# Patient Record
Sex: FEMALE | Race: ASIAN | NOT HISPANIC OR LATINO | Employment: UNEMPLOYED | URBAN - METROPOLITAN AREA
[De-identification: names, ages, dates, MRNs, and addresses within clinical notes are randomized per-mention and may not be internally consistent; named-entity substitution may affect disease eponyms.]

---

## 2017-04-28 PROBLEM — K21.9 GASTROESOPHAGEAL REFLUX DISEASE WITHOUT ESOPHAGITIS: Status: ACTIVE | Noted: 2017-04-28

## 2017-06-15 ENCOUNTER — HOSPITAL ENCOUNTER (EMERGENCY)
Facility: HOSPITAL | Age: 32
Discharge: HOME | End: 2017-06-15
Payer: COMMERCIAL

## 2017-06-15 VITALS
OXYGEN SATURATION: 99 % | DIASTOLIC BLOOD PRESSURE: 53 MMHG | HEART RATE: 84 BPM | TEMPERATURE: 98 F | WEIGHT: 103 LBS | RESPIRATION RATE: 20 BRPM | SYSTOLIC BLOOD PRESSURE: 109 MMHG | BODY MASS INDEX: 22.22 KG/M2 | HEIGHT: 57 IN

## 2017-06-15 DIAGNOSIS — R10.9 RIGHT FLANK PAIN: ICD-10-CM

## 2017-06-15 DIAGNOSIS — R11.0 NAUSEA: ICD-10-CM

## 2017-06-15 DIAGNOSIS — R10.31 RIGHT LOWER QUADRANT ABDOMINAL PAIN: Primary | ICD-10-CM

## 2017-06-15 LAB
ALBUMIN SERPL BCP-MCNC: 3.8 G/DL
ALP SERPL-CCNC: 43 U/L
ALT SERPL W/O P-5'-P-CCNC: 16 U/L
ANION GAP SERPL CALC-SCNC: 8 MMOL/L
AST SERPL-CCNC: 25 U/L
B-HCG UR QL: NEGATIVE
BASOPHILS # BLD AUTO: 0.02 K/UL
BASOPHILS NFR BLD: 0.2 %
BILIRUB SERPL-MCNC: 0.3 MG/DL
BILIRUB UR QL STRIP: NEGATIVE
BUN SERPL-MCNC: 9 MG/DL
CALCIUM SERPL-MCNC: 9.1 MG/DL
CHLORIDE SERPL-SCNC: 106 MMOL/L
CLARITY UR: CLEAR
CO2 SERPL-SCNC: 22 MMOL/L
COLOR UR: COLORLESS
CREAT SERPL-MCNC: 0.7 MG/DL
CTP QC/QA: YES
DIFFERENTIAL METHOD: ABNORMAL
EOSINOPHIL # BLD AUTO: 0.1 K/UL
EOSINOPHIL NFR BLD: 1.1 %
ERYTHROCYTE [DISTWIDTH] IN BLOOD BY AUTOMATED COUNT: 16.8 %
EST. GFR  (AFRICAN AMERICAN): >60 ML/MIN/1.73 M^2
EST. GFR  (NON AFRICAN AMERICAN): >60 ML/MIN/1.73 M^2
GLUCOSE SERPL-MCNC: 83 MG/DL
GLUCOSE UR QL STRIP: NEGATIVE
HCT VFR BLD AUTO: 35 %
HGB BLD-MCNC: 11.1 G/DL
HGB UR QL STRIP: NEGATIVE
KETONES UR QL STRIP: NEGATIVE
LEUKOCYTE ESTERASE UR QL STRIP: ABNORMAL
LIPASE SERPL-CCNC: 20 U/L
LYMPHOCYTES # BLD AUTO: 1 K/UL
LYMPHOCYTES NFR BLD: 9.5 %
MCH RBC QN AUTO: 24.4 PG
MCHC RBC AUTO-ENTMCNC: 31.7 %
MCV RBC AUTO: 77 FL
MICROSCOPIC COMMENT: NORMAL
MONOCYTES # BLD AUTO: 0.8 K/UL
MONOCYTES NFR BLD: 7.7 %
NEUTROPHILS # BLD AUTO: 8.3 K/UL
NEUTROPHILS NFR BLD: 81.5 %
NITRITE UR QL STRIP: NEGATIVE
PH UR STRIP: 7 [PH] (ref 5–8)
PLATELET # BLD AUTO: 258 K/UL
PMV BLD AUTO: 9.8 FL
POTASSIUM SERPL-SCNC: 3.9 MMOL/L
PROT SERPL-MCNC: 7.6 G/DL
PROT UR QL STRIP: NEGATIVE
RBC # BLD AUTO: 4.55 M/UL
RBC #/AREA URNS HPF: 0 /HPF (ref 0–4)
SODIUM SERPL-SCNC: 136 MMOL/L
SP GR UR STRIP: 1 (ref 1–1.03)
SQUAMOUS #/AREA URNS HPF: NORMAL /HPF
URN SPEC COLLECT METH UR: ABNORMAL
UROBILINOGEN UR STRIP-ACNC: NEGATIVE EU/DL
WBC # BLD AUTO: 10.12 K/UL
WBC #/AREA URNS HPF: 1 /HPF (ref 0–5)

## 2017-06-15 PROCEDURE — 81025 URINE PREGNANCY TEST: CPT | Performed by: EMERGENCY MEDICINE

## 2017-06-15 PROCEDURE — 99284 EMERGENCY DEPT VISIT MOD MDM: CPT | Mod: 25

## 2017-06-15 PROCEDURE — 81000 URINALYSIS NONAUTO W/SCOPE: CPT

## 2017-06-15 PROCEDURE — 96375 TX/PRO/DX INJ NEW DRUG ADDON: CPT

## 2017-06-15 PROCEDURE — 63600175 PHARM REV CODE 636 W HCPCS: Performed by: NURSE PRACTITIONER

## 2017-06-15 PROCEDURE — 96361 HYDRATE IV INFUSION ADD-ON: CPT

## 2017-06-15 PROCEDURE — 25000003 PHARM REV CODE 250: Performed by: NURSE PRACTITIONER

## 2017-06-15 PROCEDURE — 25500020 PHARM REV CODE 255

## 2017-06-15 PROCEDURE — 85025 COMPLETE CBC W/AUTO DIFF WBC: CPT

## 2017-06-15 PROCEDURE — 80053 COMPREHEN METABOLIC PANEL: CPT

## 2017-06-15 PROCEDURE — 96372 THER/PROPH/DIAG INJ SC/IM: CPT

## 2017-06-15 PROCEDURE — 83690 ASSAY OF LIPASE: CPT

## 2017-06-15 PROCEDURE — 96365 THER/PROPH/DIAG IV INF INIT: CPT

## 2017-06-15 RX ORDER — DICYCLOMINE HYDROCHLORIDE 10 MG/ML
20 INJECTION INTRAMUSCULAR
Status: COMPLETED | OUTPATIENT
Start: 2017-06-15 | End: 2017-06-15

## 2017-06-15 RX ORDER — TRAMADOL HYDROCHLORIDE 50 MG/1
50 TABLET ORAL EVERY 6 HOURS PRN
Qty: 12 TABLET | Refills: 0 | Status: SHIPPED | OUTPATIENT
Start: 2017-06-15 | End: 2017-06-25

## 2017-06-15 RX ORDER — DICYCLOMINE HYDROCHLORIDE 20 MG/1
20 TABLET ORAL 2 TIMES DAILY
Qty: 20 TABLET | Refills: 0 | Status: SHIPPED | OUTPATIENT
Start: 2017-06-15 | End: 2017-07-15

## 2017-06-15 RX ORDER — ONDANSETRON 8 MG/1
8 TABLET, ORALLY DISINTEGRATING ORAL EVERY 6 HOURS PRN
Qty: 20 TABLET | Refills: 0 | Status: SHIPPED | OUTPATIENT
Start: 2017-06-15 | End: 2018-05-03

## 2017-06-15 RX ORDER — KETOROLAC TROMETHAMINE 30 MG/ML
30 INJECTION, SOLUTION INTRAMUSCULAR; INTRAVENOUS
Status: COMPLETED | OUTPATIENT
Start: 2017-06-15 | End: 2017-06-15

## 2017-06-15 RX ORDER — ONDANSETRON 2 MG/ML
8 INJECTION INTRAMUSCULAR; INTRAVENOUS
Status: COMPLETED | OUTPATIENT
Start: 2017-06-15 | End: 2017-06-15

## 2017-06-15 RX ORDER — MORPHINE SULFATE 10 MG/ML
4 INJECTION INTRAMUSCULAR; INTRAVENOUS; SUBCUTANEOUS
Status: COMPLETED | OUTPATIENT
Start: 2017-06-15 | End: 2017-06-15

## 2017-06-15 RX ADMIN — IOHEXOL 75 ML: 350 INJECTION, SOLUTION INTRAVENOUS at 01:06

## 2017-06-15 RX ADMIN — DICYCLOMINE HYDROCHLORIDE 20 MG: 10 INJECTION INTRAMUSCULAR at 12:06

## 2017-06-15 RX ADMIN — KETOROLAC TROMETHAMINE 30 MG: 30 INJECTION, SOLUTION INTRAMUSCULAR at 02:06

## 2017-06-15 RX ADMIN — PROMETHAZINE HYDROCHLORIDE 12.5 MG: 25 INJECTION INTRAMUSCULAR; INTRAVENOUS at 02:06

## 2017-06-15 RX ADMIN — ONDANSETRON 8 MG: 2 INJECTION INTRAMUSCULAR; INTRAVENOUS at 12:06

## 2017-06-15 RX ADMIN — SODIUM CHLORIDE 1000 ML: 0.9 INJECTION, SOLUTION INTRAVENOUS at 11:06

## 2017-06-15 RX ADMIN — MORPHINE SULFATE 4 MG: 10 INJECTION INTRAVENOUS at 02:06

## 2017-06-15 NOTE — ED PROVIDER NOTES
"Encounter Date: 6/15/2017    SCRIBE #1 NOTE: I, Mat Morel, am scribing for, and in the presence of,  Aguilar Gaona NP. I have scribed the following portions of the note - Other sections scribed: HPI and ROS.       History     Chief Complaint   Patient presents with    Abdominal Pain     Intermittent cramping w/ constant burning that is generalized since yesterday. +Nausea. Patient reports "I feel like its squeezing."      Review of patient's allergies indicates:  No Known Allergies  CC: Abdominal Pain     HPI: This 31 y.o F with anemia, GERD, an ovarian cyst and hypoglycemia presents to the ED c/o acute onset of constant and severe (10/10) generalized abdominal pain described as "cramping and burning" with associated nausea and emesis which began this AM. Pt also reports sharp back pain. Pt notes her abdominal pain is more severe on the R side. Pt reports intermittent abdominal pain since the birth of her child which worsened after a colonoscopy that was performed approximately 2 months ago. Pt has been seen by a gastroenterologist who performed an endoscopy, colonoscopy and CT. Pt was dx with "excessive acid." Pt denies fever, chills and diaphoresis. Pt attempted tx with Mylanta which did not provide relief.       The history is provided by the patient. No  was used.     Past Medical History:   Diagnosis Date    Anemia     GERD (gastroesophageal reflux disease)     Hypoglycemia     Hypoglycemia      No past surgical history on file.  Family History   Problem Relation Age of Onset    Hypertension Mother     Hyperlipidemia Mother      Social History   Substance Use Topics    Smoking status: Never Smoker    Smokeless tobacco: Not on file    Alcohol use No     Review of Systems   Constitutional: Negative for fever.   HENT: Positive for sore throat. Negative for congestion.    Respiratory: Negative for shortness of breath.    Cardiovascular: Negative for chest pain.   Gastrointestinal: " "Positive for abdominal pain (generalized "cramping and burning" ) and vomiting. Negative for nausea.   Genitourinary: Negative for dysuria, pelvic pain and urgency.   Musculoskeletal: Positive for back pain ("sharp").   Skin: Negative for rash.   Neurological: Negative for weakness.   Hematological: Does not bruise/bleed easily.       Physical Exam     Initial Vitals [06/15/17 1007]   BP Pulse Resp Temp SpO2   135/64 99 (!) 22 98 °F (36.7 °C) 99 %     Physical Exam    Nursing note and vitals reviewed.  Constitutional: She appears well-developed and well-nourished. She is not diaphoretic. No distress.   HENT:   Head: Normocephalic and atraumatic.   Right Ear: External ear normal.   Left Ear: External ear normal.   Nose: Nose normal.   Eyes: Conjunctivae and EOM are normal. Pupils are equal, round, and reactive to light. Right eye exhibits no discharge. Left eye exhibits no discharge. No scleral icterus.   Neck: Normal range of motion. Neck supple. No thyromegaly present. No tracheal deviation present. No JVD present.   Cardiovascular: Normal rate, regular rhythm, normal heart sounds and intact distal pulses. Exam reveals no gallop and no friction rub.    No murmur heard.  Pulmonary/Chest: Breath sounds normal. No stridor. No respiratory distress. She has no wheezes. She has no rhonchi. She has no rales.   Abdominal: Soft. Normal appearance and bowel sounds are normal. She exhibits no distension and no mass. There is no hepatosplenomegaly. There is tenderness in the right upper quadrant and right lower quadrant. There is no rigidity, no rebound, no guarding, no CVA tenderness, no tenderness at McBurney's point and negative Casillas's sign. No hernia.   Musculoskeletal: Normal range of motion. She exhibits no edema or tenderness.   Lymphadenopathy:     She has no cervical adenopathy.   Neurological: She is alert and oriented to person, place, and time. She has normal strength and normal reflexes. She displays normal " reflexes. No cranial nerve deficit or sensory deficit.   Skin: Skin is warm and dry. No rash and no abscess noted. No erythema. No pallor.   Psychiatric: She has a normal mood and affect. Her behavior is normal. Judgment and thought content normal.         ED Course   Procedures  Labs Reviewed   CBC W/ AUTO DIFFERENTIAL - Abnormal; Notable for the following:        Result Value    Hemoglobin 11.1 (*)     Hematocrit 35.0 (*)     MCV 77 (*)     MCH 24.4 (*)     MCHC 31.7 (*)     RDW 16.8 (*)     Gran # 8.3 (*)     Gran% 81.5 (*)     Lymph% 9.5 (*)     All other components within normal limits    Narrative:     For upper or mid abdominal pain.   COMPREHENSIVE METABOLIC PANEL - Abnormal; Notable for the following:     CO2 22 (*)     Alkaline Phosphatase 43 (*)     All other components within normal limits    Narrative:     For upper or mid abdominal pain.   URINALYSIS - Abnormal; Notable for the following:     Color, UA Colorless (*)     Leukocytes, UA Trace (*)     All other components within normal limits   LIPASE    Narrative:     For upper or mid abdominal pain.   URINALYSIS MICROSCOPIC   POCT URINE PREGNANCY             Medical Decision Making:   History:   Old Medical Records: I decided to obtain old medical records.  Clinical Tests:   Lab Tests: Ordered and Reviewed  Radiological Study: Ordered and Reviewed  ED Management:  This is a 31-year-old female with a history of abdominal pain and right ovarian cyst presenting emergency department complaining of right upper and lower quadrant abdominal pain that began this morning. She states also complaining of right flank and thoracic back pain. She has a history of chronic abdominal problems but states that this is worse than usual. She has had a negative EGD, colonoscopy, and CT for the same problems. She states that her abdominal problems began following the birth of her child several months ago. She also reports associated nausea and vomiting yesterday but has not  vomited today. On exam patient appears uncomfortable. Patient is afebrile. There is tenderness to palpation of the right upper and lower abdominal quadrants. No tenderness to palpation of the left side of the abdomen. Abdomen is soft ×4 quadrants. No peritoneal signs. CBC shows mild hypochromic microcytic anemia. No leukocytosis. CMP and urinalysis are unremarkable. Lipase is within normal limits. CT of the abdomen shows no evidence of acute intra-abdominal pathology or other abnormality. Based on these findings I suspect abdominal pain. Pain possibly due to previously diagnosed right ovarian cyst. I consider but doubt cholecystitis, pancreatitis, appendicitis, nephrolithiasis, pyelonephritis, bowel obstruction, Crohn's disease, diverticulitis, AAA, dissecting aorta.    Administered morphine, Zofran, Bentyl for analgesia. Instructed patient to follow-up with gastroenterology in the next several days for further evaluation and management. At this time I felt the patient is stable and safe for discharge. Pt discharged home with instructions for follow up and supportive care. ED return precautions given. Pt verbalized understanding of all information and instructions given. This case was discussed with Hermes Salgado M.D. who agreed with the assessment and plan.    Other:   I have discussed this case with another health care provider.            Scribe Attestation:   Scribe #1: I performed the above scribed service and the documentation accurately describes the services I performed. I attest to the accuracy of the note.    Attending Attestation:     Physician Attestation Statement for NP/PA:   I have conducted a face to face encounter with this patient in addition to the NP/PA, due to    Other NP/PA Attestation Additions:    History of Present Illness: Patient has had negative colonoscopy EGD and CT at an outside Center,  states she's gained from 92 pounds to 110 pounds over the last 6 months.   Physical Exam:  Abdomen bowel sounds normoactive but general tenderness throughout no guarding unable to appreciate masses or hepatosplenomegaly        Physician Attestation for Scribe:  Physician Attestation Statement for Scribe #1: I, Aguilar Gaona NP, reviewed documentation, as scribed by Mat Morel in my presence, and it is both accurate and complete.                 ED Course     Clinical Impression:   The primary encounter diagnosis was Right lower quadrant abdominal pain. Diagnoses of Nausea and Right flank pain were also pertinent to this visit.    Disposition:   Disposition: Discharged  Condition: Stable       Aguilar Gaona NP  06/15/17 1025

## 2017-06-15 NOTE — DISCHARGE INSTRUCTIONS
Follow-up with your gastroenterologist and gynecologist as discussed.    Take medications for pain and nausea as needed and only as prescribed.    Return the emergency department for any new or worsening symptoms.

## 2017-10-31 PROBLEM — H91.90 HEARING LOSS: Status: ACTIVE | Noted: 2017-10-31

## 2017-10-31 PROBLEM — H92.03 EAR PAIN, BILATERAL: Status: ACTIVE | Noted: 2017-10-31

## 2018-08-13 ENCOUNTER — TELEPHONE (OUTPATIENT)
Dept: SLEEP MEDICINE | Facility: CLINIC | Age: 33
End: 2018-08-13

## 2018-08-13 NOTE — TELEPHONE ENCOUNTER
Left message on voicemail to call the clinic to reschedule appointment per Martha Spann due to appointment schedule error

## 2019-03-26 ENCOUNTER — HOSPITAL ENCOUNTER (EMERGENCY)
Facility: HOSPITAL | Age: 34
Discharge: HOME | End: 2019-03-26
Attending: EMERGENCY MEDICINE
Payer: COMMERCIAL

## 2019-03-26 VITALS
RESPIRATION RATE: 17 BRPM | HEART RATE: 84 BPM | OXYGEN SATURATION: 100 % | DIASTOLIC BLOOD PRESSURE: 86 MMHG | HEIGHT: 59 IN | BODY MASS INDEX: 21.17 KG/M2 | TEMPERATURE: 98 F | SYSTOLIC BLOOD PRESSURE: 112 MMHG | WEIGHT: 105 LBS

## 2019-03-26 DIAGNOSIS — N12 PYELONEPHRITIS: Primary | ICD-10-CM

## 2019-03-26 DIAGNOSIS — K38.9 APPENDICOLITH: ICD-10-CM

## 2019-03-26 LAB
ALBUMIN SERPL BCP-MCNC: 4 G/DL (ref 3.5–5.2)
ALP SERPL-CCNC: 62 U/L (ref 55–135)
ALT SERPL W/O P-5'-P-CCNC: 16 U/L (ref 10–44)
ANION GAP SERPL CALC-SCNC: 9 MMOL/L (ref 8–16)
ANISOCYTOSIS BLD QL SMEAR: SLIGHT
AST SERPL-CCNC: 17 U/L (ref 10–40)
B-HCG UR QL: NEGATIVE
BACTERIA #/AREA URNS HPF: ABNORMAL /HPF
BASOPHILS # BLD AUTO: 0.06 K/UL (ref 0–0.2)
BASOPHILS NFR BLD: 0.6 % (ref 0–1.9)
BILIRUB SERPL-MCNC: 0.2 MG/DL (ref 0.1–1)
BILIRUB UR QL STRIP: NEGATIVE
BUN SERPL-MCNC: 11 MG/DL (ref 6–20)
CALCIUM SERPL-MCNC: 9.5 MG/DL (ref 8.7–10.5)
CHLORIDE SERPL-SCNC: 108 MMOL/L (ref 95–110)
CLARITY UR: ABNORMAL
CO2 SERPL-SCNC: 22 MMOL/L (ref 23–29)
COLOR UR: YELLOW
CREAT SERPL-MCNC: 0.7 MG/DL (ref 0.5–1.4)
CTP QC/QA: YES
DIFFERENTIAL METHOD: ABNORMAL
EOSINOPHIL # BLD AUTO: 0.4 K/UL (ref 0–0.5)
EOSINOPHIL NFR BLD: 3.7 % (ref 0–8)
ERYTHROCYTE [DISTWIDTH] IN BLOOD BY AUTOMATED COUNT: 18 % (ref 11.5–14.5)
EST. GFR  (AFRICAN AMERICAN): >60 ML/MIN/1.73 M^2
EST. GFR  (NON AFRICAN AMERICAN): >60 ML/MIN/1.73 M^2
GLUCOSE SERPL-MCNC: 97 MG/DL (ref 70–110)
GLUCOSE UR QL STRIP: NEGATIVE
HCT VFR BLD AUTO: 34.3 % (ref 37–48.5)
HGB BLD-MCNC: 10.3 G/DL (ref 12–16)
HGB UR QL STRIP: ABNORMAL
HYALINE CASTS #/AREA URNS LPF: 0 /LPF
HYPOCHROMIA BLD QL SMEAR: ABNORMAL
KETONES UR QL STRIP: NEGATIVE
LEUKOCYTE ESTERASE UR QL STRIP: ABNORMAL
LIPASE SERPL-CCNC: 22 U/L (ref 4–60)
LYMPHOCYTES # BLD AUTO: 2.1 K/UL (ref 1–4.8)
LYMPHOCYTES NFR BLD: 19.4 % (ref 18–48)
MCH RBC QN AUTO: 21.4 PG (ref 27–31)
MCHC RBC AUTO-ENTMCNC: 30 G/DL (ref 32–36)
MCV RBC AUTO: 71 FL (ref 82–98)
MICROSCOPIC COMMENT: ABNORMAL
MONOCYTES # BLD AUTO: 0.8 K/UL (ref 0.3–1)
MONOCYTES NFR BLD: 7.4 % (ref 4–15)
NEUTROPHILS # BLD AUTO: 7.4 K/UL (ref 1.8–7.7)
NEUTROPHILS NFR BLD: 69 % (ref 38–73)
NITRITE UR QL STRIP: NEGATIVE
PH UR STRIP: 5 [PH] (ref 5–8)
PLATELET # BLD AUTO: 307 K/UL (ref 150–350)
PLATELET BLD QL SMEAR: ABNORMAL
PMV BLD AUTO: 10.1 FL (ref 9.2–12.9)
POLYCHROMASIA BLD QL SMEAR: ABNORMAL
POTASSIUM SERPL-SCNC: 4 MMOL/L (ref 3.5–5.1)
PROT SERPL-MCNC: 7.4 G/DL (ref 6–8.4)
PROT UR QL STRIP: ABNORMAL
RBC # BLD AUTO: 4.81 M/UL (ref 4–5.4)
RBC #/AREA URNS HPF: 10 /HPF (ref 0–4)
SODIUM SERPL-SCNC: 139 MMOL/L (ref 136–145)
SP GR UR STRIP: 1.01 (ref 1–1.03)
URN SPEC COLLECT METH UR: ABNORMAL
UROBILINOGEN UR STRIP-ACNC: NEGATIVE EU/DL
WBC # BLD AUTO: 10.71 K/UL (ref 3.9–12.7)
WBC #/AREA URNS HPF: >100 /HPF (ref 0–5)
WBC CLUMPS URNS QL MICRO: ABNORMAL

## 2019-03-26 PROCEDURE — 96375 TX/PRO/DX INJ NEW DRUG ADDON: CPT

## 2019-03-26 PROCEDURE — 83690 ASSAY OF LIPASE: CPT

## 2019-03-26 PROCEDURE — 96365 THER/PROPH/DIAG IV INF INIT: CPT

## 2019-03-26 PROCEDURE — 96361 HYDRATE IV INFUSION ADD-ON: CPT

## 2019-03-26 PROCEDURE — 96376 TX/PRO/DX INJ SAME DRUG ADON: CPT

## 2019-03-26 PROCEDURE — 87086 URINE CULTURE/COLONY COUNT: CPT

## 2019-03-26 PROCEDURE — 85025 COMPLETE CBC W/AUTO DIFF WBC: CPT

## 2019-03-26 PROCEDURE — 87088 URINE BACTERIA CULTURE: CPT

## 2019-03-26 PROCEDURE — 99284 EMERGENCY DEPT VISIT MOD MDM: CPT | Mod: 25

## 2019-03-26 PROCEDURE — 80053 COMPREHEN METABOLIC PANEL: CPT

## 2019-03-26 PROCEDURE — 81025 URINE PREGNANCY TEST: CPT | Performed by: EMERGENCY MEDICINE

## 2019-03-26 PROCEDURE — 81000 URINALYSIS NONAUTO W/SCOPE: CPT

## 2019-03-26 PROCEDURE — 87186 SC STD MICRODIL/AGAR DIL: CPT

## 2019-03-26 PROCEDURE — 87077 CULTURE AEROBIC IDENTIFY: CPT

## 2019-03-26 PROCEDURE — 63600175 PHARM REV CODE 636 W HCPCS: Performed by: EMERGENCY MEDICINE

## 2019-03-26 PROCEDURE — 25000003 PHARM REV CODE 250: Performed by: EMERGENCY MEDICINE

## 2019-03-26 RX ORDER — CEPHALEXIN 500 MG/1
500 CAPSULE ORAL EVERY 8 HOURS
Qty: 42 CAPSULE | Refills: 0 | Status: SHIPPED | OUTPATIENT
Start: 2019-03-26 | End: 2019-04-09

## 2019-03-26 RX ORDER — MORPHINE SULFATE 10 MG/ML
2 INJECTION INTRAMUSCULAR; INTRAVENOUS; SUBCUTANEOUS
Status: COMPLETED | OUTPATIENT
Start: 2019-03-26 | End: 2019-03-26

## 2019-03-26 RX ORDER — SODIUM CHLORIDE 9 MG/ML
1000 INJECTION, SOLUTION INTRAVENOUS
Status: COMPLETED | OUTPATIENT
Start: 2019-03-26 | End: 2019-03-26

## 2019-03-26 RX ORDER — ACETAMINOPHEN AND CODEINE PHOSPHATE 300; 30 MG/1; MG/1
1 TABLET ORAL EVERY 6 HOURS PRN
Qty: 12 TABLET | Refills: 0 | Status: SHIPPED | OUTPATIENT
Start: 2019-03-26 | End: 2019-04-05

## 2019-03-26 RX ORDER — KETOROLAC TROMETHAMINE 30 MG/ML
15 INJECTION, SOLUTION INTRAMUSCULAR; INTRAVENOUS
Status: COMPLETED | OUTPATIENT
Start: 2019-03-26 | End: 2019-03-26

## 2019-03-26 RX ORDER — IBUPROFEN 600 MG/1
600 TABLET ORAL EVERY 6 HOURS PRN
Qty: 20 TABLET | Refills: 0 | Status: SHIPPED | OUTPATIENT
Start: 2019-03-26 | End: 2019-05-19

## 2019-03-26 RX ORDER — ONDANSETRON 2 MG/ML
4 INJECTION INTRAMUSCULAR; INTRAVENOUS
Status: COMPLETED | OUTPATIENT
Start: 2019-03-26 | End: 2019-03-26

## 2019-03-26 RX ORDER — DOCUSATE SODIUM 100 MG/1
100 CAPSULE, LIQUID FILLED ORAL 2 TIMES DAILY
Qty: 60 CAPSULE | Refills: 0 | Status: SHIPPED | OUTPATIENT
Start: 2019-03-26 | End: 2019-05-19

## 2019-03-26 RX ORDER — ONDANSETRON 4 MG/1
4 TABLET, FILM COATED ORAL EVERY 6 HOURS PRN
Qty: 12 TABLET | Refills: 0 | Status: SHIPPED | OUTPATIENT
Start: 2019-03-26 | End: 2019-05-19

## 2019-03-26 RX ADMIN — KETOROLAC TROMETHAMINE 15 MG: 30 INJECTION, SOLUTION INTRAMUSCULAR; INTRAVENOUS at 07:03

## 2019-03-26 RX ADMIN — KETOROLAC TROMETHAMINE 15 MG: 30 INJECTION INTRAMUSCULAR; INTRAVENOUS at 12:03

## 2019-03-26 RX ADMIN — SODIUM CHLORIDE 1000 ML: 0.9 INJECTION, SOLUTION INTRAVENOUS at 09:03

## 2019-03-26 RX ADMIN — SODIUM CHLORIDE 1000 ML: 0.9 INJECTION, SOLUTION INTRAVENOUS at 07:03

## 2019-03-26 RX ADMIN — CEFTRIAXONE 1 G: 1 INJECTION, SOLUTION INTRAVENOUS at 09:03

## 2019-03-26 RX ADMIN — MORPHINE SULFATE 2 MG: 10 INJECTION INTRAVENOUS at 09:03

## 2019-03-26 RX ADMIN — ONDANSETRON 4 MG: 2 INJECTION INTRAMUSCULAR; INTRAVENOUS at 07:03

## 2019-03-26 NOTE — ED NOTES
Patient reports pain has subsided with pain mediation earlier, but has currently returned to a 5/10. MD sellers

## 2019-03-26 NOTE — ED PROVIDER NOTES
"Encounter Date: 3/26/2019    SCRIBE #1 NOTE: I, Bonifacio Workman, am scribing for, and in the presence of,  Celso Finney MD. I have scribed the following portions of the note - Other sections scribed: HPI, ROS, PE.       History     Chief Complaint   Patient presents with    Abdominal Pain     pt reports intermittent RLQ abdominal pain & nausea starting Thursday 3/21 but became severe this morning; pt grabbing at side and crying in pain     CC: Abdominal Pain    HPI: This is a 33 y.o. F who has GERD, Anemia who presents to the ED for emergent evaluation of acute recurrent abdominal pain. Pt reports an episode of generalized abdominal pain 5 days ago that spontaneously resolved and reoccurred today. However, current abdominal pain is localized in the right lower quadrant. Abdominal pain is intermittent and cramping. She states that the abdominal pain radiates to the back, in which she describes the back pain as a sharp pain. Pt has associated nausea, dysuria, urinary frequency, and hematuria. She reports an episode of diarrhea that resolved, but currently reports constipation. Her LMP was a "couple of weeks ago." Pt denies vomiting, SOB, ill contact, or Hx of abdominal surgery.    The history is provided by the patient. No  was used.     Review of patient's allergies indicates:  No Known Allergies  Past Medical History:   Diagnosis Date    Anemia     GERD (gastroesophageal reflux disease)     Hypoglycemia     Hypoglycemia     Mental disorder      History reviewed. No pertinent surgical history.  Family History   Problem Relation Age of Onset    Hypertension Mother     Hyperlipidemia Mother     Cancer Neg Hx      Social History     Tobacco Use    Smoking status: Never Smoker    Smokeless tobacco: Never Used   Substance Use Topics    Alcohol use: No    Drug use: No     Review of Systems   Constitutional: Negative for chills and fever.   HENT: Negative for ear pain and sore throat.  "   Eyes: Negative for pain.   Respiratory: Negative for cough and shortness of breath.    Cardiovascular: Negative for chest pain.   Gastrointestinal: Positive for abdominal pain, constipation and nausea. Negative for diarrhea and vomiting.   Genitourinary: Positive for dysuria, frequency and hematuria.   Musculoskeletal: Positive for back pain.   Skin: Negative for rash.   Neurological: Negative for headaches.       Physical Exam     Initial Vitals [03/26/19 0618]   BP Pulse Resp Temp SpO2   119/78 94 19 98.1 °F (36.7 °C) 100 %      MAP       --         Physical Exam    Nursing note and vitals reviewed.  Constitutional: She is not diaphoretic. She appears distressed.   HENT:   Head: Normocephalic and atraumatic.   Mouth/Throat: Oropharynx is clear and moist.   Eyes: EOM are normal. Pupils are equal, round, and reactive to light. No scleral icterus.   Neck: Normal range of motion. Neck supple. No JVD present.   Cardiovascular: Normal rate and regular rhythm.   Pulmonary/Chest: Breath sounds normal. No stridor. No respiratory distress.   Abdominal: Soft. Bowel sounds are normal. She exhibits no distension. There is tenderness in the right lower quadrant. There is CVA tenderness (on the right side).   Musculoskeletal: Normal range of motion. She exhibits no edema or tenderness.   Neurological: She is alert and oriented to person, place, and time. She has normal strength. No cranial nerve deficit or sensory deficit.   Skin: Skin is warm and dry.   Psychiatric: She has a normal mood and affect.         ED Course   Procedures  Labs Reviewed   CBC W/ AUTO DIFFERENTIAL - Abnormal; Notable for the following components:       Result Value    Hemoglobin 10.3 (*)     Hematocrit 34.3 (*)     MCV 71 (*)     MCH 21.4 (*)     MCHC 30.0 (*)     RDW 18.0 (*)     All other components within normal limits   COMPREHENSIVE METABOLIC PANEL - Abnormal; Notable for the following components:    CO2 22 (*)     All other components within  "normal limits   URINALYSIS, REFLEX TO URINE CULTURE - Abnormal; Notable for the following components:    Appearance, UA Hazy (*)     Protein, UA 1+ (*)     Occult Blood UA 2+ (*)     Leukocytes, UA 3+ (*)     All other components within normal limits    Narrative:     Preferred Collection Type->Urine, Clean Catch   URINALYSIS MICROSCOPIC - Abnormal; Notable for the following components:    RBC, UA 10 (*)     WBC, UA >100 (*)     WBC Clumps, UA Moderate (*)     Bacteria, UA Few (*)     All other components within normal limits    Narrative:     Preferred Collection Type->Urine, Clean Catch   CULTURE, URINE    Narrative:     Preferred Collection Type->Urine, Clean Catch   LIPASE   POCT URINE PREGNANCY          Imaging Results          CT Renal Stone Study ABD Pelvis WO (Edited Result - FINAL)  Result time 03/26/19 09:50:13    Addendum 1 of 1 by Izzy Hartman MD (03/26/19 09:50:13)      CORRECTION: There is an error in the impression of the report (Impression 3).  This sentence should read "appendicolith WITHOUT appendiceal enlargement or surrounding inflammatory change to suggest acute appendicitis."      Electronically signed by: Izzy Hartman MD  Date:    03/26/2019  Time:    09:50               Final result by Izzy Hartman MD (03/26/19 09:11:53)                 Impression:      1. No evidence of nephrolithiasis or obstructive uropathy.  2.   Margins of the pancreas and upper abdominal bowel loops appear somewhat indistinct which may be due to motion artifact and or lack of intravenous/enteric contrast. However mild inflammatory process such as pancreatitis, gastritis or enteritis cannot be excluded. Correlate with lipase and clinical history.    3.  Appendicoliths with appendiceal enlargement or surrounding inflammatory change to suggest acute appendicitis.      Electronically signed by: Izzy Hartman MD  Date:    03/26/2019  Time:    09:11             Narrative:    EXAMINATION:  CT RENAL " STONE STUDY ABD PELVIS WO    CLINICAL HISTORY:  Flank pain, stone disease suspected;Hematuria;    TECHNIQUE:  Low dose axial images, sagittal and coronal reformations were obtained from the lung bases to the pubic symphysis.  Contrast was not administered.    COMPARISON:  Prior dated 05/24/2017    FINDINGS:  A few subcentimeter foci of pleural thickening versus atelectasis are noted at the dependent aspect of the left lung base.    The liver is normal in size and contour.  Evaluation for focal lesions is limited by lack of intravenous contrast.  The gallbladder is nondistended.  The  spleen, and adrenal glands have a normal noncontrast appearance.  Kidneys demonstrate normal cortical thickness with no evidence of hydronephrosis or nephrolithiasis.    The margins of the pancreas and upper abdominal bowel loops appear somewhat indistinct which may be due to motion artifact and or lack of intravenous/enteric contrast.  However mild inflammatory process such as pancreatitis or enteritis cannot be excluded.  Correlate with lipase.    Stomach and small bowel are nondistended.  There is no evidence of colonic obstruction.  A small appendicoliths is present but there is no evidence of appendiceal enlargement or periappendiceal inflammatory change to suggest acute appendicitis.    Bladder and reproductive organs have a normal appearance.    There is no evidence of free air or free fluid in the abdomen or pelvis.    No osseous abnormalities are seen.                                 Medical Decision Making:   History:   Old Medical Records: I decided to obtain old medical records.  Old Records Summarized: records from clinic visits.       <> Summary of Records: Past OB visit for evaluation ovarian cyst  Differential Diagnosis:   Appendicitis   renal stone  UTI  Pyelonephritis  Ovarian cyst  Clinical Tests:   Lab Tests: Ordered and Reviewed  Radiological Study: Ordered and Reviewed  ED Management:  Patient painful distress at  time history and physical.  She has right lower quadrant and right CVA tenderness. Urine pregnancy test is negative. Given analgesia and antiemetic.  Labs without leukocytosis.  Renal function and hepatobiliary profile are within normal limits. UA indicative of UTI.  Given focal right lower quadrant tenderness patient sent for CT scans well appendicitis.  CT scan the abdomen pelvis note at and equal with but no secondary findings consistent with appendicitis.  Case discussed with Radiology as well as a general surgery who agree that findings are not likely indicative of appendicitis.  Patient given analgesia antibiotics in the emergency department.  On reassessment she reports improvement symptoms. She is hemodynamically stable and fit for discharge to complete course of antibiotics for pyelonephritis and follow up with primary physician as well as General surgery. counseled on supportive care, appropriate medication usage, concerning symptoms for which to return to ER and the importance of follow up. Understanding and agreement with treatment plan was expressed.   This chart was completed using dictation software, as a result there may be some typographical errors.                       Clinical Impression:       ICD-10-CM ICD-9-CM   1. Pyelonephritis N12 590.80   2. Appendicolith K38.9 543.9         Disposition:   Disposition: Discharged  Condition: Stable                        Celso Finney MD  03/29/19 3486

## 2019-03-26 NOTE — DISCHARGE INSTRUCTIONS
Your lab tests are within acceptable limits however you appear to have a urinary tract infection that is likely affecting your kidneys.  You have normal kidney function. You should complete the entire course of antibiotics prescribed for your kidney infection.  Make an appointment to see your primary physician as soon as possible to monitor your symptoms. CT scan did not show evidence of appendicitis but did show an appendicolith.  You should schedule appointment to be evaluated by general surgeon as an outpatient.  Return to the emergency department for fever, worsening pain not controlled by medications, uncontrollable vomiting, or any new, worsening or concerning symptoms.

## 2019-03-26 NOTE — ED TRIAGE NOTES
Pt reports to ED with complaints of RLQ abdominal pain that began Thursday and worsened this morning. Pt reports nausea with no vomiting.

## 2019-03-28 LAB — BACTERIA UR CULT: NORMAL

## 2019-05-19 ENCOUNTER — HOSPITAL ENCOUNTER (EMERGENCY)
Facility: HOSPITAL | Age: 34
Discharge: HOME | End: 2019-05-20
Attending: EMERGENCY MEDICINE
Payer: COMMERCIAL

## 2019-05-19 DIAGNOSIS — R21 RASH: ICD-10-CM

## 2019-05-19 DIAGNOSIS — L23.9 ALLERGIC DERMATITIS: Primary | ICD-10-CM

## 2019-05-19 LAB
B-HCG UR QL: NEGATIVE
BILIRUB UR QL STRIP: NEGATIVE
CLARITY UR: CLEAR
COLOR UR: YELLOW
CTP QC/QA: YES
GLUCOSE UR QL STRIP: NEGATIVE
HGB UR QL STRIP: NEGATIVE
KETONES UR QL STRIP: NEGATIVE
LEUKOCYTE ESTERASE UR QL STRIP: NEGATIVE
NITRITE UR QL STRIP: NEGATIVE
PH UR STRIP: 5 [PH] (ref 5–8)
PROT UR QL STRIP: NEGATIVE
SP GR UR STRIP: 1.03 (ref 1–1.03)
URN SPEC COLLECT METH UR: NORMAL
UROBILINOGEN UR STRIP-ACNC: NEGATIVE EU/DL

## 2019-05-19 PROCEDURE — 96375 TX/PRO/DX INJ NEW DRUG ADDON: CPT

## 2019-05-19 PROCEDURE — 99284 EMERGENCY DEPT VISIT MOD MDM: CPT | Mod: 25

## 2019-05-19 PROCEDURE — S0028 INJECTION, FAMOTIDINE, 20 MG: HCPCS | Performed by: EMERGENCY MEDICINE

## 2019-05-19 PROCEDURE — 96374 THER/PROPH/DIAG INJ IV PUSH: CPT

## 2019-05-19 PROCEDURE — 25000003 PHARM REV CODE 250: Performed by: EMERGENCY MEDICINE

## 2019-05-19 PROCEDURE — 81003 URINALYSIS AUTO W/O SCOPE: CPT

## 2019-05-19 PROCEDURE — 63600175 PHARM REV CODE 636 W HCPCS: Performed by: EMERGENCY MEDICINE

## 2019-05-19 PROCEDURE — 81025 URINE PREGNANCY TEST: CPT | Performed by: PHYSICIAN ASSISTANT

## 2019-05-19 RX ORDER — FAMOTIDINE 10 MG/ML
20 INJECTION INTRAVENOUS
Status: COMPLETED | OUTPATIENT
Start: 2019-05-19 | End: 2019-05-19

## 2019-05-19 RX ORDER — METHYLPREDNISOLONE SOD SUCC 125 MG
125 VIAL (EA) INJECTION
Status: COMPLETED | OUTPATIENT
Start: 2019-05-19 | End: 2019-05-19

## 2019-05-19 RX ORDER — DIPHENHYDRAMINE HYDROCHLORIDE 50 MG/ML
25 INJECTION INTRAMUSCULAR; INTRAVENOUS
Status: COMPLETED | OUTPATIENT
Start: 2019-05-19 | End: 2019-05-19

## 2019-05-19 RX ADMIN — METHYLPREDNISOLONE SODIUM SUCCINATE 125 MG: 125 INJECTION, POWDER, FOR SOLUTION INTRAMUSCULAR; INTRAVENOUS at 10:05

## 2019-05-19 RX ADMIN — DIPHENHYDRAMINE HYDROCHLORIDE 25 MG: 50 INJECTION INTRAMUSCULAR; INTRAVENOUS at 10:05

## 2019-05-19 RX ADMIN — FAMOTIDINE 20 MG: 10 INJECTION INTRAVENOUS at 10:05

## 2019-05-20 VITALS
HEIGHT: 57 IN | BODY MASS INDEX: 23.73 KG/M2 | HEART RATE: 87 BPM | DIASTOLIC BLOOD PRESSURE: 55 MMHG | OXYGEN SATURATION: 99 % | SYSTOLIC BLOOD PRESSURE: 100 MMHG | WEIGHT: 110 LBS | RESPIRATION RATE: 16 BRPM | TEMPERATURE: 99 F

## 2019-05-20 PROCEDURE — 25000003 PHARM REV CODE 250: Performed by: EMERGENCY MEDICINE

## 2019-05-20 RX ORDER — HYDROXYZINE PAMOATE 25 MG/1
25 CAPSULE ORAL EVERY 6 HOURS PRN
Qty: 20 CAPSULE | Refills: 0 | Status: ON HOLD | OUTPATIENT
Start: 2019-05-20 | End: 2019-05-21 | Stop reason: SDUPTHER

## 2019-05-20 RX ORDER — PREDNISONE 50 MG/1
50 TABLET ORAL DAILY
Qty: 5 TABLET | Refills: 0 | Status: ON HOLD | OUTPATIENT
Start: 2019-05-20 | End: 2019-05-21 | Stop reason: SDUPTHER

## 2019-05-20 RX ORDER — FAMOTIDINE 20 MG/1
20 TABLET, FILM COATED ORAL 2 TIMES DAILY
Qty: 20 TABLET | Refills: 0 | Status: ON HOLD | OUTPATIENT
Start: 2019-05-20 | End: 2019-05-21 | Stop reason: SDUPTHER

## 2019-05-20 RX ORDER — HYDROXYZINE PAMOATE 25 MG/1
25 CAPSULE ORAL
Status: COMPLETED | OUTPATIENT
Start: 2019-05-20 | End: 2019-05-20

## 2019-05-20 RX ADMIN — HYDROXYZINE PAMOATE 25 MG: 25 CAPSULE ORAL at 12:05

## 2019-05-20 NOTE — DISCHARGE INSTRUCTIONS
You should stop taking her Bactrim.  Please follow up with her primary care physician in 2 days to recheck her symptoms.  He may return to the ER if needed for any new or worsening symptoms.

## 2019-05-20 NOTE — ED NOTES
Redness & hives noted to have decreased on pt but pt still reporting itching especially to hands and back;

## 2019-05-20 NOTE — ED PROVIDER NOTES
"Encounter Date: 2019  SORT:   34 y/o female presenting for evaluation of allergic reaction presenting for 4 day history of hives worse within last 2 days. reports "weird feeling in throat" today. "SOB today a lot" intermittently and nausea. Reports chronic abdominal pain, vomiting denies worsening since onset of rash. On Bactrim for UTI but reports she has taken this before without any issues. Denies new soaps, lotions, foods, detergents. Initial orders placed. ANNIKA Panchal PA-C   SCRIBE #1 NOTE: IIsak, am scribing for, and in the presence of,  Krystal Chance MD. I have scribed the following portions of the note - Other sections scribed: HPI, ROS.   SCRIBE #2 NOTE: IMj, onur scribing for, and in the presence of,  Krystal ESPINOSA) . I have scribed the following portions of the note - Other sections scribed: HPI, ROS, PE, ED Management.     History     Chief Complaint   Patient presents with    Rash     x 4 days from neck down Pt taking bactrim from UTI      Patient is a 33 year old female with PMHx of GERD who presents to the ED with complaints of a rash that began 4 days ago but became severe today. Symptoms have gradually worsened since. Reports SOB that is intermittent (describes occasionally feeling the need to take a deep breath) and an itchy rash that has spread. She has nausea and has vomited but states this is not new and relates this to her GERD. On Bactrim for UTI (Began her course on ) but reports she has taken Bactrim before without any issues. She denies new soaps, lotions, foods, detergents. Recently traveled for her grandmothers , and sat on a blanket that was a cat owners 2 days ago. Thinks this may have something to do with her current symptoms. Allergic to certain smells.           Review of patient's allergies indicates:  No Known Allergies  Past Medical History:   Diagnosis Date    Anemia     GERD (gastroesophageal reflux disease)     Hypoglycemia  "    Hypoglycemia     Mental disorder      History reviewed. No pertinent surgical history.  Family History   Problem Relation Age of Onset    Hypertension Mother     Hyperlipidemia Mother     Cancer Neg Hx      Social History     Tobacco Use    Smoking status: Never Smoker    Smokeless tobacco: Never Used   Substance Use Topics    Alcohol use: No    Drug use: No     Review of Systems   Constitutional: Negative for chills, fatigue and fever.   HENT: Positive for trouble swallowing. Negative for sore throat.    Eyes: Negative for visual disturbance.   Respiratory: Positive for shortness of breath.    Cardiovascular: Negative for chest pain.   Gastrointestinal: Positive for nausea. Negative for abdominal pain.   Genitourinary: Negative for difficulty urinating.   Musculoskeletal: Negative for back pain.   Skin: Positive for rash (Hives).        Positive for itching   Allergic/Immunologic:        Allergic reaction     Neurological: Negative for dizziness, syncope, weakness and headaches.   Psychiatric/Behavioral: Negative for confusion.       Physical Exam     Initial Vitals [05/19/19 2126]   BP Pulse Resp Temp SpO2   (!) 120/55 (!) 118 18 99.1 °F (37.3 °C) 100 %      MAP       --         Physical Exam    Nursing note and vitals reviewed.  Constitutional: She appears well-developed and well-nourished. She is not diaphoretic. No distress.   Pleasant and smiling throughout exam.    HENT:   Mouth/Throat: Uvula is midline, oropharynx is clear and moist and mucous membranes are normal. No posterior oropharyngeal edema.   No oropharyngeal swelling, no oropharyngeal lesions   Eyes: Conjunctivae are normal. Pupils are equal, round, and reactive to light.   Neck: Normal range of motion. Neck supple.   Cardiovascular: Regular rhythm and normal heart sounds. Tachycardia present.    Pulmonary/Chest: Breath sounds normal. No respiratory distress. She has no wheezes.   Abdominal: Soft. Bowel sounds are normal.    Musculoskeletal: She exhibits no edema.   Neurological: She is alert and oriented to person, place, and time. No cranial nerve deficit.   Skin: Skin is warm and dry. Rash (Pink maculopapular rash that is diffuse (legs, arms, trunk) spares the patients face. Flat and pruritic.   ) noted.   Psychiatric: She has a normal mood and affect.           ED Course   Procedures  Labs Reviewed   URINALYSIS, REFLEX TO URINE CULTURE    Narrative:     Preferred Collection Type->Urine, Clean Catch   POCT URINE PREGNANCY          Imaging Results    None          Medical Decision Making:   History:   Old Medical Records: I decided to obtain old medical records.  Initial Assessment:   33-year-old female presenting with diffuse pruritic rash getting worse over the past several days.  Recently started on Bactrim for UTI, also recent exposure to cat dander.  Exam notable for a diffuse, maculopapular rash that is pruritic and flat, there is no wheezing or or pharyngeal swelling, patient appears well and is smiling and pleasant during the exam.  I suspect drug related skin eruption versus allergic dermatitis.  I do not suspect Rey Taj syndrome versus anaphylaxis versus cellulitis.  Will treat symptomatically with steroid, Pepcid, Benadryl and reassess.  ED Management:  22:15: Will order Pepcid and Benadryl as well as IV steroids.     00:23: I discussed with the patient the diagnosis, treatment plan, indications for return to the emergency department, and for expected follow-up. The patient verbalized an understanding. The patient is asked if there are any questions or concerns. We discuss the case, until all issues are addressed to the patient's satisfaction. Patient understands and is agreeable to the plan.               Scribe Attestation:   Scribe #1: I performed the above scribed service and the documentation accurately describes the services I performed. I attest to the accuracy of the note.  Scribe #2: I performed the above  "scribed service and the documentation accurately describes the services I performed. I attest to the accuracy of the note.            ED Course as of May 20 0135   Mon May 20, 2019   0024 Patient resting comfortably in bed, rash is still present but it appears to have improved, it is not as prominent as noted before.  Patient still complaining of itching.  Will give trial of Vistaril.  Her urinalysis does not show any infection.  I have advised her to discontinue the Bactrim.  I plan to discharge the patient on a course of prednisone, Benadryl and Pepcid with follow-up with her primary care.    []   0120 Patient reports itching is"bearable" after Vistaril.  Discussed return precautions.     []      ED Course User Index  [LH] Krystal Chance MD     Clinical Impression:       ICD-10-CM ICD-9-CM   1. Allergic dermatitis L23.9 692.9   2. Rash R21 782.1         Disposition:   Disposition: Discharged                        Krystal Chance MD  05/20/19 0135    "

## 2019-05-20 NOTE — ED TRIAGE NOTES
Pt reports to ED via personal transportation with  with c/o red, itchy, raised/welted rash to bilateral arms, legs, neck, stomach, & back started on Thursday 5/16/19 but gradually getting worse everyday; pt reports that she was started on Bactrim, Diflucan, & Pyridium on Monday 5/13/19 for a UTI; pt denies using any new lotions or body washes; Pt took Benadryl at 7am this morning and Zyrtec yesterday with no relief; significant rash noted to pt's skin; pt reports that she is also still having dysuria; pt denies any other complaints at this time

## 2019-05-20 NOTE — ED NOTES
Pt reports that she is feeling much better and the medication has helped stop the itching; no distress noted; red, hives also noted to have decreased even more;

## 2019-05-21 ENCOUNTER — NURSE TRIAGE (OUTPATIENT)
Dept: ADMINISTRATIVE | Facility: CLINIC | Age: 34
End: 2019-05-21

## 2019-05-21 ENCOUNTER — HOSPITAL ENCOUNTER (OUTPATIENT)
Facility: HOSPITAL | Age: 34
Discharge: HOME | End: 2019-05-21
Attending: EMERGENCY MEDICINE | Admitting: EMERGENCY MEDICINE
Payer: COMMERCIAL

## 2019-05-21 VITALS
OXYGEN SATURATION: 97 % | RESPIRATION RATE: 18 BRPM | WEIGHT: 106.69 LBS | DIASTOLIC BLOOD PRESSURE: 57 MMHG | TEMPERATURE: 98 F | HEART RATE: 82 BPM | BODY MASS INDEX: 23.02 KG/M2 | HEIGHT: 57 IN | SYSTOLIC BLOOD PRESSURE: 103 MMHG

## 2019-05-21 DIAGNOSIS — E04.1 THYROID NODULE: ICD-10-CM

## 2019-05-21 DIAGNOSIS — R07.9 CHEST PAIN: ICD-10-CM

## 2019-05-21 DIAGNOSIS — L50.9 URTICARIA: Primary | ICD-10-CM

## 2019-05-21 DIAGNOSIS — F32.A DEPRESSION, UNSPECIFIED DEPRESSION TYPE: ICD-10-CM

## 2019-05-21 DIAGNOSIS — R21 MACULOPAPULAR RASH, GENERALIZED: ICD-10-CM

## 2019-05-21 LAB
ALBUMIN SERPL BCP-MCNC: 4 G/DL (ref 3.5–5.2)
ALP SERPL-CCNC: 63 U/L (ref 55–135)
ALT SERPL W/O P-5'-P-CCNC: 20 U/L (ref 10–44)
ANION GAP SERPL CALC-SCNC: 16 MMOL/L (ref 8–16)
ANION GAP SERPL CALC-SCNC: 9 MMOL/L (ref 8–16)
AST SERPL-CCNC: 16 U/L (ref 10–40)
BASOPHILS # BLD AUTO: 0.03 K/UL (ref 0–0.2)
BASOPHILS NFR BLD: 0.1 % (ref 0–1.9)
BILIRUB SERPL-MCNC: 0.3 MG/DL (ref 0.1–1)
BNP SERPL-MCNC: 17 PG/ML (ref 0–99)
BUN SERPL-MCNC: 16 MG/DL (ref 6–20)
BUN SERPL-MCNC: 17 MG/DL (ref 6–30)
C3 SERPL-MCNC: 108 MG/DL (ref 50–180)
C4 SERPL-MCNC: 25 MG/DL (ref 11–44)
CALCIUM SERPL-MCNC: 9.6 MG/DL (ref 8.7–10.5)
CHLORIDE SERPL-SCNC: 106 MMOL/L (ref 95–110)
CHLORIDE SERPL-SCNC: 106 MMOL/L (ref 95–110)
CO2 SERPL-SCNC: 23 MMOL/L (ref 23–29)
CREAT SERPL-MCNC: 0.6 MG/DL (ref 0.5–1.4)
CREAT SERPL-MCNC: 0.7 MG/DL (ref 0.5–1.4)
DIFFERENTIAL METHOD: ABNORMAL
EOSINOPHIL # BLD AUTO: 0 K/UL (ref 0–0.5)
EOSINOPHIL NFR BLD: 0 % (ref 0–8)
ERYTHROCYTE [DISTWIDTH] IN BLOOD BY AUTOMATED COUNT: 17.8 % (ref 11.5–14.5)
EST. GFR  (AFRICAN AMERICAN): >60 ML/MIN/1.73 M^2
EST. GFR  (NON AFRICAN AMERICAN): >60 ML/MIN/1.73 M^2
GLUCOSE SERPL-MCNC: 124 MG/DL (ref 70–110)
GLUCOSE SERPL-MCNC: 140 MG/DL (ref 70–110)
HCT VFR BLD AUTO: 32.7 % (ref 37–48.5)
HCT VFR BLD CALC: 33 %PCV (ref 36–54)
HGB BLD-MCNC: 10.2 G/DL (ref 12–16)
HIV1+2 IGG SERPL QL IA.RAPID: NEGATIVE
LIPASE SERPL-CCNC: 20 U/L (ref 4–60)
LYMPHOCYTES # BLD AUTO: 2 K/UL (ref 1–4.8)
LYMPHOCYTES NFR BLD: 9.7 % (ref 18–48)
MCH RBC QN AUTO: 22.2 PG (ref 27–31)
MCHC RBC AUTO-ENTMCNC: 31.2 G/DL (ref 32–36)
MCV RBC AUTO: 71 FL (ref 82–98)
MONOCYTES # BLD AUTO: 1 K/UL (ref 0.3–1)
MONOCYTES NFR BLD: 4.9 % (ref 4–15)
NEUTROPHILS # BLD AUTO: 18 K/UL (ref 1.8–7.7)
NEUTROPHILS NFR BLD: 85.5 % (ref 38–73)
PLATELET # BLD AUTO: 358 K/UL (ref 150–350)
PMV BLD AUTO: 10 FL (ref 9.2–12.9)
POC IONIZED CALCIUM: 1.23 MMOL/L (ref 1.06–1.42)
POC TCO2 (MEASURED): 23 MMOL/L (ref 23–29)
POTASSIUM BLD-SCNC: 3.8 MMOL/L (ref 3.5–5.1)
POTASSIUM SERPL-SCNC: 3.9 MMOL/L (ref 3.5–5.1)
PROCALCITONIN SERPL IA-MCNC: 0.03 NG/ML
PROT SERPL-MCNC: 7.5 G/DL (ref 6–8.4)
RBC # BLD AUTO: 4.6 M/UL (ref 4–5.4)
SAMPLE: ABNORMAL
SODIUM BLD-SCNC: 140 MMOL/L (ref 136–145)
SODIUM SERPL-SCNC: 138 MMOL/L (ref 136–145)
TROPONIN I SERPL DL<=0.01 NG/ML-MCNC: <0.006 NG/ML (ref 0–0.03)
WBC # BLD AUTO: 21.11 K/UL (ref 3.9–12.7)

## 2019-05-21 PROCEDURE — 63600175 PHARM REV CODE 636 W HCPCS: Performed by: EMERGENCY MEDICINE

## 2019-05-21 PROCEDURE — 25000003 PHARM REV CODE 250: Performed by: EMERGENCY MEDICINE

## 2019-05-21 PROCEDURE — 84295 ASSAY OF SERUM SODIUM: CPT

## 2019-05-21 PROCEDURE — 86592 SYPHILIS TEST NON-TREP QUAL: CPT

## 2019-05-21 PROCEDURE — S0028 INJECTION, FAMOTIDINE, 20 MG: HCPCS | Performed by: EMERGENCY MEDICINE

## 2019-05-21 PROCEDURE — 80053 COMPREHEN METABOLIC PANEL: CPT

## 2019-05-21 PROCEDURE — 85014 HEMATOCRIT: CPT

## 2019-05-21 PROCEDURE — 84484 ASSAY OF TROPONIN QUANT: CPT

## 2019-05-21 PROCEDURE — 25500020 PHARM REV CODE 255: Performed by: EMERGENCY MEDICINE

## 2019-05-21 PROCEDURE — 82803 BLOOD GASES ANY COMBINATION: CPT

## 2019-05-21 PROCEDURE — 82330 ASSAY OF CALCIUM: CPT

## 2019-05-21 PROCEDURE — 93010 EKG 12-LEAD: ICD-10-PCS | Mod: ,,, | Performed by: INTERNAL MEDICINE

## 2019-05-21 PROCEDURE — 86765 RUBEOLA ANTIBODY: CPT

## 2019-05-21 PROCEDURE — 25000003 PHARM REV CODE 250: Performed by: NURSE PRACTITIONER

## 2019-05-21 PROCEDURE — 83880 ASSAY OF NATRIURETIC PEPTIDE: CPT

## 2019-05-21 PROCEDURE — 86160 COMPLEMENT ANTIGEN: CPT

## 2019-05-21 PROCEDURE — 82962 GLUCOSE BLOOD TEST: CPT

## 2019-05-21 PROCEDURE — 96375 TX/PRO/DX INJ NEW DRUG ADDON: CPT

## 2019-05-21 PROCEDURE — 82800 BLOOD PH: CPT

## 2019-05-21 PROCEDURE — 96374 THER/PROPH/DIAG INJ IV PUSH: CPT

## 2019-05-21 PROCEDURE — 86703 HIV-1/HIV-2 1 RESULT ANTBDY: CPT

## 2019-05-21 PROCEDURE — 96376 TX/PRO/DX INJ SAME DRUG ADON: CPT

## 2019-05-21 PROCEDURE — 82565 ASSAY OF CREATININE: CPT

## 2019-05-21 PROCEDURE — G0378 HOSPITAL OBSERVATION PER HR: HCPCS

## 2019-05-21 PROCEDURE — 25000003 PHARM REV CODE 250: Performed by: INTERNAL MEDICINE

## 2019-05-21 PROCEDURE — 84132 ASSAY OF SERUM POTASSIUM: CPT

## 2019-05-21 PROCEDURE — 93010 ELECTROCARDIOGRAM REPORT: CPT | Mod: ,,, | Performed by: INTERNAL MEDICINE

## 2019-05-21 PROCEDURE — 93005 ELECTROCARDIOGRAM TRACING: CPT

## 2019-05-21 PROCEDURE — 85025 COMPLETE CBC W/AUTO DIFF WBC: CPT

## 2019-05-21 PROCEDURE — 99285 EMERGENCY DEPT VISIT HI MDM: CPT | Mod: 25

## 2019-05-21 PROCEDURE — 84145 PROCALCITONIN (PCT): CPT

## 2019-05-21 PROCEDURE — 99900035 HC TECH TIME PER 15 MIN (STAT)

## 2019-05-21 PROCEDURE — 36415 COLL VENOUS BLD VENIPUNCTURE: CPT

## 2019-05-21 PROCEDURE — 83690 ASSAY OF LIPASE: CPT

## 2019-05-21 PROCEDURE — 86160 COMPLEMENT ANTIGEN: CPT | Mod: 59

## 2019-05-21 PROCEDURE — 86765 RUBEOLA ANTIBODY: CPT | Mod: 91

## 2019-05-21 RX ORDER — PREDNISONE 50 MG/1
50 TABLET ORAL DAILY
Qty: 5 TABLET | Refills: 0 | Status: SHIPPED | OUTPATIENT
Start: 2019-05-21 | End: 2019-05-26

## 2019-05-21 RX ORDER — DIPHENHYDRAMINE HYDROCHLORIDE 50 MG/ML
25 INJECTION INTRAMUSCULAR; INTRAVENOUS
Status: COMPLETED | OUTPATIENT
Start: 2019-05-21 | End: 2019-05-21

## 2019-05-21 RX ORDER — TRIAMCINOLONE ACETONIDE 1 MG/G
CREAM TOPICAL 4 TIMES DAILY
Status: DISCONTINUED | OUTPATIENT
Start: 2019-05-21 | End: 2019-05-21 | Stop reason: HOSPADM

## 2019-05-21 RX ORDER — TRIAMCINOLONE ACETONIDE 1 MG/G
CREAM TOPICAL 4 TIMES DAILY
Qty: 80 G | Refills: 1 | OUTPATIENT
Start: 2019-05-21 | End: 2019-08-16

## 2019-05-21 RX ORDER — FAMOTIDINE 20 MG/1
20 TABLET, FILM COATED ORAL 2 TIMES DAILY
Qty: 20 TABLET | Refills: 0 | Status: SHIPPED | OUTPATIENT
Start: 2019-05-21 | End: 2019-08-21

## 2019-05-21 RX ORDER — HYDROXYZINE HYDROCHLORIDE 25 MG/1
25 TABLET, FILM COATED ORAL 3 TIMES DAILY
Status: DISCONTINUED | OUTPATIENT
Start: 2019-05-21 | End: 2019-05-21 | Stop reason: HOSPADM

## 2019-05-21 RX ORDER — HYDROXYZINE HYDROCHLORIDE 25 MG/1
25 TABLET, FILM COATED ORAL 3 TIMES DAILY PRN
Status: DISCONTINUED | OUTPATIENT
Start: 2019-05-21 | End: 2019-05-21

## 2019-05-21 RX ORDER — TRIAMCINOLONE ACETONIDE 1 MG/G
CREAM TOPICAL 4 TIMES DAILY
Qty: 80 G | Refills: 1 | Status: SHIPPED | OUTPATIENT
Start: 2019-05-21 | End: 2019-05-21

## 2019-05-21 RX ORDER — ACETAMINOPHEN 325 MG/1
650 TABLET ORAL EVERY 8 HOURS PRN
Status: DISCONTINUED | OUTPATIENT
Start: 2019-05-21 | End: 2019-05-21 | Stop reason: HOSPADM

## 2019-05-21 RX ORDER — FAMOTIDINE 10 MG/ML
20 INJECTION INTRAVENOUS 2 TIMES DAILY
Status: DISCONTINUED | OUTPATIENT
Start: 2019-05-21 | End: 2019-05-21 | Stop reason: HOSPADM

## 2019-05-21 RX ORDER — DIPHENHYDRAMINE HYDROCHLORIDE 50 MG/ML
25 INJECTION INTRAMUSCULAR; INTRAVENOUS EVERY 4 HOURS PRN
Status: DISCONTINUED | OUTPATIENT
Start: 2019-05-21 | End: 2019-05-21

## 2019-05-21 RX ORDER — HYDROXYZINE PAMOATE 25 MG/1
25 CAPSULE ORAL EVERY 6 HOURS
Qty: 30 CAPSULE | Refills: 2 | Status: SHIPPED | OUTPATIENT
Start: 2019-05-21 | End: 2019-05-26

## 2019-05-21 RX ORDER — METHYLPREDNISOLONE SOD SUCC 125 MG
125 VIAL (EA) INJECTION
Status: COMPLETED | OUTPATIENT
Start: 2019-05-21 | End: 2019-05-21

## 2019-05-21 RX ORDER — HYDROXYZINE PAMOATE 25 MG/1
25 CAPSULE ORAL EVERY 6 HOURS
Qty: 20 CAPSULE | Refills: 0 | Status: SHIPPED | OUTPATIENT
Start: 2019-05-21 | End: 2019-05-21 | Stop reason: SDUPTHER

## 2019-05-21 RX ORDER — METHYLPREDNISOLONE SOD SUCC 125 MG
125 VIAL (EA) INJECTION EVERY 4 HOURS
Status: DISCONTINUED | OUTPATIENT
Start: 2019-05-21 | End: 2019-05-21 | Stop reason: HOSPADM

## 2019-05-21 RX ORDER — TRIAMCINOLONE ACETONIDE 1 MG/G
CREAM TOPICAL 4 TIMES DAILY
Qty: 1 TUBE | Refills: 1 | Status: SHIPPED | OUTPATIENT
Start: 2019-05-21 | End: 2019-05-21

## 2019-05-21 RX ORDER — SODIUM CHLORIDE 0.9 % (FLUSH) 0.9 %
10 SYRINGE (ML) INJECTION
Status: DISCONTINUED | OUTPATIENT
Start: 2019-05-21 | End: 2019-05-21 | Stop reason: HOSPADM

## 2019-05-21 RX ORDER — FAMOTIDINE 10 MG/ML
20 INJECTION INTRAVENOUS
Status: COMPLETED | OUTPATIENT
Start: 2019-05-21 | End: 2019-05-21

## 2019-05-21 RX ORDER — ONDANSETRON 2 MG/ML
4 INJECTION INTRAMUSCULAR; INTRAVENOUS EVERY 8 HOURS PRN
Status: DISCONTINUED | OUTPATIENT
Start: 2019-05-21 | End: 2019-05-21 | Stop reason: HOSPADM

## 2019-05-21 RX ADMIN — METHYLPREDNISOLONE SODIUM SUCCINATE 125 MG: 125 INJECTION, POWDER, FOR SOLUTION INTRAMUSCULAR; INTRAVENOUS at 05:05

## 2019-05-21 RX ADMIN — TRIAMCINOLONE ACETONIDE: 1 CREAM TOPICAL at 03:05

## 2019-05-21 RX ADMIN — FAMOTIDINE 20 MG: 10 INJECTION INTRAVENOUS at 02:05

## 2019-05-21 RX ADMIN — METHYLPREDNISOLONE SODIUM SUCCINATE 125 MG: 125 INJECTION, POWDER, FOR SOLUTION INTRAMUSCULAR; INTRAVENOUS at 03:05

## 2019-05-21 RX ADMIN — METHYLPREDNISOLONE SODIUM SUCCINATE 125 MG: 125 INJECTION, POWDER, FOR SOLUTION INTRAMUSCULAR; INTRAVENOUS at 02:05

## 2019-05-21 RX ADMIN — METHYLPREDNISOLONE SODIUM SUCCINATE 125 MG: 125 INJECTION, POWDER, FOR SOLUTION INTRAMUSCULAR; INTRAVENOUS at 09:05

## 2019-05-21 RX ADMIN — TRIAMCINOLONE ACETONIDE: 1 CREAM TOPICAL at 11:05

## 2019-05-21 RX ADMIN — DIPHENHYDRAMINE HYDROCHLORIDE 25 MG: 50 INJECTION INTRAMUSCULAR; INTRAVENOUS at 02:05

## 2019-05-21 RX ADMIN — HYDROXYZINE HYDROCHLORIDE 25 MG: 25 TABLET, FILM COATED ORAL at 05:05

## 2019-05-21 RX ADMIN — HYDROXYZINE HYDROCHLORIDE 25 MG: 25 TABLET, FILM COATED ORAL at 10:05

## 2019-05-21 RX ADMIN — IOHEXOL 70 ML: 350 INJECTION, SOLUTION INTRAVENOUS at 03:05

## 2019-05-21 RX ADMIN — HYDROXYZINE HYDROCHLORIDE 25 MG: 25 TABLET, FILM COATED ORAL at 03:05

## 2019-05-21 NOTE — SUBJECTIVE & OBJECTIVE
Past Medical History:   Diagnosis Date    Anemia     GERD (gastroesophageal reflux disease)     Hypoglycemia     Hypoglycemia     Mental disorder        History reviewed. No pertinent surgical history.    Review of patient's allergies indicates:  No Known Allergies    No current facility-administered medications on file prior to encounter.      Current Outpatient Medications on File Prior to Encounter   Medication Sig    famotidine (PEPCID) 20 MG tablet Take 1 tablet (20 mg total) by mouth 2 (two) times daily.    hydrOXYzine pamoate (VISTARIL) 25 MG Cap Take 1 capsule (25 mg total) by mouth every 6 (six) hours as needed.    omeprazole (PRILOSEC) 20 MG capsule TAKE ONE CAPSULE BY MOUTH TWICE DAILY    predniSONE (DELTASONE) 50 MG Tab Take 1 tablet (50 mg total) by mouth once daily. for 5 days    phenazopyridine (PYRIDIUM) 100 MG tablet Take 1 tablet (100 mg total) by mouth 3 (three) times daily as needed for Pain.    ranitidine (ZANTAC) 150 MG tablet Take 1 tablet (150 mg total) by mouth nightly.    [DISCONTINUED] fluconazole (DIFLUCAN) 150 MG Tab Take 1 tablet (150 mg total) by mouth Every 3 (three) days.    [DISCONTINUED] sulfamethoxazole-trimethoprim 800-160mg (BACTRIM DS) 800-160 mg Tab Take 1 tablet by mouth 2 (two) times daily.     Family History     Problem Relation (Age of Onset)    Hyperlipidemia Mother    Hypertension Mother        Tobacco Use    Smoking status: Never Smoker    Smokeless tobacco: Never Used   Substance and Sexual Activity    Alcohol use: No     Comment: occasional    Drug use: No    Sexual activity: Not on file     Review of Systems   Constitutional: Negative for appetite change, chills, diaphoresis and fever.   HENT: Negative for congestion, hearing loss, sore throat, tinnitus and trouble swallowing.    Eyes: Negative for photophobia, discharge, itching and visual disturbance.   Respiratory: Negative for apnea, cough, wheezing and stridor.    Cardiovascular: Negative for  chest pain, palpitations and leg swelling.   Gastrointestinal: Negative for abdominal distention, abdominal pain, blood in stool, constipation, diarrhea and nausea.   Endocrine: Negative for polydipsia, polyphagia and polyuria.   Genitourinary: Negative for difficulty urinating, dysuria, flank pain and frequency.   Musculoskeletal: Negative for arthralgias, joint swelling and neck stiffness.   Skin: Negative for color change, rash and wound.   Neurological: Negative for dizziness, tremors, seizures, light-headedness, numbness and headaches.   Hematological: Negative for adenopathy.   Psychiatric/Behavioral: Negative for hallucinations and self-injury.     Objective:     Vital Signs (Most Recent):  Temp: 98.4 °F (36.9 °C) (05/21/19 1116)  Pulse: 82 (05/21/19 1116)  Resp: 18 (05/21/19 1116)  BP: (!) 103/57 (05/21/19 1116)  SpO2: 97 % (05/21/19 1116) Vital Signs (24h Range):  Temp:  [97.5 °F (36.4 °C)-98.5 °F (36.9 °C)] 98.4 °F (36.9 °C)  Pulse:  [] 82  Resp:  [15-23] 18  SpO2:  [97 %-100 %] 97 %  BP: ()/(50-64) 103/57     Weight: 48.4 kg (106 lb 11.2 oz)  Body mass index is 23.09 kg/m².    Physical Exam   Constitutional: She appears well-developed and well-nourished. She is cooperative.   HENT:   Head: Normocephalic and atraumatic.   Eyes: Conjunctivae and lids are normal.   Neck: Full passive range of motion without pain. Neck supple. No JVD present. No edema present. No thyroid mass present.   Cardiovascular: S1 normal, S2 normal and intact distal pulses.   No murmur heard.  Abdominal: Soft. Bowel sounds are normal. She exhibits no distension and no abdominal bruit. There is no splenomegaly or hepatomegaly. There is no tenderness. There is no CVA tenderness.   Lymphadenopathy:     She has no cervical adenopathy.     She has no axillary adenopathy.   Neurological: She is alert. She has normal reflexes. She displays no tremor. She displays no seizure activity.   Skin: Skin is warm, dry and intact.    Psychiatric: She has a normal mood and affect. Her speech is normal. Thought content normal. Cognition and memory are normal.           Significant Labs:   CBC:   Recent Labs   Lab 05/21/19 0245 05/21/19 0245   WBC 21.11*  --    HGB 10.2*  --    HCT 32.7* 33*   *  --      CMP:   Recent Labs   Lab 05/21/19  0616      K 3.9      CO2 23   *   BUN 16   CREATININE 0.7   CALCIUM 9.6   PROT 7.5   ALBUMIN 4.0   BILITOT 0.3   ALKPHOS 63   AST 16   ALT 20   ANIONGAP 9   EGFRNONAA >60       Lipase:   Recent Labs   Lab 05/21/19  0245   LIPASE 20       Troponin:   Recent Labs   Lab 05/21/19 0245   TROPONINI <0.006     Significant Imaging:   Imaging Results          CTA Chest Non-Coronary (PE Study) (Final result)  Result time 05/21/19 03:40:37    Final result by Elieser Brasher MD (05/21/19 03:40:37)                 Impression:      1.  No convincing evidence of pulmonary thromboembolism or other acute intrathoracic abnormality.    2.  1.3 cm hypoattenuating left thyroid lobe nodule.  Consider follow-up nonemergent thyroid ultrasound.      Electronically signed by: Elieser Brasher MD  Date:    05/21/2019  Time:    03:40             Narrative:    EXAMINATION:  CTA CHEST NON CORONARY    CLINICAL HISTORY:  Chest pain, acute, PE suspected, low pretest prob;    TECHNIQUE:  Low dose axial images, sagittal and coronal reformations were obtained from the thoracic inlet to the lung bases following the IV administration of 70 mL of Omnipaque 350.  Contrast timing was optimized to evaluate the pulmonary arteries.  MIP images were performed.    COMPARISON:  None    FINDINGS:  There is a 1.3 cm hypoattenuating left thyroid lobe nodule.  The remaining visualized soft tissue structures at the base of the neck are unremarkable.    The thoracic aorta maintains normal caliber, contour, and course without significant atherosclerotic calcification within its course.  There is no evidence of aneurysmal dilation or  dissection. The heart is not enlarged and there is no evidence of pericardial effusion.The esophagus maintains a normal course and caliber.There is no axillary, mediastinal, or hilar lymph node enlargement.    The trachea is midline and proximal airways are patent. The lungs are symmetrically expanded. There is no confluent airspace consolidation or pulmonary mass.  There is no pneumothorax or pleural effusion.    There is no convincing evidence of pulmonary thromboembolism or pulmonary infarct.    Limited images of the upper abdomen obtained during the course of this dedicated thoracic CT demonstrate nothing unusual.    The osseous structures are unremarkable.

## 2019-05-21 NOTE — H&P
[facility] Medicine  History & Physical    Patient Name: Dina Hutton  MRN: [id number]  Admission Date: 5/21/2019  Attending Physician: Jodi Solano MD   Primary Care Provider: Pradeep Kennedy MD         Patient information was obtained from patient and ER records.     Subjective:     Principal Problem:Urticaria    Chief Complaint:   Chief Complaint   Patient presents with    Urticaria     Pt reports she was seen on 5/19/19 for hives and d/c to home. Pt tonight c/o hives returned. Pt states she has had 4 doses of benadryl and steroids and pepcid today with no relief.         HPI: Dina Hutton is a 33 y.o. female with PMHx including anemia, gerd and mental disorder?? Presents for evaluation of acute onset of pruitis on BL arms, legs, and back. Patient reports that 5 days ago on Thursday she visited her family in [place] who has cats. Her rash began shortly after arriving. She reports that she was seen in the ED and was found to have UTI and ordered bactrim + diflucan 2 days later. Her rash persisted and has progressively worsened since that time. Additionally, she also states that when she was discharged from the ED she had contact with the clothes that she brought home from [place] and suspect this contact was contributory.     Past Medical History:   Diagnosis Date    Anemia     GERD (gastroesophageal reflux disease)     Hypoglycemia     Hypoglycemia     Mental disorder        History reviewed. No pertinent surgical history.    Review of patient's allergies indicates:  No Known Allergies    No current facility-administered medications on file prior to encounter.      Current Outpatient Medications on File Prior to Encounter   Medication Sig    famotidine (PEPCID) 20 MG tablet Take 1 tablet (20 mg total) by mouth 2 (two) times daily.    hydrOXYzine pamoate (VISTARIL) 25 MG Cap Take 1 capsule (25 mg total) by mouth every 6 (six) hours as needed.    omeprazole (PRILOSEC) 20 MG  capsule TAKE ONE CAPSULE BY MOUTH TWICE DAILY    predniSONE (DELTASONE) 50 MG Tab Take 1 tablet (50 mg total) by mouth once daily. for 5 days    phenazopyridine (PYRIDIUM) 100 MG tablet Take 1 tablet (100 mg total) by mouth 3 (three) times daily as needed for Pain.    ranitidine (ZANTAC) 150 MG tablet Take 1 tablet (150 mg total) by mouth nightly.    [DISCONTINUED] fluconazole (DIFLUCAN) 150 MG Tab Take 1 tablet (150 mg total) by mouth Every 3 (three) days.    [DISCONTINUED] sulfamethoxazole-trimethoprim 800-160mg (BACTRIM DS) 800-160 mg Tab Take 1 tablet by mouth 2 (two) times daily.     Family History     Problem Relation (Age of Onset)    Hyperlipidemia Mother    Hypertension Mother        Tobacco Use    Smoking status: Never Smoker    Smokeless tobacco: Never Used   Substance and Sexual Activity    Alcohol use: No     Comment: occasional    Drug use: No    Sexual activity: Not on file     Review of Systems   Constitutional: Negative for appetite change, chills, diaphoresis and fever.   HENT: Negative for congestion, hearing loss, sore throat, tinnitus and trouble swallowing.    Eyes: Negative for photophobia, discharge, itching and visual disturbance.   Respiratory: Negative for apnea, cough, wheezing and stridor.    Cardiovascular: Negative for chest pain, palpitations and leg swelling.   Gastrointestinal: Negative for abdominal distention, abdominal pain, blood in stool, constipation, diarrhea and nausea.   Endocrine: Negative for polydipsia, polyphagia and polyuria.   Genitourinary: Negative for difficulty urinating, dysuria, flank pain and frequency.   Musculoskeletal: Negative for arthralgias, joint swelling and neck stiffness.   Skin: Negative for color change, rash and wound.   Neurological: Negative for dizziness, tremors, seizures, light-headedness, numbness and headaches.   Hematological: Negative for adenopathy.   Psychiatric/Behavioral: Negative for hallucinations and self-injury.      Objective:     Vital Signs (Most Recent):  Temp: 98.4 °F (36.9 °C) (05/21/19 1116)  Pulse: 82 (05/21/19 1116)  Resp: 18 (05/21/19 1116)  BP: (!) 103/57 (05/21/19 1116)  SpO2: 97 % (05/21/19 1116) Vital Signs (24h Range):  Temp:  [97.5 °F (36.4 °C)-98.5 °F (36.9 °C)] 98.4 °F (36.9 °C)  Pulse:  [] 82  Resp:  [15-23] 18  SpO2:  [97 %-100 %] 97 %  BP: ()/(50-64) 103/57     Weight: 48.4 kg (106 lb 11.2 oz)  Body mass index is 23.09 kg/m².    Physical Exam   Constitutional: She appears well-developed and well-nourished. She is cooperative.   HENT:   Head: Normocephalic and atraumatic.   Eyes: Conjunctivae and lids are normal.   Neck: Full passive range of motion without pain. Neck supple. No JVD present. No edema present. No thyroid mass present.   Cardiovascular: S1 normal, S2 normal and intact distal pulses.   No murmur heard.  Abdominal: Soft. Bowel sounds are normal. She exhibits no distension and no abdominal bruit. There is no splenomegaly or hepatomegaly. There is no tenderness. There is no CVA tenderness.   Lymphadenopathy:     She has no cervical adenopathy.     She has no axillary adenopathy.   Neurological: She is alert. She has normal reflexes. She displays no tremor. She displays no seizure activity.   Skin: Skin is warm, dry and intact.   Psychiatric: She has a normal mood and affect. Her speech is normal. Thought content normal. Cognition and memory are normal.           Significant Labs:   CBC:   Recent Labs   Lab 05/21/19  0245 05/21/19  0245   WBC 21.11*  --    HGB 10.2*  --    HCT 32.7* 33*   *  --      CMP:   Recent Labs   Lab 05/21/19  0616      K 3.9      CO2 23   *   BUN 16   CREATININE 0.7   CALCIUM 9.6   PROT 7.5   ALBUMIN 4.0   BILITOT 0.3   ALKPHOS 63   AST 16   ALT 20   ANIONGAP 9   EGFRNONAA >60       Lipase:   Recent Labs   Lab 05/21/19  0245   LIPASE 20       Troponin:   Recent Labs   Lab 05/21/19 0245   TROPONINI <0.006     Significant Imaging:    Imaging Results          CTA Chest Non-Coronary (PE Study) (Final result)  Result time 05/21/19 03:40:37    Final result by Elieser Brasher MD (05/21/19 03:40:37)                 Impression:      1.  No convincing evidence of pulmonary thromboembolism or other acute intrathoracic abnormality.    2.  1.3 cm hypoattenuating left thyroid lobe nodule.  Consider follow-up nonemergent thyroid ultrasound.      Electronically signed by: Elieser Brasher MD  Date:    05/21/2019  Time:    03:40             Narrative:    EXAMINATION:  CTA CHEST NON CORONARY    CLINICAL HISTORY:  Chest pain, acute, PE suspected, low pretest prob;    TECHNIQUE:  Low dose axial images, sagittal and coronal reformations were obtained from the thoracic inlet to the lung bases following the IV administration of 70 mL of Omnipaque 350.  Contrast timing was optimized to evaluate the pulmonary arteries.  MIP images were performed.    COMPARISON:  None    FINDINGS:  There is a 1.3 cm hypoattenuating left thyroid lobe nodule.  The remaining visualized soft tissue structures at the base of the neck are unremarkable.    The thoracic aorta maintains normal caliber, contour, and course without significant atherosclerotic calcification within its course.  There is no evidence of aneurysmal dilation or dissection. The heart is not enlarged and there is no evidence of pericardial effusion.The esophagus maintains a normal course and caliber.There is no axillary, mediastinal, or hilar lymph node enlargement.    The trachea is midline and proximal airways are patent. The lungs are symmetrically expanded. There is no confluent airspace consolidation or pulmonary mass.  There is no pneumothorax or pleural effusion.    There is no convincing evidence of pulmonary thromboembolism or pulmonary infarct.    Limited images of the upper abdomen obtained during the course of this dedicated thoracic CT demonstrate nothing unusual.    The osseous structures are  unremarkable.                                  Assessment/Plan:     * Urticaria  Unclear source; she reports not having an allergy to cats in the past but she suspects this. She reports her allergy worsened after she left the ED where she was RX'ed bactrim and diflucan. I believe the increased urticaria is likely worsened by the abx. Her repeat urinalysis is normal. Will discontinue her abx. Her pro calcitonin level is normal and she does not report dysuria  -discontinue bactrim  -follow up with immunology outpatient  -c1, c3, c4, compliments  -continue IV steroids - convert to oral steroids discharge  -triamcinolone cream + atarax Q6H 25 mg oral      VTE Risk Mitigation (From admission, onward)        Ordered     IP VTE LOW RISK PATIENT  Once      05/21/19 5997             ELVI Kurtz, FNP-C  Hospitalist - Department of Hospital Medicine  [address], [place] [address]  Office [phone]; Pager [phone]

## 2019-05-21 NOTE — PLAN OF CARE
05/21/19 1552   Final Note   Assessment Type Final Discharge Note   Anticipated Discharge Disposition Home   Hospital Follow Up  Appt(s) scheduled? No  (pt to schedule follow up appointment with PCP)   Discharge plans and expectations educations in teach back method with documentation complete? Yes   Right Care Referral Info   Post Acute Recommendation No Care   pts nurse Amanda advised pt can d/c from CM standpoint.

## 2019-05-21 NOTE — NURSING
Patient arrived to the unit via stretcher accompanied by transport personnel and spouse. Patient walked to the bed with no deficits noted. Vital signs obtained and found in flow sheets with complete patient assessment. Skin noted reddened on both arms, upper legs, neck, chest, and upper back and patient with a 20g RAC PIV noted saline locked. Complaints of pain noted where hives are but patient in NAD. Plan to treat Urticaria with scheduled steroids and prn antihistamine. Patient updated on plan of care and verbalized understanding. Call light in reach and patient instructed to inform the nurse if anything is needed. Patient stable and will continue to be monitored.

## 2019-05-21 NOTE — ASSESSMENT & PLAN NOTE
Unclear source; she reports not having an allergy to cats in the past but she suspects this. She reports her allergy worsened after she left the ED where she was RX'ed bactrim and diflucan. I believe the increased urticaria is likely worsened by the abx. Her repeat urinalysis is normal. Will discontinue her abx. Her pro calcitonin level is normal and she does not report dysuria  -discontinue bactrim  -follow up with immunology outpatient  -c1, c3, c4, compliments  -continue IV steroids - convert to oral steroids discharge  -triamcinolone cream + atarax Q6H 25 mg oral

## 2019-05-21 NOTE — PROGRESS NOTES
HOW TO MANAGE YOUR CARE  AT HOME:  TN taught Symptoms and Problems for URTICARIA home care with pt and with teach back:  1. INCREASES HIVES, 2.SWELLING OF LIPS. TN placed education sheet in [employer]..       TN taught patient about things SHE is responsible for when discharged TO HELP WITH HER RECOVERY:  How to Manage HER Care At Home:  1. Getting HER prescriptions filled.  2. Taking HER medications as directed. DO NOT MISS ANY DOSES!  3. Going to HER follow-up doctor appointments.   .  Roseline Rodriguez RN, BSN, STN CCM

## 2019-05-21 NOTE — NURSING
Patient still with complaints of itching and hives which are noted across body. Dr. Frausto notified of this and new orders were placed. Will administer and monitor for effectiveness. Patient stable and will continue to be monitored.

## 2019-05-21 NOTE — TELEPHONE ENCOUNTER
Pt declined triage because she is at ED already.    Reason for Disposition   Patient already left for the hospital/clinic.    Protocols used: NO CONTACT OR DUPLICATE CONTACT CALL-A-AH

## 2019-05-21 NOTE — NURSING
Discharge instructions given to patient and family at bedside. Patient and family verbalized understanding and states willingness to comply. Saline lock removed.

## 2019-05-21 NOTE — DISCHARGE SUMMARY
[facility] Hospital Medicine  Discharge Summary      Patient Name: Dina Hutton  MRN: [id number]  Admission Date: 5/21/2019  Hospital Length of Stay: 0 days  Discharge Date and Time:  05/21/2019 5:40 PM  Attending Physician: Jodi Solano MD   Discharging Provider: DIONY Claudio  Primary Care Provider: Pradeep Kennedy MD      HPI:   Dina Hutton is a 33 y.o. female with PMHx including anemia, gerd and mental disorder?? Presents for evaluation of acute onset of pruitis on BL arms, legs, and back. Patient reports that 5 days ago on Thursday she visited her family in [place] who has cats. Her rash began shortly after arriving. She reports that she was seen in the ED and was found to have UTI and ordered bactrim + diflucan 2 days later. Her rash persisted and has progressively worsened since that time. Additionally, she also states that when she was discharged from the ED she had contact with the clothes that she brought home from [place] and suspect this contact was contributory.     * No surgery found *      Hospital Course:   Patient initially responded well to treatment with High dose steroids, H2 blocker, and antihistamine. When she was ready for discharge and IV removed and patient sat on hospital comfort underpad and tape she began to break out again. I suspect that the patient has a severe latex allergy. She is ordered hydroxyzine, steroid burst, h2 blocker and triamcinolone cream for discharge. Her vitals, c3&c4 compliment normal. She has been instructed to follow up ASAp with derm at discharge.     Consults:     No new Assessment & Plan notes have been filed under this hospital service since the last note was generated.  Service: Hospital Medicine    Final Active Diagnoses:    Diagnosis Date Noted POA    PRINCIPAL PROBLEM:  Urticaria [L50.9] 05/21/2019 Yes      Problems Resolved During this Admission:       Discharged Condition: stable    Disposition: Home or Self Care    Follow  Up:  Follow-up Information     Pradeep Kennedy MD In 3 days.    Specialty:  Family Medicine  Why:  Call the office to schedule appointment, For outpatient follow-up/post hospitalizaton  Contact information:  [address] [address] [phone]                 Patient Instructions:      Ambulatory Referral to Immunology   Referral Priority: Routine Referral Type: Consultation   Referral Reason: Specialty Services Required   Requested Specialty: Immunology   Number of Visits Requested: 1     Diet Cardiac     Activity as tolerated       Significant Diagnostic Studies: Labs:   CMP   Recent Labs   Lab 05/21/19  0616      K 3.9      CO2 23   *   BUN 16   CREATININE 0.7   CALCIUM 9.6   PROT 7.5   ALBUMIN 4.0   BILITOT 0.3   ALKPHOS 63   AST 16   ALT 20   ANIONGAP 9   ESTGFRAFRICA >60   EGFRNONAA >60   , CBC   Recent Labs   Lab 05/21/19  0245 05/21/19  0245   WBC 21.11*  --    HGB 10.2*  --    HCT 32.7* 33*   *  --    , INR No results found for: INR, PROTIME, Lipid Panel No results found for: CHOL, HDL, LDLCALC, TRIG, CHOLHDL, Troponin   Recent Labs   Lab 05/21/19  0245   TROPONINI <0.006    and A1C:   Medications:  Reconciled Home Medications:      Medication List      START taking these medications    hydrOXYzine pamoate 25 MG Cap  Commonly known as:  VISTARIL  Take 1 capsule (25 mg total) by mouth every 6 (six) hours. for 5 days     triamcinolone acetonide 0.1% 0.1 % cream  Commonly known as:  KENALOG  Apply topically 4 (four) times daily. Upper/lower extremities and back for pruritis        CONTINUE taking these medications    famotidine 20 MG tablet  Commonly known as:  PEPCID  Take 1 tablet (20 mg total) by mouth 2 (two) times daily. for 5 days     predniSONE 50 MG Tab  Commonly known as:  DELTASONE  Take 1 tablet (50 mg total) by mouth once daily. for 5 days        STOP taking these medications    omeprazole 20 MG capsule  Commonly known as:  PRILOSEC     phenazopyridine 100 MG  tablet  Commonly known as:  PYRIDIUM     ranitidine 150 MG tablet  Commonly known as:  ZANTAC            Indwelling Lines/Drains at time of discharge:   Lines/Drains/Airways          None          Time spent on the discharge of patient: 35 minutes  Patient was seen and examined on the date of discharge and determined to be suitable for discharge.         Complete medications as ordered  Follow all discharge instructions.  Please schedule follow up appointments as necessary      When to Call Your Doctor  Call your doctor immediately if you have any of the following:  · Severe headache  · Severe dizziness, or fainting  · Nausea or vomiting  · Fast heartbeat (higher than 100 beats per minute)  · Fever or chills  · Swollen ankles  · Weakness  · Chest Pain attacks that last longer, occur more often, or are more severe than in the past

## 2019-05-21 NOTE — HPI
Dina Hutton is a 33 y.o. female with PMHx including anemia, gerd and mental disorder?? Presents for evaluation of acute onset of pruitis on BL arms, legs, and back. Patient reports that 5 days ago on Thursday she visited her family in [place] who has cats. Her rash began shortly after arriving. She reports that she was seen in the ED and was found to have UTI and ordered bactrim + diflucan 2 days later. Her rash persisted and has progressively worsened since that time. Additionally, she also states that when she was discharged from the ED she had contact with the clothes that she brought home from [place] and suspect this contact was contributory.

## 2019-05-21 NOTE — HOSPITAL COURSE
Patient initially responded well to treatment with High dose steroids, H2 blocker, and antihistamine. When she was ready for discharge and IV removed and patient sat on hospital comfort underpad and tape she began to break out again. I suspect that the patient has a severe latex allergy. She is ordered hydroxyzine, steroid burst, h2 blocker and triamcinolone cream for discharge. Her vitals, c3&c4 compliment normal. She has been instructed to follow up ASAp with derm at discharge.

## 2019-05-21 NOTE — PLAN OF CARE
05/21/19 1056   Discharge Assessment   Assessment Type Discharge Planning Assessment   Confirmed/corrected address and phone number on facesheet? Yes   Assessment information obtained from? Medical Record   Prior to hospitilization cognitive status: Alert/Oriented   Prior to hospitalization functional status: Independent   Current cognitive status: Alert/Oriented   Current Functional Status: Independent   Facility Arrived From: home   Lives With spouse   Able to Return to Prior Arrangements yes   Is patient able to care for self after discharge? Yes   Who are your caregiver(s) and their phone number(s)? michael- [phone]   Patient's perception of discharge disposition home or selfcare   Readmission Within the Last 30 Days no previous admission in last 30 days   Patient currently being followed by outpatient case management? No   Patient currently receives any other outside agency services? No   Equipment Currently Used at Home none   Do you have any problems affording any of your prescribed medications? No   Is the patient taking medications as prescribed? yes   Does the patient have transportation home? Yes   Transportation Anticipated car, drives self;family or friend will provide   Does the patient receive services at the [facility]? No   Discharge Plan A Home with family   Discharge Plan B Home with family  (with follow up)   DME Needed Upon Discharge  none   Patient/Family in Agreement with Plan yes     [facility] - [place] - [address]  [address]  [place] [address]  Phone: [phone] Fax: [phone]

## 2019-05-21 NOTE — DISCHARGE INSTRUCTIONS
Local Allergic Reaction, Other  You are having an allergic reaction. Almost anything can cause one. Different people are allergic to different things. It is usually something that you ate or swallowed, came into contact with by getting or putting it on your skin or clothes, or something you breathed in the air. This can be very annoying and sometimes scary.  Symptoms of an allergic reaction can include:  · Rash, hives, redness, welts, blisters  · Itching, burning, stinging, pain  · Dry, flaky, cracking, scaly skin  · Swelling of the face, lips or other parts of the body  Sometimes the cause of an allergic reaction may be obvious. To help identify your allergen, remember:  · When it started  · What you were doing at the time or just before that  · Any activities you were involved in  · Any new products or contacts  Here are some common causes, but remember almost anything can cause a reaction, and you may not even be aware that you came into contact with one of these things.  · Dust, mold, pollen  · Plants such as poison ivy and poison oak are common ones, but there are many others  · Animals  · Foods such as shrimp, shellfish, peanuts, milk products, gluten, eggs; also colorings, flavorings, additives  · Insect bites or stings such as bees, mosquitos, flees, ticks  · Medicines such as penicillin, sulfa drugs, amoxicillin, aspirin, ibuprofen; any medicine can cause a reaction  · Jewelry such as nickel, gold  (new, or something youve worn for a while including zippers, and  buttons)  · Latex such as in gloves, clothes, toys, balloons, or some tapes (some people allergic to latex may also have problems with foods like bananas, avocados, kiwi, papaya, or chestnuts)  · Lotions, perfumes, cosmetics, soaps, shampoos, skincare products, nail products  · Chemicals or dyes in clothing, linen, , hair dyes, soaps, iodine  Home care    The goal of our treatment is to help relieve the symptoms, and get you feeling  better. The rash will usually fade over several days, but can sometimes last a couple of weeks. Over the next couple of days, there may be times when it is gets a little worse, and then better again. Here are some things to do:  · If you know what you are allergic to, avoid it because future reactions could be worse than this one.  · Avoid tight clothing and anything that heats up your skin (hot showers/baths, direct sunlight) since heat will make itching worse.  · An ice pack will relieve local areas of intense itching and redness. Dont put the ice directly on the skin, because it can damage the skin. You can also ice put it in a plastic bag. Wrap it in something like a towel, fany shirt, or cloth.  · Oral Benadryl (diphenhydramine) is an antihistamine available at drug and grocery stores. Unless a prescription antihistamine was given, Benadryl may be used to reduce itching if large areas of the skin are involved. It may make you sleepy, so be careful using it in the daytime or when going to school, working, or driving. [NOTE: Do not use Benadryl if you have glaucoma or if you are a man with trouble urinating due to an enlarged prostate.] There are antihistamines that causes less drowsiness and is a good alternatives for daytime use. Ask your pharmacist for suggestions.  · Do not use Benadryl cream on your skin, because in some people it can cause a further reaction, and make you allergic to Benadryl.  · Try not to scratch. This can tear the skin and cause an infection.  · Using heat-steam to clean your home, using high-efficiency particulate (HEPA) vacuums and filters, avoiding food and pet triggers, exterminating cockroaches, and frequent house cleaning are a few of the strategies used to decrease allergic reactions.  Follow-up care  Follow up with your healthcare provider, or as advised if your symptoms do not continue to improve or get worse.  Call 911  Call 911 if any of these occur:  · Trouble breathing or  swallowing, wheezing  · New or worsening swelling in the mouth, throat, or tongue  · Hoarse voice or trouble speaking  · Confused   · Very drowsy or trouble awakening  · Fainting or loss of consciousness  · Rapid heart rate  · Low blood pressure  · Feeling of doom  · Nausea, vomiting, abdominal pain, diarrhea  · Vomiting blood, or large amounts of blood in stool  · Seizure  When to seek medical advice  Call your healthcare provider right away if any of the following occur:  · Spreading areas of itching, redness or swelling  · New or worse swelling in the face, eyelids, or  lips  · Dizziness, weakness  · Signs of infection:  ¨ Spreading redness  ¨ Increased pain or swelling  ¨ Fever of 100.4ºF (38ºC) or higher, or as directed by your healthcare provider  ¨ Colored fluid draining from the inflamed areas  Date Last Reviewed: 7/30/2015  © 6735-7470 [employer]. [address] [place] [address]. All rights reserved. This information is not intended as a substitute for professional medical care. Always follow your healthcare professional's instructions.       Your CT showed a 1.3 cm left thyroid lobe nodule.  Follow-up with primary care for nonemergent thyroid ultrasound.

## 2019-05-21 NOTE — ED PROVIDER NOTES
Encounter Date: 2019       History     Chief Complaint   Patient presents with    Urticaria     Pt reports she was seen on 19 for hives and d/c to home. Pt tonight c/o hives returned. Pt states she has had 4 doses of benadryl and steroids and pepcid today with no relief.      33-year-old female with history of GERD presents for diffuse, itchy rash.  Patient was seen by me last night, the following is HPI from her previous visit:    Patient is a 33 year old female with PMHx of GERD who presents to the ED with complaints of a rash that began 4 days ago but became severe today. Symptoms have gradually worsened since. Reports SOB that is intermittent (describes occasionally feeling the need to take a deep breath) and an itchy rash that has spread. She has nausea and has vomited but states this is not new and relates this to her GERD. On Bactrim for UTI (Began her course on ) but reports she has taken Bactrim before without any issues. She denies new soaps, lotions, foods, detergents. Recently traveled for her grandmothers , and sat on a blanket that was a cat owners 2 days ago. Thinks this may have something to do with her current symptoms. Allergic to certain smells.       Tonight she returns stating symptoms are worsening. She has taken prednisone, hydroxyzine, and pepcid as prescribed without relief. One hour ago she reports developing a sharp pain in the lower substernal area that radiates to her back, there is pleuritic in nature and associated with some shortness of breath. She denies feeling like this previously.  She has no personal history of PE or DVT, she is not on oral contraceptives, she denies tobacco use.  She does have GERD but states this is a different sensation.  She has no wheezing, no oropharyngeal swelling, no nausea or vomiting. She continues to have dysuria. UA last night negative for infection.           Review of patient's allergies indicates:  No Known Allergies  Past  Medical History:   Diagnosis Date    Anemia     GERD (gastroesophageal reflux disease)     Hypoglycemia     Hypoglycemia     Mental disorder      History reviewed. No pertinent surgical history.  Family History   Problem Relation Age of Onset    Hypertension Mother     Hyperlipidemia Mother     Cancer Neg Hx      Social History     Tobacco Use    Smoking status: Never Smoker    Smokeless tobacco: Never Used   Substance Use Topics    Alcohol use: No    Drug use: No     Review of Systems   Constitutional: Negative for chills and fever.   HENT: Negative for facial swelling, trouble swallowing and voice change.    Eyes: Negative for visual disturbance.   Respiratory: Positive for shortness of breath. Negative for cough and wheezing.    Cardiovascular: Positive for chest pain.   Gastrointestinal: Negative for abdominal pain, nausea and vomiting.   Genitourinary: Positive for dysuria. Negative for vaginal discharge.   Skin: Positive for color change and rash.   Allergic/Immunologic: Negative for immunocompromised state.   Neurological: Negative for headaches.       Physical Exam     Initial Vitals [05/21/19 0106]   BP Pulse Resp Temp SpO2   134/64 87 18 97.5 °F (36.4 °C) 100 %      MAP       --         Physical Exam    Constitutional: She appears well-developed and well-nourished. She is not diaphoretic. No distress.   HENT:   Mouth/Throat: Oropharynx is clear and moist.   No oropharyngeal swelling   Eyes: Pupils are equal, round, and reactive to light.   Neck: Neck supple.   Cardiovascular: Normal rate and regular rhythm.   Pulmonary/Chest: Breath sounds normal. She has no wheezes.   Abdominal: Soft. There is no tenderness.   Musculoskeletal: She exhibits no edema.   Neurological: She is alert and oriented to person, place, and time.   Skin: Skin is warm and dry.   Diffuse maculopapular rash noted on legs, trunk, arms.  No oral lesions.   Psychiatric: She has a normal mood and affect.         ED Course    Procedures  Labs Reviewed   CBC W/ AUTO DIFFERENTIAL - Abnormal; Notable for the following components:       Result Value    WBC 21.11 (*)     Hemoglobin 10.2 (*)     Hematocrit 32.7 (*)     Mean Corpuscular Volume 71 (*)     Mean Corpuscular Hemoglobin 22.2 (*)     Mean Corpuscular Hemoglobin Conc 31.2 (*)     RDW 17.8 (*)     Platelets 358 (*)     Gran # (ANC) 18.0 (*)     Gran% 85.5 (*)     Lymph% 9.7 (*)     All other components within normal limits   POCT GLUCOSE - Abnormal; Notable for the following components:    POC Glucose 124 (*)     POC Hematocrit 33 (*)     All other components within normal limits   TROPONIN I   B-TYPE NATRIURETIC PEPTIDE   LIPASE   ISTAT CHEM8     EKG Readings: (Independently Interpreted)   Normal sinus rhythm, rate 92 beats per minute, no ST elevation or T-wave inversion.  Normal HI interval,  milliseconds.       Imaging Results          CTA Chest Non-Coronary (PE Study) (Final result)  Result time 05/21/19 03:40:37    Final result by Elieser Brasher MD (05/21/19 03:40:37)                 Impression:      1.  No convincing evidence of pulmonary thromboembolism or other acute intrathoracic abnormality.    2.  1.3 cm hypoattenuating left thyroid lobe nodule.  Consider follow-up nonemergent thyroid ultrasound.      Electronically signed by: Elieser Brasher MD  Date:    05/21/2019  Time:    03:40             Narrative:    EXAMINATION:  CTA CHEST NON CORONARY    CLINICAL HISTORY:  Chest pain, acute, PE suspected, low pretest prob;    TECHNIQUE:  Low dose axial images, sagittal and coronal reformations were obtained from the thoracic inlet to the lung bases following the IV administration of 70 mL of Omnipaque 350.  Contrast timing was optimized to evaluate the pulmonary arteries.  MIP images were performed.    COMPARISON:  None    FINDINGS:  There is a 1.3 cm hypoattenuating left thyroid lobe nodule.  The remaining visualized soft tissue structures at the base of the neck are  unremarkable.    The thoracic aorta maintains normal caliber, contour, and course without significant atherosclerotic calcification within its course.  There is no evidence of aneurysmal dilation or dissection. The heart is not enlarged and there is no evidence of pericardial effusion.The esophagus maintains a normal course and caliber.There is no axillary, mediastinal, or hilar lymph node enlargement.    The trachea is midline and proximal airways are patent. The lungs are symmetrically expanded. There is no confluent airspace consolidation or pulmonary mass.  There is no pneumothorax or pleural effusion.    There is no convincing evidence of pulmonary thromboembolism or pulmonary infarct.    Limited images of the upper abdomen obtained during the course of this dedicated thoracic CT demonstrate nothing unusual.    The osseous structures are unremarkable.                                 Medical Decision Making:   Initial Assessment:   33-year-old female presenting with urticaria, diffuse maculopapular rash, worsening despite outpatient treatment.  She reports new onset of sharp pain just below the xiphoid that radiates to her back that is pleuritic in nature and associated with shortness of breath. Her exam is notable for diffuse maculopapular rash, there is no oropharyngeal swelling, no abdominal pain, no wheezing on exam.  She appears to be having some allergic reaction versus drug-related eruption.  She has no risk factors for PE or ACS, her EKG is normal without evidence of ischemia.  CT a ordered to rule out PE as patient noted to be tachycardic in the low 100s and reporting pleuritic chest pain with shortness of breath. This study was negative for acute PE but did show a left thyroid nodule, I disclosed this to the patient and recommend she has an ultrasound performed as an outpatient for further evaluation.  Her labs show a leukocytosis, the patient is afebrile, urinalysis last night was negative for signs  of infection, she has no cough or port of fever or chills. I suspect the leukocytosis is demargination due to recent steroid use.  I will admit this patient to observation unit to continue IV steroids, IV Benadryl and Pepcid.                      Clinical Impression:       ICD-10-CM ICD-9-CM   1. Urticaria L50.9 708.9   2. Chest pain R07.9 786.50   3. Maculopapular rash, generalized R21 782.1   4. Thyroid nodule E04.1 241.0                                Krystal Chance MD  05/21/19 0354       Krystal Chance MD  05/21/19 0354

## 2019-05-22 ENCOUNTER — LAB VISIT (OUTPATIENT)
Dept: LAB | Facility: HOSPITAL | Age: 34
End: 2019-05-22
Payer: COMMERCIAL

## 2019-05-22 ENCOUNTER — OFFICE VISIT (OUTPATIENT)
Dept: ALLERGY | Facility: CLINIC | Age: 34
End: 2019-05-22
Payer: COMMERCIAL

## 2019-05-22 VITALS
WEIGHT: 112 LBS | HEART RATE: 108 BPM | DIASTOLIC BLOOD PRESSURE: 60 MMHG | BODY MASS INDEX: 24.16 KG/M2 | SYSTOLIC BLOOD PRESSURE: 110 MMHG | OXYGEN SATURATION: 98 % | HEIGHT: 57 IN

## 2019-05-22 DIAGNOSIS — L50.9 URTICARIA: Primary | ICD-10-CM

## 2019-05-22 DIAGNOSIS — L50.9 URTICARIA: ICD-10-CM

## 2019-05-22 DIAGNOSIS — E04.1 THYROID NODULE: ICD-10-CM

## 2019-05-22 LAB
RPR SER QL: NORMAL
THYROGLOB AB SERPL IA-ACNC: 20 IU/ML (ref 0–3.9)
THYROPEROXIDASE IGG SERPL-ACNC: <6 IU/ML

## 2019-05-22 PROCEDURE — 86800 THYROGLOBULIN ANTIBODY: CPT

## 2019-05-22 PROCEDURE — 3008F BODY MASS INDEX DOCD: CPT | Mod: CPTII,S$GLB,, | Performed by: PEDIATRICS

## 2019-05-22 PROCEDURE — 99204 PR OFFICE/OUTPT VISIT, NEW, LEVL IV, 45-59 MIN: ICD-10-PCS | Mod: S$GLB,,, | Performed by: PEDIATRICS

## 2019-05-22 PROCEDURE — 3008F PR BODY MASS INDEX (BMI) DOCUMENTED: ICD-10-PCS | Mod: CPTII,S$GLB,, | Performed by: PEDIATRICS

## 2019-05-22 PROCEDURE — 84439 ASSAY OF FREE THYROXINE: CPT

## 2019-05-22 PROCEDURE — 99999 PR PBB SHADOW E&M-EST. PATIENT-LVL IV: CPT | Mod: PBBFAC,,, | Performed by: PEDIATRICS

## 2019-05-22 PROCEDURE — 99999 PR PBB SHADOW E&M-EST. PATIENT-LVL IV: ICD-10-PCS | Mod: PBBFAC,,, | Performed by: PEDIATRICS

## 2019-05-22 PROCEDURE — 84443 ASSAY THYROID STIM HORMONE: CPT

## 2019-05-22 PROCEDURE — 86376 MICROSOMAL ANTIBODY EACH: CPT

## 2019-05-22 PROCEDURE — 36415 COLL VENOUS BLD VENIPUNCTURE: CPT

## 2019-05-22 PROCEDURE — 99204 OFFICE O/P NEW MOD 45 MIN: CPT | Mod: S$GLB,,, | Performed by: PEDIATRICS

## 2019-05-22 RX ORDER — PREDNISONE 50 MG/1
50 TABLET ORAL DAILY
Qty: 5 TABLET | Refills: 0 | Status: SHIPPED | OUTPATIENT
Start: 2019-05-22 | End: 2019-05-27

## 2019-05-22 NOTE — PROGRESS NOTES
ALLERGY & IMMUNOLOGY CLINIC - INITIAL CONSULTATION      HISTORY OF PRESENT ILLNESS     Patient ID: Dina Hutton is a 33 y.o. female    CC: rash    HPI: Ms. Hutton is a 34 yo female here for evaluation of hives. Started with small red bumps on her upper extremities 5 days ago, then 3 days ago, spread to involve her head, trunk, back, lower extremities. Hives were transient, pruritic and painful; when they faded, they did leave a residual purple hue to the skin. She tried treating with benadryl, which was ineffective. No association with ibuprofen or other NSAIDs or etoh use. Hives are exacerbated by cold compresses and pressure.     No preceding viral illness or sick contacts, but she was taking bactrim for a UTI since 5/13, which she had tolerated in the past. She was also taking diflucan from 5/13-5/16, but had stopped this prior to onset of the hives. The bactrim was stopped on 5/19 when she presented to the ED for the first time. She was treated with IV steroids, H1 and H2 blockers and discharged on prednisone and topical triamcinolone. She continued with hives and they became more prominent/raised and diffiuse so she went back to the ED early in the morning on 5/21. She was again treated with IV steroids and antihistamines and was observed for several hours until she was discharged home on the afternoon of 5/21. She continues prednisone 50 mg daily, hydroxyzine and/or benadryl 25 mg every 6 hours, and pepcid twice a day. Since going home, her hives have improved, but she still has a few urticarial lesions.     Of note, she complained of shortness of breath with one of her ED visits, prompting a CTA chest which revealed a 1.3 cm hypoattenuating left thyroid nodule.     REVIEW OF SYSTEMS     CONST: no F/C/NS, +weight gain, +fatigue  NEURO: no H/A, no weakness, no paresthesias, +dizziness  EYES: no discharge, no pruritus, no erythema  EARS: no hearing loss, no sensation of fullness  NOSE: no congestion, no  "rhinorrhea, no discharge, no itching, no sneezing  PULM: no SOB, no wheezing, no cough, no snoring  CV: chest pain due to acid reflux, no palpitations, no leg swelling  GI: +acid reflux, abdominal pain, +esophageal hernia, +constipation   URO: +dysuria, no hematuria or nocturia  MSK:multiple joint pains, no muscle pain  DERM: see HPI  ENDO: no cold intolerance, +heat intolerance      MEDICAL HISTORY     MedHx: acid reflux, hypoglycemia   SurgHx: denies  SocHx: ; lives in [place]; stays at home,  works in IS at [employer]. 11 yo daughter. No tobacco use. Occasional etoh use   FamHx: arthritis in grandma, no autoimmune conditions in family   Allergies: see below  Medications: MAR reviewed  Vaccines: UTD    H/o Asthma: denies  H/o Eczema: denies  H/o Rhinitis: denies  Oral Allergy:  denies  Food Allergy: denies  Venom Allergy: denies  Latex Allergy: denies  Other Allergies: denies  Env/Occ: denies any evironmental or occupational exposures     PHYSICAL EXAM     VS: /60   Pulse 108   Ht 4' 9" (1.448 m)   Wt 50.8 kg (111 lb 15.9 oz)   LMP 05/07/2019   SpO2 98%   BMI 24.24 kg/m²   GENERAL: alert and oriented, NAD, well-appearing, cooperative  EYES: PERRL, EOMI, no conjunctival injection, no discharge, no infraorbital shiners  EARS: external auditory canals normal B/L, TM normal B/L  NOSE: normal turbinates, no stringing mucous, no polyps  ORAL: MMM, no ulcers, no thrush, no cobblestoning  NECK: supple, trachea midline, no LAD, palpable nodule in left thyroid, non-tender  LUNGS: CTAB, no w/r/c, no increased WOB  HEART: RRR, normal S1/S2, no m/g/r  ABDOMEN: non-specific tenderness to palpation throughout, soft, non-distended  EXTREMITIES: +2 distal pulses, no c/c/e  DERM: few bruises to AC fossa from prior IV sites, no urticarial lesions noted, hypopigmented macules on lower extremities at sites of previous hives  NEURO: normal gait, no facial asymmetry     LABORATORY STUDIES     Component      Latest " Ref Rng & Units 5/22/2019 5/21/2019 5/19/2019   WBC      3.90 - 12.70 K/uL  21.11 (H)    RBC      4.00 - 5.40 M/uL  4.60    Hemoglobin      12.0 - 16.0 g/dL  10.2 (L)    Hematocrit      37.0 - 48.5 %  32.7 (L)    MCV      82 - 98 fL  71 (L)    MCH      27.0 - 31.0 pg  22.2 (L)    MCHC      32.0 - 36.0 g/dL  31.2 (L)    RDW      11.5 - 14.5 %  17.8 (H)    Platelets      150 - 350 K/uL  358 (H)    MPV      9.2 - 12.9 fL  10.0    Gran # (ANC)      1.8 - 7.7 K/uL  18.0 (H)    Lymph #      1.0 - 4.8 K/uL  2.0    Mono #      0.3 - 1.0 K/uL  1.0    Eos #      0.0 - 0.5 K/uL  0.0    Baso #      0.00 - 0.20 K/uL  0.03    Gran%      38.0 - 73.0 %  85.5 (H)    Lymph%      18.0 - 48.0 %  9.7 (L)    Mono%      4.0 - 15.0 %  4.9    Eosinophil%      0.0 - 8.0 %  0.0    Basophil%      0.0 - 1.9 %  0.1    Differential Method        Automated    Sodium      136 - 145 mmol/L  138    Potassium      3.5 - 5.1 mmol/L  3.9    Chloride      95 - 110 mmol/L  106    CO2      23 - 29 mmol/L  23    Glucose      70 - 110 mg/dL  140 (H)    BUN, Bld      6 - 20 mg/dL  16    Creatinine      0.5 - 1.4 mg/dL  0.7    Calcium      8.7 - 10.5 mg/dL  9.6    PROTEIN TOTAL      6.0 - 8.4 g/dL  7.5    Albumin      3.5 - 5.2 g/dL  4.0    BILIRUBIN TOTAL      0.1 - 1.0 mg/dL  0.3    Alkaline Phosphatase      55 - 135 U/L  63    AST      10 - 40 U/L  16    ALT      10 - 44 U/L  20    Anion Gap      8 - 16 mmol/L  9    eGFR if African American      >60 mL/min/1.73 m:2  >60    eGFR if non African American      >60 mL/min/1.73 m:2  >60    Specimen UA         Urine, Clean Catch   Color, UA      Yellow, Straw, Olya   Yellow   Appearance, UA      Clear   Clear   pH, UA      5.0 - 8.0   5.0   Specific Gravity, UA      1.005 - 1.030   1.030   Protein, UA      Negative   Negative   Glucose, UA      Negative   Negative   Ketones, UA      Negative   Negative   Bilirubin (UA)      Negative   Negative   Occult Blood UA      Negative   Negative   NITRITE UA      Negative    Negative   UROBILINOGEN UA      <2.0 EU/dL   Negative   Leukocytes, UA      Negative   Negative   Troponin I      0.000 - 0.026 ng/mL  <0.006    BNP      0 - 99 pg/mL  17    Lipase      4 - 60 U/L  20    Complement (C-3)      50 - 180 mg/dL  108    Complement (C-4)      11 - 44 mg/dL  25    RPR      Non-reactive  Non-reactive    HIV Rapid Testing      Negative  Negative    Procalcitonin      <0.25 ng/mL  0.03    TSH      0.400 - 4.000 uIU/mL 0.281 (L)     Thyroglobulin Ab Screen      0.0 - 3.9 IU/mL 20.0 (H)     Thyroperoxidase Antibodies      <6.0 IU/mL <6.0     Free T4      0.71 - 1.51 ng/dL 0.85          IMAGING & OTHER DIAGNOSTICS     CTA Chest 5/21/19  Impression       1.  No convincing evidence of pulmonary thromboembolism or other acute intrathoracic abnormality.    2.  1.3 cm hypoattenuating left thyroid lobe nodule.  Consider follow-up nonemergent thyroid ultrasound.          CHART REVIEW     Reviewed ED notes, labs, imaging      ASSESSMENT & PLAN     Dina Hutton is a 33 y.o. female with     Acute urticaria-we discussed that urticaria lasting several days is not consistent with an IgE mediated process such as food, drug or environmental allergy. It is more likely that an infection, such as her UTI or an unrecognized viral illness triggered the onset of the hives. We discussed the difference between acute and chronic urticaria (chronic lasting>6 weeks), and the approach to management.  -Start high dose 2nd generation H1 blockers such as cetirizine 20 mg BID  -Can finish 5 day course prednisone course prescribed by ED (only has 1-2 more days)  -Can continue hydroxyzine 25-50 mg q6h prn breakthrough hives as well as pepcid BID  -If hives not improved with the above, will prescribe 5 more days of prednisone to be used only if high dose antihistamines ineffective     Thyroid nodule-work up in ED revealed a left thyroid nodule. ROS suggestive of possible thyroid disease, and we discussed that urticaria can be  associated with thyroid disease, although not commonly  -TSH, TPO, and thyroglobulin Ab sent today  -Will refer to endocrine for further work up given low TSH and elevated thyroglobulin Ab  -Recommend follow up with PCP if cannot be seen by endocrine soon for possible thyroid US    Follow up: June 26th at 8 AM     Discussed with Dr. Lilian Perez MD  Allergy/Immunology Fellow

## 2019-05-23 ENCOUNTER — PATIENT MESSAGE (OUTPATIENT)
Dept: ALLERGY | Facility: CLINIC | Age: 34
End: 2019-05-23

## 2019-05-23 LAB
HIV 1+2 AB+HIV1 P24 AG SERPL QL IA: NEGATIVE
MEV IGM SER QL: NEGATIVE
RUBEOLA IGG ANTIBODY: 1.83 ISR (ref 0–0.9)
RUBEOLA INTERPRETATION: POSITIVE
T4 FREE SERPL-MCNC: 0.85 NG/DL (ref 0.71–1.51)
TSH SERPL DL<=0.005 MIU/L-ACNC: 0.28 UIU/ML (ref 0.4–4)

## 2019-05-23 NOTE — UM SECONDARY REVIEW
[facility]    Level of Care Issue  Created By ER  DR. ROGERS (ED PHYSICIAN) FEELS THAT PT NEEDS IV TX DUE TO FAILED OUTPT TX-NO CRITERIA FOR ADMISSION  SEE ATTACHED REVIEW    THANKS  CHRISTOPHER WELLS     Subset: General Medical  Review Details Comments: 33-year-old female with history of GERD presents for diffuse, itchy rash. ?Patient was seen by me last night, the following is HPI from her previous visit:     Patient is a 33 year old female with PMHx of GERD who presents to the ED with complaints of a rash that began 4 days ago but became severe today. Symptoms have gradually worsened since. Reports SOB that is intermittent (describes occasionally feeling the need to take a deep breath) and an itchy rash that has spread. She has nausea and has vomited but states this is not new and relates this to her GERD. On Bactrim for UTI (Began her course on ) but reports she has taken Bactrim before without any issues. She denies new soaps, lotions, foods, detergents. Recently traveled for her grandmothers , and sat on a blanket that was a cat owners 2 days ago. Thinks this may have something to do with her current symptoms. Allergic to certain smells.         Tonight she returns stating symptoms are worsening. She has taken prednisone, hydroxyzine, and pepcid as prescribed without relief. One hour ago she reports developing a sharp pain in the lower substernal area that radiates to her back, there is pleuritic in nature and associated with some shortness of breath. She denies feeling like this previously. ?She has no personal history of PE or DVT, she is not on oral contraceptives, she denies tobacco use. ?She does have GERD but states this is a different sensation. ?She has no wheezing, no oropharyngeal swelling, no nausea or vomiting. She continues to have dysuria. UA last night negative for infection.   V/S- 99.1, 118, 120/55, 18, 100%  ED TX- BENADRYL IV, PEPCID IV, SOLUMEDROL IV  LABS- WBC 21.11  CTA-1.  No  convincing evidence of pulmonary thromboembolism or other acute intrathoracic abnormality.    2.  1.3 cm hypoattenuating left thyroid lobe nodule.  Consider follow-up nonemergent thyroid ultrasound.  EKG Readings: (Independently Interpreted)   Normal sinus rhythm, rate 92 beats per minute, no ST elevation or T-wave inversion. ?Normal CA interval,  milliseconds.   PLACE IN OBS M/S WITH URTICARIA, MACUPAPULAR RASH, THYROID NODULE, CHEST PAIN  M/S ORDERS- BENADRYL IV PRN, PEPCID IV, SOLUMEDROL IV  ED [id number]-old female presenting with urticaria, diffuse maculopapular rash, worsening despite outpatient treatment. ?She reports new onset of sharp pain just below the xiphoid that radiates to her back that is pleuritic in nature and associated with shortness of breath. Her exam is notable for diffuse maculopapular rash, there is no oropharyngeal swelling, no abdominal pain, no wheezing on exam. ?She appears to be having some allergic reaction versus drug-related eruption. ?She has no risk factors for PE or ACS, her EKG is normal without evidence of ischemia. ?CT a ordered to rule out PE as patient noted to be tachycardic in the low 100s and reporting pleuritic chest pain with shortness of breath. This study was negative for acute PE but did show a left thyroid nodule, I disclosed this to the patient and recommend she has an ultrasound performed as an outpatient for further evaluation. ?Her labs show a leukocytosis, the patient is afebrile, urinalysis last night was negative for signs of infection, she has no cough or port of fever or chills. I suspect the leukocytosis is demargination due to recent steroid use. ?I will admit this patient to observation unit to continue IV steroids, IV Benadryl and Pepcid.        Approved Observation   Patient should already be discharged. How many hours was she here?  AA    Discharged 5/21/19 151/2 hrs

## 2019-05-24 ENCOUNTER — TELEPHONE (OUTPATIENT)
Dept: ALLERGY | Facility: CLINIC | Age: 34
End: 2019-05-24

## 2019-05-24 NOTE — TELEPHONE ENCOUNTER
Attempted to contact Ms. Hutton to discuss her thyroid testing results. No answer, LVM stating that I sent a portal message explaining her test results and referral to endocrinology.

## 2019-05-27 LAB — C1INH SERPL-MCNC: 28 MG/DL (ref 21–39)

## 2019-05-31 DIAGNOSIS — S27.809A RUPTURE OF DIAPHRAGM: Primary | ICD-10-CM

## 2019-06-05 ENCOUNTER — HOSPITAL ENCOUNTER (OUTPATIENT)
Dept: RADIOLOGY | Facility: HOSPITAL | Age: 34
Discharge: HOME | End: 2019-06-05
Attending: SURGERY
Payer: COMMERCIAL

## 2019-06-05 DIAGNOSIS — S27.809A RUPTURE OF DIAPHRAGM: ICD-10-CM

## 2019-06-05 PROCEDURE — 74220 X-RAY XM ESOPHAGUS 1CNTRST: CPT | Mod: 26,,, | Performed by: RADIOLOGY

## 2019-06-05 PROCEDURE — 74220 FL ESOPHAGRAM COMPLETE: ICD-10-PCS | Mod: 26,,, | Performed by: RADIOLOGY

## 2019-06-05 PROCEDURE — 74220 X-RAY XM ESOPHAGUS 1CNTRST: CPT | Mod: TC

## 2019-07-29 ENCOUNTER — TELEPHONE (OUTPATIENT)
Dept: ENDOCRINOLOGY | Facility: CLINIC | Age: 34
End: 2019-07-29

## 2019-07-29 DIAGNOSIS — E03.9 HYPOTHYROIDISM, UNSPECIFIED TYPE: Primary | ICD-10-CM

## 2019-08-16 ENCOUNTER — HOSPITAL ENCOUNTER (EMERGENCY)
Facility: HOSPITAL | Age: 34
Discharge: HOME | End: 2019-08-16
Attending: EMERGENCY MEDICINE
Payer: COMMERCIAL

## 2019-08-16 VITALS
TEMPERATURE: 98 F | BODY MASS INDEX: 23.09 KG/M2 | RESPIRATION RATE: 20 BRPM | DIASTOLIC BLOOD PRESSURE: 55 MMHG | HEART RATE: 77 BPM | OXYGEN SATURATION: 100 % | SYSTOLIC BLOOD PRESSURE: 99 MMHG | HEIGHT: 58 IN | WEIGHT: 110 LBS

## 2019-08-16 DIAGNOSIS — R10.11 RIGHT UPPER QUADRANT PAIN: ICD-10-CM

## 2019-08-16 DIAGNOSIS — R06.02 SOB (SHORTNESS OF BREATH): ICD-10-CM

## 2019-08-16 DIAGNOSIS — Z3A.01 LESS THAN 8 WEEKS GESTATION OF PREGNANCY: Primary | ICD-10-CM

## 2019-08-16 DIAGNOSIS — R10.9 ABDOMINAL PAIN: ICD-10-CM

## 2019-08-16 LAB
ALBUMIN SERPL BCP-MCNC: 4.2 G/DL (ref 3.5–5.2)
ALP SERPL-CCNC: 66 U/L (ref 55–135)
ALT SERPL W/O P-5'-P-CCNC: 16 U/L (ref 10–44)
ANION GAP SERPL CALC-SCNC: 9 MMOL/L (ref 8–16)
ANISOCYTOSIS BLD QL SMEAR: SLIGHT
AST SERPL-CCNC: 17 U/L (ref 10–40)
B-HCG UR QL: POSITIVE
BASOPHILS # BLD AUTO: 0.04 K/UL (ref 0–0.2)
BASOPHILS NFR BLD: 0.3 % (ref 0–1.9)
BILIRUB SERPL-MCNC: 0.2 MG/DL (ref 0.1–1)
BILIRUB UR QL STRIP: NEGATIVE
BUN SERPL-MCNC: 10 MG/DL (ref 6–20)
CALCIUM SERPL-MCNC: 9.3 MG/DL (ref 8.7–10.5)
CHLORIDE SERPL-SCNC: 106 MMOL/L (ref 95–110)
CLARITY UR: CLEAR
CO2 SERPL-SCNC: 23 MMOL/L (ref 23–29)
COLOR UR: YELLOW
CREAT SERPL-MCNC: 0.7 MG/DL (ref 0.5–1.4)
CTP QC/QA: YES
DIFFERENTIAL METHOD: ABNORMAL
EOSINOPHIL # BLD AUTO: 0.1 K/UL (ref 0–0.5)
EOSINOPHIL NFR BLD: 1.2 % (ref 0–8)
ERYTHROCYTE [DISTWIDTH] IN BLOOD BY AUTOMATED COUNT: 21.7 % (ref 11.5–14.5)
EST. GFR  (AFRICAN AMERICAN): >60 ML/MIN/1.73 M^2
EST. GFR  (NON AFRICAN AMERICAN): >60 ML/MIN/1.73 M^2
GLUCOSE SERPL-MCNC: 86 MG/DL (ref 70–110)
GLUCOSE UR QL STRIP: NEGATIVE
HCT VFR BLD AUTO: 37.1 % (ref 37–48.5)
HGB BLD-MCNC: 11.4 G/DL (ref 12–16)
HGB UR QL STRIP: NEGATIVE
HYPOCHROMIA BLD QL SMEAR: ABNORMAL
KETONES UR QL STRIP: NEGATIVE
LEUKOCYTE ESTERASE UR QL STRIP: ABNORMAL
LIPASE SERPL-CCNC: 17 U/L (ref 4–60)
LYMPHOCYTES # BLD AUTO: 2.1 K/UL (ref 1–4.8)
LYMPHOCYTES NFR BLD: 17.3 % (ref 18–48)
MCH RBC QN AUTO: 23 PG (ref 27–31)
MCHC RBC AUTO-ENTMCNC: 30.7 G/DL (ref 32–36)
MCV RBC AUTO: 75 FL (ref 82–98)
MICROSCOPIC COMMENT: NORMAL
MONOCYTES # BLD AUTO: 0.8 K/UL (ref 0.3–1)
MONOCYTES NFR BLD: 6.9 % (ref 4–15)
NEUTROPHILS # BLD AUTO: 8.8 K/UL (ref 1.8–7.7)
NEUTROPHILS NFR BLD: 74.3 % (ref 38–73)
NITRITE UR QL STRIP: NEGATIVE
PH UR STRIP: 7 [PH] (ref 5–8)
PLATELET # BLD AUTO: 312 K/UL (ref 150–350)
PMV BLD AUTO: 10.4 FL (ref 9.2–12.9)
POCT GLUCOSE: 79 MG/DL (ref 70–110)
POTASSIUM SERPL-SCNC: 4 MMOL/L (ref 3.5–5.1)
PROT SERPL-MCNC: 7.6 G/DL (ref 6–8.4)
PROT UR QL STRIP: NEGATIVE
RBC # BLD AUTO: 4.96 M/UL (ref 4–5.4)
RBC #/AREA URNS HPF: 2 /HPF (ref 0–4)
SODIUM SERPL-SCNC: 138 MMOL/L (ref 136–145)
SP GR UR STRIP: 1.03 (ref 1–1.03)
URN SPEC COLLECT METH UR: ABNORMAL
UROBILINOGEN UR STRIP-ACNC: NEGATIVE EU/DL
WBC # BLD AUTO: 11.92 K/UL (ref 3.9–12.7)
WBC #/AREA URNS HPF: 3 /HPF (ref 0–5)

## 2019-08-16 PROCEDURE — 80053 COMPREHEN METABOLIC PANEL: CPT

## 2019-08-16 PROCEDURE — 99285 EMERGENCY DEPT VISIT HI MDM: CPT | Mod: 25

## 2019-08-16 PROCEDURE — 25000003 PHARM REV CODE 250: Performed by: EMERGENCY MEDICINE

## 2019-08-16 PROCEDURE — 63600175 PHARM REV CODE 636 W HCPCS: Performed by: EMERGENCY MEDICINE

## 2019-08-16 PROCEDURE — 85025 COMPLETE CBC W/AUTO DIFF WBC: CPT

## 2019-08-16 PROCEDURE — 96361 HYDRATE IV INFUSION ADD-ON: CPT

## 2019-08-16 PROCEDURE — 96374 THER/PROPH/DIAG INJ IV PUSH: CPT

## 2019-08-16 PROCEDURE — 93010 ELECTROCARDIOGRAM REPORT: CPT | Mod: ,,, | Performed by: INTERNAL MEDICINE

## 2019-08-16 PROCEDURE — 93005 ELECTROCARDIOGRAM TRACING: CPT

## 2019-08-16 PROCEDURE — 82962 GLUCOSE BLOOD TEST: CPT | Mod: 59

## 2019-08-16 PROCEDURE — 81000 URINALYSIS NONAUTO W/SCOPE: CPT

## 2019-08-16 PROCEDURE — 93010 EKG 12-LEAD: ICD-10-PCS | Mod: ,,, | Performed by: INTERNAL MEDICINE

## 2019-08-16 PROCEDURE — 83690 ASSAY OF LIPASE: CPT

## 2019-08-16 PROCEDURE — 81025 URINE PREGNANCY TEST: CPT | Performed by: EMERGENCY MEDICINE

## 2019-08-16 RX ORDER — MORPHINE SULFATE 10 MG/ML
2 INJECTION INTRAMUSCULAR; INTRAVENOUS; SUBCUTANEOUS
Status: DISCONTINUED | OUTPATIENT
Start: 2019-08-16 | End: 2019-08-16

## 2019-08-16 RX ORDER — MAG HYDROX/ALUMINUM HYD/SIMETH 200-200-20
30 SUSPENSION, ORAL (FINAL DOSE FORM) ORAL
COMMUNITY
End: 2019-08-21

## 2019-08-16 RX ORDER — ACETAMINOPHEN 325 MG/1
650 TABLET ORAL
Status: COMPLETED | OUTPATIENT
Start: 2019-08-16 | End: 2019-08-16

## 2019-08-16 RX ORDER — ONDANSETRON 2 MG/ML
4 INJECTION INTRAMUSCULAR; INTRAVENOUS
Status: COMPLETED | OUTPATIENT
Start: 2019-08-16 | End: 2019-08-16

## 2019-08-16 RX ORDER — SUCRALFATE 1 G/1
1 TABLET ORAL 4 TIMES DAILY
Qty: 10 TABLET | Refills: 0 | Status: SHIPPED | OUTPATIENT
Start: 2019-08-16 | End: 2019-08-21

## 2019-08-16 RX ORDER — ONDANSETRON 2 MG/ML
8 INJECTION INTRAMUSCULAR; INTRAVENOUS
Status: DISCONTINUED | OUTPATIENT
Start: 2019-08-16 | End: 2019-08-16

## 2019-08-16 RX ADMIN — ACETAMINOPHEN 650 MG: 325 TABLET, FILM COATED ORAL at 05:08

## 2019-08-16 RX ADMIN — ONDANSETRON 4 MG: 2 INJECTION INTRAMUSCULAR; INTRAVENOUS at 03:08

## 2019-08-16 RX ADMIN — SODIUM CHLORIDE 1000 ML: 0.9 INJECTION, SOLUTION INTRAVENOUS at 03:08

## 2019-08-16 NOTE — ED PROVIDER NOTES
Encounter Date: 8/16/2019    SCRIBE #1 NOTE: I, Jose Luis Wei, am scribing for, and in the presence of,  Noelle Reid MD. I have scribed the following portions of the note - Other sections scribed: HPI,ROS,PE, EKG.       History     Chief Complaint   Patient presents with    Shortness of Breath     since yesterday. Also reports chest pain and back pain. Denies recent travel or lifting anything heavy    Oral Swelling     x couple of days. Reports that yesterday it felt likle her throat was closing. Has an appointment with endocrinologist at the end of the month.      CC:Abdominal Pain    HPI:  This is a 34 y.o. female with a PMHx of GERD, Hypoglycemia, and Mental Disorder who presents to the Emergency Department with a cc of constant upper abdominal pain radiating to her chest and back that started yesterday. Pt reports associated shortness of breath, fatigue, light headedness, nausea, vomiting, dizziness, and diarrhea. She denies hematemesis. Patient reports prior history of similar symptoms 5 months ago. She was diagnosed with severe acid reflux but denies being diagnosed with ulcers. She used to take Omeprazole but recently stopped because she is trying to get pregnant. Patient reports concern for pregnancy. Last menstrual cycle was 6 weeks ago. Patient denies alcohol consumption. No known drug allergies.     The history is provided by the patient. No  was used.     Review of patient's allergies indicates:  No Known Allergies  Past Medical History:   Diagnosis Date    Anemia     GERD (gastroesophageal reflux disease)     Hypoglycemia     Hypoglycemia     Mental disorder      History reviewed. No pertinent surgical history.  Family History   Problem Relation Age of Onset    Hypertension Mother     Hyperlipidemia Mother     Cancer Neg Hx      Social History     Tobacco Use    Smoking status: Never Smoker    Smokeless tobacco: Never Used   Substance Use Topics    Alcohol use: No      Comment: occasional    Drug use: No     Review of Systems   Constitutional: Positive for fatigue. Negative for fever.   Respiratory: Positive for shortness of breath.    Cardiovascular: Positive for chest pain.   Gastrointestinal: Positive for abdominal pain, diarrhea, nausea and vomiting.        Negative for hematemesis.   Genitourinary: Negative for dysuria.   Musculoskeletal: Positive for back pain.   Skin: Negative for rash.   Neurological: Positive for dizziness and light-headedness. Negative for weakness.   Hematological: Does not bruise/bleed easily.       Physical Exam     Initial Vitals [08/16/19 1349]   BP Pulse Resp Temp SpO2   (!) 106/55 92 (!) 28 97.8 °F (36.6 °C) 99 %      MAP       --         Physical Exam    Constitutional: She appears well-nourished. She does not appear ill.   HENT:   Head: Normocephalic.   Eyes: Conjunctivae are normal. No scleral icterus.   Neck: Normal range of motion. Neck supple.   Cardiovascular: Normal heart sounds and intact distal pulses.   No murmur heard.  Pulmonary/Chest: Breath sounds normal. No respiratory distress. She has no wheezes. She has no rhonchi. She has no rales.   Abdominal: Soft. Bowel sounds are normal. She exhibits no distension. There is tenderness in the right upper quadrant and epigastric area.   Musculoskeletal: Normal range of motion. She exhibits no edema.   Neurological: She is alert.   Skin: Skin is warm and dry.   Psychiatric: She has a normal mood and affect. Her behavior is normal. Judgment and thought content normal.         ED Course   Procedures  Labs Reviewed   CBC W/ AUTO DIFFERENTIAL - Abnormal; Notable for the following components:       Result Value    Hemoglobin 11.4 (*)     Mean Corpuscular Volume 75 (*)     Mean Corpuscular Hemoglobin 23.0 (*)     Mean Corpuscular Hemoglobin Conc 30.7 (*)     RDW 21.7 (*)     Gran # (ANC) 8.8 (*)     Gran% 74.3 (*)     Lymph% 17.3 (*)     All other components within normal limits   URINALYSIS,  REFLEX TO URINE CULTURE - Abnormal; Notable for the following components:    Leukocytes, UA Trace (*)     All other components within normal limits    Narrative:     Preferred Collection Type->Urine, Clean Catch   POCT URINE PREGNANCY - Abnormal; Notable for the following components:    POC Preg Test, Ur Positive (*)     All other components within normal limits   COMPREHENSIVE METABOLIC PANEL   LIPASE   URINALYSIS MICROSCOPIC    Narrative:     Preferred Collection Type->Urine, Clean Catch   POCT GLUCOSE     EKG Readings: (Independently Interpreted)   Normal sinus rhythm at a rate of 94. Normal QT. No ST segment abnormalities.     ECG Results          EKG 12-lead (In process)  Result time 08/16/19 17:10:22    In process by Interface, Lab In [facility] (08/16/19 17:10:22)                 Narrative:    Test Reason : R06.02,    Vent. Rate : 094 BPM     Atrial Rate : 094 BPM     P-R Int : 128 ms          QRS Dur : 066 ms      QT Int : 338 ms       P-R-T Axes : 070 070 045 degrees     QTc Int : 422 ms    Normal sinus rhythm  Normal ECG  When compared with ECG of 21-MAY-2019 03:33,  No significant change was found    Referred By:             Confirmed By:                   In process by Interface, Lab In [facility] (08/16/19 17:08:24)                 Narrative:    Test Reason : R06.02,    Vent. Rate : 094 BPM     Atrial Rate : 094 BPM     P-R Int : 128 ms          QRS Dur : 066 ms      QT Int : 338 ms       P-R-T Axes : 070 070 045 degrees     QTc Int : 422 ms    Normal sinus rhythm  Normal ECG  When compared with ECG of 21-MAY-2019 03:33,  No significant change was found    Referred By:             Confirmed By:                             Imaging Results          US Abdomen Limited (In process)    Procedure changed from US Abdomen Complete                US OB Less Than 14 Wks First Gestation (In process)                  Medical Decision Making:   Independently Interpreted Test(s):   I have ordered and independently  interpreted EKG Reading(s) - see prior notes  Clinical Tests:   Lab Tests: Ordered and Reviewed  Radiological Study: Ordered and Reviewed  Medical Tests: Reviewed and Ordered    34-year-old female with a history of GERD noncompliant with medication presents with mid epigastric burning pain that radiates to her back.  She states that she has been trying to get pregnant and stops her omeprazole because of this.  She has also been under lot of stress with the recent death of her mother-in-law and diet change secondary to culture expectations.  Differential includes pregnancy, GERD, ulcer, pancreatitis, cholecystitis, hepatitis, gastroenteritis.  Pregnancy test is positive here today.  Labs overall reassuring.  Patient was handed off to Dr. cazares pending ultrasound.  As I was walking out ultrasound is resulted and both within normal limits. Ob ultrasound confirmed a an intrauterine gestational sac with no fetal pole.  Patient was notified of the results and understands she is to follow up to make sure that this is a normal pregnancy.  I will give her Carafate as it is safe in pregnancy.  I spoke with lactation consultant to confirm this. She should follow up with her OB for further care.  I also spoke to her about strict return precautions.  She should come back with any worsening of her condition and/or any other concerns.  MENDOZA Reid MD  6:31 PM                Scribe Attestation:   Scribe #1: I performed the above scribed service and the documentation accurately describes the services I performed. I attest to the accuracy of the note.               Clinical Impression:       ICD-10-CM ICD-9-CM   1. Right upper quadrant pain R10.11 789.01   2. SOB (shortness of breath) R06.02 786.05   3. Abdominal pain R10.9 789.00               I, Noelle Reid, personally performed the services described in this documentation. All medical record entries made by the scribe were at my direction and in my presence. I have reviewed the  chart and agree that the record reflects my personal performance and is accurate and complete.                 Noelle Reid MD  08/16/19 1935

## 2019-08-16 NOTE — ED TRIAGE NOTES
Pt arrived to EMS via personal transportation with CC of SOB and chest, throat, epigastirc, and back pain 8/10 starting yesterday 2300. Pt describes pain as burning pain that spreads downward into abdomen. Pt took Mylanta and Pepcid with some relief for pain with SOB continuing. Patient reports nausea, vomited 2-3x, last time 2 hours ago. Pt reports diarrhea yesterday PM. Pt reports feeling dizzy, light-headed, fatigued, with temporal and occipitally located headache. Pt is AAO x 4 and able to follow commands. Pt reports she may be pregnant.

## 2019-08-27 ENCOUNTER — OFFICE VISIT (OUTPATIENT)
Dept: ENDOCRINOLOGY | Facility: CLINIC | Age: 34
End: 2019-08-27
Payer: COMMERCIAL

## 2019-08-27 ENCOUNTER — PATIENT MESSAGE (OUTPATIENT)
Dept: ENDOCRINOLOGY | Facility: CLINIC | Age: 34
End: 2019-08-27

## 2019-08-27 VITALS
DIASTOLIC BLOOD PRESSURE: 60 MMHG | RESPIRATION RATE: 18 BRPM | SYSTOLIC BLOOD PRESSURE: 90 MMHG | BODY MASS INDEX: 23.57 KG/M2 | HEART RATE: 88 BPM | HEIGHT: 58 IN | WEIGHT: 112.31 LBS

## 2019-08-27 DIAGNOSIS — Z34.90 PREGNANCY, UNSPECIFIED GESTATIONAL AGE: ICD-10-CM

## 2019-08-27 DIAGNOSIS — R79.89 LOW TSH LEVEL: ICD-10-CM

## 2019-08-27 DIAGNOSIS — E04.1 THYROID NODULE: Primary | ICD-10-CM

## 2019-08-27 PROCEDURE — 99204 PR OFFICE/OUTPT VISIT, NEW, LEVL IV, 45-59 MIN: ICD-10-PCS | Mod: S$GLB,,, | Performed by: INTERNAL MEDICINE

## 2019-08-27 PROCEDURE — 3008F BODY MASS INDEX DOCD: CPT | Mod: CPTII,S$GLB,, | Performed by: INTERNAL MEDICINE

## 2019-08-27 PROCEDURE — 3008F PR BODY MASS INDEX (BMI) DOCUMENTED: ICD-10-PCS | Mod: CPTII,S$GLB,, | Performed by: INTERNAL MEDICINE

## 2019-08-27 PROCEDURE — 99204 OFFICE O/P NEW MOD 45 MIN: CPT | Mod: S$GLB,,, | Performed by: INTERNAL MEDICINE

## 2019-08-27 PROCEDURE — 99999 PR PBB SHADOW E&M-EST. PATIENT-LVL IV: CPT | Mod: PBBFAC,,, | Performed by: INTERNAL MEDICINE

## 2019-08-27 PROCEDURE — 99999 PR PBB SHADOW E&M-EST. PATIENT-LVL IV: ICD-10-PCS | Mod: PBBFAC,,, | Performed by: INTERNAL MEDICINE

## 2019-08-27 NOTE — PROGRESS NOTES
"Subjective:      Chief Complaint: Thyroid nodules; current pregnancy 8 WGA    HPI: Dina Hutton is a 34 y.o. female who is here for an initial evaluation for thyroid nodule with suppressed TSH and current pregnancy.    With regards to the thyroid nodule:  Left-sided thyroid nodule was initially discovered on CTA of the chest done in May, 2019 when she was evaluated in the emergency room for chest pain.   "There is a 1.3 cm hypoattenuating left thyroid lobe nodule.  The remaining visualized soft tissue structures at the base of the neck are unremarkable."    She had a follow-up ultrasound done on June 5th, which showed a solid, hypoechoic left thyroid nodule with "punctate peripheral calcifications" and circumscribed borders (see report below). The images aren't available for review, but her  is going to get me a copy of the disc.    She reports a history of severe GERD, and recently had to come off her prilosec due to pregnancy. She's had a lot of nausea/vomiting and reflux symptoms since the beginning of pregnancy, which includes pain in her throat. She was worried the thyroid nodule could be causing some of the symptoms. The pain is located on both the left and right side of her throat and is worsened by swallowing. She hasn't noticed any increased swelling in the thyroid area or lymph nodes.     Ref. Range 5/22/2019 15:55   TSH 0.400 - 4.000 uIU/mL 0.281 (L)   Free T4  0.71 - 1.51 ng/dL 0.85   Thyroperoxidase Ab <6.0 IU/mL <6.0   Thyroglobulin Ab Screen 0.0 - 3.9 IU/mL 20.0 (H)     Compressiveymptoms:  Anterior neck pressure?: no  Dysphagia?: yes  Voice changes?: no    Risk Factors:  Radiation to head or neck for any treatment of cancer or exposure to radiation?: no  Personal history of colon or breast cancer?: no  Tobacco use?: no  Family history of thyroid cancer or MEN syndrome?: no cancers in the family    Symptoms of hyper or hypothyroidism:  Weight changes?: no  Diarrhea or Constipation?: no  Heat or " cold intolerance?: yes, sometimes feels hot and cold - attributed to pregnancy  Hair, nail or skin changes?: no  Chest pain, palpations or shortness of breath?: yes, chest pain (sounds like GERD)  Mental fog?: no    Previous biopsy results:  None    Last ultrasound: 6/2019      Reviewed past medical, family, social history and updated as appropriate.    Review of Systems   Constitutional: Negative for unexpected weight change.   HENT:        Anterior neck pain   Eyes: Negative for visual disturbance.   Respiratory: Negative for shortness of breath.    Cardiovascular: Positive for chest pain.   Gastrointestinal: Positive for abdominal pain, nausea and vomiting.   Genitourinary: Negative for urgency.   Musculoskeletal: Negative for arthralgias.   Skin: Negative for wound.   Neurological: Negative for headaches.   Hematological: Does not bruise/bleed easily.   Psychiatric/Behavioral: Negative for sleep disturbance.     Objective:     Vitals:    08/27/19 1448   BP: 90/60   Pulse: 88   Resp: 18     BP Readings from Last 5 Encounters:   08/27/19 90/60   08/21/19 101/64   08/16/19 (!) 99/55   06/05/19 99/60   05/22/19 110/60         Physical Exam   Constitutional: She is oriented to person, place, and time. She appears well-developed.   HENT:   Right Ear: External ear normal.   Left Ear: External ear normal.   Nose: Nose normal.   Hearing grossly normal  Dentition grossly normal   Eyes: Conjunctivae are normal. Right eye exhibits no discharge. Left eye exhibits no discharge.   Neck: No tracheal deviation present. No thyromegaly (Could not palpate the nodule found on ultrasound. Tenderness noted to palpation over right and left thyroid) present.   Cardiovascular: Normal rate.   No murmur heard.  Pulmonary/Chest: Effort normal and breath sounds normal.   Musculoskeletal: She exhibits no edema.   No digital clubbing or extremity cyanosis   Lymphadenopathy:     She has no cervical adenopathy (no supraclavicular or cervical  nodes palpable).   Neurological: She is alert and oriented to person, place, and time. Coordination normal.   Skin: No rash noted.   No subcutaneous nodules noted.   Psychiatric: She has a normal mood and affect. Her behavior is normal.   Alert and oriented to person, place, and situation.   Nursing note and vitals reviewed.      Wt Readings from Last 10 Encounters:   08/27/19 1448 51 kg (112 lb 5.2 oz)   08/21/19 1259 50.8 kg (112 lb)   08/16/19 1349 49.9 kg (110 lb)   06/05/19 1441 50.2 kg (110 lb 9.6 oz)   05/22/19 1442 50.8 kg (111 lb 15.9 oz)   05/21/19 0511 48.4 kg (106 lb 11.2 oz)   05/21/19 0106 49.9 kg (110 lb)   05/19/19 2126 49.9 kg (110 lb)   05/13/19 1516 49.9 kg (110 lb)   03/26/19 0618 47.6 kg (105 lb)   01/23/19 0831 49.5 kg (109 lb 3.2 oz)       Lab Results   Component Value Date     08/16/2019    K 4.0 08/16/2019     08/16/2019    CO2 23 08/16/2019    GLU 86 08/16/2019    BUN 10 08/16/2019    CREATININE 0.7 08/16/2019    CALCIUM 9.3 08/16/2019    PROT 7.6 08/16/2019    ALBUMIN 4.2 08/16/2019    BILITOT 0.2 08/16/2019    ALKPHOS 66 08/16/2019    AST 17 08/16/2019    ALT 16 08/16/2019    ANIONGAP 9 08/16/2019    ESTGFRAFRICA >60 08/16/2019    EGFRNONAA >60 08/16/2019    TSH 0.940 08/27/2019          Assessment/Plan:     Thyroid nodule  Per the outside report, the nodule meets criteria for FNA. I asked her to obtain a copy of the images on CD, and her  says he will drop it off to me.    Low suspicion that her throat pain is related to the thyroid nodule, although thyroiditis is a consideration. Given previously suppressed TSH, will check TRAb. Cannot obtain NM uptake/scan 2/2 pregnancy.    Discussed indications for a FNA  Discussed possible FNA results (benign, FLUS,  follicular or hurthle lesion, suspicious for cancer, cancer and non diagnostic)     Reviewed that a non diagnostic or FLUS would require a repeat FNA or molecular marker testing.    Will proceed with FNA    Discussed  indications for repeat FNA as well as surgical indications (abnormal FNA, compressive symptoms or interval change)     If FNA is negative then plan follow up in 1 year with TSH and thyroid u/s prior    Low TSH level  Repeat TSH with FT4 and TRAb.    No symptoms suggestive of hyperthyroidism.    Pregnancy  Further management of thyroid nodule pending result of biopsy. If positive for thyroid cancer, we will discuss surgery during second trimester vs. waiting until after pregnancy.    Follow-up TBD pending lab and biopsy result.

## 2019-08-28 ENCOUNTER — PATIENT MESSAGE (OUTPATIENT)
Dept: ENDOCRINOLOGY | Facility: CLINIC | Age: 34
End: 2019-08-28

## 2019-08-28 NOTE — ASSESSMENT & PLAN NOTE
Per the outside report, the nodule meets criteria for FNA. I asked her to obtain a copy of the images on CD, and her  says he will drop it off to me.    Low suspicion that her throat pain is related to the thyroid nodule, although thyroiditis is a consideration. Given previously suppressed TSH, will check TRAb. Cannot obtain NM uptake/scan 2/2 pregnancy.    Discussed indications for a FNA  Discussed possible FNA results (benign, FLUS,  follicular or hurthle lesion, suspicious for cancer, cancer and non diagnostic)     Reviewed that a non diagnostic or FLUS would require a repeat FNA or molecular marker testing.    Will proceed with FNA    Discussed indications for repeat FNA as well as surgical indications (abnormal FNA, compressive symptoms or interval change)     If FNA is negative then plan follow up in 1 year with TSH and thyroid u/s prior

## 2019-08-28 NOTE — ASSESSMENT & PLAN NOTE
Further management of thyroid nodule pending result of biopsy. If positive for thyroid cancer, we will discuss surgery during second trimester vs. waiting until after pregnancy.

## 2019-11-08 ENCOUNTER — TELEPHONE (OUTPATIENT)
Dept: ENDOCRINOLOGY | Facility: CLINIC | Age: 34
End: 2019-11-08

## 2019-11-11 ENCOUNTER — TELEPHONE (OUTPATIENT)
Dept: ENDOCRINOLOGY | Facility: CLINIC | Age: 34
End: 2019-11-11

## 2019-11-11 NOTE — TELEPHONE ENCOUNTER
Called LVM regard scheduled FNA 1/2/2020 @ 2:30pm. If any problems with schedule please feel free to call and reschedule.

## 2019-11-14 ENCOUNTER — OFFICE VISIT (OUTPATIENT)
Dept: URGENT CARE | Facility: CLINIC | Age: 34
End: 2019-11-14
Payer: COMMERCIAL

## 2019-11-14 VITALS
SYSTOLIC BLOOD PRESSURE: 97 MMHG | DIASTOLIC BLOOD PRESSURE: 64 MMHG | WEIGHT: 118 LBS | OXYGEN SATURATION: 97 % | TEMPERATURE: 98 F | BODY MASS INDEX: 24.66 KG/M2 | HEART RATE: 93 BPM

## 2019-11-14 DIAGNOSIS — R11.2 NAUSEA AND VOMITING, INTRACTABILITY OF VOMITING NOT SPECIFIED, UNSPECIFIED VOMITING TYPE: Primary | ICD-10-CM

## 2019-11-14 DIAGNOSIS — R68.83 CHILLS: ICD-10-CM

## 2019-11-14 LAB
CTP QC/QA: YES
FLUAV AG NPH QL: NEGATIVE
FLUBV AG NPH QL: NEGATIVE

## 2019-11-14 PROCEDURE — 87804 POCT INFLUENZA A/B: ICD-10-PCS | Mod: 59,QW,S$GLB, | Performed by: NURSE PRACTITIONER

## 2019-11-14 PROCEDURE — 3008F BODY MASS INDEX DOCD: CPT | Mod: CPTII,S$GLB,, | Performed by: NURSE PRACTITIONER

## 2019-11-14 PROCEDURE — 99214 PR OFFICE/OUTPT VISIT, EST, LEVL IV, 30-39 MIN: ICD-10-PCS | Mod: S$GLB,,, | Performed by: NURSE PRACTITIONER

## 2019-11-14 PROCEDURE — 99214 OFFICE O/P EST MOD 30 MIN: CPT | Mod: S$GLB,,, | Performed by: NURSE PRACTITIONER

## 2019-11-14 PROCEDURE — 87804 INFLUENZA ASSAY W/OPTIC: CPT | Mod: QW,S$GLB,, | Performed by: NURSE PRACTITIONER

## 2019-11-14 PROCEDURE — 3008F PR BODY MASS INDEX (BMI) DOCUMENTED: ICD-10-PCS | Mod: CPTII,S$GLB,, | Performed by: NURSE PRACTITIONER

## 2019-11-14 NOTE — PROGRESS NOTES
Subjective:       Patient ID: Dina Hutton is a 34 y.o. female.    Vitals:  weight is 53.5 kg (118 lb). Her temperature is 98.2 °F (36.8 °C). Her blood pressure is 97/64 and her pulse is 93. Her oxygen saturation is 97%.     Chief Complaint: Emesis    Pt states that she is having vomiting , nausea , dizziness and feeling off balance (in morning) today . Pt states that she is having ear discomfort as well. Pt states that she is pregnant .  Patient reports she did not take her promethazine or Zofran today because she was unsure if she can take Zofran.  Patient reports she has not been drinking enough fluids, Patient was scheduled for thyroid biopsy with endocrinologist but cancelled the appointment  Patient does have history of GERD and takes Prilosec for it    Emesis    This is a new problem. The current episode started yesterday. The problem has been unchanged. The emesis has an appearance of stomach contents. Associated symptoms include chills, dizziness (in morning) and headaches. Pertinent negatives include no arthralgias, chest pain, coughing, diarrhea, fever or myalgias.       Constitution: Positive for chills and fatigue. Negative for fever.   HENT: Negative for congestion and sore throat.    Neck: Negative for painful lymph nodes.   Cardiovascular: Negative for chest pain and leg swelling.   Eyes: Negative for double vision and blurred vision.   Respiratory: Negative for cough and shortness of breath.    Gastrointestinal: Positive for nausea and vomiting. Negative for diarrhea.   Genitourinary: Negative for dysuria, frequency, urgency and history of kidney stones.   Musculoskeletal: Negative for joint pain, joint swelling, muscle cramps and muscle ache.   Skin: Negative for color change, pale, rash and bruising.   Allergic/Immunologic: Negative for seasonal allergies.   Neurological: Positive for dizziness (in morning), light-headedness (in morning) and headaches. Negative for history of vertigo and passing  out.   Hematologic/Lymphatic: Negative for swollen lymph nodes.   Psychiatric/Behavioral: Negative for nervous/anxious, sleep disturbance and depression. The patient is not nervous/anxious.        Objective:      Physical Exam   Constitutional: She is oriented to person, place, and time. She appears well-developed and well-nourished. She is cooperative.  Non-toxic appearance. She does not have a sickly appearance. She does not appear ill. No distress.   HENT:   Head: Normocephalic and atraumatic.   Right Ear: Hearing, tympanic membrane, external ear and ear canal normal.   Left Ear: Hearing, tympanic membrane, external ear and ear canal normal.   Nose: Nose normal. No mucosal edema, rhinorrhea or nasal deformity. No epistaxis. Right sinus exhibits no maxillary sinus tenderness and no frontal sinus tenderness. Left sinus exhibits no maxillary sinus tenderness and no frontal sinus tenderness.   Mouth/Throat: Uvula is midline, oropharynx is clear and moist and mucous membranes are normal. No trismus in the jaw. Normal dentition. No uvula swelling. No oropharyngeal exudate, posterior oropharyngeal edema or posterior oropharyngeal erythema.   Eyes: Conjunctivae and lids are normal. No scleral icterus.   Neck: Trachea normal, full passive range of motion without pain and phonation normal. Neck supple. No neck rigidity. No edema and no erythema present.   Cardiovascular: Normal rate, regular rhythm, normal heart sounds, intact distal pulses and normal pulses.   Pulmonary/Chest: Effort normal and breath sounds normal. No respiratory distress. She has no decreased breath sounds. She has no rhonchi.   Abdominal: Soft. Normal appearance and bowel sounds are normal. She exhibits no distension, no abdominal bruit, no pulsatile midline mass and no mass. There is no tenderness.   Musculoskeletal: Normal range of motion. She exhibits no edema or deformity.   Neurological: She is alert and oriented to person, place, and time. She  has normal strength. She exhibits normal muscle tone. Coordination normal.   Skin: Skin is warm, dry, intact, not diaphoretic and not pale.   Psychiatric: She has a normal mood and affect. Her speech is normal and behavior is normal. Judgment and thought content normal. Cognition and memory are normal.   Nursing note and vitals reviewed.        Results for orders placed or performed in visit on 11/14/19   POCT Influenza A/B   Result Value Ref Range    Rapid Influenza A Ag Negative Negative    Rapid Influenza B Ag Negative Negative     Acceptable Yes     Detailed education provided regarding promethazine and Zofran prescription as well as symptoms related to pregnancy.  Instructed patient to follow up with endocrinologist/Ob-GYn.  Patient verbalized understanding in agreement with current plan of care.    Assessment:       1. Nausea and vomiting, intractability of vomiting not specified, unspecified vomiting type    2. Chills        Plan:       Strict precautions given to patient to monitor for worsening signs and symptoms. Please arrange follow up with your primary medical clinic as soon as possible. You must understand that you've received an urgent care treatment only and you may be released before all of your medical problems are known or treated, you, the patient will arrange for follow up an instructed. If your symptoms worsens of fail to improve you should go to the Emergency Room.  Patient voiced understanding and in agreement with current treatment plan.    Nausea and vomiting, intractability of vomiting not specified, unspecified vomiting type    Chills  -     POCT Influenza A/B      Patient Instructions   Please return here or go to the Emergency Department for any concerns or worsening of condition.  If you were prescribed antibiotics, please take them to completion.  If you were prescribed a narcotic medication, do not drive or operate heavy equipment or machinery while taking these  "medications.  Please follow up with your primary care doctor or specialist as needed.  If you  smoke, please stop smoking.  Vomiting (Adult)  Vomiting is a common symptom that may be due to different causes. These include gastroenteritis ("stomach flu"), food poisoning and gastritis. There are other more serious causes of vomiting which may be hard to diagnose early in the illness. Therefore, it is important to watch for the warning signs listed below.  The main danger from repeated vomiting is dehydration. This is due to excess loss of water and minerals from the body. When this occurs, body fluids must be replaced.  Home care  · If symptoms are severe, rest at home for the next 24 hours.  · Because your symptoms may be from an infection, wash your hands frequently and well, and use alcohol-based  to avoid spreading the infection to others.  · Wash your hands for at least 20 seconds. Hum the happy birthday song twice for the correct length of time.  · Wash your hands after using the toilet, before and after preparing food, before eating food, after changing a diaper, cleaning a wound, caring for a sick person, and blowing your nose, coughing, or sneezing. You should also wash your hands after caring for someone who is sick, touching pet food, or treats, and touching an animal, or animal waste.  · You may use acetaminophen or NSAID medicines like ibuprofen or naproxen to control fever, unless another medicine was prescribed. If you have chronic liver or kidney disease or ever had a stomach ulcer or GI bleeding, talk with your doctor before using these medicines. Aspirin should never be used in anyone under 18 years of age who is ill with a fever. It may cause severe liver damage. Don't use NSAID medicines if you are already taking one for another condition (like arthritis) or are on aspirin (such as for heart disease, or after a stroke)  · Avoid tobacco and alcohol use, which may worsen your symptoms.  · If " medicines for vomiting were prescribed, take as directed.  · Once vomiting stops, then follow these guidelines:  During the first 12 to 24 hours follow the diet below:  · Fruit juices. Apple, grape juice, clear fruit drinks, and electrolyte replacement drinks.  · Beverages. Soft drinks without caffeine; mineral water (plain or flavored), decaffeinated tea and coffee.  · Soups. Clear broth, consommé and bouillon  · Desserts. Plain gelatin, popsicles and fruit juice bars. As you feel better, you may add 6-8 ounces of yogurt per day.  During the next 24 hours you may add the following to the above:  · Hot cereal, plain toast, bread, rolls, crackers  · Plain noodles, rice, mashed potatoes, chicken noodle or rice soup  · Unsweetened canned fruit (avoid pineapple), bananas  · Limit caffeine and chocolate. No spices or seasonings except salt.  During the next 24 hours:  Gradually resume a normal diet, as you feel better and your symptoms lessen.  Follow-up care  Follow up with your healthcare provider, or as advised.  When to seek medical advice  Call your healthcare provider right away if any of these occur:  · Constant right-sided lower abdominal pain or increasing general abdominal pain  · Continued vomiting (unable to keep liquids down) for 24 hours  · Frequent diarrhea (more than 5 times a day); blood (red or black color) or mucus in diarrhea  · Reduced urine output or extreme thirst  · Weakness, dizziness or fainting  · Unusually drowsy or confused  · Fever of 100.4°F (38°C) oral or higher, or as directed  · Yellow color of the eyes or skin  Date Last Reviewed: 11/16/2015  © 4888-6455 [employer]. [address] [place] [address], [place] [address]. All rights reserved. This information is not intended as a substitute for professional medical care. Always follow your healthcare professional's instructions.

## 2019-11-14 NOTE — PATIENT INSTRUCTIONS
"Please return here or go to the Emergency Department for any concerns or worsening of condition.  If you were prescribed antibiotics, please take them to completion.  If you were prescribed a narcotic medication, do not drive or operate heavy equipment or machinery while taking these medications.  Please follow up with your primary care doctor or specialist as needed.  If you  smoke, please stop smoking.  Vomiting (Adult)  Vomiting is a common symptom that may be due to different causes. These include gastroenteritis ("stomach flu"), food poisoning and gastritis. There are other more serious causes of vomiting which may be hard to diagnose early in the illness. Therefore, it is important to watch for the warning signs listed below.  The main danger from repeated vomiting is dehydration. This is due to excess loss of water and minerals from the body. When this occurs, body fluids must be replaced.  Home care  · If symptoms are severe, rest at home for the next 24 hours.  · Because your symptoms may be from an infection, wash your hands frequently and well, and use alcohol-based  to avoid spreading the infection to others.  · Wash your hands for at least 20 seconds. Hum the happy birthday song twice for the correct length of time.  · Wash your hands after using the toilet, before and after preparing food, before eating food, after changing a diaper, cleaning a wound, caring for a sick person, and blowing your nose, coughing, or sneezing. You should also wash your hands after caring for someone who is sick, touching pet food, or treats, and touching an animal, or animal waste.  · You may use acetaminophen or NSAID medicines like ibuprofen or naproxen to control fever, unless another medicine was prescribed. If you have chronic liver or kidney disease or ever had a stomach ulcer or GI bleeding, talk with your doctor before using these medicines. Aspirin should never be used in anyone under 18 years of age who " is ill with a fever. It may cause severe liver damage. Don't use NSAID medicines if you are already taking one for another condition (like arthritis) or are on aspirin (such as for heart disease, or after a stroke)  · Avoid tobacco and alcohol use, which may worsen your symptoms.  · If medicines for vomiting were prescribed, take as directed.  · Once vomiting stops, then follow these guidelines:  During the first 12 to 24 hours follow the diet below:  · Fruit juices. Apple, grape juice, clear fruit drinks, and electrolyte replacement drinks.  · Beverages. Soft drinks without caffeine; mineral water (plain or flavored), decaffeinated tea and coffee.  · Soups. Clear broth, consommé and bouillon  · Desserts. Plain gelatin, popsicles and fruit juice bars. As you feel better, you may add 6-8 ounces of yogurt per day.  During the next 24 hours you may add the following to the above:  · Hot cereal, plain toast, bread, rolls, crackers  · Plain noodles, rice, mashed potatoes, chicken noodle or rice soup  · Unsweetened canned fruit (avoid pineapple), bananas  · Limit caffeine and chocolate. No spices or seasonings except salt.  During the next 24 hours:  Gradually resume a normal diet, as you feel better and your symptoms lessen.  Follow-up care  Follow up with your healthcare provider, or as advised.  When to seek medical advice  Call your healthcare provider right away if any of these occur:  · Constant right-sided lower abdominal pain or increasing general abdominal pain  · Continued vomiting (unable to keep liquids down) for 24 hours  · Frequent diarrhea (more than 5 times a day); blood (red or black color) or mucus in diarrhea  · Reduced urine output or extreme thirst  · Weakness, dizziness or fainting  · Unusually drowsy or confused  · Fever of 100.4°F (38°C) oral or higher, or as directed  · Yellow color of the eyes or skin  Date Last Reviewed: 11/16/2015  © 2894-9454 [employer]. [address] [place] [address],  [place] [address]. All rights reserved. This information is not intended as a substitute for professional medical care. Always follow your healthcare professional's instructions.

## 2020-01-02 ENCOUNTER — HOSPITAL ENCOUNTER (OUTPATIENT)
Dept: ENDOCRINOLOGY | Facility: CLINIC | Age: 35
Discharge: HOME | End: 2020-01-02
Attending: INTERNAL MEDICINE
Payer: COMMERCIAL

## 2020-01-02 DIAGNOSIS — E04.1 THYROID NODULE: ICD-10-CM

## 2020-01-02 PROCEDURE — 88173 CYTOPATH EVAL FNA REPORT: CPT | Mod: 26,,, | Performed by: PATHOLOGY

## 2020-01-02 PROCEDURE — 88173 PR  INTERPRETATION OF FNA SMEAR: ICD-10-PCS | Mod: 26,,, | Performed by: PATHOLOGY

## 2020-01-02 PROCEDURE — 10005 FNA BX W/US GDN 1ST LES: CPT | Mod: S$GLB,,, | Performed by: INTERNAL MEDICINE

## 2020-01-02 PROCEDURE — 88173 CYTOPATH EVAL FNA REPORT: CPT | Performed by: PATHOLOGY

## 2020-01-02 PROCEDURE — 10005 US FINE NEEDLE ASPIRATION BIOPSY, FIRST LESION: ICD-10-PCS | Mod: S$GLB,,, | Performed by: INTERNAL MEDICINE

## 2020-01-08 LAB
CD4 COMMENT: ABNORMAL
FINAL PATHOLOGIC DIAGNOSIS: ABNORMAL

## 2020-01-09 ENCOUNTER — PATIENT MESSAGE (OUTPATIENT)
Dept: ENDOCRINOLOGY | Facility: CLINIC | Age: 35
End: 2020-01-09

## 2020-01-09 ENCOUNTER — TELEPHONE (OUTPATIENT)
Dept: ENDOCRINOLOGY | Facility: CLINIC | Age: 35
End: 2020-01-09

## 2020-01-09 DIAGNOSIS — C73 PAPILLARY THYROID CARCINOMA: Primary | ICD-10-CM

## 2020-01-09 NOTE — TELEPHONE ENCOUNTER
I called to speak with Ms. Hutton regarding the FNA result of malignant PTC ([place] 6). Her  answered and said she hasn't been feeling well and was sleeping. I discussed the results and the next steps. She is entering the third trimester of her pregnancy, so according to [facility] guidelines, we would generally wait until after delivery to perform a thyroidectomy. We will start with a thyroid ultrasound for lymph node mapping. I asked them to get a copy of the previous ultrasound images to bring to the appointment.

## 2020-01-10 ENCOUNTER — PATIENT MESSAGE (OUTPATIENT)
Dept: ENDOCRINOLOGY | Facility: CLINIC | Age: 35
End: 2020-01-10

## 2020-01-14 ENCOUNTER — TELEPHONE (OUTPATIENT)
Dept: MATERNAL FETAL MEDICINE | Facility: CLINIC | Age: 35
End: 2020-01-14

## 2020-01-14 DIAGNOSIS — Z36.89 ENCOUNTER FOR FETAL ANATOMIC SURVEY: Primary | ICD-10-CM

## 2020-01-24 ENCOUNTER — INITIAL CONSULT (OUTPATIENT)
Dept: MATERNAL FETAL MEDICINE | Facility: CLINIC | Age: 35
End: 2020-01-24
Attending: OBSTETRICS & GYNECOLOGY
Payer: COMMERCIAL

## 2020-01-24 ENCOUNTER — PROCEDURE VISIT (OUTPATIENT)
Dept: MATERNAL FETAL MEDICINE | Facility: CLINIC | Age: 35
End: 2020-01-24
Payer: COMMERCIAL

## 2020-01-24 ENCOUNTER — LAB VISIT (OUTPATIENT)
Dept: LAB | Facility: OTHER | Age: 35
End: 2020-01-24
Attending: OBSTETRICS & GYNECOLOGY
Payer: COMMERCIAL

## 2020-01-24 VITALS
WEIGHT: 127.19 LBS | DIASTOLIC BLOOD PRESSURE: 70 MMHG | SYSTOLIC BLOOD PRESSURE: 112 MMHG | BODY MASS INDEX: 26.59 KG/M2

## 2020-01-24 DIAGNOSIS — R79.89 LOW TSH LEVEL: ICD-10-CM

## 2020-01-24 DIAGNOSIS — C73 THYROID CANCER: Primary | ICD-10-CM

## 2020-01-24 DIAGNOSIS — Z36.89 ENCOUNTER FOR FETAL ANATOMIC SURVEY: ICD-10-CM

## 2020-01-24 DIAGNOSIS — Z3A.28 28 WEEKS GESTATION OF PREGNANCY: ICD-10-CM

## 2020-01-24 DIAGNOSIS — C73 THYROID CANCER: ICD-10-CM

## 2020-01-24 DIAGNOSIS — C73 PRIMARY THYROID CANCER: ICD-10-CM

## 2020-01-24 DIAGNOSIS — Z36.89 ENCOUNTER FOR ULTRASOUND TO ASSESS FETAL GROWTH: ICD-10-CM

## 2020-01-24 DIAGNOSIS — K21.9 GASTROESOPHAGEAL REFLUX DISEASE WITHOUT ESOPHAGITIS: ICD-10-CM

## 2020-01-24 LAB
T4 FREE SERPL-MCNC: 0.72 NG/DL (ref 0.71–1.51)
TSH SERPL DL<=0.005 MIU/L-ACNC: 0.95 UIU/ML (ref 0.4–4)

## 2020-01-24 PROCEDURE — 76805 PR US, OB 14+WKS, TRANSABD, SINGLE GESTATION: ICD-10-PCS | Mod: S$GLB,,, | Performed by: OBSTETRICS & GYNECOLOGY

## 2020-01-24 PROCEDURE — 99999 PR PBB SHADOW E&M-EST. PATIENT-LVL I: CPT | Mod: PBBFAC,,, | Performed by: OBSTETRICS & GYNECOLOGY

## 2020-01-24 PROCEDURE — 99205 PR OFFICE/OUTPT VISIT, NEW, LEVL V, 60-74 MIN: ICD-10-PCS | Mod: 25,S$GLB,, | Performed by: OBSTETRICS & GYNECOLOGY

## 2020-01-24 PROCEDURE — 99205 OFFICE O/P NEW HI 60 MIN: CPT | Mod: 25,S$GLB,, | Performed by: OBSTETRICS & GYNECOLOGY

## 2020-01-24 PROCEDURE — 99999 PR PBB SHADOW E&M-EST. PATIENT-LVL I: ICD-10-PCS | Mod: PBBFAC,,, | Performed by: OBSTETRICS & GYNECOLOGY

## 2020-01-24 PROCEDURE — 84443 ASSAY THYROID STIM HORMONE: CPT

## 2020-01-24 PROCEDURE — 76805 OB US >/= 14 WKS SNGL FETUS: CPT | Mod: S$GLB,,, | Performed by: OBSTETRICS & GYNECOLOGY

## 2020-01-24 PROCEDURE — 36415 COLL VENOUS BLD VENIPUNCTURE: CPT

## 2020-01-24 PROCEDURE — 84439 ASSAY OF FREE THYROXINE: CPT

## 2020-01-24 NOTE — LETTER
January 27, 2020      Татьяна Graham MD  [address] [facility] [address]  [address]  [place] [address]           [facility] [address]  [address]  [facility] [address]  Phone: [phone]          Patient: Dina Hutton   MR Number: [id number]   YOB: 1985   Date of Visit: 1/24/2020       Dear Dr. Татьяна Graham:    Thank you for referring Dina Hutton to me for evaluation. Attached you will find relevant portions of my assessment and plan of care.    If you have questions, please do not hesitate to call me. I look forward to following Dina Hutton along with you.    Sincerely,    Natividad Guerrero MD    Enclosure  CC:  No Recipients    If you would like to receive this communication electronically, please contact [email or web address] or [phone] to request more information on [employer] access.    For providers and/or their staff who would like to refer a patient to [facility], please contact us through our one-stop-shop provider referral line, [facility], at [phone].    If you feel you have received this communication in error or would no longer like to receive these types of communications, please e-mail [email or web address]

## 2020-01-24 NOTE — PROGRESS NOTES
Obstetrical ultrasound completed today. See report in imaging section of [facility].

## 2020-01-27 NOTE — PROGRESS NOTES
Obstetrical ultrasound completed today. See report in imaging section of [facility].

## 2020-02-06 ENCOUNTER — HOSPITAL ENCOUNTER (OUTPATIENT)
Dept: ENDOCRINOLOGY | Facility: CLINIC | Age: 35
Discharge: HOME | End: 2020-02-06
Attending: INTERNAL MEDICINE
Payer: COMMERCIAL

## 2020-02-06 DIAGNOSIS — C73 PAPILLARY THYROID CARCINOMA: ICD-10-CM

## 2020-02-06 PROCEDURE — 76536 US EXAM OF HEAD AND NECK: CPT | Mod: S$GLB,,, | Performed by: INTERNAL MEDICINE

## 2020-02-06 PROCEDURE — 76536 US SOFT TISSUE HEAD NECK THYROID: ICD-10-PCS | Mod: S$GLB,,, | Performed by: INTERNAL MEDICINE

## 2020-02-16 ENCOUNTER — HOSPITAL ENCOUNTER (OUTPATIENT)
Facility: HOSPITAL | Age: 35
Discharge: HOME | End: 2020-02-16
Attending: OBSTETRICS & GYNECOLOGY | Admitting: OBSTETRICS & GYNECOLOGY
Payer: COMMERCIAL

## 2020-02-16 ENCOUNTER — HOSPITAL ENCOUNTER (OUTPATIENT)
Facility: HOSPITAL | Age: 35
Discharge: HOME | End: 2020-02-17
Attending: OBSTETRICS & GYNECOLOGY | Admitting: OBSTETRICS & GYNECOLOGY
Payer: COMMERCIAL

## 2020-02-16 VITALS
SYSTOLIC BLOOD PRESSURE: 105 MMHG | HEIGHT: 58 IN | OXYGEN SATURATION: 96 % | DIASTOLIC BLOOD PRESSURE: 59 MMHG | WEIGHT: 128 LBS | TEMPERATURE: 98 F | HEART RATE: 100 BPM | RESPIRATION RATE: 16 BRPM | BODY MASS INDEX: 26.87 KG/M2

## 2020-02-16 DIAGNOSIS — R10.9 ABDOMINAL PAIN: ICD-10-CM

## 2020-02-16 DIAGNOSIS — O36.8190 DECREASED FETAL MOVEMENT: ICD-10-CM

## 2020-02-16 DIAGNOSIS — K21.9 GASTROESOPHAGEAL REFLUX DISEASE WITHOUT ESOPHAGITIS: Primary | ICD-10-CM

## 2020-02-16 LAB — FIBRONECTIN FETAL SPEC QL: NEGATIVE

## 2020-02-16 PROCEDURE — 96360 HYDRATION IV INFUSION INIT: CPT

## 2020-02-16 PROCEDURE — 99211 OFF/OP EST MAY X REQ PHY/QHP: CPT | Mod: 27,25

## 2020-02-16 PROCEDURE — 59025 FETAL NON-STRESS TEST: CPT

## 2020-02-16 PROCEDURE — 96361 HYDRATE IV INFUSION ADD-ON: CPT

## 2020-02-16 PROCEDURE — 99211 OFF/OP EST MAY X REQ PHY/QHP: CPT

## 2020-02-16 PROCEDURE — 63600175 PHARM REV CODE 636 W HCPCS: Performed by: OBSTETRICS & GYNECOLOGY

## 2020-02-16 PROCEDURE — 82731 ASSAY OF FETAL FIBRONECTIN: CPT

## 2020-02-16 RX ORDER — ONDANSETRON 8 MG/1
8 TABLET, ORALLY DISINTEGRATING ORAL EVERY 8 HOURS PRN
Status: DISCONTINUED | OUTPATIENT
Start: 2020-02-16 | End: 2020-02-16 | Stop reason: HOSPADM

## 2020-02-16 RX ORDER — SODIUM CHLORIDE, SODIUM LACTATE, POTASSIUM CHLORIDE, CALCIUM CHLORIDE 600; 310; 30; 20 MG/100ML; MG/100ML; MG/100ML; MG/100ML
INJECTION, SOLUTION INTRAVENOUS CONTINUOUS
Status: DISCONTINUED | OUTPATIENT
Start: 2020-02-16 | End: 2020-02-17 | Stop reason: HOSPADM

## 2020-02-16 RX ORDER — ACETAMINOPHEN 500 MG
500 TABLET ORAL EVERY 6 HOURS PRN
Status: DISCONTINUED | OUTPATIENT
Start: 2020-02-16 | End: 2020-02-16 | Stop reason: HOSPADM

## 2020-02-16 RX ADMIN — SODIUM CHLORIDE, SODIUM LACTATE, POTASSIUM CHLORIDE, AND CALCIUM CHLORIDE: .6; .31; .03; .02 INJECTION, SOLUTION INTRAVENOUS at 09:02

## 2020-02-16 NOTE — DISCHARGE INSTRUCTIONS
Home Undelivered Discharge Instructions    After Discharge Orders:    Future Appointments   Date Time Provider Department Center   2/20/2020  2:30 PM [facility]   2/28/2020  8:00 AM Macey Cid MD [facility]   2/28/2020  8:00 AM ROOM 2, [facility]         Current Discharge Medication List      CONTINUE these medications which have NOT CHANGED    Details   aluminum & magnesium hydroxide-simethicone (MYLANTA MAX STRENGTH) 400-400-40 mg/5 mL suspension Take by mouth every 6 (six) hours as needed for Indigestion.      !! omeprazole (PRILOSEC) 20 MG capsule Take 1 capsule (20 mg total) by mouth once daily.  Qty: 30 capsule, Refills: 11      !! omeprazole (PRILOSEC) 20 MG capsule TAKE 1 CAPSULE BY MOUTH TWICE DAILY  Qty: 60 capsule, Refills: 11    Comments: Please consider 90 day supplies to promote better adherence  Associated Diagnoses: Gastroesophageal reflux disease without esophagitis      ondansetron (ZOFRAN ODT) 4 MG TbDL Take 1 tablet (4 mg total) by mouth every 6 (six) hours as needed.  Qty: 30 tablet, Refills: 5      PNV,calcium 72-iron,carb-folic (PRENATAL PLUS) 29 mg iron- 1 mg Tab Take 1 tablet by mouth once daily.  Qty: 30 tablet, Refills: 10      promethazine (PHENERGAN) 12.5 MG Tab Take 1 tablet (12.5 mg total) by mouth every 6 (six) hours as needed.  Qty: 30 tablet, Refills: 5       !! - Potential duplicate medications found. Please discuss with provider.                  · Diet:  normal diet as tolerated    · Rest: normal activity as tolerated    Other instructions: Do kick counts once a day on your baby. Choose the time of day your baby is most active. Get in a comfortable lying or sitting position and time how long it takes to feel 10 kicks, twists, turns, swishes, or rolls. Call your physician or midwife if there have not been 10 kicks in 1 hours    Call physician or midwife, return to Labor and Delivery, call 911, or go to the  nearest Emergency Room if: increased leakage or fluid, contractions 2 to 5 minutes apart for 1 hour, decreased fetal movement, persistent low back pain or cramping, bleeding from vaginal area, difficulty urinating, pain with urination, difficulty breathing, new calf pain, persistent headache or vision change

## 2020-02-16 NOTE — NURSING
Pt given discharge instructions, including kick counts and labor precautions, verbalizes understanding. Ambulated off unit with diamond, accompanied by .

## 2020-02-16 NOTE — NURSING
"Pt to unit with c/o decreased fetal movement and occasional ctx since yesterday. Denies bleeding or lof. Clean catch urine specimen obtained. Pt states she had water and tea to drink today, "about 2 cups." PO hydration provided, educated pt on need to drink more water. EFM placed. POC reviewed with pt and , verbalize understanding.  "

## 2020-02-17 VITALS
WEIGHT: 128 LBS | HEIGHT: 58 IN | DIASTOLIC BLOOD PRESSURE: 60 MMHG | OXYGEN SATURATION: 97 % | RESPIRATION RATE: 20 BRPM | BODY MASS INDEX: 26.87 KG/M2 | SYSTOLIC BLOOD PRESSURE: 108 MMHG | HEART RATE: 100 BPM

## 2020-02-17 LAB
BILIRUB UR QL STRIP: NEGATIVE
CLARITY UR: CLEAR
COLOR UR: YELLOW
GLUCOSE UR QL STRIP: NEGATIVE
HGB UR QL STRIP: NEGATIVE
KETONES UR QL STRIP: ABNORMAL
LEUKOCYTE ESTERASE UR QL STRIP: NEGATIVE
NITRITE UR QL STRIP: NEGATIVE
PH UR STRIP: 8 [PH] (ref 5–8)
PROT UR QL STRIP: NEGATIVE
SP GR UR STRIP: 1.01 (ref 1–1.03)
URN SPEC COLLECT METH UR: ABNORMAL
UROBILINOGEN UR STRIP-ACNC: NEGATIVE EU/DL

## 2020-02-17 PROCEDURE — 63600175 PHARM REV CODE 636 W HCPCS: Performed by: OBSTETRICS & GYNECOLOGY

## 2020-02-17 PROCEDURE — 96374 THER/PROPH/DIAG INJ IV PUSH: CPT

## 2020-02-17 PROCEDURE — 96376 TX/PRO/DX INJ SAME DRUG ADON: CPT

## 2020-02-17 PROCEDURE — 25000003 PHARM REV CODE 250: Performed by: OBSTETRICS & GYNECOLOGY

## 2020-02-17 PROCEDURE — 96361 HYDRATE IV INFUSION ADD-ON: CPT

## 2020-02-17 PROCEDURE — 96375 TX/PRO/DX INJ NEW DRUG ADDON: CPT

## 2020-02-17 PROCEDURE — 81003 URINALYSIS AUTO W/O SCOPE: CPT

## 2020-02-17 PROCEDURE — 96372 THER/PROPH/DIAG INJ SC/IM: CPT

## 2020-02-17 RX ORDER — TERBUTALINE SULFATE 1 MG/ML
0.25 INJECTION SUBCUTANEOUS ONCE
Status: COMPLETED | OUTPATIENT
Start: 2020-02-17 | End: 2020-02-17

## 2020-02-17 RX ORDER — BETAMETHASONE SODIUM PHOSPHATE AND BETAMETHASONE ACETATE 3; 3 MG/ML; MG/ML
12 INJECTION, SUSPENSION INTRA-ARTICULAR; INTRALESIONAL; INTRAMUSCULAR; SOFT TISSUE EVERY 12 HOURS
Status: DISCONTINUED | OUTPATIENT
Start: 2020-02-17 | End: 2020-02-17

## 2020-02-17 RX ORDER — NIFEDIPINE 10 MG/1
10 CAPSULE ORAL EVERY 6 HOURS PRN
Status: DISCONTINUED | OUTPATIENT
Start: 2020-02-17 | End: 2020-02-17 | Stop reason: HOSPADM

## 2020-02-17 RX ORDER — BETAMETHASONE SODIUM PHOSPHATE AND BETAMETHASONE ACETATE 3; 3 MG/ML; MG/ML
12 INJECTION, SUSPENSION INTRA-ARTICULAR; INTRALESIONAL; INTRAMUSCULAR; SOFT TISSUE
Status: DISCONTINUED | OUTPATIENT
Start: 2020-02-17 | End: 2020-02-17 | Stop reason: HOSPADM

## 2020-02-17 RX ORDER — ONDANSETRON 2 MG/ML
4 INJECTION INTRAMUSCULAR; INTRAVENOUS EVERY 4 HOURS PRN
Status: DISCONTINUED | OUTPATIENT
Start: 2020-02-17 | End: 2020-02-17 | Stop reason: HOSPADM

## 2020-02-17 RX ORDER — PANTOPRAZOLE SODIUM 40 MG/1
40 TABLET, DELAYED RELEASE ORAL DAILY
Status: DISCONTINUED | OUTPATIENT
Start: 2020-02-17 | End: 2020-02-17 | Stop reason: HOSPADM

## 2020-02-17 RX ORDER — BUTORPHANOL TARTRATE 2 MG/ML
1 INJECTION INTRAMUSCULAR; INTRAVENOUS ONCE
Status: DISCONTINUED | OUTPATIENT
Start: 2020-02-17 | End: 2020-02-17 | Stop reason: HOSPADM

## 2020-02-17 RX ADMIN — NIFEDIPINE 10 MG: 10 CAPSULE ORAL at 03:02

## 2020-02-17 RX ADMIN — ONDANSETRON HYDROCHLORIDE 4 MG: 2 SOLUTION INTRAMUSCULAR; INTRAVENOUS at 04:02

## 2020-02-17 RX ADMIN — NIFEDIPINE 10 MG: 10 CAPSULE ORAL at 08:02

## 2020-02-17 RX ADMIN — ONDANSETRON HYDROCHLORIDE 4 MG: 2 SOLUTION INTRAMUSCULAR; INTRAVENOUS at 09:02

## 2020-02-17 RX ADMIN — SODIUM CHLORIDE, SODIUM LACTATE, POTASSIUM CHLORIDE, AND CALCIUM CHLORIDE: .6; .31; .03; .02 INJECTION, SOLUTION INTRAVENOUS at 02:02

## 2020-02-17 RX ADMIN — PANTOPRAZOLE SODIUM 40 MG: 40 TABLET, DELAYED RELEASE ORAL at 04:02

## 2020-02-17 RX ADMIN — BETAMETHASONE SODIUM PHOSPHATE AND BETAMETHASONE ACETATE 12 MG: 3; 3 INJECTION, SUSPENSION INTRA-ARTICULAR; INTRALESIONAL; INTRAMUSCULAR at 07:02

## 2020-02-17 RX ADMIN — TERBUTALINE SULFATE 0.25 MG: 1 INJECTION, SOLUTION SUBCUTANEOUS at 10:02

## 2020-02-17 NOTE — H&P
`History and Physical  Obstetrics      SUBJECTIVE:     Dina Hutton is a 34 y.o.  female at 31w6d  admitted for observation.  Her current obstetrical history is significant for uncomplicated pregnancy except dx'd with thyroid cancer this pregnancy  No surgery yet  She reports has had some ctx off and on for several weeks and last night became more painful and regular   Also has pain with FM.      PTA Medications   Medication Sig    aluminum & magnesium hydroxide-simethicone (MYLANTA MAX STRENGTH) 400-400-40 mg/5 mL suspension Take by mouth every 6 (six) hours as needed for Indigestion.    omeprazole (PRILOSEC) 20 MG capsule Take 1 capsule (20 mg total) by mouth once daily.    omeprazole (PRILOSEC) 20 MG capsule TAKE 1 CAPSULE BY MOUTH TWICE DAILY    ondansetron (ZOFRAN ODT) 4 MG TbDL Take 1 tablet (4 mg total) by mouth every 6 (six) hours as needed. (Patient not taking: Reported on 2019)    PNV,calcium 72-iron,carb-folic (PRENATAL PLUS) 29 mg iron- 1 mg Tab Take 1 tablet by mouth once daily.    promethazine (PHENERGAN) 12.5 MG Tab Take 1 tablet (12.5 mg total) by mouth every 6 (six) hours as needed.       Review of patient's allergies indicates:  No Known Allergies     Past Medical History:   Diagnosis Date    Anemia     GERD (gastroesophageal reflux disease)     Hypoglycemia     Hypoglycemia     Mental disorder     depression    Thyroid cancer     Thyroid disease      History reviewed. No pertinent surgical history.  Family History   Problem Relation Age of Onset    Hypertension Mother     Hyperlipidemia Mother     Cancer Neg Hx      Social History     Tobacco Use    Smoking status: Never Smoker    Smokeless tobacco: Never Used   Substance Use Topics    Alcohol use: No     Comment: occasional    Drug use: No        OBJECTIVE:     Vital Signs (Most Recent)  Temp: (P) 97.8 °F (36.6 °C) (20)  Pulse: 75 (20)  Resp: 20 (20)  BP: (!) 90/51 (20  0513)  SpO2: 97 % (20 0534)    Physical Exam:  General:  Normal, alert and oriented                       Abdomen: Soft gravid no FT   Uterine Size:  c/w dates   Presentations:  vertex   FHT: reviewed   Pelvis: NEFG   Cervix:     Dilation: 1 cm per RN    Effacement: 50%    Station:  -3               Laboratory:  No results for input(s): ABORH, HEPBSAG, RUBELLAIGGSC, GBS, AFP, XHTMVHA5CX in the last 168 hours.   ASSESSMENT/PLAN:     31w6d gestation.  Not in labor.   Conditions:  ctx  FFN neg but pt continues to have regular painful ctx  Will try po procardia, check urine and continue hydration.  1st dose BMZ given

## 2020-02-17 NOTE — NURSING
"Received to unit w/ complaints of decreased fetal movement and lower abdominal pressure. She was here earlier for the same complaints, feeling light headed on and off through out the day.   She also  "bumped " the right side of her abdomen on the edge of the counter but forgot to tell the nurse when she was last here. No tenderness, bruising noted to site.  Pt felt fetal movement upon admit. Pt states feels "light" movements, not the "strong" movements the baby usually does, but states baby has these "light" movements at least 10 in an hour.  Reassured pt those movements count and fetal heart tones reactive. She has been eating and drinking regularly throughout the day.  Abdomen soft in between mild contractions w/o tenderness to palpation. Denied vaginal bleeding, leaking, intercourse.  FFN collected. sve 1.5/40/-2. Accompanied by s.o.  Will continue to monitor.      2135: Dr. Camacho informed of pt status. Will continue to monitor. If ctx continue then iv hydration.  Awaiting FFN result    2150: LR bolus started    2245:  Pt returned from voiding. States feeling better, sve unchanged. fht reactive.  Ctx q 3 -40.     2310: pt continues to c/o intermittent discomfort but "not as bad as before"   Dr. Camacho updated on pt status, ctx, sve unchanged, reactive fht, orders to continue to observe overnight    2/17 @ 0200:  Pt c/o worsening of pain. "8"/10. sve unchanged. Abdomen remains soft in between mild to moderate ctx.  Requesting pain medication. Dr. Camacho informed of pt's request, status, orders for stadol given    0635: Dr. Camarena phoned unit. Informed of pt status, ctx, sve, iv hydration. Orders to start steroids.     "

## 2020-02-17 NOTE — DISCHARGE INSTRUCTIONS
Home Undelivered Discharge Instructions    After Discharge Orders:    Future Appointments   Date Time Provider Department Center   2/20/2020  2:30 PM Malik Christianson MD [facility]   2/28/2020  8:00 AM Macey Cid MD [facility]   2/28/2020  8:00 AM ROOM 2, [facility]       Call physician or midwife's office for instructions.    Current Discharge Medication List      CONTINUE these medications which have NOT CHANGED    Details   aluminum & magnesium hydroxide-simethicone (MYLANTA MAX STRENGTH) 400-400-40 mg/5 mL suspension Take by mouth every 6 (six) hours as needed for Indigestion.      !! omeprazole (PRILOSEC) 20 MG capsule Take 1 capsule (20 mg total) by mouth once daily.  Qty: 30 capsule, Refills: 11      !! omeprazole (PRILOSEC) 20 MG capsule TAKE 1 CAPSULE BY MOUTH TWICE DAILY  Qty: 60 capsule, Refills: 11    Comments: Please consider 90 day supplies to promote better adherence  Associated Diagnoses: Gastroesophageal reflux disease without esophagitis      ondansetron (ZOFRAN ODT) 4 MG TbDL Take 1 tablet (4 mg total) by mouth every 6 (six) hours as needed.  Qty: 30 tablet, Refills: 5      PNV,calcium 72-iron,carb-folic (PRENATAL PLUS) 29 mg iron- 1 mg Tab Take 1 tablet by mouth once daily.  Qty: 30 tablet, Refills: 10      promethazine (PHENERGAN) 12.5 MG Tab Take 1 tablet (12.5 mg total) by mouth every 6 (six) hours as needed.  Qty: 30 tablet, Refills: 5       !! - Potential duplicate medications found. Please discuss with provider.                  · Diet:  normal diet as tolerated    · Rest: normal activity as tolerated    Other instructions: Do kick counts once a day on your baby. Choose the time of day your baby is most active. Get in a comfortable lying or sitting position and time how long it takes to feel 10 kicks, twists, turns, swishes, or rolls. Call your physician or midwife if there have not been 10 kicks in 1 hours    Call physician or midwife,  return to Labor and Delivery, call 911, or go to the nearest Emergency Room if: increased leakage or fluid, contractions more than  10 per  1 hour, decreased fetal movement, persistent low back pain or cramping, bleeding from vaginal area, difficulty urinating, pain with urination, difficulty breathing, new calf pain, persistent headache or vision change

## 2020-02-18 ENCOUNTER — HOSPITAL ENCOUNTER (OUTPATIENT)
Facility: HOSPITAL | Age: 35
Discharge: HOME | End: 2020-02-18
Attending: OBSTETRICS & GYNECOLOGY | Admitting: OBSTETRICS & GYNECOLOGY
Payer: COMMERCIAL

## 2020-02-18 PROCEDURE — 63600175 PHARM REV CODE 636 W HCPCS: Performed by: OBSTETRICS & GYNECOLOGY

## 2020-02-18 PROCEDURE — 96372 THER/PROPH/DIAG INJ SC/IM: CPT

## 2020-02-18 RX ORDER — BETAMETHASONE SODIUM PHOSPHATE AND BETAMETHASONE ACETATE 3; 3 MG/ML; MG/ML
12 INJECTION, SUSPENSION INTRA-ARTICULAR; INTRALESIONAL; INTRAMUSCULAR; SOFT TISSUE ONCE
Status: COMPLETED | OUTPATIENT
Start: 2020-02-18 | End: 2020-02-18

## 2020-02-18 RX ADMIN — BETAMETHASONE SODIUM PHOSPHATE AND BETAMETHASONE ACETATE 12 MG: 3; 3 INJECTION, SUSPENSION INTRA-ARTICULAR; INTRALESIONAL; INTRAMUSCULAR at 08:02

## 2020-02-18 NOTE — NURSING
Late entry:  Was informed by Avril Stark RN that stadol on 2/17 showed not given/due.  Medication was scanned at the time using rover phone but did not register. Was unaware of this at the time, since pain score was documented via rover phone when stadol was scanned prior to administering to patient.  Unable to document later due to >24hrs . Contacted Vy in pharmacy to see about resolving issue. States no flag on medication as of now.  Will chart in narrative that it was given and due to technical error was not documented.

## 2020-02-18 NOTE — NURSING
0818: Patient arrived to 222 for second celestone injection. Pt denies pain, leaking of fluid, or vaginal bleeding. Reports +FM. Plan discussed. Verbalized understanding. Denies questions/concerns. Pt sitting in chair next to significant other. Call light within reach.    0833: Pt denies complaints or concerns. Ambulated off unit with significant other without difficulty.

## 2020-02-20 ENCOUNTER — INITIAL CONSULT (OUTPATIENT)
Dept: SURGERY | Facility: CLINIC | Age: 35
End: 2020-02-20
Payer: COMMERCIAL

## 2020-02-20 VITALS
HEIGHT: 58 IN | WEIGHT: 128.06 LBS | SYSTOLIC BLOOD PRESSURE: 109 MMHG | DIASTOLIC BLOOD PRESSURE: 60 MMHG | HEART RATE: 95 BPM | BODY MASS INDEX: 26.88 KG/M2

## 2020-02-20 DIAGNOSIS — E04.1 THYROID NODULE: Primary | ICD-10-CM

## 2020-02-20 DIAGNOSIS — C73 PRIMARY THYROID CANCER: ICD-10-CM

## 2020-02-20 PROCEDURE — 99999 PR PBB SHADOW E&M-EST. PATIENT-LVL III: CPT | Mod: PBBFAC,,, | Performed by: SURGERY

## 2020-02-20 PROCEDURE — 99999 PR PBB SHADOW E&M-EST. PATIENT-LVL III: ICD-10-PCS | Mod: PBBFAC,,, | Performed by: SURGERY

## 2020-02-20 PROCEDURE — 99244 PR OFFICE CONSULTATION,LEVEL IV: ICD-10-PCS | Mod: S$GLB,,, | Performed by: SURGERY

## 2020-02-20 PROCEDURE — 99244 OFF/OP CNSLTJ NEW/EST MOD 40: CPT | Mod: S$GLB,,, | Performed by: SURGERY

## 2020-02-20 NOTE — LETTER
[facility] - Endo Surgery 2nd [place]  [address]  [facility] [address]  Phone: [phone] February 22, 2020      Татьяна Graham MD  [address] [place] [address]  [address]  [place] [address]    Patient: Dina Hutton   MR Number: [id number]   YOB: 1985   Date of Visit: 2/20/2020     Dear Dr. Graham:    Thank you for referring Dina Hutton to me for evaluation. Below you will find relevant portions of my assessment and plan of care.    Ms. Hutton presents with her  and mother in consultation for newly diagnosed left inferior thyroid nodule positive for papillary carcinoma of the thyroid on fine-needle aspiration biopsy. She presented with a 2.8 cm left inferior thyroid nodule which was found incidentally on an MRI for some questionable chest discomfort. The patient is clinically and biochemically euthyroid with a TSH level of 0.947. There is no family history of thyroid malignancy or problems.  She has no history of radiation exposure.     She is currently 32 weeks pregnant and is due in mid April but states she may deliver sooner. We counseled her at length regarding recommendations for total thyroidectomy with central neck level 6 lymph node dissection. We discussed the recommendations for a total thyroidectomy in order to assist with appropriate postoperative radioactive iodine ablation which is typically done 6 weeks postoperatively. We discussed the technique and rationale for radioactive iodine ablation with papillary thyroid cancer at length. I told her that she may not be able to breast feed for several weeks following radioactive iodine ablation. We also discussed that we typically recommend to not get pregnant again 9-12 months after radioactive iodine ablation. We discussed that her radioactive iodine ablation may require a 1 or 2 day overnight admission and short-term isolation.     We discussed the low risk of recurrent laryngeal nerve injury and hypocalcemia  hypoparathyroidism at typically 1%.     Patient states she will likely deliver early and would like to have her surgery approximately 2 weeks after delivery.  She will keep our office posted as to the status of her pregnancy and delivery and would like to schedule the surgery approximately 2 weeks following delivery which will be a total thyroidectomy with central neck level 6 lymph node dissection for a Damon category 6 positive for papillary carcinoma fine-needle aspiration biopsy of a 2.8 cm left inferior thyroid nodule.    Time spent with the patient and her family today was greater than 45 min with greater than 50% of that time in counseling    If you have questions, please do not hesitate to call me. I look forward to following Dina Hutton along with you.    Sincerely,    Malik Christianson MD  Medical Director, [facility]  Staff Attending Surgeon - Department of Surgery  [facility]  Associate Professor of Surgery  [employer]  [employer] [employer]    RLC/hcr    CC  Pradeep Kennedy MD

## 2020-02-20 NOTE — PROGRESS NOTES
GENERAL SURGERY  Clinic Note        SUBJECTIVE:     HISTORY OF PRESENT ILLNESS:  Dina Hutton is a 34 y.o. female who has been referred to surgery clinic for malignant papillary thyroid carcinoma.     She has currently been pregnant 32 weeks, . This current pregnancy, she has early contractions, morning sickness, shortness of breath, and abdominal pain.     Fine-needle aspiration was performed on 20.     Found incidentally on random MRI check for chest pain around May 21 last year. Found nodule on left side.     MEDICATIONS:  Home Medications:  Current Outpatient Medications on File Prior to Visit   Medication Sig Dispense Refill    aluminum & magnesium hydroxide-simethicone (MYLANTA MAX STRENGTH) 400-400-40 mg/5 mL suspension Take by mouth every 6 (six) hours as needed for Indigestion.      omeprazole (PRILOSEC) 20 MG capsule Take 1 capsule (20 mg total) by mouth once daily. 30 capsule 11    omeprazole (PRILOSEC) 20 MG capsule TAKE 1 CAPSULE BY MOUTH TWICE DAILY 60 capsule 11    ondansetron (ZOFRAN ODT) 4 MG TbDL Take 1 tablet (4 mg total) by mouth every 6 (six) hours as needed. (Patient not taking: Reported on 2019) 30 tablet 5    PNV,calcium 72-iron,carb-folic (PRENATAL PLUS) 29 mg iron- 1 mg Tab Take 1 tablet by mouth once daily. 30 tablet 10    promethazine (PHENERGAN) 12.5 MG Tab Take 1 tablet (12.5 mg total) by mouth every 6 (six) hours as needed. 30 tablet 5     No current facility-administered medications on file prior to visit.        ALLERGIES:    Review of patient's allergies indicates:  No Known Allergies    PAST MEDICAL HISTORY:    Past Medical History:   Diagnosis Date    Anemia     GERD (gastroesophageal reflux disease)     Hypoglycemia     Hypoglycemia     Mental disorder     depression    Thyroid cancer     Thyroid disease      Also anxiety. Not currently taking medications for anxiety or depression.     SURGICAL HISTORY:  No past surgical history on file.    FAMILY  HISTORY:  Family History   Problem Relation Age of Onset    Hypertension Mother     Hyperlipidemia Mother     Cancer Neg Hx      Family history -   No known history of cancer or thyroid problems.    SOCIAL HISTORY:  Social History     Tobacco Use    Smoking status: Never Smoker    Smokeless tobacco: Never Used   Substance Use Topics    Alcohol use: No     Comment: occasional    Drug use: No      REVIEW OF SYSTEMS:  Review of Systems  Some occasional cough. Occasional pain in neck 2-3 days out of a week, lasts 10 minutes max.      No fevers, no chills, no headaches, no recent weight loss.     Positive for some night sweats, fatigue, syncope, gets hot easily. (She states she is unsure whether these symptoms may be due to pregnancy or not).      OBJECTIVE:     Most Recent Vitals:  Vitals:    02/20/20 1422   BP: 109/60   Pulse: 95       PHYSICAL EXAM:  Physical Exam  GENERAL APPEARANCE: The patient is alert, oriented, not in acute distress.  NECK: No lymphadenopathy. Some pain when palpated on left side. There is no mass on the neck, no tracheal deviation.  HEART: Regular rate and rhythm.  LUNGS: Revealed decreased breath sounds at the bases. No crackles or wheezes are heard.  ABDOMEN: Soft, nontender, nondistended with normal bowel sounds.  EXTREMITIES: No cyanosis, clubbing or edema.  SKIN: Warm and dry without any rash. Some redness below neck, patient states that she feels hot.    LABORATORY VALUES:  Lab Results   Component Value Date    WBC 10.3 08/21/2019    HGB 11.8 08/21/2019    HCT 37.3 08/21/2019     08/21/2019     Lab Results   Component Value Date     08/16/2019    K 4.0 08/16/2019     08/16/2019    CO2 23 08/16/2019    BUN 10 08/16/2019    CREATININE 0.7 08/16/2019    GLU 86 08/16/2019    CALCIUM 9.3 08/16/2019    MG 2.0 10/04/2013    PHOS 2.2 (L) 10/04/2013    AST 17 08/16/2019    ALT 16 08/16/2019    ALKPHOS 66 08/16/2019    BILITOT 0.2 08/16/2019    PROT 7.6 08/16/2019     ALBUMIN 4.2 08/16/2019    AMYLASE 74 09/09/2016    LIPASE 17 08/16/2019     No results found for: INR, PTT  Lab Results   Component Value Date    TSH 0.947 01/24/2020    FREET4 0.72 01/24/2020     Lab Results 1/24/20:   Ref Range & Units 3wk ago 5mo ago 9mo ago   Free T4 0.71 - 1.51 ng/dL 0.72  0.86  0.85       Ref Range & Units 3wk ago 5mo ago 9mo ago   TSH 0.400 - 4.000 uIU/mL 0.947  0.940  0.281Low           DIAGNOSTIC STUDIES:    Pathology Report:  Thyroid, left inferior, fine-needle aspiration:  - [facility] System thyroid cytology category: Malignant, papillary thyroid carcinoma.    ASSESSMENT:     Dina Hutton is a 34 y.o. female referred for malignant papillary thyroid carcinoma. We discussed the risks and benefits of surgical intervention and she is willing to undergo a total thyroidectomy.    PLAN:  · Schedule total thyroidectomy 2-3 weeks after giving birth (due date April 14)   I have personally taken the history and examined this patient, and I agree with the history, physical exam, assessment, and plan as written and outlined and stated above per the medical student.  The patient presents with her  and mother for consultation for newly diagnosed left inferior thyroid nodule positive for papillary carcinoma of the thyroid on fine-needle aspiration biopsy.  The patient presented with a 2.8 cm left inferior thyroid nodule which was found incidentally on an MRI for some questionable chest discomfort.  The patient is clinically and biochemically euthyroid with a TSH level of 0.947.  There is no family history of thyroid malignancy or problems.  She has no history of radiation exposure.    She is currently 32 weeks pregnant and is due in mid April but states she may deliver sooner.  We counseled her at length regarding recommendations for total thyroidectomy with central neck level 6 lymph node dissection.  We discussed the recommendations for a total thyroidectomy in order to assist with appropriate  postoperative radioactive iodine ablation which is typically done 6 weeks postoperatively.  We discussed the technique and rationale for radioactive iodine ablation with papillary thyroid cancer at length.  I told her that she may not be able to breast feed for several weeks following radioactive iodine ablation.  We also discussed that we typically recommend to not get pregnant again 9-12 months after radioactive iodine ablation.  We discussed that her radioactive iodine ablation may require a 1 or 2 day overnight admission and short-term isolation.    We discussed the low risk of recurrent laryngeal nerve injury and hypocalcemia hypoparathyroidism at typically 1%.    Patient states she will likely deliver early and would like to have her surgery approximately 2 weeks after delivery.  She will keep our office posted as to the status of her pregnancy and delivery and would like to schedule the surgery approximately 2 weeks following delivery which will be a total thyroidectomy with central neck level 6 lymph node dissection for a Reserve category 6 positive for papillary carcinoma fine-needle aspiration biopsy of a 2.8 cm left inferior thyroid nodule.  Time spent with the patient and her family today was greater than 45 min with greater than 50% of that time in counseling

## 2020-02-20 NOTE — LETTER
February 22, 2020      Wale Nur MD  [address]  [facility] [address]           [facility] [place]  [address]  [facility] [address]  Phone: [phone]          Patient: Dina Hutton   MR Number: [id number]   YOB: 1985   Date of Visit: 2/20/2020       Dear Dr. Wale Nur:    Thank you for referring Dina Hutton to me for evaluation. Attached you will find relevant portions of my assessment and plan of care.    If you have questions, please do not hesitate to call me. I look forward to following Dina Hutton along with you.    Sincerely,    Malik Christianson MD    Enclosure  CC:  No Recipients    If you would like to receive this communication electronically, please contact [email or web address] or [phone] to request more information on [employer] Link access.    For providers and/or their staff who would like to refer a patient to [facility], please contact us through our one-stop-shop provider referral line, [facility], at [phone].    If you feel you have received this communication in error or would no longer like to receive these types of communications, please e-mail [email or web address]

## 2020-02-22 PROBLEM — C73 PRIMARY THYROID CANCER: Status: ACTIVE | Noted: 2020-02-22

## 2020-02-28 ENCOUNTER — LAB VISIT (OUTPATIENT)
Dept: LAB | Facility: OTHER | Age: 35
End: 2020-02-28
Attending: OBSTETRICS & GYNECOLOGY
Payer: COMMERCIAL

## 2020-02-28 ENCOUNTER — PROCEDURE VISIT (OUTPATIENT)
Dept: MATERNAL FETAL MEDICINE | Facility: CLINIC | Age: 35
End: 2020-02-28
Payer: COMMERCIAL

## 2020-02-28 ENCOUNTER — INITIAL CONSULT (OUTPATIENT)
Dept: MATERNAL FETAL MEDICINE | Facility: CLINIC | Age: 35
End: 2020-02-28
Attending: OBSTETRICS & GYNECOLOGY
Payer: COMMERCIAL

## 2020-02-28 VITALS
DIASTOLIC BLOOD PRESSURE: 70 MMHG | WEIGHT: 131.63 LBS | BODY MASS INDEX: 27.51 KG/M2 | SYSTOLIC BLOOD PRESSURE: 104 MMHG

## 2020-02-28 DIAGNOSIS — C73 THYROID CANCER: Primary | ICD-10-CM

## 2020-02-28 DIAGNOSIS — C73 THYROID CANCER: ICD-10-CM

## 2020-02-28 DIAGNOSIS — Z3A.33 33 WEEKS GESTATION OF PREGNANCY: ICD-10-CM

## 2020-02-28 DIAGNOSIS — O99.891 CURRENT MATERNAL CONDITION AFFECTING PREGNANCY: ICD-10-CM

## 2020-02-28 DIAGNOSIS — Z36.3 ANTENATAL SCREENING FOR MALFORMATION USING ULTRASONICS: ICD-10-CM

## 2020-02-28 DIAGNOSIS — Z36.89 ENCOUNTER FOR ULTRASOUND TO ASSESS FETAL GROWTH: ICD-10-CM

## 2020-02-28 DIAGNOSIS — Z36.4 ANTENATAL SCREENING FOR FETAL GROWTH RETARDATION USING ULTRASONICS: ICD-10-CM

## 2020-02-28 DIAGNOSIS — Z36.89 ENCOUNTER FOR ULTRASOUND TO CHECK FETAL GROWTH: ICD-10-CM

## 2020-02-28 LAB
T4 FREE SERPL-MCNC: 0.82 NG/DL (ref 0.71–1.51)
TSH SERPL DL<=0.005 MIU/L-ACNC: 1.49 UIU/ML (ref 0.4–4)

## 2020-02-28 PROCEDURE — 36415 COLL VENOUS BLD VENIPUNCTURE: CPT

## 2020-02-28 PROCEDURE — 99213 OFFICE O/P EST LOW 20 MIN: CPT | Mod: 25,S$GLB,, | Performed by: OBSTETRICS & GYNECOLOGY

## 2020-02-28 PROCEDURE — 84443 ASSAY THYROID STIM HORMONE: CPT

## 2020-02-28 PROCEDURE — 99999 PR PBB SHADOW E&M-EST. PATIENT-LVL III: CPT | Mod: PBBFAC,,, | Performed by: OBSTETRICS & GYNECOLOGY

## 2020-02-28 PROCEDURE — 3008F PR BODY MASS INDEX (BMI) DOCUMENTED: ICD-10-PCS | Mod: CPTII,S$GLB,, | Performed by: OBSTETRICS & GYNECOLOGY

## 2020-02-28 PROCEDURE — 99213 PR OFFICE/OUTPT VISIT, EST, LEVL III, 20-29 MIN: ICD-10-PCS | Mod: 25,S$GLB,, | Performed by: OBSTETRICS & GYNECOLOGY

## 2020-02-28 PROCEDURE — 76816 PR  US,PREGNANT UTERUS,F/U,TRANSABD APP: ICD-10-PCS | Mod: 26,S$GLB,, | Performed by: OBSTETRICS & GYNECOLOGY

## 2020-02-28 PROCEDURE — 84439 ASSAY OF FREE THYROXINE: CPT

## 2020-02-28 PROCEDURE — 99999 PR PBB SHADOW E&M-EST. PATIENT-LVL III: ICD-10-PCS | Mod: PBBFAC,,, | Performed by: OBSTETRICS & GYNECOLOGY

## 2020-02-28 PROCEDURE — 76816 OB US FOLLOW-UP PER FETUS: CPT | Mod: 26,S$GLB,, | Performed by: OBSTETRICS & GYNECOLOGY

## 2020-02-28 PROCEDURE — 3008F BODY MASS INDEX DOCD: CPT | Mod: CPTII,S$GLB,, | Performed by: OBSTETRICS & GYNECOLOGY

## 2020-04-01 ENCOUNTER — HOSPITAL ENCOUNTER (INPATIENT)
Facility: HOSPITAL | Age: 35
LOS: 2 days | Discharge: HOME | End: 2020-04-03
Attending: OBSTETRICS & GYNECOLOGY | Admitting: OBSTETRICS & GYNECOLOGY
Payer: COMMERCIAL

## 2020-04-01 DIAGNOSIS — O47.9 UTERINE CONTRACTIONS DURING PREGNANCY: ICD-10-CM

## 2020-04-01 DIAGNOSIS — Z87.59 HISTORY OF FOURTH DEGREE PERINEAL LACERATION: ICD-10-CM

## 2020-04-01 PROBLEM — R10.9 ABDOMINAL PAIN: Status: RESOLVED | Noted: 2020-02-16 | Resolved: 2020-04-01

## 2020-04-01 PROBLEM — O36.8190 DECREASED FETAL MOVEMENT: Status: RESOLVED | Noted: 2020-02-16 | Resolved: 2020-04-01

## 2020-04-01 PROBLEM — C73 THYROID CANCER: Status: ACTIVE | Noted: 2020-01-10

## 2020-04-01 LAB
ABO + RH BLD: NORMAL
BASOPHILS # BLD AUTO: 0.02 K/UL (ref 0–0.2)
BASOPHILS NFR BLD: 0.1 % (ref 0–1.9)
BLD GP AB SCN CELLS X3 SERPL QL: NORMAL
DIFFERENTIAL METHOD: ABNORMAL
EOSINOPHIL # BLD AUTO: 0.1 K/UL (ref 0–0.5)
EOSINOPHIL NFR BLD: 0.8 % (ref 0–8)
ERYTHROCYTE [DISTWIDTH] IN BLOOD BY AUTOMATED COUNT: 13.2 % (ref 11.5–14.5)
HCT VFR BLD AUTO: 45.4 % (ref 37–48.5)
HGB BLD-MCNC: 14.6 G/DL (ref 12–16)
IMM GRANULOCYTES # BLD AUTO: 0.07 K/UL (ref 0–0.04)
IMM GRANULOCYTES NFR BLD AUTO: 0.5 % (ref 0–0.5)
LYMPHOCYTES # BLD AUTO: 2.1 K/UL (ref 1–4.8)
LYMPHOCYTES NFR BLD: 14.9 % (ref 18–48)
MCH RBC QN AUTO: 29.1 PG (ref 27–31)
MCHC RBC AUTO-ENTMCNC: 32.2 G/DL (ref 32–36)
MCV RBC AUTO: 91 FL (ref 82–98)
MONOCYTES # BLD AUTO: 0.9 K/UL (ref 0.3–1)
MONOCYTES NFR BLD: 6.4 % (ref 4–15)
NEUTROPHILS # BLD AUTO: 11 K/UL (ref 1.8–7.7)
NEUTROPHILS NFR BLD: 77.3 % (ref 38–73)
NRBC BLD-RTO: 0 /100 WBC
PLATELET # BLD AUTO: 159 K/UL (ref 150–350)
PMV BLD AUTO: 10.4 FL (ref 9.2–12.9)
RBC # BLD AUTO: 5.01 M/UL (ref 4–5.4)
WBC # BLD AUTO: 14.2 K/UL (ref 3.9–12.7)

## 2020-04-01 PROCEDURE — 63600175 PHARM REV CODE 636 W HCPCS: Performed by: OBSTETRICS & GYNECOLOGY

## 2020-04-01 PROCEDURE — 85025 COMPLETE CBC W/AUTO DIFF WBC: CPT

## 2020-04-01 PROCEDURE — 25000003 PHARM REV CODE 250

## 2020-04-01 PROCEDURE — 86592 SYPHILIS TEST NON-TREP QUAL: CPT

## 2020-04-01 PROCEDURE — 72100002 HC LABOR CARE, 1ST 8 HOURS

## 2020-04-01 PROCEDURE — 11000001 HC ACUTE MED/SURG PRIVATE ROOM

## 2020-04-01 PROCEDURE — 88307 TISSUE EXAM BY PATHOLOGIST: CPT | Performed by: PATHOLOGY

## 2020-04-01 PROCEDURE — 88307 TISSUE EXAM BY PATHOLOGIST: CPT | Mod: 26,,, | Performed by: PATHOLOGY

## 2020-04-01 PROCEDURE — 25000003 PHARM REV CODE 250: Performed by: OBSTETRICS & GYNECOLOGY

## 2020-04-01 PROCEDURE — 86850 RBC ANTIBODY SCREEN: CPT

## 2020-04-01 PROCEDURE — 88307 PR  SURG PATH,LEVEL V: ICD-10-PCS | Mod: 26,,, | Performed by: PATHOLOGY

## 2020-04-01 PROCEDURE — 99285 EMERGENCY DEPT VISIT HI MDM: CPT

## 2020-04-01 PROCEDURE — 99211 OFF/OP EST MAY X REQ PHY/QHP: CPT | Mod: 25,27

## 2020-04-01 PROCEDURE — 36415 COLL VENOUS BLD VENIPUNCTURE: CPT

## 2020-04-01 PROCEDURE — 72200004 HC VAGINAL DELIVERY LEVEL I

## 2020-04-01 RX ORDER — DIPHENHYDRAMINE HYDROCHLORIDE 50 MG/ML
25 INJECTION INTRAMUSCULAR; INTRAVENOUS EVERY 4 HOURS PRN
Status: DISCONTINUED | OUTPATIENT
Start: 2020-04-01 | End: 2020-04-03 | Stop reason: HOSPADM

## 2020-04-01 RX ORDER — OXYCODONE AND ACETAMINOPHEN 5; 325 MG/1; MG/1
1 TABLET ORAL ONCE
Status: COMPLETED | OUTPATIENT
Start: 2020-04-01 | End: 2020-04-01

## 2020-04-01 RX ORDER — OXYTOCIN/RINGER'S LACTATE 30/500 ML
95 PLASTIC BAG, INJECTION (ML) INTRAVENOUS CONTINUOUS
Status: ACTIVE | OUTPATIENT
Start: 2020-04-01 | End: 2020-04-02

## 2020-04-01 RX ORDER — HYDROCODONE BITARTRATE AND ACETAMINOPHEN 5; 325 MG/1; MG/1
1 TABLET ORAL EVERY 4 HOURS PRN
Status: DISCONTINUED | OUTPATIENT
Start: 2020-04-01 | End: 2020-04-03 | Stop reason: HOSPADM

## 2020-04-01 RX ORDER — DIPHENHYDRAMINE HCL 25 MG
25 CAPSULE ORAL EVERY 4 HOURS PRN
Status: DISCONTINUED | OUTPATIENT
Start: 2020-04-01 | End: 2020-04-03 | Stop reason: HOSPADM

## 2020-04-01 RX ORDER — PRENATAL WITH FERROUS FUM AND FOLIC ACID 3080; 920; 120; 400; 22; 1.84; 3; 20; 10; 1; 12; 200; 27; 25; 2 [IU]/1; [IU]/1; MG/1; [IU]/1; MG/1; MG/1; MG/1; MG/1; MG/1; MG/1; UG/1; MG/1; MG/1; MG/1; MG/1
1 TABLET ORAL DAILY
Status: DISCONTINUED | OUTPATIENT
Start: 2020-04-02 | End: 2020-04-01 | Stop reason: SDUPTHER

## 2020-04-01 RX ORDER — SODIUM CHLORIDE, SODIUM LACTATE, POTASSIUM CHLORIDE, CALCIUM CHLORIDE 600; 310; 30; 20 MG/100ML; MG/100ML; MG/100ML; MG/100ML
INJECTION, SOLUTION INTRAVENOUS CONTINUOUS
Status: DISCONTINUED | OUTPATIENT
Start: 2020-04-01 | End: 2020-04-01

## 2020-04-01 RX ORDER — HYDROCODONE BITARTRATE AND ACETAMINOPHEN 10; 325 MG/1; MG/1
1 TABLET ORAL EVERY 4 HOURS PRN
Status: DISCONTINUED | OUTPATIENT
Start: 2020-04-01 | End: 2020-04-03 | Stop reason: HOSPADM

## 2020-04-01 RX ORDER — OXYTOCIN/RINGER'S LACTATE 30/500 ML
334 PLASTIC BAG, INJECTION (ML) INTRAVENOUS CONTINUOUS
Status: DISCONTINUED | OUTPATIENT
Start: 2020-04-01 | End: 2020-04-01

## 2020-04-01 RX ORDER — IBUPROFEN 600 MG/1
600 TABLET ORAL ONCE
Status: COMPLETED | OUTPATIENT
Start: 2020-04-01 | End: 2020-04-01

## 2020-04-01 RX ORDER — ACETAMINOPHEN 325 MG/1
650 TABLET ORAL EVERY 6 HOURS PRN
Status: DISCONTINUED | OUTPATIENT
Start: 2020-04-01 | End: 2020-04-03 | Stop reason: HOSPADM

## 2020-04-01 RX ORDER — LIDOCAINE HYDROCHLORIDE 10 MG/ML
INJECTION INFILTRATION; PERINEURAL
Status: COMPLETED
Start: 2020-04-01 | End: 2020-04-01

## 2020-04-01 RX ORDER — IBUPROFEN 600 MG/1
600 TABLET ORAL EVERY 6 HOURS
Status: DISCONTINUED | OUTPATIENT
Start: 2020-04-02 | End: 2020-04-03 | Stop reason: HOSPADM

## 2020-04-01 RX ORDER — PRENATAL WITH FERROUS FUM AND FOLIC ACID 3080; 920; 120; 400; 22; 1.84; 3; 20; 10; 1; 12; 200; 27; 25; 2 [IU]/1; [IU]/1; MG/1; [IU]/1; MG/1; MG/1; MG/1; MG/1; MG/1; MG/1; UG/1; MG/1; MG/1; MG/1; MG/1
1 TABLET ORAL DAILY
Status: DISCONTINUED | OUTPATIENT
Start: 2020-04-02 | End: 2020-04-03 | Stop reason: HOSPADM

## 2020-04-01 RX ORDER — HYDROCORTISONE 25 MG/G
CREAM TOPICAL 3 TIMES DAILY PRN
Status: DISCONTINUED | OUTPATIENT
Start: 2020-04-01 | End: 2020-04-03 | Stop reason: HOSPADM

## 2020-04-01 RX ORDER — DOCUSATE SODIUM 100 MG/1
100 CAPSULE, LIQUID FILLED ORAL 2 TIMES DAILY
Status: DISCONTINUED | OUTPATIENT
Start: 2020-04-01 | End: 2020-04-03 | Stop reason: HOSPADM

## 2020-04-01 RX ORDER — ONDANSETRON 8 MG/1
8 TABLET, ORALLY DISINTEGRATING ORAL EVERY 8 HOURS PRN
Status: DISCONTINUED | OUTPATIENT
Start: 2020-04-01 | End: 2020-04-03 | Stop reason: HOSPADM

## 2020-04-01 RX ORDER — BUTORPHANOL TARTRATE 2 MG/ML
1 INJECTION INTRAMUSCULAR; INTRAVENOUS ONCE
Status: COMPLETED | OUTPATIENT
Start: 2020-04-01 | End: 2020-04-01

## 2020-04-01 RX ADMIN — LIDOCAINE HYDROCHLORIDE: 10 INJECTION, SOLUTION INFILTRATION; PERINEURAL at 10:04

## 2020-04-01 RX ADMIN — OXYCODONE HYDROCHLORIDE AND ACETAMINOPHEN 1 TABLET: 5; 325 TABLET ORAL at 10:04

## 2020-04-01 RX ADMIN — IBUPROFEN 600 MG: 600 TABLET, FILM COATED ORAL at 10:04

## 2020-04-01 RX ADMIN — Medication 334 MILLI-UNITS/MIN: at 10:04

## 2020-04-01 RX ADMIN — SODIUM CHLORIDE, SODIUM LACTATE, POTASSIUM CHLORIDE, AND CALCIUM CHLORIDE: .6; .31; .03; .02 INJECTION, SOLUTION INTRAVENOUS at 05:04

## 2020-04-01 RX ADMIN — BUTORPHANOL TARTRATE 1 MG: 2 INJECTION, SOLUTION INTRAMUSCULAR; INTRAVENOUS at 06:04

## 2020-04-01 NOTE — PROGRESS NOTES
34-year-old female, checking in through the emergency department with contractions.  She has no symptoms to suggest COVID-19 infection.  Patient is clear for transport directly to Labor and delivery.

## 2020-04-01 NOTE — NURSING
1605:LetiRN at bedside, SVE 4/85/-2, will continue to monitor. LetiRN calling , to admit and epidural when ready  1645:Labs sent to lab  1730:lab at bedside to recollect labs

## 2020-04-02 LAB
BASOPHILS # BLD AUTO: 0.02 K/UL (ref 0–0.2)
BASOPHILS NFR BLD: 0.1 % (ref 0–1.9)
DIFFERENTIAL METHOD: ABNORMAL
EOSINOPHIL # BLD AUTO: 0 K/UL (ref 0–0.5)
EOSINOPHIL NFR BLD: 0.1 % (ref 0–8)
ERYTHROCYTE [DISTWIDTH] IN BLOOD BY AUTOMATED COUNT: 13.2 % (ref 11.5–14.5)
HCT VFR BLD AUTO: 35.6 % (ref 37–48.5)
HGB BLD-MCNC: 11.7 G/DL (ref 12–16)
IMM GRANULOCYTES # BLD AUTO: 0.06 K/UL (ref 0–0.04)
IMM GRANULOCYTES NFR BLD AUTO: 0.3 % (ref 0–0.5)
LYMPHOCYTES # BLD AUTO: 1.4 K/UL (ref 1–4.8)
LYMPHOCYTES NFR BLD: 7.5 % (ref 18–48)
MCH RBC QN AUTO: 29.3 PG (ref 27–31)
MCHC RBC AUTO-ENTMCNC: 32.9 G/DL (ref 32–36)
MCV RBC AUTO: 89 FL (ref 82–98)
MONOCYTES # BLD AUTO: 1.2 K/UL (ref 0.3–1)
MONOCYTES NFR BLD: 6.6 % (ref 4–15)
NEUTROPHILS # BLD AUTO: 15.4 K/UL (ref 1.8–7.7)
NEUTROPHILS NFR BLD: 85.4 % (ref 38–73)
NRBC BLD-RTO: 0 /100 WBC
PLATELET # BLD AUTO: 141 K/UL (ref 150–350)
PMV BLD AUTO: 10.5 FL (ref 9.2–12.9)
RBC # BLD AUTO: 4 M/UL (ref 4–5.4)
RPR SER QL: NORMAL
WBC # BLD AUTO: 18.09 K/UL (ref 3.9–12.7)

## 2020-04-02 PROCEDURE — 72200004 HC VAGINAL DELIVERY LEVEL I

## 2020-04-02 PROCEDURE — 25000003 PHARM REV CODE 250: Performed by: OBSTETRICS & GYNECOLOGY

## 2020-04-02 PROCEDURE — 85025 COMPLETE CBC W/AUTO DIFF WBC: CPT

## 2020-04-02 PROCEDURE — 72100002 HC LABOR CARE, 1ST 8 HOURS

## 2020-04-02 PROCEDURE — 11000001 HC ACUTE MED/SURG PRIVATE ROOM

## 2020-04-02 RX ADMIN — IBUPROFEN 600 MG: 600 TABLET, FILM COATED ORAL at 11:04

## 2020-04-02 RX ADMIN — PRENATAL VIT W/ FE FUMARATE-FA TAB 27-0.8 MG 1 TABLET: 27-0.8 TAB at 09:04

## 2020-04-02 RX ADMIN — IBUPROFEN 600 MG: 600 TABLET, FILM COATED ORAL at 05:04

## 2020-04-02 RX ADMIN — DOCUSATE SODIUM 100 MG: 100 CAPSULE, LIQUID FILLED ORAL at 08:04

## 2020-04-02 RX ADMIN — DOCUSATE SODIUM 100 MG: 100 CAPSULE, LIQUID FILLED ORAL at 09:04

## 2020-04-02 RX ADMIN — IBUPROFEN 600 MG: 600 TABLET, FILM COATED ORAL at 06:04

## 2020-04-02 NOTE — PROGRESS NOTES
PPD# 1.  Doing well, no complaints. Denies h/a, vision changes, upper abd pain, chest pain, sob.    Patient Active Problem List   Diagnosis    Depression    Gastroesophageal reflux disease without esophagitis    Hearing loss    Postpartum depression    History of fourth degree perineal laceration    Thyroid nodule    Low TSH level    Pregnancy    Thyroid cancer    Uterine contractions during pregnancy       Temp:  [97.7 °F (36.5 °C)-98.5 °F (36.9 °C)] 98.1 °F (36.7 °C)  Pulse:  [] 82  Resp:  [16-20] 17  SpO2:  [95 %-100 %] 96 %  BP: ()/(51-80) 98/64  Alert and oriented  Lungs: Normal respiratory effort.  Cv: RRR  Abd soft fundus firm and nontender  Ext: No significant asymmetric edema, no calf tenderness.    Recent Labs   Lab 20  1800 20  0555   WBC  --  18.09*   HGB  --  11.7*   HCT  --  35.6*   PLT  --  141*   GROUPTRH B POS  --        A&P  34 y.o.  Post partum, doing well  Routine postpartum care  Thyroid cancer

## 2020-04-02 NOTE — PLAN OF CARE
Plan of care reviewed with pt.  All questions and concerns addressed.  Mother and infant bonding well.  Breastfeeding without complication. Pt up ambulating and voiding without complication. Maintaining adequate pain control with PO pain meds.  Exam WNL.       Instructed on the AAP recommendation of exclusive breastfeeding for the first 6 months of life and continued breastfeeding with the introduction of supplemental foods beyond the first year of life.  Instructed on the recommendation to delay all bottle and pacifier use until after 4 weeks of age and breastfeeding is well established.  Discussed the benefits of exclusive breastfeeding for both mother and baby.  Discussed the risks of supplementation/pacifier use on the exclusivity of breastfeeding in the first 6 months.  Pt states understanding and verbalized appropriate recall.

## 2020-04-02 NOTE — PLAN OF CARE
VSS.  Good pain control.  Ambulating and voiding without difficulty.  Tolerating regular diet.  Breastfeeding on demand.  Pt verbalized understanding of plan of care.

## 2020-04-02 NOTE — LACTATION NOTE
This note was copied from a baby's chart.     04/02/20 0928   Breastfeeding Session   Infant Positioning clutch/football   Effective Latch During Feeding yes   Suck/Swallow Coordination present   Signs of Milk Transfer audible swallow;infant jaw motion present   Breastfeeding Left Side (min) 5 Min  (continues to nurse)   LATCH Score   Latch 2-->grasps breast, tongue down, lips flanged, rhythmic sucking   Audible Swallowing 2-->spontaneous and intermittent (24 hrs old)   Type of Nipple 2-->everted (after stimulation)   Comfort (Breast/Nipple) 2-->soft/nontender   Hold (Positioning) 1-->minimal assist, teach one side, mother does other, staff holds   Score 9   min. assisted with infant latch to left breast in football hold. Able to get infant in deep latch with good sucks and audible swallows noted to mother. P/o states pain of 4/10 initially at latch but reports 2/10 intermittent after infant at breast and sucking for a couple minutes. Instructed mother on appropriate latch to facilitate effective breastfeeding.  Discussed appropriate positioning and wide mouth latch.  Discussed ways to determine an effective latch including:  areola included in latch, rhythmic/nutritive sucking and audible swallowing.  Also discussed soreness/tenderness associated with latch and prevention and treatment.  Pt states understanding and verbalized appropriate recall. Significant other at bedside. Encouraged to call for lactation assistance PRN. Understanding voiced.

## 2020-04-02 NOTE — LACTATION NOTE
"Patient with questions about formula supplementation. States concern she does not have "enough" milk and infant still hungry and always showing cues. Infant presently calm and not showing cues. Discussed normal  behavior and frequentcy of nursing to build milk supply.     Reviewed the risks of supplementation.  Discussed the adequacy of colostrum.  Instructed on normal  feeding and sleeping patterns.  Encouraged mother to feed the infant on cue, a minimum of 8 times in 24 hours prior to supplementation to promote appropriate breast stimulation for adequate milk supply.  Discussed preferred alternative feeding methods, such as supplementing the infant via breast with SNS, syringe feeding, cup feeding, spoon feeding and finger feeding.  Discussed risks and encouraged to avoid artificial bottles and nipples.      Patient does not want to supplement at this time. Informed will support her choice and decision and to notify nurse if she changes her mind or if she needs assistance breastfeeding infant. Understanding voiced.   " Rx Doxycycline done

## 2020-04-02 NOTE — LACTATION NOTE
04/02/20 0835   Maternal Assessment   Breast Density Bilateral:;soft   Areola Bilateral:;elastic   Nipples Bilateral:;everted;graspable   Left Nipple Symptoms redness   Right Nipple Symptoms redness   patient states breastfeeding well. BF last child for 3 months. Goal is to breastfeed infant for 1-2 months d/t thyroid cx. Reports sometimes feels biting when infant nursing, but able to correct latch for deeper latch. Assessed nipples and noted to be red bilaterally. Patient denies any pain. BF basis reviewed using breastfeeding guide. Instructed to call lactation at next feeding for latch assessment. Reviewed feeding cues and encouraged to watch for cues and bring to breast when displayed - at least 8 or more times in 24 hours. Understanding voiced. Significant other at bedside.

## 2020-04-02 NOTE — H&P
History and Physical - Obstetrics    Subjective:     Ms Hutton is a 35 yo  @ 38w1d gestational age with an Estimated Date of Delivery: 20, who presents with the complaint of: regular painful contractions. Fetal Movement: normal.    Her current obstetrical history is significant for:  Patient Active Problem List   Diagnosis    Depression    Gastroesophageal reflux disease without esophagitis    Hearing loss    Postpartum depression    history of 4th degree lac with 5#15oz baby - consider primary C/S    Thyroid nodule    Low TSH level    Pregnancy    Primary thyroid cancer    Uterine contractions during pregnancy       Review of Systems  Nine system ROS negative except for HPI    PTA Medications   Medication Sig    aluminum & magnesium hydroxide-simethicone (MYLANTA MAX STRENGTH) 400-400-40 mg/5 mL suspension Take by mouth every 6 (six) hours as needed for Indigestion.    PNV,calcium 72-iron,carb-folic (PRENATAL PLUS) 29 mg iron- 1 mg Tab Take 1 tablet by mouth once daily.    promethazine (PHENERGAN) 12.5 MG Tab Take 1 tablet (12.5 mg total) by mouth every 6 (six) hours as needed.       Review of patient's allergies indicates:  No Known Allergies     Past Medical History:   Diagnosis Date    Anemia     GERD (gastroesophageal reflux disease)     Hypoglycemia     Hypoglycemia     Mental disorder     depression    Thyroid cancer     Thyroid disease        History reviewed. No pertinent surgical history.    OB History        2    Para   1    Term   1            AB        Living   1       SAB        TAB        Ectopic        Multiple        Live Births   1               SHx: negative    FHx: negative    Objective:   Vital Signs (Most Recent)  Temp: 97.7 °F (36.5 °C) (20)  Pulse: 84 (20)  Resp: 16 (20)  BP: (!) 151/70 (20)  SpO2: 99 % (20)  Fetal Heart Tones: category1  TOCO: CTX q 3 mins    General: no acute distress, alert  and oriented to person, place and time  Abdomen: gravid, +palpable contractions  Incision(s): none  Extremities: calves non-tender to palpation, no calf edema, erythema or palpable cords  Cervix: C/C/0, AROM, thick meconium    Laboratory: see results console, wnl    Assessment:     35 yo  @ 38w1d gestational age in labor.    Plan:   Patient previously consented in the office.  Consents reviewed today.  All questions answered.   anticipated  GBS negative  Will send placenta 2/2 h/o thyroid cancer (no treatment yet)  H/o depression - will monitor closely during the postpartum period

## 2020-04-02 NOTE — L&D DELIVERY NOTE
Vaginal Delivery Note    Physician: Ameena Camacho MD    Pre-procedure diagnosis:   1. IUP @ 38w1d  2. Labor  3. Thyroid cancer  4. Depression  5. H/o 4th degree laceration    Post-procedure diagnosis: Same    Anesthesia: Local    Estimated blood loss: 400 cc    Lacerations: Large 2nd degree repaired with interrupted 0 Vicryl interrupteds, and in the normal running fashion with 2-0 Vicryl Rapide, followed by a running subcutaneous 3-0 Chromic to help the skin come together better    Findings: Viable male infant in the vertex position with Apgars 8/9, and weighing 6#2oz    Delivery Note: Patient called out that she felt vaginal pressure.  She was checked and found to be completely dilated at 2+ station.  With instruction, she pushed and the infant's head delivered atraumatically followed by anterior shoulder and rest of the infant's body without any difficulty.  The infant was placed on the mother's abdomen and its cord was clamped x2 and cut.  The infant's mouth and nose were bulb suctioned and it was dried and stimulated with warm towels.  Cord blood was sampled.  The placenta was then delivered spontaneously, inspected and found to be intact.   Laceration was repaired as described above. The patient tolerated the procedure well.      Complications: None    Additional medications: Pitocin IV

## 2020-04-03 VITALS
TEMPERATURE: 98 F | HEART RATE: 85 BPM | OXYGEN SATURATION: 95 % | HEIGHT: 57 IN | WEIGHT: 135 LBS | BODY MASS INDEX: 29.12 KG/M2 | RESPIRATION RATE: 17 BRPM | DIASTOLIC BLOOD PRESSURE: 51 MMHG | SYSTOLIC BLOOD PRESSURE: 108 MMHG

## 2020-04-03 PROCEDURE — 25000003 PHARM REV CODE 250: Performed by: OBSTETRICS & GYNECOLOGY

## 2020-04-03 RX ORDER — HYDROCODONE BITARTRATE AND ACETAMINOPHEN 5; 325 MG/1; MG/1
1 TABLET ORAL EVERY 6 HOURS PRN
Qty: 28 TABLET | Refills: 0 | Status: ON HOLD | OUTPATIENT
Start: 2020-04-03 | End: 2020-04-23

## 2020-04-03 RX ORDER — IBUPROFEN 600 MG/1
600 TABLET ORAL EVERY 6 HOURS
Qty: 40 TABLET | Refills: 1 | Status: SHIPPED | OUTPATIENT
Start: 2020-04-03 | End: 2020-09-24 | Stop reason: CLARIF

## 2020-04-03 RX ADMIN — IBUPROFEN 600 MG: 600 TABLET, FILM COATED ORAL at 11:04

## 2020-04-03 RX ADMIN — DOCUSATE SODIUM 100 MG: 100 CAPSULE, LIQUID FILLED ORAL at 08:04

## 2020-04-03 RX ADMIN — IBUPROFEN 600 MG: 600 TABLET, FILM COATED ORAL at 06:04

## 2020-04-03 RX ADMIN — PRENATAL VIT W/ FE FUMARATE-FA TAB 27-0.8 MG 1 TABLET: 27-0.8 TAB at 08:04

## 2020-04-03 RX ADMIN — HYDROCODONE BITARTRATE AND ACETAMINOPHEN 1 TABLET: 5; 325 TABLET ORAL at 07:04

## 2020-04-03 NOTE — PLAN OF CARE
Plan of care reviewed with pt.  All questions and concerns addressed.  Mother and infant bonding well.  Breastfeeding without complication. Pt up ambulating and voiding without complication. Maintaining adequate pain control with PO pain meds.  Exam WNL.       Instructed on the AAP recommendation of exclusive breastfeeding for the first 6 months of life and continued breastfeeding with the introduction of supplemental foods beyond the first year of life.  Instructed on the recommendation to delay all bottle and pacifier use until after 4 weeks of age and breastfeeding is well established.  Discussed the benefits of exclusive breastfeeding for both mother and baby.  Discussed the risks of supplementation/pacifier use on the exclusivity of breastfeeding in the first 6 months.  Pt states understanding and verbalized appropriate recall.     Infant fall prevention reviewed with mother. Mother verbalized understanding with no questions. Reviewed safety prevention facts and interventions which include the following:    *While you and your baby are in the hospital, please remember these important safety tips to keep your baby safe.     -If you are feeling weak, faint, or unsteady on your feet, DO NOT life your baby. Press the nurse call button and ask for help.  -Keep your bed in the LOWEST position (closest to the floor) at all times.  -Do NOT sleep with your baby in your bed, sofa, or chair as this may place your baby at risk of serious injury.  -When you want to sleep, first place the baby in the bassinet.  -If we find you asleep with the baby in your arms, we will move your baby to the bassinet.    *Keep your baby safe.    -Breastfeeding every 2-3 hours  -Bottle feeding every 3-4 hours  -Do NOT feed the baby in the crib with the bottle propped up. Feed the baby in your arms.  -NO heavy blankets in the crib.  -NO stuffed animals in the infants crib.  -NO balloons attached to the infant's crib.  -Keep the infant WRAPPED  with their HAT ON at all times.

## 2020-04-03 NOTE — LACTATION NOTE
04/03/20 0830   Maternal Assessment   Breast Density Bilateral:;soft   Areola Bilateral:;elastic   Nipples Bilateral:;everted   Maternal Infant Feeding   Maternal Emotional State independent;relaxed   Infant Positioning clutch/football   Signs of Milk Transfer audible swallow;infant jaw motion present   Pain with Feeding no   Latch Assistance no   Lactation Referrals   Lactation Referrals WIC (women, infants and children) program;pediatric care provider;other (see comments)  (insurance for pump)   mother with baby breastfeeding on right side in football hold -strong sucking with swallows noted -mother denies discomfort with feeding -states breasts may be just starting to fill -reinforced normal at this time and should feel much rm in next 24 hours -review breastfeeding discharge information and referred to breastfeeding guide for community resources -aware to monitor wet and dirty diapers over next few days -expresses understanding of all information and all questions answered -plans to get personal pump through insurance company or WIC-provided information -states understanding of all information

## 2020-04-03 NOTE — DISCHARGE INSTRUCTIONS
After a Vaginal Birth    General Discharge Instructions    May follow a regular diet, unless otherwise discussed with physician.    Take showers, not baths unless otherwise discussed with physician.    Activity as tolerated.    No lifting or heavy exercise for 6 weeks, no driving for 2 weeks, no sexual intercourse, douching or tampons for 6 weeks    May return to work/school as discussed with physician  Take medications as directed    Discuss birth control with physician    Breast care support bra worn at all times    Lactation consultant referral number ( [phone] or [phone])    Call Your Healthcare Provider Right Away If You Have:  A temperature of 100.4°F or higher.  If your blood pressure is over 155/105.  You have difficulty catching your breath or trouble breathing.  Heavy vaginal bleeding, clots, or vaginal discharge with foul odor. (heavier than menses)  Persistent nausea or vomiting.  You gain more than 3 pounds in 3 days.  Severe headaches not relieved by Tylenol (acetaminophen) or Motrin (ibuprofen)  Blurry or double vision, see spots or flashing lights.  Dizziness or fainting.  New onset swelling or worsening of existing swelling.  Burning or pain when you urinate.  No bowel movement for 5 days.  Redness, warmth, swelling, or pain in the lower leg.  Redness, discharge, or pain worse than you had in the hospital.  Burning, pain, red streaks, or lumpy areas in your breasts.  Cracks, blisters, or blood on your nipples.  Feelings of extreme sadness or anxiety, or a feeling that you dont want to be with your baby.  If you have any new or unusual symptoms or have questions or concerns    Some of these symptoms can occur up to 4 to 6 weeks after delivery. This can be a sign of pre-eclampsia, which is a serious disease that can cause stroke, seizures, organ damage, or death. Do not wait to call your doctor or seek medical attention.            If You Had Stitches  You may have received stitches in the  skin near your vagina. The stitches might have closed an episiotomy (an incision that enlarges the opening of the vagina). Or you may have needed stitches to repair torn skin. Either way, your stitches should dissolve within weeks. Until then, you can help reduce discomfort, aid healing, and reduce your risk of infection by keeping the stitches clean. These tips can help:    Gently wipe from front to back after you urinate or have a bowel movement.  After wiping, spray warm water on the area. Or you can have a sitz bath. This means sitting in a tub with a few inches of water in it. Then pat the area dry or use a hairdryer on a cool setting.  You can take a shower instead of a bath.  Change sanitary pads at least every 2-4 hours.  Place cold or heat packs on the area as directed by your doctors or nurses. Keep a thin towel between the pack and your skin.  Sit on firm seats so the stitches pull less.     Follow-Up  Schedule a  follow-up exam with your healthcare provider for about 6 weeks after delivery. During this exam, your uterus and vaginal area will be checked. Contact your healthcare provider if you think you or your baby are having any problems.         Breastfeeding Discharge Instructions      AAP recommendation of exclusive breastfeeding for the first 6 months of life and continued breastfeeding with the introduction of supplemental foods beyond the first year of life and recommends to delay all bottle and pacifier use until after 4 weeks of age and breastfeeding is well established.  Discussed the benefits of exclusive breastfeeding for both mother and baby.  Discussed the risks of supplementation/pacifier use on the exclusivity of breastfeeding in the first 6 months. Feed the baby at the earliest sign of hunger or comfort  o Hands to mouth, sucking motions  o Rooting or searching for something to suck on  o Dont wait for crying - it is a not a late sign of hunger; it is a sign of  distress     The feedings may be 8-12 times per 24hrs and will not follow a schedule   Alternate the breast you start the feeding with, or start with the breast that feels the fullest   Switch breasts when the baby takes himself off the breast or falls asleep   Keep offering breasts until the baby looks full, no longer gives hunger signs, and stays asleep when placed on his back in the crib   If the baby is sleepy and wont wake for a feeding, put the baby skin-to-skin dressed in a diaper against the mothers bare chest   Sleep near your baby   The baby should be positioned and latched on to the breast correctly  o Chest-to-chest, chin in the breast  o Babys lips are flipped outward  o Babys mouth is stretched open wide like a shout  o Babys sucking should feel like tugging to the mother  - The baby should be drinking at the breast:  o You should hear swallowing or gulping throughout the feeding  o You should see milk on the babys lips when he comes off the breast  o Your breasts should be softer when the baby is finished feeding  o The baby should look relaxed at the end of feedings  o After the 4th day and your milk is in:  o The babys poop should turn bright yellow and be loose, watery, and seedy  o The baby should have at least 3-4 poops the size of the palm of your hand per day  o The baby should have at least 6-8 wet diapers per day  o The urine should be light yellow in color  You should drink when you are thirsty and eat a healthy diet when you are    hungry.     Take naps to get the rest you need.   Take medications and/or drink alcohol only with permission of your obstetrician    or the babys pediatrician.  You can also call the [facility],   ([phone]), Monday-Friday, 8am-5pm Central time, to get the most   up-to-date evidence-based information on the use of medications during   pregnancy and breastfeeding.      The baby should be examined by a pediatrician at 3-5 days of age;  unless ordered sooner by the pediatrician.  Once your milk comes in, the baby should be back to birth weight no later than 10-14 days of age.    Primary Engorgement    If the milk is flowing, use wet or dry heat applied to the breasts for approximately 10min prior to each feeding as a comfort measure to facilitate the milk ejection reflex    Follow heat treatment with breast massage to soften hard/lumpy areas of the breast    Use unrestricted, frequent, effective feedings.      Wake baby to feed if necessary    Avoid pacifier and bottle feedings    Hand express or pump breasts to the point of comfort prn    Use cold treatments in the form of ice packs/gel packs/ frozen vegetables wrapped in a soft thin cloth and applied to the breasts for approximately 20min after each feeding until engorgement is resolved    Wear comfortable, supportive bra    Take pain medicine prn    Use anti-inflammatory medications if prescribed by physician    Other:    [facility] Pumping Instructions :    Preparation and Hygiene:    1. Shower daily.  2. Wear a clean bra each day and wash daily in warm soapy water.  3. Change wet or moist breast pads frequently.  Moist pads can promote growth of germs.  4. Actively wash your hands, paying close attention to the area around and under your fingernails, thoroughly with soap and water for 15 seconds before pumping or handling your milk.  Re-wash your hands if you touch anything (scratching your nose, answering the phone, etc) while pumping or handling your milk.   5. Before pumping your breasts, assemble the pump collection kit and have ready the sterile container and labels.  Place these items on a clean surface next to the breastpump.  6. Each time after you have finished pumping, take apart all of the parts of the breastpump collection kit and place them in a separate cleaning container (do not place them in the sink).  Be sure to remove the yellow valve from the breastshield and separate the  white membrane from the yellow valve.  Rinse all of these parts with cool water.  Then use a new sponge and/or bottle brush and dishwashing detergent to clean the parts.  Rinse off the soapy water with cool water and air dry on a clean towel covered with a clean cloth.  All parts may also be washed after each use in the top rack of a .  7. Once each day, sterilize all of the parts of the breastpump collection kit.  This can be done by boiling the kit parts for 10 minutes or by using a Quick Clean Micro-Steam Bag made by [employer]  8. If condensation appears in the tubing, continue to run the pump with the tubing attached for 1-2 minutes or until the tubing is dry.   9. Notify your babys nurse or doctor if you become ill or need to take any medication, even over-the-counter medicines.        Collection and Storage of Expressed Breastmilk:         1. Pump your breasts at least 8-10 times every 24 hours.  Double pump (both breasts at  the same time) for at least 15-20 minutes using the most suction that is comfortable.    2. Write the date and time of pumping and the name of any medications you are takingon the babys pre-printed hospital identification label.   3.    Do not touch the inside of the storage containers or lids.      4.        Tightly screw the lid onto the container and place immediately into the                                refrigerator for daily use.  Bottle may remain at room temperature if the next                    feeding is within 4 hours.  5.    Expressed breastmilk should be refrigerated or frozen within 4 hours of                pumping.  6.        Do not store expressed breastmilk on the door of your refrigerator or freeze             where the temperature is warmer.   7.        Refrigerated milk may be stored in for up to 7 days.  At this point it can be              moved to the freezer for 6 -12 months.  8.        Thaw frozen breast milk in overnight in the refrigerator.   Once milk is thawed it              must be used within 24 hours.  9.        Refrigerated breast milk needs to be warmed to room temperature.  Warm by leaving unrefrigerated until it reached room temperature, or place sealed bottle into a cup of warm (not boiling) water or use a bottle warmer.               Never warm breast milk in a microwave or boiling water.    For any questions or concerns call:  Lactation Department at [phone]

## 2020-04-03 NOTE — DISCHARGE SUMMARY
Delivery Discharge Summary  Obstetrics        Principle Dx:  Pregnancy     Conditions: h/o depression    Hospital Course: Patient admitted, underwent  without complications.  Postpartum close was uneventful    Rh Immune Globulin Given: no  Rubella Vaccine Given: no  Tdap Vaccine Given: no    Discharge Date: 4/3/2020    Disposition:  Discharge home    Follow-up 6 weeks    Patient Instructions:   Current Discharge Medication List      START taking these medications    Details   HYDROcodone-acetaminophen (NORCO) 5-325 mg per tablet Take 1 tablet by mouth every 6 (six) hours as needed for Pain.  Qty: 28 tablet, Refills: 0    Comments: Quantity prescribed more than 7 day supply? No      ibuprofen (ADVIL,MOTRIN) 600 MG tablet Take 1 tablet (600 mg total) by mouth every 6 (six) hours.  Qty: 40 tablet, Refills: 1         CONTINUE these medications which have NOT CHANGED    Details   PNV,calcium 72-iron,carb-folic (PRENATAL PLUS) 29 mg iron- 1 mg Tab Take 1 tablet by mouth once daily.  Qty: 30 tablet, Refills: 10         STOP taking these medications       aluminum & magnesium hydroxide-simethicone (MYLANTA MAX STRENGTH) 400-400-40 mg/5 mL suspension Comments:   Reason for Stopping:         promethazine (PHENERGAN) 12.5 MG Tab Comments:   Reason for Stopping:               No discharge procedures on file.    Normal diet    Normal activity    Pelvic rest x 6 weeks.     Deny Funes MD

## 2020-04-03 NOTE — LACTATION NOTE
This note was copied from a baby's chart.  Mother requests formula for supplementation.  Reviewed exclusive breastfeeding vs formula feeding.  Mother chooses to breastfeed and supplement.  Instruction and demonstration re syringe feeding given.

## 2020-04-03 NOTE — PROGRESS NOTES
Pt discharged to home with baby in arms, via wheelchair, per dr's orders.  Pt and  verbalized understanding of all discharge instructions including follow up appointment, prescription to get filled, and medication to  at pharmacy.  No complaints.  No signs of distress.

## 2020-04-05 ENCOUNTER — TELEPHONE (OUTPATIENT)
Dept: OBSTETRICS AND GYNECOLOGY | Facility: HOSPITAL | Age: 35
End: 2020-04-05

## 2020-04-05 NOTE — TELEPHONE ENCOUNTER
Lactation Telephone Follow-up:  Voicemail box not set up; unable to leave a message with return phone number on voicemail.

## 2020-04-06 ENCOUNTER — TELEPHONE (OUTPATIENT)
Dept: OBSTETRICS AND GYNECOLOGY | Facility: HOSPITAL | Age: 35
End: 2020-04-06

## 2020-04-06 LAB
FINAL PATHOLOGIC DIAGNOSIS: NORMAL
GROSS: NORMAL

## 2020-04-16 ENCOUNTER — OFFICE VISIT (OUTPATIENT)
Dept: URGENT CARE | Facility: CLINIC | Age: 35
End: 2020-04-16
Payer: COMMERCIAL

## 2020-04-16 ENCOUNTER — LAB VISIT (OUTPATIENT)
Dept: URGENT CARE | Facility: CLINIC | Age: 35
End: 2020-04-16
Payer: COMMERCIAL

## 2020-04-16 VITALS
OXYGEN SATURATION: 98 % | TEMPERATURE: 100 F | DIASTOLIC BLOOD PRESSURE: 61 MMHG | HEART RATE: 120 BPM | BODY MASS INDEX: 29.12 KG/M2 | HEIGHT: 57 IN | WEIGHT: 135 LBS | SYSTOLIC BLOOD PRESSURE: 107 MMHG

## 2020-04-16 DIAGNOSIS — Z20.822 SUSPECTED COVID-19 VIRUS INFECTION: ICD-10-CM

## 2020-04-16 DIAGNOSIS — G44.209 TENSION HEADACHE: Primary | ICD-10-CM

## 2020-04-16 PROCEDURE — 99213 OFFICE O/P EST LOW 20 MIN: CPT | Mod: S$GLB,,, | Performed by: FAMILY MEDICINE

## 2020-04-16 PROCEDURE — U0002 COVID-19 LAB TEST NON-CDC: HCPCS

## 2020-04-16 PROCEDURE — 99213 PR OFFICE/OUTPT VISIT, EST, LEVL III, 20-29 MIN: ICD-10-PCS | Mod: S$GLB,,, | Performed by: FAMILY MEDICINE

## 2020-04-16 NOTE — PATIENT INSTRUCTIONS
Please proceed to:    [facility] [address] [facility], [address]  Phone: [phone]    When you arrive go to the back lot where you can remain in your car.    They will be expecting you and you can also expect a phone call from them prior to your arrival.              Instructions for Patients with Confirmed or Suspected COVID-19    If you are awaiting your test result, you will either be called or it will be released to the patient portal.  If you have any questions about your test, please visit [email or web address] or call our COVID-19 information line at [phone].       Stay home and stay away from family members and friends. The CDC says, you can leave home after these three things have happened: 1) You have had no fever for at least 72 hours (that is three full days of no fever without the use of medicine that reduces fevers) 2) AND other symptoms have improved (for example, when your cough or shortness of breath have improved) 3) AND at least 7 days have passed since your symptoms first appeared.   Separate yourself from other people and animals in your home.   Call ahead before visiting your doctor.   Wear a facemask.   Cover your coughs and sneezes.   Wash your hands often with soap and water; hand  can be used, too.   Avoid sharing personal household items.   Wipe down surfaces used daily.   Monitor your symptoms. Seek prompt medical attention if your illness is worsening (e.g., difficulty breathing).    Before seeking care, call your healthcare provider.   If you have a medical emergency and need to call 911, notify the dispatch personnel that you have, or are being evaluated for COVID-19. If possible, put on a facemask before emergency medical services arrive.        Recommended precautions for household members, intimate partners, and caregivers in a home setting of a patient with symptomatic laboratory-confirmed COVID-19 or a patient under  investigation.  Household members, intimate partners, and caregivers in the home setting awaiting tests results have close contact with a person with symptomatic, laboratory-confirmed COVID-19 or a person under investigation. Close contacts should monitor their health; they should call their provider right away if they develop symptoms suggestive of COVID-19 (e.g., fever, cough, shortness of breath).    Close contacts should also follow these recommendations:   Make sure that you understand and can help the patient follow their provider's instructions for medication(s) and care. You should help the patient with basic needs in the home and provide support for getting groceries, prescriptions, and other personal needs.   Monitor the patient's symptoms. If the patient is getting sicker, call his or her healthcare provider and tell them that the patient has laboratory-confirmed COVID-19. If the patient has a medical emergency and you need to call 911, notify the dispatch personnel that the patient has, or is being evaluated for COVID-19.   Household members should stay in another room or be  from the patient. Household members should use a separate bedroom and bathroom, if available.   Prohibit visitors.   Household members should care for any pets in the home.   Make sure that shared spaces in the home have good air flow, such as by an air conditioner or an opened window, weather permitting.   Perform hand hygiene frequently. Wash your hands often with soap and water for at least 20 seconds or use an alcohol-based hand  (that contains > 60% alcohol) covering all surfaces of your hands and rubbing them together until they feel dry. Soap and water should be used preferentially.   Avoid touching your eyes, nose, and mouth.   The patient should wear a facemask. If the patient is not able to wear a facemask (for example, because it causes trouble breathing), caregivers should wear a mask when they  are in the same room as the patient.   Wear a disposable facemask and gloves when you touch or have contact with the patient's blood, stool, or body fluids, such as saliva, sputum, nasal mucus, vomit, urine.  o Throw out disposable facemasks and gloves after using them. Do not reuse.  o When removing personal protective equipment, first remove and dispose of gloves. Then, immediately clean your hands with soap and water or alcohol-based hand . Next, remove and dispose of facemask, and immediately clean your hands again with soap and water or alcohol-based hand .   You should not share dishes, drinking glasses, cups, eating utensils, towels, bedding, or other items with the patient. After the patient uses these items, you should wash them thoroughly (see below Wash laundry thoroughly).   Clean all high-touch surfaces, such as counters, tabletops, doorknobs, bathroom fixtures, toilets, phones, keyboards, tablets, and bedside tables, every day. Also, clean any surfaces that may have blood, stool, or body fluids on them.   Use a household cleaning spray or wipe, according to the label instructions. Labels contain instructions for safe and effective use of the cleaning product including precautions you should take when applying the product, such as wearing gloves and making sure you have good ventilation during use of the product.   Wash laundry thoroughly.  o Immediately remove and wash clothes or bedding that have blood, stool, or body fluids on them.  o Wear disposable gloves while handling soiled items and keep soiled items away from your body. Clean your hands (with soap and water or an alcohol-based hand ) immediately after removing your gloves.  o Read and follow directions on labels of laundry or clothing items and detergent. In general, using a normal laundry detergent according to washing machine instructions and dry thoroughly using the warmest temperatures recommended on  the clothing label.   Place all used disposable gloves, facemasks, and other contaminated items in a lined container before disposing of them with other household waste. Clean your hands (with soap and water or an alcohol-based hand ) immediately after handling these items. Soap and water should be used preferentially if hands are visibly dirty.   Discuss any additional questions with your state or local health department or healthcare provider. Check available hours when contacting your local health department.    For more information see CDC link below.      https://www.cdc.gov/coronavirus/2019-ncov/hcp/guidance-prevent-spread.html#precautions        Sources:  CDC, [facility] and [facility]

## 2020-04-16 NOTE — PROGRESS NOTES
"Subjective:       Patient ID: Dian Hutton is a 34 y.o. female.    Vitals:  height is 4' 9" (1.448 m) and weight is 61.2 kg (135 lb). Her temperature is 100.1 °F (37.8 °C). Her blood pressure is 107/61 and her pulse is 120 (abnormal). Her oxygen saturation is 98%.     Chief Complaint: Generalized Body Aches    34-year-old woman who gave birth to weeks prior and is recently diagnosed with thyroid cancer presents to urgent care complaining of a multitude of symptoms.  Biggest symptom is a headache.  Headache is a generalized HA. Denies prodromal symptoms.    Patient also reports anorexia, lethargy, myalgia, nausea , and a subjective sense of alternating fever and chills.    Denies any known COVID contacts. Denies anosmia. Denies diarrhea.      Constitution: Positive for fever. Negative for chills and fatigue.   HENT: Negative for congestion and sore throat.    Neck: Positive for neck pain. Negative for painful lymph nodes.   Cardiovascular: Negative for chest pain and leg swelling.   Eyes: Negative for double vision and blurred vision.   Respiratory: Positive for cough. Negative for shortness of breath.    Gastrointestinal: Negative for nausea, vomiting and diarrhea.   Genitourinary: Negative for dysuria, frequency, urgency and history of kidney stones.   Musculoskeletal: Positive for joint pain. Negative for joint swelling, muscle cramps and muscle ache.   Skin: Negative for color change, pale, rash, erythema and bruising.   Allergic/Immunologic: Negative for seasonal allergies.   Neurological: Positive for headaches. Negative for dizziness, history of vertigo, light-headedness and passing out.   Hematologic/Lymphatic: Negative for swollen lymph nodes.   Psychiatric/Behavioral: Negative for nervous/anxious, sleep disturbance and depression. The patient is not nervous/anxious.        Objective:      Physical Exam   Constitutional: She is oriented to person, place, and time. She appears well-developed and " well-nourished. She is active. No distress.   HENT:   Head: Normocephalic and atraumatic.       Right Ear: Hearing and external ear normal.   Left Ear: Hearing and external ear normal.   Nose: Nose normal. No rhinorrhea. No epistaxis.   Occipital pain, temporalis muscle tenderness. Lateral pterygoid tenderness. masseter tenderness.   Eyes: Pupils are equal, round, and reactive to light. Conjunctivae, EOM and lids are normal. Right eye exhibits no discharge. Left eye exhibits no discharge. Right conjunctiva is not injected. Right conjunctiva has no hemorrhage. Left conjunctiva is not injected. Left conjunctiva has no hemorrhage. No scleral icterus. Right eye exhibits normal extraocular motion. Left eye exhibits normal extraocular motion.   Neck: Normal range of motion. Neck supple.   Cardiovascular: Normal rate.   Pulmonary/Chest: Effort normal.   Musculoskeletal: Normal range of motion. She exhibits no edema, tenderness or deformity.   Neurological: She is alert and oriented to person, place, and time. She has normal strength. No cranial nerve deficit.   Skin: Skin is warm, dry, intact, not diaphoretic, not pale and no rash. erythema  Psychiatric: Her speech is normal and behavior is normal. Judgment and thought content normal. Her mood appears anxious. Cognition and memory are normal.   anxious         Assessment:       1. Tension headache    2. Suspected Covid-19 Virus Infection        Plan:         Tension headache    Suspected Covid-19 Virus Infection  -     COVID-19 Routine Screening; Future; Expected date: 04/16/2020

## 2020-04-17 ENCOUNTER — TELEPHONE (OUTPATIENT)
Dept: URGENT CARE | Facility: CLINIC | Age: 35
End: 2020-04-17

## 2020-04-17 LAB — SARS-COV-2 RNA RESP QL NAA+PROBE: NOT DETECTED

## 2020-04-20 ENCOUNTER — HOSPITAL ENCOUNTER (INPATIENT)
Facility: HOSPITAL | Age: 35
LOS: 3 days | Discharge: HOME | DRG: 769 | End: 2020-04-25
Attending: OBSTETRICS & GYNECOLOGY | Admitting: OBSTETRICS & GYNECOLOGY
Payer: COMMERCIAL

## 2020-04-20 DIAGNOSIS — N61.0 MASTITIS: Primary | ICD-10-CM

## 2020-04-20 LAB
ALBUMIN SERPL BCP-MCNC: 2.7 G/DL (ref 3.5–5.2)
ALP SERPL-CCNC: 133 U/L (ref 55–135)
ALT SERPL W/O P-5'-P-CCNC: 13 U/L (ref 10–44)
ANION GAP SERPL CALC-SCNC: 12 MMOL/L (ref 8–16)
AST SERPL-CCNC: 15 U/L (ref 10–40)
BASOPHILS # BLD AUTO: 0.05 K/UL (ref 0–0.2)
BASOPHILS NFR BLD: 0.3 % (ref 0–1.9)
BILIRUB SERPL-MCNC: 0.2 MG/DL (ref 0.1–1)
BUN SERPL-MCNC: 10 MG/DL (ref 6–20)
CALCIUM SERPL-MCNC: 9.1 MG/DL (ref 8.7–10.5)
CHLORIDE SERPL-SCNC: 106 MMOL/L (ref 95–110)
CO2 SERPL-SCNC: 24 MMOL/L (ref 23–29)
CREAT SERPL-MCNC: 0.4 MG/DL (ref 0.5–1.4)
DIFFERENTIAL METHOD: ABNORMAL
EOSINOPHIL # BLD AUTO: 0.6 K/UL (ref 0–0.5)
EOSINOPHIL NFR BLD: 3.7 % (ref 0–8)
ERYTHROCYTE [DISTWIDTH] IN BLOOD BY AUTOMATED COUNT: 13.2 % (ref 11.5–14.5)
EST. GFR  (AFRICAN AMERICAN): >60 ML/MIN/1.73 M^2
EST. GFR  (NON AFRICAN AMERICAN): >60 ML/MIN/1.73 M^2
GLUCOSE SERPL-MCNC: 75 MG/DL (ref 70–110)
HCT VFR BLD AUTO: 36.5 % (ref 37–48.5)
HGB BLD-MCNC: 11.8 G/DL (ref 12–16)
IMM GRANULOCYTES # BLD AUTO: 0.15 K/UL (ref 0–0.04)
IMM GRANULOCYTES NFR BLD AUTO: 1 % (ref 0–0.5)
LYMPHOCYTES # BLD AUTO: 1.3 K/UL (ref 1–4.8)
LYMPHOCYTES NFR BLD: 8.4 % (ref 18–48)
MCH RBC QN AUTO: 28.9 PG (ref 27–31)
MCHC RBC AUTO-ENTMCNC: 32.3 G/DL (ref 32–36)
MCV RBC AUTO: 90 FL (ref 82–98)
MONOCYTES # BLD AUTO: 0.8 K/UL (ref 0.3–1)
MONOCYTES NFR BLD: 5.4 % (ref 4–15)
NEUTROPHILS # BLD AUTO: 12.4 K/UL (ref 1.8–7.7)
NEUTROPHILS NFR BLD: 81.2 % (ref 38–73)
NRBC BLD-RTO: 0 /100 WBC
PLATELET # BLD AUTO: 360 K/UL (ref 150–350)
PMV BLD AUTO: 9.4 FL (ref 9.2–12.9)
POTASSIUM SERPL-SCNC: 3.7 MMOL/L (ref 3.5–5.1)
PROT SERPL-MCNC: 6.4 G/DL (ref 6–8.4)
RBC # BLD AUTO: 4.08 M/UL (ref 4–5.4)
SODIUM SERPL-SCNC: 142 MMOL/L (ref 136–145)
WBC # BLD AUTO: 15.3 K/UL (ref 3.9–12.7)

## 2020-04-20 PROCEDURE — 25000003 PHARM REV CODE 250: Performed by: OBSTETRICS & GYNECOLOGY

## 2020-04-20 PROCEDURE — 63600175 PHARM REV CODE 636 W HCPCS: Performed by: OBSTETRICS & GYNECOLOGY

## 2020-04-20 PROCEDURE — 85025 COMPLETE CBC W/AUTO DIFF WBC: CPT

## 2020-04-20 PROCEDURE — 80053 COMPREHEN METABOLIC PANEL: CPT

## 2020-04-20 PROCEDURE — U0002 COVID-19 LAB TEST NON-CDC: HCPCS

## 2020-04-20 PROCEDURE — 36415 COLL VENOUS BLD VENIPUNCTURE: CPT

## 2020-04-20 PROCEDURE — G0378 HOSPITAL OBSERVATION PER HR: HCPCS

## 2020-04-20 PROCEDURE — G0379 DIRECT REFER HOSPITAL OBSERV: HCPCS

## 2020-04-20 RX ORDER — DIPHENHYDRAMINE HYDROCHLORIDE 50 MG/ML
25 INJECTION INTRAMUSCULAR; INTRAVENOUS EVERY 6 HOURS PRN
Status: DISCONTINUED | OUTPATIENT
Start: 2020-04-20 | End: 2020-04-25 | Stop reason: HOSPADM

## 2020-04-20 RX ORDER — HYDROCODONE BITARTRATE AND ACETAMINOPHEN 5; 325 MG/1; MG/1
1 TABLET ORAL EVERY 4 HOURS PRN
Status: DISCONTINUED | OUTPATIENT
Start: 2020-04-20 | End: 2020-04-21

## 2020-04-20 RX ORDER — VANCOMYCIN HCL IN 5 % DEXTROSE 1G/250ML
20 PLASTIC BAG, INJECTION (ML) INTRAVENOUS ONCE
Status: DISCONTINUED | OUTPATIENT
Start: 2020-04-20 | End: 2020-04-20

## 2020-04-20 RX ORDER — SODIUM CHLORIDE 0.9 % (FLUSH) 0.9 %
10 SYRINGE (ML) INJECTION
Status: DISCONTINUED | OUTPATIENT
Start: 2020-04-20 | End: 2020-04-25 | Stop reason: HOSPADM

## 2020-04-20 RX ORDER — HYDROCODONE BITARTRATE AND ACETAMINOPHEN 10; 325 MG/1; MG/1
1 TABLET ORAL EVERY 4 HOURS PRN
Status: DISCONTINUED | OUTPATIENT
Start: 2020-04-20 | End: 2020-04-21

## 2020-04-20 RX ORDER — VANCOMYCIN HCL IN 5 % DEXTROSE 1G/250ML
1000 PLASTIC BAG, INJECTION (ML) INTRAVENOUS
Status: DISCONTINUED | OUTPATIENT
Start: 2020-04-21 | End: 2020-04-20

## 2020-04-20 RX ORDER — ACETAMINOPHEN 325 MG/1
650 TABLET ORAL EVERY 4 HOURS PRN
Status: DISCONTINUED | OUTPATIENT
Start: 2020-04-20 | End: 2020-04-25 | Stop reason: HOSPADM

## 2020-04-20 RX ORDER — ONDANSETRON 8 MG/1
8 TABLET, ORALLY DISINTEGRATING ORAL EVERY 8 HOURS PRN
Status: DISCONTINUED | OUTPATIENT
Start: 2020-04-20 | End: 2020-04-25 | Stop reason: HOSPADM

## 2020-04-20 RX ORDER — SODIUM CHLORIDE, SODIUM LACTATE, POTASSIUM CHLORIDE, CALCIUM CHLORIDE 600; 310; 30; 20 MG/100ML; MG/100ML; MG/100ML; MG/100ML
INJECTION, SOLUTION INTRAVENOUS CONTINUOUS
Status: DISCONTINUED | OUTPATIENT
Start: 2020-04-20 | End: 2020-04-25 | Stop reason: HOSPADM

## 2020-04-20 RX ADMIN — HYDROCODONE BITARTRATE AND ACETAMINOPHEN 1 TABLET: 10; 325 TABLET ORAL at 08:04

## 2020-04-20 RX ADMIN — DIPHENHYDRAMINE HYDROCHLORIDE 25 MG: 50 INJECTION, SOLUTION INTRAMUSCULAR; INTRAVENOUS at 08:04

## 2020-04-20 RX ADMIN — ACETAMINOPHEN 650 MG: 325 TABLET ORAL at 07:04

## 2020-04-20 RX ADMIN — VANCOMYCIN HYDROCHLORIDE 1000 MG: 1 INJECTION, POWDER, LYOPHILIZED, FOR SOLUTION INTRAVENOUS at 07:04

## 2020-04-20 RX ADMIN — SODIUM CHLORIDE, SODIUM LACTATE, POTASSIUM CHLORIDE, AND CALCIUM CHLORIDE: .6; .31; .03; .02 INJECTION, SOLUTION INTRAVENOUS at 06:04

## 2020-04-20 NOTE — PLAN OF CARE
Received from admitting in w/c. Respirations even and non-labored breath sounds clear per auscultation. Heart rate regular. Abd. Soft and non-distended with active bowel sound.

## 2020-04-20 NOTE — LACTATION NOTE
04/20/20 1650   Maternal Assessment   Breast Density Right:;full;Left:;other (see comments)  (mastitis vs abscess)   Areola Right:;elastic;Left:;firm   Nipples Right:;everted;Left:;flat   Breast Signs/Symptoms of Infection warmth, increased;redness;blister(s);other (see comments)  (bruising)   Maternal Infant Feeding   Maternal Emotional State anxious;assist needed   Equipment Type   Breast Pump Type double electric, hospital grade   Breast Pump Flange Type hard   Breast Pump Flange Size 24 mm   Breast Pumping   Breast Pumping Interventions frequent pumping encouraged   Breast Pumping right breast pumped until soft     Left breast noted to have significant redness, warmth and firm/tightness, blistering noted to left outer area at 3 o'clock position, and palpable lymph nodes x 2 in the axilla.  Breast tender to palpation, Initiate pumping on right breast to relieve engorgement.  Cannot tolerate pumping on left side due to pain of mastitis/abscess.  Warm compress applied to left breast for comfort measure.  Small drops of EBM noted leaking from left breast.  [employer] pump, tubing, collections containers and labels brought to bedside.  Discussed proper pump setting of maintenance phase.  Instructed on proper usage of pump and to adjust suction according to maximum comfort level.  Verified appropriate flange fit.  Educated on the frequency and duration of pumping in order to promote and maintain a full milk supply.  Instructed on cleaning of breast pump parts.  Written instructions also given.  Pt verbalized understanding and appropriate recall for proper milk handling, collection, labeling, storage and transportation.  FOB in garage to  EBM, labeled and transported to [facility].

## 2020-04-20 NOTE — H&P
History and Physical - OBGYN    Subjective:     Ms Hutton is a 34 y.o.  PPD20 s/p  with failed outpatient mastitis treatment.    Review of Systems  Nine system ROS negative except for HPI      (Not in a hospital admission)    Review of patient's allergies indicates:  No Known Allergies     Past Medical History:   Diagnosis Date    Anemia     GERD (gastroesophageal reflux disease)     Hypoglycemia     Hypoglycemia     Mental disorder     depression    Thyroid cancer     Thyroid disease        No past surgical history on file.    OB History        2    Para   2    Term   2            AB        Living   2       SAB        TAB        Ectopic        Multiple   0    Live Births   2                 SHx: negative    FHx: negative    Objective:   Vital Signs (Most Recent)     General: no acute distress, alert and oriented to person, place and time  Abdomen: NTTP  Breasts: left breast with large area of erythema, tender, fluctuant  Incision(s): none  Extremities: calves non-tender to palpation, no calf edema, erythema or palpable cords  External genitals: normal female anatomy, with no lesions  Urethra: normal in appearance, no discharge, non-tender to palpation  Bladder: no trigone or suprapubic tenderness  Vagina: normal-appearing mucosa; no lesions, blood or abnormal appearing discharge in vaginal vault  Cervix: full cervix visualized; normal in appearance with no lesions; no abnormal blood or discharge coming from the os  Uterus: palpated to be normal in size, non-tender to palpation  Adnexa: non-tender to palpation, no masses or fullness  Rectal: deferred    Laboratory: pending    Assessment:     33 yo  with mastitis that failed outpatient treatment    Plan:     Will admit to OW for IV ABX, breast US, and General surgery consult

## 2020-04-20 NOTE — PLAN OF CARE
Initiate pumping on right breast to relieve engorgement.  Cannot tolerate pumping on left side due to pain.

## 2020-04-21 LAB
GRAM STN SPEC: NORMAL
GRAM STN SPEC: NORMAL
SARS-COV-2 RNA RESP QL NAA+PROBE: NOT DETECTED

## 2020-04-21 PROCEDURE — 87186 SC STD MICRODIL/AGAR DIL: CPT

## 2020-04-21 PROCEDURE — 25000003 PHARM REV CODE 250: Performed by: OBSTETRICS & GYNECOLOGY

## 2020-04-21 PROCEDURE — G0378 HOSPITAL OBSERVATION PER HR: HCPCS

## 2020-04-21 PROCEDURE — 63600175 PHARM REV CODE 636 W HCPCS: Performed by: OBSTETRICS & GYNECOLOGY

## 2020-04-21 PROCEDURE — 87205 SMEAR GRAM STAIN: CPT

## 2020-04-21 PROCEDURE — 63600175 PHARM REV CODE 636 W HCPCS: Performed by: RADIOLOGY

## 2020-04-21 PROCEDURE — 87070 CULTURE OTHR SPECIMN AEROBIC: CPT

## 2020-04-21 PROCEDURE — 87075 CULTR BACTERIA EXCEPT BLOOD: CPT

## 2020-04-21 PROCEDURE — 87077 CULTURE AEROBIC IDENTIFY: CPT

## 2020-04-21 RX ORDER — FENTANYL CITRATE 50 UG/ML
INJECTION, SOLUTION INTRAMUSCULAR; INTRAVENOUS CODE/TRAUMA/SEDATION MEDICATION
Status: COMPLETED | OUTPATIENT
Start: 2020-04-21 | End: 2020-04-21

## 2020-04-21 RX ORDER — CLINDAMYCIN PHOSPHATE 600 MG/50ML
600 INJECTION, SOLUTION INTRAVENOUS
Status: DISCONTINUED | OUTPATIENT
Start: 2020-04-21 | End: 2020-04-25 | Stop reason: HOSPADM

## 2020-04-21 RX ORDER — IBUPROFEN 600 MG/1
600 TABLET ORAL EVERY 6 HOURS
Status: DISCONTINUED | OUTPATIENT
Start: 2020-04-21 | End: 2020-04-25 | Stop reason: HOSPADM

## 2020-04-21 RX ORDER — OXYCODONE HCL 5 MG/5 ML
2.5 SOLUTION, ORAL ORAL EVERY 6 HOURS PRN
Status: DISCONTINUED | OUTPATIENT
Start: 2020-04-21 | End: 2020-04-23

## 2020-04-21 RX ORDER — MIDAZOLAM HYDROCHLORIDE 1 MG/ML
INJECTION INTRAMUSCULAR; INTRAVENOUS CODE/TRAUMA/SEDATION MEDICATION
Status: COMPLETED | OUTPATIENT
Start: 2020-04-21 | End: 2020-04-21

## 2020-04-21 RX ORDER — IBUPROFEN 600 MG/1
600 TABLET ORAL EVERY 6 HOURS
Status: DISCONTINUED | OUTPATIENT
Start: 2020-04-21 | End: 2020-04-21

## 2020-04-21 RX ADMIN — SODIUM CHLORIDE, SODIUM LACTATE, POTASSIUM CHLORIDE, AND CALCIUM CHLORIDE: .6; .31; .03; .02 INJECTION, SOLUTION INTRAVENOUS at 09:04

## 2020-04-21 RX ADMIN — MIDAZOLAM HYDROCHLORIDE 0.5 MG: 1 INJECTION, SOLUTION INTRAMUSCULAR; INTRAVENOUS at 02:04

## 2020-04-21 RX ADMIN — FENTANYL CITRATE 25 MCG: 50 INJECTION, SOLUTION INTRAMUSCULAR; INTRAVENOUS at 02:04

## 2020-04-21 RX ADMIN — HYDROCODONE BITARTRATE AND ACETAMINOPHEN 1 TABLET: 10; 325 TABLET ORAL at 12:04

## 2020-04-21 RX ADMIN — ACETAMINOPHEN 650 MG: 325 TABLET ORAL at 08:04

## 2020-04-21 RX ADMIN — PROMETHAZINE HYDROCHLORIDE 12.5 MG: 25 INJECTION INTRAMUSCULAR; INTRAVENOUS at 11:04

## 2020-04-21 RX ADMIN — SODIUM CHLORIDE, SODIUM LACTATE, POTASSIUM CHLORIDE, AND CALCIUM CHLORIDE: .6; .31; .03; .02 INJECTION, SOLUTION INTRAVENOUS at 12:04

## 2020-04-21 RX ADMIN — FENTANYL CITRATE 50 MCG: 50 INJECTION, SOLUTION INTRAMUSCULAR; INTRAVENOUS at 02:04

## 2020-04-21 RX ADMIN — IBUPROFEN 600 MG: 600 TABLET, FILM COATED ORAL at 03:04

## 2020-04-21 RX ADMIN — ACETAMINOPHEN 650 MG: 325 TABLET ORAL at 01:04

## 2020-04-21 RX ADMIN — HYDROCODONE BITARTRATE AND ACETAMINOPHEN 1 TABLET: 10; 325 TABLET ORAL at 04:04

## 2020-04-21 RX ADMIN — CLINDAMYCIN IN 5 PERCENT DEXTROSE 600 MG: 12 INJECTION, SOLUTION INTRAVENOUS at 11:04

## 2020-04-21 RX ADMIN — CLINDAMYCIN IN 5 PERCENT DEXTROSE 600 MG: 12 INJECTION, SOLUTION INTRAVENOUS at 08:04

## 2020-04-21 RX ADMIN — SODIUM CHLORIDE, SODIUM LACTATE, POTASSIUM CHLORIDE, AND CALCIUM CHLORIDE 125 ML/HR: .6; .31; .03; .02 INJECTION, SOLUTION INTRAVENOUS at 09:04

## 2020-04-21 RX ADMIN — IBUPROFEN 600 MG: 600 TABLET, FILM COATED ORAL at 11:04

## 2020-04-21 RX ADMIN — CLINDAMYCIN IN 5 PERCENT DEXTROSE 600 MG: 12 INJECTION, SOLUTION INTRAVENOUS at 03:04

## 2020-04-21 NOTE — CONSULTS
Inpatient Radiology Pre-procedure Note    History of Present Illness:  Dina Hutton is a 34 y.o. female who presents for right breast abscess aspiration and possible drainage.  Admission H&P reviewed.  Past Medical History:   Diagnosis Date    Anemia     GERD (gastroesophageal reflux disease)     Hypoglycemia     Hypoglycemia     Mental disorder     depression    Thyroid cancer     Thyroid disease      No past surgical history on file.    Review of Systems:   As documented in primary team H&P    Home Meds:   Prior to Admission medications    Medication Sig Start Date End Date Taking? Authorizing Provider   HYDROcodone-acetaminophen (NORCO) 5-325 mg per tablet Take 1 tablet by mouth every 6 (six) hours as needed for Pain. 4/3/20   Deny Funes MD   ibuprofen (ADVIL,MOTRIN) 600 MG tablet Take 1 tablet (600 mg total) by mouth every 6 (six) hours. 4/3/20   Deny Funes MD   PNV,calcium 72-iron,carb-folic (PRENATAL PLUS) 29 mg iron- 1 mg Tab Take 1 tablet by mouth once daily. 8/21/19   Татьяна Graham MD     Scheduled Meds:    clindamycin (CLEOCIN) IVPB  600 mg Intravenous Q8H     Continuous Infusions:    lactated ringers Stopped (04/21/20 0945)     PRN Meds:acetaminophen, diphenhydrAMINE, HYDROcodone-acetaminophen, HYDROcodone-acetaminophen, ondansetron, promethazine (PHENERGAN) IVPB, sodium chloride 0.9%  Anticoagulants/Antiplatelets: no anticoagulation    Allergies:   Review of patient's allergies indicates:   Allergen Reactions    Vancomycin analogues Hives     Sedation Hx: have not been any systemic reactions    Labs:  No results for input(s): INR in the last 168 hours.    Invalid input(s):  PT,  PTT    Recent Labs   Lab 04/20/20  1756   WBC 15.30*   HGB 11.8*   HCT 36.5*   MCV 90   *      Recent Labs   Lab 04/20/20  1756   GLU 75      K 3.7      CO2 24   BUN 10   CREATININE 0.4*   CALCIUM 9.1   ALT 13   AST 15   ALBUMIN 2.7*   BILITOT 0.2         Vitals:  Temp: 99.3 °F  (37.4 °C) (04/21/20 1149)  Pulse: 96 (04/21/20 1149)  Resp: 17 (04/21/20 1149)  BP: 118/70 (04/21/20 1149)  SpO2: 95 % (04/21/20 1149)     Physical Exam:  ASA: 2  Mallampati: 2    General: no acute distress  Mental Status: alert and oriented to person, place and time  HEENT: normocephalic, atraumatic  Chest: unlabored breathing, c/o pain at left upper/outer breast  Heart: regular heart rate  Abdomen: nondistended, mild abdominal pain today  Extremity: moves all extremities    Plan: proceed with left breast abscess aspiration/possible drain placement  Sedation Plan: moderate    Maynor Mcgarry MD  Staff Radiologist  Department of Radiology  Pager: [phone]

## 2020-04-21 NOTE — LACTATION NOTE
"   04/21/20 1728   Maternal Assessment   Breast Density Right:;soft;Left:;engorged   Areola Right:;elastic;Left:;firm   Nipples Right:;everted;Left:;flat   Right Nipple Symptoms scabbed   Breast Signs/Symptoms of Infection blister(s);flu-like symptoms;redness;warmth, increased   Maternal Infant Feeding   Maternal Emotional State independent   Equipment Type   Breast Pump Type double electric, hospital grade   Breast Pump Flange Type hard   Breast Pump Flange Size 24 mm   Breast Pumping   Breast Pumping Interventions frequent pumping encouraged   Breast Pumping double electric breast pump utilized;pre-pumping breast massage;pre-pumping tactile stimulation   mother able to pump this morning and get some relief of engorgement on left side -only palpating 1 lymph node this morning and redness and swelling have decreased per mother but remains significant -with blistering noted -especially at raised, red and bruised looking area at 3 o'clock position to outer left breast -offered ice packs for use as needed for comfort -pt states heat "has not been working"-has been able to tolerate gentle massage to help release milk -reinforced frequent pumping as tolerated and call for any assistance   "

## 2020-04-21 NOTE — PROGRESS NOTES
.gen surg consult    Consult from Dr. Camacho  Consult directed to Dr. Mason Rubalcava  Current left mastitis    34-year-old female who is about 2-3 weeks status post vaginal delivery.  She has been breast feeding at home.  She has noticed some left breast pain and swelling over the course of the last week which did not improve with oral antibiotics prescribed by her obstetrician she has been having fever at home up to 101.  She denies any change in the color or smell of the milk produced by her left breast.  Her child was having trouble latching onto her left breast since birth  Past medical history gastroesophageal reflux disease, hypoglycemia, thyroid cancer-scheduled for surgical intervention in the next month or 2  Past surgical history none  Allergies vancomycin  Social history patient does not smoke or consume alcoholic beverages  Vital signs  Blood pressure 132/63, respiratory rate 17, pulse 99, temperature 99.5°  Alert and oriented x4 in no acute distress  HEENT no cervical or supraclavicular masses, trachea midline  Lungs is equal breath sounds bilaterally  Cardiovascular regular rate and rhythm  Abdomen is soft  Left breast some edema induration and tenderness, no axillary masses palpable, some erythema and possible fluctuance lower outer quadrant of breast adjacent to areola about 3 cm in size, there is an additional 1 cm area of erythema and induration upper outer quadrant of breast  White blood cell count 15 yesterday    Assessment plan-left postpartum mastitis-new allergy to vanc- continue intravenous Cleocin, agree with ultrasound of left breast to evaluate for fluid collection/abscess; if present image guided aspiration by interventional radiology versus incision and drainage in the operating room under anesthesia if not amenable to percutaneous drainage/aspiration; discussed with patient at length; thank you for consult; will follow

## 2020-04-21 NOTE — PLAN OF CARE
Plan pumping at least 8 times in 24 hours while  from infant -massage and pump left breast as tolerated to relieve engorgement associated with mastitis

## 2020-04-21 NOTE — PROGRESS NOTES
Pt states that she is itching. 75% of Vancomycin infused. Vancomycin discontinued. Dr. Titus notified. New orders received.

## 2020-04-21 NOTE — PROGRESS NOTES
gen surg   L br u/s results noted-one main pocket 3.6 cm and few smaller surrounding pockets  Cont abx, will consult IR for perc asp  Ultimately if fails to resolve with these measures could end up needing I&D in OR

## 2020-04-21 NOTE — PROCEDURES
Radiology Post-Procedure Note    Pre Op Diagnosis: left breast abscess    Post Op Diagnosis: left breast abscess    Procedure: left breast abscess aspiration and attempted drain placement    Procedure performed by: Maynor Mcgarry MD    Written Informed Consent Obtained: Yes    Specimen Removed: YES 5 mL pus    Estimated Blood Loss: Minimal    Findings: US was used for localization of abnormal fluid collection. A needle was inserted into the fluid collection and purulent fluid was aspirated.  A wire was inserted into the collection and the tract was dilated.  Attempted to place an 8.0 [place] all-purpose drainage catheter but unsuccessful due to small size of collection.  Approximately  5 mL fluid was removed.     A specimen was sent to the lab for further analysis and culture.    The patient tolerated procedure well and there were no complications. Please see procedure report under Imaging for further details.    Maynor Mcgarry MD  Staff Radiologist  Department of Radiology  Pager: [phone]

## 2020-04-21 NOTE — SEDATION DOCUMENTATION
Report called to BEE Vogt. Abscess to left breast drained. Drain placement unsuccessful. Approximately 5 cc purulent, blood tinged drainage aspirated and sent to lab for testing. Site with gauze and tegaderm, CDI, no redness, swelling or hematoma noted at site. CHELLE HAYNES.

## 2020-04-21 NOTE — PROGRESS NOTES
Pt states that she is light headed and weak. Vital Signs stable. Pt states she feels this is due to her having not eaten since yesterday. Dr. Titus notified. Patient placed on regular diet until midnight. Pt NPO after midnight. Pt encouraged to call for assistance when ambulating to void. Understanding verbalized.

## 2020-04-21 NOTE — PROGRESS NOTES
Pt in radiology for u/s  Temp:  [97.7 °F (36.5 °C)-99.5 °F (37.5 °C)]   Pulse:  []   Resp:  [17-20]   BP: (102-132)/(53-63)   SpO2:  [95 %-98 %]   Labs reviewed  Continue abx - will f/u on u/s

## 2020-04-21 NOTE — PROGRESS NOTES
BEE Mello called me this evening. Pt had an allergic reaction to vanc. Most of vanc received. Started Benadryl. Will now start Clindamycing 600mg IV q8 hours.

## 2020-04-21 NOTE — PLAN OF CARE
Pt progressing well. NAD noted. Left breast noted to have significant redness, warmth, tightness, and blistering noted to left outer area and palpable lymph nodes x 2 in the axilla.  Breast tender to palpation. Patient tolerating Clindamycin. Pain well controlled with Vicodin. Ambulating and voiding. POC discussed with pt. Understanding verbalized.

## 2020-04-21 NOTE — PROGRESS NOTES
Pharmacokinetic Initial Assessment: IV Vancomycin    Assessment/Plan:    Initiate intravenous vancomycin with loading dose of 1000 mg once followed by a maintenance dose of vancomycin 1000 mg IV every 12 hours  Desired empiric serum trough concentration is 10 to 20 mcg/mL  Draw vancomycin trough level 30 min prior to fourth dose on 04/22/20 at approximately 06:30   Pharmacy will continue to follow and monitor vancomycin.      Please contact pharmacy at extension [phone] with any questions regarding this assessment.     Thank you for the consult,   Dina Cowart       Patient brief summary:  Dina Hutton is a 34 y.o. female initiated on antimicrobial therapy with IV Vancomycin for treatment of suspected mastitis    Drug Allergies:   Review of patient's allergies indicates:  No Known Allergies    Actual Body Weight:   51.7 kg    Renal Function:   Estimated Creatinine Clearance: 161.7 mL/min (A) (based on SCr of 0.4 mg/dL (L)).,     Dialysis Method (if applicable):  N/A    CBC (last 72 hours):  Recent Labs   Lab Result Units 04/20/20  1756   WBC K/uL 15.30*   Hemoglobin g/dL 11.8*   Hematocrit % 36.5*   Platelets K/uL 360*   Gran% % 81.2*   Lymph% % 8.4*   Mono% % 5.4   Eosinophil% % 3.7   Basophil% % 0.3   Differential Method  Automated       Metabolic Panel (last 72 hours):  Recent Labs   Lab Result Units 04/20/20  1756   Sodium mmol/L 142   Potassium mmol/L 3.7   Chloride mmol/L 106   CO2 mmol/L 24   Glucose mg/dL 75   BUN, Bld mg/dL 10   Creatinine mg/dL 0.4*   Albumin g/dL 2.7*   Total Bilirubin mg/dL 0.2   Alkaline Phosphatase U/L 133   AST U/L 15   ALT U/L 13       Drug levels (last 3 results):  No results for input(s): VANCOMYCINRA, VANCOMYCINPE, VANCOMYCINTR in the last 72 hours.    Microbiologic Results:  Microbiology Results (last 7 days)       ** No results found for the last 168 hours. **

## 2020-04-21 NOTE — NURSING
Pt. Given warm compress for her breast. Crackers, hot tea and water to take her ibuprofen. Assisted with position of comfort. .

## 2020-04-22 ENCOUNTER — ANESTHESIA EVENT (OUTPATIENT)
Dept: SURGERY | Facility: HOSPITAL | Age: 35
DRG: 769 | End: 2020-04-22
Payer: COMMERCIAL

## 2020-04-22 ENCOUNTER — PATIENT MESSAGE (OUTPATIENT)
Dept: ADMINISTRATIVE | Facility: OTHER | Age: 35
End: 2020-04-22

## 2020-04-22 LAB
BASOPHILS # BLD AUTO: 0.06 K/UL (ref 0–0.2)
BASOPHILS NFR BLD: 0.3 % (ref 0–1.9)
DIFFERENTIAL METHOD: ABNORMAL
EOSINOPHIL # BLD AUTO: 0.3 K/UL (ref 0–0.5)
EOSINOPHIL NFR BLD: 1.5 % (ref 0–8)
ERYTHROCYTE [DISTWIDTH] IN BLOOD BY AUTOMATED COUNT: 13.2 % (ref 11.5–14.5)
HCT VFR BLD AUTO: 32.4 % (ref 37–48.5)
HGB BLD-MCNC: 10.4 G/DL (ref 12–16)
IMM GRANULOCYTES # BLD AUTO: 0.19 K/UL (ref 0–0.04)
IMM GRANULOCYTES NFR BLD AUTO: 1 % (ref 0–0.5)
LYMPHOCYTES # BLD AUTO: 1.5 K/UL (ref 1–4.8)
LYMPHOCYTES NFR BLD: 8.2 % (ref 18–48)
MCH RBC QN AUTO: 29.1 PG (ref 27–31)
MCHC RBC AUTO-ENTMCNC: 32.1 G/DL (ref 32–36)
MCV RBC AUTO: 91 FL (ref 82–98)
MONOCYTES # BLD AUTO: 1.3 K/UL (ref 0.3–1)
MONOCYTES NFR BLD: 7.3 % (ref 4–15)
NEUTROPHILS # BLD AUTO: 14.9 K/UL (ref 1.8–7.7)
NEUTROPHILS NFR BLD: 81.7 % (ref 38–73)
NRBC BLD-RTO: 0 /100 WBC
PLATELET # BLD AUTO: 330 K/UL (ref 150–350)
PMV BLD AUTO: 9.6 FL (ref 9.2–12.9)
RBC # BLD AUTO: 3.57 M/UL (ref 4–5.4)
WBC # BLD AUTO: 18.19 K/UL (ref 3.9–12.7)

## 2020-04-22 PROCEDURE — 25000003 PHARM REV CODE 250: Performed by: OBSTETRICS & GYNECOLOGY

## 2020-04-22 PROCEDURE — 85025 COMPLETE CBC W/AUTO DIFF WBC: CPT

## 2020-04-22 PROCEDURE — 11000001 HC ACUTE MED/SURG PRIVATE ROOM

## 2020-04-22 PROCEDURE — 63600175 PHARM REV CODE 636 W HCPCS: Performed by: OBSTETRICS & GYNECOLOGY

## 2020-04-22 RX ORDER — DOCUSATE SODIUM 100 MG/1
200 CAPSULE, LIQUID FILLED ORAL DAILY PRN
Status: DISCONTINUED | OUTPATIENT
Start: 2020-04-22 | End: 2020-04-23

## 2020-04-22 RX ORDER — SERTRALINE HYDROCHLORIDE 25 MG/1
25 TABLET, FILM COATED ORAL DAILY
Status: DISCONTINUED | OUTPATIENT
Start: 2020-04-22 | End: 2020-04-25 | Stop reason: HOSPADM

## 2020-04-22 RX ADMIN — IBUPROFEN 600 MG: 600 TABLET, FILM COATED ORAL at 11:04

## 2020-04-22 RX ADMIN — ONDANSETRON 8 MG: 8 TABLET, ORALLY DISINTEGRATING ORAL at 07:04

## 2020-04-22 RX ADMIN — ACETAMINOPHEN 650 MG: 325 TABLET ORAL at 09:04

## 2020-04-22 RX ADMIN — SERTRALINE HYDROCHLORIDE 25 MG: 25 TABLET ORAL at 02:04

## 2020-04-22 RX ADMIN — CLINDAMYCIN IN 5 PERCENT DEXTROSE 600 MG: 12 INJECTION, SOLUTION INTRAVENOUS at 04:04

## 2020-04-22 RX ADMIN — IBUPROFEN 600 MG: 600 TABLET, FILM COATED ORAL at 06:04

## 2020-04-22 RX ADMIN — SODIUM CHLORIDE, SODIUM LACTATE, POTASSIUM CHLORIDE, AND CALCIUM CHLORIDE: .6; .31; .03; .02 INJECTION, SOLUTION INTRAVENOUS at 03:04

## 2020-04-22 RX ADMIN — SODIUM CHLORIDE, SODIUM LACTATE, POTASSIUM CHLORIDE, AND CALCIUM CHLORIDE: .6; .31; .03; .02 INJECTION, SOLUTION INTRAVENOUS at 11:04

## 2020-04-22 RX ADMIN — IBUPROFEN 600 MG: 600 TABLET, FILM COATED ORAL at 05:04

## 2020-04-22 RX ADMIN — DOCUSATE SODIUM 200 MG: 100 CAPSULE, LIQUID FILLED ORAL at 06:04

## 2020-04-22 RX ADMIN — SODIUM CHLORIDE, SODIUM LACTATE, POTASSIUM CHLORIDE, AND CALCIUM CHLORIDE: .6; .31; .03; .02 INJECTION, SOLUTION INTRAVENOUS at 06:04

## 2020-04-22 RX ADMIN — CLINDAMYCIN IN 5 PERCENT DEXTROSE 600 MG: 12 INJECTION, SOLUTION INTRAVENOUS at 08:04

## 2020-04-22 RX ADMIN — CLINDAMYCIN IN 5 PERCENT DEXTROSE 600 MG: 12 INJECTION, SOLUTION INTRAVENOUS at 11:04

## 2020-04-22 NOTE — ANESTHESIA PREPROCEDURE EVALUATION
04/22/2020  Dina Hutton is a 34 y.o., female.  Pre-operative evaluation for Procedure(s) (LRB):  INCISION AND DRAINAGE, BREAST (Left)      NPO >8h instructions given  METS >4    Drainage of left breast on 4/21 with IR. Possible I&D 4/22 if no improvement. Continued on IV clinda.      Vitals:    04/22/20 0639 04/22/20 0755 04/22/20 1134 04/22/20 1638   BP: 109/62 120/63 120/62 (!) 118/57   BP Location:  Right arm Right arm Right arm   Patient Position:  Sitting Lying Lying   Pulse: 88 101 81 76   Resp: 18 16 16 16   Temp: 36.2 °C (97.1 °F) 36.8 °C (98.2 °F) 35.9 °C (96.7 °F) 36.3 °C (97.4 °F)   TempSrc:  Oral Oral Oral   SpO2:  96% (!) 93% 96%   Weight:       Height:           Patient Active Problem List   Diagnosis    Depression    Gastroesophageal reflux disease without esophagitis    Hearing loss    Postpartum depression    History of fourth degree perineal laceration    Thyroid nodule    Low TSH level    Pregnancy    Thyroid cancer    Uterine contractions during pregnancy    Mastitis       Review of patient's allergies indicates:   Allergen Reactions    Vancomycin analogues Hives       No current facility-administered medications on file prior to encounter.      Current Outpatient Medications on File Prior to Encounter   Medication Sig Dispense Refill    HYDROcodone-acetaminophen (NORCO) 5-325 mg per tablet Take 1 tablet by mouth every 6 (six) hours as needed for Pain. 28 tablet 0    ibuprofen (ADVIL,MOTRIN) 600 MG tablet Take 1 tablet (600 mg total) by mouth every 6 (six) hours. 40 tablet 1    PNV,calcium 72-iron,carb-folic (PRENATAL PLUS) 29 mg iron- 1 mg Tab Take 1 tablet by mouth once daily. 30 tablet 10       History reviewed. No pertinent surgical history.    Social History     Socioeconomic History    Marital status:      Spouse name: Not on file    Number of children: Not  on file    Years of education: Not on file    Highest education level: Not on file   Occupational History    Not on file   Social Needs    Financial resource strain: Not on file    Food insecurity:     Worry: Not on file     Inability: Not on file    Transportation needs:     Medical: Not on file     Non-medical: Not on file   Tobacco Use    Smoking status: Never Smoker    Smokeless tobacco: Never Used   Substance and Sexual Activity    Alcohol use: No     Comment: occasional    Drug use: No    Sexual activity: Yes     Partners: Male   Lifestyle    Physical activity:     Days per week: Not on file     Minutes per session: Not on file    Stress: Not on file   Relationships    Social connections:     Talks on phone: Not on file     Gets together: Not on file     Attends Orthodoxy service: Not on file     Active member of club or organization: Not on file     Attends meetings of clubs or organizations: Not on file     Relationship status: Not on file   Other Topics Concern    Not on file   Social History Narrative    Not on file         CBC:   Recent Labs     20  1756 20  0435   WBC 15.30* 18.19*   RBC 4.08 3.57*   HGB 11.8* 10.4*   HCT 36.5* 32.4*   * 330   MCV 90 91   MCH 28.9 29.1   MCHC 32.3 32.1       CMP:   Recent Labs     20  1756      K 3.7      CO2 24   BUN 10   CREATININE 0.4*   GLU 75   CALCIUM 9.1   ALBUMIN 2.7*   PROT 6.4   ALKPHOS 133   ALT 13   AST 15   BILITOT 0.2       INR  No results for input(s): PT, INR, PROTIME, APTT in the last 72 hours.        Diagnostic Studies:      EKD Echo:  No results found for this or any previous visit.      Anesthesia Evaluation    I have reviewed the Patient Summary Reports.     I have reviewed the Medications.     Review of Systems  Anesthesia Hx:  No problems with previous Anesthesia  Neg history of prior surgery.  Denies Personal Hx of Anesthesia complications.   Social:  Non-Smoker     Hematology/Oncology:  Hematology Normal      Oncology Comments: Thyroid cancer    EENT/Dental:EENT/Dental Normal   Cardiovascular:  Cardiovascular Normal Exercise tolerance: good     Pulmonary:  Pulmonary Normal    Renal/:  Renal/ Normal     Hepatic/GI:   GERD    Neurological:  Neurology Normal    Endocrine:  Endocrine Normal    Dermatological:   Left breast mastitis/abscess postpartum.   Psych:   Psychiatric History          Physical Exam  General:  Well nourished    Airway/Jaw/Neck:  Airway Findings: Mouth Opening: Normal General Airway Assessment: Adult  Mallampati: III  TM Distance: 4 - 6 cm  Jaw/Neck Findings:  Neck ROM: Normal ROM     Eyes/Ears/Nose:  EYES/EARS/NOSE FINDINGS: Normal   Dental:  Dental Findings: In tact, Periodontal disease, Mild    Chest/Lungs:  Chest/Lungs Findings: Normal Respiratory Rate         Mental Status:  Mental Status Findings:  Cooperative, Alert and Oriented         Anesthesia Plan  Type of Anesthesia, risks & benefits discussed:  Anesthesia Type:  general  Patient's Preference:   Intra-op Monitoring Plan: standard ASA monitors  Intra-op Monitoring Plan Comments:   Post Op Pain Control Plan: multimodal analgesia  Post Op Pain Control Plan Comments:   Induction:   IV  Beta Blocker:  Patient is not currently on a Beta-Blocker (No further documentation required).       Informed Consent: Patient understands risks and agrees with Anesthesia plan.  Questions answered. Anesthesia consent signed with patient.  ASA Score: 2     Day of Surgery Review of History & Physical: I have interviewed and examined the patient. I have reviewed the patient's H&P dated:            Ready For Surgery From Anesthesia Perspective.

## 2020-04-22 NOTE — PLAN OF CARE
Pumping well 8 - 12 times in 24 hours with Symphony pump.  Appropriate labeling and storage of EBM noted.  Encouraged to call for assist prn.

## 2020-04-22 NOTE — LACTATION NOTE
"   04/22/20 0745   Pain/Comfort/Sleep   Pain Body Location - Side Left   Pain Body Location breast   Pain Rating (0-10): Activity 6   Frequency intermittent   Quality tingling;tightness   Pain Management Interventions   (breastpump and warm compresses)   Breasts WDL   Left Breast Symptoms tenderness localized;mass, reddened;full   Breast Pumping   Breast Pumping Interventions frequent pumping encouraged   Maternal Feeding Assessment   Maternal Emotional State relaxed;independent   Reproductive Interventions   Breastfeeding Assistance electric breast pump used;support offered   Breastfeeding Support encouragement provided;lactation counseling provided     Redness noted to left breast around aerola and in 3 - 5 o'clock position.  Drainage site closed, covered with 4x4 gauze, no drainage noted at this time.  Pumped well on right breast, voiced concerns over decreased supply, advised that this is normal with the stress of mastitis even on unaffected breast.  Pt pumping left breast with minimal suction and minimal discomfort, no EBM noted at this time.  Discussed comfort measures of pumping, warm or ice compresses.  Pump parts sanitized with Medela microsteam bag.  Encouraged to call for assist prn.  States "understand".    "

## 2020-04-22 NOTE — PROGRESS NOTES
Surgery Progress Note    Primary Problem: <principal problem not specified>    Other problems:   Active Hospital Problems    Diagnosis  POA    Mastitis [N61.0]  Yes      Resolved Hospital Problems   No resolved problems to display.       HD #  LOS: 0 days   POD #     Overnight events:  IR aspiration of abscess yesterday.  Removed bile 5 cc of pus.  Patient feels better overall.  Decreased left breast pain     Diet - Diet Adult Regular (IDDSI Level 7)     I/O last 3 completed shifts:  In: 250 [P.O.:200; IV Piggyback:50]  Out: 900 [Urine:900]    Intake/Output Summary (Last 24 hours) at 4/22/2020 0633  Last data filed at 4/22/2020 0200  Gross per 24 hour   Intake 585.42 ml   Output 400 ml   Net 185.42 ml       Temp:  [98.5 °F (36.9 °C)-99.5 °F (37.5 °C)] 98.5 °F (36.9 °C)  Pulse:  [] 87  Resp:  [16-22] 16  SpO2:  [95 %-96 %] 95 %  BP: ()/(55-79) 108/58    Gen: alert, well appearing, and in no distress,    Left breast with the erythema.  Questionable fluctuance at about 5:00 position from her an areolar.  Upper outer with minimal erythema    Recent Labs   Lab 04/20/20  1756 04/22/20  0435   WBC 15.30* 18.19*   HGB 11.8* 10.4*   HCT 36.5* 32.4*   * 330     --    K 3.7  --      --    BUN 10  --    GLU 75  --    PROT 6.4  --    ALBUMIN 2.7*  --    BILITOT 0.2  --    AST 15  --    ALKPHOS 133  --    ALT 13  --       G stain-many wbc's, Gram-positive cocci    Assessment:  Postpartum mastitis, abscess    Plan:  Continue antibiotics, follow-up cultures  May require I and D, if this does not improve    Jose Anderson MD

## 2020-04-22 NOTE — PLAN OF CARE
Pt. Pain managed with scheduled motrin and prn medication. Tolerating abx well.  Enc. Pt. To frequently pump breast bilat. During shift pt was able to pump 10ml on left breast.Left breast red, swollen, tender, warmth to touch. Warm compress applied to left breast between pumping. Cbc this morning. POC reviewed with pt. Pt verbally stated understanding. Vss.Will cont. To monitor.

## 2020-04-22 NOTE — PROGRESS NOTES
Pt reports pain is moderate, 6/10, controlling with Motrin and Tylenol.  Upset that she can't have visitors.  No new c/o.  Pumping every 2 h  Vitals:    04/21/20 2013 04/22/20 0033 04/22/20 0639 04/22/20 0755   BP: (!) 102/56 (!) 108/58 109/62 120/63   BP Location: Right arm Right arm  Right arm   Patient Position: Sitting Sitting  Sitting   Pulse: 92 87 88 101   Resp: 20 16 18 16   Temp: 98.5 °F (36.9 °C) 98.5 °F (36.9 °C) 97.1 °F (36.2 °C) 98.2 °F (36.8 °C)   TempSrc: Oral Oral  Oral   SpO2:    96%   Weight:       Height:         Gen A&O x 4 tearful  Left breast area of tenderness and fluctuance at 4-5 o'clock position, breast intensely erythematous  Right breast normal  CBC WBC 18  cx pending    Assessment Breast abscess, mastitis, s/p IR aspiration yesterday  Plan Discussed case with Dr. Pichardo.  Continue Clinda IV and NPO after MN.  If no improvement by tomorrow consider I&D  Continue pumping every 2-3 hours, appreciate lactation consult  F/u cultures

## 2020-04-23 ENCOUNTER — ANESTHESIA (OUTPATIENT)
Dept: SURGERY | Facility: HOSPITAL | Age: 35
DRG: 769 | End: 2020-04-23
Payer: COMMERCIAL

## 2020-04-23 LAB
BACTERIA SPEC AEROBE CULT: ABNORMAL
BASOPHILS # BLD AUTO: 0.07 K/UL (ref 0–0.2)
BASOPHILS NFR BLD: 0.5 % (ref 0–1.9)
DIFFERENTIAL METHOD: ABNORMAL
EOSINOPHIL # BLD AUTO: 0.4 K/UL (ref 0–0.5)
EOSINOPHIL NFR BLD: 2.7 % (ref 0–8)
ERYTHROCYTE [DISTWIDTH] IN BLOOD BY AUTOMATED COUNT: 13.2 % (ref 11.5–14.5)
HCT VFR BLD AUTO: 34.7 % (ref 37–48.5)
HGB BLD-MCNC: 11 G/DL (ref 12–16)
IMM GRANULOCYTES # BLD AUTO: 0.35 K/UL (ref 0–0.04)
IMM GRANULOCYTES NFR BLD AUTO: 2.4 % (ref 0–0.5)
LYMPHOCYTES # BLD AUTO: 1.5 K/UL (ref 1–4.8)
LYMPHOCYTES NFR BLD: 10.2 % (ref 18–48)
MCH RBC QN AUTO: 28.4 PG (ref 27–31)
MCHC RBC AUTO-ENTMCNC: 31.7 G/DL (ref 32–36)
MCV RBC AUTO: 89 FL (ref 82–98)
MONOCYTES # BLD AUTO: 0.9 K/UL (ref 0.3–1)
MONOCYTES NFR BLD: 6.2 % (ref 4–15)
NEUTROPHILS # BLD AUTO: 11.4 K/UL (ref 1.8–7.7)
NEUTROPHILS NFR BLD: 78 % (ref 38–73)
NRBC BLD-RTO: 0 /100 WBC
PLATELET # BLD AUTO: 348 K/UL (ref 150–350)
PMV BLD AUTO: 9.6 FL (ref 9.2–12.9)
RBC # BLD AUTO: 3.88 M/UL (ref 4–5.4)
WBC # BLD AUTO: 14.62 K/UL (ref 3.9–12.7)

## 2020-04-23 PROCEDURE — 25000003 PHARM REV CODE 250: Performed by: NURSE ANESTHETIST, CERTIFIED REGISTERED

## 2020-04-23 PROCEDURE — D9220A PRA ANESTHESIA: Mod: ANES,,, | Performed by: ANESTHESIOLOGY

## 2020-04-23 PROCEDURE — 25000003 PHARM REV CODE 250: Performed by: OBSTETRICS & GYNECOLOGY

## 2020-04-23 PROCEDURE — 85025 COMPLETE CBC W/AUTO DIFF WBC: CPT

## 2020-04-23 PROCEDURE — 25000242 PHARM REV CODE 250 ALT 637 W/ HCPCS: Performed by: OBSTETRICS & GYNECOLOGY

## 2020-04-23 PROCEDURE — 37000009 HC ANESTHESIA EA ADD 15 MINS: Performed by: SURGERY

## 2020-04-23 PROCEDURE — 37000008 HC ANESTHESIA 1ST 15 MINUTES: Performed by: SURGERY

## 2020-04-23 PROCEDURE — 71000033 HC RECOVERY, INTIAL HOUR: Performed by: SURGERY

## 2020-04-23 PROCEDURE — 25000003 PHARM REV CODE 250: Performed by: SURGERY

## 2020-04-23 PROCEDURE — D9220A PRA ANESTHESIA: Mod: CRNA,,, | Performed by: NURSE ANESTHETIST, CERTIFIED REGISTERED

## 2020-04-23 PROCEDURE — 63600175 PHARM REV CODE 636 W HCPCS: Performed by: NURSE ANESTHETIST, CERTIFIED REGISTERED

## 2020-04-23 PROCEDURE — 36000704 HC OR TIME LEV I 1ST 15 MIN: Performed by: SURGERY

## 2020-04-23 PROCEDURE — D9220A PRA ANESTHESIA: ICD-10-PCS | Mod: CRNA,,, | Performed by: NURSE ANESTHETIST, CERTIFIED REGISTERED

## 2020-04-23 PROCEDURE — 11000001 HC ACUTE MED/SURG PRIVATE ROOM

## 2020-04-23 PROCEDURE — 36000705 HC OR TIME LEV I EA ADD 15 MIN: Performed by: SURGERY

## 2020-04-23 PROCEDURE — D9220A PRA ANESTHESIA: ICD-10-PCS | Mod: ANES,,, | Performed by: ANESTHESIOLOGY

## 2020-04-23 PROCEDURE — 63600175 PHARM REV CODE 636 W HCPCS: Performed by: ANESTHESIOLOGY

## 2020-04-23 RX ORDER — ONDANSETRON 2 MG/ML
INJECTION INTRAMUSCULAR; INTRAVENOUS
Status: DISCONTINUED | OUTPATIENT
Start: 2020-04-23 | End: 2020-04-23

## 2020-04-23 RX ORDER — ACETAMINOPHEN 10 MG/ML
INJECTION, SOLUTION INTRAVENOUS
Status: DISCONTINUED | OUTPATIENT
Start: 2020-04-23 | End: 2020-04-23

## 2020-04-23 RX ORDER — OXYCODONE AND ACETAMINOPHEN 5; 325 MG/1; MG/1
1 TABLET ORAL EVERY 6 HOURS PRN
Status: DISCONTINUED | OUTPATIENT
Start: 2020-04-23 | End: 2020-04-24

## 2020-04-23 RX ORDER — ENOXAPARIN SODIUM 100 MG/ML
40 INJECTION SUBCUTANEOUS EVERY 24 HOURS
Status: DISCONTINUED | OUTPATIENT
Start: 2020-04-24 | End: 2020-04-25 | Stop reason: HOSPADM

## 2020-04-23 RX ORDER — DOCUSATE SODIUM 100 MG/1
200 CAPSULE, LIQUID FILLED ORAL 2 TIMES DAILY PRN
Status: DISCONTINUED | OUTPATIENT
Start: 2020-04-23 | End: 2020-04-25 | Stop reason: HOSPADM

## 2020-04-23 RX ORDER — SODIUM CHLORIDE 0.9 % (FLUSH) 0.9 %
10 SYRINGE (ML) INJECTION
Status: DISCONTINUED | OUTPATIENT
Start: 2020-04-23 | End: 2020-04-25 | Stop reason: HOSPADM

## 2020-04-23 RX ORDER — MIDAZOLAM HYDROCHLORIDE 1 MG/ML
INJECTION, SOLUTION INTRAMUSCULAR; INTRAVENOUS
Status: DISCONTINUED | OUTPATIENT
Start: 2020-04-23 | End: 2020-04-23

## 2020-04-23 RX ORDER — SODIUM CHLORIDE, SODIUM LACTATE, POTASSIUM CHLORIDE, CALCIUM CHLORIDE 600; 310; 30; 20 MG/100ML; MG/100ML; MG/100ML; MG/100ML
INJECTION, SOLUTION INTRAVENOUS CONTINUOUS PRN
Status: DISCONTINUED | OUTPATIENT
Start: 2020-04-23 | End: 2020-04-23

## 2020-04-23 RX ORDER — ACETAMINOPHEN 10 MG/ML
INJECTION, SOLUTION INTRAVENOUS
Status: COMPLETED
Start: 2020-04-23 | End: 2020-04-23

## 2020-04-23 RX ORDER — HYDROMORPHONE HYDROCHLORIDE 2 MG/ML
0.5 INJECTION, SOLUTION INTRAMUSCULAR; INTRAVENOUS; SUBCUTANEOUS
Status: DISCONTINUED | OUTPATIENT
Start: 2020-04-23 | End: 2020-04-25 | Stop reason: HOSPADM

## 2020-04-23 RX ORDER — FENTANYL CITRATE 50 UG/ML
25 INJECTION, SOLUTION INTRAMUSCULAR; INTRAVENOUS EVERY 5 MIN PRN
Status: COMPLETED | OUTPATIENT
Start: 2020-04-23 | End: 2020-04-23

## 2020-04-23 RX ORDER — OXYCODONE AND ACETAMINOPHEN 10; 325 MG/1; MG/1
1 TABLET ORAL EVERY 4 HOURS PRN
Status: DISCONTINUED | OUTPATIENT
Start: 2020-04-23 | End: 2020-04-25 | Stop reason: HOSPADM

## 2020-04-23 RX ORDER — LIDOCAINE HCL/PF 100 MG/5ML
SYRINGE (ML) INTRAVENOUS
Status: DISCONTINUED | OUTPATIENT
Start: 2020-04-23 | End: 2020-04-23

## 2020-04-23 RX ORDER — FENTANYL CITRATE 50 UG/ML
INJECTION, SOLUTION INTRAMUSCULAR; INTRAVENOUS
Status: DISCONTINUED | OUTPATIENT
Start: 2020-04-23 | End: 2020-04-23

## 2020-04-23 RX ORDER — FAMOTIDINE 20 MG/1
20 TABLET, FILM COATED ORAL 2 TIMES DAILY
Status: DISCONTINUED | OUTPATIENT
Start: 2020-04-23 | End: 2020-04-25 | Stop reason: HOSPADM

## 2020-04-23 RX ORDER — PROPOFOL 10 MG/ML
VIAL (ML) INTRAVENOUS
Status: DISCONTINUED | OUTPATIENT
Start: 2020-04-23 | End: 2020-04-23

## 2020-04-23 RX ADMIN — OXYCODONE HYDROCHLORIDE 2.5 MG: 5 SOLUTION ORAL at 09:04

## 2020-04-23 RX ADMIN — IBUPROFEN 600 MG: 600 TABLET, FILM COATED ORAL at 05:04

## 2020-04-23 RX ADMIN — ONDANSETRON 4 MG: 2 INJECTION, SOLUTION INTRAMUSCULAR; INTRAVENOUS at 08:04

## 2020-04-23 RX ADMIN — LIDOCAINE HYDROCHLORIDE 40 MG: 20 INJECTION, SOLUTION INTRAVENOUS at 08:04

## 2020-04-23 RX ADMIN — PROPOFOL 150 MG: 10 INJECTION, EMULSION INTRAVENOUS at 08:04

## 2020-04-23 RX ADMIN — IBUPROFEN 600 MG: 600 TABLET, FILM COATED ORAL at 12:04

## 2020-04-23 RX ADMIN — FENTANYL CITRATE 25 MCG: 50 INJECTION INTRAMUSCULAR; INTRAVENOUS at 09:04

## 2020-04-23 RX ADMIN — MIDAZOLAM HYDROCHLORIDE 2 MG: 1 INJECTION, SOLUTION INTRAMUSCULAR; INTRAVENOUS at 08:04

## 2020-04-23 RX ADMIN — CLINDAMYCIN IN 5 PERCENT DEXTROSE 600 MG: 12 INJECTION, SOLUTION INTRAVENOUS at 08:04

## 2020-04-23 RX ADMIN — OXYCODONE HYDROCHLORIDE AND ACETAMINOPHEN 1 TABLET: 5; 325 TABLET ORAL at 05:04

## 2020-04-23 RX ADMIN — PROPOFOL 50 MG: 10 INJECTION, EMULSION INTRAVENOUS at 08:04

## 2020-04-23 RX ADMIN — ACETAMINOPHEN 1000 MG: 10 INJECTION, SOLUTION INTRAVENOUS at 08:04

## 2020-04-23 RX ADMIN — CLINDAMYCIN IN 5 PERCENT DEXTROSE 600 MG: 12 INJECTION, SOLUTION INTRAVENOUS at 12:04

## 2020-04-23 RX ADMIN — OXYCODONE HYDROCHLORIDE AND ACETAMINOPHEN 1 TABLET: 5; 325 TABLET ORAL at 11:04

## 2020-04-23 RX ADMIN — CLINDAMYCIN IN 5 PERCENT DEXTROSE 600 MG: 12 INJECTION, SOLUTION INTRAVENOUS at 04:04

## 2020-04-23 RX ADMIN — FAMOTIDINE 20 MG: 20 TABLET ORAL at 08:04

## 2020-04-23 RX ADMIN — FENTANYL CITRATE 50 MCG: 50 INJECTION INTRAMUSCULAR; INTRAVENOUS at 08:04

## 2020-04-23 RX ADMIN — SODIUM CHLORIDE, SODIUM LACTATE, POTASSIUM CHLORIDE, AND CALCIUM CHLORIDE: .6; .31; .03; .02 INJECTION, SOLUTION INTRAVENOUS at 08:04

## 2020-04-23 NOTE — PROGRESS NOTES
Pt sitting up in chair smiling.  Pt states she is feeling better and her pain is now about a 4.  No complaints.  No signs of distress.

## 2020-04-23 NOTE — PROGRESS NOTES
gen surg  Still sig L breast pain and swelling-unable to pump due to pain  abcess cx staph  Wbc down to 14  L breast still w erythema/induration/fluctuance LO quad and UO quad  A/p L breast abcess- cont abx, failed perc asp, I&D in OR today, procedure and risks d/w pt and , questions answered, they wish to proceed

## 2020-04-23 NOTE — PROGRESS NOTES
Pt transported back to Room 244 via bed.  Pt complaining of pain.   at bedside.  Left breast covered with ABD dressing and drainage marked in 2 spots.

## 2020-04-23 NOTE — ANESTHESIA POSTPROCEDURE EVALUATION
Anesthesia Post Evaluation    Patient: Thu JESUS Hutton    Procedure(s) Performed: Procedure(s) (LRB):  INCISION AND DRAINAGE, BREAST (Left)    Final Anesthesia Type: general    Patient location during evaluation: PACU  Patient participation: Yes- Able to Participate  Level of consciousness: awake and alert and oriented  Post-procedure vital signs: reviewed and stable  Pain management: adequate  Airway patency: patent    PONV status at discharge: No PONV  Anesthetic complications: no      Cardiovascular status: blood pressure returned to baseline, hemodynamically stable and stable  Respiratory status: unassisted, spontaneous ventilation and room air  Hydration status: euvolemic  Follow-up not needed.          Vitals Value Taken Time   /59 4/23/2020  9:27 AM   Temp 36.5 °C (97.7 °F) 4/23/2020  9:27 AM   Pulse 92 4/23/2020  9:27 AM   Resp 17 4/23/2020  9:27 AM   SpO2 96 % 4/23/2020  9:27 AM         No case tracking events are documented in the log.      Pain/Flor Score: Pain Rating Prior to Med Admin: 5 (4/23/2020  9:26 AM)  Pain Rating Post Med Admin: 5 (4/23/2020  9:26 AM)

## 2020-04-23 NOTE — TRANSFER OF CARE
"Anesthesia Transfer of Care Note    Patient: madeline Hutton    Procedure(s) Performed: Procedure(s) (LRB):  INCISION AND DRAINAGE, BREAST (Left)    Patient location: Other: OR    Anesthesia Type: general    Transport from OR: Transported from OR on 6-10 L/min O2 by face mask with adequate spontaneous ventilation    Post pain: adequate analgesia    Post assessment: no apparent anesthetic complications and tolerated procedure well    Post vital signs: stable    Level of consciousness: sedated    Nausea/Vomiting: no nausea/vomiting    Complications: none    Transfer of care protocol was followed      Last vitals:   Visit Vitals  /60 (BP Location: Right arm, Patient Position: Lying)   Pulse 81   Temp 36.5 °C (97.7 °F) (Oral)   Resp 17   Ht 4' 9" (1.448 m)   Wt 51.7 kg (114 lb)   LMP 07/09/2019   SpO2 (!) 93%   Breastfeeding? Yes   BMI 24.67 kg/m²     "

## 2020-04-23 NOTE — PLAN OF CARE
VSS.  Left breast abscess drained with 2 incisions packed open with gauze.  Good pain control.  Ambulating and voiding without difficulty.  Tolerating regular diet.  Pt and  verbalized understanding of plan of care.

## 2020-04-23 NOTE — PROGRESS NOTES
Pt transported to surgery via bed per dr's orders.  Pt was very emotional and nervous.   staying in pt's room.

## 2020-04-23 NOTE — PLAN OF CARE
Pt. Pain managed with scheduled motrin and prn medications. NPO. LR infusing at 125. Left breast is red, tender, warmth to touch.warm compress to left breast. Pt has not been able to pump left breast, states it is too painful and nothing is coming out. Frequently breast pumping right breast. Tolerating abx. Cbc this morning. POC reviewed with pt. And spouse.Vss.

## 2020-04-23 NOTE — PROGRESS NOTES
Delivery Progress Note  Obstetrics        SUBJECTIVE:     Postpartum Day 22    Ms. Hutton states she feels improved. She denies emotional concerns. Her pain is moderately controlled with current medications. The patient is ambulating well. Ms. Hutton is tolerating a normal diet. Flatus has been passed. Urinary output is adequate.    OBJECTIVE:     Vital Signs (Most Recent):  Temp: 98.5 °F (36.9 °C) (04/23/20 1123)  Pulse: 77 (04/23/20 1123)  Resp: 16 (04/23/20 1123)  BP: 112/66 (04/23/20 1123)  SpO2: (!) 94 % (04/23/20 1123)    Vital Signs Range (Last 24H):  Temp:  [96.4 °F (35.8 °C)-98.5 °F (36.9 °C)]   Pulse:  [72-92]   Resp:  [16-20]   BP: (104-126)/(57-70)   SpO2:  [93 %-100 %]     I & O (Last 24H):    Intake/Output Summary (Last 24 hours) at 4/23/2020 1211  Last data filed at 4/23/2020 0840  Gross per 24 hour   Intake 500 ml   Output --   Net 500 ml     Physical Exam:  General:    no distress             Breasts:  Left breast tender with bandage intact; right breast nontender and no masses   Abdomen:  soft, non-tender; bowel sounds normal   Fundus:  firm       Lochia:   scant rubra   DVT Evaluation:  No evidence of DVT on either side seen on physical exam.     Hemoglobin/Hematocrit  Recent Labs   Lab 04/23/20  0414   HGB 11.0*   HCT 34.7*     ABO/Rh  Lab Results   Component Value Date    GROUPTRH B POS 04/01/2020     Rubella  No results for input(s): RUBELLAIGGSC in the last 168 hours.    Culture  Staph sensitive to clindamycin    ASSESSMENT/PLAN:     Status post incision and drainage of left breast abscess. Doing well postoperatively.     Continue current care.

## 2020-04-23 NOTE — LACTATION NOTE
04/23/20 1230   Maternal Assessment   Breast Density Left:;engorged;other (see comments);Right:;full  (abcess)   Areola Right:;elastic   Nipples Right:;everted   Left Nipple Symptoms painful   Maternal Infant Feeding   Maternal Emotional State assist needed;anxious;tense   Equipment Type   Breast Pump Type double electric, hospital grade   Breast Pump Flange Type hard   Breast Pump Flange Size 24 mm   Breast Pumping   Breast Pumping Interventions frequent pumping encouraged   Breast Pumping right breast pumped until soft;double electric breast pump utilized   mother pumping right side independently -has been too painful to pump left side -surgery done today to drain abscess on that side-encouraged pt to call for any assistance

## 2020-04-23 NOTE — OP NOTE
[facility]  Brief Operative Note    SUMMARY     Surgery Date: 4/23/2020     Surgeon(s) and Role:     * Mason Rubalcava III, MD - Primary    Assisting Surgeon: None    Pre-op Diagnosis:  Mastitis [N61.0] left breast abscess    Post-op Diagnosis:  Post-Op Diagnosis Codes:     * Mastitis [N61.0] left breast abscess    Procedure(s) (LRB):  INCISION AND DRAINAGE, BREAST (Left) x2    Anesthesia: General    Description of Procedure:  34-year-old female with a postpartum left breast abscess.  Has not improved with intravenous antibiotics and percutaneous aspiration    Description of the findings of the procedure:  Patient brought into the operating room.  Placed on the operating table in a supine position.  The left breast was prepped and draped in sterile fashion.  A linear incision was made in the lower outer quadrant of the left breast over the fluctuant region.  There was a multiloculated abscess containing thick purulent material.  The loculations were broken up and the abscess was completely drained.  No cultures were performed as they have already been done.  A 1/2 cm incision was made in the upper outer quadrant of the left breast over the indurated area.  About 5 cc of pus were identified and completely drained.  Both wounds were irrigated with normal saline.  Hemostasis was achieved with pressure.  Each wound was packed open with gauze.  A sterile dressing was applied.  Patient tolerated the procedure well.    Estimated Blood Loss: * No values recorded between 4/23/2020  8:26 AM and 4/23/2020  8:35 AM *         Specimens:   Specimen (12h ago, onward)    None

## 2020-04-23 NOTE — PROGRESS NOTES
Pt sitting up in bed having dinner with .  Pt states pain at 7 on pain scale 0-10.  Pt states she doesn't want the higher dose of percocet only the smaller.  Percocet 5mg and scheduled dose of Ibuprofen 600mg given per dr's orders.

## 2020-04-24 PROBLEM — O47.9 UTERINE CONTRACTIONS DURING PREGNANCY: Status: RESOLVED | Noted: 2020-04-01 | Resolved: 2020-04-24

## 2020-04-24 PROBLEM — Z34.90 PREGNANCY: Status: RESOLVED | Noted: 2019-08-27 | Resolved: 2020-04-24

## 2020-04-24 PROCEDURE — 25000003 PHARM REV CODE 250: Performed by: SURGERY

## 2020-04-24 PROCEDURE — 11000001 HC ACUTE MED/SURG PRIVATE ROOM

## 2020-04-24 PROCEDURE — 25000003 PHARM REV CODE 250: Performed by: OBSTETRICS & GYNECOLOGY

## 2020-04-24 PROCEDURE — 63600175 PHARM REV CODE 636 W HCPCS: Performed by: SURGERY

## 2020-04-24 RX ORDER — OXYCODONE AND ACETAMINOPHEN 5; 325 MG/1; MG/1
1 TABLET ORAL EVERY 4 HOURS PRN
Status: DISCONTINUED | OUTPATIENT
Start: 2020-04-24 | End: 2020-04-25 | Stop reason: HOSPADM

## 2020-04-24 RX ADMIN — HYDROMORPHONE HYDROCHLORIDE 0.5 MG: 2 INJECTION, SOLUTION INTRAMUSCULAR; INTRAVENOUS; SUBCUTANEOUS at 11:04

## 2020-04-24 RX ADMIN — IBUPROFEN 600 MG: 600 TABLET, FILM COATED ORAL at 12:04

## 2020-04-24 RX ADMIN — IBUPROFEN 600 MG: 600 TABLET, FILM COATED ORAL at 11:04

## 2020-04-24 RX ADMIN — IBUPROFEN 600 MG: 600 TABLET, FILM COATED ORAL at 05:04

## 2020-04-24 RX ADMIN — CLINDAMYCIN IN 5 PERCENT DEXTROSE 600 MG: 12 INJECTION, SOLUTION INTRAVENOUS at 04:04

## 2020-04-24 RX ADMIN — CLINDAMYCIN IN 5 PERCENT DEXTROSE 600 MG: 12 INJECTION, SOLUTION INTRAVENOUS at 11:04

## 2020-04-24 RX ADMIN — DOCUSATE SODIUM 200 MG: 100 CAPSULE, LIQUID FILLED ORAL at 06:04

## 2020-04-24 RX ADMIN — FAMOTIDINE 20 MG: 20 TABLET ORAL at 08:04

## 2020-04-24 RX ADMIN — FAMOTIDINE 20 MG: 20 TABLET ORAL at 09:04

## 2020-04-24 RX ADMIN — CLINDAMYCIN IN 5 PERCENT DEXTROSE 600 MG: 12 INJECTION, SOLUTION INTRAVENOUS at 08:04

## 2020-04-24 RX ADMIN — OXYCODONE HYDROCHLORIDE AND ACETAMINOPHEN 1 TABLET: 10; 325 TABLET ORAL at 05:04

## 2020-04-24 RX ADMIN — OXYCODONE HYDROCHLORIDE AND ACETAMINOPHEN 1 TABLET: 10; 325 TABLET ORAL at 04:04

## 2020-04-24 RX ADMIN — HYDROMORPHONE HYDROCHLORIDE 0.5 MG: 2 INJECTION, SOLUTION INTRAMUSCULAR; INTRAVENOUS; SUBCUTANEOUS at 09:04

## 2020-04-24 RX ADMIN — ONDANSETRON 8 MG: 8 TABLET, ORALLY DISINTEGRATING ORAL at 10:04

## 2020-04-24 RX ADMIN — IBUPROFEN 600 MG: 600 TABLET, FILM COATED ORAL at 04:04

## 2020-04-24 NOTE — PROGRESS NOTES
"gen surg pod 1  Much less L breast pain since sx yest, op findings d/w pt  Blood pressure (!) 115/58, pulse 71, temperature 97.2 °F (36.2 °C), resp. rate 20, height 4' 9" (1.448 m), weight 51.7 kg (114 lb), last menstrual period 07/09/2019, SpO2 96 %, currently breastfeeding.  L breast wounds packed open, no purulent dc, no fluctuance, still some erythmea of skin lower outer quad  cx staph  A/p s/p I&D lactating L breast abcess-cont iv abx, begin packing changing today, may be ready for dc in next day or two when erythema decreasing  "

## 2020-04-24 NOTE — NURSING
"Pt moved to room 215.  Pt ambulated and tolerated well.  Percocet and scheduled motrin given for 7/10 pain.  Pt refused Lovenox.  Pt says "I will walk around more instead of getting a shot".  Pts  remains at bedside.  "

## 2020-04-24 NOTE — NURSING
Pt c/o pain .  Pain meds not due for another two hours.  Dr Martinez informed, okay to change percocet to every 4 hours prn.

## 2020-04-24 NOTE — PLAN OF CARE
VSS.  Pt ambulating and voiding.  Tolerating diet.  Pain meds prn and prior to BID dressing changes.

## 2020-04-24 NOTE — PLAN OF CARE
Problem: Adult Inpatient Plan of Care  Goal: Plan of Care Review  Outcome: Met  Goal: Patient-Specific Goal (Individualization)  Outcome: Met  Goal: Absence of Hospital-Acquired Illness or Injury  Outcome: Met  Goal: Optimal Comfort and Wellbeing  Outcome: Met  Goal: Readiness for Transition of Care  Outcome: Met  Goal: Rounds/Family Conference  Outcome: Met     Problem: Breastfeeding  Goal: Effective Breastfeeding  Outcome: Met     Problem: Infection  Goal: Infection Symptom Resolution  Outcome: Met     Problem: Fall Injury Risk  Goal: Absence of Fall and Fall-Related Injury  Outcome: Met

## 2020-04-24 NOTE — PLAN OF CARE
VSS.  Ambulating and voiding. Pain controlled with percocet and motrin.  Plan of care reviewed with pt,all questions and concerns addressed.

## 2020-04-24 NOTE — PROGRESS NOTES
Crying this am  Reports pain with dressing change and movement    Temp:  [96.7 °F (35.9 °C)-98.6 °F (37 °C)] 98.6 °F (37 °C)  Pulse:  [68-82] 79  Resp:  [16-20] 16  SpO2:  [95 %-97 %] 97 %  BP: (106-133)/(53-69) 124/69  Breast- significant erythema and induration, incision ok, no active purulence noted some old blood and milk noted    Mastitis and abscess  Continue abx and dressing changes  cx show staph - sens to clinda  D/w pt pain management

## 2020-04-25 VITALS
RESPIRATION RATE: 16 BRPM | OXYGEN SATURATION: 98 % | TEMPERATURE: 98 F | HEART RATE: 84 BPM | HEIGHT: 57 IN | WEIGHT: 114 LBS | DIASTOLIC BLOOD PRESSURE: 67 MMHG | BODY MASS INDEX: 24.59 KG/M2 | SYSTOLIC BLOOD PRESSURE: 124 MMHG

## 2020-04-25 PROBLEM — N61.1 BREAST ABSCESS: Status: ACTIVE | Noted: 2020-04-25

## 2020-04-25 LAB — BACTERIA SPEC ANAEROBE CULT: NORMAL

## 2020-04-25 PROCEDURE — 25000003 PHARM REV CODE 250: Performed by: SURGERY

## 2020-04-25 PROCEDURE — 25000003 PHARM REV CODE 250: Performed by: OBSTETRICS & GYNECOLOGY

## 2020-04-25 PROCEDURE — 63600175 PHARM REV CODE 636 W HCPCS: Performed by: SURGERY

## 2020-04-25 RX ORDER — SERTRALINE HYDROCHLORIDE 25 MG/1
25 TABLET, FILM COATED ORAL DAILY
Qty: 30 TABLET | Refills: 1 | Status: SHIPPED | OUTPATIENT
Start: 2020-04-26 | End: 2020-09-24 | Stop reason: CLARIF

## 2020-04-25 RX ORDER — OXYCODONE AND ACETAMINOPHEN 5; 325 MG/1; MG/1
1 TABLET ORAL EVERY 4 HOURS PRN
Qty: 30 EACH | Refills: 0 | Status: SHIPPED | OUTPATIENT
Start: 2020-04-25 | End: 2020-09-24 | Stop reason: CLARIF

## 2020-04-25 RX ORDER — ERYTHROMYCIN 500 MG/1
500 TABLET, COATED ORAL 2 TIMES DAILY WITH MEALS
Qty: 20 TABLET | Refills: 0 | Status: SHIPPED | OUTPATIENT
Start: 2020-04-25 | End: 2020-05-05

## 2020-04-25 RX ORDER — DICLOXACILLIN SODIUM 500 MG/1
500 CAPSULE ORAL 4 TIMES DAILY
Qty: 40 CAPSULE | Refills: 0 | Status: SHIPPED | OUTPATIENT
Start: 2020-04-25 | End: 2020-04-25 | Stop reason: HOSPADM

## 2020-04-25 RX ADMIN — CLINDAMYCIN IN 5 PERCENT DEXTROSE 600 MG: 12 INJECTION, SOLUTION INTRAVENOUS at 04:04

## 2020-04-25 RX ADMIN — FAMOTIDINE 20 MG: 20 TABLET ORAL at 09:04

## 2020-04-25 RX ADMIN — IBUPROFEN 600 MG: 600 TABLET, FILM COATED ORAL at 05:04

## 2020-04-25 RX ADMIN — SERTRALINE HYDROCHLORIDE 25 MG: 25 TABLET ORAL at 09:04

## 2020-04-25 RX ADMIN — HYDROMORPHONE HYDROCHLORIDE 0.5 MG: 2 INJECTION, SOLUTION INTRAMUSCULAR; INTRAVENOUS; SUBCUTANEOUS at 09:04

## 2020-04-25 RX ADMIN — CLINDAMYCIN IN 5 PERCENT DEXTROSE 600 MG: 12 INJECTION, SOLUTION INTRAVENOUS at 11:04

## 2020-04-25 RX ADMIN — OXYCODONE HYDROCHLORIDE AND ACETAMINOPHEN 1 TABLET: 5; 325 TABLET ORAL at 04:04

## 2020-04-25 RX ADMIN — IBUPROFEN 600 MG: 600 TABLET, FILM COATED ORAL at 02:04

## 2020-04-25 RX ADMIN — IBUPROFEN 600 MG: 600 TABLET, FILM COATED ORAL at 12:04

## 2020-04-25 NOTE — PROGRESS NOTES
"gen surg pod 2  Less L breast pain  abcess cx staoh sens to xiomara and bactrim  Blood pressure (!) 108/58, pulse 81, temperature 96.4 °F (35.8 °C), temperature source Oral, resp. rate 20, height 4' 9" (1.448 m), weight 51.7 kg (114 lb), last menstrual period 07/09/2019, SpO2 98 %, currently breastfeeding.  L breast less erythema LO quad, no purulent dc or fluctuance  A/p s/p I&D L breast abcess x2- appears ok for dc on po abx such as bactrim for about 1 week, pt requests home health for wound care, can f/u my office next week-she has office contact info  "

## 2020-04-25 NOTE — LACTATION NOTE
"Breast pumping dsicahrge instructions given for right breast.  Pt no longer pumpign left breast, states that only pain is from incision, not longer engorged.  Instructed to follow all of MDs instructions on dressing changes and cleaning.  Denies any other c/o or concerns at this time.  Hotline # given.  Sattes "understand".  "

## 2020-04-25 NOTE — PLAN OF CARE
Pt progressing well. NAD noted. VSS. Pain well controlled. Wet to dry dressing change done at 2300. Small amount of purulent drainage noted to gauze. Gauze removed and wound packed with dressing placed over wound. Patient tolerating Clindamycin. Pt remains afebrile. Ambulating and voiding. POC discussed with pt. Understanding verbalized.

## 2020-04-25 NOTE — DISCHARGE INSTRUCTIONS
Call to make appointment to be seen on Monday 4/27/20. Call Dr. Camarena with any problems and call office to be seen Friday 5/4/20.  Take extra care to wash pump parts.

## 2020-04-25 NOTE — DISCHARGE SUMMARY
[facility]  Discharge Summary     Patient ID:  Dina Hutton  [id number]  34 y.o.  1985    Admit date: 4/20/2020    Discharge Date and Time:  04/25/2020 1:17 PM    Admitting Physician: Ameena Camacho MD     Discharge Provider: Mariana Camarena    Reason for Admission: Mastitis [N61.0]  Mastitis [N61.0]  Mastitis [N61.0]    Admission Condition: good    Procedures Performed: Procedure(s) (LRB):  INCISION AND DRAINAGE, BREAST (Left)    Hospital Course (synopsis of major diagnoses, care, treatment, and services provided during the course of the hospital stay): Admitted for mastitis after failing outpatient treatment  Started on iv abx and general surgery consulted  She underwent failed IR drainage and ultimately open I&d  cx showed MSSA     Consults: General Surgery    Significant Diagnostic Studies: radiology: u/s    Final Diagnoses:    Principal Problem: Breast abscess   Secondary Diagnoses:   Active Hospital Problems    Diagnosis  POA    *Breast abscess [N61.1]  Yes    Mastitis [N61.0]  Yes      Resolved Hospital Problems   No resolved problems to display.       Discharged Condition: good      Disposition: Home or Self Care    Follow Up/Patient Instructions:     Medications:  Reconciled Home Medications:      Medication List      START taking these medications    dicloxacillin 500 MG capsule  Commonly known as:  DYNAPEN  Take 1 capsule (500 mg total) by mouth 4 (four) times daily. for 10 days     oxyCODONE-acetaminophen 5-325 mg per tablet  Commonly known as:  PERCOCET  Take 1 tablet by mouth every 4 (four) hours as needed.     sertraline 25 MG tablet  Commonly known as:  ZOLOFT  Take 1 tablet (25 mg total) by mouth once daily.  Start taking on:  April 26, 2020        CONTINUE taking these medications    ibuprofen 600 MG tablet  Commonly known as:  ADVIL,MOTRIN  Take 1 tablet (600 mg total) by mouth every 6 (six) hours.     PNV,calcium 72-iron,carb-folic 29 mg iron- 1 mg Tab  Commonly known as:   PRENATAL PLUS  Take 1 tablet by mouth once daily.          No discharge procedures on file.    Activity: activity as tolerated  Diet: regular diet  Wound Care: keep wound clean and dry and pack bid    Follow-up with surgery in 2 days and us later next week.    Signed:  Mariana Camarena  4/25/2020  1:17 PM

## 2020-04-25 NOTE — PROGRESS NOTES
Feeling some better  Temp:  [96.4 °F (35.8 °C)-98.3 °F (36.8 °C)] 97.8 °F (36.6 °C)  Pulse:  [80-88] 84  Resp:  [16-20] 16  SpO2:  [98 %] 98 %  BP: (108-133)/(58-74) 124/67  Breast not unpacked but area around inspected - erythema somewhat improved  Plan d/c home  Discussed wound care and abx

## 2020-06-03 ENCOUNTER — OFFICE VISIT (OUTPATIENT)
Dept: URGENT CARE | Facility: CLINIC | Age: 35
End: 2020-06-03
Payer: COMMERCIAL

## 2020-06-03 VITALS
RESPIRATION RATE: 18 BRPM | TEMPERATURE: 98 F | HEART RATE: 87 BPM | OXYGEN SATURATION: 98 % | SYSTOLIC BLOOD PRESSURE: 109 MMHG | DIASTOLIC BLOOD PRESSURE: 70 MMHG

## 2020-06-03 DIAGNOSIS — R30.0 DYSURIA: Primary | ICD-10-CM

## 2020-06-03 LAB
B-HCG UR QL: NEGATIVE
BILIRUB UR QL STRIP: NEGATIVE
CTP QC/QA: YES
GLUCOSE UR QL STRIP: NEGATIVE
KETONES UR QL STRIP: NEGATIVE
LEUKOCYTE ESTERASE UR QL STRIP: POSITIVE
PH, POC UA: 5 (ref 5–8)
POC BLOOD, URINE: NEGATIVE
POC NITRATES, URINE: POSITIVE
PROT UR QL STRIP: NEGATIVE
SP GR UR STRIP: 1.02 (ref 1–1.03)
UROBILINOGEN UR STRIP-ACNC: ABNORMAL (ref 0.1–1.1)

## 2020-06-03 PROCEDURE — 81003 POCT URINALYSIS, DIPSTICK, AUTOMATED, W/O SCOPE: ICD-10-PCS | Mod: QW,S$GLB,, | Performed by: PHYSICIAN ASSISTANT

## 2020-06-03 PROCEDURE — 99213 OFFICE O/P EST LOW 20 MIN: CPT | Mod: 25,S$GLB,, | Performed by: PHYSICIAN ASSISTANT

## 2020-06-03 PROCEDURE — 99213 PR OFFICE/OUTPT VISIT, EST, LEVL III, 20-29 MIN: ICD-10-PCS | Mod: 25,S$GLB,, | Performed by: PHYSICIAN ASSISTANT

## 2020-06-03 PROCEDURE — 81025 POCT URINE PREGNANCY: ICD-10-PCS | Mod: S$GLB,,, | Performed by: PHYSICIAN ASSISTANT

## 2020-06-03 PROCEDURE — 81025 URINE PREGNANCY TEST: CPT | Mod: S$GLB,,, | Performed by: PHYSICIAN ASSISTANT

## 2020-06-03 PROCEDURE — 81003 URINALYSIS AUTO W/O SCOPE: CPT | Mod: QW,S$GLB,, | Performed by: PHYSICIAN ASSISTANT

## 2020-06-03 RX ORDER — CEPHALEXIN 500 MG/1
500 CAPSULE ORAL 4 TIMES DAILY
Qty: 28 CAPSULE | Refills: 0 | Status: SHIPPED | OUTPATIENT
Start: 2020-06-03 | End: 2020-06-10

## 2020-06-03 RX ORDER — PHENAZOPYRIDINE HYDROCHLORIDE 200 MG/1
200 TABLET, FILM COATED ORAL
Qty: 6 TABLET | Refills: 0 | Status: SHIPPED | OUTPATIENT
Start: 2020-06-03 | End: 2020-06-05

## 2020-06-03 RX ORDER — ONDANSETRON 4 MG/1
4 TABLET, ORALLY DISINTEGRATING ORAL EVERY 6 HOURS PRN
Qty: 20 TABLET | Refills: 0 | Status: SHIPPED | OUTPATIENT
Start: 2020-06-03 | End: 2020-09-24 | Stop reason: CLARIF

## 2020-06-03 NOTE — PROGRESS NOTES
Subjective:       Patient ID: Dina Hutton is a 34 y.o. female.    Vitals:  temperature is 97.6 °F (36.4 °C). Her blood pressure is 109/70 and her pulse is 87. Her respiration is 18 and oxygen saturation is 98%.     Chief Complaint: Urinary Tract Infection    Pain and dysuria for about two weeks  A lot of pain after urination    Urinary Tract Infection    This is a new problem. The current episode started 1 to 4 weeks ago. The problem has been gradually worsening. The quality of the pain is described as burning and shooting. The pain is at a severity of 8/10. She is sexually active. Pertinent negatives include no chills, frequency, hematuria, nausea, urgency, vomiting or rash. She has tried increased fluids (cysto and cranberry) for the symptoms. The treatment provided no relief.       Constitution: Negative for chills and fever.   Neck: Negative for painful lymph nodes.   Gastrointestinal: Negative for abdominal pain, nausea and vomiting.   Genitourinary: Positive for dysuria and pelvic pain. Negative for frequency, urgency, urine decreased, hematuria, history of kidney stones, painful menstruation, irregular menstruation, missed menses, heavy menstrual bleeding, ovarian cysts, genital trauma, vaginal pain, vaginal discharge, vaginal bleeding, vaginal odor, painful intercourse, genital sore and painful ejaculation.   Musculoskeletal: Negative for back pain.   Skin: Negative for rash and lesion.   Hematologic/Lymphatic: Negative for swollen lymph nodes.       Objective:      Physical Exam   Constitutional: She is oriented to person, place, and time. She appears well-developed and well-nourished. No distress.   HENT:   Head: Normocephalic and atraumatic.   Eyes: EOM are normal.   Neck: Neck supple.   Pulmonary/Chest: No respiratory distress.   Abdominal:   Abdomen overall soft, normal bowel sounds ×4.  Mild generalized ttp. No rebound or guarding.  No palpable mass or distention.  No flank or CVA tenderness.     Neurological: She is alert and oriented to person, place, and time.   Skin: Skin is warm and not diaphoretic.   Psychiatric: She has a normal mood and affect. Her behavior is normal.   Nursing note and vitals reviewed.        Assessment:       1. Dysuria        Plan:         No flank pain. No CVA ttp. Pt states she experiences frequent UTIs and this is consistent. Previous culture sensitive to Ancef. No constitutional symptoms. Pt states mild generalized abdominal ttp is chronic; no acute worsening. Will treat empirically. Will have her f/u as outpatient.     Dysuria  -     POCT Urinalysis, Dipstick, Automated, W/O Scope  -     Culture, Urine  -     POCT urine pregnancy    Other orders  -     cephALEXin (KEFLEX) 500 MG capsule; Take 1 capsule (500 mg total) by mouth 4 (four) times daily. for 7 days  Dispense: 28 capsule; Refill: 0  -     ondansetron (ZOFRAN-ODT) 4 MG TbDL; Take 1 tablet (4 mg total) by mouth every 6 (six) hours as needed (nausea).  Dispense: 20 tablet; Refill: 0  -     phenazopyridine (PYRIDIUM) 200 MG tablet; Take 1 tablet (200 mg total) by mouth 3 (three) times daily with meals. for 2 days  Dispense: 6 tablet; Refill: 0

## 2020-06-03 NOTE — PATIENT INSTRUCTIONS
"Keflex as prescribed. Zofran as needed for nausea. Pyridium 3 times daily for 2 days to help with discomfort.     Follow-up with your primary care provider for reevaluation. Return if you begin with flank pain, if you begin with fever, if uncontrolled nausea/vomiting, if any worsening, if any other problems occur.   Dysuria     Painful urination (dysuria) is often caused by a problem in the urinary tract.   Dysuria is pain felt during urination. It is often described as a burning. Learn more about this problem and how it can be treated.  What causes dysuria?  Possible causes include:  · Infection with a bacteria or virus such as a urinary tract infection (UTI or a sexually transmitted infection (STI)  · Sensitivity or allergy to chemicals such as those found in lotions and other products  · Prostate or bladder problems  · Radiation therapy to the pelvic area  How is dysuria diagnosed?  Your healthcare provider will examine you. He or she will ask about your symptoms and health. After talking with you and doing a physical exam, your healthcare provider may know what is causing your dysuria. He or she will usually request  a sample of your urine. Tests of your urine, or a "urinalysis," are done. A urinalysis may include:  · Looking at the urine sample (visual exam)  · Checking for substances (chemical exam)  · Looking at a small amount under a microscope (microscopic exam)  Some parts of the urinalysis may be done in the provider's office and some in a lab. And, the urine sample may be checked for bacteria and yeast (urine culture). Your healthcare provider will tell you more about these tests if they are needed.  How is dysuria treated?  Treatment depends on the cause. If you have a bacterial infection, you may need antibiotics. You may be given medicines to make it easier for you to urinate and help relieve pain. Your healthcare provider can tell you more about your treatment options. Untreated, symptoms may get " worse.  When to call your healthcare provider  Call the healthcare provider right away if you have any of the following:  · Fever of 100.4°F (38°C) or higher   · No improvement after three days of treatment  · Trouble urinating because of pain  · New or increased discharge from the vagina or penis  · Rash or joint pain  · Increased back or abdominal pain  · Enlarged painful lymph nodes (lumps) in the groin   Date Last Reviewed: 1/1/2017 © 2000-2017 [employer]. [address] [place] [address]. All rights reserved. This information is not intended as a substitute for professional medical care. Always follow your healthcare professional's instructions.

## 2020-06-10 LAB
BACTERIA UR CULT: ABNORMAL
BACTERIA UR CULT: ABNORMAL
OTHER ANTIBIOTIC SUSC ISLT: ABNORMAL

## 2020-06-11 ENCOUNTER — TELEPHONE (OUTPATIENT)
Dept: URGENT CARE | Facility: CLINIC | Age: 35
End: 2020-06-11

## 2020-06-11 NOTE — TELEPHONE ENCOUNTER
Left message for patient to call clinic in regards to positive urine culture results----- Message from Fern Hutton PA-C sent at 6/11/2020  8:43 AM CDT -----  Please call the patient regarding her positive UCx result appropriately treated with Keflex at this visit. Please see how the patient is feeling.

## 2020-06-11 NOTE — TELEPHONE ENCOUNTER
Patient called clinic back and was informed of her positive urine culture. Patient states she is feeling a little better.

## 2020-09-14 ENCOUNTER — INITIAL CONSULT (OUTPATIENT)
Dept: SURGERY | Facility: CLINIC | Age: 35
End: 2020-09-14
Attending: SURGERY
Payer: COMMERCIAL

## 2020-09-14 VITALS
DIASTOLIC BLOOD PRESSURE: 83 MMHG | HEART RATE: 89 BPM | WEIGHT: 115 LBS | HEIGHT: 58 IN | BODY MASS INDEX: 24.14 KG/M2 | SYSTOLIC BLOOD PRESSURE: 119 MMHG

## 2020-09-14 DIAGNOSIS — C73 THYROID CANCER: Primary | ICD-10-CM

## 2020-09-14 PROCEDURE — 99213 OFFICE O/P EST LOW 20 MIN: CPT | Mod: S$GLB,,, | Performed by: SURGERY

## 2020-09-14 PROCEDURE — 3008F PR BODY MASS INDEX (BMI) DOCUMENTED: ICD-10-PCS | Mod: CPTII,S$GLB,, | Performed by: SURGERY

## 2020-09-14 PROCEDURE — 99213 PR OFFICE/OUTPT VISIT, EST, LEVL III, 20-29 MIN: ICD-10-PCS | Mod: S$GLB,,, | Performed by: SURGERY

## 2020-09-14 PROCEDURE — 3008F BODY MASS INDEX DOCD: CPT | Mod: CPTII,S$GLB,, | Performed by: SURGERY

## 2020-09-14 RX ORDER — OMEPRAZOLE 20 MG/1
20 CAPSULE, DELAYED RELEASE ORAL DAILY
COMMUNITY
End: 2021-06-21 | Stop reason: SDUPTHER

## 2020-09-14 RX ORDER — BREAST PUMP
1 EACH MISCELLANEOUS
COMMUNITY
Start: 2020-04-07 | End: 2021-03-16

## 2020-09-14 RX ORDER — NORELGESTROMIN AND ETHINYL ESTRADIOL 35; 150 UG/MG; UG/MG
1 PATCH TRANSDERMAL
COMMUNITY
Start: 2020-05-15 | End: 2021-03-16

## 2020-09-14 RX ORDER — NORELGESTROMIN AND ETHINYL ESTRADIOL 150; 35 UG/D; UG/D
PATCH TRANSDERMAL
COMMUNITY
Start: 2020-08-20 | End: 2020-09-14 | Stop reason: SDUPTHER

## 2020-09-14 NOTE — H&P (VIEW-ONLY)
I have reviewed the Resident's consultation note. I agree with the findings. Any changes were made directly in the note below.    Kayla Lovelace MD  Endocrine Surgery & General Surgery      Consult Note  Endocrine Surgery    Visit Diagnosis: Papillary Thyroid Cancer    SUBJECTIVE:     Dina Hutton is a 35 y.o. female seen today in the Endocrine Surgery Clinic for evaluation of thyroid carcinoma. Her history dates back to 2020 when patient was undergoing testing and a thyroid mass was identified as an incidental finding. Subsequent evaluation included referral to an endocrinologist, neck ultrasound and fine needle aspiration. She was found to have papillary thyroid cancer and saw Dr. Christianson in February but was 32 weeks pregnant. Felt ok to defer surgical intervention until after she had the baby but then the COVID pandemic began and she was lost to follow up. She recently reached out to schedule surgery but as Dr. Christianson is leaving she was sent to our clinic for evaluation. Dina Hutton complains of difficulty with swallowing. The patient denies hot/cold intolerance, fatigue, unexplained weight changes, change in voice quality and palpable neck mass. She does not have a history of head and neck radiation therapy. She does not have a family history of thyroid disease. She does not have a family history of endocrinopathies. She is not taking OTC Vitamin D and thyroid hormone supplementation. She has not undergone radioactive iodine treatment.      Review of patient's allergies indicates:   Allergen Reactions    Vancomycin analogues Hives       Current Outpatient Medications   Medication Sig Dispense Refill    breast pump Flory 1 Device.      norelgestromin-ethinyl estradiol (ORTHO EVRA) 150-35 mcg/24 hr Place 1 patch onto the skin.      omeprazole (PRILOSEC) 20 MG capsule Take 20 mg by mouth once daily.      ibuprofen (ADVIL,MOTRIN) 600 MG tablet Take 1 tablet (600 mg total) by mouth every 6 (six) hours.  40 tablet 1    ondansetron (ZOFRAN-ODT) 4 MG TbDL Take 1 tablet (4 mg total) by mouth every 6 (six) hours as needed (nausea). 20 tablet 0    oxyCODONE-acetaminophen (PERCOCET) 5-325 mg per tablet Take 1 tablet by mouth every 4 (four) hours as needed. 30 each 0    PNV,calcium 72-iron,carb-folic (PRENATAL PLUS) 29 mg iron- 1 mg Tab Take 1 tablet by mouth once daily. 30 tablet 10    sertraline (ZOLOFT) 25 MG tablet Take 1 tablet (25 mg total) by mouth once daily. (Patient not taking: Reported on 6/3/2020) 30 tablet 1     No current facility-administered medications for this visit.        Past Medical History:   Diagnosis Date    Anemia     GERD (gastroesophageal reflux disease)     Hypoglycemia     Hypoglycemia     Mental disorder     depression    Thyroid cancer     Thyroid disease      Past Surgical History:   Procedure Laterality Date    INCISION AND DRAINAGE OF BREAST Left 4/23/2020    Procedure: INCISION AND DRAINAGE, BREAST;  Surgeon: Mason Rubalcava III, MD;  [facility];  Service: General;  Laterality: Left;     Social History     Tobacco Use    Smoking status: Never Smoker    Smokeless tobacco: Never Used   Substance Use Topics    Alcohol use: No     Comment: occasional    Drug use: No          Review of Systems:    Review of Systems   Constitutional: Negative for fever, chills and fatigue.   HEENT: Positive for dysphasia. Negative for voice change and vision changes.    Respiratory: Negative for cough, shortness of breath, wheezing and dyspnea.    Cardiovascular: Negative for palpitations, arrhythmia and heart attack.   Genitourinary: Negative for dysuria and nocturia.   Endocrine: Negative for facial swelling.    Musculoskeletal: Negative for arthralgias, weakness and muscle weakness.   Skin: Negative for thin skin, striae and pruritis.   Gastrointestinal: Negative for nausea, vomiting, abdominal pain, diarrhea and constipation.   Neurological/Neuropsych: Negative for headaches,  "depression and irritability.   Hematological: Negative for lymphadenopathy and bleeds easily.         OBJECTIVE:     Vital Signs:  /83   Pulse 89   Ht 4' 10" (1.473 m)   Wt 52.2 kg (115 lb)   BMI 24.04 kg/m²    Body mass index is 24.04 kg/m².      Physical Exam:    General:  no distress, see vitals for BMI    Eyes:  conjunctivae/corneas clear   Neck: trachea midline and symmetric, no adenopathy    Thyroid:  thyroid not enlarged   Lung: clear to auscultation bilaterally   Heart:  regular rate and rhythm   Abdomen: soft, non-tender; bowel sounds normal; no masses,  no organomegaly   Skin/Extremities: warm and well-perfused   Pulses: 2+ and symmetric   Neuro: normal without focal findings and mental status, speech normal, alert and oriented x3       Laboratory/Radiologic Studies    Studies:  Date:       Results:    Ultrasound:  2/6/2020 FINDINGS:  THYROID GLAND has a homogeneous echotexture.  Vascularity is normal.     RIGHT LOBE of the thyroid measures: 4.89 x 1.35 x 1.70 cm.     1. 0.61 x 0.33 x 0.60 cm right middle lobe nodule. The nodule is solid with a isoechoic echotexture. Vascularity is Grade 2 (peripheral). Borders are well-defined. Microcalcifications are not present.  Per the report this measured 0.3 x 0.4 cm in June.  ISTHMUS measures 0.36 cm in AP dimension.     LEFT LOBE measures: 4.65 x 1.25 x 1.58 cm.     1. 2.80 x 1.15 x 1.57 cm left inferior lobe nodule. The nodule is complex with a hypoechoic solid component and macro cystic degeneration scattered throughout the nodule.  The solid component has an irregular shape with microcalcifications.  Vascularity is Grade 3 (penetrating). Borders are lobulated, and capsular invasion is not identified.  Per the report this measured 1.3 x 2.2 cm in June.  LYMPH NODES: Real-time survey of the bilateral central and lateral neck did not reveal any abnormal appearing lymph nodes.  Several benign-appearing lymph nodes were identified, each with well-defined " fatty hilums and a flat appearance with a S/L axis ratio < 0.5.     Impression:     1.  Thyroid gland is normal in size with homogeneous echotexture.  Vascularity is normal.     2.  Single isoechoic nodule in the right thyroid, which does not meet criteria for biopsy.     3.  Dominant left inferior nodule with malignant cytology on FNA.     RECOMMENDATIONS:  1.  Surgery for either left lobectomy/isthmusectomy or total thyroidectomy based on clinical factors.     2.  It is reasonable to delay surgery until after the patient delivers the baby.   Pathology::  1/2/2020 Thyroid, left inferior, fine-needle aspiration:  - [facility] thyroid cytology category: Malignant, papillary thyroid carcinoma.  - See comment.         Component Value Date    TSH 1.485 02/28/2020    Free T4 0.82 02/28/2020    Sodium 142 04/20/2020    Potassium 3.7 04/20/2020    Chloride 106 04/20/2020    CO2 24 04/20/2020    Glucose 75 04/20/2020    BUN, Bld 10 04/20/2020    Creatinine 0.4 (L) 04/20/2020    Calcium 9.1 04/20/2020    Anion Gap 12 04/20/2020    eGFR if African American >60 04/20/2020    eGFR if non African American >60 04/20/2020       ASSESSMENT/PLAN:       Assessment    Ms. Hutton is a 35 y.o. female who presents with evidence of papillary thyroid cancer    Plan    During this visit, lab and imaging results were reviewed with the patient and her spouse. Thyroid anatomy and physiology were reviewed and she was given a copy of our patient education booklet, Understanding Thyroid Disease. The above testing and examination support the diagnosis of thyroid carcinoma.     We discussed the options of lobectomy vs upfront total thyroidetomy in addition to the implications of each in terms of possible repeat surgery and surveillance. Potential complications of this operation were reviewed with the patient. The patient has a nodule in the contralateral(right) lobe and would like to have this removed in order to avoid ongoing surveillance of  the remaining lobe.    The risks and benefits of my recommendations, as well as other treatment options were discussed with the patient today. In addition, I discussed the nature of the disease process and the risk of surgery including, temporary or permanent hypoparathyroidism resulting in low blood calcium levels that require extensive medication to avoid serious degenerative conditions such as cataracts, brittle bones, muscle weakness, and muscle irritability. Other risks include bleeding, infection, injury to the recurrent laryngeal nerves resulting in hoarseness or impairment of speech and the risks of general anesthetic including MI, CVA, sudden death, or even reaction to anesthetic medications. The patient understands the risks, benefits, and treatment alternatives. Any and all questions were answered to the patient's satisfaction. Written consent was obtained.     Thyroid surgery(total thyroidectomy with ipsilateral central neck dissection) is scheduled for the near future. Case discussed with endocrine and since planning a total thyroidectomy with defer repeat imaging. We will ensure that the patient is medically optimized prior to procedure.

## 2020-09-14 NOTE — PROGRESS NOTES
I have reviewed the Resident's consultation note. I agree with the findings. Any changes were made directly in the note below.    Kayla Lovelace MD  Endocrine Surgery & General Surgery      Consult Note  Endocrine Surgery    Visit Diagnosis: Papillary Thyroid Cancer    SUBJECTIVE:     Dina Hutton is a 35 y.o. female seen today in the Endocrine Surgery Clinic for evaluation of thyroid carcinoma. Her history dates back to 2020 when patient was undergoing testing and a thyroid mass was identified as an incidental finding. Subsequent evaluation included referral to an endocrinologist, neck ultrasound and fine needle aspiration. She was found to have papillary thyroid cancer and saw Dr. Christianson in February but was 32 weeks pregnant. Felt ok to defer surgical intervention until after she had the baby but then the COVID pandemic began and she was lost to follow up. She recently reached out to schedule surgery but as Dr. Christianson is leaving she was sent to our clinic for evaluation. Dina Hutton complains of difficulty with swallowing. The patient denies hot/cold intolerance, fatigue, unexplained weight changes, change in voice quality and palpable neck mass. She does not have a history of head and neck radiation therapy. She does not have a family history of thyroid disease. She does not have a family history of endocrinopathies. She is not taking OTC Vitamin D and thyroid hormone supplementation. She has not undergone radioactive iodine treatment.      Review of patient's allergies indicates:   Allergen Reactions    Vancomycin analogues Hives       Current Outpatient Medications   Medication Sig Dispense Refill    breast pump Flory 1 Device.      norelgestromin-ethinyl estradiol (ORTHO EVRA) 150-35 mcg/24 hr Place 1 patch onto the skin.      omeprazole (PRILOSEC) 20 MG capsule Take 20 mg by mouth once daily.      ibuprofen (ADVIL,MOTRIN) 600 MG tablet Take 1 tablet (600 mg total) by mouth every 6 (six) hours.  40 tablet 1    ondansetron (ZOFRAN-ODT) 4 MG TbDL Take 1 tablet (4 mg total) by mouth every 6 (six) hours as needed (nausea). 20 tablet 0    oxyCODONE-acetaminophen (PERCOCET) 5-325 mg per tablet Take 1 tablet by mouth every 4 (four) hours as needed. 30 each 0    PNV,calcium 72-iron,carb-folic (PRENATAL PLUS) 29 mg iron- 1 mg Tab Take 1 tablet by mouth once daily. 30 tablet 10    sertraline (ZOLOFT) 25 MG tablet Take 1 tablet (25 mg total) by mouth once daily. (Patient not taking: Reported on 6/3/2020) 30 tablet 1     No current facility-administered medications for this visit.        Past Medical History:   Diagnosis Date    Anemia     GERD (gastroesophageal reflux disease)     Hypoglycemia     Hypoglycemia     Mental disorder     depression    Thyroid cancer     Thyroid disease      Past Surgical History:   Procedure Laterality Date    INCISION AND DRAINAGE OF BREAST Left 4/23/2020    Procedure: INCISION AND DRAINAGE, BREAST;  Surgeon: Mason Rubalcava III, MD;  Location: [facility];  Service: General;  Laterality: Left;     Social History     Tobacco Use    Smoking status: Never Smoker    Smokeless tobacco: Never Used   Substance Use Topics    Alcohol use: No     Comment: occasional    Drug use: No          Review of Systems:    Review of Systems   Constitutional: Negative for fever, chills and fatigue.   HEENT: Positive for dysphasia. Negative for voice change and vision changes.    Respiratory: Negative for cough, shortness of breath, wheezing and dyspnea.    Cardiovascular: Negative for palpitations, arrhythmia and heart attack.   Genitourinary: Negative for dysuria and nocturia.   Endocrine: Negative for facial swelling.    Musculoskeletal: Negative for arthralgias, weakness and muscle weakness.   Skin: Negative for thin skin, striae and pruritis.   Gastrointestinal: Negative for nausea, vomiting, abdominal pain, diarrhea and constipation.   Neurological/Neuropsych: Negative for headaches,  "depression and irritability.   Hematological: Negative for lymphadenopathy and bleeds easily.         OBJECTIVE:     Vital Signs:  /83   Pulse 89   Ht 4' 10" (1.473 m)   Wt 52.2 kg (115 lb)   BMI 24.04 kg/m²    Body mass index is 24.04 kg/m².      Physical Exam:    General:  no distress, see vitals for BMI    Eyes:  conjunctivae/corneas clear   Neck: trachea midline and symmetric, no adenopathy    Thyroid:  thyroid not enlarged   Lung: clear to auscultation bilaterally   Heart:  regular rate and rhythm   Abdomen: soft, non-tender; bowel sounds normal; no masses,  no organomegaly   Skin/Extremities: warm and well-perfused   Pulses: 2+ and symmetric   Neuro: normal without focal findings and mental status, speech normal, alert and oriented x3       Laboratory/Radiologic Studies    Studies:  Date:       Results:    Ultrasound:  2/6/2020 FINDINGS:  THYROID GLAND has a homogeneous echotexture.  Vascularity is normal.     RIGHT LOBE of the thyroid measures: 4.89 x 1.35 x 1.70 cm.     1. 0.61 x 0.33 x 0.60 cm right middle lobe nodule. The nodule is solid with a isoechoic echotexture. Vascularity is Grade 2 (peripheral). Borders are well-defined. Microcalcifications are not present.  Per the report this measured 0.3 x 0.4 cm in June.  ISTHMUS measures 0.36 cm in AP dimension.     LEFT LOBE measures: 4.65 x 1.25 x 1.58 cm.     1. 2.80 x 1.15 x 1.57 cm left inferior lobe nodule. The nodule is complex with a hypoechoic solid component and macro cystic degeneration scattered throughout the nodule.  The solid component has an irregular shape with microcalcifications.  Vascularity is Grade 3 (penetrating). Borders are lobulated, and capsular invasion is not identified.  Per the report this measured 1.3 x 2.2 cm in June.  LYMPH NODES: Real-time survey of the bilateral central and lateral neck did not reveal any abnormal appearing lymph nodes.  Several benign-appearing lymph nodes were identified, each with well-defined " fatty hilums and a flat appearance with a S/L axis ratio < 0.5.     Impression:     1.  Thyroid gland is normal in size with homogeneous echotexture.  Vascularity is normal.     2.  Single isoechoic nodule in the right thyroid, which does not meet criteria for biopsy.     3.  Dominant left inferior nodule with malignant cytology on FNA.     RECOMMENDATIONS:  1.  Surgery for either left lobectomy/isthmusectomy or total thyroidectomy based on clinical factors.     2.  It is reasonable to delay surgery until after the patient delivers the baby.   Pathology::  1/2/2020 Thyroid, left inferior, fine-needle aspiration:  - [facility] thyroid cytology category: Malignant, papillary thyroid carcinoma.  - See comment.         Component Value Date    TSH 1.485 02/28/2020    Free T4 0.82 02/28/2020    Sodium 142 04/20/2020    Potassium 3.7 04/20/2020    Chloride 106 04/20/2020    CO2 24 04/20/2020    Glucose 75 04/20/2020    BUN, Bld 10 04/20/2020    Creatinine 0.4 (L) 04/20/2020    Calcium 9.1 04/20/2020    Anion Gap 12 04/20/2020    eGFR if African American >60 04/20/2020    eGFR if non African American >60 04/20/2020       ASSESSMENT/PLAN:       Assessment    Ms. Hutton is a 35 y.o. female who presents with evidence of papillary thyroid cancer    Plan    During this visit, lab and imaging results were reviewed with the patient and her spouse. Thyroid anatomy and physiology were reviewed and she was given a copy of our patient education booklet, Understanding Thyroid Disease. The above testing and examination support the diagnosis of thyroid carcinoma.     We discussed the options of lobectomy vs upfront total thyroidetomy in addition to the implications of each in terms of possible repeat surgery and surveillance. Potential complications of this operation were reviewed with the patient. The patient has a nodule in the contralateral(right) lobe and would like to have this removed in order to avoid ongoing surveillance of  the remaining lobe.    The risks and benefits of my recommendations, as well as other treatment options were discussed with the patient today. In addition, I discussed the nature of the disease process and the risk of surgery including, temporary or permanent hypoparathyroidism resulting in low blood calcium levels that require extensive medication to avoid serious degenerative conditions such as cataracts, brittle bones, muscle weakness, and muscle irritability. Other risks include bleeding, infection, injury to the recurrent laryngeal nerves resulting in hoarseness or impairment of speech and the risks of general anesthetic including MI, CVA, sudden death, or even reaction to anesthetic medications. The patient understands the risks, benefits, and treatment alternatives. Any and all questions were answered to the patient's satisfaction. Written consent was obtained.     Thyroid surgery(total thyroidectomy with ipsilateral central neck dissection) is scheduled for the near future. Case discussed with endocrine and since planning a total thyroidectomy with defer repeat imaging. We will ensure that the patient is medically optimized prior to procedure.

## 2020-09-14 NOTE — Clinical Note
Can we have her get labs drawn when she comes to see anesthesia? Surgery orders placed.  Only anesthesia appt needed.    Thanks,    aob

## 2020-09-15 RX ORDER — SODIUM CHLORIDE 9 MG/ML
INJECTION, SOLUTION INTRAVENOUS CONTINUOUS
Status: CANCELLED | OUTPATIENT
Start: 2020-09-15

## 2020-09-16 ENCOUNTER — PATIENT MESSAGE (OUTPATIENT)
Dept: SURGERY | Facility: OTHER | Age: 35
End: 2020-09-16

## 2020-09-16 ENCOUNTER — PATIENT MESSAGE (OUTPATIENT)
Dept: ENDOCRINOLOGY | Facility: CLINIC | Age: 35
End: 2020-09-16

## 2020-09-17 ENCOUNTER — PATIENT MESSAGE (OUTPATIENT)
Dept: SURGERY | Facility: OTHER | Age: 35
End: 2020-09-17

## 2020-09-21 ENCOUNTER — TELEPHONE (OUTPATIENT)
Dept: ENDOCRINOLOGY | Facility: CLINIC | Age: 35
End: 2020-09-21

## 2020-09-21 DIAGNOSIS — C73 PAPILLARY THYROID CARCINOMA: ICD-10-CM

## 2020-09-21 DIAGNOSIS — E04.1 THYROID NODULE: Primary | ICD-10-CM

## 2020-09-21 DIAGNOSIS — C73 MALIGNANT NEOPLASM OF THYROID GLAND: ICD-10-CM

## 2020-09-21 NOTE — TELEPHONE ENCOUNTER
Spoke with patient informed patient of scheduled visit lab and CT scheduled tomorrow. Patient approved

## 2020-09-21 NOTE — TELEPHONE ENCOUNTER
----- Message from Barbara Richard MA sent at 9/21/2020  1:02 PM CDT -----  Regarding: FW: Orders  Contact: PT    ----- Message -----  From: Kori Chambers  Sent: 9/21/2020  12:37 PM CDT  To: Liudmila Echevarria Staff  Subject: Orders                                           PT is needing orders submitted scheduled prior to her CT scan tomorrow  Creatinine or CMP lab order    Callback: [phone]

## 2020-09-22 ENCOUNTER — HOSPITAL ENCOUNTER (OUTPATIENT)
Dept: RADIOLOGY | Facility: HOSPITAL | Age: 35
Discharge: HOME | End: 2020-09-22
Attending: INTERNAL MEDICINE
Payer: COMMERCIAL

## 2020-09-22 DIAGNOSIS — C73 MALIGNANT NEOPLASM OF THYROID GLAND: ICD-10-CM

## 2020-09-22 PROCEDURE — 70491 CT SOFT TISSUE NECK W/DYE: CPT | Mod: TC

## 2020-09-22 PROCEDURE — 25500020 PHARM REV CODE 255: Performed by: INTERNAL MEDICINE

## 2020-09-22 PROCEDURE — 70491 CT SOFT TISSUE NECK W/DYE: CPT | Mod: 26,,, | Performed by: RADIOLOGY

## 2020-09-22 PROCEDURE — 70491 CT SOFT TISSUE NECK WITH CONTRAST: ICD-10-PCS | Mod: 26,,, | Performed by: RADIOLOGY

## 2020-09-22 RX ADMIN — IOHEXOL 75 ML: 350 INJECTION, SOLUTION INTRAVENOUS at 09:09

## 2020-09-24 ENCOUNTER — HOSPITAL ENCOUNTER (OUTPATIENT)
Dept: PREADMISSION TESTING | Facility: OTHER | Age: 35
Discharge: HOME | End: 2020-09-24
Attending: SURGERY
Payer: COMMERCIAL

## 2020-09-24 ENCOUNTER — ANESTHESIA EVENT (OUTPATIENT)
Dept: SURGERY | Facility: OTHER | Age: 35
End: 2020-09-24
Payer: COMMERCIAL

## 2020-09-24 VITALS
SYSTOLIC BLOOD PRESSURE: 113 MMHG | OXYGEN SATURATION: 98 % | WEIGHT: 115 LBS | HEART RATE: 84 BPM | TEMPERATURE: 97 F | HEIGHT: 58 IN | DIASTOLIC BLOOD PRESSURE: 56 MMHG | BODY MASS INDEX: 24.14 KG/M2

## 2020-09-24 DIAGNOSIS — C73 THYROID CANCER: ICD-10-CM

## 2020-09-24 LAB
ALBUMIN SERPL BCP-MCNC: 3.8 G/DL (ref 3.5–5.2)
ANION GAP SERPL CALC-SCNC: 9 MMOL/L (ref 8–16)
BASOPHILS # BLD AUTO: 0.05 K/UL (ref 0–0.2)
BASOPHILS NFR BLD: 0.6 % (ref 0–1.9)
BUN SERPL-MCNC: 11 MG/DL (ref 6–20)
CALCIUM SERPL-MCNC: 9.1 MG/DL (ref 8.7–10.5)
CHLORIDE SERPL-SCNC: 105 MMOL/L (ref 95–110)
CO2 SERPL-SCNC: 26 MMOL/L (ref 23–29)
CREAT SERPL-MCNC: 0.6 MG/DL (ref 0.5–1.4)
DIFFERENTIAL METHOD: NORMAL
EOSINOPHIL # BLD AUTO: 0.2 K/UL (ref 0–0.5)
EOSINOPHIL NFR BLD: 2.7 % (ref 0–8)
ERYTHROCYTE [DISTWIDTH] IN BLOOD BY AUTOMATED COUNT: 12.7 % (ref 11.5–14.5)
EST. GFR  (AFRICAN AMERICAN): >60 ML/MIN/1.73 M^2
EST. GFR  (NON AFRICAN AMERICAN): >60 ML/MIN/1.73 M^2
GLUCOSE SERPL-MCNC: 88 MG/DL (ref 70–110)
HCT VFR BLD AUTO: 42.5 % (ref 37–48.5)
HGB BLD-MCNC: 13.8 G/DL (ref 12–16)
IMM GRANULOCYTES # BLD AUTO: 0.02 K/UL (ref 0–0.04)
IMM GRANULOCYTES NFR BLD AUTO: 0.2 % (ref 0–0.5)
LYMPHOCYTES # BLD AUTO: 1.7 K/UL (ref 1–4.8)
LYMPHOCYTES NFR BLD: 19.9 % (ref 18–48)
MCH RBC QN AUTO: 28.5 PG (ref 27–31)
MCHC RBC AUTO-ENTMCNC: 32.5 G/DL (ref 32–36)
MCV RBC AUTO: 88 FL (ref 82–98)
MONOCYTES # BLD AUTO: 0.5 K/UL (ref 0.3–1)
MONOCYTES NFR BLD: 5.3 % (ref 4–15)
NEUTROPHILS # BLD AUTO: 6.2 K/UL (ref 1.8–7.7)
NEUTROPHILS NFR BLD: 71.3 % (ref 38–73)
NRBC BLD-RTO: 0 /100 WBC
PHOSPHATE SERPL-MCNC: 2.6 MG/DL (ref 2.7–4.5)
PLATELET # BLD AUTO: 217 K/UL (ref 150–350)
PMV BLD AUTO: 10.8 FL (ref 9.2–12.9)
POTASSIUM SERPL-SCNC: 4 MMOL/L (ref 3.5–5.1)
RBC # BLD AUTO: 4.85 M/UL (ref 4–5.4)
SODIUM SERPL-SCNC: 140 MMOL/L (ref 136–145)
WBC # BLD AUTO: 8.75 K/UL (ref 3.9–12.7)

## 2020-09-24 PROCEDURE — 82306 VITAMIN D 25 HYDROXY: CPT

## 2020-09-24 PROCEDURE — 36415 COLL VENOUS BLD VENIPUNCTURE: CPT

## 2020-09-24 PROCEDURE — 85025 COMPLETE CBC W/AUTO DIFF WBC: CPT

## 2020-09-24 PROCEDURE — 80069 RENAL FUNCTION PANEL: CPT

## 2020-09-24 RX ORDER — FAMOTIDINE 20 MG/1
20 TABLET, FILM COATED ORAL
Status: CANCELLED | OUTPATIENT
Start: 2020-09-24 | End: 2020-09-24

## 2020-09-24 RX ORDER — SCOLOPAMINE TRANSDERMAL SYSTEM 1 MG/1
1 PATCH, EXTENDED RELEASE TRANSDERMAL
Status: CANCELLED | OUTPATIENT
Start: 2020-09-24

## 2020-09-24 RX ORDER — SODIUM CHLORIDE, SODIUM LACTATE, POTASSIUM CHLORIDE, CALCIUM CHLORIDE 600; 310; 30; 20 MG/100ML; MG/100ML; MG/100ML; MG/100ML
INJECTION, SOLUTION INTRAVENOUS CONTINUOUS
Status: CANCELLED | OUTPATIENT
Start: 2020-09-24

## 2020-09-24 NOTE — DISCHARGE INSTRUCTIONS
Information to Prepare you for your Surgery    PRE-ADMIT TESTING -  [phone]    [address]  [place] [facility]          Your surgery has been scheduled at [facility]. We are pleased to have the opportunity to serve you. For Further Information please call [phone].    On the day of surgery please report to the Information Desk on the 1st floor.    · CONTACT YOUR PHYSICIAN'S OFFICE THE DAY PRIOR TO YOUR SURGERY TO OBTAIN YOUR ARRIVAL TIME.     · The evening before surgery do not eat anything after 9 p.m. ( this includes hard candy, chewing gum and mints).  You may only have GATORADE, POWERADE AND WATER  from 9 p.m. until you leave your home.   DO NOT DRINK ANY LIQUIDS ON THE WAY TO THE HOSPITAL.      SPECIAL MEDICATION INSTRUCTIONS: TAKE medications checked off by the Anesthesiologist on your Medication List.    Angiogram Patients: Take medications as instructed by your physician, including aspirin.     Surgery Patients:    If you take ASPIRIN - Your PHYSICIAN/SURGEON will need to inform you IF/OR when you need to stop taking aspirin prior to your surgery.     Do Not take any medications containing IBUPROFEN.  Do Not Wear any make-up or dark nail polish   (especially eye make-up) to surgery. If you come to surgery with makeup on you will be required to remove the makeup or nail polish.    Do not shave your surgical area at least 5 days prior to your surgery. The surgical prep will be performed at the hospital according to Infection Control regulations.    Leave all valuables at home.   Do Not wear any jewelry or watches, including any metal in body piercings. Jewelry must be removed prior to coming to the hospital.  There is a possibility that rings that are unable to be removed may be cut off if they are on the surgical extremity.    Contact Lens must be removed before surgery. Either do not wear the contact lens or bring a case and solution for  storage.  Please bring a container for eyeglasses or dentures as required.  Bring any paperwork your physician has provided, such as consent forms,  history and physicals, doctor's orders, etc.   Bring comfortable clothes that are loose fitting to wear upon discharge. Take into consideration the type of surgery being performed.  Maintain your diet as advised per your physician the day prior to surgery.      Adequate rest the night before surgery is advised.   Park in the Parking lot behind the hospital or in the [place] [facility] across the street from the parking lot. Parking is complimentary.  If you will be discharged the same day as your procedure, please arrange for a responsible adult to drive you home or to accompany you if traveling by taxi.   YOU WILL NOT BE PERMITTED TO DRIVE OR TO LEAVE THE HOSPITAL ALONE AFTER SURGERY.   If you are being discharged the same day, it is strongly recommended that you arrange for someone to remain with you for the first 24 hrs following your surgery.    The Surgeon will speak to your family/visitor after your surgery regarding the outcome of your surgery and post op care.  The Surgeon may speak to you after your surgery, but there is a possibility you may not remember the details.  Please check with your family members regarding the conversation with the Surgeon.    We strongly recommend whoever is bringing you home be present for discharge instructions.  This will ensure a thorough understanding for your post op home care.    ALL CHILDREN MUST ALWAYS BE ACCOMPANIED BY AN ADULT.    Visitors-Refer to current Visitor policy handouts.    Thank you for your cooperation.  The Staff of [facility].                Bathing Instructions with Hibiclens     Shower the evening before and morning of your procedure with Hibiclens:   Wash your face with water and your regular face wash/soap   Apply Hibiclens directly on your skin or on a wet washcloth and wash  gently. When showering: Move away from the shower stream when applying Hibiclens to avoid rinsing off too soon.   Rinse thoroughly with warm water   Do not dilute Hibiclens         Dry off as usual, do not use any deodorant, powder, body lotions, perfume, after shave or cologne.

## 2020-09-24 NOTE — ANESTHESIA PREPROCEDURE EVALUATION
09/24/2020  Dina Hutton is a 35 y.o., female.    Anesthesia Evaluation    I have reviewed the Patient Summary Reports.    I have reviewed the Nursing Notes. I have reviewed the NPO Status.   I have reviewed the Medications.     Review of Systems  Anesthesia Hx:  No problems with previous Anesthesia  Denies Family Hx of Anesthesia complications.  Personal Hx of Anesthesia complications, Post-Operative Nausea/Vomiting   Social:  Non-Smoker    Hematology/Oncology:  Hematology Normal      Current/Recent Cancer. Oncology Comments: Thyroid      EENT/Dental:EENT/Dental Normal   Cardiovascular:  Cardiovascular Normal Exercise tolerance: good     Pulmonary:  Pulmonary Normal    Renal/:  Renal/ Normal     Hepatic/GI:   GERD    Musculoskeletal:  Musculoskeletal Normal    Neurological:  Neurology Normal    Endocrine:  Endocrine Normal    Dermatological:  Skin Normal    Psych:  Psychiatric Normal           Physical Exam  General:  Well nourished    Airway/Jaw/Neck:  Airway Findings: Mouth Opening: Normal Tongue: Normal  General Airway Assessment: Adult  Mallampati: I  TM Distance: Normal, at least 6 cm  Jaw/Neck Findings:  Neck ROM: Normal ROM      Dental:  Dental Findings: In tact              Anesthesia Plan  Type of Anesthesia, risks & benefits discussed:  Anesthesia Type:  general  Patient's Preference:   Intra-op Monitoring Plan: standard ASA monitors  Intra-op Monitoring Plan Comments:   Post Op Pain Control Plan: per primary service following discharge from PACU and multimodal analgesia  Post Op Pain Control Plan Comments:   Induction:   IV  Beta Blocker:         Informed Consent: Patient understands risks and agrees with Anesthesia plan.  Questions answered. Anesthesia consent signed with patient.  ASA Score: 1     Day of Surgery Review of History & Physical:    H&P update referred to the surgeon.          Ready For Surgery From Anesthesia Perspective.

## 2020-09-25 LAB — 25(OH)D3+25(OH)D2 SERPL-MCNC: 18 NG/ML (ref 30–96)

## 2020-09-26 ENCOUNTER — LAB VISIT (OUTPATIENT)
Dept: URGENT CARE | Facility: CLINIC | Age: 35
End: 2020-09-26
Payer: COMMERCIAL

## 2020-09-26 DIAGNOSIS — C73 THYROID CANCER: ICD-10-CM

## 2020-09-26 PROCEDURE — U0003 INFECTIOUS AGENT DETECTION BY NUCLEIC ACID (DNA OR RNA); SEVERE ACUTE RESPIRATORY SYNDROME CORONAVIRUS 2 (SARS-COV-2) (CORONAVIRUS DISEASE [COVID-19]), AMPLIFIED PROBE TECHNIQUE, MAKING USE OF HIGH THROUGHPUT TECHNOLOGIES AS DESCRIBED BY CMS-2020-01-R: HCPCS

## 2020-09-27 LAB — SARS-COV-2 RNA RESP QL NAA+PROBE: NOT DETECTED

## 2020-09-28 ENCOUNTER — TELEPHONE (OUTPATIENT)
Dept: SURGERY | Facility: CLINIC | Age: 35
End: 2020-09-28

## 2020-09-28 NOTE — TELEPHONE ENCOUNTER
Left message on patient's voicemail for her to arrive at the [facility] tomorrow 9/29/2020 at 5:15am for surgery with Dr. Lovelace.  Direct phone number given for her to call back with any questions or concerns.

## 2020-09-29 ENCOUNTER — ANESTHESIA (OUTPATIENT)
Dept: SURGERY | Facility: OTHER | Age: 35
End: 2020-09-29
Payer: COMMERCIAL

## 2020-09-29 ENCOUNTER — HOSPITAL ENCOUNTER (OUTPATIENT)
Facility: OTHER | Age: 35
Discharge: HOME | End: 2020-09-30
Attending: SURGERY | Admitting: SURGERY
Payer: COMMERCIAL

## 2020-09-29 DIAGNOSIS — R07.9 CHEST PAIN: ICD-10-CM

## 2020-09-29 DIAGNOSIS — C73 THYROID CANCER: Primary | ICD-10-CM

## 2020-09-29 LAB
ALBUMIN SERPL BCP-MCNC: 3.5 G/DL (ref 3.5–5.2)
ANION GAP SERPL CALC-SCNC: 10 MMOL/L (ref 8–16)
B-HCG UR QL: NEGATIVE
BUN SERPL-MCNC: 10 MG/DL (ref 6–20)
CALCIUM SERPL-MCNC: 8.3 MG/DL (ref 8.7–10.5)
CHLORIDE SERPL-SCNC: 104 MMOL/L (ref 95–110)
CO2 SERPL-SCNC: 24 MMOL/L (ref 23–29)
CREAT SERPL-MCNC: 0.7 MG/DL (ref 0.5–1.4)
CTP QC/QA: YES
EST. GFR  (AFRICAN AMERICAN): >60 ML/MIN/1.73 M^2
EST. GFR  (NON AFRICAN AMERICAN): >60 ML/MIN/1.73 M^2
GLUCOSE SERPL-MCNC: 158 MG/DL (ref 70–110)
PHOSPHATE SERPL-MCNC: 3.6 MG/DL (ref 2.7–4.5)
POCT GLUCOSE: 140 MG/DL (ref 70–110)
POTASSIUM SERPL-SCNC: 4.5 MMOL/L (ref 3.5–5.1)
PTH-INTACT SERPL-MCNC: 25.2 PG/ML (ref 9–77)
SODIUM SERPL-SCNC: 138 MMOL/L (ref 136–145)

## 2020-09-29 PROCEDURE — 94799 UNLISTED PULMONARY SVC/PX: CPT

## 2020-09-29 PROCEDURE — 60512 PR AUTOTRANSPLANT, PARATHYROID: ICD-10-PCS | Mod: ,,, | Performed by: SURGERY

## 2020-09-29 PROCEDURE — 37000008 HC ANESTHESIA 1ST 15 MINUTES: Performed by: SURGERY

## 2020-09-29 PROCEDURE — 99900035 HC TECH TIME PER 15 MIN (STAT)

## 2020-09-29 PROCEDURE — 88305 TISSUE EXAM BY PATHOLOGIST: CPT | Mod: 26,,, | Performed by: PATHOLOGY

## 2020-09-29 PROCEDURE — 88307 TISSUE EXAM BY PATHOLOGIST: CPT | Mod: 26,,, | Performed by: PATHOLOGY

## 2020-09-29 PROCEDURE — 88305 TISSUE EXAM BY PATHOLOGIST: ICD-10-PCS | Mod: 26,,, | Performed by: PATHOLOGY

## 2020-09-29 PROCEDURE — 36000706: Performed by: SURGERY

## 2020-09-29 PROCEDURE — 63600175 PHARM REV CODE 636 W HCPCS: Performed by: SURGERY

## 2020-09-29 PROCEDURE — 93005 ELECTROCARDIOGRAM TRACING: CPT

## 2020-09-29 PROCEDURE — 83970 ASSAY OF PARATHORMONE: CPT

## 2020-09-29 PROCEDURE — 25000003 PHARM REV CODE 250: Performed by: SURGERY

## 2020-09-29 PROCEDURE — 25000003 PHARM REV CODE 250: Performed by: NURSE ANESTHETIST, CERTIFIED REGISTERED

## 2020-09-29 PROCEDURE — 27201423 OPTIME MED/SURG SUP & DEVICES STERILE SUPPLY: Performed by: SURGERY

## 2020-09-29 PROCEDURE — 71000039 HC RECOVERY, EACH ADD'L HOUR: Performed by: SURGERY

## 2020-09-29 PROCEDURE — 37000009 HC ANESTHESIA EA ADD 15 MINS: Performed by: SURGERY

## 2020-09-29 PROCEDURE — S0020 INJECTION, BUPIVICAINE HYDRO: HCPCS | Performed by: SURGERY

## 2020-09-29 PROCEDURE — 88307 TISSUE EXAM BY PATHOLOGIST: CPT | Performed by: PATHOLOGY

## 2020-09-29 PROCEDURE — 63600175 PHARM REV CODE 636 W HCPCS: Performed by: ANESTHESIOLOGY

## 2020-09-29 PROCEDURE — 71000033 HC RECOVERY, INTIAL HOUR: Performed by: SURGERY

## 2020-09-29 PROCEDURE — 25000003 PHARM REV CODE 250: Performed by: ANESTHESIOLOGY

## 2020-09-29 PROCEDURE — 88331 PATH CONSLTJ SURG 1 BLK 1SPC: CPT | Mod: 26,,, | Performed by: PATHOLOGY

## 2020-09-29 PROCEDURE — 93010 ELECTROCARDIOGRAM REPORT: CPT | Mod: ,,, | Performed by: INTERNAL MEDICINE

## 2020-09-29 PROCEDURE — 88331 PR  PATH CONSULT IN SURG,W FRZ SEC: ICD-10-PCS | Mod: 26,,, | Performed by: PATHOLOGY

## 2020-09-29 PROCEDURE — 93041 RHYTHM ECG TRACING: CPT

## 2020-09-29 PROCEDURE — 94761 N-INVAS EAR/PLS OXIMETRY MLT: CPT

## 2020-09-29 PROCEDURE — 60254 EXTENSIVE THYROID SURGERY: CPT | Mod: ,,, | Performed by: SURGERY

## 2020-09-29 PROCEDURE — 88305 TISSUE EXAM BY PATHOLOGIST: CPT | Mod: 59 | Performed by: PATHOLOGY

## 2020-09-29 PROCEDURE — 81025 URINE PREGNANCY TEST: CPT | Performed by: ANESTHESIOLOGY

## 2020-09-29 PROCEDURE — 93010 EKG 12-LEAD: ICD-10-PCS | Mod: ,,, | Performed by: INTERNAL MEDICINE

## 2020-09-29 PROCEDURE — 88307 PR  SURG PATH,LEVEL V: ICD-10-PCS | Mod: 26,,, | Performed by: PATHOLOGY

## 2020-09-29 PROCEDURE — 60254: ICD-10-PCS | Mod: ,,, | Performed by: SURGERY

## 2020-09-29 PROCEDURE — 63600175 PHARM REV CODE 636 W HCPCS: Performed by: NURSE ANESTHETIST, CERTIFIED REGISTERED

## 2020-09-29 PROCEDURE — 88331 PATH CONSLTJ SURG 1 BLK 1SPC: CPT | Performed by: PATHOLOGY

## 2020-09-29 PROCEDURE — 36415 COLL VENOUS BLD VENIPUNCTURE: CPT

## 2020-09-29 PROCEDURE — 36000707: Performed by: SURGERY

## 2020-09-29 PROCEDURE — 60512 AUTOTRANSPLANT PARATHYROID: CPT | Mod: ,,, | Performed by: SURGERY

## 2020-09-29 PROCEDURE — 25000003 PHARM REV CODE 250: Performed by: STUDENT IN AN ORGANIZED HEALTH CARE EDUCATION/TRAINING PROGRAM

## 2020-09-29 PROCEDURE — 80069 RENAL FUNCTION PANEL: CPT

## 2020-09-29 RX ORDER — LEVOTHYROXINE SODIUM 100 UG/1
100 TABLET ORAL
Status: DISCONTINUED | OUTPATIENT
Start: 2020-09-30 | End: 2020-09-30 | Stop reason: HOSPADM

## 2020-09-29 RX ORDER — OXYCODONE HYDROCHLORIDE 5 MG/1
5 TABLET ORAL EVERY 4 HOURS PRN
Status: DISCONTINUED | OUTPATIENT
Start: 2020-09-29 | End: 2020-09-30 | Stop reason: HOSPADM

## 2020-09-29 RX ORDER — DEXAMETHASONE SODIUM PHOSPHATE 4 MG/ML
INJECTION, SOLUTION INTRA-ARTICULAR; INTRALESIONAL; INTRAMUSCULAR; INTRAVENOUS; SOFT TISSUE
Status: DISCONTINUED | OUTPATIENT
Start: 2020-09-29 | End: 2020-09-29

## 2020-09-29 RX ORDER — PROPOFOL 10 MG/ML
VIAL (ML) INTRAVENOUS
Status: DISCONTINUED | OUTPATIENT
Start: 2020-09-29 | End: 2020-09-29

## 2020-09-29 RX ORDER — ONDANSETRON 2 MG/ML
4 INJECTION INTRAMUSCULAR; INTRAVENOUS DAILY PRN
Status: DISCONTINUED | OUTPATIENT
Start: 2020-09-29 | End: 2020-09-29 | Stop reason: HOSPADM

## 2020-09-29 RX ORDER — LIDOCAINE HCL/PF 100 MG/5ML
SYRINGE (ML) INTRAVENOUS
Status: DISCONTINUED | OUTPATIENT
Start: 2020-09-29 | End: 2020-09-29

## 2020-09-29 RX ORDER — CEFAZOLIN SODIUM 2 G/50ML
2 SOLUTION INTRAVENOUS
Status: COMPLETED | OUTPATIENT
Start: 2020-09-29 | End: 2020-09-29

## 2020-09-29 RX ORDER — MEPERIDINE HYDROCHLORIDE 25 MG/ML
12.5 INJECTION INTRAMUSCULAR; INTRAVENOUS; SUBCUTANEOUS ONCE AS NEEDED
Status: DISCONTINUED | OUTPATIENT
Start: 2020-09-29 | End: 2020-09-29 | Stop reason: HOSPADM

## 2020-09-29 RX ORDER — HYDROMORPHONE HYDROCHLORIDE 2 MG/ML
0.4 INJECTION, SOLUTION INTRAMUSCULAR; INTRAVENOUS; SUBCUTANEOUS EVERY 5 MIN PRN
Status: DISCONTINUED | OUTPATIENT
Start: 2020-09-29 | End: 2020-09-29 | Stop reason: HOSPADM

## 2020-09-29 RX ORDER — ONDANSETRON 8 MG/1
8 TABLET, ORALLY DISINTEGRATING ORAL EVERY 8 HOURS PRN
Status: DISCONTINUED | OUTPATIENT
Start: 2020-09-29 | End: 2020-09-30 | Stop reason: HOSPADM

## 2020-09-29 RX ORDER — LORAZEPAM 2 MG/ML
1 INJECTION INTRAMUSCULAR EVERY 6 HOURS PRN
Status: DISCONTINUED | OUTPATIENT
Start: 2020-09-29 | End: 2020-09-30 | Stop reason: HOSPADM

## 2020-09-29 RX ORDER — SUCCINYLCHOLINE CHLORIDE 20 MG/ML
INJECTION INTRAMUSCULAR; INTRAVENOUS
Status: DISCONTINUED | OUTPATIENT
Start: 2020-09-29 | End: 2020-09-29

## 2020-09-29 RX ORDER — ROCURONIUM BROMIDE 10 MG/ML
INJECTION, SOLUTION INTRAVENOUS
Status: DISCONTINUED | OUTPATIENT
Start: 2020-09-29 | End: 2020-09-29

## 2020-09-29 RX ORDER — FENTANYL CITRATE 50 UG/ML
INJECTION, SOLUTION INTRAMUSCULAR; INTRAVENOUS
Status: DISCONTINUED | OUTPATIENT
Start: 2020-09-29 | End: 2020-09-29

## 2020-09-29 RX ORDER — MIDAZOLAM HYDROCHLORIDE 1 MG/ML
INJECTION INTRAMUSCULAR; INTRAVENOUS
Status: DISCONTINUED | OUTPATIENT
Start: 2020-09-29 | End: 2020-09-29

## 2020-09-29 RX ORDER — HYDROXYZINE HYDROCHLORIDE 25 MG/1
25 TABLET, FILM COATED ORAL EVERY 4 HOURS PRN
Status: DISCONTINUED | OUTPATIENT
Start: 2020-09-29 | End: 2020-09-30 | Stop reason: HOSPADM

## 2020-09-29 RX ORDER — SCOLOPAMINE TRANSDERMAL SYSTEM 1 MG/1
1 PATCH, EXTENDED RELEASE TRANSDERMAL
Status: DISCONTINUED | OUTPATIENT
Start: 2020-09-29 | End: 2020-09-29 | Stop reason: HOSPADM

## 2020-09-29 RX ORDER — SODIUM CHLORIDE 9 MG/ML
INJECTION, SOLUTION INTRAVENOUS CONTINUOUS
Status: DISCONTINUED | OUTPATIENT
Start: 2020-09-29 | End: 2020-09-29

## 2020-09-29 RX ORDER — DEXAMETHASONE SODIUM PHOSPHATE 4 MG/ML
4 INJECTION, SOLUTION INTRA-ARTICULAR; INTRALESIONAL; INTRAMUSCULAR; INTRAVENOUS; SOFT TISSUE EVERY 6 HOURS
Status: DISCONTINUED | OUTPATIENT
Start: 2020-09-29 | End: 2020-09-30 | Stop reason: HOSPADM

## 2020-09-29 RX ORDER — SODIUM CHLORIDE, SODIUM LACTATE, POTASSIUM CHLORIDE, CALCIUM CHLORIDE 600; 310; 30; 20 MG/100ML; MG/100ML; MG/100ML; MG/100ML
INJECTION, SOLUTION INTRAVENOUS CONTINUOUS
Status: DISCONTINUED | OUTPATIENT
Start: 2020-09-29 | End: 2020-09-29

## 2020-09-29 RX ORDER — PROPOFOL 10 MG/ML
VIAL (ML) INTRAVENOUS CONTINUOUS PRN
Status: DISCONTINUED | OUTPATIENT
Start: 2020-09-29 | End: 2020-09-29

## 2020-09-29 RX ORDER — FAMOTIDINE 20 MG/1
20 TABLET, FILM COATED ORAL
Status: COMPLETED | OUTPATIENT
Start: 2020-09-29 | End: 2020-09-29

## 2020-09-29 RX ORDER — SODIUM CHLORIDE 0.9 % (FLUSH) 0.9 %
3 SYRINGE (ML) INJECTION
Status: DISCONTINUED | OUTPATIENT
Start: 2020-09-29 | End: 2020-09-29

## 2020-09-29 RX ORDER — BUPIVACAINE HYDROCHLORIDE 5 MG/ML
INJECTION, SOLUTION EPIDURAL; INTRACAUDAL
Status: DISCONTINUED | OUTPATIENT
Start: 2020-09-29 | End: 2020-09-29 | Stop reason: HOSPADM

## 2020-09-29 RX ORDER — LEVOTHYROXINE SODIUM 100 UG/1
100 TABLET ORAL
Qty: 30 TABLET | Refills: 3 | Status: SHIPPED | OUTPATIENT
Start: 2020-09-30 | End: 2020-11-11

## 2020-09-29 RX ORDER — ZOLPIDEM TARTRATE 5 MG/1
5 TABLET ORAL NIGHTLY PRN
Status: DISCONTINUED | OUTPATIENT
Start: 2020-09-29 | End: 2020-09-30 | Stop reason: HOSPADM

## 2020-09-29 RX ORDER — PROMETHAZINE HYDROCHLORIDE 12.5 MG/1
25 TABLET ORAL EVERY 6 HOURS PRN
Status: DISCONTINUED | OUTPATIENT
Start: 2020-09-29 | End: 2020-09-30 | Stop reason: HOSPADM

## 2020-09-29 RX ORDER — ACETAMINOPHEN 500 MG
1000 TABLET ORAL EVERY 8 HOURS
Status: DISCONTINUED | OUTPATIENT
Start: 2020-09-29 | End: 2020-09-30 | Stop reason: HOSPADM

## 2020-09-29 RX ORDER — OXYCODONE HYDROCHLORIDE 5 MG/1
5 TABLET ORAL
Status: DISCONTINUED | OUTPATIENT
Start: 2020-09-29 | End: 2020-09-29 | Stop reason: HOSPADM

## 2020-09-29 RX ORDER — ERGOCALCIFEROL 1.25 MG/1
50000 CAPSULE ORAL DAILY
Status: COMPLETED | OUTPATIENT
Start: 2020-09-29 | End: 2020-09-30

## 2020-09-29 RX ORDER — PHENYLEPHRINE HYDROCHLORIDE 10 MG/ML
INJECTION INTRAVENOUS
Status: DISCONTINUED | OUTPATIENT
Start: 2020-09-29 | End: 2020-09-29

## 2020-09-29 RX ORDER — OXYCODONE HYDROCHLORIDE 5 MG/1
10 TABLET ORAL EVERY 4 HOURS PRN
Status: DISCONTINUED | OUTPATIENT
Start: 2020-09-29 | End: 2020-09-30 | Stop reason: HOSPADM

## 2020-09-29 RX ORDER — MUPIROCIN 20 MG/G
1 OINTMENT TOPICAL 2 TIMES DAILY
Status: DISCONTINUED | OUTPATIENT
Start: 2020-09-29 | End: 2020-09-30 | Stop reason: HOSPADM

## 2020-09-29 RX ORDER — ONDANSETRON HYDROCHLORIDE 2 MG/ML
INJECTION, SOLUTION INTRAMUSCULAR; INTRAVENOUS
Status: DISCONTINUED | OUTPATIENT
Start: 2020-09-29 | End: 2020-09-29

## 2020-09-29 RX ORDER — OXYCODONE HYDROCHLORIDE 5 MG/1
5 TABLET ORAL EVERY 6 HOURS PRN
Qty: 6 TABLET | Refills: 0 | Status: SHIPPED | OUTPATIENT
Start: 2020-09-29 | End: 2021-03-16

## 2020-09-29 RX ADMIN — HYDROMORPHONE HYDROCHLORIDE 0.4 MG: 2 INJECTION, SOLUTION INTRAMUSCULAR; INTRAVENOUS; SUBCUTANEOUS at 01:09

## 2020-09-29 RX ADMIN — ONDANSETRON 8 MG: 8 TABLET, ORALLY DISINTEGRATING ORAL at 06:09

## 2020-09-29 RX ADMIN — LIDOCAINE HYDROCHLORIDE 80 MG: 20 INJECTION, SOLUTION INTRAVENOUS at 07:09

## 2020-09-29 RX ADMIN — DEXAMETHASONE SODIUM PHOSPHATE 4 MG: 4 INJECTION, SOLUTION INTRAMUSCULAR; INTRAVENOUS at 06:09

## 2020-09-29 RX ADMIN — PROPOFOL 60 MG: 10 INJECTION, EMULSION INTRAVENOUS at 07:09

## 2020-09-29 RX ADMIN — PHENYLEPHRINE HYDROCHLORIDE 200 MCG: 10 INJECTION INTRAVENOUS at 09:09

## 2020-09-29 RX ADMIN — DEXAMETHASONE SODIUM PHOSPHATE 8 MG: 4 INJECTION, SOLUTION INTRAMUSCULAR; INTRAVENOUS at 07:09

## 2020-09-29 RX ADMIN — ONDANSETRON 4 MG: 2 INJECTION, SOLUTION INTRAMUSCULAR; INTRAVENOUS at 09:09

## 2020-09-29 RX ADMIN — CEFAZOLIN SODIUM 2 G: 2 SOLUTION INTRAVENOUS at 07:09

## 2020-09-29 RX ADMIN — PHENYLEPHRINE HYDROCHLORIDE 200 MCG: 10 INJECTION INTRAVENOUS at 07:09

## 2020-09-29 RX ADMIN — HYDROMORPHONE HYDROCHLORIDE 0.4 MG: 2 INJECTION, SOLUTION INTRAMUSCULAR; INTRAVENOUS; SUBCUTANEOUS at 12:09

## 2020-09-29 RX ADMIN — PHENYLEPHRINE HYDROCHLORIDE 100 MCG: 10 INJECTION INTRAVENOUS at 07:09

## 2020-09-29 RX ADMIN — OXYCODONE 5 MG: 5 TABLET ORAL at 10:09

## 2020-09-29 RX ADMIN — ACETAMINOPHEN 1000 MG: 500 TABLET, FILM COATED ORAL at 02:09

## 2020-09-29 RX ADMIN — MIDAZOLAM HYDROCHLORIDE 2 MG: 1 INJECTION, SOLUTION INTRAMUSCULAR; INTRAVENOUS at 06:09

## 2020-09-29 RX ADMIN — PHENYLEPHRINE HYDROCHLORIDE 200 MCG: 10 INJECTION INTRAVENOUS at 08:09

## 2020-09-29 RX ADMIN — SODIUM CHLORIDE, SODIUM LACTATE, POTASSIUM CHLORIDE, AND CALCIUM CHLORIDE: 600; 310; 30; 20 INJECTION, SOLUTION INTRAVENOUS at 08:09

## 2020-09-29 RX ADMIN — SUCCINYLCHOLINE CHLORIDE 80 MG: 20 INJECTION, SOLUTION INTRAMUSCULAR; INTRAVENOUS at 07:09

## 2020-09-29 RX ADMIN — SCOPALAMINE 1 PATCH: 1 PATCH, EXTENDED RELEASE TRANSDERMAL at 06:09

## 2020-09-29 RX ADMIN — PROPOFOL 75 MCG/KG/MIN: 10 INJECTION, EMULSION INTRAVENOUS at 07:09

## 2020-09-29 RX ADMIN — OXYCODONE 5 MG: 5 TABLET ORAL at 03:09

## 2020-09-29 RX ADMIN — MUPIROCIN 1 G: 20 OINTMENT TOPICAL at 08:09

## 2020-09-29 RX ADMIN — HYDROXYZINE HYDROCHLORIDE 25 MG: 25 TABLET, FILM COATED ORAL at 03:09

## 2020-09-29 RX ADMIN — ROCURONIUM BROMIDE 5 MG: 10 INJECTION, SOLUTION INTRAVENOUS at 07:09

## 2020-09-29 RX ADMIN — PROPOFOL 40 MG: 10 INJECTION, EMULSION INTRAVENOUS at 08:09

## 2020-09-29 RX ADMIN — PROPOFOL 20 MG: 10 INJECTION, EMULSION INTRAVENOUS at 08:09

## 2020-09-29 RX ADMIN — SODIUM CHLORIDE: 0.9 INJECTION, SOLUTION INTRAVENOUS at 02:09

## 2020-09-29 RX ADMIN — PROPOFOL 150 MG: 10 INJECTION, EMULSION INTRAVENOUS at 07:09

## 2020-09-29 RX ADMIN — SODIUM CHLORIDE, SODIUM LACTATE, POTASSIUM CHLORIDE, AND CALCIUM CHLORIDE: 600; 310; 30; 20 INJECTION, SOLUTION INTRAVENOUS at 06:09

## 2020-09-29 RX ADMIN — FENTANYL CITRATE 100 MCG: 50 INJECTION, SOLUTION INTRAMUSCULAR; INTRAVENOUS at 07:09

## 2020-09-29 RX ADMIN — ERGOCALCIFEROL 50000 UNITS: 1.25 CAPSULE ORAL at 03:09

## 2020-09-29 RX ADMIN — HYDROMORPHONE HYDROCHLORIDE 0.4 MG: 2 INJECTION, SOLUTION INTRAMUSCULAR; INTRAVENOUS; SUBCUTANEOUS at 10:09

## 2020-09-29 RX ADMIN — CARBOXYMETHYLCELLULOSE SODIUM 2 DROP: 2.5 SOLUTION/ DROPS OPHTHALMIC at 07:09

## 2020-09-29 RX ADMIN — ACETAMINOPHEN 1000 MG: 500 TABLET, FILM COATED ORAL at 09:09

## 2020-09-29 RX ADMIN — HYDROMORPHONE HYDROCHLORIDE 0.4 MG: 2 INJECTION, SOLUTION INTRAMUSCULAR; INTRAVENOUS; SUBCUTANEOUS at 11:09

## 2020-09-29 RX ADMIN — FAMOTIDINE 20 MG: 20 TABLET ORAL at 06:09

## 2020-09-29 NOTE — BRIEF OP NOTE
[facility]  Brief Operative Note    SUMMARY     Surgery Date: 9/29/2020     Surgeon(s) and Role:     * Kayla Lovelace MD - Primary     * Michelle Lin MD - Resident - Assisting    Pre-op Diagnosis:  Thyroid cancer [C73]    Post-op Diagnosis:  Post-Op Diagnosis Codes:     * Thyroid cancer [C73]    Procedure:  1) Total thyroidectomy  2) Left central neck dissection  3) Re-implantation(auto-transplantation) of left inferior parathyroid gland    Anesthesia: General    Description of Procedure: Total thyroidectomy with ipsilateral central neck dissection     Description of the findings of the procedure: Total thyroidectomy stitch marks right superior pole; left side central neck dissection, no clinically suspicious nodes identified.    Estimated Blood Loss has not been documented. EBL = 10.         Specimens:   1) question parathyroid tissue-frozen section confirmed parathyroid tissue presents  2) total thyroid stitch marks the right superior pole-permanent  3) left central neck contents     [id number]     unresponsive to pain

## 2020-09-29 NOTE — INTERVAL H&P NOTE
The patient has been examined and the H&P has been reviewed:    I concur with the findings and no changes have occurred since H&P was written.    Surgery risks, benefits and alternative options discussed and understood by patient/family.          Active Hospital Problems    Diagnosis  POA    Thyroid cancer [C73]  Yes     Repeat thyroid studies every 6 weeks until she delivers        Resolved Hospital Problems   No resolved problems to display.     
137

## 2020-09-29 NOTE — TRANSFER OF CARE
"Anesthesia Transfer of Care Note    Patient: madeline Hutton    Procedure(s) Performed: Procedure(s) (LRB):  THYROIDECTOMY with central neck dissection (Bilateral)    Patient location: PACU    Anesthesia Type: general    Transport from OR: Transported from OR on 2-3 L/min O2 by NC with adequate spontaneous ventilation    Post pain: adequate analgesia    Post assessment: no apparent anesthetic complications    Post vital signs: stable    Level of consciousness: awake    Nausea/Vomiting: no nausea/vomiting    Complications: none    Transfer of care protocol was followed      Last vitals:   Visit Vitals  /74 (BP Location: Right arm, Patient Position: Sitting)   Pulse 81   Temp 36.1 °C (97 °F) (Oral)   Resp 16   Ht 4' 10" (1.473 m)   Wt 52.2 kg (115 lb)   LMP 09/03/2020   SpO2 98%   Breastfeeding No   BMI 24.04 kg/m²     "

## 2020-09-29 NOTE — NURSING
Pt arrived to floor via stretcher with BEE Rojo and transferred to bed. IVF started, SCDs applied, oriented to room, call light placed within reach, bed low and locked. No complaints of pain. Incisions noted to neck , CDI. Trach set and suction set up at bedside.  No acute distress noted at this time. Will continue to monitor.

## 2020-09-29 NOTE — ANESTHESIA POSTPROCEDURE EVALUATION
Anesthesia Post Evaluation    Patient: Thu JESUS Hutton    Procedure(s) Performed: Procedure(s) (LRB):  THYROIDECTOMY with central neck dissection (Bilateral)    Final Anesthesia Type: general    Patient location during evaluation: PACU  Patient participation: Yes- Able to Participate  Level of consciousness: awake and alert  Post-procedure vital signs: reviewed and stable  Pain management: adequate  Airway patency: patent    PONV status at discharge: No PONV  Anesthetic complications: no      Cardiovascular status: blood pressure returned to baseline  Respiratory status: unassisted and spontaneous ventilation  Hydration status: euvolemic  Follow-up not needed.          Vitals Value Taken Time   /60 09/29/20 1508   Temp 36.4 °C (97.5 °F) 09/29/20 1508   Pulse 70 09/29/20 1508   Resp 17 09/29/20 1555   SpO2 98 % 09/29/20 1508         Event Time   Out of Recovery 13:15:00         Pain/Flor Score: Pain Rating Prior to Med Admin: 7 (9/29/2020  3:55 PM)  Pain Rating Post Med Admin: 4 (9/29/2020 12:00 PM)  Flor Score: 10 (9/29/2020 12:15 PM)

## 2020-09-29 NOTE — ANESTHESIA PROCEDURE NOTES
Intubation  Performed by: Teresa Mccauley CRNA  Authorized by: Dave Mann MD     Intubation:     Induction:  Intravenous    Intubated:  Postinduction    Mask Ventilation:  Easy mask    Attempts:  1    Attempted By:  CRNA    Method of Intubation:  Video laryngoscopy    Blade:  Bartlett 3    Laryngeal View Grade: Grade I - full view of chords      Difficult Airway Encountered?: No      Complications:  None    Airway Device:  EMG ETT (NIMS)    Airway Device Size:  6.0    Style/Cuff Inflation:  Cuffed    Inflation Amount (mL):  4    Tube secured:  21    Secured at:  The lips    Placement Verified By:  Capnometry    Complicating Factors:  None    Findings Post-Intubation:  BS equal bilateral and atraumatic/condition of teeth unchanged

## 2020-09-30 ENCOUNTER — NURSE TRIAGE (OUTPATIENT)
Dept: ADMINISTRATIVE | Facility: CLINIC | Age: 35
End: 2020-09-30

## 2020-09-30 VITALS
RESPIRATION RATE: 16 BRPM | HEIGHT: 58 IN | DIASTOLIC BLOOD PRESSURE: 66 MMHG | TEMPERATURE: 98 F | HEART RATE: 75 BPM | OXYGEN SATURATION: 97 % | SYSTOLIC BLOOD PRESSURE: 104 MMHG | WEIGHT: 115 LBS | BODY MASS INDEX: 24.14 KG/M2

## 2020-09-30 PROCEDURE — 63600175 PHARM REV CODE 636 W HCPCS: Performed by: SURGERY

## 2020-09-30 PROCEDURE — 25000003 PHARM REV CODE 250: Performed by: STUDENT IN AN ORGANIZED HEALTH CARE EDUCATION/TRAINING PROGRAM

## 2020-09-30 PROCEDURE — 25000003 PHARM REV CODE 250: Performed by: SURGERY

## 2020-09-30 RX ADMIN — OXYCODONE 5 MG: 5 TABLET ORAL at 09:09

## 2020-09-30 RX ADMIN — MUPIROCIN 1 G: 20 OINTMENT TOPICAL at 09:09

## 2020-09-30 RX ADMIN — ERGOCALCIFEROL 50000 UNITS: 1.25 CAPSULE ORAL at 09:09

## 2020-09-30 RX ADMIN — LEVOTHYROXINE SODIUM 100 MCG: 100 TABLET ORAL at 05:09

## 2020-09-30 RX ADMIN — DEXAMETHASONE SODIUM PHOSPHATE 4 MG: 4 INJECTION, SOLUTION INTRAMUSCULAR; INTRAVENOUS at 05:09

## 2020-09-30 RX ADMIN — DEXAMETHASONE SODIUM PHOSPHATE 4 MG: 4 INJECTION, SOLUTION INTRAMUSCULAR; INTRAVENOUS at 12:09

## 2020-09-30 RX ADMIN — OXYCODONE 5 MG: 5 TABLET ORAL at 05:09

## 2020-09-30 NOTE — NURSING
Pt free of trauma, falls, and injury. Pt VSS and afebrile throughout shift. Pt free of skin breakdown. Pt incision is clean, dry, and intact. Pt pain has been moderately controlled by PO pain meds and tolerated well. Pt has been eating and voiding adequately throughout shift. Pt has call light in reach, bed alarm on, bed brakes on, side rails up x2, bed in low position, SCDs on, IS at bedside, and nonskid socks on. Trach set and suction set up at bedside. Pt lying in bed in no distress. Purposeful rounding performed this shift.

## 2020-09-30 NOTE — DISCHARGE INSTRUCTIONS
Recovering after Endocrine Surgery    Type of Procedure: Thyroidectomy    Postoperative clinic appointment date: One week after surgery    Activity:  You may resume normal daily activities upon discharge.  This includes walking and climbing stairs.  Avoid heavy lifting and vigorous exercise for approximately 2 weeks.    Showering:  You may resume normal showering and bathing approximately 2 days after surgery.  Your incision does not require special care and it may get wet.    Incision:  Your neck incision is closed with absorbable sutures under the skin.  You will not need to have any stitches removed.      Pain:  After surgery you may experience pain at the incision, generalized neck pain, or a sore throat.  You will be given a prescription for pain relief.  Most patients will still have discomfort 2-3 days after surgery.  Many times, over the counter pain medications, such as Extra Strength Tylenol, are effective.    Driving:  Use your judgment in resuming to drive.  Avoid driving if you are still experiencing neck pain and are using prescription pain medications.      Return to Work:  Recovery after surgery depends on the individual.  Most patients can expect to return to work approximately 1-2 weeks after surgery.    Diet:  You may resume your normal diet after surgery without any restrictions.    Constipation:  Anesthesia and pain medication can cause a decrease in bowel motility.  If you experience constipation postoperatively, you may take over the counter laxatives such as Milk of Magnesia.    Calcium Supplements:  Calcium tablets may be recommended after your surgery.  Most often this is to promote bone health and prevent low blood calcium during recovery.  Numbness and tingling in your fingertips or around your mouth may be experienced when calcium levels are low.  If tingling or numbness occurs, call you doctor.  You may chew up several extra tablets to relieve symptoms from low calcium.  Tums may be  substituted for calcium tablets.    Medications:  You may resume taking medication as instructed by your MD at discharge from the hospital.     Questions/Concerns:  If you have any questions or concerns please call your doctor's office: Kayla Lovelace MD,  [phone] or after hours call [phone] and ask for the General Surgery Resident on call.

## 2020-09-30 NOTE — PLAN OF CARE
No significant events overnight. Remains free from fall, injury, skin breakdown. VSS on RA throughout the night. Positions self-independently. Pain controled w/ PO meds. Pt refuses ice to neck. Incision ULISSES, CDI. All alarms active and auduble. Suction and trach kit at bedside. Pt ambulated to bathroom x2. TEDs/SCDs in place. Plan of care reviewed w/ pt and all questions answered. Bed locked and in lowest position. Call light w/I reach. No needs at this time. Purposeful hourly rounding. Will continue to monitor.

## 2020-09-30 NOTE — TELEPHONE ENCOUNTER
Reason for Disposition   Information only question and nurse able to answer    Protocols used: INFORMATION ONLY CALL - NO TRIAGE-A-OH

## 2020-09-30 NOTE — OP NOTE
[facility]  Endocrine Surgery  Operative Report    SUMMARY     Date of Procedure: 9/29/2020     Procedure:   1) Total thyroidectomy  2) Left central neck dissection  3) Re-implantation(auto-transplantation) of left inferior parathyroid gland    Indications: This patient presents with a nodule on the left side of the neck. Fine needle aspiration cytology revealed findings consistent with a papillary carcinoma. The patient now presents for a total thyroidectomy and left central neck dissection.     Surgeon(s) and Role:     * Kayla Lovelace MD - Primary     * Michelle Lin MD - Resident - Assisting    Pre-Operative Diagnosis: Thyroid cancer [C73]    Post-Operative Diagnosis: Thyroid cancer [C73]    Anesthesia: General    Intraoperative Findings:     1.  A nodule was found within the thyroid lobe, located in the lower pole.  This measured approximately 2 cm in diameter and mobile from surrounding structures, and there was no adenopathy noted upon exploration of the neck..   2. The bilateral recurrent laryngeal nerve was identified.  Function was verified using the NIMS system.  3. Parathyroid tissue was identified and preserved with a viable blood supply.   4. Left inferior parathyroid gland auto-transplanted into the left SCM      Description of the Findings of the Procedure:    The patient was seen in the Holding Room. The risks, benefits, complications, treatment options, and expected outcomes were discussed with the patient. The possibilities of reaction to medication, pulmonary aspiration, perforation of viscus, bleeding, recurrent infection, finding a normal thyroid, recurrently laryngeal nerve damage, the need for additional procedures, failure to diagnose a condition, and creating a complication requiring transfusion or operation were discussed with the patient. The patient concurred with the proposed plan, giving informed consent.  The site of surgery properly noted/marked. The patient was  taken to Operating Room, identified as Dina Hutton and the procedure verified as Thyroidectomy. A Time Out was held and the above information confirmed.    The patient was placed supine after induction of a general anesthetic. The neck was extended and prepped and draped in standard fashion. An 6 cm transverse cervical incision was created above the sternal notch within a natural skin fold. Dissection was carried down through the platysma layer. Once this was completed, sub-platysmal flaps were raised superiorly to the thyroid cartilage and inferiorly to the sternal notch. The strap muscles were identified and divided at the midline. Sharp and blunt dissection were used to mobilize the left thyroid lobe in a medial direction. A nodule was found within the thyroid lobe, located in the lower pole.  This measured approximately 2 cm in diameter and mobile from surrounding structures. Dissection continued posteriorly to expose the tracheoesophageal groove and left carotid artery. The left thyroid lobe was mobilized further and the superior and inferior pole vessels were divided with silk ties, clips and the bipolar cautery. The middle thyroid vein was similarly divided. The left recurrent laryngeal nerve was identified and preserved. Function was verified using the NIMS.The left inferior parathyroid gland was identified, and the left superior parathyroid was noted dorsal to the nerve and left in situ. Small vessels were likewise divided. The gland was rotated in a medial direction and taken off the trachea using the bipolar cautery. A very small 15mg remnant was left in situ at the ligament of Thompson to preserve the nerve function.  The isthmus was removed off the thyroid cartilage also using the bipolar cautery.     Attention was then give to the right lobe.  Sharp and blunt dissection were again used to mobilize the right thyroid lobe in a medial direction. Dissection continued posteriorly to expose the  tracheoesophageal groove and right carotid artery. Similar to the left, the right thyroid lobe was mobilized further and the superior and inferior pole vessels were divided with silk ties, clips and the bipolar cautery. The middle thyroid vein was similarly divided. The right recurrent laryngeal nerve was identified and preserved. Function was verified using the NIMS. Small vessels were likewise divided. The right superior parathyroid gland was identified and preserved in situ as was the right inferior gland. The gland was rotated in a medial direction and taken off the trachea using the bipolar cautery. The specimen was submitted to pathology for permanent evaluation. A suture was placed for orientation purposes (stitch marks the right superior pole).     Inspection of the central neck did not reveal any significant adenopathy or grossly palpable disease worrisome of metastatic spread. Based on these findings and the known history of papillary thyroid cancer decision was made to perform a prophylatic left central neck dissection. The plane anterior to the carotid was opened. The plane anterior to the left carotid artery was developed using electocautery. The tissues immediately underneath the strap muscles and anterior to the carotid artery were removed. The tissues in the paratracheal region around the RLN were also removed. This dissection was continued inferiorly towards level 7. The thyrothymic ligament was incised and partial transcervical thymectomy was performed. The dissection was continue across the midline to the anterior trachea and the tissue here was also removed. The specimen was named central neck contents and sent for permanent section and included the left inferior parathyroid gland.     The left inferiorparathyroid gland (confirmed on frozen section) was minced into pieces on the back table. A small pocket in theleftSCM  was created and the gland was placed into the pocket. The pocket was closed  using a 3-0 Prolene and marked with 2 clips.    The wound was irrigated and inspected carefully. Multiple Valsalva maneuvers were performed at 30 cm of water and additional hemostasis was achieved as necessary with focal application of bipolar cautery. This was augmented with Fibrillar which was placed in the thyroid fossa. The parathyroid tissue was found to be viable and the bilateral recurrent laryngeal nerves were left intact in their anatomic locations. Function was again verified using the NIMS prior to closure. Then, 0.5% Marcaine was placed into the strap muscles and subcutaneous tissues for post-op analgesia. The strap muscles were then closed with interrupted 3-0 Vicryl suture.  The platysma was closed with interrupted 3-0 Vicryl suture, and the skin incision was closed with a 5-0 Monocryl subcuticular knot-less closure. Sterile dressings were was applied across the incision.    Instrument, sponge, and needle counts were correct prior to closure and at the conclusion of the case.     Significant Surgical Tasks Conducted by the Assistant(s), if Applicable: none    Complications: No    Total IV Fluids: see anesthesia record    Estimated Blood Loss (EBL): 10mL           Drains: none    Implants: Left inferior parathyroid gland(confirmed on frozen) re-implanted into left inferior medial SCM.    Specimens:   1) question parathyroid tissue-frozen section confirmed parathyroid tissue presents  2) total thyroid stitch marks the right superior pole-permanent  3) left central neck contents            Condition: stable    Disposition: PACU - hemodynamically stable.    Attestation: I was present and scrubbed for the entire procedure.

## 2020-09-30 NOTE — TELEPHONE ENCOUNTER
Patient's  is calling, cannot see the last dose of thyroid medication given on the AVS. AVS reviewed, assisted  in locating dose given this am. No further questions or concerns at this time.

## 2020-09-30 NOTE — DISCHARGE SUMMARY
[facility]  General Surgery  Discharge Summary      Patient Name: Dina Hutton  MRN: [id number]  Admission Date: 9/29/2020  Hospital Length of Stay: 0 days  Discharge Date and Time:  09/30/2020 6:31 AM  Attending Physician: Kayla Lovelace MD   Discharging Provider: Michelle Lin MD  Primary Care Provider: Pradeep Kennedy MD     HPI: Dina Hutton is a 35 y.o. female seen today in the Endocrine Surgery Clinic for evaluation of thyroid carcinoma. Her history dates back to 2020 when patient was undergoing testing and a thyroid mass was identified as an incidental finding. Subsequent evaluation included referral to an endocrinologist, neck ultrasound and fine needle aspiration. She was found to have papillary thyroid cancer and saw Dr. Christianson in February but was 32 weeks pregnant. Felt ok to defer surgical intervention until after she had the baby but then the COVID pandemic began and she was lost to follow up. She recently reached out to schedule surgery but as Dr. Christianson is leaving she was sent to our clinic for evaluation. Dina Hutton complains of difficulty with swallowing. The patient denies hot/cold intolerance, fatigue, unexplained weight changes, change in voice quality and palpable neck mass. She does not have a history of head and neck radiation therapy. She does not have a family history of thyroid disease. She does not have a family history of endocrinopathies. She is not taking OTC Vitamin D and thyroid hormone supplementation. She has not undergone radioactive iodine treatment.    Procedure(s) (LRB):  THYROIDECTOMY with central neck dissection (Bilateral)     Hospital Course: Patient presented as above and underwent the stated procedure without apparent intra-op complication. Following the case she was transferred from PACU to the floor where labs and vitals remained stable and appropriate. Regular diet was well tolerated. Pain was controlled with oral meds. Patient was deemed stable  for discharge home with follow up.    Physical Exam   Constitutional: She is oriented to person, place, and time and well-developed, well-nourished, and in no distress.   HENT:   Head: Normocephalic and atraumatic.   Right Ear: External ear normal.   Left Ear: External ear normal.   Eyes: Pupils are equal, round, and reactive to light. Conjunctivae and EOM are normal.   Neck: Normal range of motion. Neck supple. No tracheal deviation present.   Transverse cervical incision c/d/i no hematoma, ecchymosis or bruising appreciated   Cardiovascular: Normal rate and regular rhythm.   Pulmonary/Chest: Effort normal. No respiratory distress.   Abdominal: Soft. She exhibits no distension. There is no abdominal tenderness.   Musculoskeletal: Normal range of motion.         General: No edema.   Neurological: She is alert and oriented to person, place, and time. GCS score is 15.   Skin: Skin is warm and dry. No erythema.         Consults: None    Significant Diagnostic Studies: Labs:   BMP:   Recent Labs   Lab 09/29/20  1557   *      K 4.5      CO2 24   BUN 10   CREATININE 0.7   CALCIUM 8.3*       Pending Diagnostic Studies:     Procedure Component Value Units Date/Time    Specimen to Pathology, Surgery Thyroid, Parathyroid, and Adrenals [[id number]] Collected: 09/29/20 0750    Order Status: Sent Lab Status: In process Updated: 09/29/20 1702    Specimen to Pathology, Surgery Thyroid, Parathyroid, and Adrenals [[id number]] Collected: 09/29/20 0941    Order Status: Sent Lab Status: In process Updated: 09/29/20 0942    Specimen to Pathology, Surgery Thyroid, Parathyroid, and Adrenals [[id number]] Collected: 09/29/20 0904    Order Status: Sent Lab Status: In process Updated: 09/29/20 0904        Final Active Diagnoses:    Diagnosis Date Noted POA    PRINCIPAL PROBLEM:  Thyroid cancer [C73] 01/10/2020 Yes      Problems Resolved During this Admission:      Discharged Condition: good    Disposition: Home or Self  Care    Follow Up:  Follow-up Information     Kayla Lovelace MD In 2 weeks.    Specialty: General Surgery  Contact information:  [facility] [address]  [phone]                 Patient Instructions:      Notify your health care provider if you experience any of the following:  increased confusion or weakness     Notify your health care provider if you experience any of the following:  persistent dizziness, light-headedness, or visual disturbances     Notify your health care provider if you experience any of the following:  worsening rash     Notify your health care provider if you experience any of the following:  severe persistent headache     Notify your health care provider if you experience any of the following:  difficulty breathing or increased cough     Notify your health care provider if you experience any of the following:  redness, tenderness, or signs of infection (pain, swelling, redness, odor or green/yellow discharge around incision site)     Notify your health care provider if you experience any of the following:  severe uncontrolled pain     Notify your health care provider if you experience any of the following:  persistent nausea and vomiting or diarrhea     Notify your health care provider if you experience any of the following:  temperature >100.4     Other restrictions (specify):   Order Comments: Recovering after Endocrine Surgery    Type of Procedure: Thyroidectomy    Postoperative clinic appointment date: 1-2 weeks after surgery    Activity:  You may resume normal daily activities upon discharge.  This includes walking and climbing stairs.  Avoid heavy lifting and vigorous exercise for approximately 2 weeks.    Showering:  You may resume normal showering and bathing.  Your incision does not require special care and it may get wet after 48 hours.    Incision:  Your neck incision is closed with absorbable sutures under the skin.  You will not need to have  any stitches removed.      Pain:  After surgery you may experience pain at the incision, generalized neck pain, or a sore throat.  You will be given a prescription for pain relief.  Most patients will still have discomfort 2-3 days after surgery.  Many times, over the counter pain medications, such as Extra Strength Tylenol, are effective.    Driving:  Use your judgment in resuming to drive.  Avoid driving if you are still experiencing neck pain and are using prescription pain medications.      Return to Work:  Recovery after surgery depends on the individual.  Most patients can expect to return to work approximately 1-2 weeks after surgery.    Diet:  You may resume your normal diet after surgery without any restrictions.    Constipation:  Anesthesia and pain medication can cause a decrease in bowel motility.  If you experience constipation postoperatively, you may take over the counter laxatives such as Milk of Magnesia.    Calcium Supplements:  Calcium tablets are sometimes recommended after your surgery. Most often this is to promote bone health and prevent low blood calcium during recovery.  Numbness and tingling in your fingertips or around your mouth may be experienced when calcium levels are low.  If tingling or numbness occurs, double up on the Calcium; if you experience cramping in your hands or legs, double up on the Calcium and call you doctor.  Extra Strength Tums may be substituted for calcium tablets.    Medications:  You may resume taking medication as instructed by your MD at discharge from the hospital.     Questions/Concerns:  If you have any questions or concerns please call your doctor's office or after hours call [phone] and ask for the General Surgery Resident on call.  Dr. Kayla Lovelace     No dressing needed     Medications:  Reconciled Home Medications:      Medication List      START taking these medications    levothyroxine 100 MCG tablet  Commonly known as: SYNTHROID  Take 1  tablet (100 mcg total) by mouth before breakfast.     oxyCODONE 5 MG immediate release tablet  Commonly known as: ROXICODONE  Take 1 tablet (5 mg total) by mouth every 6 (six) hours as needed.        CONTINUE taking these medications    breast pump Flory  1 Device.     norelgestromin-ethinyl estradiol 150-35 mcg/24 hr  Commonly known as: ORTHO EVRA  Place 1 patch onto the skin.     omeprazole 20 MG capsule  Commonly known as: PRILOSEC  Take 20 mg by mouth once daily.            Michelle Lin MD  General Surgery  [facility]

## 2020-10-01 ENCOUNTER — PATIENT MESSAGE (OUTPATIENT)
Dept: SURGERY | Facility: CLINIC | Age: 35
End: 2020-10-01

## 2020-10-02 LAB
FINAL PATHOLOGIC DIAGNOSIS: NORMAL
FROZEN SECTION DIAGNOSIS: NORMAL
FROZEN SECTION FOOTNOTE: NORMAL
GROSS: NORMAL
Lab: NORMAL
MICROSCOPIC EXAM: NORMAL

## 2020-10-03 ENCOUNTER — PATIENT MESSAGE (OUTPATIENT)
Dept: ENDOCRINOLOGY | Facility: CLINIC | Age: 35
End: 2020-10-03

## 2020-10-05 ENCOUNTER — OFFICE VISIT (OUTPATIENT)
Dept: SURGERY | Facility: CLINIC | Age: 35
End: 2020-10-05
Attending: SURGERY
Payer: COMMERCIAL

## 2020-10-05 ENCOUNTER — TELEPHONE (OUTPATIENT)
Dept: ENDOCRINOLOGY | Facility: CLINIC | Age: 35
End: 2020-10-05

## 2020-10-05 VITALS — WEIGHT: 115 LBS | BODY MASS INDEX: 24.14 KG/M2 | HEIGHT: 58 IN

## 2020-10-05 DIAGNOSIS — Z09 POSTOP CHECK: ICD-10-CM

## 2020-10-05 DIAGNOSIS — E89.0 POST-SURGICAL HYPOTHYROIDISM: ICD-10-CM

## 2020-10-05 DIAGNOSIS — C73 THYROID CANCER: Primary | ICD-10-CM

## 2020-10-05 DIAGNOSIS — C73 PAPILLARY THYROID CARCINOMA: Primary | ICD-10-CM

## 2020-10-05 PROCEDURE — 99024 PR POST-OP FOLLOW-UP VISIT: ICD-10-PCS | Mod: S$GLB,,, | Performed by: SURGERY

## 2020-10-05 PROCEDURE — 99024 POSTOP FOLLOW-UP VISIT: CPT | Mod: S$GLB,,, | Performed by: SURGERY

## 2020-10-05 NOTE — LETTER
Endocrine/General Surgery  [address] [place] [address]  Phone: [phone]  Fax: [phone] October 6, 2020         Wale Nur MD  [address]  [facility] [address]    Patient: Dina Hutton   YOB: 1985   Date of Visit: 10/5/2020     Dear Dr. Nur:    I wanted to let you know that I operated on Dina Hutton. She underwent total thyroidectomy for thyroid cancer on 9/29/2020. You will find a copy of the operative report and final pathology in the [employer] system for your review. She clinically is doing very well, and I suspect her recovery will be uneventful. She is on thyroid replacement (Levothyroxine 100 mcg daily), and is not on calcium supplementation. She plans on seeing you in 6-8 weeks with repeat laboratory studies.     If you have any questions or concerns, please contact me. Again, thank you for allowing me to participate in the care of this patient.     Regards,        Kayla Lovelace MD      Pradeep Kennedy MD  [address] [facility] [address]  Via In Basket

## 2020-10-05 NOTE — Clinical Note
Afternoon,    Hope all is well.  Ms. Hutton is having some mild nausea post surgery.  It is unclear to me if this is related to the medication.  I told her to monitor over the next couple of weeks as it may be related to the surgery.  She will let you know if it persist to determine if medication change is necessary.  Also, her path was favorable.    Thanks,    aob

## 2020-10-05 NOTE — PROGRESS NOTES
"Subjective:       Dina Hutton presents to the clinic 1 weeks following total thyroidectomy with left central neck dissection on 9/29/2020. Eating a regular diet without difficulty. Bowel movements are Normal.  The patient is not having any pain.. Patient admits mood lability and tearfulness.  She also notes some mild nausea. Patient denies trouble swallowing, trouble speaking and fingers, toes and perioral numbness or tingling. She is currently not on Calcium  or Rocaltrol. Patient is taking Levothyroxine (100 mcg).       Objective:      Ht 4' 10" (1.473 m)   Wt 52.2 kg (115 lb)   BMI 24.04 kg/m²     General:   alert, appears stated age and cooperative. Chovstek's sign absent.   Incision:   well approximated, healing well, no signs of drainage, no erythema, no dehiscence, no hematoma, no ecchymosis, no seroma and swelling(minimal)   Voice:  normal     Pathology:    1. PARATHYROID BIOPSY:  HYPERCELLULAR PARATHYROID TISSUE  2. TOTAL THYROIDECTOMY SPECIMEN:  PAPILLARY CARCINOMA--SEE DESCRIPTION  NO CARCINOMA IDENTIFIED IN MARGINS  SEVERAL ADENOMATOUS NODULES THE  3. LEFT CENTRAL NECK CONTENTS:  3 LYMPH NODE WITH NO METASTATIC CARCINOMA IDENTIFIED    Procedure: Total thyroidectomy  Focality: There is 1 principal lesion with several nearby is a smaller similar foci. One focus is further way  and therefore there does appear to be multi focality of the same type of carcinoma.  Tumor Laterality: Left lobe  Tumor Size: 1.5 cm the very dominant lesion  Histologic Type: Papillary carcinoma, classic  Margins: No margin involvement identified  Angioinvasion (Vascular Invasion): Not identified  Lymphatic Invasion: Not identified  Extrathyroid Extension: No extrathyroidal extension identified  Number of Lymph Nodes Examined: 3  Which examined: Left central neck  Number of Lymph Nodes Involved: 0  Staging: pT1a pN0 pMX    Labs:  Lab Results   Component Value Date    CALCIUM 8.3 (L) 09/29/2020    CALCIUM 9.1 09/24/2020    CALCIUM " 9.1 04/20/2020    TSH 1.485 02/28/2020    TSH 0.947 01/24/2020    TSH 0.940 08/27/2019    FREET4 0.82 02/28/2020    FREET4 0.72 01/24/2020    FREET4 0.86 08/27/2019    [id number] 18 (L) 09/24/2020    PTH 25.2 09/29/2020    PHOS 3.6 09/29/2020    PHOS 2.6 (L) 09/24/2020    PHOS 2.2 (L) 10/04/2013            Assessment:     Dina Hutton is doing well post-operatively after total thyroidectomy with left central neck dissection on 9/29/2020.         Plan:        1. Continue any current medications.  I recommended the patient follow up with her primary care physician regarding her tearfullness.  She may need to resume anti-depressant medication.  Will also consider thyroid medication change if nausea persist over the next few weeks.  2. Wound care and scar massage discussed.  3. Pt is to increase activities as tolerated.  4. Follow up with endocrinology for the possibility for further treatment.

## 2020-11-10 ENCOUNTER — LAB VISIT (OUTPATIENT)
Dept: LAB | Facility: HOSPITAL | Age: 35
End: 2020-11-10
Attending: INTERNAL MEDICINE
Payer: COMMERCIAL

## 2020-11-10 DIAGNOSIS — C73 PAPILLARY THYROID CARCINOMA: ICD-10-CM

## 2020-11-10 DIAGNOSIS — E89.0 POST-SURGICAL HYPOTHYROIDISM: ICD-10-CM

## 2020-11-10 LAB
T4 FREE SERPL-MCNC: 1.34 NG/DL (ref 0.71–1.51)
TSH SERPL DL<=0.005 MIU/L-ACNC: 0.03 UIU/ML (ref 0.4–4)

## 2020-11-10 PROCEDURE — 84443 ASSAY THYROID STIM HORMONE: CPT

## 2020-11-10 PROCEDURE — 84439 ASSAY OF FREE THYROXINE: CPT

## 2020-11-10 PROCEDURE — 36415 COLL VENOUS BLD VENIPUNCTURE: CPT

## 2020-11-10 PROCEDURE — 86800 THYROGLOBULIN ANTIBODY: CPT

## 2020-11-11 ENCOUNTER — OFFICE VISIT (OUTPATIENT)
Dept: ENDOCRINOLOGY | Facility: CLINIC | Age: 35
End: 2020-11-11
Payer: COMMERCIAL

## 2020-11-11 ENCOUNTER — PATIENT MESSAGE (OUTPATIENT)
Dept: ENDOCRINOLOGY | Facility: CLINIC | Age: 35
End: 2020-11-11

## 2020-11-11 VITALS
DIASTOLIC BLOOD PRESSURE: 60 MMHG | HEIGHT: 58 IN | SYSTOLIC BLOOD PRESSURE: 100 MMHG | BODY MASS INDEX: 24.23 KG/M2 | WEIGHT: 115.44 LBS | HEART RATE: 90 BPM | OXYGEN SATURATION: 99 % | RESPIRATION RATE: 18 BRPM

## 2020-11-11 DIAGNOSIS — C73 THYROID CANCER: ICD-10-CM

## 2020-11-11 DIAGNOSIS — C73 PAPILLARY THYROID CARCINOMA: Primary | ICD-10-CM

## 2020-11-11 DIAGNOSIS — F32.A DEPRESSION, UNSPECIFIED DEPRESSION TYPE: ICD-10-CM

## 2020-11-11 DIAGNOSIS — E89.0 POST-SURGICAL HYPOTHYROIDISM: ICD-10-CM

## 2020-11-11 DIAGNOSIS — E89.0 POSTSURGICAL HYPOTHYROIDISM: ICD-10-CM

## 2020-11-11 PROBLEM — N61.1 BREAST ABSCESS: Status: RESOLVED | Noted: 2020-04-25 | Resolved: 2020-11-11

## 2020-11-11 PROBLEM — N61.0 MASTITIS: Status: RESOLVED | Noted: 2020-04-20 | Resolved: 2020-11-11

## 2020-11-11 PROBLEM — R79.89 LOW TSH LEVEL: Status: RESOLVED | Noted: 2019-08-27 | Resolved: 2020-11-11

## 2020-11-11 PROBLEM — E04.1 THYROID NODULE: Status: RESOLVED | Noted: 2019-08-27 | Resolved: 2020-11-11

## 2020-11-11 PROBLEM — Z87.59 HISTORY OF FOURTH DEGREE PERINEAL LACERATION: Status: RESOLVED | Noted: 2019-08-21 | Resolved: 2020-11-11

## 2020-11-11 LAB
THRYOGLOBULIN INTERPRETATION: ABNORMAL
THYROGLOB AB SERPL-ACNC: 25 IU/ML
THYROGLOB SERPL-MCNC: 10 NG/ML

## 2020-11-11 PROCEDURE — 99214 PR OFFICE/OUTPT VISIT, EST, LEVL IV, 30-39 MIN: ICD-10-PCS | Mod: S$GLB,,, | Performed by: INTERNAL MEDICINE

## 2020-11-11 PROCEDURE — 3008F PR BODY MASS INDEX (BMI) DOCUMENTED: ICD-10-PCS | Mod: CPTII,S$GLB,, | Performed by: INTERNAL MEDICINE

## 2020-11-11 PROCEDURE — 99214 OFFICE O/P EST MOD 30 MIN: CPT | Mod: S$GLB,,, | Performed by: INTERNAL MEDICINE

## 2020-11-11 PROCEDURE — 99999 PR PBB SHADOW E&M-EST. PATIENT-LVL IV: CPT | Mod: PBBFAC,,, | Performed by: INTERNAL MEDICINE

## 2020-11-11 PROCEDURE — 3008F BODY MASS INDEX DOCD: CPT | Mod: CPTII,S$GLB,, | Performed by: INTERNAL MEDICINE

## 2020-11-11 PROCEDURE — 99999 PR PBB SHADOW E&M-EST. PATIENT-LVL IV: ICD-10-PCS | Mod: PBBFAC,,, | Performed by: INTERNAL MEDICINE

## 2020-11-11 RX ORDER — LEVOTHYROXINE SODIUM 88 UG/1
88 TABLET ORAL
Qty: 90 TABLET | Refills: 3 | Status: SHIPPED | OUTPATIENT
Start: 2020-11-11 | End: 2020-12-29

## 2020-11-11 NOTE — ASSESSMENT & PLAN NOTE
AJCC stage I disease. LUIS ENRIQUE low risk pathology.    See below regarding adjusting levothyroxine dose.  Serum thyroglobulin is pending.    Discussed indications and role of I-131 in treatment of DTC.  Given that she has a young child, I recommended holding off on I-131 therapy for now unless her thyroglobulin level comes back unexpectedly high.    Check neck ultrasound in six months.    TSH goals discussed with patient. Goal is low normal. Will follow periodically.    TG is currently undetectable and this will be our marker for recurrence   Periodic thyroid bed u/s    RTC in 12 months with TFTs, TG and neck US.

## 2020-11-11 NOTE — ASSESSMENT & PLAN NOTE
It is likely that the hyperthyroidism is exacerbating her underlying anxiety.  We will try to keep her TSH in the low end of normal range.  I recommended discussing with her primary care regarding getting back on anxiety medication.

## 2020-11-11 NOTE — ASSESSMENT & PLAN NOTE
TSH is over suppressed with symptoms of hyperthyroidism.  Will reduce dose of levothyroxine to 88 mcg daily, which is around her weight based predicted dose.    Goal TSH 0.5-2.0

## 2020-11-11 NOTE — PROGRESS NOTES
"Subjective:      Chief Complaint:  Follow-up for papillary thyroid cancer and postsurgical hypothyroidism    HPI: Dina Hutton is a 35 y.o. female who is here for follow-up evaluation for papillary thyroid cancer and postsurgical hypothyroidism    With regards to the papillary thyroid cancer and postsurgical hypothyroidism:  11/11/2020:  She is now six weeks post surgery doing relatively well.  She does report occasional outbursts of anger and anxiety along with feeling excessively overheated at times with paroxysmal sweating.  She also reports intermittent nausea.  She is having some mild tenderness above the incision.    Most recent thyroid function tests show a suppressed TSH with normal free T4.  Serum thyroglobulin is pending.    Her new baby boy is now seven months old.       Background history:  Left-sided thyroid nodule was initially discovered on CTA of the chest done in May, 2019 when she was evaluated in the emergency room for chest pain.   "There is a 1.3 cm hypoattenuating left thyroid lobe nodule.  The remaining visualized soft tissue structures at the base of the neck are unremarkable."    She had a follow-up ultrasound done on June 5th, which showed a solid, hypoechoic left thyroid nodule with "punctate peripheral calcifications" and circumscribed borders (see report below).     She underwent FNA on 1/2/2020, which came back Buxton VI (Malignant).  Because she was pregnant at the time in the 3rd trimester, the decision was made to hold off on surgery until after she delivered.  She underwent total thyroidectomy on 9/29/2020:          Six week postoperative labs:      The current dose of levothyroxine is 100 mcg daily.      Reviewed past medical, family, social history and updated as appropriate.    Review of Systems   Constitutional: Negative for unexpected weight change.   HENT:        Anterior neck pain   Eyes: Negative for visual disturbance.   Respiratory: Negative for shortness of breath.  "   Gastrointestinal: Positive for nausea.   Genitourinary: Negative for urgency.   Musculoskeletal: Negative for arthralgias.   Skin: Negative for wound.   Neurological: Negative for headaches.   Hematological: Does not bruise/bleed easily.   Psychiatric/Behavioral: Positive for agitation and dysphoric mood. Negative for sleep disturbance.     Objective:     Vitals:    11/11/20 0950   BP: 100/60   Pulse: 90   Resp: 18     BP Readings from Last 5 Encounters:   11/11/20 100/60   09/30/20 104/66   09/24/20 (!) 113/56   09/14/20 119/83   06/03/20 109/70         Physical Exam  Vitals signs and nursing note reviewed.   Constitutional:       Appearance: She is well-developed.   HENT:      Right Ear: External ear normal.      Left Ear: External ear normal.      Nose: Nose normal.   Eyes:      General:         Right eye: No discharge.         Left eye: No discharge.      Conjunctiva/sclera: Conjunctivae normal.   Neck:      Thyroid: No thyromegaly (Thyroid is surgically absent.  There is a small, smooth midline fluid collection in the thyroid bed, possibly a seroma.  It is soft and freely mobile.).      Trachea: No tracheal deviation.   Cardiovascular:      Rate and Rhythm: Normal rate.      Heart sounds: No murmur.   Pulmonary:      Effort: Pulmonary effort is normal.      Breath sounds: Normal breath sounds.   Musculoskeletal:      Comments: No digital clubbing or extremity cyanosis   Lymphadenopathy:      Cervical: No cervical adenopathy.   Skin:     Findings: No rash.      Comments: No subcutaneous nodules noted.   Neurological:      Mental Status: She is alert and oriented to person, place, and time.      Coordination: Coordination normal.   Psychiatric:         Behavior: Behavior normal.      Comments: Alert and oriented to person, place, and situation.         Wt Readings from Last 10 Encounters:   11/11/20 0950 52.3 kg (115 lb 6.6 oz)   10/05/20 1000 52.2 kg (115 lb)   09/29/20 1956 52.2 kg (115 lb)   09/24/20 1212  52.2 kg (115 lb)   09/24/20 1117 52.2 kg (115 lb)   09/14/20 0910 52.2 kg (115 lb)   04/20/20 1750 51.7 kg (114 lb)   04/16/20 1418 61.2 kg (135 lb)   04/01/20 1649 61.2 kg (135 lb)   02/28/20 0808 59.7 kg (131 lb 9.8 oz)   02/20/20 1422 58.1 kg (128 lb 1.4 oz)       Lab Results   Component Value Date     09/29/2020    K 4.5 09/29/2020     09/29/2020    CO2 24 09/29/2020     (H) 09/29/2020    BUN 10 09/29/2020    CREATININE 0.7 09/29/2020    CALCIUM 8.3 (L) 09/29/2020    PROT 6.4 04/20/2020    ALBUMIN 3.5 09/29/2020    BILITOT 0.2 04/20/2020    ALKPHOS 133 04/20/2020    AST 15 04/20/2020    ALT 13 04/20/2020    ANIONGAP 10 09/29/2020    ESTGFRAFRICA >60 09/29/2020    EGFRNONAA >60 09/29/2020    TSH 0.028 (L) 11/10/2020          Assessment/Plan:     Postsurgical hypothyroidism  TSH is over suppressed with symptoms of hyperthyroidism.  Will reduce dose of levothyroxine to 88 mcg daily, which is around her weight based predicted dose.    Goal TSH 0.5-2.0    Thyroid cancer  AJCC stage I disease. LUIS ENRIQUE low risk pathology.    See below regarding adjusting levothyroxine dose.  Serum thyroglobulin is pending.    Discussed indications and role of I-131 in treatment of DTC.  Given that she has a young child, I recommended holding off on I-131 therapy for now unless her thyroglobulin level comes back unexpectedly high.    Check neck ultrasound in six months.    TSH goals discussed with patient. Goal is low normal. Will follow periodically.    TG is currently undetectable and this will be our marker for recurrence   Periodic thyroid bed u/s    RTC in 12 months with TFTs, TG and neck US.             Depression  It is likely that the hyperthyroidism is exacerbating her underlying anxiety.  We will try to keep her TSH in the low end of normal range.  I recommended discussing with her primary care regarding getting back on anxiety medication.      Follow-up in six months with repeat thyroid ultrasound done  before.

## 2020-11-26 ENCOUNTER — OFFICE VISIT (OUTPATIENT)
Dept: URGENT CARE | Facility: CLINIC | Age: 35
End: 2020-11-26
Payer: COMMERCIAL

## 2020-11-26 VITALS
OXYGEN SATURATION: 98 % | TEMPERATURE: 98 F | DIASTOLIC BLOOD PRESSURE: 68 MMHG | HEART RATE: 83 BPM | SYSTOLIC BLOOD PRESSURE: 114 MMHG | RESPIRATION RATE: 16 BRPM

## 2020-11-26 DIAGNOSIS — Z11.9 ENCOUNTER FOR SCREENING EXAMINATION FOR INFECTIOUS DISEASE: ICD-10-CM

## 2020-11-26 DIAGNOSIS — J06.9 VIRAL URI: Primary | ICD-10-CM

## 2020-11-26 LAB
CTP QC/QA: YES
SARS-COV-2 RDRP RESP QL NAA+PROBE: NEGATIVE

## 2020-11-26 PROCEDURE — 99214 OFFICE O/P EST MOD 30 MIN: CPT | Mod: S$GLB,,, | Performed by: NURSE PRACTITIONER

## 2020-11-26 PROCEDURE — 99214 PR OFFICE/OUTPT VISIT, EST, LEVL IV, 30-39 MIN: ICD-10-PCS | Mod: S$GLB,,, | Performed by: NURSE PRACTITIONER

## 2020-11-26 PROCEDURE — U0002: ICD-10-PCS | Mod: QW,S$GLB,, | Performed by: NURSE PRACTITIONER

## 2020-11-26 PROCEDURE — U0002 COVID-19 LAB TEST NON-CDC: HCPCS | Mod: QW,S$GLB,, | Performed by: NURSE PRACTITIONER

## 2020-11-26 RX ORDER — PROMETHAZINE HYDROCHLORIDE AND DEXTROMETHORPHAN HYDROBROMIDE 6.25; 15 MG/5ML; MG/5ML
5 SYRUP ORAL
Qty: 118 ML | Refills: 0 | Status: SHIPPED | OUTPATIENT
Start: 2020-11-26 | End: 2021-06-21

## 2020-11-26 NOTE — PATIENT INSTRUCTIONS
If not allergic,take tylenol (acetominophen) for fever control, chills, or body aches every 4 hours. Do not exceed 4000 mg/ day.If not allergic, take Motrin (Ibuprofen) every 4 hours for fever, chills, pain or inflammation. Do not exceed 2400 mg/day. You can alternate taking tylenol and motrin.    You must understand that you've received an Urgent Care treatment only and that you may be released before all your medical problems are known or treated. You, the patient, will arrange for follow up care as instructed.     Follow up with your PCP or specialty clinic as instructed in the next 2-3 days if not improved or as needed. You can call [phone] to schedule an appointment with appropriate provider.     If you condition worsens, we recommend that you receive another evaluation at the emergency room immediately or contact your primary medical clinic's after hours call service to discuss your concerns.     Please return here or go to the Emergency Department for any concerns or worsening condition.     If you were prescribed a narcotic or controlled substance, do not drive or operate heavy equipment or machinery while taking these medications.       Viral Upper Respiratory Illness (Adult)  You have a viral upper respiratory illness (URI), which is another term for the common cold. This illness is contagious during the first few days. It is spread through the air by coughing and sneezing. It may also be spread by direct contact (touching the sick person and then touching your own eyes, nose, or mouth). Frequent handwashing will decrease risk of spread. Most viral illnesses go away within 7 to 10 days with rest and simple home remedies. Sometimes the illness may last for several weeks. Antibiotics will not kill a virus, and they are generally not prescribed for this condition.    Home care  · If symptoms are severe, rest at home for the first 2 to 3 days. When you resume activity, don't let yourself get too  tired.  · Avoid being exposed to cigarette smoke (yours or others).  · You may use acetaminophen or ibuprofen to control pain and fever, unless another medicine was prescribed. (Note: If you have chronic liver or kidney disease, have ever had a stomach ulcer or gastrointestinal bleeding, or are taking blood-thinning medicines, talk with your healthcare provider before using these medicines.) Aspirin should never be given to anyone under 18 years of age who is ill with a viral infection or fever. It may cause severe liver or brain damage.  · Your appetite may be poor, so a light diet is fine. Avoid dehydration by drinking 6 to 8 glasses of fluids per day (water, soft drinks, juices, tea, or soup). Extra fluids will help loosen secretions in the nose and lungs.  · Over-the-counter cold medicines will not shorten the length of time youre sick, but they may be helpful for the following symptoms: cough, sore throat, and nasal and sinus congestion. (Note: Do not use decongestants if you have high blood pressure.)  Follow-up care  Follow up with your healthcare provider, or as advised.  When to seek medical advice  Call your healthcare provider right away if any of these occur:  · Cough with lots of colored sputum (mucus)  · Severe headache; face, neck, or ear pain  · Difficulty swallowing due to throat pain  · Fever of 100.4°F (38°C)  Call 911, or get immediate medical care  Call emergency services right away if any of these occur:  · Chest pain, shortness of breath, wheezing, or difficulty breathing  · Coughing up blood  · Inability to swallow due to throat pain  Date Last Reviewed: 9/13/2015 © 2000-2017 [employer]. [address] [place] [address], [place] [address]. All rights reserved. This information is not intended as a substitute for professional medical care. Always follow your healthcare professional's instructions.

## 2020-12-21 ENCOUNTER — LAB VISIT (OUTPATIENT)
Dept: LAB | Facility: HOSPITAL | Age: 35
End: 2020-12-21
Attending: INTERNAL MEDICINE
Payer: COMMERCIAL

## 2020-12-21 DIAGNOSIS — C73 PAPILLARY THYROID CARCINOMA: ICD-10-CM

## 2020-12-21 DIAGNOSIS — E89.0 POST-SURGICAL HYPOTHYROIDISM: ICD-10-CM

## 2020-12-21 LAB
T4 FREE SERPL-MCNC: 1.27 NG/DL (ref 0.71–1.51)
TSH SERPL DL<=0.005 MIU/L-ACNC: 0.02 UIU/ML (ref 0.4–4)

## 2020-12-21 PROCEDURE — 84439 ASSAY OF FREE THYROXINE: CPT

## 2020-12-21 PROCEDURE — 84443 ASSAY THYROID STIM HORMONE: CPT

## 2020-12-21 PROCEDURE — 84432 ASSAY OF THYROGLOBULIN: CPT

## 2020-12-28 ENCOUNTER — CLINICAL SUPPORT (OUTPATIENT)
Dept: URGENT CARE | Facility: CLINIC | Age: 35
End: 2020-12-28
Payer: COMMERCIAL

## 2020-12-28 DIAGNOSIS — Z11.9 SCREENING EXAMINATION FOR UNSPECIFIED INFECTIOUS DISEASE: Primary | ICD-10-CM

## 2020-12-28 LAB
CLINICAL BIOCHEMIST REVIEW: ABNORMAL
CTP QC/QA: YES
SARS-COV-2 RDRP RESP QL NAA+PROBE: NEGATIVE
THYROGLOB SERPL-MCNC: 0.6 NG/ML

## 2020-12-28 PROCEDURE — U0002: ICD-10-PCS | Mod: QW,S$GLB,, | Performed by: FAMILY MEDICINE

## 2020-12-28 PROCEDURE — U0002 COVID-19 LAB TEST NON-CDC: HCPCS | Mod: QW,S$GLB,, | Performed by: FAMILY MEDICINE

## 2020-12-29 ENCOUNTER — PATIENT MESSAGE (OUTPATIENT)
Dept: ENDOCRINOLOGY | Facility: CLINIC | Age: 35
End: 2020-12-29

## 2020-12-29 DIAGNOSIS — C73 PAPILLARY THYROID CARCINOMA: Primary | ICD-10-CM

## 2020-12-29 DIAGNOSIS — C73 THYROID CANCER: ICD-10-CM

## 2020-12-29 DIAGNOSIS — E89.0 POST-SURGICAL HYPOTHYROIDISM: ICD-10-CM

## 2020-12-29 RX ORDER — LEVOTHYROXINE SODIUM 75 UG/1
75 TABLET ORAL
Qty: 90 TABLET | Refills: 4 | Status: SHIPPED | OUTPATIENT
Start: 2020-12-29 | End: 2021-01-07 | Stop reason: SDUPTHER

## 2021-01-06 ENCOUNTER — PATIENT MESSAGE (OUTPATIENT)
Dept: ENDOCRINOLOGY | Facility: CLINIC | Age: 36
End: 2021-01-06

## 2021-01-06 DIAGNOSIS — E89.0 POST-SURGICAL HYPOTHYROIDISM: ICD-10-CM

## 2021-01-06 DIAGNOSIS — C73 PAPILLARY THYROID CARCINOMA: ICD-10-CM

## 2021-01-07 RX ORDER — LEVOTHYROXINE SODIUM 75 UG/1
75 TABLET ORAL
Qty: 90 TABLET | Refills: 4 | Status: SHIPPED | OUTPATIENT
Start: 2021-01-07 | End: 2021-03-11 | Stop reason: SDUPTHER

## 2021-01-29 ENCOUNTER — OFFICE VISIT (OUTPATIENT)
Dept: OPTOMETRY | Facility: CLINIC | Age: 36
End: 2021-01-29
Payer: COMMERCIAL

## 2021-01-29 ENCOUNTER — PATIENT MESSAGE (OUTPATIENT)
Dept: ENDOCRINOLOGY | Facility: CLINIC | Age: 36
End: 2021-01-29

## 2021-01-29 DIAGNOSIS — H52.13 MYOPIA OF BOTH EYES WITH REGULAR ASTIGMATISM: Primary | ICD-10-CM

## 2021-01-29 DIAGNOSIS — H52.223 MYOPIA OF BOTH EYES WITH REGULAR ASTIGMATISM: Primary | ICD-10-CM

## 2021-01-29 PROCEDURE — 1126F AMNT PAIN NOTED NONE PRSNT: CPT | Mod: S$GLB,,, | Performed by: OPTOMETRIST

## 2021-01-29 PROCEDURE — 92004 PR EYE EXAM, NEW PATIENT,COMPREHESV: ICD-10-PCS | Mod: S$GLB,,, | Performed by: OPTOMETRIST

## 2021-01-29 PROCEDURE — 1126F PR PAIN SEVERITY QUANTIFIED, NO PAIN PRESENT: ICD-10-PCS | Mod: S$GLB,,, | Performed by: OPTOMETRIST

## 2021-01-29 PROCEDURE — 99999 PR PBB SHADOW E&M-EST. PATIENT-LVL II: ICD-10-PCS | Mod: PBBFAC,,, | Performed by: OPTOMETRIST

## 2021-01-29 PROCEDURE — 92004 COMPRE OPH EXAM NEW PT 1/>: CPT | Mod: S$GLB,,, | Performed by: OPTOMETRIST

## 2021-01-29 PROCEDURE — 92015 DETERMINE REFRACTIVE STATE: CPT | Mod: S$GLB,,, | Performed by: OPTOMETRIST

## 2021-01-29 PROCEDURE — 99999 PR PBB SHADOW E&M-EST. PATIENT-LVL II: CPT | Mod: PBBFAC,,, | Performed by: OPTOMETRIST

## 2021-01-29 PROCEDURE — 92015 PR REFRACTION: ICD-10-PCS | Mod: S$GLB,,, | Performed by: OPTOMETRIST

## 2021-02-25 ENCOUNTER — PATIENT MESSAGE (OUTPATIENT)
Dept: ENDOCRINOLOGY | Facility: CLINIC | Age: 36
End: 2021-02-25

## 2021-03-05 ENCOUNTER — LAB VISIT (OUTPATIENT)
Dept: LAB | Facility: HOSPITAL | Age: 36
End: 2021-03-05
Attending: INTERNAL MEDICINE
Payer: COMMERCIAL

## 2021-03-05 DIAGNOSIS — C73 PAPILLARY THYROID CARCINOMA: ICD-10-CM

## 2021-03-05 DIAGNOSIS — C73 THYROID CANCER: ICD-10-CM

## 2021-03-05 LAB
THYROGLOB AB SERPL IA-ACNC: 9.6 IU/ML (ref 0–3.9)
TSH SERPL DL<=0.005 MIU/L-ACNC: 1.48 UIU/ML (ref 0.4–4)

## 2021-03-05 PROCEDURE — 86800 THYROGLOBULIN ANTIBODY: CPT | Performed by: INTERNAL MEDICINE

## 2021-03-05 PROCEDURE — 84432 ASSAY OF THYROGLOBULIN: CPT | Performed by: INTERNAL MEDICINE

## 2021-03-05 PROCEDURE — 36415 COLL VENOUS BLD VENIPUNCTURE: CPT | Performed by: INTERNAL MEDICINE

## 2021-03-05 PROCEDURE — 84443 ASSAY THYROID STIM HORMONE: CPT | Performed by: INTERNAL MEDICINE

## 2021-03-10 ENCOUNTER — PATIENT MESSAGE (OUTPATIENT)
Dept: ENDOCRINOLOGY | Facility: CLINIC | Age: 36
End: 2021-03-10

## 2021-03-10 LAB
CLINICAL BIOCHEMIST REVIEW: ABNORMAL
THYROGLOB SERPL-MCNC: 0.5 NG/ML

## 2021-03-16 ENCOUNTER — OFFICE VISIT (OUTPATIENT)
Dept: OBSTETRICS AND GYNECOLOGY | Facility: CLINIC | Age: 36
End: 2021-03-16
Payer: COMMERCIAL

## 2021-03-16 ENCOUNTER — LAB VISIT (OUTPATIENT)
Dept: LAB | Facility: OTHER | Age: 36
End: 2021-03-16
Payer: COMMERCIAL

## 2021-03-16 ENCOUNTER — PATIENT MESSAGE (OUTPATIENT)
Dept: OBSTETRICS AND GYNECOLOGY | Facility: CLINIC | Age: 36
End: 2021-03-16

## 2021-03-16 VITALS
BODY MASS INDEX: 25.57 KG/M2 | SYSTOLIC BLOOD PRESSURE: 100 MMHG | DIASTOLIC BLOOD PRESSURE: 66 MMHG | WEIGHT: 121.81 LBS | HEIGHT: 58 IN

## 2021-03-16 DIAGNOSIS — A59.9 TRICHOMONAS INFECTION: ICD-10-CM

## 2021-03-16 DIAGNOSIS — Z12.39 ENCOUNTER FOR SCREENING FOR MALIGNANT NEOPLASM OF BREAST, UNSPECIFIED SCREENING MODALITY: ICD-10-CM

## 2021-03-16 DIAGNOSIS — N92.6 IRREGULAR MENSES: Primary | ICD-10-CM

## 2021-03-16 DIAGNOSIS — F32.A DEPRESSION, UNSPECIFIED DEPRESSION TYPE: ICD-10-CM

## 2021-03-16 DIAGNOSIS — N92.6 IRREGULAR MENSES: ICD-10-CM

## 2021-03-16 DIAGNOSIS — Z11.3 SCREENING EXAMINATION FOR STD (SEXUALLY TRANSMITTED DISEASE): ICD-10-CM

## 2021-03-16 DIAGNOSIS — Z30.09 ENCOUNTER FOR OTHER GENERAL COUNSELING AND ADVICE ON CONTRACEPTION: ICD-10-CM

## 2021-03-16 LAB
BACTERIA HYPHAE, POC: NEGATIVE
GARDNERELLA VAGINALIS: NEGATIVE
OTHER MICROSC. OBSERVATIONS: ABNORMAL
POC BACTERIAL VAGINOSIS: NEGATIVE
POC CLUE CELLS: NEGATIVE
TRICHOMONAS, POC: POSITIVE
YEAST WET PREP: NEGATIVE
YEAST, POC: NEGATIVE

## 2021-03-16 PROCEDURE — 87088 URINE BACTERIA CULTURE: CPT | Performed by: OBSTETRICS & GYNECOLOGY

## 2021-03-16 PROCEDURE — 87147 CULTURE TYPE IMMUNOLOGIC: CPT | Performed by: OBSTETRICS & GYNECOLOGY

## 2021-03-16 PROCEDURE — 3008F PR BODY MASS INDEX (BMI) DOCUMENTED: ICD-10-PCS | Mod: CPTII,S$GLB,, | Performed by: OBSTETRICS & GYNECOLOGY

## 2021-03-16 PROCEDURE — 1126F AMNT PAIN NOTED NONE PRSNT: CPT | Mod: S$GLB,,, | Performed by: OBSTETRICS & GYNECOLOGY

## 2021-03-16 PROCEDURE — 87220 POCT KOH: ICD-10-PCS | Mod: S$GLB,,, | Performed by: OBSTETRICS & GYNECOLOGY

## 2021-03-16 PROCEDURE — 87210 POCT WET PREP: ICD-10-PCS | Mod: QW,S$GLB,, | Performed by: OBSTETRICS & GYNECOLOGY

## 2021-03-16 PROCEDURE — 87220 TISSUE EXAM FOR FUNGI: CPT | Mod: S$GLB,,, | Performed by: OBSTETRICS & GYNECOLOGY

## 2021-03-16 PROCEDURE — 87210 SMEAR WET MOUNT SALINE/INK: CPT | Mod: QW,S$GLB,, | Performed by: OBSTETRICS & GYNECOLOGY

## 2021-03-16 PROCEDURE — 1126F PR PAIN SEVERITY QUANTIFIED, NO PAIN PRESENT: ICD-10-PCS | Mod: S$GLB,,, | Performed by: OBSTETRICS & GYNECOLOGY

## 2021-03-16 PROCEDURE — 99214 PR OFFICE/OUTPT VISIT, EST, LEVL IV, 30-39 MIN: ICD-10-PCS | Mod: 25,S$GLB,, | Performed by: OBSTETRICS & GYNECOLOGY

## 2021-03-16 PROCEDURE — 84146 ASSAY OF PROLACTIN: CPT | Performed by: OBSTETRICS & GYNECOLOGY

## 2021-03-16 PROCEDURE — 87086 URINE CULTURE/COLONY COUNT: CPT | Performed by: OBSTETRICS & GYNECOLOGY

## 2021-03-16 PROCEDURE — 36415 COLL VENOUS BLD VENIPUNCTURE: CPT | Performed by: OBSTETRICS & GYNECOLOGY

## 2021-03-16 PROCEDURE — 99214 OFFICE O/P EST MOD 30 MIN: CPT | Mod: 25,S$GLB,, | Performed by: OBSTETRICS & GYNECOLOGY

## 2021-03-16 PROCEDURE — 3008F BODY MASS INDEX DOCD: CPT | Mod: CPTII,S$GLB,, | Performed by: OBSTETRICS & GYNECOLOGY

## 2021-03-16 PROCEDURE — 99999 PR PBB SHADOW E&M-EST. PATIENT-LVL III: CPT | Mod: PBBFAC,,, | Performed by: OBSTETRICS & GYNECOLOGY

## 2021-03-16 PROCEDURE — 99999 PR PBB SHADOW E&M-EST. PATIENT-LVL III: ICD-10-PCS | Mod: PBBFAC,,, | Performed by: OBSTETRICS & GYNECOLOGY

## 2021-03-16 RX ORDER — NORELGESTROMIN AND ETHINYL ESTRADIOL 35; 150 UG/MG; UG/MG
1 PATCH TRANSDERMAL
Qty: 4 PATCH | Refills: 11 | Status: SHIPPED | OUTPATIENT
Start: 2021-03-16 | End: 2021-10-20 | Stop reason: SDUPTHER

## 2021-03-16 RX ORDER — ESCITALOPRAM OXALATE 10 MG/1
10 TABLET ORAL DAILY
Qty: 30 TABLET | Refills: 2 | Status: SHIPPED | OUTPATIENT
Start: 2021-03-16 | End: 2021-10-20 | Stop reason: SDUPTHER

## 2021-03-17 ENCOUNTER — HOSPITAL ENCOUNTER (EMERGENCY)
Facility: HOSPITAL | Age: 36
Discharge: HOME | End: 2021-03-17
Attending: EMERGENCY MEDICINE
Payer: COMMERCIAL

## 2021-03-17 VITALS
DIASTOLIC BLOOD PRESSURE: 57 MMHG | WEIGHT: 117 LBS | BODY MASS INDEX: 24.45 KG/M2 | TEMPERATURE: 98 F | RESPIRATION RATE: 16 BRPM | HEART RATE: 70 BPM | OXYGEN SATURATION: 97 % | SYSTOLIC BLOOD PRESSURE: 117 MMHG

## 2021-03-17 DIAGNOSIS — M25.559 HIP PAIN: ICD-10-CM

## 2021-03-17 DIAGNOSIS — M54.2 NECK PAIN: ICD-10-CM

## 2021-03-17 DIAGNOSIS — S06.0X1A CONCUSSION WITH LOSS OF CONSCIOUSNESS OF 30 MINUTES OR LESS, INITIAL ENCOUNTER: ICD-10-CM

## 2021-03-17 DIAGNOSIS — S09.90XA CLOSED HEAD INJURY, INITIAL ENCOUNTER: Primary | ICD-10-CM

## 2021-03-17 DIAGNOSIS — S20.229A CONTUSION OF BACK, UNSPECIFIED LATERALITY, INITIAL ENCOUNTER: ICD-10-CM

## 2021-03-17 DIAGNOSIS — M54.9 BACK PAIN: ICD-10-CM

## 2021-03-17 LAB
BACTERIA UR CULT: ABNORMAL
PROLACTIN SERPL IA-MCNC: 12.4 NG/ML (ref 5.2–26.5)

## 2021-03-17 PROCEDURE — 63600175 PHARM REV CODE 636 W HCPCS: Performed by: EMERGENCY MEDICINE

## 2021-03-17 PROCEDURE — 96374 THER/PROPH/DIAG INJ IV PUSH: CPT

## 2021-03-17 PROCEDURE — 96375 TX/PRO/DX INJ NEW DRUG ADDON: CPT

## 2021-03-17 PROCEDURE — 99285 EMERGENCY DEPT VISIT HI MDM: CPT | Mod: 25

## 2021-03-17 RX ORDER — KETOROLAC TROMETHAMINE 30 MG/ML
30 INJECTION, SOLUTION INTRAMUSCULAR; INTRAVENOUS
Status: COMPLETED | OUTPATIENT
Start: 2021-03-17 | End: 2021-03-17

## 2021-03-17 RX ORDER — CYCLOBENZAPRINE HCL 10 MG
10 TABLET ORAL 3 TIMES DAILY PRN
Qty: 15 TABLET | Refills: 1 | Status: SHIPPED | OUTPATIENT
Start: 2021-03-17 | End: 2021-03-22

## 2021-03-17 RX ORDER — FENTANYL CITRATE 50 UG/ML
100 INJECTION, SOLUTION INTRAMUSCULAR; INTRAVENOUS
Status: COMPLETED | OUTPATIENT
Start: 2021-03-17 | End: 2021-03-17

## 2021-03-17 RX ORDER — KETOROLAC TROMETHAMINE 10 MG/1
10 TABLET, FILM COATED ORAL EVERY 6 HOURS PRN
Qty: 20 TABLET | Refills: 1 | Status: SHIPPED | OUTPATIENT
Start: 2021-03-17 | End: 2021-05-31

## 2021-03-17 RX ORDER — FENTANYL CITRATE 50 UG/ML
100 INJECTION, SOLUTION INTRAMUSCULAR; INTRAVENOUS
Status: DISCONTINUED | OUTPATIENT
Start: 2021-03-17 | End: 2021-03-17

## 2021-03-17 RX ORDER — METRONIDAZOLE 500 MG/1
2000 TABLET ORAL ONCE
Qty: 4 TABLET | Refills: 0 | Status: SHIPPED | OUTPATIENT
Start: 2021-03-17 | End: 2021-03-17

## 2021-03-17 RX ORDER — OXYCODONE AND ACETAMINOPHEN 5; 325 MG/1; MG/1
1 TABLET ORAL EVERY 4 HOURS PRN
Qty: 12 TABLET | Refills: 0 | Status: SHIPPED | OUTPATIENT
Start: 2021-03-17 | End: 2021-05-31

## 2021-03-17 RX ADMIN — FENTANYL CITRATE 100 MCG: 50 INJECTION, SOLUTION INTRAMUSCULAR; INTRAVENOUS at 03:03

## 2021-03-17 RX ADMIN — KETOROLAC TROMETHAMINE 30 MG: 30 INJECTION, SOLUTION INTRAMUSCULAR; INTRAVENOUS at 05:03

## 2021-03-18 LAB
C TRACH RRNA SPEC QL NAA+PROBE: NEGATIVE
N GONORRHOEA RRNA SPEC QL NAA+PROBE: NEGATIVE

## 2021-03-19 ENCOUNTER — PATIENT MESSAGE (OUTPATIENT)
Dept: OBSTETRICS AND GYNECOLOGY | Facility: CLINIC | Age: 36
End: 2021-03-19

## 2021-03-19 RX ORDER — AMOXICILLIN 500 MG/1
500 CAPSULE ORAL EVERY 12 HOURS
Qty: 14 CAPSULE | Refills: 0 | Status: SHIPPED | OUTPATIENT
Start: 2021-03-19 | End: 2021-03-26

## 2021-03-22 ENCOUNTER — PATIENT MESSAGE (OUTPATIENT)
Dept: OBSTETRICS AND GYNECOLOGY | Facility: CLINIC | Age: 36
End: 2021-03-22

## 2021-03-29 ENCOUNTER — HOSPITAL ENCOUNTER (OUTPATIENT)
Dept: RADIOLOGY | Facility: OTHER | Age: 36
Discharge: HOME | End: 2021-03-29
Attending: OBSTETRICS & GYNECOLOGY
Payer: COMMERCIAL

## 2021-03-29 DIAGNOSIS — Z12.31 BREAST CANCER SCREENING BY MAMMOGRAM: ICD-10-CM

## 2021-03-29 DIAGNOSIS — Z12.39 ENCOUNTER FOR SCREENING FOR MALIGNANT NEOPLASM OF BREAST, UNSPECIFIED SCREENING MODALITY: ICD-10-CM

## 2021-03-29 PROCEDURE — 77063 MAMMO DIGITAL SCREENING BILAT WITH TOMO: ICD-10-PCS | Mod: 26,,, | Performed by: RADIOLOGY

## 2021-03-29 PROCEDURE — 77063 BREAST TOMOSYNTHESIS BI: CPT | Mod: 26,,, | Performed by: RADIOLOGY

## 2021-03-29 PROCEDURE — 77067 SCR MAMMO BI INCL CAD: CPT | Mod: TC

## 2021-03-29 PROCEDURE — 77067 MAMMO DIGITAL SCREENING BILAT WITH TOMO: ICD-10-PCS | Mod: 26,,, | Performed by: RADIOLOGY

## 2021-03-29 PROCEDURE — 77067 SCR MAMMO BI INCL CAD: CPT | Mod: 26,,, | Performed by: RADIOLOGY

## 2021-03-30 ENCOUNTER — PATIENT MESSAGE (OUTPATIENT)
Dept: OBSTETRICS AND GYNECOLOGY | Facility: CLINIC | Age: 36
End: 2021-03-30

## 2021-03-30 RX ORDER — PHENAZOPYRIDINE HYDROCHLORIDE 200 MG/1
200 TABLET, FILM COATED ORAL 3 TIMES DAILY PRN
Qty: 20 TABLET | Refills: 0 | Status: SHIPPED | OUTPATIENT
Start: 2021-03-30 | End: 2021-05-31

## 2021-03-31 ENCOUNTER — LAB VISIT (OUTPATIENT)
Dept: LAB | Facility: HOSPITAL | Age: 36
End: 2021-03-31
Attending: OBSTETRICS & GYNECOLOGY
Payer: COMMERCIAL

## 2021-03-31 ENCOUNTER — TELEPHONE (OUTPATIENT)
Dept: OBSTETRICS AND GYNECOLOGY | Facility: CLINIC | Age: 36
End: 2021-03-31

## 2021-03-31 DIAGNOSIS — R30.0 DYSURIA: ICD-10-CM

## 2021-03-31 DIAGNOSIS — R30.0 DYSURIA: Primary | ICD-10-CM

## 2021-03-31 PROCEDURE — 87086 URINE CULTURE/COLONY COUNT: CPT | Performed by: OBSTETRICS & GYNECOLOGY

## 2021-04-02 LAB — BACTERIA UR CULT: NORMAL

## 2021-04-12 ENCOUNTER — PATIENT MESSAGE (OUTPATIENT)
Dept: RESEARCH | Facility: HOSPITAL | Age: 36
End: 2021-04-12

## 2021-04-30 ENCOUNTER — PATIENT MESSAGE (OUTPATIENT)
Dept: ENDOCRINOLOGY | Facility: CLINIC | Age: 36
End: 2021-04-30

## 2021-04-30 DIAGNOSIS — C73 PAPILLARY THYROID CARCINOMA: Primary | ICD-10-CM

## 2021-04-30 DIAGNOSIS — E89.0 POST-SURGICAL HYPOTHYROIDISM: ICD-10-CM

## 2021-04-30 DIAGNOSIS — R11.0 NAUSEA: ICD-10-CM

## 2021-05-01 ENCOUNTER — LAB VISIT (OUTPATIENT)
Dept: LAB | Facility: HOSPITAL | Age: 36
End: 2021-05-01
Attending: INTERNAL MEDICINE
Payer: COMMERCIAL

## 2021-05-01 DIAGNOSIS — R11.0 NAUSEA: ICD-10-CM

## 2021-05-01 DIAGNOSIS — E89.0 POST-SURGICAL HYPOTHYROIDISM: ICD-10-CM

## 2021-05-01 LAB
ALBUMIN SERPL BCP-MCNC: 3.7 G/DL (ref 3.5–5.2)
ALP SERPL-CCNC: 60 U/L (ref 55–135)
ALT SERPL W/O P-5'-P-CCNC: 11 U/L (ref 10–44)
ANION GAP SERPL CALC-SCNC: 11 MMOL/L (ref 8–16)
AST SERPL-CCNC: 13 U/L (ref 10–40)
BASOPHILS # BLD AUTO: 0.06 K/UL (ref 0–0.2)
BASOPHILS NFR BLD: 0.7 % (ref 0–1.9)
BILIRUB SERPL-MCNC: 0.2 MG/DL (ref 0.1–1)
BUN SERPL-MCNC: 16 MG/DL (ref 6–20)
CALCIUM SERPL-MCNC: 9 MG/DL (ref 8.7–10.5)
CHLORIDE SERPL-SCNC: 105 MMOL/L (ref 95–110)
CO2 SERPL-SCNC: 25 MMOL/L (ref 23–29)
CORTIS SERPL-MCNC: 25.4 UG/DL (ref 4.3–22.4)
CREAT SERPL-MCNC: 0.7 MG/DL (ref 0.5–1.4)
DIFFERENTIAL METHOD: NORMAL
EOSINOPHIL # BLD AUTO: 0.4 K/UL (ref 0–0.5)
EOSINOPHIL NFR BLD: 4.1 % (ref 0–8)
ERYTHROCYTE [DISTWIDTH] IN BLOOD BY AUTOMATED COUNT: 12.6 % (ref 11.5–14.5)
EST. GFR  (AFRICAN AMERICAN): >60 ML/MIN/1.73 M^2
EST. GFR  (NON AFRICAN AMERICAN): >60 ML/MIN/1.73 M^2
GLUCOSE SERPL-MCNC: 104 MG/DL (ref 70–110)
HCT VFR BLD AUTO: 41 % (ref 37–48.5)
HGB BLD-MCNC: 13.4 G/DL (ref 12–16)
IMM GRANULOCYTES # BLD AUTO: 0.01 K/UL (ref 0–0.04)
IMM GRANULOCYTES NFR BLD AUTO: 0.1 % (ref 0–0.5)
LYMPHOCYTES # BLD AUTO: 2.2 K/UL (ref 1–4.8)
LYMPHOCYTES NFR BLD: 25.1 % (ref 18–48)
MCH RBC QN AUTO: 28.8 PG (ref 27–31)
MCHC RBC AUTO-ENTMCNC: 32.7 G/DL (ref 32–36)
MCV RBC AUTO: 88 FL (ref 82–98)
MONOCYTES # BLD AUTO: 0.6 K/UL (ref 0.3–1)
MONOCYTES NFR BLD: 6.4 % (ref 4–15)
NEUTROPHILS # BLD AUTO: 5.6 K/UL (ref 1.8–7.7)
NEUTROPHILS NFR BLD: 63.6 % (ref 38–73)
NRBC BLD-RTO: 0 /100 WBC
PLATELET # BLD AUTO: 241 K/UL (ref 150–450)
PMV BLD AUTO: 10.6 FL (ref 9.2–12.9)
POTASSIUM SERPL-SCNC: 4.3 MMOL/L (ref 3.5–5.1)
PROT SERPL-MCNC: 7.5 G/DL (ref 6–8.4)
RBC # BLD AUTO: 4.66 M/UL (ref 4–5.4)
SODIUM SERPL-SCNC: 141 MMOL/L (ref 136–145)
T4 FREE SERPL-MCNC: 0.83 NG/DL (ref 0.71–1.51)
TSH SERPL DL<=0.005 MIU/L-ACNC: 4.16 UIU/ML (ref 0.4–4)
WBC # BLD AUTO: 8.79 K/UL (ref 3.9–12.7)

## 2021-05-01 PROCEDURE — 82533 TOTAL CORTISOL: CPT | Performed by: INTERNAL MEDICINE

## 2021-05-01 PROCEDURE — 84443 ASSAY THYROID STIM HORMONE: CPT | Performed by: INTERNAL MEDICINE

## 2021-05-01 PROCEDURE — 36415 COLL VENOUS BLD VENIPUNCTURE: CPT | Performed by: INTERNAL MEDICINE

## 2021-05-01 PROCEDURE — 84439 ASSAY OF FREE THYROXINE: CPT | Performed by: INTERNAL MEDICINE

## 2021-05-01 PROCEDURE — 80053 COMPREHEN METABOLIC PANEL: CPT | Performed by: INTERNAL MEDICINE

## 2021-05-01 PROCEDURE — 85025 COMPLETE CBC W/AUTO DIFF WBC: CPT | Performed by: INTERNAL MEDICINE

## 2021-05-03 ENCOUNTER — PATIENT MESSAGE (OUTPATIENT)
Dept: ENDOCRINOLOGY | Facility: CLINIC | Age: 36
End: 2021-05-03

## 2021-05-03 ENCOUNTER — OFFICE VISIT (OUTPATIENT)
Dept: ENDOCRINOLOGY | Facility: CLINIC | Age: 36
End: 2021-05-03
Payer: COMMERCIAL

## 2021-05-03 VITALS
SYSTOLIC BLOOD PRESSURE: 108 MMHG | HEIGHT: 58 IN | HEART RATE: 93 BPM | BODY MASS INDEX: 25.57 KG/M2 | RESPIRATION RATE: 18 BRPM | WEIGHT: 121.81 LBS | OXYGEN SATURATION: 97 % | DIASTOLIC BLOOD PRESSURE: 70 MMHG

## 2021-05-03 DIAGNOSIS — F32.A DEPRESSION, UNSPECIFIED DEPRESSION TYPE: ICD-10-CM

## 2021-05-03 DIAGNOSIS — C73 THYROID CANCER: Primary | Chronic | ICD-10-CM

## 2021-05-03 DIAGNOSIS — E89.0 POSTSURGICAL HYPOTHYROIDISM: Chronic | ICD-10-CM

## 2021-05-03 DIAGNOSIS — K21.9 GASTROESOPHAGEAL REFLUX DISEASE, UNSPECIFIED WHETHER ESOPHAGITIS PRESENT: ICD-10-CM

## 2021-05-03 DIAGNOSIS — R49.0 HOARSENESS: ICD-10-CM

## 2021-05-03 DIAGNOSIS — E89.0 POST-SURGICAL HYPOTHYROIDISM: ICD-10-CM

## 2021-05-03 DIAGNOSIS — K21.9 GASTROESOPHAGEAL REFLUX DISEASE WITHOUT ESOPHAGITIS: ICD-10-CM

## 2021-05-03 PROCEDURE — 1125F PR PAIN SEVERITY QUANTIFIED, PAIN PRESENT: ICD-10-PCS | Mod: S$GLB,,, | Performed by: INTERNAL MEDICINE

## 2021-05-03 PROCEDURE — 99999 PR PBB SHADOW E&M-EST. PATIENT-LVL V: ICD-10-PCS | Mod: PBBFAC,,, | Performed by: INTERNAL MEDICINE

## 2021-05-03 PROCEDURE — 3008F PR BODY MASS INDEX (BMI) DOCUMENTED: ICD-10-PCS | Mod: CPTII,S$GLB,, | Performed by: INTERNAL MEDICINE

## 2021-05-03 PROCEDURE — 3008F BODY MASS INDEX DOCD: CPT | Mod: CPTII,S$GLB,, | Performed by: INTERNAL MEDICINE

## 2021-05-03 PROCEDURE — 1125F AMNT PAIN NOTED PAIN PRSNT: CPT | Mod: S$GLB,,, | Performed by: INTERNAL MEDICINE

## 2021-05-03 PROCEDURE — 99999 PR PBB SHADOW E&M-EST. PATIENT-LVL V: CPT | Mod: PBBFAC,,, | Performed by: INTERNAL MEDICINE

## 2021-05-03 PROCEDURE — 99214 OFFICE O/P EST MOD 30 MIN: CPT | Mod: S$GLB,,, | Performed by: INTERNAL MEDICINE

## 2021-05-03 PROCEDURE — 99214 PR OFFICE/OUTPT VISIT, EST, LEVL IV, 30-39 MIN: ICD-10-PCS | Mod: S$GLB,,, | Performed by: INTERNAL MEDICINE

## 2021-05-03 RX ORDER — LEVOTHYROXINE SODIUM 88 UG/1
88 CAPSULE ORAL
Qty: 30 CAPSULE | Refills: 11 | Status: SHIPPED | OUTPATIENT
Start: 2021-05-03 | End: 2021-06-21

## 2021-05-18 ENCOUNTER — TELEPHONE (OUTPATIENT)
Dept: SPEECH THERAPY | Facility: HOSPITAL | Age: 36
End: 2021-05-18

## 2021-05-18 ENCOUNTER — OFFICE VISIT (OUTPATIENT)
Dept: OTOLARYNGOLOGY | Facility: CLINIC | Age: 36
End: 2021-05-18
Payer: COMMERCIAL

## 2021-05-18 VITALS
HEART RATE: 92 BPM | DIASTOLIC BLOOD PRESSURE: 90 MMHG | SYSTOLIC BLOOD PRESSURE: 127 MMHG | WEIGHT: 123 LBS | BODY MASS INDEX: 25.71 KG/M2

## 2021-05-18 DIAGNOSIS — R49.0 MUSCLE TENSION DYSPHONIA: Primary | ICD-10-CM

## 2021-05-18 DIAGNOSIS — E89.0 POST-SURGICAL HYPOTHYROIDISM: ICD-10-CM

## 2021-05-18 DIAGNOSIS — C73 THYROID CANCER: Chronic | ICD-10-CM

## 2021-05-18 PROCEDURE — 3008F BODY MASS INDEX DOCD: CPT | Mod: CPTII,S$GLB,, | Performed by: OTOLARYNGOLOGY

## 2021-05-18 PROCEDURE — 1125F AMNT PAIN NOTED PAIN PRSNT: CPT | Mod: S$GLB,,, | Performed by: OTOLARYNGOLOGY

## 2021-05-18 PROCEDURE — 99204 OFFICE O/P NEW MOD 45 MIN: CPT | Mod: 25,S$GLB,, | Performed by: OTOLARYNGOLOGY

## 2021-05-18 PROCEDURE — 99999 PR PBB SHADOW E&M-EST. PATIENT-LVL IV: CPT | Mod: PBBFAC,,, | Performed by: OTOLARYNGOLOGY

## 2021-05-18 PROCEDURE — 99204 PR OFFICE/OUTPT VISIT, NEW, LEVL IV, 45-59 MIN: ICD-10-PCS | Mod: 25,S$GLB,, | Performed by: OTOLARYNGOLOGY

## 2021-05-18 PROCEDURE — 3008F PR BODY MASS INDEX (BMI) DOCUMENTED: ICD-10-PCS | Mod: CPTII,S$GLB,, | Performed by: OTOLARYNGOLOGY

## 2021-05-18 PROCEDURE — 1125F PR PAIN SEVERITY QUANTIFIED, PAIN PRESENT: ICD-10-PCS | Mod: S$GLB,,, | Performed by: OTOLARYNGOLOGY

## 2021-05-18 PROCEDURE — 99999 PR PBB SHADOW E&M-EST. PATIENT-LVL IV: ICD-10-PCS | Mod: PBBFAC,,, | Performed by: OTOLARYNGOLOGY

## 2021-05-20 ENCOUNTER — PATIENT MESSAGE (OUTPATIENT)
Dept: OTOLARYNGOLOGY | Facility: CLINIC | Age: 36
End: 2021-05-20

## 2021-05-21 ENCOUNTER — TELEPHONE (OUTPATIENT)
Dept: SPEECH THERAPY | Facility: HOSPITAL | Age: 36
End: 2021-05-21

## 2021-05-31 ENCOUNTER — OFFICE VISIT (OUTPATIENT)
Dept: GASTROENTEROLOGY | Facility: CLINIC | Age: 36
End: 2021-05-31
Payer: COMMERCIAL

## 2021-05-31 VITALS
BODY MASS INDEX: 25.54 KG/M2 | SYSTOLIC BLOOD PRESSURE: 108 MMHG | DIASTOLIC BLOOD PRESSURE: 70 MMHG | WEIGHT: 121.69 LBS | HEART RATE: 84 BPM | HEIGHT: 58 IN

## 2021-05-31 DIAGNOSIS — R10.13 EPIGASTRIC PAIN: ICD-10-CM

## 2021-05-31 DIAGNOSIS — K21.9 GASTROESOPHAGEAL REFLUX DISEASE, UNSPECIFIED WHETHER ESOPHAGITIS PRESENT: Primary | ICD-10-CM

## 2021-05-31 DIAGNOSIS — R11.0 NAUSEA: ICD-10-CM

## 2021-05-31 DIAGNOSIS — R12 PYROSIS: ICD-10-CM

## 2021-05-31 PROCEDURE — 99204 OFFICE O/P NEW MOD 45 MIN: CPT | Mod: S$GLB,,, | Performed by: FAMILY MEDICINE

## 2021-05-31 PROCEDURE — 3008F PR BODY MASS INDEX (BMI) DOCUMENTED: ICD-10-PCS | Mod: CPTII,S$GLB,, | Performed by: FAMILY MEDICINE

## 2021-05-31 PROCEDURE — 99999 PR PBB SHADOW E&M-EST. PATIENT-LVL IV: CPT | Mod: PBBFAC,,, | Performed by: FAMILY MEDICINE

## 2021-05-31 PROCEDURE — 99204 PR OFFICE/OUTPT VISIT, NEW, LEVL IV, 45-59 MIN: ICD-10-PCS | Mod: S$GLB,,, | Performed by: FAMILY MEDICINE

## 2021-05-31 PROCEDURE — 3008F BODY MASS INDEX DOCD: CPT | Mod: CPTII,S$GLB,, | Performed by: FAMILY MEDICINE

## 2021-05-31 PROCEDURE — 1125F PR PAIN SEVERITY QUANTIFIED, PAIN PRESENT: ICD-10-PCS | Mod: S$GLB,,, | Performed by: FAMILY MEDICINE

## 2021-05-31 PROCEDURE — 1125F AMNT PAIN NOTED PAIN PRSNT: CPT | Mod: S$GLB,,, | Performed by: FAMILY MEDICINE

## 2021-05-31 PROCEDURE — 99999 PR PBB SHADOW E&M-EST. PATIENT-LVL IV: ICD-10-PCS | Mod: PBBFAC,,, | Performed by: FAMILY MEDICINE

## 2021-05-31 RX ORDER — SUCRALFATE 1 G/10ML
1 SUSPENSION ORAL 4 TIMES DAILY PRN
Qty: 1200 ML | Refills: 1 | Status: SHIPPED | OUTPATIENT
Start: 2021-05-31 | End: 2021-06-21

## 2021-05-31 RX ORDER — PROCHLORPERAZINE MALEATE 5 MG
5 TABLET ORAL EVERY 6 HOURS PRN
Qty: 10 TABLET | Refills: 2 | Status: SHIPPED | OUTPATIENT
Start: 2021-05-31 | End: 2021-06-21

## 2021-05-31 RX ORDER — SUCRALFATE 1 G/1
1 TABLET ORAL 4 TIMES DAILY PRN
Qty: 120 TABLET | Refills: 0 | Status: SHIPPED | OUTPATIENT
Start: 2021-05-31 | End: 2021-05-31 | Stop reason: SDUPTHER

## 2021-06-01 ENCOUNTER — TELEPHONE (OUTPATIENT)
Dept: ENDOSCOPY | Facility: HOSPITAL | Age: 36
End: 2021-06-01

## 2021-06-01 ENCOUNTER — TELEPHONE (OUTPATIENT)
Dept: OBSTETRICS AND GYNECOLOGY | Facility: CLINIC | Age: 36
End: 2021-06-01
Payer: COMMERCIAL

## 2021-06-01 ENCOUNTER — PATIENT MESSAGE (OUTPATIENT)
Dept: ENDOSCOPY | Facility: HOSPITAL | Age: 36
End: 2021-06-01

## 2021-06-01 DIAGNOSIS — Z01.818 PRE-OP TESTING: Primary | ICD-10-CM

## 2021-06-02 ENCOUNTER — CLINICAL SUPPORT (OUTPATIENT)
Dept: SPEECH THERAPY | Facility: HOSPITAL | Age: 36
End: 2021-06-02
Payer: COMMERCIAL

## 2021-06-02 DIAGNOSIS — R49.0 MUSCLE TENSION DYSPHONIA: Primary | ICD-10-CM

## 2021-06-02 DIAGNOSIS — C73 THYROID CANCER: ICD-10-CM

## 2021-06-02 PROCEDURE — 92524 BEHAVRAL QUALIT ANALYS VOICE: CPT | Mod: GN | Performed by: SPEECH-LANGUAGE PATHOLOGIST

## 2021-06-03 ENCOUNTER — LAB VISIT (OUTPATIENT)
Dept: LAB | Facility: HOSPITAL | Age: 36
End: 2021-06-03
Attending: INTERNAL MEDICINE
Payer: COMMERCIAL

## 2021-06-03 ENCOUNTER — HOSPITAL ENCOUNTER (OUTPATIENT)
Dept: ENDOCRINOLOGY | Facility: CLINIC | Age: 36
Discharge: HOME | End: 2021-06-03
Attending: INTERNAL MEDICINE
Payer: COMMERCIAL

## 2021-06-03 DIAGNOSIS — C73 THYROID CANCER: Chronic | ICD-10-CM

## 2021-06-03 DIAGNOSIS — E89.0 POST-SURGICAL HYPOTHYROIDISM: ICD-10-CM

## 2021-06-03 DIAGNOSIS — C73 PAPILLARY THYROID CARCINOMA: ICD-10-CM

## 2021-06-03 LAB
T4 FREE SERPL-MCNC: 1.1 NG/DL (ref 0.71–1.51)
TSH SERPL DL<=0.005 MIU/L-ACNC: 0.6 UIU/ML (ref 0.4–4)

## 2021-06-03 PROCEDURE — 84439 ASSAY OF FREE THYROXINE: CPT | Performed by: INTERNAL MEDICINE

## 2021-06-03 PROCEDURE — 84443 ASSAY THYROID STIM HORMONE: CPT | Performed by: INTERNAL MEDICINE

## 2021-06-03 PROCEDURE — 76536 US SOFT TISSUE HEAD NECK THYROID: ICD-10-PCS | Mod: S$GLB,,, | Performed by: INTERNAL MEDICINE

## 2021-06-03 PROCEDURE — 76536 US EXAM OF HEAD AND NECK: CPT | Mod: S$GLB,,, | Performed by: INTERNAL MEDICINE

## 2021-06-03 PROCEDURE — 36415 COLL VENOUS BLD VENIPUNCTURE: CPT | Performed by: INTERNAL MEDICINE

## 2021-06-04 ENCOUNTER — PATIENT MESSAGE (OUTPATIENT)
Dept: ENDOCRINOLOGY | Facility: CLINIC | Age: 36
End: 2021-06-04

## 2021-06-04 DIAGNOSIS — C73 PAPILLARY THYROID CARCINOMA: Primary | ICD-10-CM

## 2021-06-11 ENCOUNTER — LAB VISIT (OUTPATIENT)
Dept: SPORTS MEDICINE | Facility: CLINIC | Age: 36
End: 2021-06-11
Payer: COMMERCIAL

## 2021-06-11 ENCOUNTER — ANESTHESIA EVENT (OUTPATIENT)
Dept: ENDOSCOPY | Facility: HOSPITAL | Age: 36
End: 2021-06-11
Payer: COMMERCIAL

## 2021-06-11 DIAGNOSIS — Z01.818 PRE-OP TESTING: ICD-10-CM

## 2021-06-11 PROCEDURE — U0003 INFECTIOUS AGENT DETECTION BY NUCLEIC ACID (DNA OR RNA); SEVERE ACUTE RESPIRATORY SYNDROME CORONAVIRUS 2 (SARS-COV-2) (CORONAVIRUS DISEASE [COVID-19]), AMPLIFIED PROBE TECHNIQUE, MAKING USE OF HIGH THROUGHPUT TECHNOLOGIES AS DESCRIBED BY CMS-2020-01-R: HCPCS | Performed by: INTERNAL MEDICINE

## 2021-06-11 PROCEDURE — U0005 INFEC AGEN DETEC AMPLI PROBE: HCPCS | Performed by: INTERNAL MEDICINE

## 2021-06-12 LAB — SARS-COV-2 RNA RESP QL NAA+PROBE: NOT DETECTED

## 2021-06-14 ENCOUNTER — HOSPITAL ENCOUNTER (OUTPATIENT)
Facility: HOSPITAL | Age: 36
Discharge: HOME | End: 2021-06-14
Attending: INTERNAL MEDICINE | Admitting: INTERNAL MEDICINE
Payer: COMMERCIAL

## 2021-06-14 ENCOUNTER — ANESTHESIA (OUTPATIENT)
Dept: ENDOSCOPY | Facility: HOSPITAL | Age: 36
End: 2021-06-14
Payer: COMMERCIAL

## 2021-06-14 VITALS
HEART RATE: 67 BPM | RESPIRATION RATE: 18 BRPM | SYSTOLIC BLOOD PRESSURE: 117 MMHG | OXYGEN SATURATION: 99 % | DIASTOLIC BLOOD PRESSURE: 71 MMHG | TEMPERATURE: 98 F

## 2021-06-14 DIAGNOSIS — K21.9 GERD (GASTROESOPHAGEAL REFLUX DISEASE): ICD-10-CM

## 2021-06-14 LAB — B-HCG UR QL: NEGATIVE

## 2021-06-14 PROCEDURE — 37000009 HC ANESTHESIA EA ADD 15 MINS: Performed by: INTERNAL MEDICINE

## 2021-06-14 PROCEDURE — D9220A PRA ANESTHESIA: Mod: CRNA,,, | Performed by: NURSE ANESTHETIST, CERTIFIED REGISTERED

## 2021-06-14 PROCEDURE — 43239 PR EGD, FLEX, W/BIOPSY, SGL/MULTI: ICD-10-PCS | Mod: ,,, | Performed by: INTERNAL MEDICINE

## 2021-06-14 PROCEDURE — 88305 TISSUE EXAM BY PATHOLOGIST: CPT | Mod: 59 | Performed by: PATHOLOGY

## 2021-06-14 PROCEDURE — 43239 EGD BIOPSY SINGLE/MULTIPLE: CPT | Mod: ,,, | Performed by: INTERNAL MEDICINE

## 2021-06-14 PROCEDURE — 88305 TISSUE EXAM BY PATHOLOGIST: ICD-10-PCS | Mod: 26,,, | Performed by: PATHOLOGY

## 2021-06-14 PROCEDURE — 88305 TISSUE EXAM BY PATHOLOGIST: CPT | Mod: 26,,, | Performed by: PATHOLOGY

## 2021-06-14 PROCEDURE — 27201012 HC FORCEPS, HOT/COLD, DISP: Performed by: INTERNAL MEDICINE

## 2021-06-14 PROCEDURE — D9220A PRA ANESTHESIA: ICD-10-PCS | Mod: CRNA,,, | Performed by: NURSE ANESTHETIST, CERTIFIED REGISTERED

## 2021-06-14 PROCEDURE — D9220A PRA ANESTHESIA: ICD-10-PCS | Mod: ANES,,, | Performed by: ANESTHESIOLOGY

## 2021-06-14 PROCEDURE — 43239 EGD BIOPSY SINGLE/MULTIPLE: CPT | Performed by: INTERNAL MEDICINE

## 2021-06-14 PROCEDURE — 25000003 PHARM REV CODE 250: Performed by: INTERNAL MEDICINE

## 2021-06-14 PROCEDURE — D9220A PRA ANESTHESIA: Mod: ANES,,, | Performed by: ANESTHESIOLOGY

## 2021-06-14 PROCEDURE — 25000003 PHARM REV CODE 250: Performed by: NURSE ANESTHETIST, CERTIFIED REGISTERED

## 2021-06-14 PROCEDURE — 81025 URINE PREGNANCY TEST: CPT | Performed by: INTERNAL MEDICINE

## 2021-06-14 PROCEDURE — 63600175 PHARM REV CODE 636 W HCPCS: Performed by: NURSE ANESTHETIST, CERTIFIED REGISTERED

## 2021-06-14 PROCEDURE — 37000008 HC ANESTHESIA 1ST 15 MINUTES: Performed by: INTERNAL MEDICINE

## 2021-06-14 RX ORDER — LIDOCAINE HYDROCHLORIDE 20 MG/ML
INJECTION INTRAVENOUS
Status: DISCONTINUED | OUTPATIENT
Start: 2021-06-14 | End: 2021-06-14

## 2021-06-14 RX ORDER — LIDOCAINE HYDROCHLORIDE 20 MG/ML
INJECTION, SOLUTION EPIDURAL; INFILTRATION; INTRACAUDAL; PERINEURAL
Status: DISCONTINUED
Start: 2021-06-14 | End: 2021-06-14 | Stop reason: HOSPADM

## 2021-06-14 RX ORDER — SODIUM CHLORIDE 9 MG/ML
INJECTION, SOLUTION INTRAVENOUS ONCE
Status: COMPLETED | OUTPATIENT
Start: 2021-06-14 | End: 2021-06-14

## 2021-06-14 RX ORDER — PROPOFOL 10 MG/ML
VIAL (ML) INTRAVENOUS
Status: DISCONTINUED | OUTPATIENT
Start: 2021-06-14 | End: 2021-06-14

## 2021-06-14 RX ORDER — SODIUM CHLORIDE 9 MG/ML
INJECTION, SOLUTION INTRAVENOUS CONTINUOUS
Status: CANCELLED | OUTPATIENT
Start: 2021-06-14

## 2021-06-14 RX ORDER — PROPOFOL 10 MG/ML
INJECTION, EMULSION INTRAVENOUS
Status: DISCONTINUED
Start: 2021-06-14 | End: 2021-06-14 | Stop reason: HOSPADM

## 2021-06-14 RX ADMIN — PROPOFOL 60 MG: 10 INJECTION, EMULSION INTRAVENOUS at 03:06

## 2021-06-14 RX ADMIN — SODIUM CHLORIDE: 0.9 INJECTION, SOLUTION INTRAVENOUS at 03:06

## 2021-06-14 RX ADMIN — Medication 100 MG: at 03:06

## 2021-06-14 RX ADMIN — PROPOFOL 10 MG: 10 INJECTION, EMULSION INTRAVENOUS at 03:06

## 2021-06-14 RX ADMIN — PROPOFOL 40 MG: 10 INJECTION, EMULSION INTRAVENOUS at 03:06

## 2021-06-16 LAB
FINAL PATHOLOGIC DIAGNOSIS: NORMAL
GROSS: NORMAL
Lab: NORMAL

## 2021-06-21 ENCOUNTER — OFFICE VISIT (OUTPATIENT)
Dept: URGENT CARE | Facility: CLINIC | Age: 36
End: 2021-06-21
Payer: COMMERCIAL

## 2021-06-21 VITALS
RESPIRATION RATE: 16 BRPM | SYSTOLIC BLOOD PRESSURE: 110 MMHG | OXYGEN SATURATION: 99 % | HEIGHT: 58 IN | TEMPERATURE: 98 F | HEART RATE: 80 BPM | BODY MASS INDEX: 25.4 KG/M2 | WEIGHT: 121 LBS | DIASTOLIC BLOOD PRESSURE: 60 MMHG

## 2021-06-21 DIAGNOSIS — Z76.0 MEDICINE REFILL: ICD-10-CM

## 2021-06-21 DIAGNOSIS — J02.9 SORE THROAT: Primary | ICD-10-CM

## 2021-06-21 LAB
CTP QC/QA: YES
CTP QC/QA: YES
MOLECULAR STREP A: NEGATIVE
SARS-COV-2 RDRP RESP QL NAA+PROBE: NEGATIVE

## 2021-06-21 PROCEDURE — 87651 POCT STREP A MOLECULAR: ICD-10-PCS | Mod: QW,S$GLB,, | Performed by: NURSE PRACTITIONER

## 2021-06-21 PROCEDURE — U0002 COVID-19 LAB TEST NON-CDC: HCPCS | Mod: QW,S$GLB,, | Performed by: NURSE PRACTITIONER

## 2021-06-21 PROCEDURE — 87651 STREP A DNA AMP PROBE: CPT | Mod: QW,S$GLB,, | Performed by: NURSE PRACTITIONER

## 2021-06-21 PROCEDURE — 3008F PR BODY MASS INDEX (BMI) DOCUMENTED: ICD-10-PCS | Mod: CPTII,S$GLB,, | Performed by: NURSE PRACTITIONER

## 2021-06-21 PROCEDURE — 99214 OFFICE O/P EST MOD 30 MIN: CPT | Mod: S$GLB,,, | Performed by: NURSE PRACTITIONER

## 2021-06-21 PROCEDURE — 99214 PR OFFICE/OUTPT VISIT, EST, LEVL IV, 30-39 MIN: ICD-10-PCS | Mod: S$GLB,,, | Performed by: NURSE PRACTITIONER

## 2021-06-21 PROCEDURE — U0002: ICD-10-PCS | Mod: QW,S$GLB,, | Performed by: NURSE PRACTITIONER

## 2021-06-21 PROCEDURE — 3008F BODY MASS INDEX DOCD: CPT | Mod: CPTII,S$GLB,, | Performed by: NURSE PRACTITIONER

## 2021-06-21 RX ORDER — OMEPRAZOLE 20 MG/1
20 CAPSULE, DELAYED RELEASE ORAL DAILY
Qty: 30 CAPSULE | Refills: 2 | Status: SHIPPED | OUTPATIENT
Start: 2021-06-21 | End: 2021-07-28 | Stop reason: SDUPTHER

## 2021-06-23 ENCOUNTER — PATIENT MESSAGE (OUTPATIENT)
Dept: ENDOCRINOLOGY | Facility: CLINIC | Age: 36
End: 2021-06-23

## 2021-06-23 DIAGNOSIS — E89.0 POST-SURGICAL HYPOTHYROIDISM: Primary | ICD-10-CM

## 2021-06-24 RX ORDER — LEVOTHYROXINE SODIUM 88 UG/1
88 CAPSULE ORAL DAILY
Qty: 90 CAPSULE | Refills: 3 | Status: SHIPPED | OUTPATIENT
Start: 2021-06-24 | End: 2022-02-03

## 2021-10-04 ENCOUNTER — PATIENT MESSAGE (OUTPATIENT)
Dept: ENDOCRINOLOGY | Facility: CLINIC | Age: 36
End: 2021-10-04

## 2021-10-04 ENCOUNTER — LAB VISIT (OUTPATIENT)
Dept: LAB | Facility: HOSPITAL | Age: 36
End: 2021-10-04
Attending: INTERNAL MEDICINE
Payer: COMMERCIAL

## 2021-10-04 DIAGNOSIS — C73 PAPILLARY THYROID CARCINOMA: ICD-10-CM

## 2021-10-04 LAB
T4 FREE SERPL-MCNC: 1.07 NG/DL (ref 0.71–1.51)
TSH SERPL DL<=0.005 MIU/L-ACNC: 1.03 UIU/ML (ref 0.4–4)

## 2021-10-04 PROCEDURE — 84439 ASSAY OF FREE THYROXINE: CPT | Performed by: INTERNAL MEDICINE

## 2021-10-04 PROCEDURE — 84432 ASSAY OF THYROGLOBULIN: CPT | Performed by: INTERNAL MEDICINE

## 2021-10-04 PROCEDURE — 84443 ASSAY THYROID STIM HORMONE: CPT | Performed by: INTERNAL MEDICINE

## 2021-10-08 LAB
CLINICAL BIOCHEMIST REVIEW: NORMAL
THYROGLOB SERPL-MCNC: <0.2 NG/ML

## 2021-10-10 ENCOUNTER — HOSPITAL ENCOUNTER (EMERGENCY)
Facility: HOSPITAL | Age: 36
Discharge: HOME | End: 2021-10-10
Attending: INTERNAL MEDICINE
Payer: COMMERCIAL

## 2021-10-10 VITALS
HEART RATE: 78 BPM | WEIGHT: 123 LBS | RESPIRATION RATE: 19 BRPM | DIASTOLIC BLOOD PRESSURE: 66 MMHG | BODY MASS INDEX: 25.71 KG/M2 | SYSTOLIC BLOOD PRESSURE: 120 MMHG | TEMPERATURE: 99 F | OXYGEN SATURATION: 100 %

## 2021-10-10 DIAGNOSIS — R55 SYNCOPE: ICD-10-CM

## 2021-10-10 LAB
ALBUMIN SERPL-MCNC: 3.5 G/DL (ref 3.3–5.5)
ALLENS TEST: ABNORMAL
ALP SERPL-CCNC: 59 U/L (ref 42–141)
B-HCG UR QL: NEGATIVE
BILIRUB SERPL-MCNC: 0.4 MG/DL (ref 0.2–1.6)
BILIRUBIN, POC UA: NEGATIVE
BLOOD, POC UA: ABNORMAL
BUN SERPL-MCNC: 14 MG/DL (ref 7–22)
CALCIUM SERPL-MCNC: 9.1 MG/DL (ref 8–10.3)
CHLORIDE SERPL-SCNC: 104 MMOL/L (ref 98–108)
CLARITY, POC UA: CLEAR
COLOR, POC UA: YELLOW
CREAT SERPL-MCNC: 0.6 MG/DL (ref 0.6–1.2)
CTP QC/QA: YES
GLUCOSE SERPL-MCNC: 124 MG/DL (ref 73–118)
GLUCOSE, POC UA: NEGATIVE
HCO3 UR-SCNC: 26.5 MMOL/L (ref 24–28)
KETONES, POC UA: NEGATIVE
LDH SERPL L TO P-CCNC: 1.57 MMOL/L (ref 0.5–2.2)
LEUKOCYTE EST, POC UA: NEGATIVE
NITRITE, POC UA: NEGATIVE
PCO2 BLDA: 39.8 MMHG (ref 35–45)
PH SMN: 7.43 [PH] (ref 7.35–7.45)
PH UR STRIP: 6.5 [PH]
PO2 BLDA: 30 MMHG (ref 40–60)
POC ALT (SGPT): 18 U/L (ref 10–47)
POC AST (SGOT): 22 U/L (ref 11–38)
POC BE: 2 MMOL/L
POC CARDIAC TROPONIN I: 0 NG/ML
POC SATURATED O2: 59 % (ref 95–100)
POC TCO2: 26 MMOL/L (ref 18–33)
POC TCO2: 28 MMOL/L (ref 24–29)
POCT GLUCOSE: 132 MG/DL (ref 70–110)
POTASSIUM BLD-SCNC: 3.9 MMOL/L (ref 3.6–5.1)
PROTEIN, POC UA: NEGATIVE
PROTEIN, POC: 7.2 G/DL (ref 6.4–8.1)
SAMPLE: ABNORMAL
SAMPLE: NORMAL
SITE: ABNORMAL
SODIUM BLD-SCNC: 142 MMOL/L (ref 128–145)
SPECIFIC GRAVITY, POC UA: >=1.03
UROBILINOGEN, POC UA: 0.2 E.U./DL

## 2021-10-10 PROCEDURE — 36000 PLACE NEEDLE IN VEIN: CPT | Mod: ER

## 2021-10-10 PROCEDURE — 85025 COMPLETE CBC W/AUTO DIFF WBC: CPT | Mod: ER

## 2021-10-10 PROCEDURE — 82962 GLUCOSE BLOOD TEST: CPT | Mod: ER

## 2021-10-10 PROCEDURE — 80053 COMPREHEN METABOLIC PANEL: CPT | Mod: ER

## 2021-10-10 PROCEDURE — 82803 BLOOD GASES ANY COMBINATION: CPT | Mod: ER

## 2021-10-10 PROCEDURE — 84484 ASSAY OF TROPONIN QUANT: CPT | Mod: ER

## 2021-10-10 PROCEDURE — 99284 EMERGENCY DEPT VISIT MOD MDM: CPT | Mod: 25,ER

## 2021-10-10 PROCEDURE — 25000003 PHARM REV CODE 250: Mod: ER | Performed by: INTERNAL MEDICINE

## 2021-10-10 PROCEDURE — 81025 URINE PREGNANCY TEST: CPT | Mod: ER | Performed by: INTERNAL MEDICINE

## 2021-10-10 RX ORDER — BUTALBITAL, ACETAMINOPHEN AND CAFFEINE 50; 325; 40 MG/1; MG/1; MG/1
2 TABLET ORAL
Status: COMPLETED | OUTPATIENT
Start: 2021-10-10 | End: 2021-10-10

## 2021-10-10 RX ORDER — ONDANSETRON 4 MG/1
8 TABLET, ORALLY DISINTEGRATING ORAL
Status: COMPLETED | OUTPATIENT
Start: 2021-10-10 | End: 2021-10-10

## 2021-10-10 RX ADMIN — ONDANSETRON 8 MG: 4 TABLET, ORALLY DISINTEGRATING ORAL at 09:10

## 2021-10-10 RX ADMIN — BUTALBITAL, ACETAMINOPHEN AND CAFFEINE 2 TABLET: 50; 325; 40 TABLET ORAL at 09:10

## 2021-10-20 ENCOUNTER — OFFICE VISIT (OUTPATIENT)
Dept: OBSTETRICS AND GYNECOLOGY | Facility: CLINIC | Age: 36
End: 2021-10-20
Payer: COMMERCIAL

## 2021-10-20 VITALS
BODY MASS INDEX: 26.48 KG/M2 | HEIGHT: 58 IN | WEIGHT: 126.13 LBS | SYSTOLIC BLOOD PRESSURE: 110 MMHG | DIASTOLIC BLOOD PRESSURE: 70 MMHG

## 2021-10-20 DIAGNOSIS — Z30.09 ENCOUNTER FOR OTHER GENERAL COUNSELING AND ADVICE ON CONTRACEPTION: ICD-10-CM

## 2021-10-20 DIAGNOSIS — F32.A DEPRESSION, UNSPECIFIED DEPRESSION TYPE: ICD-10-CM

## 2021-10-20 DIAGNOSIS — Z01.419 ENCOUNTER FOR ANNUAL ROUTINE GYNECOLOGICAL EXAMINATION: Primary | ICD-10-CM

## 2021-10-20 PROCEDURE — 3008F PR BODY MASS INDEX (BMI) DOCUMENTED: ICD-10-PCS | Mod: CPTII,S$GLB,, | Performed by: OBSTETRICS & GYNECOLOGY

## 2021-10-20 PROCEDURE — 1160F PR REVIEW ALL MEDS BY PRESCRIBER/CLIN PHARMACIST DOCUMENTED: ICD-10-PCS | Mod: CPTII,S$GLB,, | Performed by: OBSTETRICS & GYNECOLOGY

## 2021-10-20 PROCEDURE — 1160F RVW MEDS BY RX/DR IN RCRD: CPT | Mod: CPTII,S$GLB,, | Performed by: OBSTETRICS & GYNECOLOGY

## 2021-10-20 PROCEDURE — 1159F PR MEDICATION LIST DOCUMENTED IN MEDICAL RECORD: ICD-10-PCS | Mod: CPTII,S$GLB,, | Performed by: OBSTETRICS & GYNECOLOGY

## 2021-10-20 PROCEDURE — 99395 PR PREVENTIVE VISIT,EST,18-39: ICD-10-PCS | Mod: S$GLB,,, | Performed by: OBSTETRICS & GYNECOLOGY

## 2021-10-20 PROCEDURE — 3074F PR MOST RECENT SYSTOLIC BLOOD PRESSURE < 130 MM HG: ICD-10-PCS | Mod: CPTII,S$GLB,, | Performed by: OBSTETRICS & GYNECOLOGY

## 2021-10-20 PROCEDURE — 99999 PR PBB SHADOW E&M-EST. PATIENT-LVL III: CPT | Mod: PBBFAC,,, | Performed by: OBSTETRICS & GYNECOLOGY

## 2021-10-20 PROCEDURE — 3078F PR MOST RECENT DIASTOLIC BLOOD PRESSURE < 80 MM HG: ICD-10-PCS | Mod: CPTII,S$GLB,, | Performed by: OBSTETRICS & GYNECOLOGY

## 2021-10-20 PROCEDURE — 99999 PR PBB SHADOW E&M-EST. PATIENT-LVL III: ICD-10-PCS | Mod: PBBFAC,,, | Performed by: OBSTETRICS & GYNECOLOGY

## 2021-10-20 PROCEDURE — 3008F BODY MASS INDEX DOCD: CPT | Mod: CPTII,S$GLB,, | Performed by: OBSTETRICS & GYNECOLOGY

## 2021-10-20 PROCEDURE — 1159F MED LIST DOCD IN RCRD: CPT | Mod: CPTII,S$GLB,, | Performed by: OBSTETRICS & GYNECOLOGY

## 2021-10-20 PROCEDURE — 3078F DIAST BP <80 MM HG: CPT | Mod: CPTII,S$GLB,, | Performed by: OBSTETRICS & GYNECOLOGY

## 2021-10-20 PROCEDURE — 3074F SYST BP LT 130 MM HG: CPT | Mod: CPTII,S$GLB,, | Performed by: OBSTETRICS & GYNECOLOGY

## 2021-10-20 PROCEDURE — 99395 PREV VISIT EST AGE 18-39: CPT | Mod: S$GLB,,, | Performed by: OBSTETRICS & GYNECOLOGY

## 2021-10-20 RX ORDER — ESCITALOPRAM OXALATE 10 MG/1
10 TABLET ORAL DAILY
Qty: 90 TABLET | Refills: 3 | Status: SHIPPED | OUTPATIENT
Start: 2021-10-20 | End: 2022-12-16

## 2021-10-20 RX ORDER — NORELGESTROMIN AND ETHINYL ESTRADIOL 35; 150 UG/MG; UG/MG
1 PATCH TRANSDERMAL
Qty: 12 PATCH | Refills: 3 | Status: SHIPPED | OUTPATIENT
Start: 2021-10-20 | End: 2022-09-09

## 2021-10-22 ENCOUNTER — PATIENT MESSAGE (OUTPATIENT)
Dept: ENDOCRINOLOGY | Facility: CLINIC | Age: 36
End: 2021-10-22
Payer: COMMERCIAL

## 2021-11-30 ENCOUNTER — OFFICE VISIT (OUTPATIENT)
Dept: URGENT CARE | Facility: CLINIC | Age: 36
End: 2021-11-30
Payer: COMMERCIAL

## 2021-11-30 VITALS
TEMPERATURE: 99 F | DIASTOLIC BLOOD PRESSURE: 68 MMHG | HEART RATE: 98 BPM | HEIGHT: 58 IN | SYSTOLIC BLOOD PRESSURE: 124 MMHG | RESPIRATION RATE: 18 BRPM | BODY MASS INDEX: 26.45 KG/M2 | WEIGHT: 126 LBS | OXYGEN SATURATION: 99 %

## 2021-11-30 DIAGNOSIS — R06.2 WHEEZING: ICD-10-CM

## 2021-11-30 DIAGNOSIS — R05.9 COUGH: ICD-10-CM

## 2021-11-30 DIAGNOSIS — B96.89 ACUTE BACTERIAL SINUSITIS: Primary | ICD-10-CM

## 2021-11-30 DIAGNOSIS — J02.9 SORE THROAT: ICD-10-CM

## 2021-11-30 DIAGNOSIS — J01.90 ACUTE BACTERIAL SINUSITIS: Primary | ICD-10-CM

## 2021-11-30 LAB
CTP QC/QA: YES
CTP QC/QA: YES
S PYO RRNA THROAT QL PROBE: NEGATIVE
SARS-COV-2 RDRP RESP QL NAA+PROBE: NEGATIVE

## 2021-11-30 PROCEDURE — 87880 POCT RAPID STREP A: ICD-10-PCS | Mod: QW,S$GLB,, | Performed by: PHYSICIAN ASSISTANT

## 2021-11-30 PROCEDURE — 71046 XR CHEST PA AND LATERAL: ICD-10-PCS | Mod: S$GLB,,, | Performed by: RADIOLOGY

## 2021-11-30 PROCEDURE — 99213 PR OFFICE/OUTPT VISIT, EST, LEVL III, 20-29 MIN: ICD-10-PCS | Mod: 25,S$GLB,CS, | Performed by: PHYSICIAN ASSISTANT

## 2021-11-30 PROCEDURE — 94640 AIRWAY INHALATION TREATMENT: CPT | Mod: S$GLB,,, | Performed by: PHYSICIAN ASSISTANT

## 2021-11-30 PROCEDURE — 87880 STREP A ASSAY W/OPTIC: CPT | Mod: QW,S$GLB,, | Performed by: PHYSICIAN ASSISTANT

## 2021-11-30 PROCEDURE — U0002: ICD-10-PCS | Mod: QW,S$GLB,, | Performed by: PHYSICIAN ASSISTANT

## 2021-11-30 PROCEDURE — U0002 COVID-19 LAB TEST NON-CDC: HCPCS | Mod: QW,S$GLB,, | Performed by: PHYSICIAN ASSISTANT

## 2021-11-30 PROCEDURE — 99213 OFFICE O/P EST LOW 20 MIN: CPT | Mod: 25,S$GLB,CS, | Performed by: PHYSICIAN ASSISTANT

## 2021-11-30 PROCEDURE — 71046 X-RAY EXAM CHEST 2 VIEWS: CPT | Mod: S$GLB,,, | Performed by: RADIOLOGY

## 2021-11-30 PROCEDURE — 94640 PR INHAL RX, AIRWAY OBST/DX SPUTUM INDUCT: ICD-10-PCS | Mod: S$GLB,,, | Performed by: PHYSICIAN ASSISTANT

## 2021-11-30 RX ORDER — ALBUTEROL SULFATE 90 UG/1
2 AEROSOL, METERED RESPIRATORY (INHALATION) EVERY 4 HOURS PRN
Qty: 6.7 G | Refills: 0 | Status: SHIPPED | OUTPATIENT
Start: 2021-11-30 | End: 2022-03-04

## 2021-11-30 RX ORDER — BENZONATATE 100 MG/1
200 CAPSULE ORAL 3 TIMES DAILY PRN
Qty: 40 CAPSULE | Refills: 0 | Status: SHIPPED | OUTPATIENT
Start: 2021-11-30 | End: 2021-12-07

## 2021-11-30 RX ORDER — ALBUTEROL SULFATE 0.83 MG/ML
2.5 SOLUTION RESPIRATORY (INHALATION)
Status: COMPLETED | OUTPATIENT
Start: 2021-11-30 | End: 2021-11-30

## 2021-11-30 RX ORDER — FLUTICASONE PROPIONATE 50 MCG
1 SPRAY, SUSPENSION (ML) NASAL DAILY
Qty: 9.9 ML | Refills: 0 | Status: SHIPPED | OUTPATIENT
Start: 2021-11-30 | End: 2022-03-04

## 2021-11-30 RX ORDER — AMOXICILLIN AND CLAVULANATE POTASSIUM 875; 125 MG/1; MG/1
1 TABLET, FILM COATED ORAL 2 TIMES DAILY
Qty: 20 TABLET | Refills: 0 | Status: SHIPPED | OUTPATIENT
Start: 2021-11-30 | End: 2021-12-10

## 2021-11-30 RX ADMIN — ALBUTEROL SULFATE 2.5 MG: 0.83 SOLUTION RESPIRATORY (INHALATION) at 12:11

## 2021-12-05 ENCOUNTER — CLINICAL SUPPORT (OUTPATIENT)
Dept: URGENT CARE | Facility: CLINIC | Age: 36
End: 2021-12-05
Payer: COMMERCIAL

## 2021-12-05 VITALS
RESPIRATION RATE: 18 BRPM | DIASTOLIC BLOOD PRESSURE: 76 MMHG | TEMPERATURE: 98 F | HEIGHT: 58 IN | OXYGEN SATURATION: 98 % | WEIGHT: 126 LBS | HEART RATE: 100 BPM | BODY MASS INDEX: 26.45 KG/M2 | SYSTOLIC BLOOD PRESSURE: 119 MMHG

## 2021-12-05 DIAGNOSIS — R05.9 COUGH: Primary | ICD-10-CM

## 2021-12-05 DIAGNOSIS — Z76.89 ENCOUNTER TO ESTABLISH CARE: ICD-10-CM

## 2021-12-05 DIAGNOSIS — J40 BRONCHITIS: ICD-10-CM

## 2021-12-05 LAB
CTP QC/QA: YES
SARS-COV-2 RDRP RESP QL NAA+PROBE: NEGATIVE

## 2021-12-05 PROCEDURE — 99213 OFFICE O/P EST LOW 20 MIN: CPT | Mod: S$GLB,,, | Performed by: FAMILY MEDICINE

## 2021-12-05 PROCEDURE — 99213 PR OFFICE/OUTPT VISIT, EST, LEVL III, 20-29 MIN: ICD-10-PCS | Mod: S$GLB,,, | Performed by: FAMILY MEDICINE

## 2021-12-05 PROCEDURE — 71046 XR CHEST PA AND LATERAL: ICD-10-PCS | Mod: S$GLB,,, | Performed by: RADIOLOGY

## 2021-12-05 PROCEDURE — U0002 COVID-19 LAB TEST NON-CDC: HCPCS | Mod: QW,S$GLB,, | Performed by: FAMILY MEDICINE

## 2021-12-05 PROCEDURE — U0002: ICD-10-PCS | Mod: QW,S$GLB,, | Performed by: FAMILY MEDICINE

## 2021-12-05 PROCEDURE — 71046 X-RAY EXAM CHEST 2 VIEWS: CPT | Mod: S$GLB,,, | Performed by: RADIOLOGY

## 2021-12-05 RX ORDER — FLUTICASONE PROPIONATE 110 UG/1
1 AEROSOL, METERED RESPIRATORY (INHALATION) 2 TIMES DAILY
Qty: 12 G | Refills: 0 | Status: SHIPPED | OUTPATIENT
Start: 2021-12-05 | End: 2022-03-04

## 2021-12-05 RX ORDER — PROMETHAZINE HYDROCHLORIDE AND DEXTROMETHORPHAN HYDROBROMIDE 6.25; 15 MG/5ML; MG/5ML
5 SYRUP ORAL EVERY 6 HOURS PRN
Qty: 118 ML | Refills: 0 | Status: SHIPPED | OUTPATIENT
Start: 2021-12-05 | End: 2022-11-01

## 2021-12-05 RX ORDER — AZITHROMYCIN 250 MG/1
TABLET, FILM COATED ORAL
Qty: 6 TABLET | Refills: 0 | Status: SHIPPED | OUTPATIENT
Start: 2021-12-05 | End: 2021-12-10

## 2021-12-05 RX ORDER — INHALER, ASSIST DEVICES
SPACER (EA) MISCELLANEOUS
Qty: 1 EACH | Refills: 0 | Status: SHIPPED | OUTPATIENT
Start: 2021-12-05 | End: 2022-03-04

## 2021-12-30 ENCOUNTER — LAB VISIT (OUTPATIENT)
Dept: PRIMARY CARE CLINIC | Facility: OTHER | Age: 36
End: 2021-12-30
Attending: INTERNAL MEDICINE
Payer: COMMERCIAL

## 2021-12-30 DIAGNOSIS — Z20.822 ENCOUNTER FOR LABORATORY TESTING FOR COVID-19 VIRUS: ICD-10-CM

## 2021-12-30 PROCEDURE — U0003 INFECTIOUS AGENT DETECTION BY NUCLEIC ACID (DNA OR RNA); SEVERE ACUTE RESPIRATORY SYNDROME CORONAVIRUS 2 (SARS-COV-2) (CORONAVIRUS DISEASE [COVID-19]), AMPLIFIED PROBE TECHNIQUE, MAKING USE OF HIGH THROUGHPUT TECHNOLOGIES AS DESCRIBED BY CMS-2020-01-R: HCPCS | Mod: ST72 | Performed by: INTERNAL MEDICINE

## 2022-01-04 LAB
SARS-COV-2 RNA RESP QL NAA+PROBE: DETECTED
SARS-COV-2- CYCLE NUMBER: 22

## 2022-01-13 ENCOUNTER — OFFICE VISIT (OUTPATIENT)
Dept: URGENT CARE | Facility: CLINIC | Age: 37
End: 2022-01-13
Payer: COMMERCIAL

## 2022-01-13 VITALS
DIASTOLIC BLOOD PRESSURE: 73 MMHG | HEIGHT: 58 IN | TEMPERATURE: 98 F | RESPIRATION RATE: 18 BRPM | WEIGHT: 126 LBS | OXYGEN SATURATION: 98 % | SYSTOLIC BLOOD PRESSURE: 122 MMHG | BODY MASS INDEX: 26.45 KG/M2 | HEART RATE: 78 BPM

## 2022-01-13 DIAGNOSIS — K29.70 GASTRITIS, PRESENCE OF BLEEDING UNSPECIFIED, UNSPECIFIED CHRONICITY, UNSPECIFIED GASTRITIS TYPE: ICD-10-CM

## 2022-01-13 DIAGNOSIS — N39.0 RECURRENT UTI: ICD-10-CM

## 2022-01-13 DIAGNOSIS — R31.9 URINARY TRACT INFECTION WITH HEMATURIA, SITE UNSPECIFIED: Primary | ICD-10-CM

## 2022-01-13 DIAGNOSIS — K21.9 GASTROESOPHAGEAL REFLUX DISEASE, UNSPECIFIED WHETHER ESOPHAGITIS PRESENT: ICD-10-CM

## 2022-01-13 DIAGNOSIS — N39.0 URINARY TRACT INFECTION WITH HEMATURIA, SITE UNSPECIFIED: Primary | ICD-10-CM

## 2022-01-13 DIAGNOSIS — R10.13 EPIGASTRIC PAIN: ICD-10-CM

## 2022-01-13 DIAGNOSIS — R11.2 NAUSEA AND VOMITING, INTRACTABILITY OF VOMITING NOT SPECIFIED, UNSPECIFIED VOMITING TYPE: ICD-10-CM

## 2022-01-13 LAB
B-HCG UR QL: NEGATIVE
BILIRUB UR QL STRIP: NEGATIVE
CTP QC/QA: YES
GLUCOSE UR QL STRIP: NEGATIVE
KETONES UR QL STRIP: NEGATIVE
LEUKOCYTE ESTERASE UR QL STRIP: POSITIVE
PH, POC UA: 7 (ref 5–8)
POC BLOOD, URINE: POSITIVE
POC NITRATES, URINE: NEGATIVE
PROT UR QL STRIP: POSITIVE
SP GR UR STRIP: 1.01 (ref 1–1.03)
UROBILINOGEN UR STRIP-ACNC: NORMAL (ref 0.1–1.1)

## 2022-01-13 PROCEDURE — 87086 URINE CULTURE/COLONY COUNT: CPT | Performed by: PHYSICIAN ASSISTANT

## 2022-01-13 PROCEDURE — 96372 THER/PROPH/DIAG INJ SC/IM: CPT | Mod: S$GLB,,, | Performed by: PHYSICIAN ASSISTANT

## 2022-01-13 PROCEDURE — 1159F PR MEDICATION LIST DOCUMENTED IN MEDICAL RECORD: ICD-10-PCS | Mod: CPTII,S$GLB,, | Performed by: PHYSICIAN ASSISTANT

## 2022-01-13 PROCEDURE — 3074F PR MOST RECENT SYSTOLIC BLOOD PRESSURE < 130 MM HG: ICD-10-PCS | Mod: CPTII,S$GLB,, | Performed by: PHYSICIAN ASSISTANT

## 2022-01-13 PROCEDURE — 96372 PR INJECTION,THERAP/PROPH/DIAG2ST, IM OR SUBCUT: ICD-10-PCS | Mod: S$GLB,,, | Performed by: PHYSICIAN ASSISTANT

## 2022-01-13 PROCEDURE — 81025 POCT URINE PREGNANCY: ICD-10-PCS | Mod: S$GLB,,, | Performed by: PHYSICIAN ASSISTANT

## 2022-01-13 PROCEDURE — 87077 CULTURE AEROBIC IDENTIFY: CPT | Performed by: PHYSICIAN ASSISTANT

## 2022-01-13 PROCEDURE — 3008F BODY MASS INDEX DOCD: CPT | Mod: CPTII,S$GLB,, | Performed by: PHYSICIAN ASSISTANT

## 2022-01-13 PROCEDURE — 99214 PR OFFICE/OUTPT VISIT, EST, LEVL IV, 30-39 MIN: ICD-10-PCS | Mod: 25,S$GLB,, | Performed by: PHYSICIAN ASSISTANT

## 2022-01-13 PROCEDURE — 3078F PR MOST RECENT DIASTOLIC BLOOD PRESSURE < 80 MM HG: ICD-10-PCS | Mod: CPTII,S$GLB,, | Performed by: PHYSICIAN ASSISTANT

## 2022-01-13 PROCEDURE — 99214 OFFICE O/P EST MOD 30 MIN: CPT | Mod: 25,S$GLB,, | Performed by: PHYSICIAN ASSISTANT

## 2022-01-13 PROCEDURE — 3008F PR BODY MASS INDEX (BMI) DOCUMENTED: ICD-10-PCS | Mod: CPTII,S$GLB,, | Performed by: PHYSICIAN ASSISTANT

## 2022-01-13 PROCEDURE — 1160F RVW MEDS BY RX/DR IN RCRD: CPT | Mod: CPTII,S$GLB,, | Performed by: PHYSICIAN ASSISTANT

## 2022-01-13 PROCEDURE — 81025 URINE PREGNANCY TEST: CPT | Mod: S$GLB,,, | Performed by: PHYSICIAN ASSISTANT

## 2022-01-13 PROCEDURE — 87088 URINE BACTERIA CULTURE: CPT | Performed by: PHYSICIAN ASSISTANT

## 2022-01-13 PROCEDURE — 1160F PR REVIEW ALL MEDS BY PRESCRIBER/CLIN PHARMACIST DOCUMENTED: ICD-10-PCS | Mod: CPTII,S$GLB,, | Performed by: PHYSICIAN ASSISTANT

## 2022-01-13 PROCEDURE — 81003 POCT URINALYSIS, DIPSTICK, AUTOMATED, W/O SCOPE: ICD-10-PCS | Mod: QW,S$GLB,, | Performed by: PHYSICIAN ASSISTANT

## 2022-01-13 PROCEDURE — 3078F DIAST BP <80 MM HG: CPT | Mod: CPTII,S$GLB,, | Performed by: PHYSICIAN ASSISTANT

## 2022-01-13 PROCEDURE — 3074F SYST BP LT 130 MM HG: CPT | Mod: CPTII,S$GLB,, | Performed by: PHYSICIAN ASSISTANT

## 2022-01-13 PROCEDURE — 1159F MED LIST DOCD IN RCRD: CPT | Mod: CPTII,S$GLB,, | Performed by: PHYSICIAN ASSISTANT

## 2022-01-13 PROCEDURE — 87186 SC STD MICRODIL/AGAR DIL: CPT | Performed by: PHYSICIAN ASSISTANT

## 2022-01-13 PROCEDURE — 81003 URINALYSIS AUTO W/O SCOPE: CPT | Mod: QW,S$GLB,, | Performed by: PHYSICIAN ASSISTANT

## 2022-01-13 RX ORDER — CIPROFLOXACIN 500 MG/1
500 TABLET ORAL 2 TIMES DAILY
Qty: 14 TABLET | Refills: 0 | Status: SHIPPED | OUTPATIENT
Start: 2022-01-13 | End: 2022-01-14 | Stop reason: ALTCHOICE

## 2022-01-13 RX ORDER — CEFTRIAXONE 1 G/1
1 INJECTION, POWDER, FOR SOLUTION INTRAMUSCULAR; INTRAVENOUS
Status: COMPLETED | OUTPATIENT
Start: 2022-01-13 | End: 2022-01-13

## 2022-01-13 RX ADMIN — CEFTRIAXONE 1 G: 1 INJECTION, POWDER, FOR SOLUTION INTRAMUSCULAR; INTRAVENOUS at 07:01

## 2022-01-13 NOTE — PROGRESS NOTES
"Subjective:       Patient ID: Dina Hutton is a 36 y.o. female.    Vitals:  height is 4' 10" (1.473 m) and weight is 57.2 kg (126 lb). Her temperature is 98.4 °F (36.9 °C). Her blood pressure is 122/73 and her pulse is 78. Her respiration is 18 and oxygen saturation is 98%.     Chief Complaint: Urinary Tract Infection (I also have abdominal pain and vagina pain, nausea, and vomitting. - Entered by patient)    Patient is a 36-year-old female with history of recurrent UTI, GERD, thyroid cancer who presents today with chief complaint of dysuria, urgency, hematuria x1, cloudy urine x1, frequency that began 2 days ago.  Patient notes that 7 days ago she began a cleanse diet where she has been drinking warm water with lemon and instant coffee in it 1st thing in the morning.  She states that since beginning this she has had decreased appetite, epigastric discomfort, increased heartburn/acid reflux, intermittent nausea/vomiting.  She states that yesterday she discontinued doing they coughing/lemon drink.  There was no other change in diet.  No diarrhea.  Patient has history of recurrent UTIs, states that UTI symptoms feel like prior UTIs, though the epigastric pain and GI symptoms are not typically associated with her UTIs, though this began before UTI symptoms with onset of new coffee/Lemon drink.  No hematemesis/melena.  No vaginal discharge/vaginal symptoms.    Urinary Tract Infection   This is a new problem. The current episode started in the past 7 days (X2 DAYS). The problem occurs every urination. The problem has been unchanged. The quality of the pain is described as burning. The pain is moderate. There has been no fever. Associated symptoms include a discharge, frequency, hematuria, hesitancy, nausea, urgency and vomiting. Pertinent negatives include no chills, flank pain or rash. She has tried home medications and antibiotics for the symptoms. The treatment provided no relief.       Constitution: Positive for " fatigue. Negative for chills, sweating and fever.   HENT: Negative for ear pain, tinnitus, congestion and sore throat.    Neck: Negative for neck pain and neck stiffness.   Cardiovascular: Negative for chest pain, leg swelling, palpitations and sob on exertion.   Eyes: Negative for eye discharge, eye itching, eye pain and eye redness.   Respiratory: Negative for cough, sputum production and shortness of breath.    Gastrointestinal: Positive for abdominal pain, nausea and vomiting.   Genitourinary: Positive for dysuria, frequency, urgency and hematuria. Negative for urine decreased, flank pain, vaginal pain, vaginal discharge, vaginal bleeding, vaginal odor and pelvic pain.   Musculoskeletal: Negative for pain and joint pain.   Skin: Negative for color change and rash.   Neurological: Positive for headaches. Negative for dizziness, light-headedness, history of migraines, altered mental status, numbness and tingling.   Psychiatric/Behavioral: Negative for altered mental status and confusion.       Objective:      Physical Exam   Constitutional: She is oriented to person, place, and time. She appears well-developed and well-nourished.  Non-toxic appearance. She does not appear ill. No distress.      Comments:Very pleasant sitting comfortably in no acute distress.   HENT:   Head: Normocephalic and atraumatic.   Ears:   Right Ear: External ear normal.   Left Ear: External ear normal.   Nose: Nose normal.   Mouth/Throat: Mucous membranes are normal.   Eyes: Conjunctivae and lids are normal.   Neck: Trachea normal. Neck supple.   Cardiovascular: Normal rate, regular rhythm and normal heart sounds.   Pulmonary/Chest: Effort normal and breath sounds normal. No respiratory distress.   Abdominal: Normal appearance and bowel sounds are normal. She exhibits no distension, no abdominal bruit, no pulsatile midline mass and no mass. Soft. There is abdominal tenderness in the epigastric area. There is right CVA tenderness. There is  no rebound, no guarding, no tenderness at McBurney's point, negative Casillas's sign, no left CVA tenderness and negative Rovsing's sign. No hernia.      Comments: Mild CVA tenderness on right.  Abdomen soft without rigidity or guarding.  There is midline epigastric tenderness which is area of pain/discomfort x7 days.   Musculoskeletal: Normal range of motion.         General: No edema. Normal range of motion.   Neurological: She is alert and oriented to person, place, and time. She has normal strength.   Skin: Skin is warm, dry, intact, not diaphoretic and not pale.   Psychiatric: She has a normal mood and affect. Her speech is normal and behavior is normal. Judgment and thought content normal. Cognition and memory  Nursing note and vitals reviewed.          Results for orders placed or performed in visit on 01/13/22   POCT Urinalysis, Dipstick, Automated, W/O Scope   Result Value Ref Range    POC Blood, Urine Positive (A) Negative    POC Bilirubin, Urine Negative Negative    POC Urobilinogen, Urine normal 0.1 - 1.1    POC Ketones, Urine Negative Negative    POC Protein, Urine Positive (A) Negative    POC Nitrates, Urine Negative Negative    POC Glucose, Urine Negative Negative    pH, UA 7.0 5 - 8    POC Specific Gravity, Urine 1.015 1.003 - 1.029    POC Leukocytes, Urine Positive (A) Negative   POCT urine pregnancy   Result Value Ref Range    POC Preg Test, Ur Negative Negative     Acceptable Yes        Assessment:       1. Urinary tract infection with hematuria, site unspecified    2. Epigastric pain    3. Nausea and vomiting, intractability of vomiting not specified, unspecified vomiting type    4. Recurrent UTI        Patient expresses great improvement and epigastric discomfort with GI cocktail.  Patient states no longer in pain/no longer tender to touch of epigastrium over stomach.  Will treat for gastritis, instructed increase omeprazole from 20 to 40 mg and can add shorter acting antacid p.r.n.  for breakthrough, maintain bland diet.  Likely related to new coffee/citrus drinks.  Will send for culture due to recurrent UTI and possible CVA tenderness.  Given follow-up/ER precautions.  Plan:         Urinary tract infection with hematuria, site unspecified  -     POCT Urinalysis, Dipstick, Automated, W/O Scope  -     POCT urine pregnancy  -     cefTRIAXone injection 1 g  -     ciprofloxacin HCl (CIPRO) 500 MG tablet; Take 1 tablet (500 mg total) by mouth 2 (two) times daily. for 7 days  Dispense: 14 tablet; Refill: 0  -     Urine culture    Epigastric pain  -     (pyxis) gi cocktail (mylanta 30 mL, LIDOcaine 2 % viscous 10 mL, dicyclomine 10 mL) 50 mL    Nausea and vomiting, intractability of vomiting not specified, unspecified vomiting type    Recurrent UTI  -     Urine culture      Patient Instructions     - Rest.    - Drink plenty of fluids.      - continue omeprazole- you can increase from 20 to 40 mg daily for 1-2 weeks.     - you can use over the counter antacids tums, maalox, mylanta, or pepcid as directed/ as needed until stomach issue resolves. Keep diet bland.     As discussed, this appears consistent with gastritis. In urgent care we are limited and we cannot do ultrasound or CT scan to see what is going on inside the abdomen, but your abdominal exam does not appear concerning at this time. My suspicion is low for pancreatitis or gall stones or kidney stones/kidney infection as cause of abdominal pain. However, if you develop new or worsening symptoms, seek medical attention.     - You have been given an antibiotic (cipro) to treat UTI    - Please complete the antibiotic as directed on the bottle.   - If you are female and on oral birth control pills, use additional methods to prevent pregnancy while on antibiotics and for one cycle after.   - you can take otc probiotic to limit upset stomach    - Follow up with your PCP or specialty clinic as directed in the next 1-2 weeks if not improved or as  needed.  You can call [phone] to schedule an appointment with the appropriate provider.    - Go to the ER or seek medical attention immediately if you develop new or worsening symptoms.     - You must understand that you have received an Urgent Care treatment only and that you may be released before all of your medical problems are known or treated.   - You, the patient, will arrange for follow up care as instructed.   - If your condition worsens or fails to improve we recommend that you receive another evaluation at the ER immediately or contact your PCP to discuss your concerns or return here.      Patient Education       Gastritis Discharge Instructions   About this topic   Gastritis is inflammation of the lining of the stomach. Sometimes gastritis is caused by bacteria. Other times, it can be caused by drugs. Some types of drugs can cause gastritis. The most common are nonsteroidal anti-inflammatory drugs (NSAIDs) like ibuprofen or naproxen. Gastritis can also be caused by other things like drinking alcohol or having a serious illness. It can also happen if you have a health problem in which the bodys own infection fighting system attacks the stomach lining. Based on the cause, you may need to take an antibiotic or other medicine to treat your gastritis. If so, be sure you finish all of the medicine that is ordered.     What care is needed at home?   · Ask your doctor what you need to do when you go home. Make sure you ask questions if you do not understand what the doctor says.  · Eat small meals more often to help with belly pain.  · Keep a diary about your pain and the foods you eat. Then you can avoid those that bother your stomach.  · Avoid or limit spicy foods.  · Avoid or limit beer, wine, and mixed drinks.  · If you smoke, try to quit. Your doctor or nurse can help.  · Try to learn ways to manage stress. Stress may cause the acid levels in your stomach to rise.  · If possible, avoid long-term use  of aspirin and other anti-inflammatory drugs.  What follow-up care is needed?   Your doctor may ask you to make visits to the office to check on your progress. Be sure to keep these visits.  What drugs may be needed?   The doctor may order drugs to:  · Fight an infection  · Control how much acid your stomach makes  · Help healing  Will physical activity be limited?   You may want to limit your activity if you have belly pain. Having an upset stomach or throwing up may also limit what you do. You may need more rest if you feel weak or tired.  What problems could happen?   · Stomach ulcer or bleeding  · Stomach cancer  When do I need to call the doctor?   · You start throwing up blood or pass a lot of blood in your stool.  · Your belly pain becomes much worse all of a sudden or over a few hours.  · Your belly becomes hard or tender.  · You have chest pain or trouble breathing  · Your stools are bright red, black, or tar-colored.  · You are throwing up often.  · Your belly pain does not get better even after taking medicine, changing your diet, and following treatment instructions.  · You lose a lot of weight without trying.  Teach Back: Helping You Understand   The Teach Back Method helps you understand the information we are giving you. After you talk with the staff, tell them in your own words what you learned. This helps to make sure the staff has described each thing clearly. It also helps to explain things that may have been confusing. Before going home, make sure you can do these:  · I can tell you about my condition.  · I can tell you if I need to make changes with my diet or drugs.  · I can tell you what I will do if I throw up blood or have bloody or black tarry stools.  Where can I learn more?   [facility] [place]  https://www.nhs.[place]/conditions/gastritis/   Last Reviewed Date   2021-06-08  Consumer Information Use and Disclaimer   This information is not specific medical advice and does not  "replace information you receive from your health care provider. This is only a brief summary of general information. It does NOT include all information about conditions, illnesses, injuries, tests, procedures, treatments, therapies, discharge instructions or life-style choices that may apply to you. You must talk with your health care provider for complete information about your health and treatment options. This information should not be used to decide whether or not to accept your health care providers advice, instructions or recommendations. Only your health care provider has the knowledge and training to provide advice that is right for you.  Copyright   Copyright © 2021 UpToDate, Inc. and its affiliates and/or licensors. All rights reserved.  Patient Education       Gastritis   The Basics   Written by the doctors and editors at [employer]   What is gastritis? -- "Gastritis" means inflammation of the stomach lining (figure 1).  Some people have gastritis that comes on suddenly and lasts only for a short time. Doctors call this "acute" gastritis. Other people have gastritis that lasts for months or years. Doctors call this "chronic" gastritis.  What causes gastritis? -- Different things can cause gastritis, including:  · An infection in the stomach from bacteria called "H. pylori"  · Medicines called "nonsteroidal antiinflammatory drugs" (NSAIDs) - These include aspirin, ibuprofen (brand names: Advil, Motrin), and naproxen (brand names: Aleve, Naprosyn).  · Drinking alcohol  · Conditions in which the body's infection-fighting system attacks the stomach lining  · Having a serious or life-threatening illness  What are the symptoms of gastritis? -- People with gastritis have no symptoms. When people do have symptoms, they are due to other conditions that can happen with gastritis, like ulcers. Symptoms from ulcers include:  · Pain in the upper belly  · Feeling bloated, or feeling full after eating a small amount of " "food  · Decreased appetite  · Nausea or vomiting  · Vomiting blood, or having black-colored bowel movements  · Feeling more tired than usual - This happens if people with gastritis get a condition called "anemia."  Should I call my doctor or nurse? -- Call your doctor or nurse if:  · You have belly pain that gets worse or doesn't go away  · You vomit blood or have black bowel movements  · You are losing weight (without trying to)  Will I need tests? -- Probably. Your doctor or nurse will ask about your symptoms and do an exam. They might also do:  · An upper endoscopy - During this procedure, the doctor puts a thin tube with a camera on the end into your mouth and down into your stomach (figure 2). they will look at the inside of your stomach. During the procedure, they might also do a test called a biopsy. For a biopsy, the doctor takes a small sample of the stomach lining. Then another doctor looks at the sample under a microscope.  · Tests to check for H. pylori infection. These can include:  ? Blood tests  ? Breath tests - These tests measure substances in your breath after you drink a special liquid.  ? Tests on a small sample of your bowel movement  · Blood tests to check for anemia  How is gastritis treated? -- Treatment depends on what's causing your gastritis.  For example, if NSAIDs are causing your gastritis, your doctor will recommend that you not take those medicines. If alcohol is causing your gastritis, they will recommend that you stop drinking alcohol.  Doctors can use medicines to treat gastritis caused by an H. pylori infection. Most people take 3 or more medicines for 2 weeks. The treatment includes antibiotics plus medicine that helps the stomach make less acid.  Doctors can use medicines that reduce or block stomach acid to treat other causes of gastritis (table 1). The main types of medicines that reduce or block stomach acid are:  · Antacids  · Surface agents  · Histamine blockers  · Proton " pump inhibitors  If your doctor recommends acid-reducing treatment, they will tell you which medicine to use.  What happens after treatment? -- Sometimes, people who are treated for an H. pylori infection need follow-up tests to make sure the infection is gone. Follow-up tests include breath tests, lab tests on a sample of bowel movement, or endoscopy.  All topics are updated as new evidence becomes available and our peer review process is complete.  This topic retrieved from [employer] on: Sep 21, 2021.  Topic 89227 Version 8.0  Release: 29.4.2 - C29.263  © 2021 UpToDate, Inc. and/or its affiliates. All rights reserved.  figure 1: Upper digestive tract     The upper digestive tract includes the esophagus (the tube that connects the mouth to the stomach), the stomach, and the duodenum (the first part of the small intestine).  Graphic 11082 Version 6.0    figure 2: Upper endoscopy     During an upper endoscopy, you lie down and the doctor puts a thin tube with a camera and light on the end (called an endoscope) into your mouth and down into your esophagus, stomach, and duodenum (the first part of your small intestine). The camera sends pictures from inside your body to a television screen. That way, your doctor can see the inside of your esophagus, stomach, and duodenum.  Graphic 41999 Version 4.0    table 1: Medicines used to reduce stomach acid  Medicine type  Medicine name examples    Antacids* Calcium carbonate (sample brand names: Maalox, Tums)    Aluminum hydroxide, magnesium hydroxide, and simethicone (sample brand name: Mylanta)   Surface agents Sucralfate (brand name: Carafate)   Histamine blockers¶  Famotidine (brand name: Pepcid)    Cimetidine (brand name: Tagamet)   Proton pump inhibitors Omeprazole (brand name: Prilosec)    Esomeprazole (brand name: Nexium)    Pantoprazole (brand name: Protonix)    Lansoprazole (brand name: Prevacid)    Dexlansoprazole (brand name: Dexilant)    Rabeprazole (brand name:  "AcipHex)   Graphic 64708 Version 14.0  Consumer Information Use and Disclaimer   This information is not specific medical advice and does not replace information you receive from your health care provider. This is only a brief summary of general information. It does NOT include all information about conditions, illnesses, injuries, tests, procedures, treatments, therapies, discharge instructions or life-style choices that may apply to you. You must talk with your health care provider for complete information about your health and treatment options. This information should not be used to decide whether or not to accept your health care provider's advice, instructions or recommendations. Only your health care provider has the knowledge and training to provide advice that is right for you. The use of this information is governed by the [employer] End User License Agreement, available at https://www.[employer]/en/solutions/[employer]about/[employer]iezer.The use of [employer] content is governed by the [employer] Terms of Use. ©2021 UpToDate, Inc. All rights reserved.  Copyright   © 2021 UpToDate, Inc. and/or its affiliates. All rights reserved.  Patient Education       Urinary Tract Infections in Adults   The Basics   Written by the doctors and editors at [email or web address]   What is the urinary tract? -- The urinary tract is the group of organs in the body that handle urine (figure 1). The urinary tract includes the:  · Kidneys, 2 bean-shaped organs that filter the blood to make urine  · Bladder, a balloon-shaped organ that stores urine  · Ureters, 2 tubes that carry urine from the kidneys to the bladder  · Urethra, the tube that carries urine from the bladder to the outside of the body  What are urinary tract infections? -- Urinary tract infections, also called "UTIs," are infections that affect either the bladder or the kidneys:  · Bladder infections are more common than kidney infections. They happen when bacteria get into the urethra " "and travel up into the bladder. The medical term for bladder infection is "cystitis."  · Kidney infections happen when the bacteria travel even higher, up into the kidneys. The medical term for kidney infection is "pyelonephritis."   Both bladder and kidney infections are more common in women than men.  What are the symptoms of a bladder infection? -- The symptoms include:  · Pain or a burning feeling when you urinate  · The need to urinate often  · The need to urinate suddenly or in a hurry  · Blood in the urine  What are the symptoms of a kidney infection? -- The symptoms of a kidney infection can include the symptoms of a bladder infection, but kidney infections can also cause:  · Fever  · Back pain  · Nausea or vomiting  How do I find out if I have a urinary tract infection? -- Call your doctor or nurse. Sometimes, he or she can tell if you have a urinary tract infection just by learning about your symptoms.  Your doctor or nurse might do a simple urine test. If he or she thinks you might have a kidney infection or is unsure what you have, he or she might also do a more involved urine test to check for bacteria.  How are urinary tract infections treated? -- Most urinary tract infections are treated with antibiotic pills. These pills work by killing the germs that cause the infection.  If you have a bladder infection, you will probably need to take antibiotics for 3 to 7 days. If you have a kidney infection, you will probably need to take antibiotics for longer - maybe for up to 2 weeks. If you have a kidney infection, it's also possible you will need to be treated in the hospital.  Your symptoms should begin to improve within a day of starting antibiotics. But you should finish all the antibiotic pills you get. Otherwise your infection might come back.  If needed, you can also take a medicine to numb your bladder. This medicine eases the pain caused by urinary tract infections. It also reduces the need to " urinate.  What if I get bladder infections a lot? -- First, check with your doctor or nurse to make sure that you are really having bladder infections. The symptoms of bladder infection can be caused by other things. Your doctor or nurse will want to see if those problems might be causing your symptoms.  But if you are really dealing with repeated infections, there are things you can do to keep from getting more infections. These include:  · Avoiding spermicides (sperm-killing creams) - Spermicide is a form of birth control. It seems to increase the risk of bladder infections in some women, especially when used with a diaphragm. If you use spermicide and get a lot of bladder infections, you might want to try switching to a different form of birth control.  · Drinking more fluid - This can help prevent bladder infections.  · Urinating right after sex - Some doctors think this helps, because it helps flush out germs that might get into the bladder during sex. There is no proof it works, but it also cannot hurt.  · Vaginal estrogen - If you are a woman who has already been through menopause, your doctor might suggest this. Vaginal estrogen comes in a cream or a flexible ring that you put into your vagina. It can help prevent bladder infections.  · Antibiotics - If you get a lot of bladder infections, and the above methods have not helped, your doctor might give you antibiotics to help prevent infection. But taking antibiotics has downsides, so doctors usually suggest trying other things first.  Can cranberry juice or other cranberry products prevent bladder infections? -- The studies suggesting that cranberry products prevent bladder infections are not very good. Other studies suggest that cranberry products do not prevent bladder infections. But if you want to try cranberry products for this purpose, there is probably not much harm in doing so.  All topics are updated as new evidence becomes available and our peer  review process is complete.  This topic retrieved from [employer] on: Sep 21, 2021.  Topic 41707 Version 12.0  Release: 29.4.2 - C29.263  © 2021 UpToDate, Inc. and/or its affiliates. All rights reserved.  figure 1: Anatomy of the urinary tract     Urine is made by the kidneys. It passes from the kidneys into the bladder through two tubes called the ureters. Then it leaves the bladder through another tube called the urethra.  Graphic 37325 Version 7.0    Consumer Information Use and Disclaimer   This information is not specific medical advice and does not replace information you receive from your health care provider. This is only a brief summary of general information. It does NOT include all information about conditions, illnesses, injuries, tests, procedures, treatments, therapies, discharge instructions or life-style choices that may apply to you. You must talk with your health care provider for complete information about your health and treatment options. This information should not be used to decide whether or not to accept your health care provider's advice, instructions or recommendations. Only your health care provider has the knowledge and training to provide advice that is right for you. The use of this information is governed by the [employer] End User License Agreement, available at https://www.[email or web address]/en/solutions/[employer]/about/eliezer.The use of [employer] content is governed by the [employer] Terms of Use. ©2021 UpToDate, Inc. All rights reserved.  Copyright   © 2021 UpToDate, Inc. and/or its affiliates. All rights reserved.

## 2022-01-14 ENCOUNTER — TELEPHONE (OUTPATIENT)
Dept: URGENT CARE | Facility: CLINIC | Age: 37
End: 2022-01-14
Payer: COMMERCIAL

## 2022-01-14 DIAGNOSIS — R31.9 URINARY TRACT INFECTION WITH HEMATURIA, SITE UNSPECIFIED: Primary | ICD-10-CM

## 2022-01-14 DIAGNOSIS — N39.0 URINARY TRACT INFECTION WITH HEMATURIA, SITE UNSPECIFIED: Primary | ICD-10-CM

## 2022-01-14 RX ORDER — SULFAMETHOXAZOLE AND TRIMETHOPRIM 800; 160 MG/1; MG/1
1 TABLET ORAL 2 TIMES DAILY
Qty: 14 TABLET | Refills: 0 | Status: SHIPPED | OUTPATIENT
Start: 2022-01-14 | End: 2022-01-17 | Stop reason: ALTCHOICE

## 2022-01-14 NOTE — PATIENT INSTRUCTIONS
- Rest.    - Drink plenty of fluids.      - continue omeprazole- you can increase from 20 to 40 mg daily for 1-2 weeks.     - you can use over the counter antacids tums, maalox, mylanta, or pepcid as directed/ as needed until stomach issue resolves. Keep diet bland.     As discussed, this appears consistent with gastritis. In urgent care we are limited and we cannot do ultrasound or CT scan to see what is going on inside the abdomen, but your abdominal exam does not appear concerning at this time. My suspicion is low for pancreatitis or gall stones or kidney stones/kidney infection as cause of abdominal pain. However, if you develop new or worsening symptoms, seek medical attention.     - You have been given an antibiotic (cipro) to treat UTI    - Please complete the antibiotic as directed on the bottle.   - If you are female and on oral birth control pills, use additional methods to prevent pregnancy while on antibiotics and for one cycle after.   - you can take otc probiotic to limit upset stomach    - Follow up with your PCP or specialty clinic as directed in the next 1-2 weeks if not improved or as needed.  You can call [phone] to schedule an appointment with the appropriate provider.    - Go to the ER or seek medical attention immediately if you develop new or worsening symptoms.     - You must understand that you have received an Urgent Care treatment only and that you may be released before all of your medical problems are known or treated.   - You, the patient, will arrange for follow up care as instructed.   - If your condition worsens or fails to improve we recommend that you receive another evaluation at the ER immediately or contact your PCP to discuss your concerns or return here.      Patient Education       Gastritis Discharge Instructions   About this topic   Gastritis is inflammation of the lining of the stomach. Sometimes gastritis is caused by bacteria. Other times, it can be caused by  drugs. Some types of drugs can cause gastritis. The most common are nonsteroidal anti-inflammatory drugs (NSAIDs) like ibuprofen or naproxen. Gastritis can also be caused by other things like drinking alcohol or having a serious illness. It can also happen if you have a health problem in which the bodys own infection fighting system attacks the stomach lining. Based on the cause, you may need to take an antibiotic or other medicine to treat your gastritis. If so, be sure you finish all of the medicine that is ordered.     What care is needed at home?   · Ask your doctor what you need to do when you go home. Make sure you ask questions if you do not understand what the doctor says.  · Eat small meals more often to help with belly pain.  · Keep a diary about your pain and the foods you eat. Then you can avoid those that bother your stomach.  · Avoid or limit spicy foods.  · Avoid or limit beer, wine, and mixed drinks.  · If you smoke, try to quit. Your doctor or nurse can help.  · Try to learn ways to manage stress. Stress may cause the acid levels in your stomach to rise.  · If possible, avoid long-term use of aspirin and other anti-inflammatory drugs.  What follow-up care is needed?   Your doctor may ask you to make visits to the office to check on your progress. Be sure to keep these visits.  What drugs may be needed?   The doctor may order drugs to:  · Fight an infection  · Control how much acid your stomach makes  · Help healing  Will physical activity be limited?   You may want to limit your activity if you have belly pain. Having an upset stomach or throwing up may also limit what you do. You may need more rest if you feel weak or tired.  What problems could happen?   · Stomach ulcer or bleeding  · Stomach cancer  When do I need to call the doctor?   · You start throwing up blood or pass a lot of blood in your stool.  · Your belly pain becomes much worse all of a sudden or over a few hours.  · Your belly  becomes hard or tender.  · You have chest pain or trouble breathing  · Your stools are bright red, black, or tar-colored.  · You are throwing up often.  · Your belly pain does not get better even after taking medicine, changing your diet, and following treatment instructions.  · You lose a lot of weight without trying.  Teach Back: Helping You Understand   The Teach Back Method helps you understand the information we are giving you. After you talk with the staff, tell them in your own words what you learned. This helps to make sure the staff has described each thing clearly. It also helps to explain things that may have been confusing. Before going home, make sure you can do these:  · I can tell you about my condition.  · I can tell you if I need to make changes with my diet or drugs.  · I can tell you what I will do if I throw up blood or have bloody or black tarry stools.  Where can I learn more?   [facility] [place]  https://www.nhs.[place]/conditions/gastritis/   Last Reviewed Date   2021-06-08  Consumer Information Use and Disclaimer   This information is not specific medical advice and does not replace information you receive from your health care provider. This is only a brief summary of general information. It does NOT include all information about conditions, illnesses, injuries, tests, procedures, treatments, therapies, discharge instructions or life-style choices that may apply to you. You must talk with your health care provider for complete information about your health and treatment options. This information should not be used to decide whether or not to accept your health care providers advice, instructions or recommendations. Only your health care provider has the knowledge and training to provide advice that is right for you.  Copyright   Copyright © 2021 UpToDate, Inc. and its affiliates and/or licensors. All rights reserved.  Patient Education       Gastritis   The Basics   Written by the  "doctors and editors at UpToDate   What is gastritis? -- "Gastritis" means inflammation of the stomach lining (figure 1).  Some people have gastritis that comes on suddenly and lasts only for a short time. Doctors call this "acute" gastritis. Other people have gastritis that lasts for months or years. Doctors call this "chronic" gastritis.  What causes gastritis? -- Different things can cause gastritis, including:  · An infection in the stomach from bacteria called "H. pylori"  · Medicines called "nonsteroidal antiinflammatory drugs" (NSAIDs) - These include aspirin, ibuprofen (brand names: Advil, Motrin), and naproxen (brand names: Aleve, Naprosyn).  · Drinking alcohol  · Conditions in which the body's infection-fighting system attacks the stomach lining  · Having a serious or life-threatening illness  What are the symptoms of gastritis? -- People with gastritis have no symptoms. When people do have symptoms, they are due to other conditions that can happen with gastritis, like ulcers. Symptoms from ulcers include:  · Pain in the upper belly  · Feeling bloated, or feeling full after eating a small amount of food  · Decreased appetite  · Nausea or vomiting  · Vomiting blood, or having black-colored bowel movements  · Feeling more tired than usual - This happens if people with gastritis get a condition called "anemia."  Should I call my doctor or nurse? -- Call your doctor or nurse if:  · You have belly pain that gets worse or doesn't go away  · You vomit blood or have black bowel movements  · You are losing weight (without trying to)  Will I need tests? -- Probably. Your doctor or nurse will ask about your symptoms and do an exam. They might also do:  · An upper endoscopy - During this procedure, the doctor puts a thin tube with a camera on the end into your mouth and down into your stomach (figure 2). they will look at the inside of your stomach. During the procedure, they might also do a test called a biopsy. For a " biopsy, the doctor takes a small sample of the stomach lining. Then another doctor looks at the sample under a microscope.  · Tests to check for H. pylori infection. These can include:  ? Blood tests  ? Breath tests - These tests measure substances in your breath after you drink a special liquid.  ? Tests on a small sample of your bowel movement  · Blood tests to check for anemia  How is gastritis treated? -- Treatment depends on what's causing your gastritis.  For example, if NSAIDs are causing your gastritis, your doctor will recommend that you not take those medicines. If alcohol is causing your gastritis, they will recommend that you stop drinking alcohol.  Doctors can use medicines to treat gastritis caused by an H. pylori infection. Most people take 3 or more medicines for 2 weeks. The treatment includes antibiotics plus medicine that helps the stomach make less acid.  Doctors can use medicines that reduce or block stomach acid to treat other causes of gastritis (table 1). The main types of medicines that reduce or block stomach acid are:  · Antacids  · Surface agents  · Histamine blockers  · Proton pump inhibitors  If your doctor recommends acid-reducing treatment, they will tell you which medicine to use.  What happens after treatment? -- Sometimes, people who are treated for an H. pylori infection need follow-up tests to make sure the infection is gone. Follow-up tests include breath tests, lab tests on a sample of bowel movement, or endoscopy.  All topics are updated as new evidence becomes available and our peer review process is complete.  This topic retrieved from [employer] on: Sep 21, 2021.  Topic 40266 Version 8.0  Release: 29.4.2 - C29.263  © 2021 UpToDate, Inc. and/or its affiliates. All rights reserved.  figure 1: Upper digestive tract     The upper digestive tract includes the esophagus (the tube that connects the mouth to the stomach), the stomach, and the duodenum (the first part of the small  intestine).  Graphic 68196 Version 6.0    figure 2: Upper endoscopy     During an upper endoscopy, you lie down and the doctor puts a thin tube with a camera and light on the end (called an endoscope) into your mouth and down into your esophagus, stomach, and duodenum (the first part of your small intestine). The camera sends pictures from inside your body to a television screen. That way, your doctor can see the inside of your esophagus, stomach, and duodenum.  Graphic 28682 Version 4.0    table 1: Medicines used to reduce stomach acid  Medicine type  Medicine name examples    Antacids* Calcium carbonate (sample brand names: Maalox, Tums)    Aluminum hydroxide, magnesium hydroxide, and simethicone (sample brand name: Mylanta)   Surface agents Sucralfate (brand name: Carafate)   Histamine blockers¶  Famotidine (brand name: Pepcid)    Cimetidine (brand name: Tagamet)   Proton pump inhibitors Omeprazole (brand name: Prilosec)    Esomeprazole (brand name: Nexium)    Pantoprazole (brand name: Protonix)    Lansoprazole (brand name: Prevacid)    Dexlansoprazole (brand name: Dexilant)    Rabeprazole (brand name: AcipHex)   Graphic 49973 Version 14.0  Consumer Information Use and Disclaimer   This information is not specific medical advice and does not replace information you receive from your health care provider. This is only a brief summary of general information. It does NOT include all information about conditions, illnesses, injuries, tests, procedures, treatments, therapies, discharge instructions or life-style choices that may apply to you. You must talk with your health care provider for complete information about your health and treatment options. This information should not be used to decide whether or not to accept your health care provider's advice, instructions or recommendations. Only your health care provider has the knowledge and training to provide advice that is right for you. The use of this information  "is governed by the [employer] End User License Agreement, available at https://[email or web address]entaho/en/solutions/[place] [place]/about/eliezer.The use of [employer] content is governed by the [employer] Terms of Use. ©2021 UpToDate, Inc. All rights reserved.  Copyright   © 2021 UpToDate, Inc. and/or its affiliates. All rights reserved.  Patient Education       Urinary Tract Infections in Adults   The Basics   Written by the doctors and editors at [employer]   What is the urinary tract? -- The urinary tract is the group of organs in the body that handle urine (figure 1). The urinary tract includes the:  · Kidneys, 2 bean-shaped organs that filter the blood to make urine  · Bladder, a balloon-shaped organ that stores urine  · Ureters, 2 tubes that carry urine from the kidneys to the bladder  · Urethra, the tube that carries urine from the bladder to the outside of the body  What are urinary tract infections? -- Urinary tract infections, also called "UTIs," are infections that affect either the bladder or the kidneys:  · Bladder infections are more common than kidney infections. They happen when bacteria get into the urethra and travel up into the bladder. The medical term for bladder infection is "cystitis."  · Kidney infections happen when the bacteria travel even higher, up into the kidneys. The medical term for kidney infection is "pyelonephritis."   Both bladder and kidney infections are more common in women than men.  What are the symptoms of a bladder infection? -- The symptoms include:  · Pain or a burning feeling when you urinate  · The need to urinate often  · The need to urinate suddenly or in a hurry  · Blood in the urine  What are the symptoms of a kidney infection? -- The symptoms of a kidney infection can include the symptoms of a bladder infection, but kidney infections can also cause:  · Fever  · Back pain  · Nausea or vomiting  How do I find out if I have a urinary tract infection? -- Call your doctor or nurse. " Sometimes, he or she can tell if you have a urinary tract infection just by learning about your symptoms.  Your doctor or nurse might do a simple urine test. If he or she thinks you might have a kidney infection or is unsure what you have, he or she might also do a more involved urine test to check for bacteria.  How are urinary tract infections treated? -- Most urinary tract infections are treated with antibiotic pills. These pills work by killing the germs that cause the infection.  If you have a bladder infection, you will probably need to take antibiotics for 3 to 7 days. If you have a kidney infection, you will probably need to take antibiotics for longer - maybe for up to 2 weeks. If you have a kidney infection, it's also possible you will need to be treated in the hospital.  Your symptoms should begin to improve within a day of starting antibiotics. But you should finish all the antibiotic pills you get. Otherwise your infection might come back.  If needed, you can also take a medicine to numb your bladder. This medicine eases the pain caused by urinary tract infections. It also reduces the need to urinate.  What if I get bladder infections a lot? -- First, check with your doctor or nurse to make sure that you are really having bladder infections. The symptoms of bladder infection can be caused by other things. Your doctor or nurse will want to see if those problems might be causing your symptoms.  But if you are really dealing with repeated infections, there are things you can do to keep from getting more infections. These include:  · Avoiding spermicides (sperm-killing creams) - Spermicide is a form of birth control. It seems to increase the risk of bladder infections in some women, especially when used with a diaphragm. If you use spermicide and get a lot of bladder infections, you might want to try switching to a different form of birth control.  · Drinking more fluid - This can help prevent bladder  infections.  · Urinating right after sex - Some doctors think this helps, because it helps flush out germs that might get into the bladder during sex. There is no proof it works, but it also cannot hurt.  · Vaginal estrogen - If you are a woman who has already been through menopause, your doctor might suggest this. Vaginal estrogen comes in a cream or a flexible ring that you put into your vagina. It can help prevent bladder infections.  · Antibiotics - If you get a lot of bladder infections, and the above methods have not helped, your doctor might give you antibiotics to help prevent infection. But taking antibiotics has downsides, so doctors usually suggest trying other things first.  Can cranberry juice or other cranberry products prevent bladder infections? -- The studies suggesting that cranberry products prevent bladder infections are not very good. Other studies suggest that cranberry products do not prevent bladder infections. But if you want to try cranberry products for this purpose, there is probably not much harm in doing so.  All topics are updated as new evidence becomes available and our peer review process is complete.  This topic retrieved from [employer] on: Sep 21, 2021.  Topic 32843 Version 12.0  Release: 29.4.2 - C29.263  © 2021 UpToDate, Inc. and/or its affiliates. All rights reserved.  figure 1: Anatomy of the urinary tract     Urine is made by the kidneys. It passes from the kidneys into the bladder through two tubes called the ureters. Then it leaves the bladder through another tube called the urethra.  Graphic 65654 Version 7.0    Consumer Information Use and Disclaimer   This information is not specific medical advice and does not replace information you receive from your health care provider. This is only a brief summary of general information. It does NOT include all information about conditions, illnesses, injuries, tests, procedures, treatments, therapies, discharge instructions or  life-style choices that may apply to you. You must talk with your health care provider for complete information about your health and treatment options. This information should not be used to decide whether or not to accept your health care provider's advice, instructions or recommendations. Only your health care provider has the knowledge and training to provide advice that is right for you. The use of this information is governed by the [employer] End User License Agreement, available at https://www.[employer]/en/solutions/[employer]/about/eliezer.The use of [employer] content is governed by the [employer] Terms of Use. ©2021 [employer]. All rights reserved.  Copyright   © 2021 [employer] Inc[employer] and/or its affiliates. All rights reserved.

## 2022-01-14 NOTE — TELEPHONE ENCOUNTER
The pharmacy called on this patient because of the drug interaction of Cipro with Lexapro, QT prolongation.  Seen yesterday for UTI with possible flank pain.  Given Rocephin in the clinic IM and went to get Rx today prompting this telephone encounter.  Therefore switched from Cipro to Septra DS twice daily for 7 days.  Urine culture in progress.

## 2022-01-17 ENCOUNTER — TELEPHONE (OUTPATIENT)
Dept: URGENT CARE | Facility: CLINIC | Age: 37
End: 2022-01-17
Payer: COMMERCIAL

## 2022-01-17 LAB — BACTERIA UR CULT: ABNORMAL

## 2022-01-17 RX ORDER — CIPROFLOXACIN 500 MG/1
500 TABLET ORAL 2 TIMES DAILY
Qty: 10 TABLET | Refills: 0 | Status: SHIPPED | OUTPATIENT
Start: 2022-01-17 | End: 2022-01-22

## 2022-01-17 NOTE — TELEPHONE ENCOUNTER
Patient called in regard to lab results from urine culture. Informed patient a provider would call her back.

## 2022-01-18 NOTE — TELEPHONE ENCOUNTER
I spoke with patient and reported result of urine cx and sensitive to prescribed RX.  Still with some burning but no fever. Still with some back discomfort.  Discussed with fever, or increased back pain or vomiting, go to ER. Advised cont antibiotic, Septra DS to completion, full 7 days.  If still with symptoms recheck with PCP or UC. Answered all questions.

## 2022-01-18 NOTE — TELEPHONE ENCOUNTER
----- Message from Lily Hagen MA sent at 1/17/2022  5:37 PM CST -----  Regarding: Urine Culture Results  Patient called in regard to positive urine culture results.

## 2022-01-18 NOTE — TELEPHONE ENCOUNTER
Pt called earlier and I returned her call. She was seen on the 13th and diagnosed with a UTI . unfortunately she doesn't feel the antibiotic has helped much with there frequency and dysuria. I offered to change her to a different antibiotic for a few more days.

## 2022-01-25 ENCOUNTER — PATIENT MESSAGE (OUTPATIENT)
Dept: ENDOCRINOLOGY | Facility: CLINIC | Age: 37
End: 2022-01-25
Payer: COMMERCIAL

## 2022-01-25 DIAGNOSIS — C73 THYROID CANCER: Primary | Chronic | ICD-10-CM

## 2022-01-25 DIAGNOSIS — E89.0 POSTSURGICAL HYPOTHYROIDISM: Chronic | ICD-10-CM

## 2022-01-26 ENCOUNTER — PATIENT MESSAGE (OUTPATIENT)
Dept: ENDOCRINOLOGY | Facility: CLINIC | Age: 37
End: 2022-01-26
Payer: COMMERCIAL

## 2022-01-27 ENCOUNTER — LAB VISIT (OUTPATIENT)
Dept: LAB | Facility: HOSPITAL | Age: 37
End: 2022-01-27
Attending: INTERNAL MEDICINE
Payer: COMMERCIAL

## 2022-01-27 DIAGNOSIS — E89.0 POSTSURGICAL HYPOTHYROIDISM: Chronic | ICD-10-CM

## 2022-01-27 DIAGNOSIS — C73 THYROID CANCER: Chronic | ICD-10-CM

## 2022-01-27 LAB
T4 FREE SERPL-MCNC: 1.07 NG/DL (ref 0.71–1.51)
TSH SERPL DL<=0.005 MIU/L-ACNC: 2.19 UIU/ML (ref 0.4–4)

## 2022-01-27 PROCEDURE — 84432 ASSAY OF THYROGLOBULIN: CPT | Performed by: INTERNAL MEDICINE

## 2022-01-27 PROCEDURE — 36415 COLL VENOUS BLD VENIPUNCTURE: CPT | Performed by: INTERNAL MEDICINE

## 2022-01-27 PROCEDURE — 84439 ASSAY OF FREE THYROXINE: CPT | Performed by: INTERNAL MEDICINE

## 2022-01-27 PROCEDURE — 84443 ASSAY THYROID STIM HORMONE: CPT | Performed by: INTERNAL MEDICINE

## 2022-01-28 ENCOUNTER — OFFICE VISIT (OUTPATIENT)
Dept: FAMILY MEDICINE | Facility: CLINIC | Age: 37
End: 2022-01-28
Payer: COMMERCIAL

## 2022-01-28 VITALS
BODY MASS INDEX: 26.33 KG/M2 | TEMPERATURE: 98 F | DIASTOLIC BLOOD PRESSURE: 70 MMHG | OXYGEN SATURATION: 99 % | HEIGHT: 58 IN | HEART RATE: 79 BPM | SYSTOLIC BLOOD PRESSURE: 122 MMHG

## 2022-01-28 DIAGNOSIS — N30.01 ACUTE CYSTITIS WITH HEMATURIA: Primary | ICD-10-CM

## 2022-01-28 DIAGNOSIS — N39.0 RECURRENT UTI: ICD-10-CM

## 2022-01-28 DIAGNOSIS — R00.2 HEART PALPITATIONS: ICD-10-CM

## 2022-01-28 LAB
BILIRUB SERPL-MCNC: ABNORMAL MG/DL
BLOOD URINE, POC: 250
CLARITY, POC UA: ABNORMAL
COLOR, POC UA: YELLOW
GLUCOSE UR QL STRIP: NORMAL
KETONES UR QL STRIP: NEGATIVE
LEUKOCYTE ESTERASE URINE, POC: NEGATIVE
NITRITE, POC UA: NEGATIVE
PH, POC UA: 5
PROTEIN, POC: NEGATIVE
SPECIFIC GRAVITY, POC UA: 1020
UROBILINOGEN, POC UA: NORMAL

## 2022-01-28 PROCEDURE — 81002 URINALYSIS NONAUTO W/O SCOPE: CPT | Mod: S$GLB,,, | Performed by: STUDENT IN AN ORGANIZED HEALTH CARE EDUCATION/TRAINING PROGRAM

## 2022-01-28 PROCEDURE — 3008F BODY MASS INDEX DOCD: CPT | Mod: CPTII,S$GLB,, | Performed by: STUDENT IN AN ORGANIZED HEALTH CARE EDUCATION/TRAINING PROGRAM

## 2022-01-28 PROCEDURE — 3074F SYST BP LT 130 MM HG: CPT | Mod: CPTII,S$GLB,, | Performed by: STUDENT IN AN ORGANIZED HEALTH CARE EDUCATION/TRAINING PROGRAM

## 2022-01-28 PROCEDURE — 3008F PR BODY MASS INDEX (BMI) DOCUMENTED: ICD-10-PCS | Mod: CPTII,S$GLB,, | Performed by: STUDENT IN AN ORGANIZED HEALTH CARE EDUCATION/TRAINING PROGRAM

## 2022-01-28 PROCEDURE — 99999 PR PBB SHADOW E&M-EST. PATIENT-LVL IV: CPT | Mod: PBBFAC,,, | Performed by: STUDENT IN AN ORGANIZED HEALTH CARE EDUCATION/TRAINING PROGRAM

## 2022-01-28 PROCEDURE — 3078F PR MOST RECENT DIASTOLIC BLOOD PRESSURE < 80 MM HG: ICD-10-PCS | Mod: CPTII,S$GLB,, | Performed by: STUDENT IN AN ORGANIZED HEALTH CARE EDUCATION/TRAINING PROGRAM

## 2022-01-28 PROCEDURE — 1159F MED LIST DOCD IN RCRD: CPT | Mod: CPTII,S$GLB,, | Performed by: STUDENT IN AN ORGANIZED HEALTH CARE EDUCATION/TRAINING PROGRAM

## 2022-01-28 PROCEDURE — 81001 URINALYSIS AUTO W/SCOPE: CPT | Performed by: STUDENT IN AN ORGANIZED HEALTH CARE EDUCATION/TRAINING PROGRAM

## 2022-01-28 PROCEDURE — 99214 PR OFFICE/OUTPT VISIT, EST, LEVL IV, 30-39 MIN: ICD-10-PCS | Mod: S$GLB,,, | Performed by: STUDENT IN AN ORGANIZED HEALTH CARE EDUCATION/TRAINING PROGRAM

## 2022-01-28 PROCEDURE — 99214 OFFICE O/P EST MOD 30 MIN: CPT | Mod: S$GLB,,, | Performed by: STUDENT IN AN ORGANIZED HEALTH CARE EDUCATION/TRAINING PROGRAM

## 2022-01-28 PROCEDURE — 3074F PR MOST RECENT SYSTOLIC BLOOD PRESSURE < 130 MM HG: ICD-10-PCS | Mod: CPTII,S$GLB,, | Performed by: STUDENT IN AN ORGANIZED HEALTH CARE EDUCATION/TRAINING PROGRAM

## 2022-01-28 PROCEDURE — 3078F DIAST BP <80 MM HG: CPT | Mod: CPTII,S$GLB,, | Performed by: STUDENT IN AN ORGANIZED HEALTH CARE EDUCATION/TRAINING PROGRAM

## 2022-01-28 PROCEDURE — 99999 PR PBB SHADOW E&M-EST. PATIENT-LVL IV: ICD-10-PCS | Mod: PBBFAC,,, | Performed by: STUDENT IN AN ORGANIZED HEALTH CARE EDUCATION/TRAINING PROGRAM

## 2022-01-28 PROCEDURE — 81002 POCT URINE DIPSTICK WITHOUT MICROSCOPE: ICD-10-PCS | Mod: S$GLB,,, | Performed by: STUDENT IN AN ORGANIZED HEALTH CARE EDUCATION/TRAINING PROGRAM

## 2022-01-28 PROCEDURE — 1159F PR MEDICATION LIST DOCUMENTED IN MEDICAL RECORD: ICD-10-PCS | Mod: CPTII,S$GLB,, | Performed by: STUDENT IN AN ORGANIZED HEALTH CARE EDUCATION/TRAINING PROGRAM

## 2022-01-28 RX ORDER — CEPHALEXIN 500 MG/1
500 CAPSULE ORAL EVERY 12 HOURS
Qty: 20 CAPSULE | Refills: 0 | Status: SHIPPED | OUTPATIENT
Start: 2022-01-28 | End: 2022-02-07

## 2022-01-28 NOTE — PROGRESS NOTES
01/28/2022    Thu JESUS Hutton  [id number]    Chief Complaint   Patient presents with    Urinary Tract Infection       HPI    UTI  Dx earlier this month at urgent care   Was given Rocephin IM and cipro 7 day course  Urine culture pan sensitive proteus on 1/13  States that she completed course of Cipro  On chart review was called in Bactrim after sensitivities cam back but pharmacy told patient that it was double coverage and refused to prescribe    Dizziness  More in the morning  Can happen if stand too quickly  Doesn't happen that often    Negative 10 point ROS outside of HPI    Social History     Socioeconomic History    Marital status:    Tobacco Use    Smoking status: Never Smoker    Smokeless tobacco: Never Used   Substance and Sexual Activity    Alcohol use: Yes     Comment: occasional    Drug use: No    Sexual activity: Yes     Partners: Male       Current Outpatient Medications:     EScitalopram oxalate (LEXAPRO) 10 MG tablet, Take 1 tablet (10 mg total) by mouth once daily., Disp: 90 tablet, Rfl: 3    inhalation spacing device (BREATHERITE MDI SPACER), Use as directed for inhalation., Disp: 1 each, Rfl: 0    norelgestromin-ethinyl estradiol (ORTHO EVRA) 150-35 mcg/24 hr, Place 1 patch onto the skin every 7 days., Disp: 12 patch, Rfl: 3    omeprazole (PRILOSEC) 40 MG capsule, Take 1 capsule (40 mg total) by mouth once daily., Disp: 90 capsule, Rfl: 1    TIROSINT 88 mcg Cap, Take 88 mcg by mouth once daily. Brand name Tirosint only., Disp: 90 capsule, Rfl: 3    albuterol (PROVENTIL/VENTOLIN HFA) 90 mcg/actuation inhaler, Inhale 2 puffs into the lungs every 4 (four) hours as needed for Wheezing. Rescue (Patient not taking: Reported on 1/28/2022), Disp: 6.7 g, Rfl: 0    fluticasone propionate (FLONASE) 50 mcg/actuation nasal spray, 1 spray (50 mcg total) by Each Nostril route once daily. (Patient not taking: Reported on 1/28/2022), Disp: 9.9 mL, Rfl: 0    fluticasone propionate (FLOVENT HFA) 110  mcg/actuation inhaler, Inhale 1 puff into the lungs 2 (two) times daily. Controller (Patient not taking: Reported on 1/28/2022), Disp: 12 g, Rfl: 0    promethazine-dextromethorphan (PROMETHAZINE-DM) 6.25-15 mg/5 mL Syrp, Take 5 mLs by mouth every 6 (six) hours as needed (cough). (Patient not taking: No sig reported), Disp: 118 mL, Rfl: 0      Physical Exam  Vitals:    01/28/22 1114   BP: 122/70   Pulse: 79   Temp: 98.3 °F (36.8 °C)       Gen: well appearing, NAD  Resp: non labored breathing, no crackles, no wheezes, CTAB  CV: RRR no murmur, gallops, rubs, no LE edema  Abd: soft nontender BS present no organomegaly      1. Acute cystitis with hematuria  - Urinalysis, Reflex to Urine Culture Urine, Clean Catch  - Ambulatory referral/consult to Urology; Future  - cephALEXin (KEFLEX) 500 MG capsule; Take 1 capsule (500 mg total) by mouth every 12 (twelve) hours. for 10 days  Dispense: 20 capsule; Refill: 0  - POCT URINE DIPSTICK WITHOUT MICROSCOPE    2. Recurrent UTI  - Ambulatory referral/consult to Urology; Future    3. Heart palpitations  - Cardiac Monitor - 3-15 Day Adult (Cupid Only); Future      RTC if no improvement after completing course of abd    Estefany Nuno MD  Family Medicine

## 2022-01-29 LAB
BACTERIA #/AREA URNS AUTO: NORMAL /HPF
BILIRUB UR QL STRIP: NEGATIVE
CLARITY UR REFRACT.AUTO: CLEAR
COLOR UR AUTO: YELLOW
GLUCOSE UR QL STRIP: NEGATIVE
HGB UR QL STRIP: ABNORMAL
KETONES UR QL STRIP: NEGATIVE
LEUKOCYTE ESTERASE UR QL STRIP: NEGATIVE
MICROSCOPIC COMMENT: NORMAL
NITRITE UR QL STRIP: NEGATIVE
PH UR STRIP: 5 [PH] (ref 5–8)
PROT UR QL STRIP: NEGATIVE
RBC #/AREA URNS AUTO: 0 /HPF (ref 0–4)
SP GR UR STRIP: 1.02 (ref 1–1.03)
SQUAMOUS #/AREA URNS AUTO: 2 /HPF
URN SPEC COLLECT METH UR: ABNORMAL
WBC #/AREA URNS AUTO: 2 /HPF (ref 0–5)

## 2022-02-02 LAB
CLINICAL BIOCHEMIST REVIEW: ABNORMAL
THYROGLOB SERPL-MCNC: 0.4 NG/ML

## 2022-02-03 ENCOUNTER — OFFICE VISIT (OUTPATIENT)
Dept: ENDOCRINOLOGY | Facility: CLINIC | Age: 37
End: 2022-02-03
Payer: COMMERCIAL

## 2022-02-03 VITALS
HEIGHT: 58 IN | WEIGHT: 127.88 LBS | BODY MASS INDEX: 26.85 KG/M2 | HEART RATE: 74 BPM | SYSTOLIC BLOOD PRESSURE: 118 MMHG | DIASTOLIC BLOOD PRESSURE: 74 MMHG

## 2022-02-03 DIAGNOSIS — C73 THYROID CANCER: Chronic | ICD-10-CM

## 2022-02-03 DIAGNOSIS — E89.0 POSTSURGICAL HYPOTHYROIDISM: Primary | Chronic | ICD-10-CM

## 2022-02-03 PROCEDURE — 1160F RVW MEDS BY RX/DR IN RCRD: CPT | Mod: CPTII,S$GLB,, | Performed by: INTERNAL MEDICINE

## 2022-02-03 PROCEDURE — 99999 PR PBB SHADOW E&M-EST. PATIENT-LVL IV: ICD-10-PCS | Mod: PBBFAC,,, | Performed by: INTERNAL MEDICINE

## 2022-02-03 PROCEDURE — 1159F MED LIST DOCD IN RCRD: CPT | Mod: CPTII,S$GLB,, | Performed by: INTERNAL MEDICINE

## 2022-02-03 PROCEDURE — 3078F PR MOST RECENT DIASTOLIC BLOOD PRESSURE < 80 MM HG: ICD-10-PCS | Mod: CPTII,S$GLB,, | Performed by: INTERNAL MEDICINE

## 2022-02-03 PROCEDURE — 3074F SYST BP LT 130 MM HG: CPT | Mod: CPTII,S$GLB,, | Performed by: INTERNAL MEDICINE

## 2022-02-03 PROCEDURE — 99213 OFFICE O/P EST LOW 20 MIN: CPT | Mod: S$GLB,,, | Performed by: INTERNAL MEDICINE

## 2022-02-03 PROCEDURE — 1159F PR MEDICATION LIST DOCUMENTED IN MEDICAL RECORD: ICD-10-PCS | Mod: CPTII,S$GLB,, | Performed by: INTERNAL MEDICINE

## 2022-02-03 PROCEDURE — 1160F PR REVIEW ALL MEDS BY PRESCRIBER/CLIN PHARMACIST DOCUMENTED: ICD-10-PCS | Mod: CPTII,S$GLB,, | Performed by: INTERNAL MEDICINE

## 2022-02-03 PROCEDURE — 3008F BODY MASS INDEX DOCD: CPT | Mod: CPTII,S$GLB,, | Performed by: INTERNAL MEDICINE

## 2022-02-03 PROCEDURE — 3008F PR BODY MASS INDEX (BMI) DOCUMENTED: ICD-10-PCS | Mod: CPTII,S$GLB,, | Performed by: INTERNAL MEDICINE

## 2022-02-03 PROCEDURE — 3078F DIAST BP <80 MM HG: CPT | Mod: CPTII,S$GLB,, | Performed by: INTERNAL MEDICINE

## 2022-02-03 PROCEDURE — 99213 PR OFFICE/OUTPT VISIT, EST, LEVL III, 20-29 MIN: ICD-10-PCS | Mod: S$GLB,,, | Performed by: INTERNAL MEDICINE

## 2022-02-03 PROCEDURE — 3074F PR MOST RECENT SYSTOLIC BLOOD PRESSURE < 130 MM HG: ICD-10-PCS | Mod: CPTII,S$GLB,, | Performed by: INTERNAL MEDICINE

## 2022-02-03 PROCEDURE — 99999 PR PBB SHADOW E&M-EST. PATIENT-LVL IV: CPT | Mod: PBBFAC,,, | Performed by: INTERNAL MEDICINE

## 2022-02-03 RX ORDER — LEVOTHYROXINE SODIUM 100 UG/1
100 CAPSULE ORAL DAILY
Qty: 90 CAPSULE | Refills: 3 | Status: SHIPPED | OUTPATIENT
Start: 2022-02-03 | End: 2022-02-07 | Stop reason: SDUPTHER

## 2022-02-03 NOTE — ASSESSMENT & PLAN NOTE
TSH is slightly above goal    Having some symptoms that could be related to hypothyroidism although non-specific.     Will increase Tirosint to 100 mcg daily and repeat TSH in 6-8 weeks.    Goal TSH 0.5-2.0

## 2022-02-03 NOTE — ASSESSMENT & PLAN NOTE
AJCC stage I disease. LUIS ENRIQUE low risk pathology.    Serum thyroglobulin with mass spec 0.4  No significant findings on neck USS from 6/2021    Would not recommend CISSE ablation at this time    Repeat neck USS 4-5 months

## 2022-02-03 NOTE — PROGRESS NOTES
"  Subjective:      Patient ID: Dina Hutton is a 36 y.o. female.    Chief Complaint:  Thyroid Cancer      History of Present Illness  Dina Hutton is a 36 y.o. female who is here for follow-up of papillary thyroid cancer and postsurgical hypothyroidism     Last visit with Dr. Nur in 5/2021. This is her first visit with me.      With regards to the papillary thyroid cancer and postsurgical hypothyroidism:  5/3/2021:    Has felt more fatigue recently.     Feels like her immune system is weak. Has history of UTI and it had gone away from a while but she recently had another UTI. She also reports having breathing problems. Notes more diarrhea, dry skin and hair loss.       She also reports that since the thyroid surgery, she has had intermittent hoarse voice.      Remains on the same OCP.      Current dose of thyroid hormone is Tirosint 88 mcg daily.      Postop ultrasound in 6/2021:  1.  Thyroid gland is surgically absent without evidence of residual/recurrent thyroid tissue in the surgical beds.     2.  No abnormal appearing lymph nodes are identified.        8AM cortisol was 25.4 ruling out adrenal insufficiency.        Background history:  Left-sided thyroid nodule was initially discovered on CTA of the chest done in May, 2019 when she was evaluated in the emergency room for chest pain.   "There is a 1.3 cm hypoattenuating left thyroid lobe nodule.  The remaining visualized soft tissue structures at the base of the neck are unremarkable."     She had a follow-up ultrasound done on June 5th, which showed a solid, hypoechoic left thyroid nodule with "punctate peripheral calcifications" and circumscribed borders (see report below).      She underwent FNA on 1/2/2020, which came back [place] [facility] (Malignant).  Because she was pregnant at the time in the 3rd trimester, the decision was made to hold off on surgery until after she delivered.  She underwent total thyroidectomy on 9/29/2020:            Six week " postoperative labs:        Review of Systems   Constitutional: Negative for chills and fever.   Gastrointestinal: Negative for nausea.       Objective:   Physical Exam  Vitals and nursing note reviewed.       BP Readings from Last 3 Encounters:   02/03/22 118/74   01/28/22 122/70   01/13/22 122/73     Wt Readings from Last 1 Encounters:   02/03/22 0744 58 kg (127 lb 13.9 oz)       Body mass index is 26.72 kg/m².    Lab Review:   Lab Results   Component Value Date    HGBA1C 5.6 07/28/2021     Lab Results   Component Value Date    CHOL 295 (H) 07/28/2021    HDL 79 07/28/2021    LDLCALC 175 (H) 07/28/2021    TRIG 225 (H) 07/28/2021     Lab Results   Component Value Date     07/28/2021    K 4.2 07/28/2021     07/28/2021    CO2 24 07/28/2021    GLU 87 07/28/2021    BUN 10 07/28/2021    CREATININE 0.54 (L) 07/28/2021    CALCIUM 9.2 07/28/2021    PROT 7.5 05/01/2021    ALBUMIN 4.7 07/28/2021    BILITOT 0.4 07/28/2021    ALKPHOS 60 05/01/2021    AST 21 07/28/2021    ALT 20 07/28/2021    ANIONGAP 11 05/01/2021    ESTGFRAFRICA >60 05/01/2021    EGFRNONAA 123 07/28/2021    TSH 2.193 01/27/2022         Assessment and Plan     Postsurgical hypothyroidism  TSH is slightly above goal    Having some symptoms that could be related to hypothyroidism although non-specific.     Will increase Tirosint to 100 mcg daily and repeat TSH in 6-8 weeks.    Goal TSH 0.5-2.0    Thyroid cancer  AJCC stage I disease. LUIS ENRIQUE low risk pathology.    Serum thyroglobulin with mass spec 0.4  No significant findings on neck USS from 6/2021    Would not recommend CISSE ablation at this time    Repeat neck USS 4-5 months      TSH in 6-8 weeks, ultrasound in 4-5 months, follow up in 6 months      Kei Gold M.D. Staff Endocrinology

## 2022-02-11 ENCOUNTER — PATIENT MESSAGE (OUTPATIENT)
Dept: ENDOCRINOLOGY | Facility: CLINIC | Age: 37
End: 2022-02-11
Payer: COMMERCIAL

## 2022-02-14 ENCOUNTER — PATIENT MESSAGE (OUTPATIENT)
Dept: FAMILY MEDICINE | Facility: CLINIC | Age: 37
End: 2022-02-14
Payer: COMMERCIAL

## 2022-02-17 ENCOUNTER — OFFICE VISIT (OUTPATIENT)
Dept: UROLOGY | Facility: CLINIC | Age: 37
End: 2022-02-17
Payer: COMMERCIAL

## 2022-02-17 VITALS — WEIGHT: 127.88 LBS | BODY MASS INDEX: 26.72 KG/M2

## 2022-02-17 DIAGNOSIS — N30.01 ACUTE CYSTITIS WITH HEMATURIA: ICD-10-CM

## 2022-02-17 DIAGNOSIS — N39.0 RECURRENT UTI: ICD-10-CM

## 2022-02-17 DIAGNOSIS — R31.0 GROSS HEMATURIA: Primary | ICD-10-CM

## 2022-02-17 LAB
BILIRUB SERPL-MCNC: NEGATIVE MG/DL
BLOOD URINE, POC: ABNORMAL
COLOR, POC UA: ABNORMAL
GLUCOSE UR QL STRIP: NORMAL
KETONES UR QL STRIP: NEGATIVE
LEUKOCYTE ESTERASE URINE, POC: NEGATIVE
NITRITE, POC UA: NEGATIVE
PH, POC UA: 7
PROTEIN, POC: NEGATIVE
SPECIFIC GRAVITY, POC UA: 1010
UROBILINOGEN, POC UA: NORMAL

## 2022-02-17 PROCEDURE — 87147 CULTURE TYPE IMMUNOLOGIC: CPT | Performed by: NURSE PRACTITIONER

## 2022-02-17 PROCEDURE — 99999 PR PBB SHADOW E&M-EST. PATIENT-LVL III: CPT | Mod: PBBFAC,,, | Performed by: NURSE PRACTITIONER

## 2022-02-17 PROCEDURE — 99204 OFFICE O/P NEW MOD 45 MIN: CPT | Mod: S$GLB,,, | Performed by: NURSE PRACTITIONER

## 2022-02-17 PROCEDURE — 87088 URINE BACTERIA CULTURE: CPT | Performed by: NURSE PRACTITIONER

## 2022-02-17 PROCEDURE — 1160F PR REVIEW ALL MEDS BY PRESCRIBER/CLIN PHARMACIST DOCUMENTED: ICD-10-PCS | Mod: CPTII,S$GLB,, | Performed by: NURSE PRACTITIONER

## 2022-02-17 PROCEDURE — 3008F PR BODY MASS INDEX (BMI) DOCUMENTED: ICD-10-PCS | Mod: CPTII,S$GLB,, | Performed by: NURSE PRACTITIONER

## 2022-02-17 PROCEDURE — 81001 POCT URINALYSIS, DIPSTICK OR TABLET REAGENT, AUTOMATED, WITH MICROSCOP: ICD-10-PCS | Mod: S$GLB,,, | Performed by: NURSE PRACTITIONER

## 2022-02-17 PROCEDURE — 3008F BODY MASS INDEX DOCD: CPT | Mod: CPTII,S$GLB,, | Performed by: NURSE PRACTITIONER

## 2022-02-17 PROCEDURE — 1159F MED LIST DOCD IN RCRD: CPT | Mod: CPTII,S$GLB,, | Performed by: NURSE PRACTITIONER

## 2022-02-17 PROCEDURE — 87086 URINE CULTURE/COLONY COUNT: CPT | Performed by: NURSE PRACTITIONER

## 2022-02-17 PROCEDURE — 81001 URINALYSIS AUTO W/SCOPE: CPT | Mod: S$GLB,,, | Performed by: NURSE PRACTITIONER

## 2022-02-17 PROCEDURE — 99204 PR OFFICE/OUTPT VISIT, NEW, LEVL IV, 45-59 MIN: ICD-10-PCS | Mod: S$GLB,,, | Performed by: NURSE PRACTITIONER

## 2022-02-17 PROCEDURE — 1160F RVW MEDS BY RX/DR IN RCRD: CPT | Mod: CPTII,S$GLB,, | Performed by: NURSE PRACTITIONER

## 2022-02-17 PROCEDURE — 1159F PR MEDICATION LIST DOCUMENTED IN MEDICAL RECORD: ICD-10-PCS | Mod: CPTII,S$GLB,, | Performed by: NURSE PRACTITIONER

## 2022-02-17 PROCEDURE — 99999 PR PBB SHADOW E&M-EST. PATIENT-LVL III: ICD-10-PCS | Mod: PBBFAC,,, | Performed by: NURSE PRACTITIONER

## 2022-02-17 NOTE — PROGRESS NOTES
Subjective:       Patient ID: Dina Hutton is a 36 y.o. female who is a new patientwas referred by Estefany Nuno MD    Chief Complaint:   Chief Complaint   Patient presents with    Urinary Frequency       Patient treated for UTI by urgent care last month. She was treated with IM Rocephin x1 in clinic and 7 day course of Cipro. Antibiotic later changed to Bactrim DS due to possible drug interaction with Cipro and Lexapro. Patient completed course of Bactrim DS. She continued to have dysuria, pelvic pain and gross hematuria after completing Bactrim. Therefore she was prescribed Keflex x 10 days by Family Med MD later that same month. No UCx done at that time.  She reports previous hx of recurrent UTIs but had been doing well for the past year    Today she reports persistent hematuria. No blood clots. Her dysuria and pelvic pain have resolved. She denies flank pain, n/v or fever.  There is not a history of nephrolithiasis. There is not a history of urologic trauma.Patient admits to history of no risk factors for cancer. Patient denies history of chronic Santiago catheter,  surgeries, occupational exposure, sexually transmitted diseases, tobacco use, trauma and urolithiasis. Prior workup has been UA'S/UCx. Denies known family hx of  malignancy      UCx  1/13/22-- >100k Proteus  3/31/21-- negative  3/16/21-- 50 to 99k GBS  6/3/20-- >100k E coli  8/21/19 --contaminated  3/26/19-- >100k E coli    PMHx: Thyroid cancer, GERD, Depression    ACTIVE MEDICAL ISSUES:  Patient Active Problem List   Diagnosis    Depression    Gastroesophageal reflux disease without esophagitis    Hearing loss    Postpartum depression    Thyroid cancer    Postsurgical hypothyroidism    Muscle tension dysphonia    GERD (gastroesophageal reflux disease)    Syncope       PAST MEDICAL HISTORY  Past Medical History:   Diagnosis Date    Anemia     Breast abscess 4/25/2020    GERD (gastroesophageal reflux disease)     History of fourth  degree perineal laceration 8/21/2019    Hypoglycemia     Hypoglycemia     Mastitis 4/20/2020    Mental disorder     depression    PONV (postoperative nausea and vomiting)     Thyroid cancer     Thyroid disease        PAST SURGICAL HISTORY:  Past Surgical History:   Procedure Laterality Date    BREAST CYST EXCISION      COLONOSCOPY      ESOPHAGOGASTRODUODENOSCOPY      ESOPHAGOGASTRODUODENOSCOPY N/A 6/14/2021    Procedure: EGD (ESOPHAGOGASTRODUODENOSCOPY);  Surgeon: Farooq Cullen MD;  Location: [facility] ENDO;  Service: Endoscopy;  Laterality: N/A;  ongoing epigastric pain, burning, nausea, vomiting  Lives on [facility], want first available but if there are openings on , would prefer that location  cOVID [facility] on 6/11/21 at [facility]    INCISION AND DRAINAGE OF BREAST Left 4/23/2020    Procedure: INCISION AND DRAINAGE, BREAST;  Surgeon: Mason Rubalcava III, MD;  Location: [facility] OR;  Service: General;  Laterality: Left;    THYROIDECTOMY Bilateral 9/29/2020    Procedure: THYROIDECTOMY with central neck dissection;  Surgeon: Kayla Lovelace MD;  Location: [facility] OR;  Service: General;  Laterality: Bilateral;       SOCIAL HISTORY:  Social History     Tobacco Use    Smoking status: Never Smoker    Smokeless tobacco: Never Used   Substance Use Topics    Alcohol use: Yes     Comment: occasional    Drug use: No       FAMILY HISTORY:  Family History   Problem Relation Age of Onset    Hypertension Mother     Hyperlipidemia Mother     Cancer Neg Hx        ALLERGIES AND MEDICATIONS: updated and reviewed.  Review of patient's allergies indicates:   Allergen Reactions    Vancomycin analogues Hives    Vicodin [hydrocodone-acetaminophen] Nausea Only     Patient states she is fine with other pain medication     Current Outpatient Medications   Medication Sig    albuterol (PROVENTIL/VENTOLIN HFA) 90 mcg/actuation inhaler Inhale 2 puffs into the lungs every 4 (four) hours as needed for Wheezing. Rescue     EScitalopram oxalate (LEXAPRO) 10 MG tablet Take 1 tablet (10 mg total) by mouth once daily.    fluticasone propionate (FLONASE) 50 mcg/actuation nasal spray 1 spray (50 mcg total) by Each Nostril route once daily.    fluticasone propionate (FLOVENT HFA) 110 mcg/actuation inhaler Inhale 1 puff into the lungs 2 (two) times daily. Controller    inhalation spacing device (BREATHERITE MDI SPACER) Use as directed for inhalation.    levothyroxine (TIROSINT) 100 mcg Cap Take 100 mcg by mouth once daily. Brand name Tirosint medically necessary    norelgestromin-ethinyl estradiol (ORTHO EVRA) 150-35 mcg/24 hr Place 1 patch onto the skin every 7 days.    omeprazole (PRILOSEC) 40 MG capsule Take 1 capsule (40 mg total) by mouth once daily.    promethazine-dextromethorphan (PROMETHAZINE-DM) 6.25-15 mg/5 mL Syrp Take 5 mLs by mouth every 6 (six) hours as needed (cough).     No current facility-administered medications for this visit.       Review of Systems   Constitutional: Negative for activity change, chills, fatigue, fever and unexpected weight change.   Eyes: Negative for discharge, redness and visual disturbance.   Respiratory: Negative for cough, shortness of breath and wheezing.    Cardiovascular: Negative for chest pain and leg swelling.   Gastrointestinal: Negative for abdominal distention, abdominal pain, constipation, diarrhea, nausea and vomiting.   Genitourinary: Positive for hematuria. Negative for decreased urine volume, difficulty urinating, dysuria, flank pain, frequency, pelvic pain, urgency, vaginal bleeding, vaginal discharge and vaginal pain.   Musculoskeletal: Negative for arthralgias, joint swelling and myalgias.   Skin: Negative for color change and rash.   Neurological: Negative for dizziness and light-headedness.   Psychiatric/Behavioral: Negative for behavioral problems and confusion. The patient is not nervous/anxious.        Objective:      Vitals:    02/17/22 1603   Weight: 58 kg (127 lb  13.9 oz)        Physical Exam  Constitutional:       Appearance: She is well-developed.   HENT:      Head: Normocephalic and atraumatic.      Nose: Nose normal.   Eyes:      General:         Right eye: No discharge.         Left eye: No discharge.      Conjunctiva/sclera: Conjunctivae normal.   Neck:      Thyroid: No thyromegaly.      Trachea: No tracheal deviation.   Cardiovascular:      Rate and Rhythm: Normal rate and regular rhythm.   Pulmonary:      Effort: Pulmonary effort is normal. No respiratory distress.      Breath sounds: No wheezing.   Abdominal:      General: There is no distension.      Palpations: Abdomen is soft. There is no hepatosplenomegaly.      Tenderness: There is no abdominal tenderness. There is no CVA tenderness.      Hernia: No hernia is present.   Genitourinary:     Comments:  exam deferred  Musculoskeletal:         General: No edema. Normal range of motion.      Cervical back: Normal range of motion and neck supple.   Skin:     General: Skin is warm and dry.      Findings: No erythema or rash.   Neurological:      Mental Status: She is alert and oriented to person, place, and time.   Psychiatric:         Mood and Affect: Mood and affect normal.         Behavior: Behavior normal.         Judgment: Judgment normal.         Urine dipstick shows 50 RBCs.     Assessment:       1. Gross hematuria    2. Acute cystitis with hematuria    3. Recurrent UTI          Plan:       1. Gross hematuria   - Discussed etiology and workup of hematuria   - UCx 1/22-- + Proteus   - CT urogram   - Office cystoscopy--will hold on this for now  - POCT urinalysis, dipstick or tablet reag  - Urine culture  - Basic Metabolic Panel; Future  - CT Urogram Abd Pelvis W WO; Future    2. Acute cystitis with hematuria  -UTI previously treated  -UA today without obvious evidence of infection. Ok to hold on empiric abx  - Ambulatory referral/consult to Urology  - Urine culture    3. Recurrent UTI   - Discussed UTI  prevention strategies.   - Adequate hydration.   - Double voiding. Consider timed voiding.    - Avoid constipation.   - Consider office cystoscopy   - Cranberry/probiotics.   - Call with UTI symptoms so UA/UCx can be sent.   - Ambulatory referral/consult to Urology  - Urine culture            Follow up in about 2 weeks (around 3/3/2022) for Follow up with CT urogram.

## 2022-02-18 ENCOUNTER — HOSPITAL ENCOUNTER (OUTPATIENT)
Dept: RADIOLOGY | Facility: HOSPITAL | Age: 37
Discharge: HOME | End: 2022-02-18
Attending: NURSE PRACTITIONER
Payer: COMMERCIAL

## 2022-02-18 DIAGNOSIS — R31.0 GROSS HEMATURIA: ICD-10-CM

## 2022-02-18 PROCEDURE — 25500020 PHARM REV CODE 255: Performed by: NURSE PRACTITIONER

## 2022-02-18 PROCEDURE — 74178 CT ABD&PLV WO CNTR FLWD CNTR: CPT | Mod: 26,,, | Performed by: RADIOLOGY

## 2022-02-18 PROCEDURE — 74178 CT UROGRAM ABD PELVIS W WO: ICD-10-PCS | Mod: 26,,, | Performed by: RADIOLOGY

## 2022-02-18 PROCEDURE — 74178 CT ABD&PLV WO CNTR FLWD CNTR: CPT | Mod: TC

## 2022-02-18 RX ADMIN — IOHEXOL 125 ML: 350 INJECTION, SOLUTION INTRAVENOUS at 12:02

## 2022-02-20 LAB — BACTERIA UR CULT: ABNORMAL

## 2022-02-22 ENCOUNTER — TELEPHONE (OUTPATIENT)
Dept: UROLOGY | Facility: CLINIC | Age: 37
End: 2022-02-22
Payer: COMMERCIAL

## 2022-02-22 RX ORDER — AMOXICILLIN AND CLAVULANATE POTASSIUM 875; 125 MG/1; MG/1
1 TABLET, FILM COATED ORAL EVERY 12 HOURS
Qty: 20 TABLET | Refills: 0 | Status: SHIPPED | OUTPATIENT
Start: 2022-02-22 | End: 2022-03-04

## 2022-02-22 NOTE — TELEPHONE ENCOUNTER
Very low growth bacteria noted on recent urine culture. Will treat with antibiotics due to patient having symptoms. Rx for Augmentin sent to pharmacy

## 2022-03-04 ENCOUNTER — OFFICE VISIT (OUTPATIENT)
Dept: UROLOGY | Facility: CLINIC | Age: 37
End: 2022-03-04
Payer: COMMERCIAL

## 2022-03-04 VITALS — HEIGHT: 58 IN | BODY MASS INDEX: 26.85 KG/M2 | WEIGHT: 127.88 LBS

## 2022-03-04 DIAGNOSIS — R31.0 GROSS HEMATURIA: Primary | ICD-10-CM

## 2022-03-04 DIAGNOSIS — R30.0 DYSURIA: ICD-10-CM

## 2022-03-04 DIAGNOSIS — N39.0 RECURRENT UTI: ICD-10-CM

## 2022-03-04 LAB
BILIRUB SERPL-MCNC: NEGATIVE MG/DL
BLOOD URINE, POC: 250
COLOR, POC UA: YELLOW
GLUCOSE UR QL STRIP: NORMAL
KETONES UR QL STRIP: NEGATIVE
LEUKOCYTE ESTERASE URINE, POC: NEGATIVE
NITRITE, POC UA: NEGATIVE
PH, POC UA: 5
PROTEIN, POC: NEGATIVE
SPECIFIC GRAVITY, POC UA: 1.02
UROBILINOGEN, POC UA: NORMAL

## 2022-03-04 PROCEDURE — 3008F BODY MASS INDEX DOCD: CPT | Mod: CPTII,S$GLB,, | Performed by: NURSE PRACTITIONER

## 2022-03-04 PROCEDURE — 87086 URINE CULTURE/COLONY COUNT: CPT | Performed by: NURSE PRACTITIONER

## 2022-03-04 PROCEDURE — 99999 PR PBB SHADOW E&M-EST. PATIENT-LVL III: CPT | Mod: PBBFAC,,, | Performed by: NURSE PRACTITIONER

## 2022-03-04 PROCEDURE — 99214 PR OFFICE/OUTPT VISIT, EST, LEVL IV, 30-39 MIN: ICD-10-PCS | Mod: S$GLB,,, | Performed by: NURSE PRACTITIONER

## 2022-03-04 PROCEDURE — 99999 PR PBB SHADOW E&M-EST. PATIENT-LVL III: ICD-10-PCS | Mod: PBBFAC,,, | Performed by: NURSE PRACTITIONER

## 2022-03-04 PROCEDURE — 3008F PR BODY MASS INDEX (BMI) DOCUMENTED: ICD-10-PCS | Mod: CPTII,S$GLB,, | Performed by: NURSE PRACTITIONER

## 2022-03-04 PROCEDURE — 1159F MED LIST DOCD IN RCRD: CPT | Mod: CPTII,S$GLB,, | Performed by: NURSE PRACTITIONER

## 2022-03-04 PROCEDURE — 1160F PR REVIEW ALL MEDS BY PRESCRIBER/CLIN PHARMACIST DOCUMENTED: ICD-10-PCS | Mod: CPTII,S$GLB,, | Performed by: NURSE PRACTITIONER

## 2022-03-04 PROCEDURE — 1160F RVW MEDS BY RX/DR IN RCRD: CPT | Mod: CPTII,S$GLB,, | Performed by: NURSE PRACTITIONER

## 2022-03-04 PROCEDURE — 81001 URINALYSIS AUTO W/SCOPE: CPT | Mod: S$GLB,,, | Performed by: NURSE PRACTITIONER

## 2022-03-04 PROCEDURE — 1159F PR MEDICATION LIST DOCUMENTED IN MEDICAL RECORD: ICD-10-PCS | Mod: CPTII,S$GLB,, | Performed by: NURSE PRACTITIONER

## 2022-03-04 PROCEDURE — 99214 OFFICE O/P EST MOD 30 MIN: CPT | Mod: S$GLB,,, | Performed by: NURSE PRACTITIONER

## 2022-03-04 PROCEDURE — 81001 POCT URINALYSIS, DIPSTICK OR TABLET REAGENT, AUTOMATED, WITH MICROSCOP: ICD-10-PCS | Mod: S$GLB,,, | Performed by: NURSE PRACTITIONER

## 2022-03-04 NOTE — PROGRESS NOTES
Subjective:       Patient ID: Dina Hutton is a 36 y.o. female who was last seen in this office 2/17/2022    Chief Complaint:   Chief Complaint   Patient presents with    Follow-up     2 weeks follow up hematuria         Patient treated for UTI by urgent care last month. She was treated with IM Rocephin x1 in clinic and 7 day course of Cipro. Antibiotic later changed to Bactrim DS due to possible drug interaction with Cipro and Lexapro. Patient completed course of Bactrim DS. She continued to have dysuria, pelvic pain and gross hematuria after completing Bactrim. Therefore she was prescribed Keflex x 10 days by Family Med MD later that same month. No UCx done at that time.  She reports previous hx of recurrent UTIs but had been doing well for the past year    She presented with persistent hematuria at last visit. No blood clots. Her dysuria and pelvic pain have resolved. She denies flank pain, n/v or fever.  There is not a history of nephrolithiasis. There is not a history of urologic trauma.Patient admits to history of no risk factors for cancer. Patient denies history of chronic Santiago catheter,  surgeries, occupational exposure, sexually transmitted diseases, tobacco use, trauma and urolithiasis. Prior workup has been UA'S/UCx. Denies known family hx of  malignancy    She was treated with Augmentin for mild UTI by myself since last visit. She is still having dysuria and pelvic pain. She is back today with a CT scan. No new complaints      UCx  2/17/22 -- 10 to 49k GBS  1/13/22-- >100k Proteus  3/31/21-- negative  3/16/21-- 50 to 99k GBS  6/3/20-- >100k E coli  8/21/19 --contaminated  3/26/19-- >100k E coli    PMHx: Thyroid cancer, GERD, Depression      ACTIVE MEDICAL ISSUES:  Patient Active Problem List   Diagnosis    Depression    Gastroesophageal reflux disease without esophagitis    Hearing loss    Postpartum depression    Thyroid cancer    Postsurgical hypothyroidism    Muscle tension dysphonia     GERD (gastroesophageal reflux disease)    Syncope       ALLERGIES AND MEDICATIONS: updated and reviewed.  Review of patient's allergies indicates:   Allergen Reactions    Vancomycin analogues Hives    Vicodin [hydrocodone-acetaminophen] Nausea Only     Patient states she is fine with other pain medication     Current Outpatient Medications   Medication Sig    amoxicillin-clavulanate 875-125mg (AUGMENTIN) 875-125 mg per tablet Take 1 tablet by mouth every 12 (twelve) hours. for 10 days    EScitalopram oxalate (LEXAPRO) 10 MG tablet Take 1 tablet (10 mg total) by mouth once daily.    levothyroxine (TIROSINT) 100 mcg Cap Take 100 mcg by mouth once daily. Brand name Tirosint medically necessary    norelgestromin-ethinyl estradiol (ORTHO EVRA) 150-35 mcg/24 hr Place 1 patch onto the skin every 7 days.    omeprazole (PRILOSEC) 40 MG capsule Take 1 capsule (40 mg total) by mouth once daily.    promethazine-dextromethorphan (PROMETHAZINE-DM) 6.25-15 mg/5 mL Syrp Take 5 mLs by mouth every 6 (six) hours as needed (cough).    methen-m.blue-s.phos-phsal-hyo (URIBEL) 118-10-40.8-36 mg Cap Take 1 capsule by mouth 4 (four) times daily as needed (dysuria/pelvic pain).     No current facility-administered medications for this visit.       Review of Systems   Constitutional: Negative for activity change, chills, fatigue, fever and unexpected weight change.   Eyes: Negative for discharge, redness and visual disturbance.   Respiratory: Negative for cough, shortness of breath and wheezing.    Cardiovascular: Negative for chest pain and leg swelling.   Gastrointestinal: Negative for abdominal distention, abdominal pain, constipation, diarrhea, nausea and vomiting.   Genitourinary: Positive for dysuria and pelvic pain. Negative for decreased urine volume, difficulty urinating, flank pain, frequency, hematuria, urgency and vaginal bleeding.   Musculoskeletal: Negative for arthralgias, joint swelling and myalgias.   Skin: Negative  "for color change and rash.   Neurological: Negative for dizziness and light-headedness.   Psychiatric/Behavioral: Negative for behavioral problems and confusion. The patient is not nervous/anxious.        Objective:      Vitals:    03/04/22 1606   Weight: 58 kg (127 lb 13.9 oz)   Height: 4' 10" (1.473 m)        Physical Exam  Constitutional:       Appearance: She is well-developed.   HENT:      Head: Normocephalic and atraumatic.      Nose: Nose normal.   Eyes:      General:         Right eye: No discharge.         Left eye: No discharge.      Conjunctiva/sclera: Conjunctivae normal.   Neck:      Thyroid: No thyromegaly.      Trachea: No tracheal deviation.   Cardiovascular:      Rate and Rhythm: Normal rate and regular rhythm.   Pulmonary:      Effort: Pulmonary effort is normal. No respiratory distress.      Breath sounds: No wheezing.   Abdominal:      General: There is no distension.      Palpations: Abdomen is soft.      Tenderness: There is no abdominal tenderness.      Hernia: No hernia is present.   Genitourinary:     Comments:  exam deferred  Musculoskeletal:         General: Normal range of motion.      Cervical back: Normal range of motion and neck supple.   Skin:     General: Skin is warm and dry.      Findings: No erythema or rash.   Neurological:      Mental Status: She is alert and oriented to person, place, and time.   Psychiatric:         Behavior: Behavior normal.         Judgment: Judgment normal.         Urine dipstick shows 250 RBC.        Narrative & Impression    EXAMINATION:  CT UROGRAM ABD PELVIS W WO     CLINICAL HISTORY:  gross hematuria;Gross hematuria     TECHNIQUE:  Low dose axial, sagittal and coronal reformations were obtained from the lung bases to the pubic symphysis before and following the IV administration of 125 mL of Omnipaque 350.  Timing was optimized for nephrogram and excretory renal phases.     COMPARISON:  None     FINDINGS:  : Kidneys are symmetric in size.  " Precontrast imaging shows no stones or hydronephrosis.  Kidneys concentrate and excrete contrast appropriately on postcontrast imaging.  No focal filling defects.  Urinary bladder is unremarkable.     CT:     Lung bases are clear.     Liver is homogeneous.  Punctate hypodensity is too small to further characterize by imaging.  Gallbladder is unremarkable.  Bile ducts, spleen, pancreas, and adrenal glands are unremarkable.     Stomach and loops of bowel are normal in caliber.  Visualized appendix is normal.  No free fluid in the pelvis.     Regional skeleton is intact.     Impression:     No abnormality identified to account for the patient's gross hematuria.        Electronically signed by: Shirley Danielle  Date:                                            02/18/2022  Time:                                           14:36      Encounter            Reviewed with patient    Assessment:       1. Gross hematuria    2. Recurrent UTI    3. Dysuria          Plan:       1. Gross hematuria   - Discussed etiology and workup of hematuria   - UCx 1/22-- + Proteus   - UCx 2/22 -- 10 to 49k GBS  -CT urogram--no stones, no tumors  - POCT urinalysis, dipstick or tablet reag  - Cystoscopy; Future    2. Recurrent UTI   - Discussed UTI prevention strategies.   - Adequate hydration.   - Double voiding. Consider timed voiding.    - Avoid constipation.   - Cranberry/probiotics.   - Call with UTI symptoms so UA/UCx can be sent.   - Cystoscopy; Future    3. Dysuria  -Uribel prn. Rx printed and voucher given to patient  - Urine culture  - Cystoscopy; Future  - methen-m.blue-s.phos-phsal-hyo (URIBEL) 118-10-40.8-36 mg Cap; Take 1 capsule by mouth 4 (four) times daily as needed (dysuria/pelvic pain).  Dispense: 30 capsule; Refill: 2              Follow up for office cystoscopy.

## 2022-03-06 LAB — BACTERIA UR CULT: NO GROWTH

## 2022-03-08 ENCOUNTER — TELEPHONE (OUTPATIENT)
Dept: UROLOGY | Facility: CLINIC | Age: 37
End: 2022-03-08
Payer: COMMERCIAL

## 2022-03-08 NOTE — TELEPHONE ENCOUNTER
I called and let the patient know her recent urine culture came back negative for a UTI and there is no need for antibiotics at this time.

## 2022-03-18 ENCOUNTER — PATIENT MESSAGE (OUTPATIENT)
Dept: UROLOGY | Facility: CLINIC | Age: 37
End: 2022-03-18
Payer: COMMERCIAL

## 2022-03-18 ENCOUNTER — TELEPHONE (OUTPATIENT)
Dept: UROLOGY | Facility: CLINIC | Age: 37
End: 2022-03-18
Payer: COMMERCIAL

## 2022-03-19 ENCOUNTER — LAB VISIT (OUTPATIENT)
Dept: LAB | Facility: HOSPITAL | Age: 37
End: 2022-03-19
Attending: INTERNAL MEDICINE
Payer: COMMERCIAL

## 2022-03-19 DIAGNOSIS — E89.0 POSTSURGICAL HYPOTHYROIDISM: Chronic | ICD-10-CM

## 2022-03-19 LAB — TSH SERPL DL<=0.005 MIU/L-ACNC: 0.7 UIU/ML (ref 0.4–4)

## 2022-03-19 PROCEDURE — 36415 COLL VENOUS BLD VENIPUNCTURE: CPT | Mod: PO | Performed by: INTERNAL MEDICINE

## 2022-03-19 PROCEDURE — 84443 ASSAY THYROID STIM HORMONE: CPT | Performed by: INTERNAL MEDICINE

## 2022-04-22 ENCOUNTER — PATIENT MESSAGE (OUTPATIENT)
Dept: ENDOCRINOLOGY | Facility: CLINIC | Age: 37
End: 2022-04-22
Payer: COMMERCIAL

## 2022-04-22 DIAGNOSIS — E89.0 POSTSURGICAL HYPOTHYROIDISM: Primary | Chronic | ICD-10-CM

## 2022-05-09 ENCOUNTER — PROCEDURE VISIT (OUTPATIENT)
Dept: UROLOGY | Facility: CLINIC | Age: 37
End: 2022-05-09
Payer: COMMERCIAL

## 2022-05-09 DIAGNOSIS — N39.0 RECURRENT UTI: ICD-10-CM

## 2022-05-09 DIAGNOSIS — R31.0 GROSS HEMATURIA: ICD-10-CM

## 2022-05-09 DIAGNOSIS — R30.0 DYSURIA: ICD-10-CM

## 2022-05-09 PROCEDURE — 52000 CYSTOSCOPY: ICD-10-PCS | Mod: S$GLB,,, | Performed by: UROLOGY

## 2022-05-09 PROCEDURE — 52000 CYSTOURETHROSCOPY: CPT | Mod: S$GLB,,, | Performed by: UROLOGY

## 2022-05-09 NOTE — PROCEDURES
Cystoscopy    Date/Time: 5/9/2022 2:00 PM  Performed by: Mariana Wilhelm MD  Authorized by: Aileen Durand NP     Consent Done?:  Yes (Written)  Timeout: prior to procedure the correct patient, procedure, and site was verified    Prep: patient was prepped and draped in usual sterile fashion    Anesthesia:  Lidocaine jelly  Indications: recurrent UTIs, dysuria and hematuria    Position:  Dorsal lithotomy  Anesthesia:  Lidocaine jelly  Patient sedated?: No    Preparation: Patient was prepped and draped in usual sterile fashion    Scope type:  Flexible cystoscope  Stent inserted: No    Stent removed: No    External exam normal: Yes    Digital exam performed: No    Urethra normal: Yes    Bladder neck normal: Yes    Bladder normal: Yes (UOs normal)     patient tolerated the procedure well with no immediate complications  Comments:      CT uro - normal  Cysto - normal    Monitor bladder irritants, Uribel PRN.

## 2022-05-26 ENCOUNTER — PATIENT MESSAGE (OUTPATIENT)
Dept: ENDOCRINOLOGY | Facility: CLINIC | Age: 37
End: 2022-05-26
Payer: COMMERCIAL

## 2022-05-31 ENCOUNTER — LAB VISIT (OUTPATIENT)
Dept: LAB | Facility: HOSPITAL | Age: 37
End: 2022-05-31
Attending: INTERNAL MEDICINE
Payer: COMMERCIAL

## 2022-05-31 DIAGNOSIS — E89.0 POSTSURGICAL HYPOTHYROIDISM: Chronic | ICD-10-CM

## 2022-05-31 LAB
T4 FREE SERPL-MCNC: 1.1 NG/DL (ref 0.71–1.51)
TSH SERPL DL<=0.005 MIU/L-ACNC: <0.01 UIU/ML (ref 0.4–4)

## 2022-05-31 PROCEDURE — 84439 ASSAY OF FREE THYROXINE: CPT | Performed by: INTERNAL MEDICINE

## 2022-05-31 PROCEDURE — 36415 COLL VENOUS BLD VENIPUNCTURE: CPT | Mod: PO | Performed by: INTERNAL MEDICINE

## 2022-05-31 PROCEDURE — 84443 ASSAY THYROID STIM HORMONE: CPT | Performed by: INTERNAL MEDICINE

## 2022-06-01 ENCOUNTER — HOSPITAL ENCOUNTER (OUTPATIENT)
Dept: ENDOCRINOLOGY | Facility: CLINIC | Age: 37
Discharge: HOME | End: 2022-06-01
Attending: INTERNAL MEDICINE
Payer: COMMERCIAL

## 2022-06-01 DIAGNOSIS — C73 THYROID CANCER: Chronic | ICD-10-CM

## 2022-06-01 PROCEDURE — 76536 US SOFT TISSUE HEAD NECK THYROID: ICD-10-PCS | Mod: S$GLB,,, | Performed by: INTERNAL MEDICINE

## 2022-06-01 PROCEDURE — 76536 US EXAM OF HEAD AND NECK: CPT | Mod: S$GLB,,, | Performed by: INTERNAL MEDICINE

## 2022-06-03 ENCOUNTER — PATIENT MESSAGE (OUTPATIENT)
Dept: ENDOCRINOLOGY | Facility: CLINIC | Age: 37
End: 2022-06-03
Payer: COMMERCIAL

## 2022-06-03 DIAGNOSIS — E89.0 POSTSURGICAL HYPOTHYROIDISM: Primary | Chronic | ICD-10-CM

## 2022-06-03 RX ORDER — LEVOTHYROXINE SODIUM 88 UG/1
88 CAPSULE ORAL DAILY
Qty: 90 CAPSULE | Refills: 3 | Status: SHIPPED | OUTPATIENT
Start: 2022-06-03 | End: 2022-08-10 | Stop reason: SDUPTHER

## 2022-06-06 ENCOUNTER — TELEPHONE (OUTPATIENT)
Dept: PHARMACY | Facility: CLINIC | Age: 37
End: 2022-06-06
Payer: COMMERCIAL

## 2022-06-22 ENCOUNTER — TELEPHONE (OUTPATIENT)
Dept: ORTHOPEDICS | Facility: CLINIC | Age: 37
End: 2022-06-22
Payer: COMMERCIAL

## 2022-06-22 NOTE — TELEPHONE ENCOUNTER
I called the patient to confirm her appointment with Dr. Shah. The patient did not answer. I left a voicemail with a call back number.

## 2022-07-10 ENCOUNTER — OFFICE VISIT (OUTPATIENT)
Dept: URGENT CARE | Facility: CLINIC | Age: 37
End: 2022-07-10
Payer: COMMERCIAL

## 2022-07-10 VITALS
HEART RATE: 98 BPM | TEMPERATURE: 98 F | RESPIRATION RATE: 20 BRPM | BODY MASS INDEX: 26.66 KG/M2 | SYSTOLIC BLOOD PRESSURE: 120 MMHG | OXYGEN SATURATION: 95 % | DIASTOLIC BLOOD PRESSURE: 78 MMHG | HEIGHT: 58 IN | WEIGHT: 127 LBS

## 2022-07-10 DIAGNOSIS — U07.1 COVID-19 VIRUS INFECTION: Primary | ICD-10-CM

## 2022-07-10 LAB
CTP QC/QA: YES
SARS-COV-2 RDRP RESP QL NAA+PROBE: POSITIVE

## 2022-07-10 PROCEDURE — 1159F PR MEDICATION LIST DOCUMENTED IN MEDICAL RECORD: ICD-10-PCS | Mod: CPTII,S$GLB,, | Performed by: NURSE PRACTITIONER

## 2022-07-10 PROCEDURE — U0002: ICD-10-PCS | Mod: QW,S$GLB,, | Performed by: NURSE PRACTITIONER

## 2022-07-10 PROCEDURE — 1160F RVW MEDS BY RX/DR IN RCRD: CPT | Mod: CPTII,S$GLB,, | Performed by: NURSE PRACTITIONER

## 2022-07-10 PROCEDURE — 1159F MED LIST DOCD IN RCRD: CPT | Mod: CPTII,S$GLB,, | Performed by: NURSE PRACTITIONER

## 2022-07-10 PROCEDURE — 1160F PR REVIEW ALL MEDS BY PRESCRIBER/CLIN PHARMACIST DOCUMENTED: ICD-10-PCS | Mod: CPTII,S$GLB,, | Performed by: NURSE PRACTITIONER

## 2022-07-10 PROCEDURE — 3078F PR MOST RECENT DIASTOLIC BLOOD PRESSURE < 80 MM HG: ICD-10-PCS | Mod: CPTII,S$GLB,, | Performed by: NURSE PRACTITIONER

## 2022-07-10 PROCEDURE — U0002 COVID-19 LAB TEST NON-CDC: HCPCS | Mod: QW,S$GLB,, | Performed by: NURSE PRACTITIONER

## 2022-07-10 PROCEDURE — 99213 PR OFFICE/OUTPT VISIT, EST, LEVL III, 20-29 MIN: ICD-10-PCS | Mod: S$GLB,,, | Performed by: NURSE PRACTITIONER

## 2022-07-10 PROCEDURE — 3078F DIAST BP <80 MM HG: CPT | Mod: CPTII,S$GLB,, | Performed by: NURSE PRACTITIONER

## 2022-07-10 PROCEDURE — 3074F PR MOST RECENT SYSTOLIC BLOOD PRESSURE < 130 MM HG: ICD-10-PCS | Mod: CPTII,S$GLB,, | Performed by: NURSE PRACTITIONER

## 2022-07-10 PROCEDURE — 3008F BODY MASS INDEX DOCD: CPT | Mod: CPTII,S$GLB,, | Performed by: NURSE PRACTITIONER

## 2022-07-10 PROCEDURE — 3008F PR BODY MASS INDEX (BMI) DOCUMENTED: ICD-10-PCS | Mod: CPTII,S$GLB,, | Performed by: NURSE PRACTITIONER

## 2022-07-10 PROCEDURE — 3074F SYST BP LT 130 MM HG: CPT | Mod: CPTII,S$GLB,, | Performed by: NURSE PRACTITIONER

## 2022-07-10 PROCEDURE — 99213 OFFICE O/P EST LOW 20 MIN: CPT | Mod: S$GLB,,, | Performed by: NURSE PRACTITIONER

## 2022-07-10 RX ORDER — PROMETHAZINE HYDROCHLORIDE AND DEXTROMETHORPHAN HYDROBROMIDE 6.25; 15 MG/5ML; MG/5ML
5 SYRUP ORAL NIGHTLY PRN
Qty: 118 ML | Refills: 0 | Status: SHIPPED | OUTPATIENT
Start: 2022-07-10 | End: 2022-11-13

## 2022-07-10 RX ORDER — BENZONATATE 200 MG/1
200 CAPSULE ORAL 3 TIMES DAILY PRN
Qty: 30 CAPSULE | Refills: 0 | Status: SHIPPED | OUTPATIENT
Start: 2022-07-10 | End: 2022-11-13

## 2022-07-10 RX ORDER — AZELASTINE 1 MG/ML
1 SPRAY, METERED NASAL 2 TIMES DAILY
Qty: 30 ML | Refills: 0 | Status: SHIPPED | OUTPATIENT
Start: 2022-07-10 | End: 2022-11-13

## 2022-07-10 NOTE — LETTER
[address] [facility], [address] ? [place] [address] ? Phone [phone] ? Fax [phone]           Return to Work/School    Patient: Dina Hutton  YOB: 1985   Date: 07/10/2022      To Whom It May Concern:     Dina Hutton was in contact with/seen in my office on 07/10/2022. COVID-19 is present in our communities across the [facility]. Not all patients are eligible or appropriate to be tested. In this situation, your employee meets the following criteria:     Dina Hutton has met the criteria for COVID-19 testing and has a POSITIVE result. She can return to work once they are asymptomatic for 24 hours without the use of fever reducing medications AND at least five days from the start of symptoms (or from the first positive result if they have no symptoms).      If you have any questions or concerns, or if I can be of further assistance, please do not hesitate to contact me.     Sincerely,    Marlon Ramirez NP

## 2022-07-10 NOTE — PROGRESS NOTES
"Subjective:       Patient ID: Dina Hutton is a 36 y.o. female.    Vitals:  height is 4' 10" (1.473 m) and weight is 57.6 kg (127 lb). Her tympanic temperature is 97.8 °F (36.6 °C). Her blood pressure is 120/78 and her pulse is 98. Her respiration is 20 and oxygen saturation is 95%.     Chief Complaint: Cough (Sore throat, stuffy nose. - Entered by patient)    36-year-old female presents to clinic for evaluation of nasal congestion, ear pressure, cough, sore throat x1 week.  She is not vaccinated for COVID, she reports exposure to COVID.  She reports taking Robitussin with minimal relief.  She denies chest pain, fever, shortness of breath.  She is awake and alert, answers questions appropriately, no acute distress noted on today's visit.    Cough  This is a new problem. The current episode started 1 to 4 weeks ago. The problem has been unchanged. Associated symptoms include ear pain, headaches, nasal congestion and a sore throat. Pertinent negatives include no chest pain, chills or fever. Associated symptoms comments: sweats. Treatments tried: robitussin, tessalon, theraflu. The treatment provided mild relief. There is no history of asthma, bronchitis or COPD.       Constitution: Negative for activity change, appetite change, chills, sweating, fatigue and fever.   HENT: Positive for ear pain, congestion and sore throat.    Cardiovascular: Negative for chest pain.   Respiratory: Positive for cough.    Gastrointestinal: Negative for abdominal pain, nausea and vomiting.   Neurological: Positive for headaches.       Objective:      Physical Exam   Constitutional: She is oriented to person, place, and time.  Non-toxic appearance. She does not appear ill. No distress.   HENT:   Head: Normocephalic and atraumatic.   Eyes: Conjunctivae are normal. Right eye exhibits no discharge. Left eye exhibits no discharge.   Cardiovascular: Normal rate.   Pulmonary/Chest: Effort normal. No respiratory distress.   Abdominal: Normal " appearance.   Musculoskeletal: Normal range of motion.         General: Normal range of motion.   Neurological: She is alert and oriented to person, place, and time.   Skin: Skin is dry, not diaphoretic and not pale.   Psychiatric: Her behavior is normal. Mood, judgment and thought content normal.   Nursing note and vitals reviewed.    Results for orders placed or performed in visit on 07/10/22   POCT COVID-19 Rapid Screening   Result Value Ref Range    POC Rapid COVID Positive (A) Negative     Acceptable Yes            Assessment:       1. COVID-19 virus infection          Plan:         COVID-19 virus infection  -     POCT COVID-19 Rapid Screening  -     COVID-19 Home Symptom Monitoring  - Duration (days): 14  -     azelastine (ASTELIN) 137 mcg (0.1 %) nasal spray; 1 spray (137 mcg total) by Nasal route 2 (two) times daily. for 10 days  Dispense: 30 mL; Refill: 0  -     benzonatate (TESSALON) 200 MG capsule; Take 1 capsule (200 mg total) by mouth 3 (three) times daily as needed for Cough.  Dispense: 30 capsule; Refill: 0  -     promethazine-dextromethorphan (PROMETHAZINE-DM) 6.25-15 mg/5 mL Syrp; Take 5 mLs by mouth nightly as needed (cough).  Dispense: 118 mL; Refill: 0    Patient Instructions   - You must understand that you have received an Urgent Care treatment only and that you may be released before all of your medical problems are known or treated.   - You, the patient, will arrange for follow up care as instructed.   - If your condition worsens or fails to improve we recommend that you receive another evaluation at the ER immediately or contact your PCP to discuss your concerns or return here.     Recommend daily antihistamine (Claritin, Zyrtec, or Allegra), decongestant (Mucinex, or Coricidin if hx of HTN), cough suppressant, Nasal spray (Flonase), Tylenol (if not contraindicated) for pain or fever, along with plenty of fluids and rest. Discussed POSITIVE Covid result w/ patient. Discussed  quarantine instructions. Patient verbally understood and agreed with treatment plan. ER for worsening symptoms.     You have tested POSITIVE for COVID-19 today.           ISOLATION    If you tested positive and do not have symptoms, you must isolate for 5 days starting on the day of the positive test. I       If you tested positive and have symptoms, you must isolate for 5 days starting on the day of the first symptoms,  not the day of the positive test.       This is the most important part, both the CDC and the [facility] emphasize that you do not test out of isolation.       After 5 days, if your symptoms have improved and you have not had fever on day 5, you can return to the community on day 6- NO TESTING REQUIRED!        In fact, we do not retest if you were positive in the last 90 days.       After your 5 days of isolation are completed, the CDC recommends strict mask use for the first 5 days that you come out of isolation.     CDC Testing and Quarantine Guidelines for patients with exposure to a known-positive COVID-19 person:    ·     A 'close exposure' is defined as anyone who has had an exposure (masked or unmasked) to a known COVID -19 positive person            within 6 feet of someone            for a cumulative total of 15 minutes or more over a 24-hour period.    ·     vaccinated Have been boosted or completed the primary series of Pfizer or Moderna vaccine within the last 6 months or completed the primary series of J&J vaccine within the last 2 months and/or had a positive test within 90 days            do NOT need to quarantine after contact with someone who had COVID-19 unless they have symptoms.            fully vaccinated people who have not had a positive test within 90 days, should get tested 3-5 days after their exposure, even if they don't have symptoms and wear a mask indoors in public for 10 days following exposure or until their test result is negative on day 5.            If you develop  symptoms test and quarantine.         ·     Unvaccinated, or are more than six months out from their second mRNA dose (or more than 2 months after the J&J vaccine) and not yet boosted,  and/or NOT had a positive test within 90 days and meet 'close exposure'    you are required by CDC guidelines to quarantine for at least 5 days from time of exposure followed by 5 days of strict masking. It is recommended, but not required to test after 5 days, unless you develop symptoms, in which case you should test at that time.    If you do decide to test at 5 days and are asymptomatic, the risk is that if you test without symptoms on Day 5 for example) and you are positive, your 5 day isolation begins on that day, and you turned your 5 day quarantine into 10 days.            If your exposure does not meet the above definition, you can return to your normal daily activities to include social distancing, wearing a mask and frequent handwashing.    Alternatively, if a 5-day quarantine is not feasible, it is imperative that an exposed person wear a well-fitting mask at all times when around others for 10 days after exposure.

## 2022-07-18 ENCOUNTER — HOSPITAL ENCOUNTER (OUTPATIENT)
Dept: RADIOLOGY | Facility: HOSPITAL | Age: 37
Discharge: HOME | End: 2022-07-18
Attending: ORTHOPAEDIC SURGERY
Payer: COMMERCIAL

## 2022-07-18 ENCOUNTER — TELEPHONE (OUTPATIENT)
Dept: ADMINISTRATIVE | Facility: OTHER | Age: 37
End: 2022-07-18
Payer: COMMERCIAL

## 2022-07-18 ENCOUNTER — TELEPHONE (OUTPATIENT)
Dept: ORTHOPEDICS | Facility: CLINIC | Age: 37
End: 2022-07-18
Payer: COMMERCIAL

## 2022-07-18 ENCOUNTER — OFFICE VISIT (OUTPATIENT)
Dept: ORTHOPEDICS | Facility: CLINIC | Age: 37
End: 2022-07-18
Payer: COMMERCIAL

## 2022-07-18 VITALS — BODY MASS INDEX: 25.3 KG/M2 | HEIGHT: 58 IN | WEIGHT: 120.5 LBS

## 2022-07-18 DIAGNOSIS — M25.512 LEFT SHOULDER PAIN, UNSPECIFIED CHRONICITY: ICD-10-CM

## 2022-07-18 DIAGNOSIS — G89.29 CHRONIC PAIN OF BOTH SHOULDERS: ICD-10-CM

## 2022-07-18 DIAGNOSIS — M79.642 BILATERAL HAND PAIN: ICD-10-CM

## 2022-07-18 DIAGNOSIS — M79.641 BILATERAL HAND PAIN: ICD-10-CM

## 2022-07-18 DIAGNOSIS — G56.23 CUBITAL TUNNEL SYNDROME, BILATERAL: ICD-10-CM

## 2022-07-18 DIAGNOSIS — R20.0 BILATERAL HAND NUMBNESS: ICD-10-CM

## 2022-07-18 DIAGNOSIS — M25.511 CHRONIC PAIN OF BOTH SHOULDERS: ICD-10-CM

## 2022-07-18 DIAGNOSIS — M54.12 CERVICAL RADICULOPATHY: ICD-10-CM

## 2022-07-18 DIAGNOSIS — G56.03 CARPAL TUNNEL SYNDROME, BILATERAL: Primary | ICD-10-CM

## 2022-07-18 DIAGNOSIS — M25.512 CHRONIC PAIN OF BOTH SHOULDERS: ICD-10-CM

## 2022-07-18 PROCEDURE — 73130 X-RAY EXAM OF HAND: CPT | Mod: TC,50

## 2022-07-18 PROCEDURE — 99999 PR PBB SHADOW E&M-EST. PATIENT-LVL IV: ICD-10-PCS | Mod: PBBFAC,,, | Performed by: ORTHOPAEDIC SURGERY

## 2022-07-18 PROCEDURE — 1160F PR REVIEW ALL MEDS BY PRESCRIBER/CLIN PHARMACIST DOCUMENTED: ICD-10-PCS | Mod: CPTII,S$GLB,, | Performed by: ORTHOPAEDIC SURGERY

## 2022-07-18 PROCEDURE — 3008F BODY MASS INDEX DOCD: CPT | Mod: CPTII,S$GLB,, | Performed by: ORTHOPAEDIC SURGERY

## 2022-07-18 PROCEDURE — 3008F PR BODY MASS INDEX (BMI) DOCUMENTED: ICD-10-PCS | Mod: CPTII,S$GLB,, | Performed by: ORTHOPAEDIC SURGERY

## 2022-07-18 PROCEDURE — 1160F RVW MEDS BY RX/DR IN RCRD: CPT | Mod: CPTII,S$GLB,, | Performed by: ORTHOPAEDIC SURGERY

## 2022-07-18 PROCEDURE — 99204 PR OFFICE/OUTPT VISIT, NEW, LEVL IV, 45-59 MIN: ICD-10-PCS | Mod: S$GLB,,, | Performed by: ORTHOPAEDIC SURGERY

## 2022-07-18 PROCEDURE — 99999 PR PBB SHADOW E&M-EST. PATIENT-LVL IV: CPT | Mod: PBBFAC,,, | Performed by: ORTHOPAEDIC SURGERY

## 2022-07-18 PROCEDURE — 99204 OFFICE O/P NEW MOD 45 MIN: CPT | Mod: S$GLB,,, | Performed by: ORTHOPAEDIC SURGERY

## 2022-07-18 PROCEDURE — 73130 XR HAND COMPLETE 3 VIEWS BILATERAL: ICD-10-PCS | Mod: 26,50,, | Performed by: RADIOLOGY

## 2022-07-18 PROCEDURE — 1159F PR MEDICATION LIST DOCUMENTED IN MEDICAL RECORD: ICD-10-PCS | Mod: CPTII,S$GLB,, | Performed by: ORTHOPAEDIC SURGERY

## 2022-07-18 PROCEDURE — 73130 X-RAY EXAM OF HAND: CPT | Mod: 26,50,, | Performed by: RADIOLOGY

## 2022-07-18 PROCEDURE — 1159F MED LIST DOCD IN RCRD: CPT | Mod: CPTII,S$GLB,, | Performed by: ORTHOPAEDIC SURGERY

## 2022-07-18 NOTE — TELEPHONE ENCOUNTER
Left voice message for patient to return call to schedule appointment from referral to Functional Restoration department.  Nella BARRERA [phone]

## 2022-07-19 ENCOUNTER — TELEPHONE (OUTPATIENT)
Dept: ADMINISTRATIVE | Facility: OTHER | Age: 37
End: 2022-07-19
Payer: COMMERCIAL

## 2022-07-19 NOTE — TELEPHONE ENCOUNTER
Left voice message for patient to return call to schedule appointment from referral to Functional Restoration department..  Nella BARRERA [phone]

## 2022-07-20 ENCOUNTER — TELEPHONE (OUTPATIENT)
Dept: NEUROLOGY | Facility: CLINIC | Age: 37
End: 2022-07-20
Payer: COMMERCIAL

## 2022-07-20 NOTE — TELEPHONE ENCOUNTER
Lvm requesting return call from patient in regards to scheduling her EMG Procedure.  I provided my return contact information.
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
## 2022-07-21 ENCOUNTER — PATIENT MESSAGE (OUTPATIENT)
Dept: ADMINISTRATIVE | Facility: OTHER | Age: 37
End: 2022-07-21
Payer: COMMERCIAL

## 2022-08-06 ENCOUNTER — PATIENT MESSAGE (OUTPATIENT)
Dept: ENDOCRINOLOGY | Facility: CLINIC | Age: 37
End: 2022-08-06
Payer: COMMERCIAL

## 2022-08-06 DIAGNOSIS — C73 THYROID CANCER: Primary | ICD-10-CM

## 2022-08-06 DIAGNOSIS — E89.0 POSTSURGICAL HYPOTHYROIDISM: ICD-10-CM

## 2022-08-09 ENCOUNTER — OFFICE VISIT (OUTPATIENT)
Dept: URGENT CARE | Facility: CLINIC | Age: 37
End: 2022-08-09
Payer: COMMERCIAL

## 2022-08-09 VITALS
SYSTOLIC BLOOD PRESSURE: 130 MMHG | RESPIRATION RATE: 18 BRPM | TEMPERATURE: 98 F | HEIGHT: 58 IN | OXYGEN SATURATION: 97 % | WEIGHT: 120 LBS | HEART RATE: 94 BPM | DIASTOLIC BLOOD PRESSURE: 91 MMHG | BODY MASS INDEX: 25.19 KG/M2

## 2022-08-09 DIAGNOSIS — J32.9 SINUSITIS, BACTERIAL: Primary | ICD-10-CM

## 2022-08-09 DIAGNOSIS — J02.9 SORE THROAT: ICD-10-CM

## 2022-08-09 DIAGNOSIS — B96.89 SINUSITIS, BACTERIAL: Primary | ICD-10-CM

## 2022-08-09 LAB
CTP QC/QA: YES
MOLECULAR STREP A: NEGATIVE

## 2022-08-09 PROCEDURE — 3080F DIAST BP >= 90 MM HG: CPT | Mod: CPTII,S$GLB,, | Performed by: PHYSICIAN ASSISTANT

## 2022-08-09 PROCEDURE — 3075F SYST BP GE 130 - 139MM HG: CPT | Mod: CPTII,S$GLB,, | Performed by: PHYSICIAN ASSISTANT

## 2022-08-09 PROCEDURE — 1160F RVW MEDS BY RX/DR IN RCRD: CPT | Mod: CPTII,S$GLB,, | Performed by: PHYSICIAN ASSISTANT

## 2022-08-09 PROCEDURE — 99213 OFFICE O/P EST LOW 20 MIN: CPT | Mod: S$GLB,,, | Performed by: PHYSICIAN ASSISTANT

## 2022-08-09 PROCEDURE — 3008F PR BODY MASS INDEX (BMI) DOCUMENTED: ICD-10-PCS | Mod: CPTII,S$GLB,, | Performed by: PHYSICIAN ASSISTANT

## 2022-08-09 PROCEDURE — 3008F BODY MASS INDEX DOCD: CPT | Mod: CPTII,S$GLB,, | Performed by: PHYSICIAN ASSISTANT

## 2022-08-09 PROCEDURE — 1159F MED LIST DOCD IN RCRD: CPT | Mod: CPTII,S$GLB,, | Performed by: PHYSICIAN ASSISTANT

## 2022-08-09 PROCEDURE — 87651 POCT STREP A MOLECULAR: ICD-10-PCS | Mod: QW,S$GLB,, | Performed by: PHYSICIAN ASSISTANT

## 2022-08-09 PROCEDURE — 3075F PR MOST RECENT SYSTOLIC BLOOD PRESS GE 130-139MM HG: ICD-10-PCS | Mod: CPTII,S$GLB,, | Performed by: PHYSICIAN ASSISTANT

## 2022-08-09 PROCEDURE — 1160F PR REVIEW ALL MEDS BY PRESCRIBER/CLIN PHARMACIST DOCUMENTED: ICD-10-PCS | Mod: CPTII,S$GLB,, | Performed by: PHYSICIAN ASSISTANT

## 2022-08-09 PROCEDURE — 99213 PR OFFICE/OUTPT VISIT, EST, LEVL III, 20-29 MIN: ICD-10-PCS | Mod: S$GLB,,, | Performed by: PHYSICIAN ASSISTANT

## 2022-08-09 PROCEDURE — 87651 STREP A DNA AMP PROBE: CPT | Mod: QW,S$GLB,, | Performed by: PHYSICIAN ASSISTANT

## 2022-08-09 PROCEDURE — 1159F PR MEDICATION LIST DOCUMENTED IN MEDICAL RECORD: ICD-10-PCS | Mod: CPTII,S$GLB,, | Performed by: PHYSICIAN ASSISTANT

## 2022-08-09 PROCEDURE — 3080F PR MOST RECENT DIASTOLIC BLOOD PRESSURE >= 90 MM HG: ICD-10-PCS | Mod: CPTII,S$GLB,, | Performed by: PHYSICIAN ASSISTANT

## 2022-08-09 RX ORDER — AMOXICILLIN AND CLAVULANATE POTASSIUM 875; 125 MG/1; MG/1
1 TABLET, FILM COATED ORAL 2 TIMES DAILY
Qty: 20 TABLET | Refills: 0 | Status: SHIPPED | OUTPATIENT
Start: 2022-08-09 | End: 2022-08-19

## 2022-08-09 NOTE — PATIENT INSTRUCTIONS
- Rest.  - Drink plenty of fluids.  - Take Tylenol and/or Ibuprofen as directed as needed for fever/pain.  Do not take more than the recommended dose.  - follow up with your PCP within the next 1-2 weeks as needed.   - Take over-the-counter claritin, zyrtec, allegra, or xyzal as directed.  You should NOT use a decongestant form (D) of this medication if you have a history of hypertension or heart disease.   - Use over the counter Flonase as directed for sinus congestion and postnasal drip.  - use nasal saline prior to Flonase.  - stop using Flonase if you developed nosebleeds.   - Use Ocean Spray Nasal Saline 1-3 puffs each nostril every 2-3 hours then blow out onto tissue. This is to irrigate the nasal passage way to clear the sinus openings. Use until sinus problem resolved.   - You must understand that you have received an Urgent Care treatment only and that you may be released before all of your medical problems are known or treated.   - You, the patient, will arrange for follow up care as instructed.   - If your condition worsens or fails to improve we recommend that you receive another evaluation at the ER immediately or contact your PCP to discuss your concerns.   - You can call [phone] or [phone] to help schedule an appointment with the appropriate provider.

## 2022-08-09 NOTE — PROGRESS NOTES
"Subjective:       Patient ID: Dina Hutton is a 37 y.o. female.    Vitals:  height is 4' 10" (1.473 m) and weight is 54.4 kg (120 lb). Her temperature is 98.3 °F (36.8 °C). Her blood pressure is 130/91 (abnormal) and her pulse is 94. Her respiration is 18 and oxygen saturation is 97%.     Chief Complaint: Sore Throat    Pt states that she is having sore throat that started approximately 7-8 days ago.  She has sinus congestion and pressure.  Pt states that she is having a cough as well but she had covid approximately 1 month ago . Pt is taking otc meds with no relief      Sore Throat   This is a new problem. The current episode started in the past 7 days. The problem has been unchanged. There has been no fever. The pain is at a severity of 2/10. Associated symptoms include congestion and coughing. Pertinent negatives include no ear pain, headaches, neck pain or shortness of breath. Treatments tried: otc meds  The treatment provided no relief.       Constitution: Positive for fatigue. Negative for chills and fever.   HENT: Positive for congestion, sinus pressure and sore throat. Negative for ear pain.    Neck: Negative for neck pain.   Cardiovascular: Negative for chest pain.   Respiratory: Positive for cough. Negative for shortness of breath.    Musculoskeletal: Negative for pain, joint pain and joint swelling.   Skin: Negative for rash.   Neurological: Negative for headaches, altered mental status and numbness.   Psychiatric/Behavioral: Negative for altered mental status.       Objective:      Physical Exam   Constitutional: She is oriented to person, place, and time. She appears well-developed. No distress.   HENT:   Head: Normocephalic and atraumatic.   Ears:   Right Ear: Hearing, tympanic membrane, external ear and ear canal normal.   Left Ear: Hearing, tympanic membrane, external ear and ear canal normal.   Nose: Mucosal edema and rhinorrhea present. Right sinus exhibits maxillary sinus tenderness. Right sinus " exhibits no frontal sinus tenderness. Left sinus exhibits maxillary sinus tenderness. Left sinus exhibits no frontal sinus tenderness.   Mouth/Throat: Uvula is midline and oropharynx is clear and moist.   Eyes: Conjunctivae are normal.   Cardiovascular: Normal rate, regular rhythm and normal heart sounds.   No murmur heard.Exam reveals no gallop and no friction rub.   Pulmonary/Chest: Effort normal and breath sounds normal. No respiratory distress. She has no wheezes.   Musculoskeletal: Normal range of motion.         General: No tenderness. Normal range of motion.   Neurological: She is alert and oriented to person, place, and time.   Skin: Skin is warm, dry and not diaphoretic.   Psychiatric: Her behavior is normal. Judgment and thought content normal.   Nursing note and vitals reviewed.        Results for orders placed or performed in visit on 08/09/22   POCT Strep A, Molecular   Result Value Ref Range    Molecular Strep A, POC Negative Negative     Acceptable Yes        Assessment:       1. Sinusitis, bacterial    2. Sore throat          Plan:         Sinusitis, bacterial  -     amoxicillin-clavulanate 875-125mg (AUGMENTIN) 875-125 mg per tablet; Take 1 tablet by mouth 2 (two) times daily. for 10 days  Dispense: 20 tablet; Refill: 0    Sore throat  -     POCT Strep A, Molecular                 Patient Instructions   - Rest.  - Drink plenty of fluids.  - Take Tylenol and/or Ibuprofen as directed as needed for fever/pain.  Do not take more than the recommended dose.  - follow up with your PCP within the next 1-2 weeks as needed.   - Take over-the-counter claritin, zyrtec, allegra, or xyzal as directed.  You should NOT use a decongestant form (D) of this medication if you have a history of hypertension or heart disease.   - Use over the counter Flonase as directed for sinus congestion and postnasal drip.  - use nasal saline prior to Flonase.  - stop using Flonase if you developed nosebleeds.   - Use  Ocean Spray Nasal Saline 1-3 puffs each nostril every 2-3 hours then blow out onto tissue. This is to irrigate the nasal passage way to clear the sinus openings. Use until sinus problem resolved.   - You must understand that you have received an Urgent Care treatment only and that you may be released before all of your medical problems are known or treated.   - You, the patient, will arrange for follow up care as instructed.   - If your condition worsens or fails to improve we recommend that you receive another evaluation at the ER immediately or contact your PCP to discuss your concerns.   - You can call [phone] or [phone] to help schedule an appointment with the appropriate provider.

## 2022-08-10 RX ORDER — LEVOTHYROXINE SODIUM 88 UG/1
88 CAPSULE ORAL DAILY
Qty: 90 CAPSULE | Refills: 3 | Status: SHIPPED | OUTPATIENT
Start: 2022-08-10 | End: 2022-12-05 | Stop reason: SDUPTHER

## 2022-08-31 ENCOUNTER — HOSPITAL ENCOUNTER (EMERGENCY)
Facility: HOSPITAL | Age: 37
Discharge: HOME | End: 2022-08-31
Attending: EMERGENCY MEDICINE
Payer: COMMERCIAL

## 2022-08-31 VITALS
WEIGHT: 120 LBS | HEIGHT: 58 IN | RESPIRATION RATE: 17 BRPM | TEMPERATURE: 98 F | BODY MASS INDEX: 25.19 KG/M2 | SYSTOLIC BLOOD PRESSURE: 131 MMHG | HEART RATE: 81 BPM | DIASTOLIC BLOOD PRESSURE: 63 MMHG | OXYGEN SATURATION: 99 %

## 2022-08-31 DIAGNOSIS — R11.2 NON-INTRACTABLE VOMITING WITH NAUSEA, UNSPECIFIED VOMITING TYPE: ICD-10-CM

## 2022-08-31 DIAGNOSIS — R20.2 PARESTHESIA OF BOTH HANDS: Primary | ICD-10-CM

## 2022-08-31 DIAGNOSIS — R51.9 OCCIPITAL HEADACHE: ICD-10-CM

## 2022-08-31 LAB
ALBUMIN SERPL BCP-MCNC: 4.2 G/DL (ref 3.5–5.2)
ALP SERPL-CCNC: 99 U/L (ref 55–135)
ALT SERPL W/O P-5'-P-CCNC: 49 U/L (ref 10–44)
ANION GAP SERPL CALC-SCNC: 9 MMOL/L (ref 8–16)
AST SERPL-CCNC: 28 U/L (ref 10–40)
B-HCG UR QL: NEGATIVE
BASOPHILS # BLD AUTO: 0.04 K/UL (ref 0–0.2)
BASOPHILS NFR BLD: 0.6 % (ref 0–1.9)
BILIRUB SERPL-MCNC: 0.5 MG/DL (ref 0.1–1)
BILIRUB UR QL STRIP: NEGATIVE
BUN SERPL-MCNC: 10 MG/DL (ref 6–20)
CALCIUM SERPL-MCNC: 9.9 MG/DL (ref 8.7–10.5)
CHLORIDE SERPL-SCNC: 104 MMOL/L (ref 95–110)
CLARITY UR: CLEAR
CO2 SERPL-SCNC: 26 MMOL/L (ref 23–29)
COLOR UR: COLORLESS
CREAT SERPL-MCNC: 0.7 MG/DL (ref 0.5–1.4)
CTP QC/QA: YES
DIFFERENTIAL METHOD: NORMAL
EOSINOPHIL # BLD AUTO: 0.4 K/UL (ref 0–0.5)
EOSINOPHIL NFR BLD: 5.9 % (ref 0–8)
ERYTHROCYTE [DISTWIDTH] IN BLOOD BY AUTOMATED COUNT: 12.5 % (ref 11.5–14.5)
EST. GFR  (NO RACE VARIABLE): >60 ML/MIN/1.73 M^2
GLUCOSE SERPL-MCNC: 90 MG/DL (ref 70–110)
GLUCOSE UR QL STRIP: NEGATIVE
HCT VFR BLD AUTO: 44.4 % (ref 37–48.5)
HGB BLD-MCNC: 14.8 G/DL (ref 12–16)
HGB UR QL STRIP: NEGATIVE
IMM GRANULOCYTES # BLD AUTO: 0.01 K/UL (ref 0–0.04)
IMM GRANULOCYTES NFR BLD AUTO: 0.1 % (ref 0–0.5)
KETONES UR QL STRIP: NEGATIVE
LEUKOCYTE ESTERASE UR QL STRIP: NEGATIVE
LIPASE SERPL-CCNC: 17 U/L (ref 4–60)
LYMPHOCYTES # BLD AUTO: 2.3 K/UL (ref 1–4.8)
LYMPHOCYTES NFR BLD: 31.6 % (ref 18–48)
MCH RBC QN AUTO: 28.6 PG (ref 27–31)
MCHC RBC AUTO-ENTMCNC: 33.3 G/DL (ref 32–36)
MCV RBC AUTO: 86 FL (ref 82–98)
MONOCYTES # BLD AUTO: 0.5 K/UL (ref 0.3–1)
MONOCYTES NFR BLD: 7 % (ref 4–15)
NEUTROPHILS # BLD AUTO: 3.9 K/UL (ref 1.8–7.7)
NEUTROPHILS NFR BLD: 54.8 % (ref 38–73)
NITRITE UR QL STRIP: NEGATIVE
NRBC BLD-RTO: 0 /100 WBC
PH UR STRIP: 6 [PH] (ref 5–8)
PLATELET # BLD AUTO: 218 K/UL (ref 150–450)
PMV BLD AUTO: 10.1 FL (ref 9.2–12.9)
POTASSIUM SERPL-SCNC: 4.2 MMOL/L (ref 3.5–5.1)
PROT SERPL-MCNC: 8.2 G/DL (ref 6–8.4)
PROT UR QL STRIP: NEGATIVE
RBC # BLD AUTO: 5.17 M/UL (ref 4–5.4)
SODIUM SERPL-SCNC: 139 MMOL/L (ref 136–145)
SP GR UR STRIP: 1.01 (ref 1–1.03)
URN SPEC COLLECT METH UR: ABNORMAL
UROBILINOGEN UR STRIP-ACNC: NEGATIVE EU/DL
WBC # BLD AUTO: 7.13 K/UL (ref 3.9–12.7)

## 2022-08-31 PROCEDURE — 80053 COMPREHEN METABOLIC PANEL: CPT | Performed by: EMERGENCY MEDICINE

## 2022-08-31 PROCEDURE — 63600175 PHARM REV CODE 636 W HCPCS: Performed by: EMERGENCY MEDICINE

## 2022-08-31 PROCEDURE — 81025 URINE PREGNANCY TEST: CPT | Performed by: EMERGENCY MEDICINE

## 2022-08-31 PROCEDURE — 85025 COMPLETE CBC W/AUTO DIFF WBC: CPT | Performed by: EMERGENCY MEDICINE

## 2022-08-31 PROCEDURE — 99285 EMERGENCY DEPT VISIT HI MDM: CPT | Mod: 25

## 2022-08-31 PROCEDURE — 83690 ASSAY OF LIPASE: CPT | Performed by: EMERGENCY MEDICINE

## 2022-08-31 PROCEDURE — 81003 URINALYSIS AUTO W/O SCOPE: CPT | Performed by: EMERGENCY MEDICINE

## 2022-08-31 PROCEDURE — 25000003 PHARM REV CODE 250: Performed by: EMERGENCY MEDICINE

## 2022-08-31 RX ORDER — DIPHENHYDRAMINE HYDROCHLORIDE 50 MG/ML
25 INJECTION INTRAMUSCULAR; INTRAVENOUS
Status: COMPLETED | OUTPATIENT
Start: 2022-08-31 | End: 2022-08-31

## 2022-08-31 RX ORDER — MECLIZINE HYDROCHLORIDE 25 MG/1
25 TABLET ORAL
Status: COMPLETED | OUTPATIENT
Start: 2022-08-31 | End: 2022-08-31

## 2022-08-31 RX ORDER — PROCHLORPERAZINE MALEATE 10 MG
10 TABLET ORAL EVERY 6 HOURS PRN
Qty: 15 TABLET | Refills: 0 | Status: SHIPPED | OUTPATIENT
Start: 2022-08-31 | End: 2022-11-13

## 2022-08-31 RX ORDER — DIPHENHYDRAMINE HCL 25 MG
50 CAPSULE ORAL EVERY 6 HOURS PRN
Qty: 20 CAPSULE | Refills: 0 | Status: SHIPPED | OUTPATIENT
Start: 2022-08-31 | End: 2023-09-16

## 2022-08-31 RX ORDER — BUTALBITAL, ACETAMINOPHEN AND CAFFEINE 50; 325; 40 MG/1; MG/1; MG/1
1 TABLET ORAL EVERY 4 HOURS PRN
Qty: 20 TABLET | Refills: 0 | Status: SHIPPED | OUTPATIENT
Start: 2022-08-31 | End: 2022-09-30

## 2022-08-31 RX ORDER — PROCHLORPERAZINE EDISYLATE 5 MG/ML
10 INJECTION INTRAMUSCULAR; INTRAVENOUS
Status: COMPLETED | OUTPATIENT
Start: 2022-08-31 | End: 2022-08-31

## 2022-08-31 RX ORDER — MECLIZINE HYDROCHLORIDE 25 MG/1
25 TABLET ORAL EVERY 6 HOURS PRN
Qty: 20 TABLET | Refills: 0 | Status: SHIPPED | OUTPATIENT
Start: 2022-08-31 | End: 2022-11-13

## 2022-08-31 RX ADMIN — PROCHLORPERAZINE EDISYLATE 10 MG: 5 INJECTION INTRAMUSCULAR; INTRAVENOUS at 12:08

## 2022-08-31 RX ADMIN — SODIUM CHLORIDE 2000 ML: 0.9 INJECTION, SOLUTION INTRAVENOUS at 12:08

## 2022-08-31 RX ADMIN — DIPHENHYDRAMINE HYDROCHLORIDE 25 MG: 50 INJECTION, SOLUTION INTRAMUSCULAR; INTRAVENOUS at 12:08

## 2022-08-31 RX ADMIN — MECLIZINE HYDROCHLORIDE 25 MG: 25 TABLET ORAL at 12:08

## 2022-08-31 NOTE — ED PROVIDER NOTES
Encounter Date: 8/31/2022    SCRIBE #1 NOTE: IThierno, am scribing for, and in the presence of,  Hermes Zarate MD.     History     Chief Complaint   Patient presents with    Headache     36 yo female to triage for HA and dizziness intermittently x 2 weeks, blurred vision that started today around 8 am, tingling to fingers and arms x 2-3 days, epigastric pain. Hx of GERDS, thyroid cancer. VSS, AAOx4     Dizziness    Abdominal Pain     Dina Hutton is a 37 y.o. female with a PMHx of GERD. anemia, hypoglycemia, depression, thyroid cancer who presents to the Emergency Department for evaluation of a 2 week history of acute constant occipital to frontal headache with associated generalized weakness, lightheadedness, nausea, vomiting, numbness to the fingers of the bilateral hands, and neck pain. Patient reports her symptoms have gradually worsened. She states she has never had a headache this severe in the past. Patient is also complaining of mid sternal chest pain when taking deep breaths and chronic generalized abdominal pain that has been studied by gastroenterology with no etiology. She states she is still able to ambulate, but feels unsteady. She denies any room spinning dizziness, cough, shortness of breath, fever, chills, diarrhea, dysuria, congestion, sore throat, eye pain, ear pain, rash, or any other associated symptoms.      The history is provided by the patient.     Review of patient's allergies indicates:   Allergen Reactions    Vancomycin analogues Hives    Vicodin [hydrocodone-acetaminophen] Nausea Only     Patient states she is fine with other pain medication     Past Medical History:   Diagnosis Date    Anemia     Breast abscess 4/25/2020    GERD (gastroesophageal reflux disease)     History of fourth degree perineal laceration 8/21/2019    Hypoglycemia     Hypoglycemia     Mastitis 4/20/2020    Mental disorder     depression    PONV (postoperative nausea and vomiting)     Thyroid cancer      Thyroid disease      Past Surgical History:   Procedure Laterality Date    BREAST CYST EXCISION      COLONOSCOPY      ESOPHAGOGASTRODUODENOSCOPY      ESOPHAGOGASTRODUODENOSCOPY N/A 6/14/2021    Procedure: EGD (ESOPHAGOGASTRODUODENOSCOPY);  Surgeon: Farooq Cullen MD;  Location: [facility] ENDO;  Service: Endoscopy;  Laterality: N/A;  ongoing epigastric pain, burning, nausea, vomiting  Lives on [facility] [place], [place], want first available but if there are openings on , would prefer that location  cOVID test on 6/11/21 at [facility]    INCISION AND DRAINAGE OF BREAST Left 4/23/2020    Procedure: INCISION AND DRAINAGE, BREAST;  Surgeon: Mason Rubalcava III, MD;  Location: [facility] OR;  Service: General;  Laterality: Left;    THYROIDECTOMY Bilateral 9/29/2020    Procedure: THYROIDECTOMY with central neck dissection;  Surgeon: Kayla Lovelace MD;  Location: [facility] OR;  Service: General;  Laterality: Bilateral;     Family History   Problem Relation Age of Onset    Hypertension Mother     Hyperlipidemia Mother     Cancer Neg Hx      Social History     Tobacco Use    Smoking status: Never    Smokeless tobacco: Never   Substance Use Topics    Alcohol use: Yes     Comment: occasional    Drug use: No     Review of Systems   Constitutional:  Negative for chills and fever.   HENT:  Negative for congestion, ear pain and sore throat.    Eyes:  Negative for pain.   Respiratory:  Negative for cough and shortness of breath.    Cardiovascular:  Positive for chest pain (with deep inhalation).   Gastrointestinal:  Positive for abdominal pain (chronic), nausea and vomiting. Negative for diarrhea.   Genitourinary:  Negative for dysuria.   Musculoskeletal:  Positive for neck pain.   Skin:  Negative for rash.   Neurological:  Positive for weakness, light-headedness, numbness and headaches.     Physical Exam     Initial Vitals [08/31/22 1136]   BP Pulse Resp Temp SpO2   103/68 78 17 98.4 °F (36.9 °C) 98 %      MAP       --         Physical Exam  The  patient was examined specifically for the following:   General:No significant distress, Good color, Warm and dry. Head and neck:Scalp atraumatic, Neck supple. Neurological:Appropriate conversation, Gross motor deficits. Eyes:Conjugate gaze, Clear corneas. ENT: No epistaxis. Cardiac: Regular rate and rhythm, Grossly normal heart tones. Pulmonary: Wheezing, Rales. Gastrointestinal: Abdominal tenderness, Abdominal distention. Musculoskeletal: Extremity deformity, Apparent pain with range of motion of the joints. Skin: Rash.   The findings on examination were normal except for the following:  The patient has no ataxia.  The neck is supple.  Mental status examination, cranial nerves, motor and sensory examination are normal.  Lungs are clear and free of wheezing rales rubs or rhonchi.  There is no clinical evidence of respiratory distress.  Heart tones are normal.  The patient has regular rate and rhythm.  The abdomen is mildly vaguely in diffusely tender.  There is no guarding rebound mass or distention.  Extremities are nontender.  There is no pain with range of motion any joints.  The patient has no rash.   ED Course   Procedures  Labs Reviewed   COMPREHENSIVE METABOLIC PANEL - Abnormal; Notable for the following components:       Result Value    ALT 49 (*)     All other components within normal limits   URINALYSIS, REFLEX TO URINE CULTURE - Abnormal; Notable for the following components:    Color, UA Colorless (*)     All other components within normal limits    Narrative:     Specimen Source->Urine   CBC W/ AUTO DIFFERENTIAL   LIPASE   POCT URINE PREGNANCY          Imaging Results              CT Head Without Contrast (Final result)  Result time 08/31/22 12:51:40      Final result by Jermaine Hilton MD (08/31/22 12:51:40)                   Impression:      1. No acute intracranial abnormalities.      Electronically signed by: Jermaine Hilton MD  Date:    08/31/2022  Time:    12:51               Narrative:     EXAMINATION:  CT HEAD WITHOUT CONTRAST    CLINICAL HISTORY:  Headache, new or worsening, neuro deficit (Age 19-49y); Headache, unspecified    TECHNIQUE:  Low dose axial images were obtained through the head.  Coronal and sagittal reformations were also performed. Contrast was not administered.    COMPARISON:  10/10/2021    FINDINGS:  There is no evidence of acute major vascular territory infarct, hemorrhage, or mass.  There is no hydrocephalus.  There are no abnormal extra-axial fluid collections.  The paranasal sinuses and mastoid air cells are clear, and there is no evidence of calvarial fracture.  The visualized soft tissues are unremarkable.                                       Medications   prochlorperazine injection Soln 10 mg (10 mg Intravenous Given 8/31/22 1249)   diphenhydrAMINE injection 25 mg (25 mg Intravenous Given 8/31/22 1249)   sodium chloride 0.9% bolus 2,000 mL (0 mLs Intravenous Stopped 8/31/22 1349)   meclizine tablet 25 mg (25 mg Oral Given 8/31/22 1248)     Medical Decision Making:   History:   Old Medical Records: I decided to obtain old medical records.  Clinical Tests:   Lab Tests: Reviewed and Ordered  Radiological Study: Ordered and Reviewed     Medical decision making: Given the above this patient presents to the emergency with a 2 week history of occipital head pain nausea vomiting neck pain some mild chest discomfort with deep breathing.  The patient has dizziness.  She reports some blurred vision.  Patient reports a 2 week history of chronic abdominal pain that is worked up before.  The neck is actually supple.  I doubt subarachnoid hemorrhage.  The headache is generalized.  The patient is not ataxic.  There is no evidence of ischemic stroke on CT.  Her abdominal complaints are chronic.  She was treated with IV Compazine in the emergency room.  She improved markedly.  All of her symptoms resolved at discharge.  Laboratory evaluation was not revealing.  The patient's hand tingling was  bilateral.  This is inconsistent with ischemic stroke.  I will refer her to outpatient evaluation and treatment with primary care.  Migraine headache is considered.  Subarachnoid hemorrhage and meningitis seem remote       Scribe Attestation:   Scribe #1: I performed the above scribed service and the documentation accurately describes the services I performed. I attest to the accuracy of the note.               Clinical Impression:   Final diagnoses:  [R51.9] Occipital headache  [R20.2] Paresthesia of both hands (Primary)  [R11.2] Non-intractable vomiting with nausea, unspecified vomiting type      ED Disposition Condition    Discharge Stable          ED Prescriptions       Medication Sig Dispense Start Date End Date Auth. Provider    prochlorperazine (COMPAZINE) 10 MG tablet Take 1 tablet (10 mg total) by mouth every 6 (six) hours as needed. 15 tablet 8/31/2022 -- Hermes Zarate MD    diphenhydrAMINE (BENADRYL) 25 mg capsule Take 2 capsules (50 mg total) by mouth every 6 (six) hours as needed (Every time you use Compazine.). 20 capsule 8/31/2022 -- Hermes Zarate MD    butalbital-acetaminophen-caffeine -40 mg (FIORICET, ESGIC) -40 mg per tablet Take 1 tablet by mouth every 4 (four) hours as needed for Pain. 20 tablet 8/31/2022 9/30/2022 Hermes Zarate MD    meclizine (ANTIVERT) 25 mg tablet Take 1 tablet (25 mg total) by mouth every 6 (six) hours as needed. 20 tablet 8/31/2022 -- Hermes Zarate MD          Follow-up Information       Follow up With Specialties Details Why Contact Info    Pradeep Kennedy MD Family Medicine In 3 days  [address] [facility] [address] [phone]            I personally performed the services described in this documentation.  All medical record  entries made by the scribe are at my direction and in my presence.  Signed, Dr. Elliot Zarate MD  08/31/22 [id number]

## 2022-08-31 NOTE — ED NOTES
Pt was given a urine cup, understands a sample is needed.. Will complete orthostatics once pt is more comfortable with sitting up/ standing

## 2022-08-31 NOTE — DISCHARGE INSTRUCTIONS
Meclizine for dizziness.  Fioricet for headache.  If Fioricet is not affective.  Please use Compazine, 10 mg by mouth every 6 hours as needed for headache or nausea.  Please take Benadryl, every time you take Compazine.  Return immediately if you get worse or if new problems develop.  Please follow-up with your primary care doctor this week.  Rest.  Lots of liquids.  Regular meals

## 2022-09-09 ENCOUNTER — OFFICE VISIT (OUTPATIENT)
Dept: OBSTETRICS AND GYNECOLOGY | Facility: CLINIC | Age: 37
End: 2022-09-09
Payer: COMMERCIAL

## 2022-09-09 ENCOUNTER — LAB VISIT (OUTPATIENT)
Dept: LAB | Facility: OTHER | Age: 37
End: 2022-09-09
Attending: OBSTETRICS & GYNECOLOGY
Payer: COMMERCIAL

## 2022-09-09 VITALS
DIASTOLIC BLOOD PRESSURE: 58 MMHG | SYSTOLIC BLOOD PRESSURE: 98 MMHG | WEIGHT: 124.25 LBS | BODY MASS INDEX: 26.08 KG/M2 | HEIGHT: 58 IN

## 2022-09-09 DIAGNOSIS — R31.9 HEMATURIA, UNSPECIFIED TYPE: ICD-10-CM

## 2022-09-09 DIAGNOSIS — N91.1 SECONDARY AMENORRHEA: Primary | ICD-10-CM

## 2022-09-09 DIAGNOSIS — R10.2 PELVIC PAIN: ICD-10-CM

## 2022-09-09 DIAGNOSIS — N91.1 SECONDARY AMENORRHEA: ICD-10-CM

## 2022-09-09 LAB
B-HCG UR QL: NEGATIVE
BILIRUB UR QL STRIP: NEGATIVE
CTP QC/QA: YES
DHEA-S SERPL-MCNC: 161.6 UG/DL (ref 74.8–410.2)
ESTRADIOL SERPL-MCNC: 107 PG/ML
GLUCOSE UR QL STRIP: NEGATIVE
KETONES UR QL STRIP: NEGATIVE
LEUKOCYTE ESTERASE UR QL STRIP: NEGATIVE
PH, POC UA: 6
POC BLOOD, URINE: NEGATIVE
POC NITRATES, URINE: NEGATIVE
PROLACTIN SERPL IA-MCNC: 9.4 NG/ML (ref 5.2–26.5)
PROT UR QL STRIP: NEGATIVE
SP GR UR STRIP: 1.01 (ref 1–1.03)
UROBILINOGEN UR STRIP-ACNC: 1 (ref 0.1–1.1)

## 2022-09-09 PROCEDURE — 87184 SC STD DISK METHOD PER PLATE: CPT | Performed by: OBSTETRICS & GYNECOLOGY

## 2022-09-09 PROCEDURE — 3078F DIAST BP <80 MM HG: CPT | Mod: CPTII,S$GLB,, | Performed by: OBSTETRICS & GYNECOLOGY

## 2022-09-09 PROCEDURE — 99999 PR PBB SHADOW E&M-EST. PATIENT-LVL III: CPT | Mod: PBBFAC,,, | Performed by: OBSTETRICS & GYNECOLOGY

## 2022-09-09 PROCEDURE — 87491 CHLMYD TRACH DNA AMP PROBE: CPT | Performed by: OBSTETRICS & GYNECOLOGY

## 2022-09-09 PROCEDURE — 82040 ASSAY OF SERUM ALBUMIN: CPT | Performed by: OBSTETRICS & GYNECOLOGY

## 2022-09-09 PROCEDURE — 87088 URINE BACTERIA CULTURE: CPT | Performed by: OBSTETRICS & GYNECOLOGY

## 2022-09-09 PROCEDURE — 99214 OFFICE O/P EST MOD 30 MIN: CPT | Mod: S$GLB,,, | Performed by: OBSTETRICS & GYNECOLOGY

## 2022-09-09 PROCEDURE — 87591 N.GONORRHOEAE DNA AMP PROB: CPT | Performed by: OBSTETRICS & GYNECOLOGY

## 2022-09-09 PROCEDURE — 81025 POCT URINE PREGNANCY: ICD-10-PCS | Mod: S$GLB,,, | Performed by: OBSTETRICS & GYNECOLOGY

## 2022-09-09 PROCEDURE — 3078F PR MOST RECENT DIASTOLIC BLOOD PRESSURE < 80 MM HG: ICD-10-PCS | Mod: CPTII,S$GLB,, | Performed by: OBSTETRICS & GYNECOLOGY

## 2022-09-09 PROCEDURE — 36415 COLL VENOUS BLD VENIPUNCTURE: CPT | Performed by: OBSTETRICS & GYNECOLOGY

## 2022-09-09 PROCEDURE — 1160F PR REVIEW ALL MEDS BY PRESCRIBER/CLIN PHARMACIST DOCUMENTED: ICD-10-PCS | Mod: CPTII,S$GLB,, | Performed by: OBSTETRICS & GYNECOLOGY

## 2022-09-09 PROCEDURE — 82670 ASSAY OF TOTAL ESTRADIOL: CPT | Performed by: OBSTETRICS & GYNECOLOGY

## 2022-09-09 PROCEDURE — 1160F RVW MEDS BY RX/DR IN RCRD: CPT | Mod: CPTII,S$GLB,, | Performed by: OBSTETRICS & GYNECOLOGY

## 2022-09-09 PROCEDURE — 3074F PR MOST RECENT SYSTOLIC BLOOD PRESSURE < 130 MM HG: ICD-10-PCS | Mod: CPTII,S$GLB,, | Performed by: OBSTETRICS & GYNECOLOGY

## 2022-09-09 PROCEDURE — 81025 URINE PREGNANCY TEST: CPT | Mod: S$GLB,,, | Performed by: OBSTETRICS & GYNECOLOGY

## 2022-09-09 PROCEDURE — 87186 SC STD MICRODIL/AGAR DIL: CPT | Performed by: OBSTETRICS & GYNECOLOGY

## 2022-09-09 PROCEDURE — 1159F MED LIST DOCD IN RCRD: CPT | Mod: CPTII,S$GLB,, | Performed by: OBSTETRICS & GYNECOLOGY

## 2022-09-09 PROCEDURE — 87481 CANDIDA DNA AMP PROBE: CPT | Mod: 59 | Performed by: OBSTETRICS & GYNECOLOGY

## 2022-09-09 PROCEDURE — 84146 ASSAY OF PROLACTIN: CPT | Performed by: OBSTETRICS & GYNECOLOGY

## 2022-09-09 PROCEDURE — 99999 PR PBB SHADOW E&M-EST. PATIENT-LVL III: ICD-10-PCS | Mod: PBBFAC,,, | Performed by: OBSTETRICS & GYNECOLOGY

## 2022-09-09 PROCEDURE — 87077 CULTURE AEROBIC IDENTIFY: CPT | Performed by: OBSTETRICS & GYNECOLOGY

## 2022-09-09 PROCEDURE — 3008F BODY MASS INDEX DOCD: CPT | Mod: CPTII,S$GLB,, | Performed by: OBSTETRICS & GYNECOLOGY

## 2022-09-09 PROCEDURE — 3074F SYST BP LT 130 MM HG: CPT | Mod: CPTII,S$GLB,, | Performed by: OBSTETRICS & GYNECOLOGY

## 2022-09-09 PROCEDURE — 82627 DEHYDROEPIANDROSTERONE: CPT | Performed by: OBSTETRICS & GYNECOLOGY

## 2022-09-09 PROCEDURE — 1159F PR MEDICATION LIST DOCUMENTED IN MEDICAL RECORD: ICD-10-PCS | Mod: CPTII,S$GLB,, | Performed by: OBSTETRICS & GYNECOLOGY

## 2022-09-09 PROCEDURE — 81003 URINALYSIS AUTO W/O SCOPE: CPT | Mod: QW,S$GLB,, | Performed by: OBSTETRICS & GYNECOLOGY

## 2022-09-09 PROCEDURE — 99214 PR OFFICE/OUTPT VISIT, EST, LEVL IV, 30-39 MIN: ICD-10-PCS | Mod: S$GLB,,, | Performed by: OBSTETRICS & GYNECOLOGY

## 2022-09-09 PROCEDURE — 81003 POCT URINALYSIS, DIPSTICK, AUTOMATED, W/O SCOPE: ICD-10-PCS | Mod: QW,S$GLB,, | Performed by: OBSTETRICS & GYNECOLOGY

## 2022-09-09 PROCEDURE — 87086 URINE CULTURE/COLONY COUNT: CPT | Performed by: OBSTETRICS & GYNECOLOGY

## 2022-09-09 PROCEDURE — 3008F PR BODY MASS INDEX (BMI) DOCUMENTED: ICD-10-PCS | Mod: CPTII,S$GLB,, | Performed by: OBSTETRICS & GYNECOLOGY

## 2022-09-09 NOTE — PROGRESS NOTES
"Chief Complaint: Secondary Amenorrhea, Abdominal and Pelvic Pain, Hematuria     HPI:      Dina Hutton is a 37 y.o.  who presents today for evaluation of multiple issues. Was using the contraceptive patch, but stopped about 3 months ago when her partner got a vasectomy. Since that time she has not had a menses. Has a h/o hypothyroidism, but Free T4 from May is WNL. She also reports that during this time she has had abdominal pain with reflux. Feels pain at the very top of her abdomen and then at the very bottom of the pelvis. Does not have pain with sex. Has not noticed any vaginitis symptoms. She has been having gross hematuria. Was seen by urology who did a cystoscopy and could not find a source for her bleeding.       is currently sexually active with a single male partner. She is currently using partner's vasectomy for contraception.     Physical Exam:      PHYSICAL EXAM:  BP (!) 98/58   Ht 4' 10" (1.473 m)   Wt 56.4 kg (124 lb 3.7 oz)   BMI 25.96 kg/m²   Body mass index is 25.96 kg/m².     APPEARANCE: Well nourished, well developed, in no acute distress.  PELVIC:  External genitalia and urethra within normal limits. Vagina without lesions, without discharge, without erythema, without ulcers.  Cervix without cervical motion tenderness, non-friable. Uterus: bulky, mobile, tender .  No adnexal masses or tenderness palpated.    Results:     22:  Free T4 0.71 - 1.51 ng/dL 1.10      TSH 0.400 - 4.000 uIU/mL <0.010 Low      Assessment/Plan:     Secondary amenorrhea  -     POCT urine pregnancy  -     Prolactin; Future; Expected date: 2022  -     TESTOSTERONE PANEL; Future; Expected date: 2022  -     ESTRADIOL; Future; Expected date: 2022  -     DHEA-SULFATE; Future; Expected date: 2022  -     US Pelvis Comp with Transvag NON-OB (xpd; Future; Expected date: 2022    Hematuria, unspecified type  -     POCT Urinalysis, Dipstick, Automated, W/O Scope  -     C. " trachomatis/N. gonorrhoeae by AMP DNA [facility]; Cervicovaginal  -     Vaginosis Screen by DNA Probe  -     Urine culture    Pelvic pain  -     C. trachomatis/N. gonorrhoeae by AMP DNA [facility]; Cervicovaginal  -     Vaginosis Screen by DNA Probe      Follow up within 4 weeks (around 10/7/2022) for /S and virtual visit to follow to discuss results.

## 2022-09-12 LAB
C TRACH DNA SPEC QL NAA+PROBE: NOT DETECTED
N GONORRHOEA DNA SPEC QL NAA+PROBE: NOT DETECTED

## 2022-09-13 ENCOUNTER — OFFICE VISIT (OUTPATIENT)
Dept: OPTOMETRY | Facility: CLINIC | Age: 37
End: 2022-09-13
Payer: COMMERCIAL

## 2022-09-13 ENCOUNTER — PATIENT MESSAGE (OUTPATIENT)
Dept: OBSTETRICS AND GYNECOLOGY | Facility: CLINIC | Age: 37
End: 2022-09-13
Payer: COMMERCIAL

## 2022-09-13 DIAGNOSIS — H52.13 MYOPIA OF BOTH EYES WITH REGULAR ASTIGMATISM: Primary | ICD-10-CM

## 2022-09-13 DIAGNOSIS — H52.223 MYOPIA OF BOTH EYES WITH REGULAR ASTIGMATISM: Primary | ICD-10-CM

## 2022-09-13 LAB
BACTERIAL VAGINOSIS DNA: NEGATIVE
CANDIDA GLABRATA DNA: NEGATIVE
CANDIDA KRUSEI DNA: NEGATIVE
CANDIDA RRNA VAG QL PROBE: NEGATIVE
T VAGINALIS RRNA GENITAL QL PROBE: NEGATIVE

## 2022-09-13 PROCEDURE — 92014 COMPRE OPH EXAM EST PT 1/>: CPT | Mod: S$GLB,,, | Performed by: OPTOMETRIST

## 2022-09-13 PROCEDURE — 99999 PR PBB SHADOW E&M-EST. PATIENT-LVL III: CPT | Mod: PBBFAC,,, | Performed by: OPTOMETRIST

## 2022-09-13 PROCEDURE — 92014 PR EYE EXAM, EST PATIENT,COMPREHESV: ICD-10-PCS | Mod: S$GLB,,, | Performed by: OPTOMETRIST

## 2022-09-13 PROCEDURE — 99999 PR PBB SHADOW E&M-EST. PATIENT-LVL III: ICD-10-PCS | Mod: PBBFAC,,, | Performed by: OPTOMETRIST

## 2022-09-13 RX ORDER — SULFAMETHOXAZOLE AND TRIMETHOPRIM 400; 80 MG/1; MG/1
1 TABLET ORAL 2 TIMES DAILY
Qty: 14 TABLET | Refills: 0 | Status: SHIPPED | OUTPATIENT
Start: 2022-09-13 | End: 2022-09-15

## 2022-09-13 NOTE — PROGRESS NOTES
HPI    Annual eye exam  DLS: 01/29/21    GTTS:    Patients here for annual eye exam  Pt states when she outside with bright lights she get light headed and   dizzy  Pt states she want a new rx for gl  (+) Tearing  (+) Flashes  (+) Floaters  (+) Blurry Vision  (+) Headaches   Last edited by Janet Mancini MA on 9/13/2022 12:31 PM.            Assessment /Plan     For exam results, see Encounter Report.    Myopia of both eyes with regular astigmatism  Eyeglass Final Rx       Eyeglass Final Rx         Sphere Cylinder Axis Dist VA    Right -0.50 +0.50 060 20/20    Left -0.50 +0.50 105 20/20      Type: SVL    Expiration Date: 9/13/2023    Comments: Blue Light Filter                   Blue light filter to reduce light sensitivity.  Not from an obvious ocular pathology     RTC 1 yr

## 2022-09-14 ENCOUNTER — HOSPITAL ENCOUNTER (OUTPATIENT)
Dept: RADIOLOGY | Facility: OTHER | Age: 37
Discharge: HOME | End: 2022-09-14
Attending: OBSTETRICS & GYNECOLOGY
Payer: COMMERCIAL

## 2022-09-14 DIAGNOSIS — N91.1 SECONDARY AMENORRHEA: ICD-10-CM

## 2022-09-14 LAB — BACTERIA UR CULT: ABNORMAL

## 2022-09-14 PROCEDURE — 76830 TRANSVAGINAL US NON-OB: CPT | Mod: TC

## 2022-09-14 PROCEDURE — 76856 US PELVIS COMP WITH TRANSVAG NON-OB (XPD): ICD-10-PCS | Mod: 26,,, | Performed by: INTERNAL MEDICINE

## 2022-09-14 PROCEDURE — 76856 US EXAM PELVIC COMPLETE: CPT | Mod: 26,,, | Performed by: INTERNAL MEDICINE

## 2022-09-14 PROCEDURE — 76830 TRANSVAGINAL US NON-OB: CPT | Mod: 26,,, | Performed by: INTERNAL MEDICINE

## 2022-09-14 PROCEDURE — 76830 US PELVIS COMP WITH TRANSVAG NON-OB (XPD): ICD-10-PCS | Mod: 26,,, | Performed by: INTERNAL MEDICINE

## 2022-09-15 ENCOUNTER — PATIENT MESSAGE (OUTPATIENT)
Dept: OBSTETRICS AND GYNECOLOGY | Facility: CLINIC | Age: 37
End: 2022-09-15
Payer: COMMERCIAL

## 2022-09-15 LAB
ALBUMIN SERPL-MCNC: 4.5 G/DL (ref 3.6–5.1)
SHBG SERPL-SCNC: 50 NMOL/L (ref 17–124)
TESTOST FREE SERPL-MCNC: 1.2 PG/ML (ref 0.2–5)
TESTOST SERPL-MCNC: 14 NG/DL (ref 2–45)
TESTOSTERONE.FREE+WB SERPL-MCNC: 2.4 NG/DL (ref 0.5–8.5)

## 2022-09-15 RX ORDER — CIPROFLOXACIN 500 MG/1
500 TABLET ORAL EVERY 12 HOURS
Qty: 14 TABLET | Refills: 0 | Status: SHIPPED | OUTPATIENT
Start: 2022-09-15 | End: 2022-09-22

## 2022-09-15 NOTE — TELEPHONE ENCOUNTER
May I ask if it is normal to have painful abdominal pain, vomiting, nausea, burning during urination and insomnia after takimg Bactrim Antibiotics? Thank you very much Dr. Post.     Dose was taken with food. Taking abx for UTI, should the abx be switched?

## 2022-09-15 NOTE — TELEPHONE ENCOUNTER
Cipro sent. Agree to switch antibiotic. Culture was not >100,000 colonies. If she wants to take a break for a day or two with no antibiotic and then start the cipro she can

## 2022-09-16 ENCOUNTER — PATIENT MESSAGE (OUTPATIENT)
Dept: OBSTETRICS AND GYNECOLOGY | Facility: CLINIC | Age: 37
End: 2022-09-16
Payer: COMMERCIAL

## 2022-09-16 DIAGNOSIS — Z30.09 ENCOUNTER FOR OTHER GENERAL COUNSELING AND ADVICE ON CONTRACEPTION: Primary | ICD-10-CM

## 2022-09-16 RX ORDER — NORELGESTROMIN AND ETHINYL ESTRADIOL 35; 150 UG/MG; UG/MG
1 PATCH TRANSDERMAL
Qty: 4 PATCH | Refills: 11 | Status: SHIPPED | OUTPATIENT
Start: 2022-09-16 | End: 2023-01-10

## 2022-09-21 ENCOUNTER — PATIENT MESSAGE (OUTPATIENT)
Dept: ENDOCRINOLOGY | Facility: CLINIC | Age: 37
End: 2022-09-21
Payer: COMMERCIAL

## 2022-09-21 ENCOUNTER — PATIENT MESSAGE (OUTPATIENT)
Dept: OBSTETRICS AND GYNECOLOGY | Facility: CLINIC | Age: 37
End: 2022-09-21
Payer: COMMERCIAL

## 2022-09-21 DIAGNOSIS — E89.0 POSTSURGICAL HYPOTHYROIDISM: Primary | Chronic | ICD-10-CM

## 2022-10-06 ENCOUNTER — LAB VISIT (OUTPATIENT)
Dept: LAB | Facility: HOSPITAL | Age: 37
End: 2022-10-06
Attending: INTERNAL MEDICINE
Payer: COMMERCIAL

## 2022-10-06 DIAGNOSIS — E89.0 POSTSURGICAL HYPOTHYROIDISM: Chronic | ICD-10-CM

## 2022-10-06 LAB
T4 FREE SERPL-MCNC: 1.1 NG/DL (ref 0.71–1.51)
TSH SERPL DL<=0.005 MIU/L-ACNC: 0.13 UIU/ML (ref 0.4–4)

## 2022-10-06 PROCEDURE — 84443 ASSAY THYROID STIM HORMONE: CPT | Performed by: INTERNAL MEDICINE

## 2022-10-06 PROCEDURE — 84439 ASSAY OF FREE THYROXINE: CPT | Performed by: INTERNAL MEDICINE

## 2022-10-06 PROCEDURE — 36415 COLL VENOUS BLD VENIPUNCTURE: CPT | Performed by: INTERNAL MEDICINE

## 2022-10-07 ENCOUNTER — PATIENT MESSAGE (OUTPATIENT)
Dept: ENDOCRINOLOGY | Facility: CLINIC | Age: 37
End: 2022-10-07
Payer: COMMERCIAL

## 2022-10-07 DIAGNOSIS — E89.0 POSTSURGICAL HYPOTHYROIDISM: Primary | Chronic | ICD-10-CM

## 2022-10-18 ENCOUNTER — PROCEDURE VISIT (OUTPATIENT)
Dept: NEUROLOGY | Facility: CLINIC | Age: 37
End: 2022-10-18
Payer: COMMERCIAL

## 2022-10-18 DIAGNOSIS — G56.23 CUBITAL TUNNEL SYNDROME, BILATERAL: ICD-10-CM

## 2022-10-18 DIAGNOSIS — G56.03 CARPAL TUNNEL SYNDROME, BILATERAL: ICD-10-CM

## 2022-10-18 PROCEDURE — 95913 PR NERVE CONDUCTION STUDY; 13 OR MORE STUDIES: ICD-10-PCS | Mod: S$GLB,,, | Performed by: PSYCHIATRY & NEUROLOGY

## 2022-10-18 PROCEDURE — 95913 NRV CNDJ TEST 13/> STUDIES: CPT | Mod: S$GLB,,, | Performed by: PSYCHIATRY & NEUROLOGY

## 2022-10-18 NOTE — PROCEDURES
Procedures      Please see NCS/EMG procedure report    Clarke Casanova MD  [facility]

## 2022-10-25 ENCOUNTER — PATIENT MESSAGE (OUTPATIENT)
Dept: ENDOCRINOLOGY | Facility: CLINIC | Age: 37
End: 2022-10-25
Payer: COMMERCIAL

## 2022-11-01 ENCOUNTER — OFFICE VISIT (OUTPATIENT)
Dept: INTERNAL MEDICINE | Facility: CLINIC | Age: 37
End: 2022-11-01
Payer: COMMERCIAL

## 2022-11-01 VITALS
HEART RATE: 94 BPM | BODY MASS INDEX: 26.82 KG/M2 | OXYGEN SATURATION: 98 % | WEIGHT: 124.31 LBS | DIASTOLIC BLOOD PRESSURE: 80 MMHG | SYSTOLIC BLOOD PRESSURE: 112 MMHG | HEIGHT: 57 IN

## 2022-11-01 DIAGNOSIS — C73 THYROID CANCER: Chronic | ICD-10-CM

## 2022-11-01 DIAGNOSIS — E89.0 POSTSURGICAL HYPOTHYROIDISM: Primary | Chronic | ICD-10-CM

## 2022-11-01 DIAGNOSIS — M25.50 MULTIPLE JOINT PAIN: ICD-10-CM

## 2022-11-01 DIAGNOSIS — F32.9 MAJOR DEPRESSIVE DISORDER WITH CURRENT ACTIVE EPISODE, UNSPECIFIED DEPRESSION EPISODE SEVERITY, UNSPECIFIED WHETHER RECURRENT: ICD-10-CM

## 2022-11-01 DIAGNOSIS — R53.83 FATIGUE, UNSPECIFIED TYPE: ICD-10-CM

## 2022-11-01 PROCEDURE — 1159F PR MEDICATION LIST DOCUMENTED IN MEDICAL RECORD: ICD-10-PCS | Mod: CPTII,S$GLB,, | Performed by: STUDENT IN AN ORGANIZED HEALTH CARE EDUCATION/TRAINING PROGRAM

## 2022-11-01 PROCEDURE — 3008F BODY MASS INDEX DOCD: CPT | Mod: CPTII,S$GLB,, | Performed by: STUDENT IN AN ORGANIZED HEALTH CARE EDUCATION/TRAINING PROGRAM

## 2022-11-01 PROCEDURE — 3008F PR BODY MASS INDEX (BMI) DOCUMENTED: ICD-10-PCS | Mod: CPTII,S$GLB,, | Performed by: STUDENT IN AN ORGANIZED HEALTH CARE EDUCATION/TRAINING PROGRAM

## 2022-11-01 PROCEDURE — 99999 PR PBB SHADOW E&M-EST. PATIENT-LVL IV: CPT | Mod: PBBFAC,,, | Performed by: STUDENT IN AN ORGANIZED HEALTH CARE EDUCATION/TRAINING PROGRAM

## 2022-11-01 PROCEDURE — 3079F PR MOST RECENT DIASTOLIC BLOOD PRESSURE 80-89 MM HG: ICD-10-PCS | Mod: CPTII,S$GLB,, | Performed by: STUDENT IN AN ORGANIZED HEALTH CARE EDUCATION/TRAINING PROGRAM

## 2022-11-01 PROCEDURE — 99214 PR OFFICE/OUTPT VISIT, EST, LEVL IV, 30-39 MIN: ICD-10-PCS | Mod: S$GLB,,, | Performed by: STUDENT IN AN ORGANIZED HEALTH CARE EDUCATION/TRAINING PROGRAM

## 2022-11-01 PROCEDURE — 99999 PR PBB SHADOW E&M-EST. PATIENT-LVL IV: ICD-10-PCS | Mod: PBBFAC,,, | Performed by: STUDENT IN AN ORGANIZED HEALTH CARE EDUCATION/TRAINING PROGRAM

## 2022-11-01 PROCEDURE — 1159F MED LIST DOCD IN RCRD: CPT | Mod: CPTII,S$GLB,, | Performed by: STUDENT IN AN ORGANIZED HEALTH CARE EDUCATION/TRAINING PROGRAM

## 2022-11-01 PROCEDURE — 1160F PR REVIEW ALL MEDS BY PRESCRIBER/CLIN PHARMACIST DOCUMENTED: ICD-10-PCS | Mod: CPTII,S$GLB,, | Performed by: STUDENT IN AN ORGANIZED HEALTH CARE EDUCATION/TRAINING PROGRAM

## 2022-11-01 PROCEDURE — 3074F PR MOST RECENT SYSTOLIC BLOOD PRESSURE < 130 MM HG: ICD-10-PCS | Mod: CPTII,S$GLB,, | Performed by: STUDENT IN AN ORGANIZED HEALTH CARE EDUCATION/TRAINING PROGRAM

## 2022-11-01 PROCEDURE — 3074F SYST BP LT 130 MM HG: CPT | Mod: CPTII,S$GLB,, | Performed by: STUDENT IN AN ORGANIZED HEALTH CARE EDUCATION/TRAINING PROGRAM

## 2022-11-01 PROCEDURE — 99214 OFFICE O/P EST MOD 30 MIN: CPT | Mod: S$GLB,,, | Performed by: STUDENT IN AN ORGANIZED HEALTH CARE EDUCATION/TRAINING PROGRAM

## 2022-11-01 PROCEDURE — 3079F DIAST BP 80-89 MM HG: CPT | Mod: CPTII,S$GLB,, | Performed by: STUDENT IN AN ORGANIZED HEALTH CARE EDUCATION/TRAINING PROGRAM

## 2022-11-01 PROCEDURE — 1160F RVW MEDS BY RX/DR IN RCRD: CPT | Mod: CPTII,S$GLB,, | Performed by: STUDENT IN AN ORGANIZED HEALTH CARE EDUCATION/TRAINING PROGRAM

## 2022-11-01 NOTE — PROGRESS NOTES
SUBJECTIVE     Chief Complaint   Patient presents with    Establish Care    Abdominal Pain    Headache    Muscle Pain    Fatigue    Dizziness    Nausea    Insomnia       HPI  Dina Hutton is a 37 y.o. female with medical diagnoses as listed in the medical history and problem list that presents for Joint pain, depression, faigue. Pt is establishing care with me today.    Hx Thyroid cancer/Post surgical hypothyroidism:  Stage I at diagnosis in 2020  -Hx of Thyroid cancer with removal.  -In remission  -On levothyroxine 88mcg daily  Tolerating medication well.  Denies symptoms of Fatigue, Cold intolerance, Weight gain, Constipation, Dry skin, Myalgia  Due for TSH recheck.      Multiple joint pain:  Joint pain in bilateral Fingers, knees, wrists x several months  Started having them in 2020 but has rcently become more consistent.  -Pain is worse in the morning, improves with movement.   Does not notice anything that makes pain worse.  No rashes, unintentional weight loss, fevers    Depression:   On Lexapro 10. Taking intermittently (a few days a week when she notes feelings of depression)  Has not noticed improvement in sxs  Denies HI/SI/AVH  Patient endorses occasional feeling hopeless, decreased pleasure in doing things, difficulty with sleep, decreased energy, eating changes, and difficulty concentrating.        PAST MEDICAL HISTORY:  Past Medical History:   Diagnosis Date    Anemia     Breast abscess 4/25/2020    GERD (gastroesophageal reflux disease)     History of fourth degree perineal laceration 8/21/2019    Hypoglycemia     Hypoglycemia     Mastitis 4/20/2020    Mental disorder     depression    PONV (postoperative nausea and vomiting)     Thyroid cancer     Thyroid disease        PAST SURGICAL HISTORY:  Past Surgical History:   Procedure Laterality Date    BREAST CYST EXCISION      COLONOSCOPY      ESOPHAGOGASTRODUODENOSCOPY      ESOPHAGOGASTRODUODENOSCOPY N/A 6/14/2021    Procedure: EGD  (ESOPHAGOGASTRODUODENOSCOPY);  Surgeon: Farooq Cullen MD;  Location: [facility] ENDO;  Service: Endoscopy;  Laterality: N/A;  ongoing epigastric pain, burning, nausea, vomiting  Lives on [facility], want first available but if there are openings on , would prefer that location  cOVID test on 6/11/21 at [facility]    INCISION AND DRAINAGE OF BREAST Left 4/23/2020    Procedure: INCISION AND DRAINAGE, BREAST;  Surgeon: Mason Rubalcava III, MD;  Location: [facility] OR;  Service: General;  Laterality: Left;    THYROIDECTOMY Bilateral 9/29/2020    Procedure: THYROIDECTOMY with central neck dissection;  Surgeon: Kayla Lovelace MD;  Location: [facility] OR;  Service: General;  Laterality: Bilateral;       SOCIAL HISTORY:  Social History     Socioeconomic History    Marital status:    Tobacco Use    Smoking status: Never    Smokeless tobacco: Never   Substance and Sexual Activity    Alcohol use: Yes     Comment: occasional    Drug use: No    Sexual activity: Yes     Partners: Male       FAMILY HISTORY:  Family History   Problem Relation Age of Onset    Hypertension Mother     Hyperlipidemia Mother     Cancer Neg Hx        ALLERGIES AND MEDICATIONS: updated and reviewed.  Review of patient's allergies indicates:   Allergen Reactions    Vancomycin analogues Hives    Bactrim [sulfamethoxazole-trimethoprim] Nausea And Vomiting    Vicodin [hydrocodone-acetaminophen] Nausea Only     Patient states she is fine with other pain medication     Current Outpatient Medications   Medication Sig Dispense Refill    diphenhydrAMINE (BENADRYL) 25 mg capsule Take 2 capsules (50 mg total) by mouth every 6 (six) hours as needed (Every time you use Compazine.). 20 capsule 0    EScitalopram oxalate (LEXAPRO) 10 MG tablet Take 1 tablet (10 mg total) by mouth once daily. 90 tablet 3    omeprazole (PRILOSEC) 40 MG capsule Take 1 capsule (40 mg total) by mouth once daily. 90 capsule 1    TIROSINT 88 mcg Cap Take 1 capsule (88 mcg) by mouth once  "daily at 6am. Brand name Tirosint medically necessary 90 capsule 3    norelgestromin-ethinyl estradiol 150-35 mcg/24 hr Place 1 patch onto the skin every 7 days. (Patient not taking: Reported on 11/1/2022) 4 patch 11     No current facility-administered medications for this visit.       ROS  Review of Systems   Constitutional:  Negative for fever and weight loss.   Respiratory:  Negative for cough and shortness of breath.    Cardiovascular:  Negative for chest pain and palpitations.   Gastrointestinal:  Negative for abdominal pain, constipation, diarrhea, nausea and vomiting.   Genitourinary:  Negative for dysuria.   Musculoskeletal:  Positive for joint pain. Negative for back pain.   Skin:  Negative for rash.   Neurological:  Negative for dizziness, weakness and headaches.   Psychiatric/Behavioral:  Positive for depression. Negative for hallucinations and suicidal ideas. The patient is not nervous/anxious.        OBJECTIVE     Physical Exam  Vitals:    11/01/22 1605   BP: 112/80   Pulse: 94    Body mass index is 26.91 kg/m².  Weight: 56.4 kg (124 lb 5.4 oz)   Height: 4' 9" (144.8 cm)     Physical Exam  HENT:      Head: Normocephalic and atraumatic.      Nose: Nose normal.      Mouth/Throat:      Mouth: Mucous membranes are moist.      Pharynx: Oropharynx is clear.   Eyes:      Extraocular Movements: Extraocular movements intact.      Conjunctiva/sclera: Conjunctivae normal.      Pupils: Pupils are equal, round, and reactive to light.   Cardiovascular:      Rate and Rhythm: Normal rate and regular rhythm.   Pulmonary:      Effort: Pulmonary effort is normal.      Breath sounds: Normal breath sounds.   Abdominal:      General: There is no distension.      Palpations: Abdomen is soft.      Tenderness: There is no abdominal tenderness.   Musculoskeletal:         General: No swelling. Normal range of motion.      Cervical back: Normal range of motion.      Right lower leg: No edema.      Left lower leg: No edema. "   Skin:     General: Skin is warm.      Findings: No lesion or rash.   Neurological:      General: No focal deficit present.      Mental Status: She is alert and oriented to person, place, and time.      Motor: No weakness.   Psychiatric:         Mood and Affect: Mood normal.         Thought Content: Thought content normal.             ASSESSMENT     37 y.o. female with     1. Postsurgical hypothyroidism    2. Thyroid cancer    3. Multiple joint pain    4. Fatigue, unspecified type    5. Major depressive disorder with current active episode, unspecified depression episode severity, unspecified whether recurrent        PLAN:     1. Postsurgical hypothyroidism  Overview:  Levothyroxine 88mcg  Stable on medications, continue regimen  Follow up labs and make adjustments as needed        2. Thyroid cancer  In remission, continue routine endocrine follow up and levothyroxine medication daily    3. Multiple joint pain  -     MARIE by IFA, w/Rflx; Future; Expected date: 11/01/2022  -     CBC auto differential; Future; Expected date: 11/01/2022  -     Comprehensive metabolic panel; Future; Expected date: 11/01/2022  -     Sedimentation rate; Future; Expected date: 11/01/2022  -     C-reactive protein; Future; Expected date: 11/01/2022  -     Rheumatoid factor; Future; Expected date: 11/01/2022  -     Cyclic citrul peptide antibody, IgG; Future; Expected date: 11/01/2022  -     Sjogrens syndrome-A extractable nuclear antibody; Future; Expected date: 11/01/2022  Follow up labwork.    4. Fatigue, unspecified type  -     MARIE by IFA, w/Rflx; Future; Expected date: 11/01/2022  -     CBC auto differential; Future; Expected date: 11/01/2022  -     Comprehensive metabolic panel; Future; Expected date: 11/01/2022  -     Sedimentation rate; Future; Expected date: 11/01/2022  -     C-reactive protein; Future; Expected date: 11/01/2022  -     Rheumatoid factor; Future; Expected date: 11/01/2022  -     Cyclic citrul peptide antibody, IgG;  Future; Expected date: 11/01/2022  -     Sjogrens syndrome-A extractable nuclear antibody; Future; Expected date: 11/01/2022    5. Major depressive disorder with current active episode, unspecified depression episode severity, unspecified whether recurrent  Overview:  Sxs not currently stable  Counseled on starting lexopro daily without missing doses. Follow up in 2 weeks for dose titration  Explained risks of this class of medication including potential for SI/HI/AVH. Educated to stop medication and proceed to ED if pt experiences SI/HI/AVH. Patient verbalizes understanding of the plan.          Discussed age and gender appropriate screenings at this visit and encouraged a healthy diet low in simple carbohydrates, and increased physical activity.  Counseled on medically appropriate vaccines based on age and current health condition.  Screening test reviewed and discussed with patient.      RTC in 2 weeks.    Lona Pham MD

## 2022-11-02 ENCOUNTER — LAB VISIT (OUTPATIENT)
Dept: LAB | Facility: HOSPITAL | Age: 37
End: 2022-11-02
Attending: STUDENT IN AN ORGANIZED HEALTH CARE EDUCATION/TRAINING PROGRAM
Payer: COMMERCIAL

## 2022-11-02 DIAGNOSIS — M25.50 MULTIPLE JOINT PAIN: ICD-10-CM

## 2022-11-02 DIAGNOSIS — R53.83 FATIGUE, UNSPECIFIED TYPE: ICD-10-CM

## 2022-11-02 LAB
ALBUMIN SERPL BCP-MCNC: 4.1 G/DL (ref 3.5–5.2)
ALP SERPL-CCNC: 91 U/L (ref 55–135)
ALT SERPL W/O P-5'-P-CCNC: 33 U/L (ref 10–44)
ANION GAP SERPL CALC-SCNC: 10 MMOL/L (ref 8–16)
AST SERPL-CCNC: 21 U/L (ref 10–40)
BASOPHILS # BLD AUTO: 0.04 K/UL (ref 0–0.2)
BASOPHILS NFR BLD: 0.4 % (ref 0–1.9)
BILIRUB SERPL-MCNC: 0.5 MG/DL (ref 0.1–1)
BUN SERPL-MCNC: 11 MG/DL (ref 6–20)
CALCIUM SERPL-MCNC: 9.3 MG/DL (ref 8.7–10.5)
CCP AB SER IA-ACNC: 9.4 U/ML
CHLORIDE SERPL-SCNC: 105 MMOL/L (ref 95–110)
CO2 SERPL-SCNC: 25 MMOL/L (ref 23–29)
CREAT SERPL-MCNC: 0.6 MG/DL (ref 0.5–1.4)
CRP SERPL-MCNC: 4.7 MG/L (ref 0–8.2)
DIFFERENTIAL METHOD: NORMAL
EOSINOPHIL # BLD AUTO: 0.2 K/UL (ref 0–0.5)
EOSINOPHIL NFR BLD: 2.4 % (ref 0–8)
ERYTHROCYTE [DISTWIDTH] IN BLOOD BY AUTOMATED COUNT: 12.6 % (ref 11.5–14.5)
ERYTHROCYTE [SEDIMENTATION RATE] IN BLOOD BY PHOTOMETRIC METHOD: 42 MM/HR (ref 0–36)
EST. GFR  (NO RACE VARIABLE): >60 ML/MIN/1.73 M^2
GLUCOSE SERPL-MCNC: 88 MG/DL (ref 70–110)
HCT VFR BLD AUTO: 44 % (ref 37–48.5)
HGB BLD-MCNC: 14.1 G/DL (ref 12–16)
IMM GRANULOCYTES # BLD AUTO: 0.01 K/UL (ref 0–0.04)
IMM GRANULOCYTES NFR BLD AUTO: 0.1 % (ref 0–0.5)
LYMPHOCYTES # BLD AUTO: 1.6 K/UL (ref 1–4.8)
LYMPHOCYTES NFR BLD: 18.1 % (ref 18–48)
MCH RBC QN AUTO: 28.3 PG (ref 27–31)
MCHC RBC AUTO-ENTMCNC: 32 G/DL (ref 32–36)
MCV RBC AUTO: 88 FL (ref 82–98)
MONOCYTES # BLD AUTO: 0.5 K/UL (ref 0.3–1)
MONOCYTES NFR BLD: 6.1 % (ref 4–15)
NEUTROPHILS # BLD AUTO: 6.5 K/UL (ref 1.8–7.7)
NEUTROPHILS NFR BLD: 72.9 % (ref 38–73)
NRBC BLD-RTO: 0 /100 WBC
PLATELET # BLD AUTO: 268 K/UL (ref 150–450)
PMV BLD AUTO: 11.2 FL (ref 9.2–12.9)
POTASSIUM SERPL-SCNC: 3.8 MMOL/L (ref 3.5–5.1)
PROT SERPL-MCNC: 7.6 G/DL (ref 6–8.4)
RBC # BLD AUTO: 4.98 M/UL (ref 4–5.4)
RHEUMATOID FACT SERPL-ACNC: <13 IU/ML (ref 0–15)
SODIUM SERPL-SCNC: 140 MMOL/L (ref 136–145)
WBC # BLD AUTO: 8.89 K/UL (ref 3.9–12.7)

## 2022-11-02 PROCEDURE — 86431 RHEUMATOID FACTOR QUANT: CPT | Performed by: STUDENT IN AN ORGANIZED HEALTH CARE EDUCATION/TRAINING PROGRAM

## 2022-11-02 PROCEDURE — 80053 COMPREHEN METABOLIC PANEL: CPT | Performed by: STUDENT IN AN ORGANIZED HEALTH CARE EDUCATION/TRAINING PROGRAM

## 2022-11-02 PROCEDURE — 86200 CCP ANTIBODY: CPT | Performed by: STUDENT IN AN ORGANIZED HEALTH CARE EDUCATION/TRAINING PROGRAM

## 2022-11-02 PROCEDURE — 86038 ANTINUCLEAR ANTIBODIES: CPT | Performed by: STUDENT IN AN ORGANIZED HEALTH CARE EDUCATION/TRAINING PROGRAM

## 2022-11-02 PROCEDURE — 86235 NUCLEAR ANTIGEN ANTIBODY: CPT | Performed by: STUDENT IN AN ORGANIZED HEALTH CARE EDUCATION/TRAINING PROGRAM

## 2022-11-02 PROCEDURE — 86039 ANTINUCLEAR ANTIBODIES (ANA): CPT | Performed by: STUDENT IN AN ORGANIZED HEALTH CARE EDUCATION/TRAINING PROGRAM

## 2022-11-02 PROCEDURE — 85652 RBC SED RATE AUTOMATED: CPT | Performed by: STUDENT IN AN ORGANIZED HEALTH CARE EDUCATION/TRAINING PROGRAM

## 2022-11-02 PROCEDURE — 36415 COLL VENOUS BLD VENIPUNCTURE: CPT | Mod: PO | Performed by: STUDENT IN AN ORGANIZED HEALTH CARE EDUCATION/TRAINING PROGRAM

## 2022-11-02 PROCEDURE — 86140 C-REACTIVE PROTEIN: CPT | Performed by: STUDENT IN AN ORGANIZED HEALTH CARE EDUCATION/TRAINING PROGRAM

## 2022-11-02 PROCEDURE — 85025 COMPLETE CBC W/AUTO DIFF WBC: CPT | Performed by: STUDENT IN AN ORGANIZED HEALTH CARE EDUCATION/TRAINING PROGRAM

## 2022-11-03 LAB
ANA PATTERN 1: NORMAL
ANA SER-ACNC: POSITIVE
ANA TITR SER IF: NORMAL {TITER}

## 2022-11-07 ENCOUNTER — TELEPHONE (OUTPATIENT)
Dept: INTERNAL MEDICINE | Facility: CLINIC | Age: 37
End: 2022-11-07
Payer: COMMERCIAL

## 2022-11-07 DIAGNOSIS — R76.8 POSITIVE ANTI-CCP TEST: Primary | ICD-10-CM

## 2022-11-07 DIAGNOSIS — R76.8 ELEVATED ANTINUCLEAR ANTIBODY (ANA) LEVEL: ICD-10-CM

## 2022-11-07 DIAGNOSIS — M25.50 ARTHRALGIA, UNSPECIFIED JOINT: ICD-10-CM

## 2022-11-07 LAB
ANTI-SSA ANTIBODY: 0.05 RATIO (ref 0–0.99)
ANTI-SSA INTERPRETATION: NEGATIVE

## 2022-11-07 NOTE — TELEPHONE ENCOUNTER
----- Message from Sravani Ro sent at 11/5/2022  9:38 AM CDT -----  Contact: [phone]  Pt states she had a call on Friday for her test results please give return call on Monday

## 2022-11-07 NOTE — TELEPHONE ENCOUNTER
Discussed labs with patient over the phone.  We discussed playing in arm Rheumatology referral for her to follow-up on recent lab work.  Patient understands plan and knows to schedule appointment after this phone call.

## 2022-11-29 ENCOUNTER — OFFICE VISIT (OUTPATIENT)
Dept: RHEUMATOLOGY | Facility: CLINIC | Age: 37
End: 2022-11-29
Payer: COMMERCIAL

## 2022-11-29 VITALS
TEMPERATURE: 99 F | WEIGHT: 122.56 LBS | BODY MASS INDEX: 26.44 KG/M2 | HEIGHT: 57 IN | SYSTOLIC BLOOD PRESSURE: 110 MMHG | DIASTOLIC BLOOD PRESSURE: 69 MMHG | HEART RATE: 80 BPM

## 2022-11-29 DIAGNOSIS — M25.50 ARTHRALGIA, UNSPECIFIED JOINT: ICD-10-CM

## 2022-11-29 DIAGNOSIS — R76.8 ELEVATED ANTINUCLEAR ANTIBODY (ANA) LEVEL: ICD-10-CM

## 2022-11-29 DIAGNOSIS — R76.8 POSITIVE ANTI-CCP TEST: ICD-10-CM

## 2022-11-29 DIAGNOSIS — M25.542 ARTHRALGIA OF BOTH HANDS: Primary | ICD-10-CM

## 2022-11-29 DIAGNOSIS — R51.9 INTRACTABLE HEADACHE, UNSPECIFIED CHRONICITY PATTERN, UNSPECIFIED HEADACHE TYPE: ICD-10-CM

## 2022-11-29 DIAGNOSIS — M25.541 ARTHRALGIA OF BOTH HANDS: Primary | ICD-10-CM

## 2022-11-29 PROCEDURE — 3074F SYST BP LT 130 MM HG: CPT | Mod: CPTII,S$GLB,, | Performed by: INTERNAL MEDICINE

## 2022-11-29 PROCEDURE — 3074F PR MOST RECENT SYSTOLIC BLOOD PRESSURE < 130 MM HG: ICD-10-PCS | Mod: CPTII,S$GLB,, | Performed by: INTERNAL MEDICINE

## 2022-11-29 PROCEDURE — 99999 PR PBB SHADOW E&M-EST. PATIENT-LVL V: ICD-10-PCS | Mod: PBBFAC,,, | Performed by: INTERNAL MEDICINE

## 2022-11-29 PROCEDURE — 3078F DIAST BP <80 MM HG: CPT | Mod: CPTII,S$GLB,, | Performed by: INTERNAL MEDICINE

## 2022-11-29 PROCEDURE — 1159F PR MEDICATION LIST DOCUMENTED IN MEDICAL RECORD: ICD-10-PCS | Mod: CPTII,S$GLB,, | Performed by: INTERNAL MEDICINE

## 2022-11-29 PROCEDURE — 3008F PR BODY MASS INDEX (BMI) DOCUMENTED: ICD-10-PCS | Mod: CPTII,S$GLB,, | Performed by: INTERNAL MEDICINE

## 2022-11-29 PROCEDURE — 1159F MED LIST DOCD IN RCRD: CPT | Mod: CPTII,S$GLB,, | Performed by: INTERNAL MEDICINE

## 2022-11-29 PROCEDURE — 3078F PR MOST RECENT DIASTOLIC BLOOD PRESSURE < 80 MM HG: ICD-10-PCS | Mod: CPTII,S$GLB,, | Performed by: INTERNAL MEDICINE

## 2022-11-29 PROCEDURE — 99205 PR OFFICE/OUTPT VISIT, NEW, LEVL V, 60-74 MIN: ICD-10-PCS | Mod: S$GLB,,, | Performed by: INTERNAL MEDICINE

## 2022-11-29 PROCEDURE — 99205 OFFICE O/P NEW HI 60 MIN: CPT | Mod: S$GLB,,, | Performed by: INTERNAL MEDICINE

## 2022-11-29 PROCEDURE — 3008F BODY MASS INDEX DOCD: CPT | Mod: CPTII,S$GLB,, | Performed by: INTERNAL MEDICINE

## 2022-11-29 PROCEDURE — 99999 PR PBB SHADOW E&M-EST. PATIENT-LVL V: CPT | Mod: PBBFAC,,, | Performed by: INTERNAL MEDICINE

## 2022-11-29 ASSESSMENT — ROUTINE ASSESSMENT OF PATIENT INDEX DATA (RAPID3)
PSYCHOLOGICAL DISTRESS SCORE: 5.5
FATIGUE SCORE: 8
AM STIFFNESS SCORE: 1, YES
PATIENT GLOBAL ASSESSMENT SCORE: 8.5
PAIN SCORE: 8.5
WHEN YOU AWAKENED IN THE MORNING OVER THE LAST WEEK, PLEASE INDICATE THE AMOUNT OF TIME IT TAKES UNTIL YOU ARE AS LIMBER AS YOU WILL BE FOR THE DAY: 4 HOURS
MDHAQ FUNCTION SCORE: 1.1
TOTAL RAPID3 SCORE: 6.89

## 2022-11-29 NOTE — PROGRESS NOTES
Rapid3 Question Responses and Scores 11/28/2022   MDHAQ Score 1.1   Psychologic Score 5.5   Pain Score 8.5   When you awakened in the morning OVER THE LAST WEEK, did you feel stiff? Yes   If Yes, please indicate the number of hours until you are as limber as you will be for the day 4   Fatigue Score 8   Global Health Score 8.5   RAPID3 Score 6.89     Answers submitted by the patient for this visit:  Rheumatology Questionnaire (Submitted on 11/28/2022)  fever: No  eye redness: No  mouth sores: No  headaches: Yes  shortness of breath: Yes  chest pain: Yes  trouble swallowing: No  diarrhea: No  constipation: No  unexpected weight change: No  genital sore: No  dysuria: Yes  During the last 3 days, have you had a skin rash?: No  Bruises or bleeds easily: Yes  cough: No

## 2022-11-29 NOTE — PROGRESS NOTES
Chief Complaint   Patient presents with    Disease Management       Patient was referred by     History of presenting illness      37 year old  female     Was in perfect state of health until pregnancy  - in 2020 while she was pregnant- she had headaches,dizziness,fatigue,abdominal pain, tingling and numbness  Work up at this point revealed thyroid cancer  They had to wait till she delivered  Finally in 2021- she had entire thyroid gland removed    Removing the gland- didn't help her symptoms at all  But it took a while for them to get her on the correct thyroid medications  Finally they achieved a normal TSH,T4  When she achieved that-she felt better but she had dizziness,headaches,muscle pain and joint pain    Now TSH is abnormal again    TSH was 0.127 last time,it was < 0.010 prior to that  Medications are being adjusted   Free T4 is normal    She is on and off a birth control patch-this might be interacting with the TSH numbers    Joint pains - started 2 months after thyroid surgery and has persisted until now-  Hands,wrists,knees,elbows and ankles,neck hurt  Pain is every morning,worse during cold weather  Swelling in the feet,wrists and fingers  Morning stiffness-for few hours    She experienced tingling and numbness in the hands  She loses   Left hand- bending is painful  EMG/NCS b/l UE negative      OTC pain medications-tylenol,advil,salon patches help a bit    GERD  Bloating and epigastric burning  Nausea+  Vomiting when GERD gets severe    She has severe headaches and that makes the scalp hurt  Memory issues,brain fog,inability to concentrate  Dry mouth due to medications  She has sensitivity to light- she needs a new filter for her eye-as per optometry      Sharp back pains and needles to her back  No triggers    Episodic shortness of breath-started after covid- this happens at rest and with exertion  Occasional chest pains    She had covid jan 2022 and July 2022  She doesn't think this  has anything to do with her symptoms    Has a lot of allergies recently   Hives+,itchy eyes and nose  All this started after thyroid surgery    She lost a lot of hair after thyroid surgery and she had a big bald patch as well  She took vitamins and that grew her hair back    Night sweats+      No skin rashes,malar rash,photosensitivity   No telangiectasias   No calcinosis   No psoriasis   No oral and nasal ulcers   No dry eyes   No dysphagia,diplopia and dysphonia and muscle weakness   No raynaud's+   No digital ulcers   No cytopenias   No renal issues   No blood clots   No fever,chills,weight loss and loss of appetite   No pregnancy losses/pre term deliveries /pregnancy complications   No recurrent conjunctivitis or uveitis or scleritis or episcleritis   No chronic or bloody diarrhea with no u colitis or crohn's /inflammatory bowel disease   No vaginal or urethral  d/c/STDs/no ulcers   No unexplained lymphadenopathy,parotitis   No seizures,strokes,psychosis  No sclerodactyly  No puffy hands  No perioral tightness         Past history : depression,postpartum depression  Thyroid cancer  GERD    Family history  HTN,HLD,Dementia,arthritis    Social history  Not a smoker,alcoholic    Review of Systems   Constitutional:  Negative for fever and unexpected weight change.   HENT:  Negative for mouth sores and trouble swallowing.    Eyes:  Negative for redness.   Respiratory:  Positive for shortness of breath. Negative for cough.    Cardiovascular:  Positive for chest pain.   Gastrointestinal:  Negative for constipation and diarrhea.   Genitourinary:  Positive for dysuria. Negative for genital sores.   Skin:  Negative for rash.   Neurological:  Positive for headaches.   Hematological:  Bruises/bleeds easily.   As detailed above        Physical Exam   Constitutional: She is oriented to person, place, and time. No distress.   HENT:   Head: Normocephalic.   Mouth/Throat: Oropharynx is clear and moist.   Eyes: Pupils are equal,  round, and reactive to light. Conjunctivae are normal. Right eye exhibits no discharge. Left eye exhibits no discharge. No scleral icterus.   Neck: No thyromegaly present.   Cardiovascular: Normal rate, regular rhythm and normal heart sounds.   Pulmonary/Chest: Effort normal and breath sounds normal. No stridor.   Abdominal: Soft. Bowel sounds are normal.   Musculoskeletal:         General: Normal range of motion.      Cervical back: Normal range of motion.   Lymphadenopathy:     She has no cervical adenopathy.   Neurological: She is alert and oriented to person, place, and time.   Skin: Skin is warm. No rash noted. She is not diaphoretic.   Psychiatric: Affect and judgment normal.     Widespread pain index  Note the areas which the patient has had pain over the last week:                   Shoulder-girdle, left 1               Shoulder-girdle, right 1                         Upper arm left                        Upper arm right                          Lower arm left 1                       Lower arm right     Hip (buttock, trochanter) left  1  Hip (buttock, trochanter) right 1                           Upper leg, left                         Upper leg, right                           Lower leg, left                         Lower leg, right                                      Jaw, left                                   Jaw, right                                         Chest 1                                  Abdomen 1                               Upper back                               Lower back 1                                        Neck 1  Score will be from 0-19: 9/19                                         Symptom severity score  Fatigue 2  Waking Unrefreshed 3  Cognitive Symptoms 2   0 = no problem, 1=slight or mild problem 2= moderate; considerable problems often present and/or at a moderate level, 3 = severe, pervasive, continuous, life disturbing problem  For each of the 3 symptoms, indicate the  level of severity over the past week using the Scale.  The symptom severity score is the sum of the severity of the 3 symptoms (fatigue, waking unrefreshed, and cognitive symptoms) plus the number of the following symptoms occurring during the previous 6 months:   Headaches 1  Pain or cramps in the lower abdomen 1  Depression 1  The final score is between 0 and 12 : 10/12                                          Criteria  Patient has fibromyalgia if the following 3 conditions are met:  1.  Widespread pain index greater than or equal to 7 and symptom severity score greater than or equal to 5 or widespread pain index between 3- 6, and symptom severity score greater than or equal to 9.    2.  Symptoms have been present in a similar level for at least 3 months  3.  The patient does not have a disorder that would otherwise sufficiently explain the pain      Laboratory abnormalities    ESR 42  CRP nml  RF neg  CCP 9.4- mildly elevated     MARIE positive  1: 320 centromere pattern  SSA neg    CBC nml  Mild anemia during pregnancy  CMP nml      Assessment       37 year old  female who was in the perfect state of health until she got pregnant in 2020 when she developed symptoms of headaches,dizziness,fatigue,abdominal pain, tingling and numbness,further work up led to the diagnosis of thyroid cancer,requiring removal of the thyroid gland.  Unfortunately removal of the gland did not eliminate all her symptoms    She now comes with an abnormal TSH but normal T4 and lot of symptoms  Joint pain,swelling,stiffness- she does have swollen wrists b/l  She has pain in the hands,wrists,elbows,ankles and neck with significant morning stiffness  Multiple joints are tender on exam but only wrists are swollen  She has elevated ESR,normal CRP,She has normal hand,neck,T and LS spine and hip xrays  She suffers from hand paresthesias -but has normal EMG/NCS B/L UE    Her wide spread pain index is high,so is the symptom severity score  She  does qualify to have fibromyalgia-which might be interefering with the CDAI since she has CDAI of 24.5,mostly due to tender joints    She has associated GERD,bloating,nausea,vomiting,headaches,neurocognitive difficulties,sharp pains,episodic shortness of breath,severe allergies and hair loss and night sweats  She suffers from depression    She has a positive MARIE but that's a centromere pattern,she doesn't suffer from raynaud's,doesn't have symptoms of systemic sclerosis     I do think some of her symptoms are due to her thyroid disease and some due to fibromyalgia but inflammatory arthritis is also very much likely-  CCP is positive but very low titre      1. Arthralgia of both hands    2. Positive anti-CCP test    3. Elevated antinuclear antibody (MARIE) level    4. Arthralgia, unspecified joint    5. Intractable headache, unspecified chronicity pattern, unspecified headache type        Reviewed labs/xrays  Reviewed medications      New problem     Plan    Message - ask him to r/v the new TSH,T4 which I am going to send today-ask if thyroid issues might be contributing to some of the symptoms    Complete the lupus panel  Scleroderma myositis panel    Neurology to evaluate the headaches,memory issues and neurocognitive issues     MRI hands/wrists-RA protocol    Trial of steroids if endocrine agrees    Rtc in 3 months    Thu was seen today for disease management.    Diagnoses and all orders for this visit:    Arthralgia of both hands  -     MRI Hand Wrist W WO Bilat_Rheumatoid; Future    Positive anti-CCP test  -     Ambulatory referral/consult to Rheumatology  -     MARIE Screen w/Reflex; Future  -     Direct antiglobulin test; Future  -     C3 Complement; Future  -     C4 Complement; Future  -     Protein/Creatinine Ratio, Urine; Future  -     Urinalysis; Future  -     Cardiolipin antibody; Future  -     Beta-2 Glycoprotein Abs (IgA, IgG, IgM); Future  -     DRVVT; Future  -     HLA B27 Antigen; Future  -      Anti-scleroderma antibody; Future  -     RNA polymerase III Ab, IgG; Future  -     PM-Scl Antibody by Immunodiffusion; Future  -     Anti Sm/RNP Antibody; Future  -     Th/To Antibody; Future  -     MyoMarker Panel 3; Future  -     CK; Future  -     Aldolase; Future    Elevated antinuclear antibody (MARIE) level  -     Ambulatory referral/consult to Rheumatology  -     MARIE Screen w/Reflex; Future  -     Direct antiglobulin test; Future  -     C3 Complement; Future  -     C4 Complement; Future  -     Protein/Creatinine Ratio, Urine; Future  -     Urinalysis; Future  -     Cardiolipin antibody; Future  -     Beta-2 Glycoprotein Abs (IgA, IgG, IgM); Future  -     DRVVT; Future  -     HLA B27 Antigen; Future  -     Anti-scleroderma antibody; Future  -     RNA polymerase III Ab, IgG; Future  -     PM-Scl Antibody by Immunodiffusion; Future  -     Anti Sm/RNP Antibody; Future  -     Th/To Antibody; Future  -     MyoMarker Panel 3; Future  -     CK; Future  -     Aldolase; Future  -     HIV 1/2 Ag/Ab (4th Gen); Future  -     Hepatitis B Surface Antigen; Future  -     Hepatitis B Surface Ab, Qualitative; Future  -     Hepatitis B Core Antibody, Total; Future  -     TSH; Future  -     T4; Future    Arthralgia, unspecified joint  -     Ambulatory referral/consult to Rheumatology  -     MARIE Screen w/Reflex; Future  -     Direct antiglobulin test; Future  -     C3 Complement; Future  -     C4 Complement; Future  -     Protein/Creatinine Ratio, Urine; Future  -     Urinalysis; Future  -     Cardiolipin antibody; Future  -     Beta-2 Glycoprotein Abs (IgA, IgG, IgM); Future  -     DRVVT; Future  -     HLA B27 Antigen; Future  -     Anti-scleroderma antibody; Future  -     RNA polymerase III Ab, IgG; Future  -     PM-Scl Antibody by Immunodiffusion; Future  -     Anti Sm/RNP Antibody; Future  -     Th/To Antibody; Future  -     MyoMarker Panel 3; Future  -     CK; Future  -     Aldolase; Future    Intractable headache, unspecified  chronicity pattern, unspecified headache type  -     Ambulatory referral/consult to Neurology; Future

## 2022-11-30 ENCOUNTER — PATIENT MESSAGE (OUTPATIENT)
Dept: RHEUMATOLOGY | Facility: CLINIC | Age: 37
End: 2022-11-30
Payer: COMMERCIAL

## 2022-11-30 RX ORDER — METHYLPREDNISOLONE 4 MG/1
TABLET ORAL
Qty: 1 EACH | Refills: 1 | Status: SHIPPED | OUTPATIENT
Start: 2022-11-30 | End: 2023-01-10

## 2022-12-03 ENCOUNTER — LAB VISIT (OUTPATIENT)
Dept: LAB | Facility: HOSPITAL | Age: 37
End: 2022-12-03
Attending: INTERNAL MEDICINE
Payer: COMMERCIAL

## 2022-12-03 DIAGNOSIS — R76.8 POSITIVE ANTI-CCP TEST: ICD-10-CM

## 2022-12-03 DIAGNOSIS — R76.8 ELEVATED ANTINUCLEAR ANTIBODY (ANA) LEVEL: ICD-10-CM

## 2022-12-03 DIAGNOSIS — M25.50 ARTHRALGIA, UNSPECIFIED JOINT: ICD-10-CM

## 2022-12-03 LAB
C3 SERPL-MCNC: 136 MG/DL (ref 50–180)
C4 SERPL-MCNC: 31 MG/DL (ref 11–44)
CK SERPL-CCNC: 72 U/L (ref 20–180)
DAT IGG-SP REAG RBC-IMP: NORMAL
HBV CORE AB SERPL QL IA: REACTIVE
HBV SURFACE AB SER-ACNC: >1000 MIU/ML
HBV SURFACE AB SER-ACNC: REACTIVE M[IU]/ML
HBV SURFACE AG SERPL QL IA: NORMAL
HIV 1+2 AB+HIV1 P24 AG SERPL QL IA: NORMAL
T4 SERPL-MCNC: 9.4 UG/DL (ref 4.5–11.5)
TSH SERPL DL<=0.005 MIU/L-ACNC: 0.42 UIU/ML (ref 0.4–4)

## 2022-12-03 PROCEDURE — 82085 ASSAY OF ALDOLASE: CPT | Performed by: INTERNAL MEDICINE

## 2022-12-03 PROCEDURE — 86235 NUCLEAR ANTIGEN ANTIBODY: CPT | Performed by: INTERNAL MEDICINE

## 2022-12-03 PROCEDURE — 82550 ASSAY OF CK (CPK): CPT | Performed by: INTERNAL MEDICINE

## 2022-12-03 PROCEDURE — 87340 HEPATITIS B SURFACE AG IA: CPT | Performed by: INTERNAL MEDICINE

## 2022-12-03 PROCEDURE — 86235 NUCLEAR ANTIGEN ANTIBODY: CPT | Mod: 59 | Performed by: INTERNAL MEDICINE

## 2022-12-03 PROCEDURE — 86147 CARDIOLIPIN ANTIBODY EA IG: CPT | Mod: 59 | Performed by: INTERNAL MEDICINE

## 2022-12-03 PROCEDURE — 84443 ASSAY THYROID STIM HORMONE: CPT | Performed by: INTERNAL MEDICINE

## 2022-12-03 PROCEDURE — 84436 ASSAY OF TOTAL THYROXINE: CPT | Performed by: INTERNAL MEDICINE

## 2022-12-03 PROCEDURE — 87389 HIV-1 AG W/HIV-1&-2 AB AG IA: CPT | Performed by: INTERNAL MEDICINE

## 2022-12-03 PROCEDURE — 86160 COMPLEMENT ANTIGEN: CPT | Performed by: INTERNAL MEDICINE

## 2022-12-03 PROCEDURE — 83520 IMMUNOASSAY QUANT NOS NONAB: CPT | Performed by: INTERNAL MEDICINE

## 2022-12-03 PROCEDURE — 86146 BETA-2 GLYCOPROTEIN ANTIBODY: CPT | Mod: 59 | Performed by: INTERNAL MEDICINE

## 2022-12-03 PROCEDURE — 83516 IMMUNOASSAY NONANTIBODY: CPT | Mod: 59 | Performed by: INTERNAL MEDICINE

## 2022-12-03 PROCEDURE — 86880 COOMBS TEST DIRECT: CPT | Performed by: INTERNAL MEDICINE

## 2022-12-03 PROCEDURE — 85613 RUSSELL VIPER VENOM DILUTED: CPT | Performed by: INTERNAL MEDICINE

## 2022-12-03 PROCEDURE — 86706 HEP B SURFACE ANTIBODY: CPT | Mod: 91 | Performed by: INTERNAL MEDICINE

## 2022-12-03 PROCEDURE — 86704 HEP B CORE ANTIBODY TOTAL: CPT | Performed by: INTERNAL MEDICINE

## 2022-12-03 PROCEDURE — 86038 ANTINUCLEAR ANTIBODIES: CPT | Performed by: INTERNAL MEDICINE

## 2022-12-03 PROCEDURE — 86160 COMPLEMENT ANTIGEN: CPT | Mod: 59 | Performed by: INTERNAL MEDICINE

## 2022-12-03 PROCEDURE — 86039 ANTINUCLEAR ANTIBODIES (ANA): CPT | Performed by: INTERNAL MEDICINE

## 2022-12-03 PROCEDURE — 83516 IMMUNOASSAY NONANTIBODY: CPT | Performed by: INTERNAL MEDICINE

## 2022-12-03 PROCEDURE — 36415 COLL VENOUS BLD VENIPUNCTURE: CPT | Performed by: INTERNAL MEDICINE

## 2022-12-04 ENCOUNTER — HOSPITAL ENCOUNTER (OUTPATIENT)
Dept: RADIOLOGY | Facility: HOSPITAL | Age: 37
Discharge: HOME | End: 2022-12-04
Attending: INTERNAL MEDICINE
Payer: COMMERCIAL

## 2022-12-04 DIAGNOSIS — M25.541 ARTHRALGIA OF BOTH HANDS: ICD-10-CM

## 2022-12-04 DIAGNOSIS — M25.542 ARTHRALGIA OF BOTH HANDS: ICD-10-CM

## 2022-12-04 PROCEDURE — A9585 GADOBUTROL INJECTION: HCPCS | Performed by: INTERNAL MEDICINE

## 2022-12-04 PROCEDURE — 73223 MRI HAND_WRIST W WO LEFT_RHEUMATOID ARTHRITIS: ICD-10-PCS | Mod: 26,LT,, | Performed by: RADIOLOGY

## 2022-12-04 PROCEDURE — 73223 MRI JOINT UPR EXTR W/O&W/DYE: CPT | Mod: 26,LT,, | Performed by: RADIOLOGY

## 2022-12-04 PROCEDURE — 25500020 PHARM REV CODE 255: Performed by: INTERNAL MEDICINE

## 2022-12-04 PROCEDURE — 73223 MRI JOINT UPR EXTR W/O&W/DYE: CPT | Mod: TC,LT

## 2022-12-04 RX ORDER — GADOBUTROL 604.72 MG/ML
6 INJECTION INTRAVENOUS
Status: COMPLETED | OUTPATIENT
Start: 2022-12-04 | End: 2022-12-04

## 2022-12-04 RX ADMIN — GADOBUTROL 6 ML: 604.72 INJECTION INTRAVENOUS at 11:12

## 2022-12-05 ENCOUNTER — PATIENT MESSAGE (OUTPATIENT)
Dept: ENDOCRINOLOGY | Facility: CLINIC | Age: 37
End: 2022-12-05
Payer: COMMERCIAL

## 2022-12-05 DIAGNOSIS — C73 THYROID CANCER: ICD-10-CM

## 2022-12-05 DIAGNOSIS — E89.0 POSTSURGICAL HYPOTHYROIDISM: ICD-10-CM

## 2022-12-05 LAB
ANA PATTERN 1: NORMAL
ANA SER QL IF: POSITIVE
ANA TITR SER IF: NORMAL {TITER}
ANTI SM/RNP ANTIBODY: 0.05 RATIO (ref 0–0.99)
ANTI-SM/RNP INTERPRETATION: NEGATIVE
ENA SCL70 IGG SER IA-ACNC: <0.2 U

## 2022-12-05 RX ORDER — LEVOTHYROXINE SODIUM 88 UG/1
88 CAPSULE ORAL
Qty: 90 CAPSULE | Refills: 3 | Status: SHIPPED | OUTPATIENT
Start: 2022-12-05 | End: 2023-03-03

## 2022-12-07 LAB
ANTI SM ANTIBODY: 0.07 RATIO (ref 0–0.99)
ANTI SM/RNP ANTIBODY: 0.04 RATIO (ref 0–0.99)
ANTI-SM INTERPRETATION: NEGATIVE
ANTI-SM/RNP INTERPRETATION: NEGATIVE
ANTI-SSA ANTIBODY: 0.07 RATIO (ref 0–0.99)
ANTI-SSA INTERPRETATION: NEGATIVE
ANTI-SSB ANTIBODY: 0.04 RATIO (ref 0–0.99)
ANTI-SSB INTERPRETATION: NEGATIVE
CARDIOLIPIN IGG SER IA-ACNC: <9.4 GPL (ref 0–14.99)
CARDIOLIPIN IGM SER IA-ACNC: <9.4 MPL (ref 0–12.49)
DSDNA AB SER-ACNC: NORMAL [IU]/ML
ENA SCL70 AB SER-ACNC: 4 UNITS

## 2022-12-08 LAB
APTT IMM NP PPP: NORMAL SEC (ref 32–48)
APTT P HEP NEUT PPP: NORMAL SEC (ref 32–48)
B2 GLYCOPROT1 IGA SER QL: <9 SAU
B2 GLYCOPROT1 IGG SER QL: <9 SGU
B2 GLYCOPROT1 IGM SER QL: <9 SMU
CONFIRM APTT STACLOT: NORMAL
DRVVT SCREEN TO CONFIRM RATIO: NORMAL RATIO
LA 3 SCREEN W REFLEX-IMP: NORMAL
LA NT DPL PPP QL: NORMAL
MIXING DRVVT: NORMAL SEC (ref 33–44)
PROTHROMBIN TIME: 12.9 SEC (ref 12–15.5)
REPTILASE TIME: NORMAL SEC
SCREEN APTT: 43 SEC (ref 32–48)
SCREEN DRVVT: 37 SEC (ref 33–44)
THROMBIN TIME: NORMAL SEC (ref 14.7–19.5)

## 2022-12-09 DIAGNOSIS — R76.8 POSITIVE ANTI-CCP TEST: Primary | ICD-10-CM

## 2022-12-10 ENCOUNTER — PATIENT MESSAGE (OUTPATIENT)
Dept: RHEUMATOLOGY | Facility: CLINIC | Age: 37
End: 2022-12-10
Payer: COMMERCIAL

## 2022-12-10 LAB — ALDOLASE SERPL-CCNC: 7.8 U/L (ref 1.2–7.6)

## 2022-12-11 RX ORDER — HYDROXYCHLOROQUINE SULFATE 200 MG/1
200 TABLET, FILM COATED ORAL 2 TIMES DAILY
Qty: 60 TABLET | Refills: 5 | Status: SHIPPED | OUTPATIENT
Start: 2022-12-11 | End: 2023-01-10

## 2022-12-14 LAB — RNAP III AB SER-ACNC: <20 UNITS

## 2022-12-16 ENCOUNTER — OFFICE VISIT (OUTPATIENT)
Dept: INTERNAL MEDICINE | Facility: CLINIC | Age: 37
End: 2022-12-16
Payer: COMMERCIAL

## 2022-12-16 VITALS
DIASTOLIC BLOOD PRESSURE: 78 MMHG | SYSTOLIC BLOOD PRESSURE: 118 MMHG | HEIGHT: 57 IN | BODY MASS INDEX: 27.31 KG/M2 | HEART RATE: 83 BPM | WEIGHT: 126.56 LBS | OXYGEN SATURATION: 99 %

## 2022-12-16 DIAGNOSIS — M26.609 TMJ (TEMPOROMANDIBULAR JOINT SYNDROME): ICD-10-CM

## 2022-12-16 DIAGNOSIS — R76.8 POSITIVE ANTI-CCP TEST: ICD-10-CM

## 2022-12-16 DIAGNOSIS — F32.A DEPRESSION, UNSPECIFIED DEPRESSION TYPE: Primary | ICD-10-CM

## 2022-12-16 LAB

## 2022-12-16 PROCEDURE — 99999 PR PBB SHADOW E&M-EST. PATIENT-LVL IV: ICD-10-PCS | Mod: PBBFAC,,, | Performed by: STUDENT IN AN ORGANIZED HEALTH CARE EDUCATION/TRAINING PROGRAM

## 2022-12-16 PROCEDURE — 99999 PR PBB SHADOW E&M-EST. PATIENT-LVL IV: CPT | Mod: PBBFAC,,, | Performed by: STUDENT IN AN ORGANIZED HEALTH CARE EDUCATION/TRAINING PROGRAM

## 2022-12-16 PROCEDURE — 1159F PR MEDICATION LIST DOCUMENTED IN MEDICAL RECORD: ICD-10-PCS | Mod: CPTII,S$GLB,, | Performed by: STUDENT IN AN ORGANIZED HEALTH CARE EDUCATION/TRAINING PROGRAM

## 2022-12-16 PROCEDURE — 3074F SYST BP LT 130 MM HG: CPT | Mod: CPTII,S$GLB,, | Performed by: STUDENT IN AN ORGANIZED HEALTH CARE EDUCATION/TRAINING PROGRAM

## 2022-12-16 PROCEDURE — 1160F RVW MEDS BY RX/DR IN RCRD: CPT | Mod: CPTII,S$GLB,, | Performed by: STUDENT IN AN ORGANIZED HEALTH CARE EDUCATION/TRAINING PROGRAM

## 2022-12-16 PROCEDURE — 1160F PR REVIEW ALL MEDS BY PRESCRIBER/CLIN PHARMACIST DOCUMENTED: ICD-10-PCS | Mod: CPTII,S$GLB,, | Performed by: STUDENT IN AN ORGANIZED HEALTH CARE EDUCATION/TRAINING PROGRAM

## 2022-12-16 PROCEDURE — 3078F PR MOST RECENT DIASTOLIC BLOOD PRESSURE < 80 MM HG: ICD-10-PCS | Mod: CPTII,S$GLB,, | Performed by: STUDENT IN AN ORGANIZED HEALTH CARE EDUCATION/TRAINING PROGRAM

## 2022-12-16 PROCEDURE — 1159F MED LIST DOCD IN RCRD: CPT | Mod: CPTII,S$GLB,, | Performed by: STUDENT IN AN ORGANIZED HEALTH CARE EDUCATION/TRAINING PROGRAM

## 2022-12-16 PROCEDURE — 3008F BODY MASS INDEX DOCD: CPT | Mod: CPTII,S$GLB,, | Performed by: STUDENT IN AN ORGANIZED HEALTH CARE EDUCATION/TRAINING PROGRAM

## 2022-12-16 PROCEDURE — 99214 PR OFFICE/OUTPT VISIT, EST, LEVL IV, 30-39 MIN: ICD-10-PCS | Mod: S$GLB,,, | Performed by: STUDENT IN AN ORGANIZED HEALTH CARE EDUCATION/TRAINING PROGRAM

## 2022-12-16 PROCEDURE — 99214 OFFICE O/P EST MOD 30 MIN: CPT | Mod: S$GLB,,, | Performed by: STUDENT IN AN ORGANIZED HEALTH CARE EDUCATION/TRAINING PROGRAM

## 2022-12-16 PROCEDURE — 3078F DIAST BP <80 MM HG: CPT | Mod: CPTII,S$GLB,, | Performed by: STUDENT IN AN ORGANIZED HEALTH CARE EDUCATION/TRAINING PROGRAM

## 2022-12-16 PROCEDURE — 3008F PR BODY MASS INDEX (BMI) DOCUMENTED: ICD-10-PCS | Mod: CPTII,S$GLB,, | Performed by: STUDENT IN AN ORGANIZED HEALTH CARE EDUCATION/TRAINING PROGRAM

## 2022-12-16 PROCEDURE — 3074F PR MOST RECENT SYSTOLIC BLOOD PRESSURE < 130 MM HG: ICD-10-PCS | Mod: CPTII,S$GLB,, | Performed by: STUDENT IN AN ORGANIZED HEALTH CARE EDUCATION/TRAINING PROGRAM

## 2022-12-16 RX ORDER — IBUPROFEN 600 MG/1
600 TABLET ORAL EVERY 8 HOURS PRN
Qty: 30 TABLET | Refills: 0 | Status: SHIPPED | OUTPATIENT
Start: 2022-12-16 | End: 2023-11-27 | Stop reason: SDUPTHER

## 2022-12-16 RX ORDER — ESCITALOPRAM OXALATE 20 MG/1
20 TABLET ORAL DAILY
Qty: 30 TABLET | Refills: 11 | Status: SHIPPED | OUTPATIENT
Start: 2022-12-16 | End: 2023-01-10

## 2022-12-16 NOTE — PROGRESS NOTES
SUBJECTIVE     Chief Complaint   Patient presents with    Follow-up       HPI  Dina Hutton is a very pleasant 37 y.o. female with history of thyroid cancer (in remission) and depression that presents for depression follow up and TMJ. Pt has been doing well since our last appointment.    + anti CCP:  Currently undergoing Rheum workup with Rheumatology.  Started on Plaquenil with improvement in sxs  Joint pain in bilateral Fingers, knees, wrists x several months      Depression:   On Lexapro 10mg, now taking every day with some improvement in sxs  Denies HI/SI/AVH  Patient endorses occasional feeling hopeless, decreased pleasure in doing things, difficulty with sleep, decreased energy, eating changes, and difficulty concentrating.    TMJ:  Sxs started about 1 week ago  Pain in L jaw and L ear  No injuries  Pain worse with chewing  Has not tried anything for pain    PAST MEDICAL HISTORY:  Past Medical History:   Diagnosis Date    Anemia     Breast abscess 4/25/2020    GERD (gastroesophageal reflux disease)     History of fourth degree perineal laceration 8/21/2019    Hypoglycemia     Hypoglycemia     Mastitis 4/20/2020    Mental disorder     depression    PONV (postoperative nausea and vomiting)     Thyroid cancer     Thyroid disease        PAST SURGICAL HISTORY:  Past Surgical History:   Procedure Laterality Date    BREAST CYST EXCISION      COLONOSCOPY      ESOPHAGOGASTRODUODENOSCOPY      ESOPHAGOGASTRODUODENOSCOPY N/A 6/14/2021    Procedure: EGD (ESOPHAGOGASTRODUODENOSCOPY);  Surgeon: Farooq Cullen MD;  Location: [facility] ENDO;  Service: Endoscopy;  Laterality: N/A;  ongoing epigastric pain, burning, nausea, vomiting  [place] [facility] [place] available but if there are openings on [place], would prefer that location  cOVID test on 6/11/21 at [facility]    INCISION AND DRAINAGE OF BREAST Left 4/23/2020    Procedure: INCISION AND DRAINAGE, BREAST;  Surgeon: Mason Rubalcava III, MD;  Location: [facility] OR;  Service:  General;  Laterality: Left;    THYROIDECTOMY Bilateral 9/29/2020    Procedure: THYROIDECTOMY with central neck dissection;  Surgeon: Kayla Lovelace MD;  Location: [facility];  Service: General;  Laterality: Bilateral;       SOCIAL HISTORY:  Social History     Socioeconomic History    Marital status:    Tobacco Use    Smoking status: Never    Smokeless tobacco: Never   Substance and Sexual Activity    Alcohol use: Yes     Comment: occasional    Drug use: No    Sexual activity: Yes     Partners: Male       FAMILY HISTORY:  Family History   Problem Relation Age of Onset    Hypertension Mother     Hyperlipidemia Mother     Cancer Neg Hx        ALLERGIES AND MEDICATIONS: updated and reviewed.  Review of patient's allergies indicates:   Allergen Reactions    Vancomycin analogues Hives    Bactrim [sulfamethoxazole-trimethoprim] Nausea And Vomiting    Vicodin [hydrocodone-acetaminophen] Nausea Only     Patient states she is fine with other pain medication     Current Outpatient Medications   Medication Sig Dispense Refill    ascorbic acid/collagen hydr (COLLAGEN PLUS VITAMIN C ORAL) Take by mouth.      CALCIUM ORAL Take by mouth.      CRANBERRY ORAL Take by mouth.      diphenhydrAMINE (BENADRYL) 25 mg capsule Take 2 capsules (50 mg total) by mouth every 6 (six) hours as needed (Every time you use Compazine.). 20 capsule 0    hydrOXYchloroQUINE (PLAQUENIL) 200 mg tablet Take 1 tablet (200 mg total) by mouth 2 (two) times daily. 60 tablet 5    methyl salicylate/menth/camph (SALONPAS TOP) Apply topically. patch      mv-min/iron/folic/calcium/vitK (WOMEN'S MULTIVITAMIN ORAL) Take by mouth.      norelgestromin-ethinyl estradiol 150-35 mcg/24 hr Place 1 patch onto the skin every 7 days. 4 patch 11    omeprazole (PRILOSEC) 40 MG capsule Take 1 capsule (40 mg total) by mouth once daily. 90 capsule 1    TIROSINT 88 mcg Cap Take 1 capsule (88 mcg) by mouth once daily at 6am. Brand name Tirosint medically necessary 90  "capsule 3    EScitalopram oxalate (LEXAPRO) 20 MG tablet Take 1 tablet (20 mg total) by mouth once daily. 30 tablet 11    ibuprofen (ADVIL,MOTRIN) 600 MG tablet Take 1 tablet (600 mg total) by mouth every 8 (eight) hours as needed for Pain. 30 tablet 0    methylPREDNISolone (MEDROL DOSEPACK) 4 mg tablet use as directed 1 each 1     No current facility-administered medications for this visit.       ROS  Review of Systems   Constitutional:  Negative for fever and weight loss.   Respiratory:  Negative for cough and shortness of breath.    Cardiovascular:  Negative for chest pain and palpitations.   Gastrointestinal:  Negative for abdominal pain, constipation, diarrhea, nausea and vomiting.   Genitourinary:  Negative for dysuria.   Musculoskeletal:  Positive for joint pain. Negative for back pain.   Skin:  Negative for rash.   Neurological:  Negative for dizziness, weakness and headaches.   Psychiatric/Behavioral:  Positive for depression. Negative for hallucinations and suicidal ideas. The patient is not nervous/anxious.        OBJECTIVE     Physical Exam  Vitals:    12/16/22 1305   BP: 118/78   Pulse: 83    Body mass index is 27.38 kg/m².  Weight: 57.4 kg (126 lb 8.7 oz)   Height: 4' 9" (144.8 cm)     Physical Exam  HENT:      Head: Normocephalic and atraumatic.      Nose: Nose normal.      Mouth/Throat:      Mouth: Mucous membranes are moist.      Pharynx: Oropharynx is clear.   Eyes:      Extraocular Movements: Extraocular movements intact.      Conjunctiva/sclera: Conjunctivae normal.      Pupils: Pupils are equal, round, and reactive to light.   Cardiovascular:      Rate and Rhythm: Normal rate and regular rhythm.   Pulmonary:      Effort: Pulmonary effort is normal.      Breath sounds: Normal breath sounds.   Abdominal:      General: There is no distension.      Palpations: Abdomen is soft.      Tenderness: There is no abdominal tenderness.   Musculoskeletal:         General: No swelling. Normal range of motion. "      Cervical back: Normal range of motion.      Right lower leg: No edema.      Left lower leg: No edema.   Skin:     General: Skin is warm.      Findings: No lesion or rash.   Neurological:      General: No focal deficit present.      Mental Status: She is alert and oriented to person, place, and time.      Motor: No weakness.   Psychiatric:         Mood and Affect: Mood normal.         Thought Content: Thought content normal.             ASSESSMENT     37 y.o. female with     1. Depression, unspecified depression type    2. TMJ (temporomandibular joint syndrome)    3. Positive anti-CCP test        PLAN:     1. Depression, unspecified depression type  Overview:  Sxs improving  Increase lexapro to 20mg daily  Explained risks of this class of medication including potential for SI/HI/AVH. Educated to stop medication and proceed to ED if pt experiences SI/HI/AVH. Patient verbalizes understanding of the plan.        2. TMJ (temporomandibular joint syndrome)  -     ibuprofen (ADVIL,MOTRIN) 600 MG tablet; Take 1 tablet (600 mg total) by mouth every 8 (eight) hours as needed for Pain.  Dispense: 30 tablet; Refill: 0  Suspect TMJ based on history and unremarkable physical exam.  Prescribe ibuprofen for prn jaw pain  Advise warm compress prn and gentle stretching of jaw.    3. Positive anti-CCP test  Overview:  Continue routine follow up with rheumatology  Plaquenil daily, continue current regimen per rheumatology      Other orders  -     EScitalopram oxalate (LEXAPRO) 20 MG tablet; Take 1 tablet (20 mg total) by mouth once daily.  Dispense: 30 tablet; Refill: 11    Follow up in 6 months.    Lona Pham MD

## 2022-12-18 PROBLEM — R76.8 POSITIVE ANTI-CCP TEST: Status: ACTIVE | Noted: 2022-12-18

## 2022-12-19 ENCOUNTER — PATIENT MESSAGE (OUTPATIENT)
Dept: INTERNAL MEDICINE | Facility: CLINIC | Age: 37
End: 2022-12-19
Payer: COMMERCIAL

## 2023-01-03 ENCOUNTER — LAB VISIT (OUTPATIENT)
Dept: LAB | Facility: HOSPITAL | Age: 38
End: 2023-01-03
Attending: STUDENT IN AN ORGANIZED HEALTH CARE EDUCATION/TRAINING PROGRAM
Payer: COMMERCIAL

## 2023-01-03 ENCOUNTER — OFFICE VISIT (OUTPATIENT)
Dept: NEUROLOGY | Facility: CLINIC | Age: 38
End: 2023-01-03
Payer: COMMERCIAL

## 2023-01-03 VITALS
BODY MASS INDEX: 27.06 KG/M2 | DIASTOLIC BLOOD PRESSURE: 59 MMHG | HEIGHT: 57 IN | SYSTOLIC BLOOD PRESSURE: 114 MMHG | HEART RATE: 77 BPM | WEIGHT: 125.44 LBS

## 2023-01-03 DIAGNOSIS — R20.2 PARESTHESIA: Primary | ICD-10-CM

## 2023-01-03 DIAGNOSIS — R29.818 AURA: ICD-10-CM

## 2023-01-03 DIAGNOSIS — R20.2 PARESTHESIA: ICD-10-CM

## 2023-01-03 DIAGNOSIS — R51.9 INTRACTABLE HEADACHE, UNSPECIFIED CHRONICITY PATTERN, UNSPECIFIED HEADACHE TYPE: ICD-10-CM

## 2023-01-03 DIAGNOSIS — G31.84 MCI (MILD COGNITIVE IMPAIRMENT): ICD-10-CM

## 2023-01-03 DIAGNOSIS — M79.7 FIBROMYALGIA: ICD-10-CM

## 2023-01-03 LAB
FOLATE SERPL-MCNC: 14.5 NG/ML (ref 4–24)
VIT B12 SERPL-MCNC: 561 PG/ML (ref 210–950)

## 2023-01-03 PROCEDURE — 3008F PR BODY MASS INDEX (BMI) DOCUMENTED: ICD-10-PCS | Mod: CPTII,S$GLB,, | Performed by: STUDENT IN AN ORGANIZED HEALTH CARE EDUCATION/TRAINING PROGRAM

## 2023-01-03 PROCEDURE — 82607 VITAMIN B-12: CPT | Performed by: STUDENT IN AN ORGANIZED HEALTH CARE EDUCATION/TRAINING PROGRAM

## 2023-01-03 PROCEDURE — 86334 IMMUNOFIX E-PHORESIS SERUM: CPT | Performed by: STUDENT IN AN ORGANIZED HEALTH CARE EDUCATION/TRAINING PROGRAM

## 2023-01-03 PROCEDURE — 3078F PR MOST RECENT DIASTOLIC BLOOD PRESSURE < 80 MM HG: ICD-10-PCS | Mod: CPTII,S$GLB,, | Performed by: STUDENT IN AN ORGANIZED HEALTH CARE EDUCATION/TRAINING PROGRAM

## 2023-01-03 PROCEDURE — 1159F PR MEDICATION LIST DOCUMENTED IN MEDICAL RECORD: ICD-10-PCS | Mod: CPTII,S$GLB,, | Performed by: STUDENT IN AN ORGANIZED HEALTH CARE EDUCATION/TRAINING PROGRAM

## 2023-01-03 PROCEDURE — 3008F BODY MASS INDEX DOCD: CPT | Mod: CPTII,S$GLB,, | Performed by: STUDENT IN AN ORGANIZED HEALTH CARE EDUCATION/TRAINING PROGRAM

## 2023-01-03 PROCEDURE — 84165 PATHOLOGIST INTERPRETATION SPE: ICD-10-PCS | Mod: 26,,, | Performed by: PATHOLOGY

## 2023-01-03 PROCEDURE — 99999 PR PBB SHADOW E&M-EST. PATIENT-LVL V: ICD-10-PCS | Mod: PBBFAC,,, | Performed by: STUDENT IN AN ORGANIZED HEALTH CARE EDUCATION/TRAINING PROGRAM

## 2023-01-03 PROCEDURE — 84425 ASSAY OF VITAMIN B-1: CPT | Performed by: STUDENT IN AN ORGANIZED HEALTH CARE EDUCATION/TRAINING PROGRAM

## 2023-01-03 PROCEDURE — 86334 IMMUNOFIX E-PHORESIS SERUM: CPT | Mod: 26,,, | Performed by: PATHOLOGY

## 2023-01-03 PROCEDURE — 3074F SYST BP LT 130 MM HG: CPT | Mod: CPTII,S$GLB,, | Performed by: STUDENT IN AN ORGANIZED HEALTH CARE EDUCATION/TRAINING PROGRAM

## 2023-01-03 PROCEDURE — 84165 PROTEIN E-PHORESIS SERUM: CPT | Performed by: STUDENT IN AN ORGANIZED HEALTH CARE EDUCATION/TRAINING PROGRAM

## 2023-01-03 PROCEDURE — 36415 COLL VENOUS BLD VENIPUNCTURE: CPT | Performed by: STUDENT IN AN ORGANIZED HEALTH CARE EDUCATION/TRAINING PROGRAM

## 2023-01-03 PROCEDURE — 84165 PROTEIN E-PHORESIS SERUM: CPT | Mod: 26,,, | Performed by: PATHOLOGY

## 2023-01-03 PROCEDURE — 99215 OFFICE O/P EST HI 40 MIN: CPT | Mod: S$GLB,,, | Performed by: STUDENT IN AN ORGANIZED HEALTH CARE EDUCATION/TRAINING PROGRAM

## 2023-01-03 PROCEDURE — 1159F MED LIST DOCD IN RCRD: CPT | Mod: CPTII,S$GLB,, | Performed by: STUDENT IN AN ORGANIZED HEALTH CARE EDUCATION/TRAINING PROGRAM

## 2023-01-03 PROCEDURE — 82746 ASSAY OF FOLIC ACID SERUM: CPT | Performed by: STUDENT IN AN ORGANIZED HEALTH CARE EDUCATION/TRAINING PROGRAM

## 2023-01-03 PROCEDURE — 86334 PATHOLOGIST INTERPRETATION IFE: ICD-10-PCS | Mod: 26,,, | Performed by: PATHOLOGY

## 2023-01-03 PROCEDURE — 99215 PR OFFICE/OUTPT VISIT, EST, LEVL V, 40-54 MIN: ICD-10-PCS | Mod: S$GLB,,, | Performed by: STUDENT IN AN ORGANIZED HEALTH CARE EDUCATION/TRAINING PROGRAM

## 2023-01-03 PROCEDURE — 84207 ASSAY OF VITAMIN B-6: CPT | Performed by: STUDENT IN AN ORGANIZED HEALTH CARE EDUCATION/TRAINING PROGRAM

## 2023-01-03 PROCEDURE — 3074F PR MOST RECENT SYSTOLIC BLOOD PRESSURE < 130 MM HG: ICD-10-PCS | Mod: CPTII,S$GLB,, | Performed by: STUDENT IN AN ORGANIZED HEALTH CARE EDUCATION/TRAINING PROGRAM

## 2023-01-03 PROCEDURE — 3078F DIAST BP <80 MM HG: CPT | Mod: CPTII,S$GLB,, | Performed by: STUDENT IN AN ORGANIZED HEALTH CARE EDUCATION/TRAINING PROGRAM

## 2023-01-03 PROCEDURE — 99999 PR PBB SHADOW E&M-EST. PATIENT-LVL V: CPT | Mod: PBBFAC,,, | Performed by: STUDENT IN AN ORGANIZED HEALTH CARE EDUCATION/TRAINING PROGRAM

## 2023-01-03 NOTE — ASSESSMENT & PLAN NOTE
- worked up by rheumatology. Also being seen by rheum for inflammatory arthritis.  - referral to functional restoration program

## 2023-01-03 NOTE — ASSESSMENT & PLAN NOTE
- multifactorial, related to hx of thyroid cancer s/p thyroidectomy and on replacement, inflammatory arthritis and fibro, poor sleep, chronic pains  - low concern for neurodegenerative process at this time, patient is cognitively intact on exam today

## 2023-01-03 NOTE — ASSESSMENT & PLAN NOTE
- association w and without headache  - noted machelle while driving and the aura would cause her eyes to become sleepy and want to shut  - will get EEG to evaluate for epileptiform discharges given nonspecificity but also as this affects patient while driving

## 2023-01-03 NOTE — ASSESSMENT & PLAN NOTE
- multifactorial, likely 2/2 to fibromyalgia and overall body pain. Patient w neck pain and back pain on exam today. Does not fit migraine or other primary headache disorder  - pattern of headache changed after thyroid cancer. Also has auras of light sensitivity - will get MRI brain wo contrast to evaluate for lesion given hx of thyroid cancer (s/p removal in 2020) and positive MARIE, could be autoimmune related  - discussed magnesium supplementation 400mg daily  - discussed lifestyle modifications

## 2023-01-03 NOTE — PROGRESS NOTES
Chief Complaint and Duration     Headaches, cognitive difficulty    History of Present Illness     Dina Hutton is a 37 y.o. female with a history of multiple medical diagnoses as listed below that presents for evaluation and treatment of headache. She does not have a headache at this time.    Hx of thyroid cancer s/p thyroid removal (2020), chronic neck and lower back and overall body pains, joint pains. On birth control. Referred by rheumatology for headaches and neurocognitive issues. Being seen by rheum for inflammatory arthritis and fibromyalgia. Workup ongoing with lupus panel and myositis panel.     Hx of headaches as a child - after thyroid cancer, pattern of headaches changed and is now more diffuse with light sensitivity as an aura. Sees spots in her eyes. Will sometimes be associated w headache but sometimes her eyes become drowsy and she wants to fall asleep. States 3 weeks out of the month, has a generalized headache associated w neck pain.     Gets lightheaded often, which also seems to trigger these headaches. Denies room spinning. Does have ear ringing.     Has overall body parasthesias including in her fingers and her toes, recent diagnosis of carpal tunnel.     Also endorses cognitive difficulty w remembering short term recall.     Poor sleep.     Description of Headaches:  Location of pain: generalized  Radiation of pain?:all throughout  Character of pain:throbbing and sharp  Severity of pain: 7  Accompanying symptoms: lightedheadedness, GERD  Prodromal sx?: light sensitivity, dizzy.   Rapidity of onset: gradually  Typical duration of individual headache: 1 hour - a few hours  Are most headaches similar in presentation? yes  Typical precipitants: light    Temporal Pattern of Headaches:  Started having HA's childhood, then changed in 2 years ago  Worst time of day: morning  Awaken from sleep?: yes  Seasonal pattern?: yes, allergies and cold weather  Clustering of HA's over time? no  Overall  pattern since problem began: gradually worsening    Degree of Functional Impairment: severe, missed social plans    Additional Relevant History:  History of head/neck trauma? Yes, did not hit head, no LOC  History of head/neck surgery? Thyroid surgery  Family h/o headache problems? Yes, mother has it  Use of meds that might worsen HA's (nitrates, exogenous estrogens,    Nifedipine)? Yes, was on birth control  Exposure to carbon monoxide? no  Substance use: occasional alcohol use    Current Medication Regiment:   Abortive meds? Tylenol, advil  Daily use? No (takes it 3x/week double dose)  Prophylactic meds? none    Review of Systems: (positive bolded)  Constitutional: Negative for fatigue, activity change, fevers, or chills.   HENT:  Negative for new visual disturbances or difficulty swallowing.    Respiratory:  Negative for shortness of breath.    Cardiovascular:  Negative for palpitations.   Gastrointestinal:  Negative for constipation, nausea, or vomiting.   Endocrine: Negative for temperature instability/intolerance.   Genitourinary:  Negative for difficulty urinating.   Musculoskeletal:  Negative for neck pain, back pain, myalgias, joint pain, joint swelling, or gait problem.  Skin:  Negative for rash or lesions.   Neurological:  Negative for seizures, headaches, dizziness, tremors, syncope, speech difficulty, weakness, light-headedness, or numbness.   Hematological:  Does not bruise/bleed easily.   Psychiatric/Behavioral: Negative for decreased concentration or sleep disturbance.    Review of patient's allergies indicates:   Allergen Reactions    Vancomycin analogues Hives    Bactrim [sulfamethoxazole-trimethoprim] Nausea And Vomiting    Vicodin [hydrocodone-acetaminophen] Nausea Only     Patient states she is fine with other pain medication     Current Outpatient Medications   Medication Sig Dispense Refill    ascorbic acid/collagen hydr (COLLAGEN PLUS VITAMIN C ORAL) Take by mouth.      CALCIUM ORAL Take by  mouth.      CRANBERRY ORAL Take by mouth.      diphenhydrAMINE (BENADRYL) 25 mg capsule Take 2 capsules (50 mg total) by mouth every 6 (six) hours as needed (Every time you use Compazine.). 20 capsule 0    EScitalopram oxalate (LEXAPRO) 20 MG tablet Take 1 tablet (20 mg total) by mouth once daily. 30 tablet 11    hydrOXYchloroQUINE (PLAQUENIL) 200 mg tablet Take 1 tablet (200 mg total) by mouth 2 (two) times daily. 60 tablet 5    ibuprofen (ADVIL,MOTRIN) 600 MG tablet Take 1 tablet (600 mg total) by mouth every 8 (eight) hours as needed for Pain. 30 tablet 0    methyl salicylate/menth/camph (SALONPAS TOP) Apply topically. patch      methylPREDNISolone (MEDROL DOSEPACK) 4 mg tablet use as directed 1 each 1    mv-min/iron/folic/calcium/vitK (WOMEN'S MULTIVITAMIN ORAL) Take by mouth.      norelgestromin-ethinyl estradiol 150-35 mcg/24 hr Place 1 patch onto the skin every 7 days. 4 patch 11    omeprazole (PRILOSEC) 40 MG capsule Take 1 capsule (40 mg total) by mouth once daily. 90 capsule 1    TIROSINT 88 mcg Cap Take 1 capsule (88 mcg) by mouth once daily at 6am. Brand name Tirosint medically necessary 90 capsule 3     No current facility-administered medications for this visit.     Medical History     Past Medical History:   Diagnosis Date    Anemia     Breast abscess 4/25/2020    GERD (gastroesophageal reflux disease)     History of fourth degree perineal laceration 8/21/2019    Hypoglycemia     Hypoglycemia     Mastitis 4/20/2020    Mental disorder     depression    PONV (postoperative nausea and vomiting)     Thyroid cancer     Thyroid disease      Past Surgical History:   Procedure Laterality Date    BREAST CYST EXCISION      COLONOSCOPY      ESOPHAGOGASTRODUODENOSCOPY      ESOPHAGOGASTRODUODENOSCOPY N/A 6/14/2021    Procedure: EGD (ESOPHAGOGASTRODUODENOSCOPY);  Surgeon: Farooq Cullen MD;  Location: [facility];  Service: Endoscopy;  Laterality: N/A;  ongoing epigastric pain, burning, nausea, vomiting  Lives on  [facility], want first available but if there are openings on [place], would prefer that location  cOVID test on 6/11/21 at [facility]    INCISION AND DRAINAGE OF BREAST Left 4/23/2020    Procedure: INCISION AND DRAINAGE, BREAST;  Surgeon: Mason Rubalcava III, MD;  Location: [facility] OR;  Service: General;  Laterality: Left;    THYROIDECTOMY Bilateral 9/29/2020    Procedure: THYROIDECTOMY with central neck dissection;  Surgeon: Kayla Lovelace MD;  Location: [facility] OR;  Service: General;  Laterality: Bilateral;     Family History   Problem Relation Age of Onset    Hypertension Mother     Hyperlipidemia Mother     Cancer Neg Hx      Social History     Socioeconomic History    Marital status:    Tobacco Use    Smoking status: Never    Smokeless tobacco: Never   Substance and Sexual Activity    Alcohol use: Yes     Comment: occasional    Drug use: No    Sexual activity: Yes     Partners: Male       Exam     Vitals:    01/03/23 0732   BP: (!) 114/59   Pulse: 77      Physical Exam:  General: She is not in acute distress. She is not ill-appearing.   HENT: Normocephalic and atraumatic. Moist mucous membranes.  Eyes: Conjunctivae normal.   Pulmonary: Pulmonary effort is normal.   Abdominal: Abdomen is soft and flat.   Skin: Skin is warm and dry. No rashes.   Psychiatric: Mood normal.        Neurologic Exam   Mental status: oriented to person, place, and time  Attention: Normal. Concentration: normal. Able to spell "world' backwards, able to do serial 7s.   Speech: speech is normal.  Cranial Nerves: PERRL, EOMI intact, V1-V3 Facial sensation intact. Symmetric facies. Hearing grossly intact. Palate and uvula midline, symmetric. No tongue deviation. Trapezius strength intact.     Motor exam: bulk and tone normal. Strength 5/5 in bilateral upper extremities: deltoids, biceps, triceps, wrist flexion/extension, finger abduction/adduction. Strength 5/5 in bilateral lower extremities: hip flexion/extension, thigh " adduction/abduction, knee flexion/extension, dorsiflexion/plantarflexion, foot eversion/inversion.    Reflexes: 2+ in bilateral upper extremities: biceps and brachiaradialis, 2+ in bilateral lower extremities: patellar and achilles  Plantar reflex: normal    Sensory exam: light touch intact    Gait exam: normal  Romberg: negative  Coordination: normal     Tremor: none    Labs and Imaging     Labs: reviewed  12/3/22 - TSH and T4 wnl. MARIE positive.   11/2/22 - ESR 42, MARIE positive    Imaging: reviewed personally  CT 8/31/22 - no acute intracranial abnormality    Assessment and Plan     Problem List Items Addressed This Visit          Neuro    Intractable headache    Current Assessment & Plan     - multifactorial, likely 2/2 to fibromyalgia and overall body pain. Patient w neck pain and back pain on exam today. Does not fit migraine or other primary headache disorder  - pattern of headache changed after thyroid cancer. Also has auras of light sensitivity - will get MRI brain wo contrast to evaluate for lesion given hx of thyroid cancer (s/p removal in 2020) and positive MARIE, could be autoimmune related  - discussed magnesium supplementation 400mg daily  - discussed lifestyle modifications         Relevant Orders    MRI Brain Without Contrast    Aura    Current Assessment & Plan     - association w and without headache  - noted machelle while driving and the aura would cause her eyes to become sleepy and want to shut  - will get EEG to evaluate for epileptiform discharges given nonspecificity but also as this affects patient while driving           Relevant Orders    EEG,w/awake & drowsy record    MRI Brain Without Contrast    MCI (mild cognitive impairment)    Current Assessment & Plan     - multifactorial, related to hx of thyroid cancer s/p thyroidectomy and on replacement, inflammatory arthritis and fibro, poor sleep, chronic pains  - low concern for neurodegenerative process at this time, patient is cognitively intact  on exam today            Orthopedic    Fibromyalgia    Current Assessment & Plan     - worked up by rheumatology. Also being seen by rheum for inflammatory arthritis.  - referral to functional restoration program         Relevant Orders    Ambulatory referral/consult to [facility]       Other    Paresthesia - Primary    Current Assessment & Plan     - has bilateral carpal tunnel  - endorses in her finger and toes, numbness and tingling that can wake her up from sleep at night  - will get levels of B vitamins and protein electrophoresis given hx of thyroid cancer  - reviewed calcium levels which could be associated w paraesthesias - unremarkable. This can also be related to patient hx of thyroid cancer         Relevant Orders    Vitamin B1    Vitamin B12    Vitamin B6    Folate    Protein Electrophoresis, Serum    Immunofixation Electrophoresis       Follow-up: after workup    Time spent on this encounter: 55 minutes. This includes face to face time and non-face to face time preparing to see the patient (eg, review of tests), obtaining and/or reviewing separately obtained history, documenting clinical information in the electronic or other health record, independently interpreting results and communicating results to the patient/family/caregiver, or care coordinator.

## 2023-01-03 NOTE — ASSESSMENT & PLAN NOTE
- has bilateral carpal tunnel  - endorses in her finger and toes, numbness and tingling that can wake her up from sleep at night  - will get levels of B vitamins and protein electrophoresis given hx of thyroid cancer  - reviewed calcium levels which could be associated w paraesthesias - unremarkable. This can also be related to patient hx of thyroid cancer

## 2023-01-04 LAB
ALBUMIN SERPL ELPH-MCNC: 4.42 G/DL (ref 3.35–5.55)
ALPHA1 GLOB SERPL ELPH-MCNC: 0.22 G/DL (ref 0.17–0.41)
ALPHA2 GLOB SERPL ELPH-MCNC: 0.62 G/DL (ref 0.43–0.99)
B-GLOBULIN SERPL ELPH-MCNC: 0.8 G/DL (ref 0.5–1.1)
GAMMA GLOB SERPL ELPH-MCNC: 1.14 G/DL (ref 0.67–1.58)
INTERPRETATION SERPL IFE-IMP: NORMAL
PROT SERPL-MCNC: 7.2 G/DL (ref 6–8.4)

## 2023-01-05 LAB
PATHOLOGIST INTERPRETATION IFE: NORMAL
PATHOLOGIST INTERPRETATION SPE: NORMAL

## 2023-01-06 ENCOUNTER — PATIENT MESSAGE (OUTPATIENT)
Dept: RHEUMATOLOGY | Facility: CLINIC | Age: 38
End: 2023-01-06
Payer: COMMERCIAL

## 2023-01-07 ENCOUNTER — HOSPITAL ENCOUNTER (OUTPATIENT)
Dept: RADIOLOGY | Facility: HOSPITAL | Age: 38
Discharge: HOME | End: 2023-01-07
Attending: STUDENT IN AN ORGANIZED HEALTH CARE EDUCATION/TRAINING PROGRAM
Payer: COMMERCIAL

## 2023-01-07 DIAGNOSIS — R51.9 INTRACTABLE HEADACHE, UNSPECIFIED CHRONICITY PATTERN, UNSPECIFIED HEADACHE TYPE: ICD-10-CM

## 2023-01-07 DIAGNOSIS — R29.818 AURA: ICD-10-CM

## 2023-01-07 PROCEDURE — 70551 MRI BRAIN WITHOUT CONTRAST: ICD-10-PCS | Mod: 26,,, | Performed by: RADIOLOGY

## 2023-01-07 PROCEDURE — 70551 MRI BRAIN STEM W/O DYE: CPT | Mod: 26,,, | Performed by: RADIOLOGY

## 2023-01-07 PROCEDURE — 70551 MRI BRAIN STEM W/O DYE: CPT | Mod: TC

## 2023-01-10 DIAGNOSIS — M25.541 ARTHRALGIA OF BOTH HANDS: Primary | ICD-10-CM

## 2023-01-10 DIAGNOSIS — M25.542 ARTHRALGIA OF BOTH HANDS: Primary | ICD-10-CM

## 2023-01-10 LAB
PYRIDOXAL SERPL-MCNC: 9 UG/L (ref 5–50)
VIT B1 BLD-MCNC: 61 UG/L (ref 38–122)

## 2023-01-10 RX ORDER — SULFASALAZINE 500 MG/1
TABLET ORAL
Qty: 180 TABLET | Refills: 3 | OUTPATIENT
Start: 2023-01-10 | End: 2023-01-21

## 2023-01-17 ENCOUNTER — LAB VISIT (OUTPATIENT)
Dept: LAB | Facility: HOSPITAL | Age: 38
End: 2023-01-17
Attending: INTERNAL MEDICINE
Payer: COMMERCIAL

## 2023-01-17 DIAGNOSIS — M25.542 ARTHRALGIA OF BOTH HANDS: ICD-10-CM

## 2023-01-17 DIAGNOSIS — M25.541 ARTHRALGIA OF BOTH HANDS: ICD-10-CM

## 2023-01-17 LAB
ALBUMIN SERPL BCP-MCNC: 3.9 G/DL (ref 3.5–5.2)
ALP SERPL-CCNC: 76 U/L (ref 55–135)
ALT SERPL W/O P-5'-P-CCNC: 23 U/L (ref 10–44)
ANION GAP SERPL CALC-SCNC: 7 MMOL/L (ref 8–16)
AST SERPL-CCNC: 18 U/L (ref 10–40)
BILIRUB SERPL-MCNC: 0.2 MG/DL (ref 0.1–1)
BUN SERPL-MCNC: 13 MG/DL (ref 6–20)
CALCIUM SERPL-MCNC: 9.2 MG/DL (ref 8.7–10.5)
CHLORIDE SERPL-SCNC: 105 MMOL/L (ref 95–110)
CO2 SERPL-SCNC: 27 MMOL/L (ref 23–29)
CREAT SERPL-MCNC: 0.7 MG/DL (ref 0.5–1.4)
EST. GFR  (NO RACE VARIABLE): >60 ML/MIN/1.73 M^2
GLUCOSE SERPL-MCNC: 83 MG/DL (ref 70–110)
POTASSIUM SERPL-SCNC: 4.3 MMOL/L (ref 3.5–5.1)
PROT SERPL-MCNC: 7.3 G/DL (ref 6–8.4)
SODIUM SERPL-SCNC: 139 MMOL/L (ref 136–145)

## 2023-01-17 PROCEDURE — 80053 COMPREHEN METABOLIC PANEL: CPT | Performed by: INTERNAL MEDICINE

## 2023-01-17 PROCEDURE — 36415 COLL VENOUS BLD VENIPUNCTURE: CPT | Mod: PO | Performed by: INTERNAL MEDICINE

## 2023-01-19 ENCOUNTER — OFFICE VISIT (OUTPATIENT)
Dept: URGENT CARE | Facility: CLINIC | Age: 38
End: 2023-01-19
Payer: COMMERCIAL

## 2023-01-19 ENCOUNTER — PATIENT MESSAGE (OUTPATIENT)
Dept: RHEUMATOLOGY | Facility: CLINIC | Age: 38
End: 2023-01-19
Payer: COMMERCIAL

## 2023-01-19 VITALS
OXYGEN SATURATION: 98 % | BODY MASS INDEX: 26.97 KG/M2 | TEMPERATURE: 98 F | DIASTOLIC BLOOD PRESSURE: 75 MMHG | SYSTOLIC BLOOD PRESSURE: 115 MMHG | RESPIRATION RATE: 16 BRPM | WEIGHT: 125 LBS | HEART RATE: 81 BPM | HEIGHT: 57 IN

## 2023-01-19 DIAGNOSIS — M25.431 SWELLING OF BOTH WRISTS: ICD-10-CM

## 2023-01-19 DIAGNOSIS — L50.9 URTICARIA: Primary | ICD-10-CM

## 2023-01-19 DIAGNOSIS — M25.432 SWELLING OF BOTH WRISTS: ICD-10-CM

## 2023-01-19 DIAGNOSIS — T78.40XA ALLERGIC REACTION, INITIAL ENCOUNTER: ICD-10-CM

## 2023-01-19 PROCEDURE — 1159F MED LIST DOCD IN RCRD: CPT | Mod: CPTII,S$GLB,,

## 2023-01-19 PROCEDURE — 96372 PR INJECTION,THERAP/PROPH/DIAG2ST, IM OR SUBCUT: ICD-10-PCS | Mod: S$GLB,,,

## 2023-01-19 PROCEDURE — 1160F PR REVIEW ALL MEDS BY PRESCRIBER/CLIN PHARMACIST DOCUMENTED: ICD-10-PCS | Mod: CPTII,S$GLB,,

## 2023-01-19 PROCEDURE — 3074F PR MOST RECENT SYSTOLIC BLOOD PRESSURE < 130 MM HG: ICD-10-PCS | Mod: CPTII,S$GLB,,

## 2023-01-19 PROCEDURE — 3008F PR BODY MASS INDEX (BMI) DOCUMENTED: ICD-10-PCS | Mod: CPTII,S$GLB,,

## 2023-01-19 PROCEDURE — 3074F SYST BP LT 130 MM HG: CPT | Mod: CPTII,S$GLB,,

## 2023-01-19 PROCEDURE — 3078F DIAST BP <80 MM HG: CPT | Mod: CPTII,S$GLB,,

## 2023-01-19 PROCEDURE — 3078F PR MOST RECENT DIASTOLIC BLOOD PRESSURE < 80 MM HG: ICD-10-PCS | Mod: CPTII,S$GLB,,

## 2023-01-19 PROCEDURE — 1160F RVW MEDS BY RX/DR IN RCRD: CPT | Mod: CPTII,S$GLB,,

## 2023-01-19 PROCEDURE — 99213 PR OFFICE/OUTPT VISIT, EST, LEVL III, 20-29 MIN: ICD-10-PCS | Mod: 25,S$GLB,,

## 2023-01-19 PROCEDURE — 1159F PR MEDICATION LIST DOCUMENTED IN MEDICAL RECORD: ICD-10-PCS | Mod: CPTII,S$GLB,,

## 2023-01-19 PROCEDURE — 99213 OFFICE O/P EST LOW 20 MIN: CPT | Mod: 25,S$GLB,,

## 2023-01-19 PROCEDURE — 96372 THER/PROPH/DIAG INJ SC/IM: CPT | Mod: S$GLB,,,

## 2023-01-19 PROCEDURE — 3008F BODY MASS INDEX DOCD: CPT | Mod: CPTII,S$GLB,,

## 2023-01-19 RX ORDER — FAMOTIDINE 20 MG/1
20 TABLET, FILM COATED ORAL 2 TIMES DAILY
Qty: 20 TABLET | Refills: 0 | OUTPATIENT
Start: 2023-01-19 | End: 2023-01-21

## 2023-01-19 RX ORDER — HYDROXYZINE HYDROCHLORIDE 25 MG/1
25 TABLET, FILM COATED ORAL 3 TIMES DAILY
Qty: 30 TABLET | Refills: 0 | Status: SHIPPED | OUTPATIENT
Start: 2023-01-19 | End: 2023-02-16

## 2023-01-19 RX ORDER — DEXAMETHASONE SODIUM PHOSPHATE 100 MG/10ML
10 INJECTION INTRAMUSCULAR; INTRAVENOUS
Status: COMPLETED | OUTPATIENT
Start: 2023-01-19 | End: 2023-01-19

## 2023-01-19 RX ORDER — PREDNISONE 20 MG/1
40 TABLET ORAL DAILY
Qty: 10 TABLET | Refills: 0 | OUTPATIENT
Start: 2023-01-19 | End: 2023-01-21

## 2023-01-19 RX ADMIN — DEXAMETHASONE SODIUM PHOSPHATE 10 MG: 100 INJECTION INTRAMUSCULAR; INTRAVENOUS at 06:01

## 2023-01-20 NOTE — PATIENT INSTRUCTIONS
Please drink plenty of fluids. Please get plenty of rest.     Please return here or go to the Emergency Department for any concerns or worsening of condition.     Please take famotidine and hydroxyzine as prescribed     If you were given a steroid shot in the clinic and have also been given a prescription for a steroid such as Prednisone or a Medrol Dose Pack, please begin taking them tomorrow. If you received a steroid shot today - this can elevate your blood pressure, elevate your blood sugar, water weight gain, nervous energy, redness to the face and dimpling of the skin where the shot goes in.      If you have a localized reaction it is ok to apply OTC  topical creams (e.g. Cortaid) as directed to the affected area. You can also use a cool compress to reduce itching.      In the future make sure to keep benadryl with you or an Epi-pen if you were prescribed one.

## 2023-01-20 NOTE — PROGRESS NOTES
"Subjective:       Patient ID: Dina Hutton is a 37 y.o. female.    Vitals:  height is 4' 9" (1.448 m) and weight is 56.7 kg (125 lb). Her temperature is 98.1 °F (36.7 °C). Her blood pressure is 115/75 and her pulse is 81. Her respiration is 16 and oxygen saturation is 98%.     Chief Complaint: Allergic Reaction    Pt is a 36 y/o F who presents with hives, itching, and BL wrist swelling that started yesterday. Pt reports that she started sulfasalazine on 1/10/22. Initially on one tablet BID. She then increased her dosage to 2 tablets BID yesterday. She is having hives and itchiness to her BL arms, legs, and back. She has taken benadryl and zyrtec with temporary relief. Pt reports she has had a similar reaction to bactrim in the past with hives and swelling. Has been having diarrhea since she started taking the sulfasalazine. Denies fever, abdominal pain, trouble swallowing, SoB, CP, wheezing, facial swelling.    Allergic Reaction  This is a new problem. The current episode started yesterday. The problem has been gradually worsening since onset. The problem is moderate. The patient was exposed to an antibiotic. Associated symptoms include diarrhea and itching. Pertinent negatives include no abdominal pain, chest pain, chest pressure, coughing, difficulty breathing, drooling, eye itching, eye redness, eye watering, globus sensation, hyperventilation, rash, skin blistering, stridor, trouble swallowing, vomiting or wheezing. Past treatments include diphenhydramine. The treatment provided no relief. Swelling is present on the feet.     Constitution: Negative for chills and fever.   HENT:  Negative for ear pain, ear discharge, drooling, congestion, sore throat, trouble swallowing and voice change.    Neck: Negative for neck pain and neck stiffness.   Cardiovascular:  Negative for chest pain.   Eyes:  Negative for eye itching and eye redness.   Respiratory:  Negative for cough, stridor and wheezing.    Gastrointestinal:  " Positive for diarrhea. Negative for abdominal pain, nausea, vomiting and constipation.   Genitourinary:  Negative for dysuria.   Musculoskeletal:  Negative for muscle ache.   Skin:  Negative for rash.   Allergic/Immunologic: Negative for sneezing.   Neurological:  Negative for dizziness, headaches, numbness and tingling.     Objective:      Physical Exam   Constitutional: She is oriented to person, place, and time. She appears well-developed.   HENT:   Head: Normocephalic and atraumatic.   Ears:   Right Ear: Tympanic membrane, external ear and ear canal normal.   Left Ear: Tympanic membrane, external ear and ear canal normal.   Nose: Nose normal.   Mouth/Throat: Oropharynx is clear and moist. Mucous membranes are moist. Oropharynx is clear.      Comments: Airway patent.  Handling secretions.No facial swelling noted.  Eyes: Conjunctivae, EOM and lids are normal. Pupils are equal, round, and reactive to light. Extraocular movement intact   Neck: Trachea normal and phonation normal. Neck supple.   Cardiovascular: Normal rate, regular rhythm, normal heart sounds and normal pulses.   Pulmonary/Chest: Effort normal and breath sounds normal. No respiratory distress. She has no wheezes. She has no rhonchi. She has no rales.   Musculoskeletal: Normal range of motion.         General: Swelling present. Normal range of motion.      Comments: Mild edema to bilateral wrists.   Neurological: no focal deficit. She is alert and oriented to person, place, and time. No cranial nerve deficit.   Skin: Skin is warm, dry, intact, rash and urticarial. Capillary refill takes less than 2 seconds.         Comments: Scattered hives to bilateral arms, legs, back.   Psychiatric: Her speech is normal and behavior is normal. Judgment and thought content normal.   Nursing note and vitals reviewed.      Assessment:       1. Urticaria    2. Swelling of both wrists    3. Allergic reaction, initial encounter          Plan:         Urticaria  -      dexAMETHasone injection 10 mg  -     famotidine (PEPCID) 20 MG tablet; Take 1 tablet (20 mg total) by mouth 2 (two) times daily. for 10 days  Dispense: 20 tablet; Refill: 0  -     hydrOXYzine HCL (ATARAX) 25 MG tablet; Take 1 tablet (25 mg total) by mouth 3 (three) times daily.  Dispense: 30 tablet; Refill: 0  -     predniSONE (DELTASONE) 20 MG tablet; Take 2 tablets (40 mg total) by mouth once daily. for 5 days  Dispense: 10 tablet; Refill: 0    Swelling of both wrists    Allergic reaction, initial encounter  -     dexAMETHasone injection 10 mg  -     famotidine (PEPCID) 20 MG tablet; Take 1 tablet (20 mg total) by mouth 2 (two) times daily. for 10 days  Dispense: 20 tablet; Refill: 0  -     hydrOXYzine HCL (ATARAX) 25 MG tablet; Take 1 tablet (25 mg total) by mouth 3 (three) times daily.  Dispense: 30 tablet; Refill: 0  -     predniSONE (DELTASONE) 20 MG tablet; Take 2 tablets (40 mg total) by mouth once daily. for 5 days  Dispense: 10 tablet; Refill: 0           Patient is a 37-year-old female who presents with bilateral wrist swelling,  hives, itchiness that started yesterday.  Patient has been taking sulfasalazine that was newly prescribed on 01/10/2023.  Recently increased her dose yesterday.  Vital signs are stable.  On exam, patient is nontoxic and in no acute distress.  Lungs clear to auscultation bilaterally.  RRR.  Bilateral wrist swelling noted.  Hives to bilateral arms, legs, back.  No oral facial swelling.  Do not suspect anaphylaxis  Or other severe drug reaction such as SJS/TEN.  Will treat for allergic reaction with steroids, hydroxyzine, Pepcid.    Advised to discontinue sulfasalazine and notify rheumatologist. ED precautions given.  Patient verbalizes understanding and agrees with plan.          Patient Instructions   Please drink plenty of fluids. Please get plenty of rest.     Please return here or go to the Emergency Department for any concerns or worsening of condition.     Please take  famotidine and hydroxyzine as prescribed     If you were given a steroid shot in the clinic and have also been given a prescription for a steroid such as Prednisone or a Medrol Dose Pack, please begin taking them tomorrow. If you received a steroid shot today - this can elevate your blood pressure, elevate your blood sugar, water weight gain, nervous energy, redness to the face and dimpling of the skin where the shot goes in.      If you have a localized reaction it is ok to apply OTC  topical creams (e.g. Cortaid) as directed to the affected area. You can also use a cool compress to reduce itching.      In the future make sure to keep benadryl with you or an Epi-pen if you were prescribed one.

## 2023-01-21 ENCOUNTER — HOSPITAL ENCOUNTER (EMERGENCY)
Facility: HOSPITAL | Age: 38
Discharge: HOME | End: 2023-01-21
Attending: EMERGENCY MEDICINE
Payer: COMMERCIAL

## 2023-01-21 VITALS
RESPIRATION RATE: 20 BRPM | OXYGEN SATURATION: 97 % | DIASTOLIC BLOOD PRESSURE: 75 MMHG | WEIGHT: 129 LBS | HEART RATE: 83 BPM | HEIGHT: 57 IN | TEMPERATURE: 98 F | BODY MASS INDEX: 27.83 KG/M2 | SYSTOLIC BLOOD PRESSURE: 127 MMHG

## 2023-01-21 DIAGNOSIS — R21 RASH AND NONSPECIFIC SKIN ERUPTION: Primary | ICD-10-CM

## 2023-01-21 LAB
B-HCG UR QL: NEGATIVE
CTP QC/QA: YES

## 2023-01-21 PROCEDURE — 96372 THER/PROPH/DIAG INJ SC/IM: CPT | Performed by: EMERGENCY MEDICINE

## 2023-01-21 PROCEDURE — 63600175 PHARM REV CODE 636 W HCPCS: Mod: ER | Performed by: EMERGENCY MEDICINE

## 2023-01-21 PROCEDURE — 81025 URINE PREGNANCY TEST: CPT | Mod: ER | Performed by: EMERGENCY MEDICINE

## 2023-01-21 PROCEDURE — 99284 EMERGENCY DEPT VISIT MOD MDM: CPT | Mod: ER

## 2023-01-21 RX ORDER — TRIAMCINOLONE ACETONIDE 1 MG/G
OINTMENT TOPICAL 2 TIMES DAILY
Qty: 80 G | Refills: 0 | Status: SHIPPED | OUTPATIENT
Start: 2023-01-21 | End: 2023-03-10

## 2023-01-21 RX ORDER — MONTELUKAST SODIUM 10 MG/1
10 TABLET ORAL NIGHTLY
Qty: 30 TABLET | Refills: 0 | Status: SHIPPED | OUTPATIENT
Start: 2023-01-21 | End: 2023-02-20

## 2023-01-21 RX ORDER — DIPHENHYDRAMINE HYDROCHLORIDE 50 MG/ML
50 INJECTION INTRAMUSCULAR; INTRAVENOUS
Status: COMPLETED | OUTPATIENT
Start: 2023-01-21 | End: 2023-01-21

## 2023-01-21 RX ORDER — PREDNISONE 20 MG/1
60 TABLET ORAL DAILY
Qty: 10 TABLET | Refills: 0 | Status: SHIPPED | OUTPATIENT
Start: 2023-01-21 | End: 2023-01-26

## 2023-01-21 RX ORDER — FAMOTIDINE 20 MG/1
40 TABLET, FILM COATED ORAL 2 TIMES DAILY
Qty: 28 TABLET | Refills: 0 | Status: SHIPPED | OUTPATIENT
Start: 2023-01-21 | End: 2023-02-16

## 2023-01-21 RX ADMIN — DIPHENHYDRAMINE HYDROCHLORIDE 50 MG: 50 INJECTION, SOLUTION INTRAMUSCULAR; INTRAVENOUS at 03:01

## 2023-01-21 NOTE — ED PROVIDER NOTES
Encounter Date: 1/21/2023       History     Chief Complaint   Patient presents with    Urticaria     PT REPORTS HIVES ALL OVER BODY FOR 2 DAYS, REPORTS SEEN AT URGENT CARE YESTERDAY AND GIVEN MEDICATIONS BUT REPORTS NOT WORKING     37 y.o. female with multiple medical problems including thyroid disease, anemia, Bactrim allergy and others presents to the emergency department complaining of acute, itchy rash that began over two weeks ago.  She reports being prescribed sulfasalazine on January 10th and states the rash began on January 17th.  She discontinued taking sulfasalazine and was seen urgent care yesterday for the rash.  She states she was given a steroid shot and prescribed prednisone 40 mg x 5 days, famotidine, hydroxyzine and Zyrtec for symptoms.  Patient states she declined to fill the hydroxyzine prescription due to feared adverse reaction.  She reports taking Benadryl for symptoms instead.  She reports persistent itching and rash despite these interventions.  She denies shortness of breath, chest pain, nausea, vomiting, facial swelling, throat swelling, difficulty swallowing dizziness or syncope.    The history is provided by the patient.   Rash   This is a new problem. The current episode started several weeks ago (two weeks ago).   Review of patient's allergies indicates:   Allergen Reactions    Vancomycin analogues Hives    Bactrim [sulfamethoxazole-trimethoprim] Nausea And Vomiting    Vicodin [hydrocodone-acetaminophen] Nausea Only     Patient states she is fine with other pain medication     Past Medical History:   Diagnosis Date    Anemia     Breast abscess 4/25/2020    GERD (gastroesophageal reflux disease)     History of fourth degree perineal laceration 8/21/2019    Hypoglycemia     Hypoglycemia     Mastitis 4/20/2020    Mental disorder     depression    PONV (postoperative nausea and vomiting)     Thyroid cancer     Thyroid disease      Past Surgical History:   Procedure Laterality Date    BREAST  CYST EXCISION      COLONOSCOPY      ESOPHAGOGASTRODUODENOSCOPY      ESOPHAGOGASTRODUODENOSCOPY N/A 6/14/2021    Procedure: EGD (ESOPHAGOGASTRODUODENOSCOPY);  Surgeon: Farooq Cullen MD;  Location: [facility] ENDO;  Service: Endoscopy;  Laterality: N/A;  ongoing epigastric pain, burning, nausea, vomiting  Lives on [facility], want first available but if there are openings on , would prefer that location  cOVID test on 6/11/21 at [facility]    INCISION AND DRAINAGE OF BREAST Left 4/23/2020    Procedure: INCISION AND DRAINAGE, BREAST;  Surgeon: Mason Rubalcava III, MD;  Location: [facility] OR;  Service: General;  Laterality: Left;    THYROIDECTOMY Bilateral 9/29/2020    Procedure: THYROIDECTOMY with central neck dissection;  Surgeon: Kayla Lovelace MD;  Location: [facility] OR;  Service: General;  Laterality: Bilateral;     Family History   Problem Relation Age of Onset    Hypertension Mother     Hyperlipidemia Mother     Cancer Neg Hx      Social History     Tobacco Use    Smoking status: Never    Smokeless tobacco: Never   Substance Use Topics    Alcohol use: Yes     Comment: occasional    Drug use: No     Review of Systems   HENT:  Negative for drooling, facial swelling, trouble swallowing and voice change.    Respiratory:  Negative for shortness of breath.    Cardiovascular:  Negative for chest pain.   Gastrointestinal:  Negative for nausea and vomiting.   Neurological:  Negative for dizziness and syncope.   All other systems reviewed and are negative.    Physical Exam     Initial Vitals [01/21/23 0153]   BP Pulse Resp Temp SpO2   127/75 83 20 97.8 °F (36.6 °C) 97 %      MAP       --         Physical Exam    Nursing note and vitals reviewed.  Constitutional: She appears well-developed and well-nourished. She is not diaphoretic. She is cooperative. She does not appear ill. No distress.   HENT:   Head: Normocephalic and atraumatic.   Eyes: Conjunctivae are normal.   Neck: Phonation normal. Neck supple. No stridor present.    Normal range of motion.  Cardiovascular:  Normal rate and intact distal pulses.           Pulmonary/Chest: Effort normal. No accessory muscle usage. No tachypnea. No respiratory distress. She has no decreased breath sounds. She has no wheezes. She has no rhonchi. She has no rales.   Abdominal: She exhibits no distension. There is no abdominal tenderness.   Musculoskeletal:         General: No tenderness. Normal range of motion.      Cervical back: Normal range of motion and neck supple.     Neurological: She is alert and oriented to person, place, and time. She has normal strength. Gait normal. GCS eye subscore is 4. GCS verbal subscore is 5. GCS motor subscore is 6.   Skin: Skin is warm. Capillary refill takes less than 2 seconds. Rash noted. Rash is macular (bilateral arms, legs, chest and back). Rash is not urticarial.   Psychiatric: She has a normal mood and affect.       ED Course   Procedures  Labs Reviewed   POCT URINE PREGNANCY          Imaging Results    None          Medications   diphenhydrAMINE injection 50 mg (50 mg Intramuscular Given 1/21/23 0305)                          Labs Reviewed  Admission on 01/21/2023, Discharged on 01/21/2023   Component Date Value Ref Range Status    POC Preg Test, Ur 01/21/2023 Negative  Negative Final     Acceptable 01/21/2023 Yes   Final        Imaging Reviewed    Imaging Results    None         Medications given in ED    Medications   diphenhydrAMINE injection 50 mg (50 mg Intramuscular Given 1/21/23 0305)       Note was created using voice recognition software. Note may have occasional typographical errors that may not have been identified and edited despite good momo initial review prior to signing.      Clinical Impression:   Final diagnoses:  [R21] Rash and nonspecific skin eruption (Primary)        ED Disposition Condition    Discharge Stable          ED Prescriptions       Medication Sig Dispense Start Date End Date Auth. Provider    montelukast  (SINGULAIR) 10 mg tablet Take 1 tablet (10 mg total) by mouth every evening. 30 tablet 1/21/2023 2/20/2023 Gita Conde MD    predniSONE (DELTASONE) 20 MG tablet Take 3 tablets (60 mg total) by mouth once daily. for 5 days 10 tablet 1/21/2023 1/26/2023 Gita Conde MD    famotidine (PEPCID) 20 MG tablet Take 2 tablets (40 mg total) by mouth 2 (two) times daily. for 7 days 28 tablet 1/21/2023 1/28/2023 Gita Conde MD    triamcinolone acetonide 0.1% (KENALOG) 0.1 % ointment Apply topically 2 (two) times daily. Do not use on face 80 g 1/21/2023 -- iGta Conde MD          Follow-up Information       Follow up With Specialties Details Why Contact Info Additional Information    Lona Pham MD Internal Medicine Call  As needed, for ongoing care [address]  [place] [id number] [phone]       The nearest emergency department.  Go to  As needed, If symptoms worsen      [facility] - Allergy/Immunology Allergy Call  Monday morning, to schedule an appointment, for re-evaluation of today's complaint, and ongoing care [address] [facility] [id number] [phone] [place] [address]             Gita Conde MD  01/22/23 [id number]

## 2023-02-01 ENCOUNTER — HOSPITAL ENCOUNTER (OUTPATIENT)
Dept: NEUROLOGY | Facility: HOSPITAL | Age: 38
Discharge: HOME | End: 2023-02-01
Attending: STUDENT IN AN ORGANIZED HEALTH CARE EDUCATION/TRAINING PROGRAM
Payer: COMMERCIAL

## 2023-02-01 DIAGNOSIS — R29.818 AURA: ICD-10-CM

## 2023-02-01 PROCEDURE — 95816 EEG AWAKE AND DROWSY: CPT

## 2023-02-01 PROCEDURE — 95816 EEG AWAKE AND DROWSY: CPT | Mod: 26,,, | Performed by: PSYCHIATRY & NEUROLOGY

## 2023-02-01 PROCEDURE — 95816 PR EEG,W/AWAKE & DROWSY RECORD: ICD-10-PCS | Mod: 26,,, | Performed by: PSYCHIATRY & NEUROLOGY

## 2023-02-01 NOTE — PROCEDURES
EEG,w/awake & drowsy record    Date/Time: 2/1/2023 9:22 AM  Performed by: Dillon Elder MD  Authorized by: Roseline Hutton MD     ELECTROENCEPHALOGRAM REPORT    DATE OF SERVICE: 2/1/23  EEG NUMBER: OW   REQUESTED BY: Anni  [place] [facility] [place]OLOGY   Electroencephalographic (EEG) recording is with electrodes placed according to the International 10-20 placement system.  Thirty two (32) channels of digital signal (sampling rate of 512/sec) including T1 and T2 was simultaneously recorded from the scalp and may include  EKG, EMG, and/or eye monitors.  Recording band pass was 0.1 to 512 hz.  Digital video recording of the patient is simultaneously recorded with the EEG.  The patient is instructed report clinical symptoms which may occur during the recording session.  EEG and video recording is stored and archived in digital format. Activation procedures which include photic stimulation, hyperventilation and instructing patients to perform simple task are done in selected patients.    The EEG is displayed on a monitor screen and can be reviewed using different montages.  Computer assisted analysis is employed to detect spike and electrographic seizure activity.   The entire record is submitted for computer analysis.  The entire recording is visually reviewed and the times identified by computer analysis as being spikes or seizures are reviewed again.  Compresses spectral analysis (CSA) is also performed on the activity recorded from each individual channel.  This is displayed as a power display of frequencies from 0 to 30 Hz over time.   The CSA is reviewed looking for asymmetries in power between homologous areas of the scalp and then compared with the original EEG recording.     [employer] software was also utilized in the review of this study.  This software suite analyzes the EEG recording in multiple domains.  Coherence and rhythmicity is computed to identify EEG sections which may contain  organized seizures.  Each channel undergoes analysis to detect presence of spike and sharp waves which have special and morphological characteristic of epileptic activity.  The routine EEG recording is converted from spacial into frequency domain.  This is then displayed comparing homologous areas to identify areas of significant asymmetry.  Algorithm to identify non-cortically generated artifact is used to separate eye movement, EMG and other artifact from the EEG    EEG FINDINGS  The record shows a good  organization at rest, consisting of a 9 Hz posterior dominant rhythm with good  reactivity. There is mild bilateral beta activity.    Drowsiness is characterized by attenuation of the background, vertex waves, and bilateral theta slowing.     Provocative maneuvers including hyperventilation and photic stimulation were not performed.     EKG recording shows a regular rhythm.    There is no push button or clinical event.    IMPRESSION:  Normal stop.    Dillon Elder MD

## 2023-02-08 ENCOUNTER — PATIENT MESSAGE (OUTPATIENT)
Dept: RHEUMATOLOGY | Facility: CLINIC | Age: 38
End: 2023-02-08
Payer: COMMERCIAL

## 2023-02-15 ENCOUNTER — OFFICE VISIT (OUTPATIENT)
Dept: PAIN MEDICINE | Facility: OTHER | Age: 38
End: 2023-02-15
Payer: COMMERCIAL

## 2023-02-15 DIAGNOSIS — R26.9 GAIT DISTURBANCE: ICD-10-CM

## 2023-02-15 DIAGNOSIS — G89.29 CHRONIC PAIN OF MULTIPLE JOINTS: ICD-10-CM

## 2023-02-15 DIAGNOSIS — M79.18 MYOFASCIAL PAIN: ICD-10-CM

## 2023-02-15 DIAGNOSIS — M79.7 FIBROMYALGIA: Primary | ICD-10-CM

## 2023-02-15 DIAGNOSIS — M25.50 CHRONIC PAIN OF MULTIPLE JOINTS: ICD-10-CM

## 2023-02-15 DIAGNOSIS — Z78.9 DECREASED ACTIVITIES OF DAILY LIVING (ADL): ICD-10-CM

## 2023-02-15 DIAGNOSIS — E89.0 POSTSURGICAL HYPOTHYROIDISM: Chronic | ICD-10-CM

## 2023-02-15 PROCEDURE — 99204 OFFICE O/P NEW MOD 45 MIN: CPT | Mod: ,,, | Performed by: NURSE PRACTITIONER

## 2023-02-15 PROCEDURE — 1159F MED LIST DOCD IN RCRD: CPT | Mod: CPTII,,, | Performed by: NURSE PRACTITIONER

## 2023-02-15 PROCEDURE — 1160F RVW MEDS BY RX/DR IN RCRD: CPT | Mod: CPTII,,, | Performed by: NURSE PRACTITIONER

## 2023-02-15 PROCEDURE — 99204 PR OFFICE/OUTPT VISIT, NEW, LEVL IV, 45-59 MIN: ICD-10-PCS | Mod: ,,, | Performed by: NURSE PRACTITIONER

## 2023-02-15 PROCEDURE — 1160F PR REVIEW ALL MEDS BY PRESCRIBER/CLIN PHARMACIST DOCUMENTED: ICD-10-PCS | Mod: CPTII,,, | Performed by: NURSE PRACTITIONER

## 2023-02-15 PROCEDURE — 1159F PR MEDICATION LIST DOCUMENTED IN MEDICAL RECORD: ICD-10-PCS | Mod: CPTII,,, | Performed by: NURSE PRACTITIONER
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
## 2023-02-20 ENCOUNTER — PATIENT MESSAGE (OUTPATIENT)
Dept: RHEUMATOLOGY | Facility: CLINIC | Age: 38
End: 2023-02-20
Payer: COMMERCIAL

## 2023-02-20 ENCOUNTER — PATIENT MESSAGE (OUTPATIENT)
Dept: ENDOCRINOLOGY | Facility: CLINIC | Age: 38
End: 2023-02-20
Payer: COMMERCIAL

## 2023-02-20 DIAGNOSIS — E89.0 POSTSURGICAL HYPOTHYROIDISM: Primary | Chronic | ICD-10-CM

## 2023-03-02 NOTE — PROGRESS NOTES
Attempted 3 times to reach patient without success. Case closed.   Pt ambulated to restroom with assistance.  Pt did well.  Pt now sitting up in bed pumping.  Pt smiling and looks better.

## 2023-03-03 ENCOUNTER — PATIENT MESSAGE (OUTPATIENT)
Dept: ENDOCRINOLOGY | Facility: CLINIC | Age: 38
End: 2023-03-03
Payer: COMMERCIAL

## 2023-03-03 ENCOUNTER — LAB VISIT (OUTPATIENT)
Dept: LAB | Facility: HOSPITAL | Age: 38
End: 2023-03-03
Attending: INTERNAL MEDICINE
Payer: COMMERCIAL

## 2023-03-03 DIAGNOSIS — E89.0 POSTSURGICAL HYPOTHYROIDISM: Chronic | ICD-10-CM

## 2023-03-03 DIAGNOSIS — E89.0 POSTSURGICAL HYPOTHYROIDISM: Primary | Chronic | ICD-10-CM

## 2023-03-03 LAB — TSH SERPL DL<=0.005 MIU/L-ACNC: 3.54 UIU/ML (ref 0.4–4)

## 2023-03-03 PROCEDURE — 84443 ASSAY THYROID STIM HORMONE: CPT | Performed by: INTERNAL MEDICINE

## 2023-03-03 PROCEDURE — 36415 COLL VENOUS BLD VENIPUNCTURE: CPT | Performed by: INTERNAL MEDICINE

## 2023-03-03 RX ORDER — LEVOTHYROXINE SODIUM 100 UG/1
100 CAPSULE ORAL DAILY
Qty: 90 CAPSULE | Refills: 3 | Status: SHIPPED | OUTPATIENT
Start: 2023-03-03 | End: 2023-09-16

## 2023-03-06 ENCOUNTER — PATIENT MESSAGE (OUTPATIENT)
Dept: ENDOCRINOLOGY | Facility: CLINIC | Age: 38
End: 2023-03-06
Payer: COMMERCIAL

## 2023-03-10 ENCOUNTER — LAB VISIT (OUTPATIENT)
Dept: LAB | Facility: HOSPITAL | Age: 38
End: 2023-03-10
Attending: INTERNAL MEDICINE
Payer: COMMERCIAL

## 2023-03-10 ENCOUNTER — TELEPHONE (OUTPATIENT)
Dept: HEPATOLOGY | Facility: CLINIC | Age: 38
End: 2023-03-10
Payer: COMMERCIAL

## 2023-03-10 ENCOUNTER — OFFICE VISIT (OUTPATIENT)
Dept: RHEUMATOLOGY | Facility: CLINIC | Age: 38
End: 2023-03-10
Payer: COMMERCIAL

## 2023-03-10 DIAGNOSIS — M13.80 SERONEGATIVE ARTHRITIS: ICD-10-CM

## 2023-03-10 DIAGNOSIS — M13.80 SERONEGATIVE ARTHRITIS: Primary | ICD-10-CM

## 2023-03-10 DIAGNOSIS — B18.1 CHRONIC VIRAL HEPATITIS B WITHOUT DELTA AGENT AND WITHOUT COMA: ICD-10-CM

## 2023-03-10 LAB
ALBUMIN SERPL BCP-MCNC: 3.5 G/DL (ref 3.5–5.2)
ALP SERPL-CCNC: 52 U/L (ref 55–135)
ALT SERPL W/O P-5'-P-CCNC: 10 U/L (ref 10–44)
ANION GAP SERPL CALC-SCNC: 6 MMOL/L (ref 8–16)
AST SERPL-CCNC: 14 U/L (ref 10–40)
BASOPHILS # BLD AUTO: 0.04 K/UL (ref 0–0.2)
BASOPHILS NFR BLD: 0.4 % (ref 0–1.9)
BILIRUB SERPL-MCNC: 0.2 MG/DL (ref 0.1–1)
BUN SERPL-MCNC: 9 MG/DL (ref 6–20)
CALCIUM SERPL-MCNC: 8.7 MG/DL (ref 8.7–10.5)
CHLORIDE SERPL-SCNC: 107 MMOL/L (ref 95–110)
CO2 SERPL-SCNC: 25 MMOL/L (ref 23–29)
CREAT SERPL-MCNC: 0.7 MG/DL (ref 0.5–1.4)
DIFFERENTIAL METHOD: ABNORMAL
EOSINOPHIL # BLD AUTO: 0.3 K/UL (ref 0–0.5)
EOSINOPHIL NFR BLD: 2.4 % (ref 0–8)
ERYTHROCYTE [DISTWIDTH] IN BLOOD BY AUTOMATED COUNT: 12.8 % (ref 11.5–14.5)
EST. GFR  (NO RACE VARIABLE): >60 ML/MIN/1.73 M^2
GLUCOSE SERPL-MCNC: 97 MG/DL (ref 70–110)
HCT VFR BLD AUTO: 40.2 % (ref 37–48.5)
HGB BLD-MCNC: 13.1 G/DL (ref 12–16)
IMM GRANULOCYTES # BLD AUTO: 0.02 K/UL (ref 0–0.04)
IMM GRANULOCYTES NFR BLD AUTO: 0.2 % (ref 0–0.5)
LYMPHOCYTES # BLD AUTO: 1.8 K/UL (ref 1–4.8)
LYMPHOCYTES NFR BLD: 16.2 % (ref 18–48)
MCH RBC QN AUTO: 28.5 PG (ref 27–31)
MCHC RBC AUTO-ENTMCNC: 32.6 G/DL (ref 32–36)
MCV RBC AUTO: 87 FL (ref 82–98)
MONOCYTES # BLD AUTO: 0.5 K/UL (ref 0.3–1)
MONOCYTES NFR BLD: 4.5 % (ref 4–15)
NEUTROPHILS # BLD AUTO: 8.3 K/UL (ref 1.8–7.7)
NEUTROPHILS NFR BLD: 76.3 % (ref 38–73)
NRBC BLD-RTO: 0 /100 WBC
PLATELET # BLD AUTO: 248 K/UL (ref 150–450)
PMV BLD AUTO: 9.6 FL (ref 9.2–12.9)
POTASSIUM SERPL-SCNC: 4.2 MMOL/L (ref 3.5–5.1)
PROT SERPL-MCNC: 7.1 G/DL (ref 6–8.4)
RBC # BLD AUTO: 4.6 M/UL (ref 4–5.4)
SODIUM SERPL-SCNC: 138 MMOL/L (ref 136–145)
WBC # BLD AUTO: 10.81 K/UL (ref 3.9–12.7)

## 2023-03-10 PROCEDURE — 99214 OFFICE O/P EST MOD 30 MIN: CPT | Mod: 95,,, | Performed by: INTERNAL MEDICINE

## 2023-03-10 PROCEDURE — 1160F PR REVIEW ALL MEDS BY PRESCRIBER/CLIN PHARMACIST DOCUMENTED: ICD-10-PCS | Mod: CPTII,95,, | Performed by: INTERNAL MEDICINE

## 2023-03-10 PROCEDURE — 87517 HEPATITIS B DNA QUANT: CPT | Performed by: INTERNAL MEDICINE

## 2023-03-10 PROCEDURE — 80053 COMPREHEN METABOLIC PANEL: CPT | Performed by: INTERNAL MEDICINE

## 2023-03-10 PROCEDURE — 1159F MED LIST DOCD IN RCRD: CPT | Mod: CPTII,95,, | Performed by: INTERNAL MEDICINE

## 2023-03-10 PROCEDURE — 36415 COLL VENOUS BLD VENIPUNCTURE: CPT | Performed by: INTERNAL MEDICINE

## 2023-03-10 PROCEDURE — 1160F RVW MEDS BY RX/DR IN RCRD: CPT | Mod: CPTII,95,, | Performed by: INTERNAL MEDICINE

## 2023-03-10 PROCEDURE — 99214 PR OFFICE/OUTPT VISIT, EST, LEVL IV, 30-39 MIN: ICD-10-PCS | Mod: 95,,, | Performed by: INTERNAL MEDICINE

## 2023-03-10 PROCEDURE — 1159F PR MEDICATION LIST DOCUMENTED IN MEDICAL RECORD: ICD-10-PCS | Mod: CPTII,95,, | Performed by: INTERNAL MEDICINE

## 2023-03-10 PROCEDURE — 85025 COMPLETE CBC W/AUTO DIFF WBC: CPT | Performed by: INTERNAL MEDICINE

## 2023-03-10 RX ORDER — LEFLUNOMIDE 20 MG/1
20 TABLET ORAL DAILY
Qty: 30 TABLET | Refills: 3 | Status: SHIPPED | OUTPATIENT
Start: 2023-03-10 | End: 2023-09-06

## 2023-03-10 NOTE — TELEPHONE ENCOUNTER
Referral to Hepatology for positive hepatitis B core Ab    Scheduling attempt # 1    Please call patient to schedule appointment with any REFUGIO, thanks!

## 2023-03-10 NOTE — PROGRESS NOTES
Chief Complaint   Patient presents with    Disease Management     The patient location is: home  The chief complaint leading to consultation is: seronegative arthritis    Visit type: audiovisual    Face to Face time with patient: 20   minutes of total time spent on the encounter, which includes face to face time and non-face to face time preparing to see the patient (eg, review of tests), Obtaining and/or reviewing separately obtained history, Documenting clinical information in the electronic or other health record, Independently interpreting results (not separately reported) and communicating results to the patient/family/caregiver, or Care coordination (not separately reported).         Each patient to whom he or she provides medical services by telemedicine is:  (1) informed of the relationship between the physician and patient and the respective role of any other health care provider with respect to management of the patient; and (2) notified that he or she may decline to receive medical services by telemedicine and may withdraw from such care at any time.    Notes:       History of presenting illness      37 year old  female     Was in perfect state of health until pregnancy  - in 2020 while she was pregnant- she had headaches,dizziness,fatigue,abdominal pain, tingling and numbness  Work up at this point revealed thyroid cancer  They had to wait till she delivered  Finally in 2021- she had entire thyroid gland removed    Removing the gland- didn't help her symptoms at all  But it took a while for them to get her on the correct thyroid medications  Finally they achieved a normal TSH,T4  When she achieved that-she felt better but she had dizziness,headaches,muscle pain and joint pain    Now TSH is abnormal again    TSH was 0.127 last time,it was < 0.010 prior to that  Medications are being adjusted   Free T4 is normal    She is on and off a birth control patch-this might be interacting with the TSH  numbers    Joint pains - started 2 months after thyroid surgery and has persisted until now-  Hands,wrists,knees,elbows and ankles,neck hurt  Pain is every morning,worse during cold weather  Swelling in the feet,wrists and fingers  Morning stiffness-for few hours    She experienced tingling and numbness in the hands  She loses   Left hand- bending is painful  EMG/NCS b/l UE negative      OTC pain medications-tylenol,advil,salon patches help a bit    GERD  Bloating and epigastric burning  Nausea+  Vomiting when GERD gets severe    She has severe headaches and that makes the scalp hurt  Memory issues,brain fog,inability to concentrate  Dry mouth due to medications  She has sensitivity to light- she needs a new filter for her eye-as per optometry      Sharp back pains and needles to her back  No triggers    Episodic shortness of breath-started after covid- this happens at rest and with exertion  Occasional chest pains    She had covid jan 2022 and July 2022  She doesn't think this has anything to do with her symptoms    Has a lot of allergies recently   Hives+,itchy eyes and nose  All this started after thyroid surgery    She lost a lot of hair after thyroid surgery and she had a big bald patch as well  She took vitamins and that grew her hair back    Night sweats+      No skin rashes,malar rash,photosensitivity   No telangiectasias   No calcinosis   No psoriasis   No oral and nasal ulcers   No dry eyes   No dysphagia,diplopia and dysphonia and muscle weakness   No raynaud's+   No digital ulcers   No cytopenias   No renal issues   No blood clots   No fever,chills,weight loss and loss of appetite   No pregnancy losses/pre term deliveries /pregnancy complications   No recurrent conjunctivitis or uveitis or scleritis or episcleritis   No chronic or bloody diarrhea with no u colitis or crohn's /inflammatory bowel disease   No vaginal or urethral  d/c/STDs/no ulcers   No unexplained lymphadenopathy,parotitis   No  seizures,strokes,psychosis  No sclerodactyly  No puffy hands  No perioral tightness     3/2023    Pain in more joints  Stiffness   Left hand index finger - range of motion- if she flexes it the finger straightens back  Swelling in the feet  Shoulder pain  Elbow pain+    MRI hands  1. Severe enhancing tenosynovitis of the flexor carpi radialis. Mild nonenhancing tenosynovitis of the 1st, 2nd, 3rd and 5th flexor tendons.  2. No osteitis.  No osseous erosions.  No synovitis.       Past history : depression,postpartum depression  Thyroid cancer  GERD    Family history  HTN,HLD,Dementia,arthritis    Social history  Not a smoker,alcoholic    Review of Systems   Constitutional:  Negative for fever and unexpected weight change.   HENT:  Negative for mouth sores and trouble swallowing.    Eyes:  Negative for redness.   Respiratory:  Positive for shortness of breath. Negative for cough.    Cardiovascular:  Positive for chest pain.   Gastrointestinal:  Negative for constipation and diarrhea.   Genitourinary:  Positive for dysuria. Negative for genital sores.   Skin:  Negative for rash.   Neurological:  Positive for headaches.   Hematological:  Bruises/bleeds easily.   As detailed above    There is currently no information documented on the homunculus. Go to the Rheumatology activity and complete the homunculus joint exam.    Physical Exam   Constitutional: She is oriented to person, place, and time. No distress.   HENT:   Head: Normocephalic.   Mouth/Throat: Oropharynx is clear and moist.   Eyes: Pupils are equal, round, and reactive to light. Conjunctivae are normal. Right eye exhibits no discharge. Left eye exhibits no discharge. No scleral icterus.   Neck: No thyromegaly present.   Cardiovascular: Normal rate, regular rhythm and normal heart sounds.   Pulmonary/Chest: Effort normal and breath sounds normal. No stridor.   Abdominal: Soft. Bowel sounds are normal.   Musculoskeletal:         General: Normal range of motion.       Cervical back: Normal range of motion.   Lymphadenopathy:     She has no cervical adenopathy.   Neurological: She is alert and oriented to person, place, and time.   Skin: Skin is warm. No rash noted. She is not diaphoretic.   Psychiatric: Affect and judgment normal.     Widespread pain index  Note the areas which the patient has had pain over the last week:                   Shoulder-girdle, left 1               Shoulder-girdle, right 1                         Upper arm left                        Upper arm right                          Lower arm left 1                       Lower arm right     Hip (buttock, trochanter) left  1  Hip (buttock, trochanter) right 1                           Upper leg, left                         Upper leg, right                           Lower leg, left                         Lower leg, right                                      Jaw, left                                   Jaw, right                                         Chest 1                                  Abdomen 1                               Upper back                               Lower back 1                                        Neck 1  Score will be from 0-19: 9/19                                         Symptom severity score  Fatigue 2  Waking Unrefreshed 3  Cognitive Symptoms 2   0 = no problem, 1=slight or mild problem 2= moderate; considerable problems often present and/or at a moderate level, 3 = severe, pervasive, continuous, life disturbing problem  For each of the 3 symptoms, indicate the level of severity over the past week using the Scale.  The symptom severity score is the sum of the severity of the 3 symptoms (fatigue, waking unrefreshed, and cognitive symptoms) plus the number of the following symptoms occurring during the previous 6 months:   Headaches 1  Pain or cramps in the lower abdomen 1  Depression 1  The final score is between 0 and 12 : 10/12                                           Criteria  Patient has fibromyalgia if the following 3 conditions are met:  1.  Widespread pain index greater than or equal to 7 and symptom severity score greater than or equal to 5 or widespread pain index between 3- 6, and symptom severity score greater than or equal to 9.    2.  Symptoms have been present in a similar level for at least 3 months  3.  The patient does not have a disorder that would otherwise sufficiently explain the pain      Laboratory abnormalities    ESR 42  CRP nml  RF neg  CCP 9.4- mildly elevated     MARIE positive  1: 320 centromere pattern  SSA neg  UA,UPCR nml  DELFIN neg    Profile neg  CBC nml  Mild anemia during pregnancy  CMP nml  Complements nml  APLAS panel neg  Scleroderma myomarker panel neg    Ck nml  Aldolase slightly elevated 7.8    Hep b and hiv neg    SPEP,GIDEON nml    Vit b1,b12,6 nml    Assessment       37 year old  female who was in the perfect state of health until she got pregnant in 2020 when she developed symptoms of headaches,dizziness,fatigue,abdominal pain, tingling and numbness,further work up led to the diagnosis of thyroid cancer,requiring removal of the thyroid gland.  Unfortunately removal of the gland did not eliminate all her symptoms    She now comes with an abnormal TSH but normal T4 and lot of symptoms  Joint pain,swelling,stiffness- she does have swollen wrists b/l  She has pain in the hands,wrists,elbows,ankles and neck with significant morning stiffness  Multiple joints are tender on exam but only wrists are swollen  She has elevated ESR,normal CRP,She has normal hand,neck,T and LS spine and hip xrays  She suffers from hand paresthesias -but has normal EMG/NCS B/L UE    Her wide spread pain index is high,so is the symptom severity score  She does qualify to have fibromyalgia-which might be interefering with the CDAI since she has CDAI of 24.5,mostly due to tender joints    She has associated GERD,bloating,nausea,vomiting,headaches,neurocognitive  difficulties,sharp pains,episodic shortness of breath,severe allergies and hair loss and night sweats  She suffers from depression    She has a positive MARIE but that's a centromere pattern,she doesn't suffer from raynaud's,doesn't have symptoms of systemic sclerosis     I do think some of her symptoms are due to her thyroid disease and some due to fibromyalgia but inflammatory arthritis is also very much likely-  CCP is positive but very low titre        3/2023    Pain in more joints  Stiffness   Left hand index finger - range of motion- if she flexes it the finger straightens back  Swelling in the feet  Shoulder pain  Elbow pain+    MRI hands  1. Severe enhancing tenosynovitis of the flexor carpi radialis. Mild nonenhancing tenosynovitis of the 1st, 2nd, 3rd and 5th flexor tendons.  2. No osteitis.  No osseous erosions.  No synovitis.    Hep b core antibody positive    Her  is vasectomised and she will not get pregnant      1. Seronegative arthritis    2. Chronic viral hepatitis B without delta agent and without coma          Reviewed labs/xrays  Reviewed medications      New problem     Plan    Suggested ssz in the past  She had side effects  We had to stop it    Will start arava 20 mg daily  Clinical symptoms of arava toxicity  Headache,alopecia,skin rash,diarrhea,rash,nausea,dizziness,hypertension,abdominal pain,oral ulcers,vomiting,liver function abnormalities,weakness,hyperesensitivity,bronchitis,anorexia,chest pain,drowsiness,  Anorexia,flu like symptoms    Neurology and ophthalmology evaluated  the headaches,memory issues and neurocognitive issues - mostly light sensitivity    Hepatology to address the pos hep b core ab     Thu was seen today for disease management.    Diagnoses and all orders for this visit:    Seronegative arthritis  -     HEPATITIS B VIRAL DNA, QUANTITATIVE; Future  -     CBC Auto Differential; Future  -     Comprehensive Metabolic Panel; Future    Chronic viral hepatitis B without  delta agent and without coma  -     Ambulatory referral/consult to Hepatology; Future    Other orders  -     leflunomide (ARAVA) 20 MG Tab; Take 1 tablet (20 mg total) by mouth once daily.

## 2023-03-15 LAB
HBV DNA SERPL NAA+PROBE-ACNC: <10 IU/ML
HBV DNA SERPL NAA+PROBE-LOG IU: <1 LOG (10) IU/ML
HBV DNA SERPL QL NAA+PROBE: NOT DETECTED

## 2023-03-21 ENCOUNTER — CLINICAL SUPPORT (OUTPATIENT)
Dept: REHABILITATION | Facility: OTHER | Age: 38
End: 2023-03-21
Attending: NURSE PRACTITIONER
Payer: COMMERCIAL

## 2023-03-21 ENCOUNTER — CLINICAL SUPPORT (OUTPATIENT)
Dept: REHABILITATION | Facility: OTHER | Age: 38
End: 2023-03-21
Payer: COMMERCIAL

## 2023-03-21 DIAGNOSIS — R29.898 DECREASED GRIP STRENGTH: ICD-10-CM

## 2023-03-21 DIAGNOSIS — R68.89 DECREASED FUNCTIONAL ACTIVITY TOLERANCE: ICD-10-CM

## 2023-03-21 DIAGNOSIS — E89.0 POSTSURGICAL HYPOTHYROIDISM: Chronic | ICD-10-CM

## 2023-03-21 DIAGNOSIS — R26.9 GAIT DISTURBANCE: ICD-10-CM

## 2023-03-21 DIAGNOSIS — M79.7 FIBROMYALGIA: ICD-10-CM

## 2023-03-21 DIAGNOSIS — G89.29 CENTRAL SENSITIZATION TO PAIN: Primary | ICD-10-CM

## 2023-03-21 DIAGNOSIS — F40.298 FEAR OF PAIN: ICD-10-CM

## 2023-03-21 DIAGNOSIS — Z78.9 DECREASED ACTIVITIES OF DAILY LIVING (ADL): ICD-10-CM

## 2023-03-21 DIAGNOSIS — R26.89 POOR BALANCE: ICD-10-CM

## 2023-03-21 PROCEDURE — 97530 THERAPEUTIC ACTIVITIES: CPT

## 2023-03-21 PROCEDURE — 97163 PT EVAL HIGH COMPLEX 45 MIN: CPT

## 2023-03-21 PROCEDURE — 97166 OT EVAL MOD COMPLEX 45 MIN: CPT

## 2023-03-21 NOTE — PLAN OF CARE
NOTE: This is a duplicate copy utilized for reassessment purposes & continuity of care.  See pt's plan of care for co-signed copy.    [facility]  Functional Restoration Program  Physical Therapy Initial Evaluation    Date: 3/21/2023   Name: Dina Hutton  Clinic Number: [id number]    Therapy Diagnosis:   Encounter Diagnoses   Name Primary?    Fibromyalgia     Gait disturbance     Postsurgical hypothyroidism      Physician: Sharona Neumann, NP    Physician Orders: PT Eval and Treat   Medical Diagnosis from Referral:   M79.7 (ICD-10-CM) - Fibromyalgia   R26.9 (ICD-10-CM) - Gait disturbance   E89.0 (ICD-10-CM) - Postsurgical hypothyroidism     Evaluation Date: 3/21/2023  Authorization Period Expiration: 12/29/2023  Plan of Care Expiration: 6/30/2023  Visit # / Visits authorized: 1/ 1  FOTO: 1/ 3     Precautions: Standard    Time In: 2:05p  Time Out: 3:05p  Total Appointment Time (timed & untimed codes): 60 minutes      Subjective     Date of onset: 2020 w/ thyroid CA    Patient reported history of current condition - Thu reports chronic pain that started when she developed thyroid CA in 2020 & she had a full thyroidectomy. At first it was through that her medications were not balanced, so she ultimately saw a rheumatologist who dx w/ fibromyalgia. Her recent MARIE test is positive, so they are still investigating other inflammatory markers. Before all of this started, she would play w/ her kids over the weekend, volleyball & kickball, & lots of walking. Now she associates well w/ the boom-bust cycle, often w/ too much lifting or standing.      Prior Therapy: natural anti-inflammatory  Social History: single story home w/out ANATOLIY lives with their family  Occupation: not working (was former )  Prior Level of Function: worked full time as , played vigorously w/ family  Current Level of Function: trouble standing/walking >15 minutes, insomnia, pain w/ use of handing & high pain w/  lifting    Pain:      Current 6/10, worst 8/10, best 5/10   Location: arms, elbow, feet, hands (worse in index fingers), head, jaw, shoulders, wrists, and neck.  Description: pulsating pain, which becomes tingling, which becomes numb. Sharp pain left side of lower back. She reports intermittent swelling into the joints. Her pain is worse in the morning and day. She does endorse stiffness. She also reports headaches.  Functional Exacerbations: when pushing beyond her limits.  Easing Factors: not on meds due to HepA; takes homeopathic remedies (tumeric)    Patient's FRP goals: play w/ kids more, do home tasks more easily,     Medical History:   Past Medical History:   Diagnosis Date    Anemia     Breast abscess 4/25/2020    GERD (gastroesophageal reflux disease)     History of fourth degree perineal laceration 8/21/2019    Hypoglycemia     Hypoglycemia     Mastitis 4/20/2020    Mental disorder     depression    PONV (postoperative nausea and vomiting)     Thyroid cancer     Thyroid disease        Surgical History:   Dina Hutton  has a past surgical history that includes Incision and drainage of breast (Left, 4/23/2020); Colonoscopy; Esophagogastroduodenoscopy; Thyroidectomy (Bilateral, 9/29/2020); Breast cyst excision; and Esophagogastroduodenoscopy (N/A, 6/14/2021).    Medications:   Thu has a current medication list which includes the following prescription(s): ascorbic acid/collagen hydr, calcium, cranberry, diphenhydramine, ibuprofen, leflunomide, methyl salicylate/menth/camph, mv-min/iron/folic/calcium/vitk, and tirosint.    Allergies:   Review of patient's allergies indicates:   Allergen Reactions    Vancomycin analogues Hives    Bactrim [sulfamethoxazole-trimethoprim] Nausea And Vomiting    Sulfa (sulfonamide antibiotics) Rash    Vicodin [hydrocodone-acetaminophen] Nausea Only     Patient states she is fine with other pain medication        Objective     Postural examination:  Seated: head forward, B rounder  shld, slouched  Standing: head forward, B rounder shld, slouched, rigid spine in mobility    Range of Motion - Cervical   AROM AROM AROM    3/21/2023 Reassessment  Reassessment   Flexion 40 ° ° °   Extension 34 ° ° °   Side bending Right 18 ° ° °   Side bending Left 24 ° ° °   Rotation Right 55 ° ° °   Rotation Left 60 ° ° °     Range of Motion - Lumbar   3/21/2023      ROM Loss ROM Loss ROM Loss   Flexion major loss     Extension moderate loss     Side bending Right moderate loss     Side bending Left moderate loss     Rotation Right major loss     Rotation Left major loss       Strength Testing   3/21/2023 Reassessment Reassessment   Motion Tested         Lower Extremity R L R L R L   Hip Flexion 4-/5 4-/5       Hip IR 3+/5 3+/5       Hip ER 4-/5 4-/5       Knee Extension 4+/5 4+/5       Knee Flexion 4/5 4/5         Functional Testing     3/21/2023 Reassessment  Reassessment   Timed Up and Go 12.9 sec sec sec   Single Limb Stance R LE 19.9 sec sec sec   Single Limb Stance L LE 12.9 sec sec sec   2 Minute Walk Test 272 feet feet feet   6 Minute Walk Test  (Stopped 4:00 due to fatigue & pain) 519 feet feet feet   Self Selected Walking Speed 0.66 m/sec m/sec m/sec     GAIT:  Assistive Device used: none  Level of Assistance: independent  Patient displays the following gait deviations:  unsteady gait, decreased step length, decreased base of support, decreased weight shift, antalgic gait, and poor endurance.     Minimum standards/norms:    TUG:  < 13.5 seconds/ Males 7.3 sec, Females 8.1 sec  SLS:  26-29 sec  Ages 20-69  Self Selected Walking Speed:  .4-.8m/sec  Limited community ambulator                                                   .8- 1.2  Community ambulator                                                    1.2 m/sec and above  Able to safely cross streets        Limitation/Restriction for FOTO Neck Survey    Therapist reviewed FOTO scores for Dina Hutton on 3/21/2023.   FOTO documents entered into EPIC -  see Media section.    Limitation Score 3/21/2023: 54%    Predicted Score: 41%         TREATMENT     Total Treatment time (time-based codes) separate from Evaluation: 25 minutes     Thu received the treatments listed below:        Thu received therapeutic activities to improve functional performance for 25 minutes, including:  PT education:  Physical & psychological viscous pain cycles (see patient instructions 3/21/2023)   Central sensitization (see patient instructions 3/21/2023)  Role of consistent activity (graded exposure) to break the physical cycle  Importance of education & behavioral changes that promote intrinsic patient motivation to help break the psychological cycle  Impact on pt's functional goals when breaking each one of these cycles.    Impact central sensitization has on the sensory system in the chronic pain population        PATIENT EDUCATION AND HOME EXERCISES     Education provided:   - pain cycle, central sensitization    Written Home Exercises Provided: Patient instructed to cont prior HEP. Exercises were reviewed and Thu was able to demonstrate them prior to the end of the session.  Thu demonstrated good  understanding of the education provided. See EMR under Patient Instructions for information provided during therapy sessions.    ASSESSMENT   Thu is a 37 y.o. female referred to outpatient Physical Therapy with a medical diagnosis of FM. Pt presents with pain, weakness, impaired mobility, decreased AROM, impaired balance, fall risk, gait deviations, decreased endurance, fear of pain w/ central sensitization, increased psychosocial concerns, & inability to perform ADL's as before due to current functional status. Pt's increased psychosocial concerns w/ central sensitization present an unstable clinical presentation which may limit progress, but the intrinsic motivation to improve will assist.      Based on the above history and physical examination an active physical therapy program within a  multi-disciplinary setting is recommended.  Pt will benefit from skilled outpatient physical therapy to address the deficits listed below in the chart, provide pt/family education and to maximize pt's level of independence in the home and community environment.     Plan of care discussed with patient: Yes  Pt's spiritual, cultural and educational needs considered and patient is agreeable to the plan of care and goals as stated below:     Pt prognosis is Good.   Anticipated Barriers for therapy: chronic nature of issues, psychosocial factors    Medical Necessity is demonstrated by the following  History  Co-morbidities and personal factors that may impact the plan of care Co-morbidities:   anxiety, coping style/mechanism, depression, difficulty sleeping, high BMI, and level of undertstanding of current condition    Personal Factors:   coping style  social background  lifestyle  character  attitudes     high   Examination  Body Structures and Functions, activity limitations and participation restrictions that may impact the plan of care Body Regions:   head  neck  back  lower extremities  upper extremities    Body Systems:    ROM  strength  balance  gait  transfers  transitions  motor control    Participation Restrictions:       Activity limitations:   Learning and applying knowledge  no deficits    General Tasks and Commands  no deficits    Communication  no deficits    Mobility  lifting and carrying objects  fine hand use (grasping/picking up)  walking  using transportation (bus, train, plane, car)  driving (bike, car, motorcycle)    Self care  washing oneself (bathing, drying, washing hands)  caring for body parts (brushing teeth, shaving, grooming)  toileting  dressing    Domestic Life  shopping  cooking  doing house work (cleaning house, washing dishes, laundry)  assisting others    Interactions/Relationships  no deficits    Life Areas  employment    Community and Social Life  community life  recreation and  "leisure         high   Clinical Presentation unstable clinical presentation with unpredictable characteristics high   Decision Making/ Complexity Score: high       GOALS: Pt is in agreement with the following goals:  Goal Progress Towards Goal   SHORT Term: In 3 weeks, pt will:    Verbalize & demonstrate good understanding of resisted training with 3-1-3 second rep for [id number] contraction to encourage full muscle recruitment for strength & endurance. Initiated 3/21/2023   Verbalize & demonstrate good understanding of home stretch routine to improve AROM, help decrease pain & improve mobility. Initiated 3/21/2023   Demonstrate proper supine or seated diaphragmatic breathing with decreased chest excursion & emphasis on full abdominal excursion & increased expiration time to promote muscle relaxation & increased parasympathetic activity to improve patient tolerance to activities. Initiated 3/21/2023   Verbalize good understanding of importance of proper posture in resisted exercise, functional activities & ADL's in order to help reduce postural strain, promote proper breathing. Initiated 3/21/2023   Demonstrate good understanding of full body resisted exercises w/ use of pictures &/or verbal cues to promote independence w/ exercise at the end of the program. Initiated 3/21/2023   Verbalize plan for continuing resisted exercises w/ specific location (i.e. commercial gym, home, community fitness center)  to incorporate all major muscle groups to maintain & progress therapeutic functional improvements. Initiated 3/21/2023   Verbalize difference between  "hurt vs harm"  to demonstrate understanding of fear avoidance in pain cycle.  Initiated 3/21/2023       Midterm: In 8 weeks, pt will:    Verbalize good understanding of activities planning/scheduling (stretching, mindfulness, resisted training, & cardio) prescribed by PT/OT following the end of the program to sustain & progress functional " improvements. Initiated 3/21/2023   Perform selected resisted training w/ minimal to no cuing in therapy session to be independent w/ resisted strengthening. Initiated 3/21/2023   Demonstrate improved symptom self-management w/ posture/positioning and mechanical movements and/or implementation of appropriate modalities throughout a typical day.  Initiated 3/21/2023   Demonstrate independence w/ home stretch routine to improve AROM, help decrease pain & improve mobility. Initiated 3/21/2023       Long Term: In 12 weeks, pt will:    Perform selected cardio & resisted training independently to continue safe regular performance of daily exercise. Initiated 3/21/2023   Improve 2 Minute Walk Test (MWT) to 425 feet and 6 MWT to 1275 feet or greater to improve gait speed and mobility. Initiated 3/21/2023   Perform single leg stance 10 seconds or greater bilaterally to improve safety and balance in ADLs. Initiated 3/21/2023   Demonstrate Timed Up & Go (with appropriate assistive device, if necessary) of 10 seconds or less to decrease fall risk and improve functional mobility. Initiated 3/21/2023   Score 41 % or less on Neck FOTO to indicate significant functional improvements in impaired functions secondary to pain. Initiated 3/21/2023         PLAN   Plan of care Certification: 3/21/2023 to 6/30/2023.    Outpatient Physical Therapy 3 times weekly for 12 weeks to include the following interventions: Gait Training, Manual Therapy, Moist Heat/ Ice, Neuromuscular Re-ed, Patient Education, Therapeutic Activities, and Therapeutic Exercise.     [place], PT

## 2023-03-21 NOTE — PROGRESS NOTES
NOTE: This is a duplicate copy utilized for reassessment purposes & continuity of care.  See pt's plan of care for co-signed copy.    [facility]  Functional Restoration Program  Physical Therapy Initial Evaluation    Date: 3/21/2023   Name: Dina Hutton  Clinic Number: [id number]    Therapy Diagnosis:   Encounter Diagnoses   Name Primary?    Fibromyalgia     Gait disturbance     Postsurgical hypothyroidism      Physician: Sharona Neumann, NP    Physician Orders: PT Eval and Treat   Medical Diagnosis from Referral:   M79.7 (ICD-10-CM) - Fibromyalgia   R26.9 (ICD-10-CM) - Gait disturbance   E89.0 (ICD-10-CM) - Postsurgical hypothyroidism     Evaluation Date: 3/21/2023  Authorization Period Expiration: 12/29/2023  Plan of Care Expiration: 6/30/2023  Visit # / Visits authorized: 1/ 1  FOTO: 1/ 3     Precautions: Standard    Time In: 2:05p  Time Out: 3:05p  Total Appointment Time (timed & untimed codes): 60 minutes      Subjective     Date of onset: 2020 w/ thyroid CA    Patient reported history of current condition - Thu reports chronic pain that started when she developed thyroid CA in 2020 & she had a full thyroidectomy. At first it was through that her medications were not balanced, so she ultimately saw a rheumatologist who dx w/ fibromyalgia. Her recent MARIE test is positive, so they are still investigating other inflammatory markers. Before all of this started, she would play w/ her kids over the weekend, volleyball & kickball, & lots of walking. Now she associates well w/ the boom-bust cycle, often w/ too much lifting or standing.      Prior Therapy: natural anti-inflammatory  Social History: single story home w/out ANATOLIY lives with their family  Occupation: not working (was former )  Prior Level of Function: worked full time as , played vigorously w/ family  Current Level of Function: trouble standing/walking >15 minutes, insomnia, pain w/ use of handing & high pain w/  lifting    Pain:      Current 6/10, worst 8/10, best 5/10   Location: arms, elbow, feet, hands (worse in index fingers), head, jaw, shoulders, wrists, and neck.  Description: pulsating pain, which becomes tingling, which becomes numb. Sharp pain left side of lower back. She reports intermittent swelling into the joints. Her pain is worse in the morning and day. She does endorse stiffness. She also reports headaches.  Functional Exacerbations: when pushing beyond her limits.  Easing Factors: not on meds due to HepA; takes homeopathic remedies (tumeric)    Patient's FRP goals: play w/ kids more, do home tasks more easily,     Medical History:   Past Medical History:   Diagnosis Date    Anemia     Breast abscess 4/25/2020    GERD (gastroesophageal reflux disease)     History of fourth degree perineal laceration 8/21/2019    Hypoglycemia     Hypoglycemia     Mastitis 4/20/2020    Mental disorder     depression    PONV (postoperative nausea and vomiting)     Thyroid cancer     Thyroid disease        Surgical History:   Dina Hutton  has a past surgical history that includes Incision and drainage of breast (Left, 4/23/2020); Colonoscopy; Esophagogastroduodenoscopy; Thyroidectomy (Bilateral, 9/29/2020); Breast cyst excision; and Esophagogastroduodenoscopy (N/A, 6/14/2021).    Medications:   Thu has a current medication list which includes the following prescription(s): ascorbic acid/collagen hydr, calcium, cranberry, diphenhydramine, ibuprofen, leflunomide, methyl salicylate/menth/camph, mv-min/iron/folic/calcium/vitk, and tirosint.    Allergies:   Review of patient's allergies indicates:   Allergen Reactions    Vancomycin analogues Hives    Bactrim [sulfamethoxazole-trimethoprim] Nausea And Vomiting    Sulfa (sulfonamide antibiotics) Rash    Vicodin [hydrocodone-acetaminophen] Nausea Only     Patient states she is fine with other pain medication        Objective     Postural examination:  Seated: head forward, B rounder  shld, slouched  Standing: head forward, B rounder shld, slouched, rigid spine in mobility    Range of Motion - Cervical   AROM AROM AROM    3/21/2023 Reassessment  Reassessment   Flexion 40 ° ° °   Extension 34 ° ° °   Side bending Right 18 ° ° °   Side bending Left 24 ° ° °   Rotation Right 55 ° ° °   Rotation Left 60 ° ° °     Range of Motion - Lumbar   3/21/2023      ROM Loss ROM Loss ROM Loss   Flexion major loss     Extension moderate loss     Side bending Right moderate loss     Side bending Left moderate loss     Rotation Right major loss     Rotation Left major loss       Strength Testing   3/21/2023 Reassessment Reassessment   Motion Tested         Lower Extremity R L R L R L   Hip Flexion 4-/5 4-/5       Hip IR 3+/5 3+/5       Hip ER 4-/5 4-/5       Knee Extension 4+/5 4+/5       Knee Flexion 4/5 4/5         Functional Testing     3/21/2023 Reassessment  Reassessment   Timed Up and Go 12.9 sec sec sec   Single Limb Stance R LE 19.9 sec sec sec   Single Limb Stance L LE 12.9 sec sec sec   2 Minute Walk Test 272 feet feet feet   6 Minute Walk Test  (Stopped 4:00 due to fatigue & pain) 519 feet feet feet   Self Selected Walking Speed 0.66 m/sec m/sec m/sec     GAIT:  Assistive Device used: none  Level of Assistance: independent  Patient displays the following gait deviations:  unsteady gait, decreased step length, decreased base of support, decreased weight shift, antalgic gait, and poor endurance .     Minimum standards/norms:    TUG:  < 13.5 seconds/ Males 7.3 sec, Females 8.1 sec  SLS:  26-29 sec  Ages 20-69  Self Selected Walking Speed:  .4-.8m/sec  Limited community ambulator                                                   .8- 1.2  Community ambulator                                                    1.2 m/sec and above  Able to safely cross streets        Limitation/Restriction for FOTO Neck Survey    Therapist reviewed FOTO scores for Dina Hutton on 3/21/2023.   FOTO documents entered into EPIC -  see Media section.    Limitation Score 3/21/2023: 54%    Predicted Score: 41%         TREATMENT     Total Treatment time (time-based codes) separate from Evaluation: 25 minutes     Thu received the treatments listed below:        Thu received therapeutic activities to improve functional performance for 25 minutes, including:  PT education:  Physical & psychological viscous pain cycles (see patient instructions 3/21/2023)   Central sensitization (see patient instructions 3/21/2023)  Role of consistent activity (graded exposure) to break the physical cycle  Importance of education & behavioral changes that promote intrinsic patient motivation to help break the psychological cycle  Impact on pt's functional goals when breaking each one of these cycles.    Impact central sensitization has on the sensory system in the chronic pain population        PATIENT EDUCATION AND HOME EXERCISES     Education provided:   - pain cycle, central sensitization    Written Home Exercises Provided: Patient instructed to cont prior HEP. Exercises were reviewed and Thu was able to demonstrate them prior to the end of the session.  Thu demonstrated good  understanding of the education provided. See EMR under Patient Instructions for information provided during therapy sessions.    ASSESSMENT   Thu is a 37 y.o. female referred to outpatient Physical Therapy with a medical diagnosis of FM. Pt presents with pain, weakness, impaired mobility, decreased AROM, impaired balance, fall risk, gait deviations, decreased endurance, fear of pain w/ central sensitization, increased psychosocial concerns, & inability to perform ADL's as before due to current functional status. Pt's increased psychosocial concerns w/ central sensitization present an unstable clinical presentation which may limit progress, but the intrinsic motivation to improve will assist.      Based on the above history and physical examination an active physical therapy program within a  multi-disciplinary setting is recommended.  Pt will benefit from skilled outpatient physical therapy to address the deficits listed below in the chart, provide pt/family education and to maximize pt's level of independence in the home and community environment.     Plan of care discussed with patient: Yes  Pt's spiritual, cultural and educational needs considered and patient is agreeable to the plan of care and goals as stated below:     Pt prognosis is Good.   Anticipated Barriers for therapy: chronic nature of issues, psychosocial factors    Medical Necessity is demonstrated by the following  History  Co-morbidities and personal factors that may impact the plan of care Co-morbidities:   anxiety, coping style/mechanism, depression, difficulty sleeping, high BMI, and level of undertstanding of current condition    Personal Factors:   coping style  social background  lifestyle  character  attitudes     high   Examination  Body Structures and Functions, activity limitations and participation restrictions that may impact the plan of care Body Regions:   head  neck  back  lower extremities  upper extremities    Body Systems:    ROM  strength  balance  gait  transfers  transitions  motor control    Participation Restrictions:       Activity limitations:   Learning and applying knowledge  no deficits    General Tasks and Commands  no deficits    Communication  no deficits    Mobility  lifting and carrying objects  fine hand use (grasping/picking up)  walking  using transportation (bus, train, plane, car)  driving (bike, car, motorcycle)    Self care  washing oneself (bathing, drying, washing hands)  caring for body parts (brushing teeth, shaving, grooming)  toileting  dressing    Domestic Life  shopping  cooking  doing house work (cleaning house, washing dishes, laundry)  assisting others    Interactions/Relationships  no deficits    Life Areas  employment    Community and Social Life  community life  recreation and  "leisure         high   Clinical Presentation unstable clinical presentation with unpredictable characteristics high   Decision Making/ Complexity Score: high       GOALS: Pt is in agreement with the following goals:  Goal Progress Towards Goal   SHORT Term: In 3 weeks, pt will:    Verbalize & demonstrate good understanding of resisted training with 3-1-3 second rep for [id number] contraction to encourage full muscle recruitment for strength & endurance. Initiated 3/21/2023   Verbalize & demonstrate good understanding of home stretch routine to improve AROM, help decrease pain & improve mobility. Initiated 3/21/2023   Demonstrate proper supine or seated diaphragmatic breathing with decreased chest excursion & emphasis on full abdominal excursion & increased expiration time to promote muscle relaxation & increased parasympathetic activity to improve patient tolerance to activities. Initiated 3/21/2023   Verbalize good understanding of importance of proper posture in resisted exercise, functional activities & ADL's in order to help reduce postural strain, promote proper breathing. Initiated 3/21/2023   Demonstrate good understanding of full body resisted exercises w/ use of pictures &/or verbal cues to promote independence w/ exercise at the end of the program. Initiated 3/21/2023   Verbalize plan for continuing resisted exercises w/ specific location (i.e. commercial gym, home, community fitness center)  to incorporate all major muscle groups to maintain & progress therapeutic functional improvements. Initiated 3/21/2023   Verbalize difference between  "hurt vs harm"  to demonstrate understanding of fear avoidance in pain cycle.  Initiated 3/21/2023       Midterm: In 8 weeks, pt will:    Verbalize good understanding of activities planning/scheduling (stretching, mindfulness, resisted training, & cardio) prescribed by PT/OT following the end of the program to sustain & progress functional " improvements. Initiated 3/21/2023   Perform selected resisted training w/ minimal to no cuing in therapy session to be independent w/ resisted strengthening. Initiated 3/21/2023   Demonstrate improved symptom self-management w/ posture/positioning and mechanical movements and/or implementation of appropriate modalities throughout a typical day.  Initiated 3/21/2023   Demonstrate independence w/ home stretch routine to improve AROM, help decrease pain & improve mobility. Initiated 3/21/2023       Long Term: In 12 weeks, pt will:    Perform selected cardio & resisted training independently to continue safe regular performance of daily exercise. Initiated 3/21/2023   Improve 2 Minute Walk Test (MWT) to 425 feet and 6 MWT to 1275 feet or greater to improve gait speed and mobility. Initiated 3/21/2023   Perform single leg stance 10 seconds or greater bilaterally to improve safety and balance in ADLs. Initiated 3/21/2023   Demonstrate Timed Up & Go (with appropriate assistive device, if necessary) of 10 seconds or less to decrease fall risk and improve functional mobility. Initiated 3/21/2023   Score 41 % or less on Neck FOTO to indicate significant functional improvements in impaired functions secondary to pain. Initiated 3/21/2023         PLAN   Plan of care Certification: 3/21/2023 to 6/30/2023.    Outpatient Physical Therapy 3 times weekly for 12 weeks to include the following interventions: Gait Training, Manual Therapy, Moist Heat/ Ice, Neuromuscular Re-ed, Patient Education, Therapeutic Activities, and Therapeutic Exercise.     Mason Branch, PT

## 2023-03-21 NOTE — PATIENT INSTRUCTIONS
Central Sensitization        The chronic pain conundrum: Why do I hurt? Why wont the pain stop? All I can think about is my pain. Why does my pain change? Why can't anyone tell me what's wrong?       1 in 4 people will develop a sensitive nervous system - traditional medicine is still rooted in the biomedical model, leading to kinesiophobia, increased fear/anxiety around pain and persistent pain    Neuroscience education has been proven to be an effective tool for chronic pain management, thus playing a role in improving central sensitization by reducing fear avoidance beliefs and behaviors, improving maladaptive pain cognitions & improving illness perceptions    Pain production:   Pain is a function of the nervous system  Pain is produced by the brain when it senses danger      Pain perception:  Perception of pain is personal, variable, and based on a variety of inputs to the brain: Sights, sounds, sensations, smells, thoughts, emotions, memories, beliefs        Central Sensitization:  Hypersensitivity of the central nervous system that causes increased perception of pain  Commonly associated with the development of chronic pain  Enhanced sensitivity of the alarm system is nearly always a main feature in persistent pain. Remember that pain is normal, but the processes behind it are altered (Explain Pain)    Pain threshold:            Central Sensitization complicates the clinical picture for patients and providers alike:   Medically unexplained symptoms  Hyperalgesia: increased sensitivity to pain and extreme response to pain  Refractory pain: when pain cannot be adequately controlled despite aggressive measures    Focusing on a cure for chronic pain is, in many cases, exhausting and counter-productive and fuels the pain cycle    Pain education plus an active, multidisciplinary approach with a focus on function, in combination with usual medical treatment, is effective  Understanding chronic pain is essential  to understanding the importance of a multimodal and multidisciplinary approach. A greater understanding of pain is associated with improved function, less pain-related fear and anxiety, reduced pain, and greater compliance with treatment. Medications, injections, and surgery can help control pain, but in many cases, it is not enough. Below is a helpful learning and self-management resource:    [employer] video: Understanding Pain in Less Than 5 Minutes:  https://www.oumar.com/videos/search?q=understanding+pain+in+less+than+5+min&vpyfn=[email or web address]&view=detail&FORM=VIRE    Central sensitization is a subconscious behavior  Conscious awareness reminding yourself that you are feeling sensations (hurt) & it is not doing tissue damage (harm) is how to break those subconscious processes  We validate that this is a process that will take some time & it may seem elementary  It is a product of classical conditioning  Tramaine's Dog:  Food --> bell --> salivation   --> bell --> salivation  Injury -->  movement --> pain   --> movement --> pain
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
## 2023-03-22 PROBLEM — F40.298 FEAR OF PAIN: Status: ACTIVE | Noted: 2023-03-22

## 2023-03-22 PROBLEM — Z78.9 DECREASED ACTIVITIES OF DAILY LIVING (ADL): Status: ACTIVE | Noted: 2023-03-22

## 2023-03-22 PROBLEM — R29.898 DECREASED GRIP STRENGTH: Status: ACTIVE | Noted: 2023-03-22

## 2023-03-22 PROBLEM — R68.89 DECREASED FUNCTIONAL ACTIVITY TOLERANCE: Status: ACTIVE | Noted: 2023-03-22

## 2023-03-22 NOTE — PATIENT INSTRUCTIONS
DIAPHRAGMATIC BREATHING     The diaphragm is a dome shaped muscle that forms the floor of the rib cage. It is the most efficient muscle for breathing and relaxation, although most people are not used to using the diaphragm. Diaphragmatic or belly breathing is an important technique to learn because it helps settle down or relax the autonomic nervous system. The correct use of diaphragmatic breathing can help to quiet brain activity resulting in the relaxation of all the muscles and organs of the body. This is accomplished by slow rhythmic breathing concentrated in the diaphragm muscle rather than the chest.    How to do proper relaxation breathing:    Start by lying on your back or reclining in a chair in a relaxed position. Place one hand on your chest and the other on your abdomen.  Relax your jaw by placing your tongue on the roof of your mouth and keeping your teeth slightly apart.   Take a deep breath in, letting the abdomen expand and rise while you keep your upper chest, neck and shoulders relaxed.   As you breathe out, allow your abdomen and chest to fall. Exhale completely.  It doesn't matter if you breathe in/out through your nose and/or mouth. Do whichever feels comfortable.  Remember to breathe slowly.  Do not force your breathing. Do not hold your breath.  Repeat Diaphragmatic breathing for pain and stress management (can be done in seated) in addition to intentional breath work to build resilience (10 reps 5x a day).  During task performance, attempt purse lip breathing, where exhaling is longer than the inhale, due to the lips being pursed  together (whistle lips, or like blowing out a candle).
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
## 2023-03-28 NOTE — TELEPHONE ENCOUNTER
Spoke with pt and [facility] er scheduled for a liver consult. Mailed appt letter to patient.

## 2023-04-03 ENCOUNTER — PATIENT MESSAGE (OUTPATIENT)
Dept: ADMINISTRATIVE | Facility: HOSPITAL | Age: 38
End: 2023-04-03
Payer: COMMERCIAL

## 2023-04-19 ENCOUNTER — PATIENT MESSAGE (OUTPATIENT)
Dept: RESEARCH | Facility: HOSPITAL | Age: 38
End: 2023-04-19
Payer: COMMERCIAL

## 2023-04-22 ENCOUNTER — OFFICE VISIT (OUTPATIENT)
Dept: URGENT CARE | Facility: CLINIC | Age: 38
End: 2023-04-22
Payer: COMMERCIAL

## 2023-04-22 VITALS
TEMPERATURE: 99 F | DIASTOLIC BLOOD PRESSURE: 78 MMHG | SYSTOLIC BLOOD PRESSURE: 124 MMHG | OXYGEN SATURATION: 98 % | HEIGHT: 57 IN | WEIGHT: 129 LBS | HEART RATE: 94 BPM | BODY MASS INDEX: 27.83 KG/M2 | RESPIRATION RATE: 16 BRPM

## 2023-04-22 DIAGNOSIS — B96.89 BACTERIAL SINUSITIS: Primary | ICD-10-CM

## 2023-04-22 DIAGNOSIS — R06.2 WHEEZING: ICD-10-CM

## 2023-04-22 DIAGNOSIS — J32.9 BACTERIAL SINUSITIS: Primary | ICD-10-CM

## 2023-04-22 PROCEDURE — 99213 PR OFFICE/OUTPT VISIT, EST, LEVL III, 20-29 MIN: ICD-10-PCS | Mod: S$GLB,,,

## 2023-04-22 PROCEDURE — 99213 OFFICE O/P EST LOW 20 MIN: CPT | Mod: S$GLB,,,

## 2023-04-22 RX ORDER — AMOXICILLIN AND CLAVULANATE POTASSIUM 875; 125 MG/1; MG/1
1 TABLET, FILM COATED ORAL EVERY 12 HOURS
Qty: 14 TABLET | Refills: 0 | Status: SHIPPED | OUTPATIENT
Start: 2023-04-22 | End: 2023-04-29

## 2023-04-22 RX ORDER — PROMETHAZINE HYDROCHLORIDE AND DEXTROMETHORPHAN HYDROBROMIDE 6.25; 15 MG/5ML; MG/5ML
5 SYRUP ORAL NIGHTLY PRN
Qty: 118 ML | Refills: 0 | Status: SHIPPED | OUTPATIENT
Start: 2023-04-22 | End: 2023-07-26

## 2023-04-22 RX ORDER — BENZONATATE 100 MG/1
100 CAPSULE ORAL 3 TIMES DAILY PRN
Qty: 30 CAPSULE | Refills: 0 | Status: SHIPPED | OUTPATIENT
Start: 2023-04-22 | End: 2023-05-02

## 2023-04-22 RX ORDER — ALBUTEROL SULFATE 90 UG/1
2 AEROSOL, METERED RESPIRATORY (INHALATION) EVERY 6 HOURS PRN
Qty: 18 G | Refills: 0 | Status: SHIPPED | OUTPATIENT
Start: 2023-04-22 | End: 2024-04-21

## 2023-04-22 NOTE — PATIENT INSTRUCTIONS
Try tessalon perles as directed during the day for your cough. It will help numb the back of your throat so you do not have the urge to cough.      Take promethazine/DM cough syrup at night for cough. This will make you drowsy. Make sure not to drive, drink alcohol, operate machinery or take other sedating medications while taking this.       Use albuterol inhaler for chest tightness/shortness of breath/wheezing as directed. I included information in your discharge paperwork about this medication for you to review. Check glucose at home and eat a well balanced diet to avoid hyperglycemia.     Try a decongestant and corticosteroid nasal spray like flonase for the next few days for sinus relief. Initial: 2 sprays in each nostril once daily for 1 week. Reduce to 1 spray in each nostril once per day. An antihistamine like zyrtec, claritin, allegra can also be helpful for sinus relief. Neti pot irrigation, humidifier in your room, saline nasal spray and warm compresses to face can help. Regular (Guaifenesin) Mucinex 600 mg twice per day for 10 days can help thin secretions for better clearance. Drink plenty of fluids with this. Honey is a natural cough suppressant.     Please only use over the counter cough and cold medications for 3-5 days at a time to avoid rebound symptoms.     ----if not better with the above recommendation and if sinus pain worsens after 7-10 days of symptoms, start augmentin antibiotic for bacterial sinusitis. Take full dose or symptoms will not get better. Take with food and water to decrease GI upset. Try a probiotic or eat daily yogurt to maintain good gut and vaginal shiva while on an antibiotic. Do not drink alcohol while taking an antibiotic.      Getting plenty of rest can aid in a faster recovery of illnesses.     Alternate Tylenol and ibuprofen every 4 hours as needed for fever and body aches.  Please take NSAIDs with a full glass of water and food to avoid GI upset.  Get plenty of rest.   Drink at least 2.5 L of water per day to clear/thin secretions.      Please follow-up with your primary care provider or return to the clinic if not better/worsening symptoms in 1 week.     Report to the ER if you have chest pain, shortness of breath, palpitations.

## 2023-04-22 NOTE — PROGRESS NOTES
"Subjective:      Patient ID: Dina Hutton is a 37 y.o. female.    Vitals:  height is 4' 9" (1.448 m) and weight is 58.5 kg (129 lb). Her tympanic temperature is 98.7 °F (37.1 °C). Her blood pressure is 124/78 and her pulse is 94. Her respiration is 16 and oxygen saturation is 98%.     Chief Complaint: Cough    Past Medical History:  No date: Anemia  4/25/2020: Breast abscess  No date: GERD (gastroesophageal reflux disease)  8/21/2019: History of fourth degree perineal laceration  No date: Hypoglycemia  No date: Hypoglycemia  4/20/2020: Mastitis  No date: Mental disorder      Comment:  depression  No date: PONV (postoperative nausea and vomiting)  No date: Thyroid cancer  No date: Thyroid disease    Provider's note begins below:  Pt reports hoarse voice and sore throat x 2 weeks. Productive cough with green/yellow mucous, sinus pain, headaches, chest congestion, wheezing at night began 5 days ago. She feels SOB after coughing fits. Reports cold and hot sweats. She tried robitussin and advil with mild relief. Hx of COVID 1 year ago and required an albuterol inhaler. Hx of thyroidectomy r/t thyroid cancer and on synthroid. No current chemo/radiation.     Cough  This is a new problem. The current episode started in the past 7 days. The problem has been gradually worsening. The problem occurs constantly. The cough is Non-productive. Associated symptoms include chills, headaches, nasal congestion, postnasal drip, a sore throat and shortness of breath. Pertinent negatives include no chest pain, fever or myalgias. She has tried OTC cough suppressant (advil) for the symptoms. The treatment provided mild relief. There is no history of asthma, bronchitis or pneumonia.     Constitution: Positive for chills. Negative for fatigue and fever.   HENT:  Positive for congestion, postnasal drip, sinus pain, sinus pressure and sore throat.    Cardiovascular:  Negative for chest pain.   Eyes:  Negative for double vision and blurred " vision.   Respiratory:  Positive for cough and shortness of breath.    Gastrointestinal:  Negative for abdominal pain, nausea, vomiting and diarrhea.   Musculoskeletal:  Negative for muscle ache.   Neurological:  Positive for headaches.    Objective:     Physical Exam   Constitutional: She is oriented to person, place, and time. She appears well-developed. She is cooperative.  Non-toxic appearance. She does not appear ill. No distress.   HENT:   Head: Normocephalic and atraumatic.   Ears:   Right Ear: Hearing, tympanic membrane, external ear and ear canal normal.   Left Ear: Hearing, tympanic membrane, external ear and ear canal normal.   Nose: Rhinorrhea and congestion present. No mucosal edema or nasal deformity. No epistaxis. Right sinus exhibits frontal sinus tenderness. Right sinus exhibits no maxillary sinus tenderness. Left sinus exhibits frontal sinus tenderness. Left sinus exhibits no maxillary sinus tenderness.   Mouth/Throat: Uvula is midline and mucous membranes are normal. No trismus in the jaw. Normal dentition. No uvula swelling. Posterior oropharyngeal erythema present. No oropharyngeal exudate or posterior oropharyngeal edema.   Eyes: Conjunctivae and lids are normal. Pupils are equal, round, and reactive to light. No scleral icterus.   Neck: Trachea normal and phonation normal. Neck supple. No edema present. No erythema present. No neck rigidity present.   Cardiovascular: Normal rate, regular rhythm, normal heart sounds and normal pulses.   Pulmonary/Chest: Effort normal and breath sounds normal. No stridor. No respiratory distress. She has no decreased breath sounds. She has no wheezes. She has no rhonchi. She has no rales. She exhibits no tenderness.   Abdominal: Normal appearance.   Musculoskeletal: Normal range of motion.         General: No deformity. Normal range of motion.   Lymphadenopathy:     She has cervical adenopathy.        Right cervical: Superficial cervical and deep cervical  adenopathy present.        Left cervical: Superficial cervical and deep cervical adenopathy present.   Neurological: She is alert and oriented to person, place, and time. She exhibits normal muscle tone. Coordination normal.   Skin: Skin is warm, dry, intact, not diaphoretic and not pale. Capillary refill takes less than 2 seconds.   Psychiatric: Her speech is normal and behavior is normal. Judgment and thought content normal.   Nursing note and vitals reviewed.    Assessment:     1. Bacterial sinusitis    2. Wheezing        Plan:       Bacterial sinusitis  -     promethazine-dextromethorphan (PROMETHAZINE-DM) 6.25-15 mg/5 mL Syrp; Take 5 mLs by mouth nightly as needed (cough).  Dispense: 118 mL; Refill: 0  -     benzonatate (TESSALON) 100 MG capsule; Take 1 capsule (100 mg total) by mouth 3 (three) times daily as needed for Cough.  Dispense: 30 capsule; Refill: 0  -     amoxicillin-clavulanate 875-125mg (AUGMENTIN) 875-125 mg per tablet; Take 1 tablet by mouth every 12 (twelve) hours. for 7 days  Dispense: 14 tablet; Refill: 0    Wheezing  -     albuterol (PROVENTIL HFA) 90 mcg/actuation inhaler; Inhale 2 puffs into the lungs every 6 (six) hours as needed for Wheezing or Shortness of Breath. Rescue  Dispense: 18 g; Refill: 0            Discussed results/diagnosis/plan with patient in clinic. Strict precautions given to patient to monitor for worsening signs and symptoms. Advised to follow up with PCP or specialist.  Explained side effects of medications prescribed with patient and informed him/her to discontinue use if he/she has any side effects and to inform UC or PCP if this occurs. All questions answered. Strict ED verses clinic return precautions stressed and given in depth. Advised if symptoms worsens of fail to improve he/she should go to the Emergency Room. Discharge and follow-up instructions given verbally/printed with the patient who expressed understanding and willingness to comply with my  recommendations. Patient voiced understanding and in agreement with current treatment plan. Patient exits the exam room in no acute distress. Conversant and engaged during discharge discussion, verbalized understanding.

## 2023-04-24 ENCOUNTER — PATIENT MESSAGE (OUTPATIENT)
Dept: RHEUMATOLOGY | Facility: CLINIC | Age: 38
End: 2023-04-24
Payer: COMMERCIAL

## 2023-04-24 ENCOUNTER — LAB VISIT (OUTPATIENT)
Dept: LAB | Facility: HOSPITAL | Age: 38
End: 2023-04-24
Attending: INTERNAL MEDICINE
Payer: COMMERCIAL

## 2023-04-24 DIAGNOSIS — E89.0 POSTSURGICAL HYPOTHYROIDISM: Chronic | ICD-10-CM

## 2023-04-24 LAB — TSH SERPL DL<=0.005 MIU/L-ACNC: 0.42 UIU/ML (ref 0.4–4)

## 2023-04-24 PROCEDURE — 84443 ASSAY THYROID STIM HORMONE: CPT | Performed by: INTERNAL MEDICINE

## 2023-04-24 PROCEDURE — 36415 COLL VENOUS BLD VENIPUNCTURE: CPT | Performed by: INTERNAL MEDICINE

## 2023-04-26 ENCOUNTER — OFFICE VISIT (OUTPATIENT)
Dept: HEPATOLOGY | Facility: CLINIC | Age: 38
End: 2023-04-26
Payer: COMMERCIAL

## 2023-04-26 VITALS — WEIGHT: 130.06 LBS | HEIGHT: 57 IN | BODY MASS INDEX: 28.06 KG/M2

## 2023-04-26 DIAGNOSIS — R76.8 HEPATITIS B CORE ANTIBODY POSITIVE: ICD-10-CM

## 2023-04-26 PROCEDURE — 3008F PR BODY MASS INDEX (BMI) DOCUMENTED: ICD-10-PCS | Mod: CPTII,S$GLB,, | Performed by: NURSE PRACTITIONER

## 2023-04-26 PROCEDURE — 1159F MED LIST DOCD IN RCRD: CPT | Mod: CPTII,S$GLB,, | Performed by: NURSE PRACTITIONER

## 2023-04-26 PROCEDURE — 1159F PR MEDICATION LIST DOCUMENTED IN MEDICAL RECORD: ICD-10-PCS | Mod: CPTII,S$GLB,, | Performed by: NURSE PRACTITIONER

## 2023-04-26 PROCEDURE — 99214 PR OFFICE/OUTPT VISIT, EST, LEVL IV, 30-39 MIN: ICD-10-PCS | Mod: S$GLB,,, | Performed by: NURSE PRACTITIONER

## 2023-04-26 PROCEDURE — 99999 PR PBB SHADOW E&M-EST. PATIENT-LVL III: CPT | Mod: PBBFAC,,, | Performed by: NURSE PRACTITIONER

## 2023-04-26 PROCEDURE — 99999 PR PBB SHADOW E&M-EST. PATIENT-LVL III: ICD-10-PCS | Mod: PBBFAC,,, | Performed by: NURSE PRACTITIONER

## 2023-04-26 PROCEDURE — 3008F BODY MASS INDEX DOCD: CPT | Mod: CPTII,S$GLB,, | Performed by: NURSE PRACTITIONER

## 2023-04-26 PROCEDURE — 99214 OFFICE O/P EST MOD 30 MIN: CPT | Mod: S$GLB,,, | Performed by: NURSE PRACTITIONER

## 2023-04-26 RX ORDER — NORELGESTROMIN AND ETHINYL ESTRADIOL 35; 150 UG/D; UG/D
1 PATCH TRANSDERMAL
COMMUNITY
Start: 2023-04-06 | End: 2023-06-09 | Stop reason: SDUPTHER

## 2023-04-26 NOTE — PATIENT INSTRUCTIONS
Your labs suggest that you were exposed to hepatitis B in the past and your body cleared the virus. This typically does not cause a chronic (long standing) hepatitis B infection; however the virus can remain present in the liver and potentially flare if the immune system is suppressed. You should let your provider know if you are prescribed medications that can affect your immune system. These include but are not limited to- high dose steroids, medications for rheumatologic disease (such as Lupus, RA, psoriasis, etc.), and chemotherapy. You may need special blood monitoring or medication to prevent the hepatitis B virus from returning or reactivating.      Your current medication, Leflunomide, is low risk for hepatitis B reactivation.    Recommendation:  Let me know if your medications change in the future. We can reassess risk at that time.

## 2023-04-26 NOTE — PROGRESS NOTES
[facility] - New Patient     REFERRING PROVIDER: Dr. Parmjit Villar*  PCP: Lona Pham MD    Chief Complaint: Hepatitis B core Ab      HISTORY     This is a 37 y.o. female referred for evaluation of positive hepatitis B core Ab.     Labs from 12/3/22 showed a positive hepatitis B core antibody.    No known history of hepatitis B or prior exposure.  No family history of hepatitis B.  Parents were born in [place]; she was born in [place].     History of IVIG - no    Hepatitis B serologies:  Lab Results   Component Value Date    HEPBSAG Non-reactive 12/03/2022    HEPBCAB Reactive (A) 12/03/2022    HEPBSAB >1000.00 12/03/2022    HEPBSAB Reactive 12/03/2022    HEPBDNA <10 03/10/2023    HEPATITISBVI Not detected 03/10/2023     Normal liver enzymes and synthetic liver function.    Currently followed by Rheumatology for seronegative arthritis.   Medication regimen - planning to start leflunomide though considering Humira or Enbrel.     Feels well overall.  No symptoms of hepatic decompensation.           Past medical history, surgical history, problem list, family history, social history, allergies: Reviewed and updated in the appropriate section of the electronic medical record.      Current Outpatient Medications   Medication Sig Dispense Refill    albuterol (PROVENTIL HFA) 90 mcg/actuation inhaler Inhale 2 puffs into the lungs every 6 (six) hours as needed for Wheezing or Shortness of Breath. Rescue 18 g 0    amoxicillin-clavulanate 875-125mg (AUGMENTIN) 875-125 mg per tablet Take 1 tablet by mouth every 12 (twelve) hours. for 7 days 14 tablet 0    ascorbic acid/collagen hydr (COLLAGEN PLUS VITAMIN C ORAL) Take by mouth.      benzonatate (TESSALON) 100 MG capsule Take 1 capsule (100 mg total) by mouth 3 (three) times daily as needed for Cough. 30 capsule 0    CALCIUM ORAL Take by mouth.      CRANBERRY ORAL Take by mouth.      diphenhydrAMINE (BENADRYL) 25 mg capsule Take 2 capsules (50 mg  total) by mouth every 6 (six) hours as needed (Every time you use Compazine.). 20 capsule 0    ibuprofen (ADVIL,MOTRIN) 600 MG tablet Take 1 tablet (600 mg total) by mouth every 8 (eight) hours as needed for Pain. 30 tablet 0    methyl salicylate/menth/camph (SALONPAS TOP) Apply topically. patch      mv-min/iron/folic/calcium/vitK (WOMEN'S MULTIVITAMIN ORAL) Take by mouth.      promethazine-dextromethorphan (PROMETHAZINE-DM) 6.25-15 mg/5 mL Syrp Take 5 mLs by mouth nightly as needed (cough). 118 mL 0    TIROSINT 100 mcg Cap Take 100 mcg by mouth once daily. 90 capsule 3    ZAFEMY 150-35 mcg/24 hr 1 patch every 7 days.      leflunomide (ARAVA) 20 MG Tab Take 1 tablet (20 mg total) by mouth once daily. 30 tablet 3     No current facility-administered medications for this visit.     Medication list reviewed and updated.      Review of Systems - as per HPI  Constitutional: Negative for fatigue or unexpected weight change.   Respiratory: Negative for shortness of breath.    Cardiovascular: Negative for leg swelling.  Gastrointestinal: Negative for abdominal distention or abdominal pain. Negative for melena or hematemesis.  Musculoskeletal: Negative for myalgias.    Skin: Negative for itching.  Neurological: Negative for confusion or slowed mentation. Negative for tremors.   Hematological: Does not bruise/bleed easily.   Psychiatric: Negative for sleep disturbance.      Physical Exam   Constitutional: Well-nourished. No distress. Alert and oriented.  Eyes: No scleral icterus.   Pulmonary/Chest: Respiratory effort normal. No respiratory distress.   Abdominal: No distension, no ascites appreciated.   Extremities: No edema.   Neurological: No tremor or asterixis. Gait normal.  Skin: No jaundice. No spider telangiectasias or palmar erythema.  Psychiatric: Normal mood and affect. Speech, behavior, and thought content normal. No depression or anxiety noted.         LABS & DIAGNOSTIC STUDIES     I have personally reviewed  pertinent laboratory findings:    Lab Results   Component Value Date    ALT 10 03/10/2023    AST 14 03/10/2023    ALKPHOS 52 (L) 03/10/2023    BILITOT 0.2 03/10/2023    ALBUMIN 3.5 03/10/2023       Lab Results   Component Value Date    WBC 10.81 03/10/2023    HGB 13.1 03/10/2023    HCT 40.2 03/10/2023    MCV 87 03/10/2023     03/10/2023       Lab Results   Component Value Date     03/10/2023    K 4.2 03/10/2023    BUN 9 03/10/2023    CREATININE 0.7 03/10/2023    ESTGFRAFRICA >60 02/18/2022    EGFRNONAA >60 02/18/2022       Lab Results   Component Value Date    ANASCREEN Positive (A) 12/03/2022    HEPBSAG Non-reactive 12/03/2022    HEPBCAB Reactive (A) 12/03/2022    HEPCAB NON-REACTIVE 08/21/2019    [id number] Non-reactive 12/03/2022       No results found for: AFP    I have personally reviewed the following result reports:  Abdominal US - 8/2019      ASSESSMENT & PLAN     37 y.o. female with:    1. Hepatitis B core antibody positive  -- Hepatitis B serologies reviewed with patient. Reassured that patient does not have active or chronic hepatitis B infection. Labs suggest prior exposure and clearance.  -- Discussed risk of HBV reactivation with certain immunosuppressive medications and when antiviral prophylaxis is warranted.   -- Leflunomide is low risk for HBV reactivation. No liver monitoring or prophylaxis warranted.   -- Advised to let us know if her medication regimen changes in the future so we can reassess risk       *See AVS for patient education and instructions.       Return to clinic PRN.      Thank you for allowing me to participate in the care of [facility]atology        Duration of encounter: 30 min  This includes face to face time and non-face to face time preparing to see the patient (eg, review of tests), obtaining and/or reviewing separately obtained history, documenting clinical information in the electronic or other health record, independently  interpreting results and communicating results to the patient/family/caregiver, or Care coordination.

## 2023-05-03 ENCOUNTER — CLINICAL SUPPORT (OUTPATIENT)
Dept: REHABILITATION | Facility: OTHER | Age: 38
End: 2023-05-03
Payer: COMMERCIAL

## 2023-05-04 NOTE — PROGRESS NOTES
Pt participated in cohort orientation & there is no treatment or charge associated with this visit      Mason Branch, PT, DPT

## 2023-05-08 ENCOUNTER — CLINICAL SUPPORT (OUTPATIENT)
Dept: REHABILITATION | Facility: OTHER | Age: 38
End: 2023-05-08
Payer: COMMERCIAL

## 2023-05-08 DIAGNOSIS — R52 PAIN AGGRAVATED BY ACTIVITIES OF DAILY LIVING: ICD-10-CM

## 2023-05-08 DIAGNOSIS — R26.89 POOR BALANCE: ICD-10-CM

## 2023-05-08 DIAGNOSIS — R26.9 GAIT DISTURBANCE: ICD-10-CM

## 2023-05-08 DIAGNOSIS — F40.298 FEAR OF PAIN: ICD-10-CM

## 2023-05-08 DIAGNOSIS — R29.898 DECREASED GRIP STRENGTH: ICD-10-CM

## 2023-05-08 DIAGNOSIS — Z78.9 DECREASED ACTIVITIES OF DAILY LIVING (ADL): Primary | ICD-10-CM

## 2023-05-08 DIAGNOSIS — G89.29 CENTRAL SENSITIZATION TO PAIN: Primary | ICD-10-CM

## 2023-05-08 DIAGNOSIS — M79.7 FIBROMYALGIA: ICD-10-CM

## 2023-05-08 DIAGNOSIS — R68.89 DECREASED FUNCTIONAL ACTIVITY TOLERANCE: ICD-10-CM

## 2023-05-08 PROCEDURE — 97530 THERAPEUTIC ACTIVITIES: CPT

## 2023-05-08 PROCEDURE — 97112 NEUROMUSCULAR REEDUCATION: CPT

## 2023-05-08 NOTE — PROGRESS NOTES
[facility]    Functional Restoration Program  Physical Therapy Reassessment  FRP Day 1    Name: Dina Hutton  MRN:[id number]      Therapy Diagnosis:   Encounter Diagnoses   Name Primary?    Central sensitization to pain Yes    Fibromyalgia     Gait disturbance     Poor balance      Physician: Sharona Neumann NP    Date: 5/8/2023    Physician Orders: PT Eval and Treat   Medical Diagnosis from Referral:   M79.7 (ICD-10-CM) - Fibromyalgia   R26.9 (ICD-10-CM) - Gait disturbance   E89.0 (ICD-10-CM) - Postsurgical hypothyroidism      Evaluation Date: 3/21/2023  Authorization Period Expiration: 12/31/2023  Plan of Care Expiration: 6/30/2023  Visit # / Visits authorized: 1/ 30  FOTO: 1/ 3         Time In: 2:00p  Time Out: 2:55p  Total Billable Time: 55 minutes    SUBJECTIVE       Thu reports she is very excited to be here & optimistic about her progress. She was unable to start the program in April as she planned, but she has not had any significant changes that would limit her participation now.      Current 6/10  Location(s): B hands, B shld, neck, B feet    OBJECTIVE     Objective Measures updated at progress report unless specified.      Limitation/Restriction for FOTO Neck Survey     Therapist reviewed FOTO scores for Dina Hutton on 5/8/2023.   FOTO documents entered into [employer] - see Media section.     Limitation Score 3/21/2023: 54%  Limitation Score 5/8/2023: 56%    Predicted Score: 41%           Treatment       Thu received the Individualized Treatments listed below:         Thu participated in neuromuscular re-education activities to improve: Balance, Coordination, Kinesthetic, Sense, Proprioception, and Posture for 30 minutes. The following activities were included:      Peripheral muscle strengthening which included 1-2 sets of 10-20 repetitions at a slow, controlled 5-7 second per rep pace focused on strengthening supporting musculature for improved body mechanics & functional  mobility.  Patient & therapist focused on proper form & speed during treatment to ensure optimal strengthening of each targeted muscle group & neuromuscular re-education. Patients are instructed to keep sets/reps with proper load to stay at 3-5 out of 10 on the provided modified Rosendo exertion scale.    BATCA Machine Exercises Weight (lbs) Sets Repetitions Rosendo Exertion Scale Rating   Leg Extension        Hamstring Curls       Chest Press 5 1 15 2   Pec Fly 7.5 1 15 6   Lat Pull Down 7.5 1 20 3   Mid Row 7.5 1 15 2   Bicep Bar Curls       Standing Tricep Extension       Single Leg Press 15 1 15 4     Thu participated in dynamic functional therapeutic activities to improve functional performance for 30  minutes, including:    Pt education (see topics below)  Diaphragmatic breathing:   Verbal cues for unlabored, long & relaxed breathing w/ increased time for exhalation  Awareness of pulling breath down away from mouth to hips  Cues for for proper abdominal excursion & decreased use of accessory muscles of breathing (hand on stomach & on chest; increased stomach motion, decreased chest motion)  Education on inhalation activating sympathetic nervous system   Education on exhalation activating parasympathetic nervous system  Performed supine & seated      Patient Education and Home Exercises     Home Exercises Provided and Patient Education Provided     Education provided:   - FRP Educational Topics: Modified Rosendo Exertion Scale, Z-Lying for Pain Relief/Relaxation, Diaphragmatic Breathing, Central Sensitization , Resisted Exercise Basics, and Posture & Body Mechanics    Written Home Exercises Provided: Patient instructed to cont prior HEP. Exercises were reviewed and Thu was able to demonstrate them prior to the end of the session.  Thu demonstrated good  understanding of the education provided. See EMR under Patient Instructions for information provided during therapy sessions    ASSESSMENT     Pt w/ good participation  in therapy today & is w/out any significant changes since evaluation. She demonstrates excellent intrinsic motivation to improve, but is limited by fear & may need some increased instruction on Rosendo scale. In pec fly exercises, she had high difficulty rating due to presence of pain. She was open to conversation regarding hurt vs harm & appeared to have some relief when reminded the exercise is not able to cause tissue damage. She will need reinforcement.    Thu Is progressing well towards her goals.   Pt prognosis is Good.     Pt will continue to benefit from skilled outpatient physical therapy to address the deficits listed in the problem list box on initial evaluation, provide pt/family education and to maximize pt's level of independence in the home and community environment.     Pt's spiritual, cultural and educational needs considered and pt agreeable to plan of care and goals.     Anticipated barriers to physical therapy: chronic nature of issues, psychosocial factors    GOALS: Pt is in agreement with the following goals:  Goal Progress Towards Goal   SHORT Term: In 3 weeks, pt will:     Verbalize & demonstrate good understanding of resisted training with 3-1-3 second rep for [id number]-[id number] contraction to encourage full muscle recruitment for strength & endurance. Initiated 3/21/2023   Verbalize & demonstrate good understanding of home stretch routine to improve AROM, help decrease pain & improve mobility. Initiated 3/21/2023   Demonstrate proper supine or seated diaphragmatic breathing with decreased chest excursion & emphasis on full abdominal excursion & increased expiration time to promote muscle relaxation & increased parasympathetic activity to improve patient tolerance to activities. Initiated 3/21/2023   Verbalize good understanding of importance of proper posture in resisted exercise, functional activities & ADL's in order to help reduce postural strain, promote proper breathing.  "Initiated 3/21/2023   Demonstrate good understanding of full body resisted exercises w/ use of pictures &/or verbal cues to promote independence w/ exercise at the end of the program. Initiated 3/21/2023   Verbalize plan for continuing resisted exercises w/ specific location (i.e. commercial gym, home, community fitness center)  to incorporate all major muscle groups to maintain & progress therapeutic functional improvements. Initiated 3/21/2023   Verbalize difference between  "hurt vs harm"  to demonstrate understanding of fear avoidance in pain cycle.  Initiated 3/21/2023         Midterm: In 8 weeks, pt will:     Verbalize good understanding of activities planning/scheduling (stretching, mindfulness, resisted training, & cardio) prescribed by PT/OT following the end of the program to sustain & progress functional improvements. Initiated 3/21/2023   Perform selected resisted training w/ minimal to no cuing in therapy session to be independent w/ resisted strengthening. Initiated 3/21/2023   Demonstrate improved symptom self-management w/ posture/positioning and mechanical movements and/or implementation of appropriate modalities throughout a typical day.  Initiated 3/21/2023   Demonstrate independence w/ home stretch routine to improve AROM, help decrease pain & improve mobility. Initiated 3/21/2023         Long Term: In 12 weeks, pt will:     Perform selected cardio & resisted training independently to continue safe regular performance of daily exercise. Initiated 3/21/2023   Improve 2 Minute Walk Test (MWT) to 425 feet and 6 MWT to 1275 feet or greater to improve gait speed and mobility. Initiated 3/21/2023   Perform single leg stance 10 seconds or greater bilaterally to improve safety and balance in ADLs. Initiated 3/21/2023   Demonstrate Timed Up & Go (with appropriate assistive device, if necessary) of 10 seconds or less to decrease fall risk and improve functional mobility. Initiated 3/21/2023   Score 41 % or " less on Neck FOTO to indicate significant functional improvements in impaired functions secondary to pain. Initiated 3/21/2023        PLAN     Continue PT per POC     Mason Branch, PT, DPT

## 2023-05-08 NOTE — PROGRESS NOTES
"[facility]  Functional Restoration Program  Occupational Therapy Treatment Note  FRP day 1      Name: Dina Hutton  MRN:[id number]  Encounter Date: 5/8/2023    Diagnosis:   Encounter Diagnoses   Name Primary?    Decreased activities of daily living (ADL) Yes    Decreased  strength     Decreased functional activity tolerance     Fear of pain     Pain aggravated by activities of daily living      Referring provider: Sharona Neumann NP    Physician Orders: eval and treat; FRP  Medical Diagnosis:  M79.7 (ICD-10-CM) - Fibromyalgia  R26.9 (ICD-10-CM) - Gait disturbance  E89.0 (ICD-10-CM) - Postsurgical hypothyroidism   Evaluation Date: 3/21/2023  Insurance Authorization Period Expiration: 12/31/2023  Plan of Care Certification Period: 8/4/2023  Visit # / Visits authorized: 1/30 (plus eval)   FOTO completed: 2/3     Precautions:  Standard and Fall, light sensitivity.    Time In: 1500  Time Out: 15:55  Total Appointment Time: 55 minutes    SUBJECTIVE     She reports "my whole body is confused, its like its all inflamed." "I want to learn to do things better"     Thu was compliant with parts of home exercise program given previously. Writing therapist reminded Thu of importance of completion of exercises and activities outside of session/FRP to attain goals.     Response to previous treatment: Ms. Hutton noted: "I was ok" "I felt  it a little bit, but it was not that bad"     Functional change: Eval only at this time.         Functional Pain Scale Rating 0-10: within the last 30 days  6/10 current  Location: arms, elbow, feet, hands (worse in index fingers), head, jaw, shoulders, wrists, and neck, and scalp.  Description: pulsating pain, which becomes tingling, which becomes numb. Sharp pain left side of lower back. She reports intermittent swelling into the joints. Her pain is worse in the morning and day. She does endorse stiffness. She also reports headaches.    Patient Specific Activities based on " Personal goals:  Activity  Performance  5/8/2023  Satisfaction      Meal prep and cooking.  5  3-4   2.  Getting on the ground to play with son.  1  0   3.  Participating in outdoor activities w family (Standing)  3  0   4.  Carrying/Lifting groceries, and putting them in cabinets/pantry.  7  6   5.  laundry (bending over, lifting wet laundry)  4  4           Total Score  4 -      Patient Specific Activity Scoring Scheme for Performance     0        1        2        3        4        5        6        7        8        9        10     Unable                                                                                     Able to perform  To perform                                                                              activity at same  Activity                                                                                    level as before                                                                          Objective     Objective Measures updated at progress report unless specified.    Limitation/Restriction for FOTO NOC Survey     Therapist reviewed FOTO scores for Dina Hutton on 5/8/2023   FOTO documents entered into [employer] - see Media section.     Limitation Score: 58 %              No environmental, cultural, spiritual, developmental or education needs expressed or noted    Treatment     Refer to FRP protocol for details and explanation of each activity.     Thu received the treatments listed below:     Therapeutic activities and functional lifting activities in order to increase participation in, endurance for, and performance with vocational, selfcare ADLs, and leisure activities in order to improve quality of life, for 55 minutes, including:    While seated, review of FRP binder regarding body mechanics and tips to prevent back pain, including review of recommended daily water intake based on body weight.     Introduction to stretching as tool to manage discomfort, as well as importance, and  "use of DONNA scale to guide pacing. Used physio ball for walk outs for modified forward flex stretch for stretch.     While on nustep, Personal goals reviewed and updated as needed. Thu rated performance of activities related to personal goals, as well as satisfaction of said performance regarding. (Refer to subjective section for details).    Thu participated in endurance activity using Nustep for 11 minutes, starting at level to improve functional endurance and progress towards increased MET level for improved community mobility and participation, rated as Moderate (3/10) exertion, Thu re-educated on how to add mindfulness component to activity and how to pace appropriately by completed self check ins and grading activity as needed, with goal to reach moderate to sort of hard by end of activity.  Pt completed 795 steps. Of note, pt initially stating that she was experiencing increased pain, adjustments made for leg and arm length, pt immediately noted improved comfort.     Seated education and practice of deep-breathing technique, "diaphragmatic breathing" for use as needed for pain management/MM relaxation, and for internal self-regulation in conjunction w/ functional tasks as recovery of effective respiration during ADLS/IADLs and to bolster proper circulation and respiratory response.  Noted difficulty recruiting from lower lung/diaphragm during breath work, pt inhalation/exhalation isolated to mid-core.      No environmental, cultural, spiritual, developmental or education needs expressed or noted.    Patient Education and Home Exercises     Education provided:   - Progress towards goals   - proper posture and body mechanics.  - anticipated muscular soreness following lift testing and strategies for management including stretching, using ice, scheduled rest.  - Functional pain scale  - DONNA rate of perceived exertion scale.   - details of the Functional Restoration Program.    Written Home Exercises Provided: " yes.  Exercises were reviewed and Thu was able to demonstrate them prior to the end of the session.  Thu demonstrated good  understanding of the HEP provided.     See EMR under Patient Instructions for exercises provided during therapy sessions. FRP binder provided day one of cohort.    Assessment     Ms. Hutton presents with engaged and positive demeanor, eager to apply education and principles to daily life. Pt required encouragement and cues to name postures or positions that felt uncomfortable, pt with tendency to minimize experience of discomfort/pain. Overall, pt tolerated today's session well, encouragement provided to continue w stretches and to increase water intake- pt in agreement.     Thu is progressing well towards her goals and status of goals can be seen below. Patient's prognosis is Good.     Thu would continue to benefit from skilled outpatient occupational therapy to address the deficits listed in the problem list on initial evaluation, provide patient education, and to maximize patient's level of independence in the home and community environment.     Anticipated barriers to occupational therapy: psychosocial demands, co morbidities    Goals:     SHORT Term goals: In 3 weeks, patient will:  Status of goal:   Implements correct use of breathing techniques during functional lifting tasks, with minimal cues needed.  Initiated 5/8/2023    Utilizes DONNA rate of exertion scale to pace performance with functional lifting tasks, and implements self-care strategies during activity participation to manage pain. Initiated 5/8/2023    Verbalize understanding of energy conservation and pacing strategies during daily habits, roles, and routines in order to improve tolerance for work and home demands.  Initiated 5/8/2023    Completes 5x sit to stand test in 20 seconds or less to improve ability to participate in community mobility.   Initiated 5/8/2023    Demonstrate increased positional tolerance to the level  needed for work, home, and community activities (standing in cue at social event, managing supplies for outing, streneous IADL), as evidenced by having a METS level of 4. Initiated 5/8/2023    Demonstrate proper body mechanics with functional lifting tasks (floor to waist, waist to shoulder, maximum lift, and box carry), via teach back method with minimal cues needed. Initiated 5/8/2023    Demonstrate increased functional strength by lifting 13 lbs waist to shoulder for management of (I)ADL, including dressing and grooming, placing items in kitchen cabinets, folding towels, and/or managing suitcase when traveling.   Initiated 5/8/2023    Demonstrate increased functional strength by lifting 20 lbs floor to waist to meet demand of work/home routine, including lifting groceries, managing laundry, and/or lifting supplies for outdoor activities.   Initiated 5/8/2023    Tolerate Home Activity Plan (HAP)/ Home Exercise Program (HEP) to promote safety and independence with ADL, with report of completion of at least 2 out of 4 days outside of program participation.  Initiated 5/8/2023    Show improved perception of own abilities as evidenced by increase of FOTO scores to established MDC value and perception of performance ratings regarding personal long term goals.  Initiated 5/8/2023    Pt will verbalize and demonstrate increased water intake to goal level Initiated 5/8/2023          MIDTERM goals: In 9 weeks, patient will: Status of goal:   Report implementation of application of mindfulness, stretching, and other coping strategies for improved participation in daily routine. Initiated 5/8/2023    Verbalize functional application of lifting techniques in daily life (golfers lift, floor to waist, waist to shoulder). Initiated 5/8/2023    Completes 5x sit to stand test in 17 seconds or less to improve ability to participate in community mobility.  Initiated 5/8/2023    Applies functional application of lifting techniques  (golfer's lift, floor to waist, waist to shoulder) in activities of daily life, per patient report.  Initiated 5/8/2023    Demonstrate physical capacities consistent with a Light-Medium (#35 max, frequent lifting/carrying #20, 3 METS)  demand level, as needed to perform activities to be successful in roles of life, regarding Household Management and Community Participation. Initiated 5/8/2023    Have an improvement of 3 points in 2/5 personal goals in rating of  performance.  Initiated 5/8/2023    Show improved perception of own abilities as evidenced by increase of FOTO scores to established MC II value and perception of performance ratings regarding personal long term goals.  Initiated 5/8/2023          LONG term goals = Patient Specific Goals:  Vocational: Chores, Daily tasks, playing with family   Recreational : sports, outdoor activities, walks   Daily Living Activities: cooking, cleaning, sports, one day go back to work      Measured by patient's self-reported performance and satisfaction of personal FRP goals, listed above in subjective section.   Total Score = sum of the activity performance scores / number of activities  Minimum detectable change (90%CI) for average score = 2 points  Minimum detectable change (90%CI) for single activity score  = 3 points    Plan     Continue per plan of care.     Updates/Grading for next session: functional lifts, pacing, water intake    Noy Storey, OT   5/8/2023

## 2023-05-10 ENCOUNTER — CLINICAL SUPPORT (OUTPATIENT)
Dept: REHABILITATION | Facility: OTHER | Age: 38
End: 2023-05-10
Payer: COMMERCIAL

## 2023-05-10 ENCOUNTER — OFFICE VISIT (OUTPATIENT)
Dept: PSYCHIATRY | Facility: OTHER | Age: 38
End: 2023-05-10
Payer: COMMERCIAL

## 2023-05-10 DIAGNOSIS — R68.89 DECREASED FUNCTIONAL ACTIVITY TOLERANCE: ICD-10-CM

## 2023-05-10 DIAGNOSIS — G89.29 CENTRAL SENSITIZATION TO PAIN: Primary | ICD-10-CM

## 2023-05-10 DIAGNOSIS — Z78.9 DECREASED ACTIVITIES OF DAILY LIVING (ADL): ICD-10-CM

## 2023-05-10 DIAGNOSIS — R52 PAIN AGGRAVATED BY ACTIVITIES OF DAILY LIVING: Primary | ICD-10-CM

## 2023-05-10 DIAGNOSIS — R26.9 GAIT DISTURBANCE: ICD-10-CM

## 2023-05-10 DIAGNOSIS — M79.7 FIBROMYALGIA: ICD-10-CM

## 2023-05-10 DIAGNOSIS — R26.89 POOR BALANCE: ICD-10-CM

## 2023-05-10 DIAGNOSIS — E89.0 POSTSURGICAL HYPOTHYROIDISM: Chronic | ICD-10-CM

## 2023-05-10 DIAGNOSIS — R29.898 DECREASED GRIP STRENGTH: ICD-10-CM

## 2023-05-10 DIAGNOSIS — F40.298 FEAR OF PAIN: ICD-10-CM

## 2023-05-10 PROCEDURE — 97530 THERAPEUTIC ACTIVITIES: CPT

## 2023-05-10 PROCEDURE — 97112 NEUROMUSCULAR REEDUCATION: CPT

## 2023-05-10 PROCEDURE — 99499 NO LOS: ICD-10-PCS | Mod: ,,, | Performed by: SOCIAL WORKER

## 2023-05-10 PROCEDURE — 99499 UNLISTED E&M SERVICE: CPT | Mod: ,,, | Performed by: SOCIAL WORKER

## 2023-05-10 NOTE — PROGRESS NOTES
"[facility]    Functional Restoration Program  Physical Therapy Treatment Note  FRP Day 2    Name: Dina Hutton  MRN:[id number]      Therapy Diagnosis:   Encounter Diagnoses   Name Primary?    Central sensitization to pain Yes    Fibromyalgia     Gait disturbance     Poor balance        Physician: Sharona Neumann NP    Date: 5/10/2023    Physician Orders: PT Eval and Treat   Medical Diagnosis from Referral:   M79.7 (ICD-10-CM) - Fibromyalgia   R26.9 (ICD-10-CM) - Gait disturbance   E89.0 (ICD-10-CM) - Postsurgical hypothyroidism      Evaluation Date: 3/21/2023  Authorization Period Expiration: 12/31/2023  Plan of Care Expiration: 6/30/2023  Visit # / Visits authorized: 3/ 30  FOTO: 1/ 3         Time In: 2:45 pm  Time Out: 4:00 pm  Total Billable Time: 75 minutes    SUBJECTIVE       Thu reports Monday session was "fun" because she got to do exercises she has never done before.  Patient report performing inc errands yesterday including inc standing and walking.  Patient note inc fatigue today and notes she is "feeling the exercises" more today.     Patient report this weekend /c inc inc errands and activities for family event and expects to have inc stress as a result.  Patient report tolerating last session well /c no c/o of excess discomfort.  Patient agree to tx today.       Patient's FRP goals: play w/ kids more, do home tasks more easily,        Current 6/10  Location(s): B hands, B shld, neck, B feet    OBJECTIVE     Objective Measures updated at progress report unless specified.      Limitation/Restriction for FOTO Neck Survey     Therapist reviewed FOTO scores for Dina Hutton on 5/8/2023.   FOTO documents entered into [employer] - see Media section.     Limitation Score 3/21/2023: 54%  Limitation Score 5/8/2023: 56%    Predicted Score: 41%           Treatment       Thu received the Individualized Treatments listed below:     Thu participated in neuromuscular re-education activities to " improve: Balance, Coordination, Kinesthetic, Sense, Proprioception, and Posture for 55 minutes. The following activities were included:    Fitter board - min setting, 20 tilt bouts   Fwd/Bwd    Trial 1 - NBOS x 10 2 hands on rail, norm CODY x 10 2 hands on rail      Trial 2 - norm CODY, 2 hand  fade to 1 hand     Lat     Trial 1 - hand and forearm on rail, controlled tilt     Trial 2 - 1 hand on rail     DLS on Airex pad    Arms crossed  30 sec   EC Arms crossed 30 sec inc ankle strategy   SLS on Airex pad    1 hand piano finger support 30 sec B   EC, 1 hand piano finger support 30 sec B     Peripheral muscle strengthening which included 1-2 sets of 10-20 repetitions at a slow, controlled 5-7 second per rep pace focused on strengthening supporting musculature for improved body mechanics & functional mobility.  Patient & therapist focused on proper form & speed during treatment to ensure optimal strengthening of each targeted muscle group & neuromuscular re-education. Patients are instructed to keep sets/reps with proper load to stay at 3-5 out of 10 on the provided modified Rosendo exertion scale.    BATCA Machine Exercises Weight (lbs) Sets Repetitions Rosendo Exertion Scale Rating   Leg Extension  5 1 15 5   Hamstring Curls 15 1 20 4   Chest Press 7.5 1 20 7   Pec Fly 7.5 1 20 3   Lat Pull Down       Mid Row       Bicep Bar Curls       Standing Tricep Extension       Single Leg Press 20 1 20 4       Thu participated in dynamic functional therapeutic activities to improve functional performance for 20 minutes, including:    TM 1.3 mph 0 incline 6 min  3/10 RPE for functional ambulation endurance    Gait - WBOS, excess weight shift, dec weight shift     Diaphragmatic breathing:   Verbal cues for unlabored, long & relaxed breathing w/ increased time for exhalation  Awareness of pulling breath down away from mouth to hips  Cues for for proper abdominal excursion & decreased use of accessory muscles of breathing (hand  "on stomach & on chest; increased stomach motion, decreased chest motion)  Education on inhalation activating sympathetic nervous system   Education on exhalation activating parasympathetic nervous system  Performed supine         Patient Education and Home Exercises     Home Exercises Provided and Patient Education Provided     Education provided:   - FRP Educational Topics: Modified Rosendo Exertion Scale, Z-Lying for Pain Relief/Relaxation, Diaphragmatic Breathing, Central Sensitization , Resisted Exercise Basics, and Posture & Body Mechanics    Written Home Exercises Provided: Patient instructed to cont prior HEP. Exercises were reviewed and u was able to demonstrate them prior to the end of the session.  Thu demonstrated good  understanding of the education provided. See EMR under Patient Instructions for information provided during therapy sessions    ASSESSMENT     Patient /c good participation in tx today.  Patient demonstrate dec fear limitation participating in fitter board and TM consecutively /s concern for extended standing and returning to Framebench fly resistance training /s notation of inc pain like she had last visit. Tx continue /c resistance exercise introduction to full body training.  Patient /c good attention to rep pacing for improved muscle fiber activation. Patient report safe RPE throughout exercises today, however, recommend f/u next visit for body response to workout to ensure she is not under-representing her effort and leading to "boom/bust" (as was described in her subjective report Tuesday activity).  Also, considering patient note family event this weekend /c associated stress and inc activity, patient re-educated on diaphragmatic breathing to attend to exertion levels and inc attention to potential "boom/bust" cycle.  Recommend f/u next Monday to assess activity pacing thru weekend.           Thu Is progressing well towards her goals.   Pt prognosis is Good.     Pt will continue to benefit " "from skilled outpatient physical therapy to address the deficits listed in the problem list box on initial evaluation, provide pt/family education and to maximize pt's level of independence in the home and community environment.     Pt's spiritual, cultural and educational needs considered and pt agreeable to plan of care and goals.     Anticipated barriers to physical therapy: chronic nature of issues, psychosocial factors    GOALS: Pt is in agreement with the following goals:  Goal Progress Towards Goal   SHORT Term: In 3 weeks, pt will:     Verbalize & demonstrate good understanding of resisted training with 3-1-3 second rep for [id number] contraction to encourage full muscle recruitment for strength & endurance. Initiated 3/21/2023   Verbalize & demonstrate good understanding of home stretch routine to improve AROM, help decrease pain & improve mobility. Initiated 3/21/2023   Demonstrate proper supine or seated diaphragmatic breathing with decreased chest excursion & emphasis on full abdominal excursion & increased expiration time to promote muscle relaxation & increased parasympathetic activity to improve patient tolerance to activities. Initiated 3/21/2023   Verbalize good understanding of importance of proper posture in resisted exercise, functional activities & ADL's in order to help reduce postural strain, promote proper breathing. Initiated 3/21/2023   Demonstrate good understanding of full body resisted exercises w/ use of pictures &/or verbal cues to promote independence w/ exercise at the end of the program. Initiated 3/21/2023   Verbalize plan for continuing resisted exercises w/ specific location (i.e. commercial gym, home, community fitness center)  to incorporate all major muscle groups to maintain & progress therapeutic functional improvements. Initiated 3/21/2023   Verbalize difference between  "hurt vs harm"  to demonstrate understanding of fear avoidance in pain cycle.  " Initiated 3/21/2023         Midterm: In 8 weeks, pt will:     Verbalize good understanding of activities planning/scheduling (stretching, mindfulness, resisted training, & cardio) prescribed by PT/OT following the end of the program to sustain & progress functional improvements. Initiated 3/21/2023   Perform selected resisted training w/ minimal to no cuing in therapy session to be independent w/ resisted strengthening. Initiated 3/21/2023   Demonstrate improved symptom self-management w/ posture/positioning and mechanical movements and/or implementation of appropriate modalities throughout a typical day.  Initiated 3/21/2023   Demonstrate independence w/ home stretch routine to improve AROM, help decrease pain & improve mobility. Initiated 3/21/2023         Long Term: In 12 weeks, pt will:     Perform selected cardio & resisted training independently to continue safe regular performance of daily exercise. Initiated 3/21/2023   Improve 2 Minute Walk Test (MWT) to 425 feet and 6 MWT to 1275 feet or greater to improve gait speed and mobility. Initiated 3/21/2023   Perform single leg stance 10 seconds or greater bilaterally to improve safety and balance in ADLs. Initiated 3/21/2023   Demonstrate Timed Up & Go (with appropriate assistive device, if necessary) of 10 seconds or less to decrease fall risk and improve functional mobility. Initiated 3/21/2023   Score 41 % or less on Neck FOTO to indicate significant functional improvements in impaired functions secondary to pain. Initiated 3/21/2023        PLAN     Continue PT per POC     Tomasz Rizvi, PT, DPT

## 2023-05-10 NOTE — PROGRESS NOTES
"[facility]  Functional Restoration Program  Occupational Therapy Treatment Note  FRP day 2      Name: Dina Hutton  MRN:[id number]  Encounter Date: 5/10/2023    Diagnosis:   Encounter Diagnoses   Name Primary?    Pain aggravated by activities of daily living Yes    Decreased activities of daily living (ADL)     Decreased  strength     Decreased functional activity tolerance     Fear of pain        Referring provider: Sharona Neumann NP    Physician Orders: eval and treat; FRP  Medical Diagnosis:  M79.7 (ICD-10-CM) - Fibromyalgia  R26.9 (ICD-10-CM) - Gait disturbance  E89.0 (ICD-10-CM) - Postsurgical hypothyroidism   Evaluation Date: 3/21/2023  Insurance Authorization Period Expiration: 12/31/2023  Plan of Care Certification Period: 8/4/2023  Visit # / Visits authorized: 2/30 (plus eval)   FOTO completed: 2/3     Precautions:  Standard and Fall, light sensitivity    Time In: 1330  Time Out: 1445  Total Appointment Time: 75 minutes    SUBJECTIVE     She reports "I have a hard time sleeping, but I find a way to make it work, I drink lots of coffee." When prompted, pt shared about her many attempts at improving sleep hygiene with limited/no success, including limiting phone/screen time, and creating a dark environment, though stated "i'm scared of the dark, so I was trying to scare myself into staying in the bed and falling asleep" - continued education on SNS with fear response provided, "that makes sense, I've been scared since I was a little kid"     Thu was compliant with parts of home exercise program given previously. Writing therapist reminded Thu of importance of completion of exercises and activities outside of session/FRP to attain goals.    States that she limits her stretching because of her joint pain, which causes her to have more pain with stretches that involve bending    Response to previous treatment: Ms. Hutton noted: increased joint pain the next day    Functional change: " n/a        Functional Pain Scale Rating 0-10: within the last 30 days  7/10 current  Location: arms, elbow, feet, hands (worse in index fingers), head, jaw, shoulders, wrists, and neck, and scalp.  Description: pulsating pain, which becomes tingling, which becomes numb. Sharp pain left side of lower back. She reports intermittent swelling into the joints. Her pain is worse in the morning and day. She does endorse stiffness. She also reports headaches.    Patient Specific Activities based on Personal goals:  Activity  Performance  5/8/2023  Satisfaction      Meal prep and cooking.  5  3-4   2.  Getting on the ground to play with son.  1  0   3.  Participating in outdoor activities w family (Standing)  3  0   4.  Carrying/Lifting groceries, and putting them in cabinets/pantry.  7  6   5.  laundry (bending over, lifting wet laundry)  4  4           Total Score  4 -      Patient Specific Activity Scoring Scheme for Performance     0        1        2        3        4        5        6        7        8        9        10     Unable                                                                                     Able to perform  To perform                                                                              activity at same  Activity                                                                                    level as before                                                                          Objective     Objective Measures updated at progress report unless specified.    Limitation/Restriction for FOTO NOC Survey     Therapist reviewed FOTO scores for Dina Hutton on 5/8/2023   FOTO documents entered into [facility] [employer] - see Media section.     Limitation Score: 58 %              No environmental, cultural, spiritual, developmental or education needs expressed or noted    Treatment     Refer to FRP protocol for details and explanation of each activity.     Shellyu received the treatments listed below:      Therapeutic activities and functional lifting activities in order to increase participation in, endurance for, and performance with vocational, selfcare ADLs, and leisure activities in order to improve quality of life, for 75 minutes, including:    Full body functional mobility w/ 3-10 sec hold technique &/or 3-5 repetition technique to improve functional performance. In order to:  Improve driving safety, visual & spatial awareness:  Cervical flx/ext  Cervical side bending  Cervical rotation  Cervical protraction/retraction  Improve lifting mechanics, showering/bathing, dressing, cooking, reaching, pushing & fine motor skills  Shld rolls  Shld depression/elevation  Scapular retraction/protraction/scaption  Posterior shld stretch (cross arm)  Shld ER stretch (chicken wing)  Elbow flx/ext  Forearm supination/pronation  Wrist flx/ext  Open/close hands/fingers  Improve bed mobility & rolling, bending over, cooking & cleaning:  Seated trunk rotations  Seated trunk/thoracic ext/flx  Improve functional gait, squatting, sitting tolerance, functional balance:   Seated marches & knee to chest  Seated knee flx/ext  Seated hip ER/piriformis stretch  Seated hamstring stretch  Seated toe raise to heel raise   Seated ankle circles each way  Improve overhead reaching/activities, standing tolerance:  Standing lumbar extensions  Standing lateral leaning stretch  Standing quad stretch (UE support for balance)     Thu participated in functional lift waist to shoulder, 5 reps x 5 lbs with a rating of Sort of hard (4/10) exertion. Thu educated on standing posture, core awareness, keeping shoulders depressed and retracted, stepping to place > reaching to place, keeping weight close to center of gravity and pacing as needed. Increased pain noted. Because of increased weight in knees, pt educated on use of chair to shift weight into heels.    Pt educated on technique for sit<>stand from edge of mat using high velocity, big movements,  "with focus on using BUE for weight shift/momentum as well as "noes over toes" and coordinating movement with breath. Pt completed 10x2 reps with mod-min cues needed.  Pt endorsed increased pain, requested to trial nustep over seated stretching with physioball.    Thu participated in endurance activity using Nustep for 6.43 minutes, at level 1.0 to improve functional endurance and progress towards increased MET level for improved community mobility and participation, rated as Sort of hard (4/10) exertion.   Thu required cues for pacing and education on hurt vs harm, as noted increased rate of breath with increased cardio as stressful or a sign of performing exercise incorrectly vs it being an appropriate physical response to exertion. Improved ease noted with education, Thu endorsed easily assuming that she is doing something incorrect. Education on mind-body connection as tool for increased positive relationship with self.     Seated education and practice of deep-breathing technique, "diaphragmatic breathing" for use as needed for pain management/MM relaxation, and for internal self-regulation in conjunction w/ functional tasks as recovery of effective respiration during ADLS/IADLs and to bolster proper circulation and respiratory response.  Noted difficulty recruiting from lower lung/diaphragm during breath work, pt inhalation/exhalation isolated to mid-core.      No environmental, cultural, spiritual, developmental or education needs expressed or noted.    Patient Education and Home Exercises     Education provided:   - Progress towards goals   - proper posture and body mechanics.  - anticipated muscular soreness following lift testing and strategies for management including stretching, using ice, scheduled rest.  - Functional pain scale  - DONNA rate of perceived exertion scale.   - details of the Functional Restoration Program.    Written Home Exercises Provided: yes.  Exercises were reviewed and Shellyu was able to " demonstrate them prior to the end of the session.  Thu demonstrated good  understanding of the HEP provided.     See EMR under Patient Instructions for exercises provided during therapy sessions. FRP binder provided day one of cohort.    Assessment     Ms. Hutton presents with engaged and positive demeanor, eager to apply education and principles to daily life. Thu required cues for pacing and education on hurt vs harm, as noted increased rate of breath with increased cardio as stressful or a sign of performing exercise incorrectly vs it being an appropriate physical response to exertion. Improved ease noted with education, Thu endorsed easily assuming that she is doing something incorrect. Education on mind-body connection as tool for increased positive relationship with self.  Because of increased fatigue, pt requested permission to go get coffee, increased engagement and heightened energy noted after caffeine intake.     Thu is progressing well towards her goals and status of goals can be seen below. Patient's prognosis is Good.     Shellyu would continue to benefit from skilled outpatient occupational therapy to address the deficits listed in the problem list on initial evaluation, provide patient education, and to maximize patient's level of independence in the home and community environment.     Anticipated barriers to occupational therapy: psychosocial demands, co morbidities    Goals:     SHORT Term goals: In 3 weeks, patient will:  Status of goal:   Implements correct use of breathing techniques during functional lifting tasks, with minimal cues needed.  Initiated 5/10/2023    Utilizes DONNA rate of exertion scale to pace performance with functional lifting tasks, and implements self-care strategies during activity participation to manage pain. Initiated 5/8/2023    Verbalize understanding of energy conservation and pacing strategies during daily habits, roles, and routines in order to improve tolerance for work  and home demands.  Initiated 5/8/2023    Completes 5x sit to stand test in 20 seconds or less to improve ability to participate in community mobility.   Initiated 5/8/2023    Demonstrate increased positional tolerance to the level needed for work, home, and community activities (standing in cue at social event, managing supplies for outing, streneous IADL), as evidenced by having a METS level of 4. Initiated 5/8/2023    Demonstrate proper body mechanics with functional lifting tasks (floor to waist, waist to shoulder, maximum lift, and box carry), via teach back method with minimal cues needed. Initiated 5/8/2023    Demonstrate increased functional strength by lifting 13 lbs waist to shoulder for management of (I)ADL, including dressing and grooming, placing items in kitchen cabinets, folding towels, and/or managing suitcase when traveling.   Initiated 5/8/2023    Demonstrate increased functional strength by lifting 20 lbs floor to waist to meet demand of work/home routine, including lifting groceries, managing laundry, and/or lifting supplies for outdoor activities.   Initiated 5/8/2023    Tolerate Home Activity Plan (HAP)/ Home Exercise Program (HEP) to promote safety and independence with ADL, with report of completion of at least 2 out of 4 days outside of program participation.  Initiated 5/8/2023    Show improved perception of own abilities as evidenced by increase of FOTO scores to established MDC value and perception of performance ratings regarding personal long term goals.  Initiated 5/8/2023    Pt will verbalize and demonstrate increased water intake to goal level Initiated 5/8/2023          MIDTERM goals: In 9 weeks, patient will: Status of goal:   Report implementation of application of mindfulness, stretching, and other coping strategies for improved participation in daily routine. Initiated 5/8/2023    Verbalize functional application of lifting techniques in daily life (golfers lift, floor to waist,  waist to shoulder). Initiated 5/8/2023    Completes 5x sit to stand test in 17 seconds or less to improve ability to participate in community mobility.  Initiated 5/8/2023    Applies functional application of lifting techniques (golfer's lift, floor to waist, waist to shoulder) in activities of daily life, per patient report.  Initiated 5/8/2023    Demonstrate physical capacities consistent with a Light-Medium (#35 max, frequent lifting/carrying #20, 3 METS)  demand level, as needed to perform activities to be successful in roles of life, regarding Household Management and Community Participation. Initiated 5/8/2023    Have an improvement of 3 points in 2/5 personal goals in rating of  performance.  Initiated 5/8/2023    Show improved perception of own abilities as evidenced by increase of FOTO scores to established MC II value and perception of performance ratings regarding personal long term goals.  Initiated 5/8/2023          LONG term goals = Patient Specific Goals:  Vocational: Chores, Daily tasks, playing with family   Recreational : sports, outdoor activities, walks   Daily Living Activities: cooking, cleaning, sports, one day go back to work      Measured by patient's self-reported performance and satisfaction of personal FRP goals, listed above in subjective section.   Total Score = sum of the activity performance scores / number of activities  Minimum detectable change (90%CI) for average score = 2 points  Minimum detectable change (90%CI) for single activity score  = 3 points    Plan     Continue per plan of care.     Updates/Grading for next session: functional lifts, pacing, water intake     Noy Storey, OT   5/10/2023

## 2023-05-11 ENCOUNTER — CLINICAL SUPPORT (OUTPATIENT)
Dept: REHABILITATION | Facility: OTHER | Age: 38
End: 2023-05-11
Payer: COMMERCIAL

## 2023-05-11 DIAGNOSIS — M79.7 FIBROMYALGIA: ICD-10-CM

## 2023-05-11 DIAGNOSIS — Z78.9 DECREASED ACTIVITIES OF DAILY LIVING (ADL): ICD-10-CM

## 2023-05-11 DIAGNOSIS — R52 PAIN AGGRAVATED BY ACTIVITIES OF DAILY LIVING: Primary | ICD-10-CM

## 2023-05-11 DIAGNOSIS — F40.298 FEAR OF PAIN: ICD-10-CM

## 2023-05-11 DIAGNOSIS — R26.89 POOR BALANCE: ICD-10-CM

## 2023-05-11 DIAGNOSIS — G89.29 CENTRAL SENSITIZATION TO PAIN: Primary | ICD-10-CM

## 2023-05-11 DIAGNOSIS — R26.9 GAIT DISTURBANCE: ICD-10-CM

## 2023-05-11 DIAGNOSIS — R29.898 DECREASED GRIP STRENGTH: ICD-10-CM

## 2023-05-11 DIAGNOSIS — R68.89 DECREASED FUNCTIONAL ACTIVITY TOLERANCE: ICD-10-CM

## 2023-05-11 PROCEDURE — 97530 THERAPEUTIC ACTIVITIES: CPT

## 2023-05-11 PROCEDURE — 97112 NEUROMUSCULAR REEDUCATION: CPT

## 2023-05-11 NOTE — PATIENT INSTRUCTIONS
YouTube: Understanding Pain in less than 5 minutes, and what to do about it!  [email or web address]

## 2023-05-11 NOTE — PROGRESS NOTES
"[facility]  Functional Restoration Program  Occupational Therapy Treatment Note  FRP day 3 (make up day)    Name: Dina Hutton  MRN:[id number]  Encounter Date: 5/11/2023    Diagnosis:   Encounter Diagnoses   Name Primary?    Pain aggravated by activities of daily living Yes    Decreased activities of daily living (ADL)     Decreased  strength     Decreased functional activity tolerance     Fear of pain      Referring provider: Sharona Neumann NP    Physician Orders: eval and treat; FRP  Medical Diagnosis:  M79.7 (ICD-10-CM) - Fibromyalgia  R26.9 (ICD-10-CM) - Gait disturbance  E89.0 (ICD-10-CM) - Postsurgical hypothyroidism   Evaluation Date: 3/21/2023  Insurance Authorization Period Expiration: 12/31/2023  Plan of Care Certification Period: 8/4/2023  Visit # / Visits authorized: 3/30 (plus eval)   FOTO completed: 2/3     Precautions:  Standard and Fall, light sensitivity    Time In: 11:03  Time Out: 12:00  Total Appointment Time: 57 minutes    SUBJECTIVE     She reports "it's been an eye opener that pain is related to mental state". "I get disappointed in myself a lot; I don't give myself enough credit".    Thu was compliant with parts of home exercise program given previously. Writing therapist reminded Thu of importance of completion of exercises and activities outside of session/FRP to attain goals.      Response to previous treatment: Ms. Hutton noted: increased joint pain the next day    Functional change: n/a        Pain:  4/10 current  Location: arms, elbow, feet, hands (worse in index fingers), head, jaw, shoulders, wrists, and neck, and scalp.  Description: pulsating pain, which becomes tingling, which becomes numb. Sharp pain left side of lower back. She reports intermittent swelling into the joints. Her pain is worse in the morning and day. She does endorse stiffness. She also reports headaches.    Patient Specific Activities based on Personal goals:  Activity  Performance  5/8/2023  " Satisfaction      Meal prep and cooking.  5  3-4   2.  Getting on the ground to play with son.  1  0   3.  Participating in outdoor activities w family (Standing)  3  0   4.  Carrying/Lifting groceries, and putting them in cabinets/pantry.  7  6   5.  laundry (bending over, lifting wet laundry)  4  4           Total Score  4 -      Patient Specific Activity Scoring Scheme for Performance     0        1        2        3        4        5        6        7        8        9        10     Unable                                                                                     Able to perform  To perform                                                                              activity at same  Activity                                                                                    level as before                                                                          Objective     Objective Measures updated at progress report unless specified.      Treatment     Refer to FRP protocol for details and explanation of each activity.     Thu received the treatments listed below:     Therapeutic activities and functional lifting activities in order to increase participation in, endurance for, and performance with vocational, selfcare ADLs, and leisure activities in order to improve quality of life, for 57 minutes, including:    Thu participated in endurance activity using Nustep for 10 minutes, starting at level 1 to improve functional endurance and progress towards increased MET level for improved community mobility and participation, rated as Sort of hard (4/10) exertion, Thu re-educated on how to add mindfulness component to activity and how to pace appropriately by completed self check ins and grading activity as needed, with goal to reach moderate to sort of hard by end of activity. Completed 602 steps. Took pause mid way; cues for breath work to activate PNS; difficulties with diaphragmatic breathing while  "seated.    Transferred to supine on mat, in z-lie using small physio ball, with 1 lbs cuff on abdomen for biofeedback. While in supine, education on shoulder depression with overhead ROM. Performed forward flexion/abduction full range. Scapular punches each side x 7 reps, reverse pendulums CW and CCW x 7 each direction, each side. D1/D2 flexion/extension patterns x 7 reps each, with reach, grasp and release element added.     Thu participated in functional lift waist to shoulder, 5 reps x 5 lbs with a rating of Sort of hard (4/10) exertion. Thu educated on standing posture, core awareness, keeping shoulders depressed and retracted, stepping to place > reaching to place, keeping weight close to center of gravity and pacing as needed. Cues needed as tendency to elevate shoulders. Directed to view self in mirror; became emotion and stated: "I did terrible".     No environmental, cultural, spiritual, developmental or education needs expressed or noted.    Patient Education and Home Exercises     Education provided:   - Progress towards goals   - proper posture and body mechanics.  - anticipated muscular soreness following lift testing and strategies for management including stretching, using ice, scheduled rest.  - Functional pain scale  - DONNA rate of perceived exertion scale.   - details of the Functional Restoration Program.    Written Home Exercises Provided: yes.  Exercises were reviewed and Thu was able to demonstrate them prior to the end of the session.  Thu demonstrated good  understanding of the HEP provided.     See EMR under Patient Instructions for exercises provided during therapy sessions. FRP binder provided day one of cohort.    Assessment     Ms. Hutton presents with engaged and positive demeanor, and willing to participate in all activities presented to her. Good understanding of importance to keeping shoulders depressed when reaching overhead, but cues needed to do so during lifting task, and with " negative self talk. Open to shifting focus to realize that session was successful as Thu with self awareness, and able to pace as instructed, using DONNA scale as guide.    Thu is progressing well towards her goals and status of goals can be seen below. Patient's prognosis is Good.     Thu would continue to benefit from skilled outpatient occupational therapy to address the deficits listed in the problem list on initial evaluation, provide patient education, and to maximize patient's level of independence in the home and community environment.     Anticipated barriers to occupational therapy: psychosocial demands, co morbidities    Goals:     SHORT Term goals: In 3 weeks, patient will:  Status of goal:   Implements correct use of breathing techniques during functional lifting tasks, with minimal cues needed.  Initiated 5/11/2023    Utilizes DONNA rate of exertion scale to pace performance with functional lifting tasks, and implements self-care strategies during activity participation to manage pain. Initiated 5/8/2023    Verbalize understanding of energy conservation and pacing strategies during daily habits, roles, and routines in order to improve tolerance for work and home demands.  Initiated 5/8/2023    Completes 5x sit to stand test in 20 seconds or less to improve ability to participate in community mobility.   Initiated 5/8/2023    Demonstrate increased positional tolerance to the level needed for work, home, and community activities (standing in cue at social event, managing supplies for outing, streneous IADL), as evidenced by having a METS level of 4. Initiated 5/8/2023    Demonstrate proper body mechanics with functional lifting tasks (floor to waist, waist to shoulder, maximum lift, and box carry), via teach back method with minimal cues needed. Initiated 5/8/2023    Demonstrate increased functional strength by lifting 13 lbs waist to shoulder for management of (I)ADL, including dressing and grooming,  placing items in kitchen cabinets, folding towels, and/or managing suitcase when traveling.   Initiated 5/8/2023    Demonstrate increased functional strength by lifting 20 lbs floor to waist to meet demand of work/home routine, including lifting groceries, managing laundry, and/or lifting supplies for outdoor activities.   Initiated 5/8/2023    Tolerate Home Activity Plan (HAP)/ Home Exercise Program (HEP) to promote safety and independence with ADL, with report of completion of at least 2 out of 4 days outside of program participation.  Initiated 5/8/2023    Show improved perception of own abilities as evidenced by increase of FOTO scores to established MDC value and perception of performance ratings regarding personal long term goals.  Initiated 5/8/2023    Pt will verbalize and demonstrate increased water intake to goal level Initiated 5/8/2023          MIDTERM goals: In 9 weeks, patient will: Status of goal:   Report implementation of application of mindfulness, stretching, and other coping strategies for improved participation in daily routine. Initiated 5/8/2023    Verbalize functional application of lifting techniques in daily life (golfers lift, floor to waist, waist to shoulder). Initiated 5/8/2023    Completes 5x sit to stand test in 17 seconds or less to improve ability to participate in community mobility.  Initiated 5/8/2023    Applies functional application of lifting techniques (golfer's lift, floor to waist, waist to shoulder) in activities of daily life, per patient report.  Initiated 5/8/2023    Demonstrate physical capacities consistent with a Light-Medium (#35 max, frequent lifting/carrying #20, 3 METS)  demand level, as needed to perform activities to be successful in roles of life, regarding Household Management and Community Participation. Initiated 5/8/2023    Have an improvement of 3 points in 2/5 personal goals in rating of  performance.  Initiated 5/8/2023    Show improved perception of  own abilities as evidenced by increase of FOTO scores to established MC II value and perception of performance ratings regarding personal long term goals.  Initiated 5/8/2023          LONG term goals = Patient Specific Goals:  Vocational: Chores, Daily tasks, playing with family   Recreational : sports, outdoor activities, walks   Daily Living Activities: cooking, cleaning, sports, one day go back to work      Measured by patient's self-reported performance and satisfaction of personal FRP goals, listed above in subjective section.   Total Score = sum of the activity performance scores / number of activities  Minimum detectable change (90%CI) for average score = 2 points  Minimum detectable change (90%CI) for single activity score  = 3 points    Plan     Continue per plan of care.     Updates/Grading for next session: functional lifts, pacing, water intake     ARIC Ware, OTR/L, CEAS-I  5/11/2023

## 2023-05-11 NOTE — PROGRESS NOTES
[facility]    Functional Restoration Program  Physical Therapy Treatment Note  FRP Day 3    Name: Dina Hutton  MRN:[id number]      Therapy Diagnosis:   Encounter Diagnoses   Name Primary?    Central sensitization to pain Yes    Fibromyalgia     Gait disturbance     Poor balance          Physician: Sharona Neumann NP    Date: 5/11/2023    Physician Orders: PT Eval and Treat   Medical Diagnosis from Referral:   M79.7 (ICD-10-CM) - Fibromyalgia   R26.9 (ICD-10-CM) - Gait disturbance   E89.0 (ICD-10-CM) - Postsurgical hypothyroidism      Evaluation Date: 3/21/2023  Authorization Period Expiration: 12/31/2023  Plan of Care Expiration: 6/30/2023  Visit # / Visits authorized: 3/ 30  FOTO: 1/ 3         Time In: 10:00 am  Time Out: 11:00 am  Total Billable Time: 60 minutes    SUBJECTIVE       Thu reports she is not too sore from yesterday & she is already noticing an improved tolerance to mobility w/ less painful response to exercise. She also was not as exhausted after her visit.      Patient's FRP goals: play w/ kids more, do home tasks more easily,        Current 4/10  Location(s): B hands, B shld, neck, B feet    OBJECTIVE     Objective Measures updated at progress report unless specified.      Limitation/Restriction for FOTO Neck Survey     Therapist reviewed FOTO scores for Dina Hutton on 5/8/2023.   FOTO documents entered into [employer] - see Media section.     Limitation Score 3/21/2023: 54%  Limitation Score 5/8/2023: 56%    Predicted Score: 41%           Treatment       Shellyu received the Individualized Treatments listed below:     Shellyu participated in dynamic functional therapeutic activities to improve functional performance for 25 minutes, including:      Full body functional mobility w/ 3-10 sec hold technique &/or 3-5 repetition technique to improve functional performance. In order to:  Improve driving safety, visual & spatial awareness:  Cervical flx/ext  Cervical side bending  Cervical  rotation  Cervical protraction/retraction  Improve lifting mechanics, showering/bathing, dressing, cooking, reaching, pushing & fine motor skills  Shld rolls  Shld depression/elevation  Scapular retraction/protraction/scaption  Posterior shld stretch (cross arm)  Shld ER stretch (chicken wing)  Elbow flx/ext  Forearm supination/pronation  Wrist flx/ext  Open/close hands/fingers  Improve bed mobility & rolling, bending over, cooking & cleaning:  Seated trunk rotations  Seated trunk/thoracic ext/flx  Improve functional gait, squatting, sitting tolerance, functional balance:   Seated marches & knee to chest  Seated knee flx/ext  Seated hip ER/piriformis stretch  Seated hamstring stretch  Seated toe raise to heel raise   Seated ankle circles each way  Improve overhead reaching/activities, standing tolerance:  Standing lumbar extensions  Standing lateral leaning stretch  Standing quad stretch (UE support for balance)    Pt education on central sensitization & impact on function (see pt instructions)    Thu participated in neuromuscular re-education activities to improve: Balance, Coordination, Kinesthetic, Sense, Proprioception, and Posture for 35 minutes. The following activities were included:      Peripheral muscle strengthening which included 1-2 sets of 10-20 repetitions at a slow, controlled 5-7 second per rep pace focused on strengthening supporting musculature for improved body mechanics & functional mobility.  Patient & therapist focused on proper form & speed during treatment to ensure optimal strengthening of each targeted muscle group & neuromuscular re-education. Patients are instructed to keep sets/reps with proper load to stay at 3-5 out of 10 on the provided modified Rosendo exertion scale.    BATCA Machine Exercises Weight (lbs) Sets Repetitions Rosendo Exertion Scale Rating   Leg Extension  5 1 15 5   Hamstring Curls 15 1 20 5   Chest Press       Pec Fly       Lat Pull Down 12.5 1 20 3   Mid Row 12.5 1 20 3    Bicep Bar Curls 5 1 10 5   Standing Tricep Extension 7.5 1 20 3   Single Leg Press               Patient Education and Home Exercises     Home Exercises Provided and Patient Education Provided     Education provided:   - FRP Educational Topics: Modified Rosendo Exertion Scale, Central Sensitization , and Posture & Body Mechanics    Written Home Exercises Provided: Patient instructed to cont prior HEP. Exercises were reviewed and Thu was able to demonstrate them prior to the end of the session.  Thu demonstrated good  understanding of the education provided. See EMR under Patient Instructions for information provided during therapy sessions    ASSESSMENT     Patient /c good participation in tx today & demonstrated improved ability to perform seated functional warm up w/ decreased use of pictures & emphasis on kinetic learning. In education on central sensitization, pt indicated she related to factors educated & asked appropriate questions concerning her experiences. She demonstrated good understanding of her 3-1-3 speed of reps & proper posture throughout.         Thu Is progressing well towards her goals.   Pt prognosis is Good.     Pt will continue to benefit from skilled outpatient physical therapy to address the deficits listed in the problem list box on initial evaluation, provide pt/family education and to maximize pt's level of independence in the home and community environment.     Pt's spiritual, cultural and educational needs considered and pt agreeable to plan of care and goals.     Anticipated barriers to physical therapy: chronic nature of issues, psychosocial factors    GOALS: Pt is in agreement with the following goals:  Goal Progress Towards Goal   SHORT Term: In 3 weeks, pt will:     Verbalize & demonstrate good understanding of resisted training with 3-1-3 second rep for lphjzentii-eaaobhetq-lepozbsho contraction to encourage full muscle recruitment for strength & endurance. Goal met 5/11/2023  "  Verbalize & demonstrate good understanding of home stretch routine to improve AROM, help decrease pain & improve mobility. Initiated 3/21/2023   Demonstrate proper supine or seated diaphragmatic breathing with decreased chest excursion & emphasis on full abdominal excursion & increased expiration time to promote muscle relaxation & increased parasympathetic activity to improve patient tolerance to activities. Initiated 3/21/2023   Verbalize good understanding of importance of proper posture in resisted exercise, functional activities & ADL's in order to help reduce postural strain, promote proper breathing. Goal met 5/11/2023   Demonstrate good understanding of full body resisted exercises w/ use of pictures &/or verbal cues to promote independence w/ exercise at the end of the program. Initiated 3/21/2023   Verbalize plan for continuing resisted exercises w/ specific location (i.e. commercial gym, home, community fitness center)  to incorporate all major muscle groups to maintain & progress therapeutic functional improvements. Initiated 3/21/2023   Verbalize difference between  "hurt vs harm"  to demonstrate understanding of fear avoidance in pain cycle.  Initiated 3/21/2023         Midterm: In 8 weeks, pt will:     Verbalize good understanding of activities planning/scheduling (stretching, mindfulness, resisted training, & cardio) prescribed by PT/OT following the end of the program to sustain & progress functional improvements. Initiated 3/21/2023   Perform selected resisted training w/ minimal to no cuing in therapy session to be independent w/ resisted strengthening. Initiated 3/21/2023   Demonstrate improved symptom self-management w/ posture/positioning and mechanical movements and/or implementation of appropriate modalities throughout a typical day.  Initiated 3/21/2023   Demonstrate independence w/ home stretch routine to improve AROM, help decrease pain & improve mobility. Initiated 3/21/2023       "   Long Term: In 12 weeks, pt will:     Perform selected cardio & resisted training independently to continue safe regular performance of daily exercise. Initiated 3/21/2023   Improve 2 Minute Walk Test (MWT) to 425 feet and 6 MWT to 1275 feet or greater to improve gait speed and mobility. Initiated 3/21/2023   Perform single leg stance 10 seconds or greater bilaterally to improve safety and balance in ADLs. Initiated 3/21/2023   Demonstrate Timed Up & Go (with appropriate assistive device, if necessary) of 10 seconds or less to decrease fall risk and improve functional mobility. Initiated 3/21/2023   Score 41 % or less on Neck FOTO to indicate significant functional improvements in impaired functions secondary to pain. Initiated 3/21/2023        PLAN     Continue PT per POC     [place], PT, DPT

## 2023-05-17 ENCOUNTER — CLINICAL SUPPORT (OUTPATIENT)
Dept: REHABILITATION | Facility: OTHER | Age: 38
End: 2023-05-17
Payer: COMMERCIAL

## 2023-05-17 DIAGNOSIS — Z78.9 DECREASED ACTIVITIES OF DAILY LIVING (ADL): ICD-10-CM

## 2023-05-17 DIAGNOSIS — R29.898 DECREASED GRIP STRENGTH: ICD-10-CM

## 2023-05-17 DIAGNOSIS — G89.29 CENTRAL SENSITIZATION TO PAIN: Primary | ICD-10-CM

## 2023-05-17 DIAGNOSIS — R52 PAIN AGGRAVATED BY ACTIVITIES OF DAILY LIVING: Primary | ICD-10-CM

## 2023-05-17 DIAGNOSIS — M79.7 FIBROMYALGIA: ICD-10-CM

## 2023-05-17 DIAGNOSIS — F40.298 FEAR OF PAIN: ICD-10-CM

## 2023-05-17 DIAGNOSIS — R26.9 GAIT DISTURBANCE: ICD-10-CM

## 2023-05-17 DIAGNOSIS — R26.89 POOR BALANCE: ICD-10-CM

## 2023-05-17 DIAGNOSIS — R68.89 DECREASED FUNCTIONAL ACTIVITY TOLERANCE: ICD-10-CM

## 2023-05-17 PROCEDURE — 97112 NEUROMUSCULAR REEDUCATION: CPT

## 2023-05-17 PROCEDURE — 97530 THERAPEUTIC ACTIVITIES: CPT

## 2023-05-17 PROCEDURE — 97110 THERAPEUTIC EXERCISES: CPT

## 2023-05-17 NOTE — PROGRESS NOTES
"[facility]    Functional Restoration Program  Physical Therapy Treatment Note  FRP Day 5    Name: Dina Hutton  MRN:[id number]      Therapy Diagnosis:   Encounter Diagnoses   Name Primary?    Central sensitization to pain Yes    Fibromyalgia     Gait disturbance     Poor balance        Physician: Sharona Neumann NP    Date: 5/17/2023    Physician Orders: PT Eval and Treat   Medical Diagnosis from Referral:   M79.7 (ICD-10-CM) - Fibromyalgia   R26.9 (ICD-10-CM) - Gait disturbance   E89.0 (ICD-10-CM) - Postsurgical hypothyroidism      Evaluation Date: 3/21/2023  Authorization Period Expiration: 12/31/2023  Plan of Care Expiration: 6/30/2023  Visit # / Visits authorized: 4/ 30  FOTO: 1/ 3       Time In: 1:30 pm  Time Out: 2:45 pm  Total Billable Time: 75 minutes    SUBJECTIVE     Thu reports missing Monday tx session due to inc fatigue and pain from weekend wedding activities.  Patient report inc dancing and "running around to help my sister" leading to sig inc activity.  Patient believes she experienced a "boom/bust" scenario.  Patient report movement and stretching not dec her joint pain and reports continue inc joint pain into today.    Patient report tolerating last tx session well /c no c/o of excess discomfort.  Patient agree to tx today.       Patient's FRP goals: play w/ kids more, do home tasks more easily,        Current 4/10  Location(s): B hands, B shld, neck, B feet    OBJECTIVE     Objective Measures updated at progress report unless specified.      Limitation/Restriction for FOTO Neck Survey     Therapist reviewed FOTO scores for Dina Hutton on 5/8/2023.   FOTO documents entered into EPIC - see Media section.     Limitation Score 3/21/2023: 54%  Limitation Score 5/8/2023: 56%    Predicted Score: 41%           Treatment       Thu received the Individualized Treatments listed below:     Thu participated in dynamic functional therapeutic activities to improve functional " performance for  25 minutes, including:    Full body functional mobility w/ 3-10 sec hold technique &/or 3-5 repetition technique to improve functional performance. In order to:  Improve driving safety, visual & spatial awareness:  Cervical flx/ext  Cervical side bending  Cervical rotation  Cervical protraction/retraction  Improve lifting mechanics, showering/bathing, dressing, cooking, reaching, pushing & fine motor skills  Shld rolls  Shld depression/elevation  Scapular retraction/protraction/scaption  Posterior shld stretch (cross arm)  Shld ER stretch (chicken wing)  Elbow flx/ext  Forearm supination/pronation  Wrist flx/ext  Open/close hands/fingers  Improve bed mobility & rolling, bending over, cooking & cleaning:  Seated trunk rotations  Seated trunk/thoracic ext/flx  Improve functional gait, squatting, sitting tolerance, functional balance:   Seated marches & knee to chest  Seated knee flx/ext  Seated hip ER/piriformis stretch  Seated hamstring stretch  Seated toe raise to heel raise   Seated ankle circles each way  Improve overhead reaching/activities, standing tolerance:  Standing lumbar extensions  Standing lateral leaning stretch  Standing quad stretch (UE support for balance)      therapeutic exercises to develop strength, endurance, ROM, flexibility, posture, and core stabilization for 15 minutes including:    Supine:  LTR x 10   DKTC x 10 /c towel assist   BKFO x 20     Prone   Laying 1 min -> DELILAH 2 min    Knee flexion x 10 B    Knee flexed /c hip IR/ER x 10 ea         Thu participated in neuromuscular re-education activities to improve: Balance, Coordination, Kinesthetic, Sense, Proprioception, and Posture for 40 minutes. The following activities were included:      Peripheral muscle strengthening which included 1-2 sets of 10-20 repetitions at a slow, controlled 5-7 second per rep pace focused on strengthening supporting musculature for improved body mechanics & functional mobility.  Patient &  "therapist focused on proper form & speed during treatment to ensure optimal strengthening of each targeted muscle group & neuromuscular re-education. Patients are instructed to keep sets/reps with proper load to stay at 3-5 out of 10 on the provided modified Rosendo exertion scale.    BATCA Machine Exercises Weight (lbs) Sets Repetitions Rosendo Exertion Scale Rating   Leg Extension  5 1 20 5   Hamstring Curls 15 1 20 5   Chest Press 5 1 15 5   Pec Fly 15 1 15 5   Lat Pull Down       Mid Row       Bicep Bar Curls       Standing Tricep Extension       Single Leg Press 20 1 20 3     Seated Tball  5 min    Bouncing for kinesthetic feel of motion in large joints   Lat pelvic tilting for core activation       Patient Education and Home Exercises     Home Exercises Provided and Patient Education Provided     Education provided:   - FRP Educational Topics: Modified Rosendo Exertion Scale, Central Sensitization , and Posture & Body Mechanics    Written Home Exercises Provided: Patient instructed to cont prior HEP. Exercises were reviewed and Thu was able to demonstrate them prior to the end of the session.  Thu demonstrated good  understanding of the education provided. See EMR under Patient Instructions for information provided during therapy sessions    ASSESSMENT     Patient subjective report indicate inc understanding of "boom/bust" episode and willingness to use terminology of pacing but will need continue education for full understanding and carry over.     However, patient present /c inc subjective pain and verbalizing fearfulness to activity today, therefore, followed stretching /c large joint movement and muscle activation in supine to inc patient comfort /c return to resistance training.  Patient /c good participation /c initial cardinal plane movements indicating willingness to continue exercise progressions.  Returned to BATCA exercises /s issued including advancing reps and lower RPE ratings.  Patient continue to note " ""joint" concern, therefore, plan to continue education on resistance training offering support for joint stabilization.          Thu Is progressing well towards her goals.   Pt prognosis is Good.     Pt will continue to benefit from skilled outpatient physical therapy to address the deficits listed in the problem list box on initial evaluation, provide pt/family education and to maximize pt's level of independence in the home and community environment.     Pt's spiritual, cultural and educational needs considered and pt agreeable to plan of care and goals.     Anticipated barriers to physical therapy: chronic nature of issues, psychosocial factors    GOALS: Pt is in agreement with the following goals:  Goal Progress Towards Goal   SHORT Term: In 3 weeks, pt will:     Verbalize & demonstrate good understanding of resisted training with 3-1-3 second rep for [id number]-[id number]-[id number] contraction to encourage full muscle recruitment for strength & endurance. Goal met 5/11/2023   Verbalize & demonstrate good understanding of home stretch routine to improve AROM, help decrease pain & improve mobility. Initiated 3/21/2023   Demonstrate proper supine or seated diaphragmatic breathing with decreased chest excursion & emphasis on full abdominal excursion & increased expiration time to promote muscle relaxation & increased parasympathetic activity to improve patient tolerance to activities. Initiated 3/21/2023   Verbalize good understanding of importance of proper posture in resisted exercise, functional activities & ADL's in order to help reduce postural strain, promote proper breathing. Goal met 5/11/2023   Demonstrate good understanding of full body resisted exercises w/ use of pictures &/or verbal cues to promote independence w/ exercise at the end of the program. Initiated 3/21/2023   Verbalize plan for continuing resisted exercises w/ specific location (i.e. commercial gym, home, community fitness center)  to " "incorporate all major muscle groups to maintain & progress therapeutic functional improvements. Initiated 3/21/2023   Verbalize difference between  "hurt vs harm"  to demonstrate understanding of fear avoidance in pain cycle.  Initiated 3/21/2023         Midterm: In 8 weeks, pt will:     Verbalize good understanding of activities planning/scheduling (stretching, mindfulness, resisted training, & cardio) prescribed by PT/OT following the end of the program to sustain & progress functional improvements. Initiated 3/21/2023   Perform selected resisted training w/ minimal to no cuing in therapy session to be independent w/ resisted strengthening. Initiated 3/21/2023   Demonstrate improved symptom self-management w/ posture/positioning and mechanical movements and/or implementation of appropriate modalities throughout a typical day.  Initiated 3/21/2023   Demonstrate independence w/ home stretch routine to improve AROM, help decrease pain & improve mobility. Initiated 3/21/2023         Long Term: In 12 weeks, pt will:     Perform selected cardio & resisted training independently to continue safe regular performance of daily exercise. Initiated 3/21/2023   Improve 2 Minute Walk Test (MWT) to 425 feet and 6 MWT to 1275 feet or greater to improve gait speed and mobility. Initiated 3/21/2023   Perform single leg stance 10 seconds or greater bilaterally to improve safety and balance in ADLs. Initiated 3/21/2023   Demonstrate Timed Up & Go (with appropriate assistive device, if necessary) of 10 seconds or less to decrease fall risk and improve functional mobility. Initiated 3/21/2023   Score 41 % or less on Neck FOTO to indicate significant functional improvements in impaired functions secondary to pain. Initiated 3/21/2023        PLAN     Continue PT per POC     Tomasz Rizvi, PT, DPT          "

## 2023-05-17 NOTE — PROGRESS NOTES
"[facility]  Functional Restoration Program  Occupational Therapy Treatment Note  FRP day 4  Dictation #1  MRN:[id number]  [facility]:[id number]   Name: Dina Hutton  MRN:[id number]  Encounter Date: 5/17/2023    Diagnosis:   Encounter Diagnoses   Name Primary?    Pain aggravated by activities of daily living Yes    Decreased activities of daily living (ADL)     Decreased  strength     Decreased functional activity tolerance     Fear of pain        Referring provider: Sharona Neumann NP    Physician Orders: eval and treat; FRP  Medical Diagnosis:  M79.7 (ICD-10-CM) - Fibromyalgia  R26.9 (ICD-10-CM) - Gait disturbance  E89.0 (ICD-10-CM) - Postsurgical hypothyroidism   Evaluation Date: 3/21/2023  Insurance Authorization Period Expiration: 12/31/2023  Plan of Care Certification Period: 8/4/2023  Visit # / Visits authorized: 4/30 (plus eval)   FOTO completed: 2/3     Precautions:  Standard and Fall, light sensitivity    Time In: 1445  Time Out: 1600  Total Appointment Time: 75 minutes    SUBJECTIVE     She reports "I can be really hard on myself" "it feels good to move"     Thu was compliant with parts of home exercise program given previously, having difficulty pacing but states she's been stretching daily. Writing therapist reminded Thu of importance of completion of exercises and activities outside of session/FRP to attain goals.      Response to previous treatment: Ms. Hutton noted: improved energy and tolerance for ADLs    Functional change: improved energy and tolerance for ADLs        Pain:  6/10 current  Location: arms, elbow, feet, hands (worse in index fingers), head, jaw, shoulders, wrists, and neck, and scalp.  Description: pulsating pain, which becomes tingling, which becomes numb. Sharp pain left side of lower back. She reports intermittent swelling into the joints. Her pain is worse in the morning and day. She does endorse stiffness. She also reports headaches.    Patient Specific Activities based on " Personal goals:  Activity  Performance  5/8/2023  Satisfaction      Meal prep and cooking.  5  3-4   2.  Getting on the ground to play with son.  1  0   3.  Participating in outdoor activities w family (Standing)  3  0   4.  Carrying/Lifting groceries, and putting them in cabinets/pantry.  7  6   5.  laundry (bending over, lifting wet laundry)  4  4           Total Score  4 -      Patient Specific Activity Scoring Scheme for Performance     0        1        2        3        4        5        6        7        8        9        10     Unable                                                                                     Able to perform  To perform                                                                              activity at same  Activity                                                                                    level as before                                                                          Objective     Objective Measures updated at progress report unless specified.      Treatment     Refer to [facility] protocol for details and explanation of each activity.     Thu received the treatments listed below:     Therapeutic activities and functional lifting activities in order to increase participation in, endurance for, and performance with vocational, selfcare ADLs, and leisure activities in order to improve quality of life, for 75 minutes, including:    Pt educated on proper mechanics to get on and off floor using chair for support. Pt educated on steps to take to ensure safety if she were to have a fall: roll to back, complete body scan to see if all body parts are okay, roll to side, hands/knees, crawl to chair and then use steps to get on/off floor. Pt demonstrated understanding x3 reps. Pt with increased self-efficacy after completion of activity. Pt given handouts on proper mechanics. Pt rated as hard (5/10) exertion.      Pt completed yoga activity on mat, supine and quadriped, with  "focused education on getting on/off floor properly and safely, back mobility, hip flexibility, stress reduction, dynamic standing balance, posture, alignment and coordinating movement with breath, rated hard (5/10) but stated that she enjoyed the section of "windshield wipers" as well as being able to follow visual cues from therapist (vs solely auditory).     Thu participated in endurance activity using Nustep for 20 minutes, at level 1 to improve functional endurance and progress towards increased MET level for improved community mobility and participation, rated as Easy (2/10) exertion, Thu re-educated on how to add mindfulness component to activity and how to pace appropriately by completed self check ins and grading activity as needed, with goal to reach moderate to sort of hard by end of activity. Completed 1312 steps.     No environmental, cultural, spiritual, developmental or education needs expressed or noted.    Patient Education and Home Exercises     Education provided:   - Progress towards goals   - proper posture and body mechanics.  - anticipated muscular soreness following lift testing and strategies for management including stretching, using ice, scheduled rest.  - Functional pain scale  - DONNA rate of perceived exertion scale.   - details of the Functional Restoration Program.    Written Home Exercises Provided: yes.  Exercises were reviewed and Thu was able to demonstrate them prior to the end of the session.  Thu demonstrated good  understanding of the HEP provided.     See EMR under Patient Instructions for exercises provided during therapy sessions. FRP binder provided day one of cohort.    Assessment     Ms. Hutton tolerated today's session well, endorsed being hard on herself, but also able to note improvement in process by allowing for imperfection and process. Also noted to be highly responsive to visual cues vs auditory alone. Endorsed improved ease and application of FRP education this " date re: anticipatory stress and fear of pain. Overall, tolerated well.     Thu is progressing well towards her goals and status of goals can be seen below. Patient's prognosis is Good.     Thu would continue to benefit from skilled outpatient occupational therapy to address the deficits listed in the problem list on initial evaluation, provide patient education, and to maximize patient's level of independence in the home and community environment.     Anticipated barriers to occupational therapy: psychosocial demands, co morbidities    Goals:     SHORT Term goals: In 3 weeks, patient will:  Status of goal:   Implements correct use of breathing techniques during functional lifting tasks, with minimal cues needed.  Initiated 5/17/2023    Utilizes DONNA rate of exertion scale to pace performance with functional lifting tasks, and implements self-care strategies during activity participation to manage pain. Initiated 5/8/2023    Verbalize understanding of energy conservation and pacing strategies during daily habits, roles, and routines in order to improve tolerance for work and home demands.  Initiated 5/8/2023    Completes 5x sit to stand test in 20 seconds or less to improve ability to participate in community mobility.   Initiated 5/8/2023    Demonstrate increased positional tolerance to the level needed for work, home, and community activities (standing in cue at social event, managing supplies for outing, streneous IADL), as evidenced by having a METS level of 4. Initiated 5/8/2023    Demonstrate proper body mechanics with functional lifting tasks (floor to waist, waist to shoulder, maximum lift, and box carry), via teach back method with minimal cues needed. Initiated 5/8/2023    Demonstrate increased functional strength by lifting 13 lbs waist to shoulder for management of (I)ADL, including dressing and grooming, placing items in kitchen cabinets, folding towels, and/or managing suitcase when traveling.    Initiated 5/8/2023    Demonstrate increased functional strength by lifting 20 lbs floor to waist to meet demand of work/home routine, including lifting groceries, managing laundry, and/or lifting supplies for outdoor activities.   Initiated 5/8/2023    Tolerate Home Activity Plan (HAP)/ Home Exercise Program (HEP) to promote safety and independence with ADL, with report of completion of at least 2 out of 4 days outside of program participation.  Initiated 5/8/2023    Show improved perception of own abilities as evidenced by increase of FOTO scores to established MDC value and perception of performance ratings regarding personal long term goals.  Initiated 5/8/2023    Pt will verbalize and demonstrate increased water intake to goal level Initiated 5/8/2023          MIDTERM goals: In 9 weeks, patient will: Status of goal:   Report implementation of application of mindfulness, stretching, and other coping strategies for improved participation in daily routine. Initiated 5/8/2023    Verbalize functional application of lifting techniques in daily life (golfers lift, floor to waist, waist to shoulder). Initiated 5/8/2023    Completes 5x sit to stand test in 17 seconds or less to improve ability to participate in community mobility.  Initiated 5/8/2023    Applies functional application of lifting techniques (golfer's lift, floor to waist, waist to shoulder) in activities of daily life, per patient report.  Initiated 5/8/2023    Demonstrate physical capacities consistent with a Light-Medium (#35 max, frequent lifting/carrying #20, 3 METS)  demand level, as needed to perform activities to be successful in roles of life, regarding Household Management and Community Participation. Initiated 5/8/2023    Have an improvement of 3 points in 2/5 personal goals in rating of  performance.  Initiated 5/8/2023    Show improved perception of own abilities as evidenced by increase of FOTO scores to established MC II value and perception  of performance ratings regarding personal long term goals.  Initiated 5/8/2023          LONG term goals = Patient Specific Goals:  Vocational: Chores, Daily tasks, playing with family   Recreational : sports, outdoor activities, walks   Daily Living Activities: cooking, cleaning, sports, one day go back to work      Measured by patient's self-reported performance and satisfaction of personal FRP goals, listed above in subjective section.   Total Score = sum of the activity performance scores / number of activities  Minimum detectable change (90%CI) for average score = 2 points  Minimum detectable change (90%CI) for single activity score  = 3 points    Plan     Continue per plan of care.     Updates/Grading for next session:  functional lifts, pacing, water intake     JACKELYN Henley   5/17/2023

## 2023-05-19 ENCOUNTER — CLINICAL SUPPORT (OUTPATIENT)
Dept: REHABILITATION | Facility: OTHER | Age: 38
End: 2023-05-19
Payer: COMMERCIAL

## 2023-05-19 DIAGNOSIS — R26.89 POOR BALANCE: ICD-10-CM

## 2023-05-19 DIAGNOSIS — R52 PAIN AGGRAVATED BY ACTIVITIES OF DAILY LIVING: Primary | ICD-10-CM

## 2023-05-19 DIAGNOSIS — F40.298 FEAR OF PAIN: ICD-10-CM

## 2023-05-19 DIAGNOSIS — G89.29 CENTRAL SENSITIZATION TO PAIN: Primary | ICD-10-CM

## 2023-05-19 DIAGNOSIS — Z78.9 DECREASED ACTIVITIES OF DAILY LIVING (ADL): ICD-10-CM

## 2023-05-19 DIAGNOSIS — R68.89 DECREASED FUNCTIONAL ACTIVITY TOLERANCE: ICD-10-CM

## 2023-05-19 DIAGNOSIS — M79.7 FIBROMYALGIA: ICD-10-CM

## 2023-05-19 DIAGNOSIS — R29.898 DECREASED GRIP STRENGTH: ICD-10-CM

## 2023-05-19 DIAGNOSIS — R26.9 GAIT DISTURBANCE: ICD-10-CM

## 2023-05-19 PROCEDURE — 97110 THERAPEUTIC EXERCISES: CPT | Mod: CQ

## 2023-05-19 PROCEDURE — 97530 THERAPEUTIC ACTIVITIES: CPT

## 2023-05-19 PROCEDURE — 97112 NEUROMUSCULAR REEDUCATION: CPT | Mod: CQ

## 2023-05-19 NOTE — PROGRESS NOTES
"[facility]  Functional Restoration Program  Occupational Therapy Treatment Note  FRP day 6  Dictation #1  MRN:[id number]  [facility]:[id number]   Name: Dina Hutton  MRN:[id number]  Encounter Date: 5/19/2023    Diagnosis:   Encounter Diagnoses   Name Primary?    Pain aggravated by activities of daily living Yes    Decreased activities of daily living (ADL)     Decreased  strength     Decreased functional activity tolerance     Fear of pain      Referring provider: Sharona Neumann NP    Physician Orders: eval and treat; FRP  Medical Diagnosis:  M79.7 (ICD-10-CM) - Fibromyalgia  R26.9 (ICD-10-CM) - Gait disturbance  E89.0 (ICD-10-CM) - Postsurgical hypothyroidism   Evaluation Date: 3/21/2023  Insurance Authorization Period Expiration: 12/31/2023  Plan of Care Certification Period: 8/4/2023  Visit # / Visits authorized: 5/30 (plus eval)   FOTO completed: 2/3     Precautions:  Standard and Fall, light sensitivity    Time In: 13:32  Time Out: 14:45  Total Billable Time: 73 minutes    SUBJECTIVE     She reports: "I am very depressed". "I don't know if I will get it; it's so hard", regarding floor to waist lifting task.    Thu was compliant with parts of home exercise program given previously, having difficulty pacing but states she's been stretching daily.     Response to previous treatment: Ms. Hutton noted: improved energy and tolerance for ADLs    Functional change: improved energy and tolerance for ADLs        Pain:  4/10 current  Location: arms, elbow, feet, hands (worse in index fingers), head, jaw, shoulders, wrists, and neck, and scalp.  Description: pulsating pain, which becomes tingling, which becomes numb. Sharp pain left side of lower back. She reports intermittent swelling into the joints. Her pain is worse in the morning and day. She does endorse stiffness. She also reports headaches.    Patient Specific Activities based on Personal goals:  Activity  Performance  5/8/2023  Satisfaction      Meal prep " and cooking.  5  3-4   2.  Getting on the ground to play with son.  1  0   3.  Participating in outdoor activities w family (Standing)  3  0   4.  Carrying/Lifting groceries, and putting them in cabinets/pantry.  7  6   5.  laundry (bending over, lifting wet laundry)  4  4           Total Score  4 -      Patient Specific Activity Scoring Scheme for Performance     0        1        2        3        4        5        6        7        8        9        10     Unable                                                                                     Able to perform  To perform                                                                              activity at same  Activity                                                                                    level as before                                                                          Objective     Objective Measures updated at progress report unless specified.      Treatment     Refer to FRP protocol for details and explanation of each activity.     Thu received the treatments listed below:     Therapeutic activities and functional lifting activities in order to increase participation in, endurance for, and performance with vocational, selfcare ADLs, and leisure activities in order to improve quality of life, for 73 minutes, including:     Thu completed functional lifting, floor to waist, attempted 2 reps x 5 lbs to floor, after review of sequence using sitting in chair. Use of step stool in front of chair, with cues to sit to the back of the chair, as utilizing technique with weight through toes, and knees past toes when squatting instead. Attempted lifting sequence to raised surface, but continues to move hips forward, resulting in knees past toes when squatting, with exertion rated Hard (6/10); focused education on technique and importance to prevent over extension of trunk with performance. Several rest breaks, using physio ball for roll outs, as  well as sitting on ball, and using it for trunk extension stretch, with PNS activation occurring. Required break to eat snack, as reporting to feeling light headed; stated to not having had breakfast either; open to setting alarms to help plan as needing to wait x 1 hour after AM meds before she can eat.    Reviewed energy banking tracking sheets; sheets provided. Willing to track activities this weekend, to help review opportunities for pacing.    No environmental, cultural, spiritual, developmental or education needs expressed or noted.    Patient Education and Home Exercises     Education provided:   - Progress towards goals   - proper posture and body mechanics.  - Functional pain scale  - DONNA rate of perceived exertion scale.   - importance of completion of exercises and activities outside of session/FRP to attain goals.      Written Home Exercises Provided: yes.  Exercises were reviewed and Shellyu was able to demonstrate them prior to the end of the session.  Thu demonstrated good  understanding of the HEP provided.     See EMR under Patient Instructions for exercises provided during therapy sessions. FRP binder provided day one of cohort.    Assessment     Ms. Hutton with difficulty regarding technique for floor to waist lifting; tendency to carry weight through toes, resulting in muscle activation and increased discomfort in back. Instructed in importance for PNS activation when taking rest breaks. Shared about depression. In session, appears to not want to take up too much space; but eager to learn, however, hard on self when hard to grasp concepts, instead of allowing learning to be a process.    Thu is progressing well towards her goals and status of goals can be seen below. Patient's prognosis is Good.     Thu would continue to benefit from skilled outpatient occupational therapy to address the deficits listed in the problem list on initial evaluation, provide patient education, and to maximize patient's  level of independence in the home and community environment.     Anticipated barriers to occupational therapy: psychosocial demands, co morbidities    Goals:     SHORT Term goals: In 3 weeks, patient will:  Status of goal:   Implements correct use of breathing techniques during functional lifting tasks, with minimal cues needed.  Initiated 5/19/2023    Utilizes DONNA rate of exertion scale to pace performance with functional lifting tasks, and implements self-care strategies during activity participation to manage pain. Initiated 5/8/2023    Verbalize understanding of energy conservation and pacing strategies during daily habits, roles, and routines in order to improve tolerance for work and home demands.  Initiated 5/8/2023    Completes 5x sit to stand test in 20 seconds or less to improve ability to participate in community mobility.   Initiated 5/8/2023    Demonstrate increased positional tolerance to the level needed for work, home, and community activities (standing in cue at social event, managing supplies for outing, streneous IADL), as evidenced by having a METS level of 4. Initiated 5/8/2023    Demonstrate proper body mechanics with functional lifting tasks (floor to waist, waist to shoulder, maximum lift, and box carry), via teach back method with minimal cues needed. Initiated 5/8/2023    Demonstrate increased functional strength by lifting 13 lbs waist to shoulder for management of (I)ADL, including dressing and grooming, placing items in kitchen cabinets, folding towels, and/or managing suitcase when traveling.   Initiated 5/8/2023    Demonstrate increased functional strength by lifting 20 lbs floor to waist to meet demand of work/home routine, including lifting groceries, managing laundry, and/or lifting supplies for outdoor activities.   Initiated 5/8/2023    Tolerate Home Activity Plan (HAP)/ Home Exercise Program (HEP) to promote safety and independence with ADL, with report of completion of at least  2 out of 4 days outside of program participation.  Initiated 5/8/2023    Show improved perception of own abilities as evidenced by increase of FOTO scores to established MDC value and perception of performance ratings regarding personal long term goals.  Initiated 5/8/2023    Pt will verbalize and demonstrate increased water intake to goal level Initiated 5/8/2023          MIDTERM goals: In 9 weeks, patient will: Status of goal:   Report implementation of application of mindfulness, stretching, and other coping strategies for improved participation in daily routine. Initiated 5/8/2023    Verbalize functional application of lifting techniques in daily life (golfers lift, floor to waist, waist to shoulder). Initiated 5/8/2023    Completes 5x sit to stand test in 17 seconds or less to improve ability to participate in community mobility.  Initiated 5/8/2023    Applies functional application of lifting techniques (golfer's lift, floor to waist, waist to shoulder) in activities of daily life, per patient report.  Initiated 5/8/2023    Demonstrate physical capacities consistent with a Light-Medium (#35 max, frequent lifting/carrying #20, 3 METS)  demand level, as needed to perform activities to be successful in roles of life, regarding Household Management and Community Participation. Initiated 5/8/2023    Have an improvement of 3 points in 2/5 personal goals in rating of  performance.  Initiated 5/8/2023    Show improved perception of own abilities as evidenced by increase of FOTO scores to established MC II value and perception of performance ratings regarding personal long term goals.  Initiated 5/8/2023          LONG term goals = Patient Specific Goals:  Vocational: Chores, Daily tasks, playing with family   Recreational : sports, outdoor activities, walks   Daily Living Activities: cooking, cleaning, sports, one day go back to work      Measured by patient's self-reported performance and satisfaction of personal FRP  goals, listed above in subjective section.   Total Score = sum of the activity performance scores / number of activities  Minimum detectable change (90%CI) for average score = 2 points  Minimum detectable change (90%CI) for single activity score  = 3 points    Plan     Continue per plan of care.     Updates/Grading for next session:  functional lifts, pacing, water intake, schedule to involve time for breakfast.    ARIC Ware, OTR/L, CEAS-I  5/19/2023

## 2023-05-19 NOTE — PROGRESS NOTES
[facility]    Functional Restoration Program  Physical Therapy Treatment Note  FRP Day 6    Name: Dina Hutton  MRN:[id number]      Therapy Diagnosis:   Encounter Diagnoses   Name Primary?    Central sensitization to pain Yes    Fibromyalgia     Gait disturbance     Poor balance        Physician: Sharona Neumann NP    Date: 5/19/2023    Physician Orders: PT Eval and Treat   Medical Diagnosis from Referral:   M79.7 (ICD-10-CM) - Fibromyalgia   R26.9 (ICD-10-CM) - Gait disturbance   E89.0 (ICD-10-CM) - Postsurgical hypothyroidism      Evaluation Date: 3/21/2023  Authorization Period Expiration: 12/31/2023  Plan of Care Expiration: 6/30/2023  Visit # / Visits authorized: 6/ 30  FOTO: 1/ 3       Time In: 1:30 pm  Time Out: 2:45 pm  Total Billable Time: 75 minutes    SUBJECTIVE     Thu reports not being able to pace well and suffers from the boom and bust often. Pt c/o her joint pain does not decrease with the stretches. Pt states the stretches make her back feel better but not her hands, neck or feet.   Patient report tolerating last tx session well /c no c/o of excess discomfort.  Patient agree to tx today.       Patient's FRP goals: play w/ kids more, do home tasks more easily,        Current 4/10  Location(s): B hands, B shld, neck, B feet    OBJECTIVE     Objective Measures updated at progress report unless specified.      Limitation/Restriction for FOTO Neck Survey     Therapist reviewed FOTO scores for Dina Hutton on 5/8/2023.   FOTO documents entered into [employer] - see Media section.     Limitation Score 3/21/2023: 54%  Limitation Score 5/8/2023: 56%    Predicted Score: 41%           Treatment       Thu received the Individualized Treatments listed below:     Thu participated in dynamic functional therapeutic activities to improve functional performance for 10  minutes, including:    TM 1.5 - 1.7mph 10 min, cues for increase and even step lengths      therapeutic exercises to develop  strength, endurance, ROM, flexibility, posture, and core stabilization for 15 minutes including:    Supine:  LTR x 10   DKTC x 10  BKFO x 10 RTB  PPT x10  TA brace c/ T ball + SLR x10 ea    Standing lumbar ext x10    Thu participated in neuromuscular re-education activities to improve: Balance, Coordination, Kinesthetic, Sense, Proprioception, and Posture for 40 minutes. The following activities were included:      Peripheral muscle strengthening which included 1-2 sets of 10-20 repetitions at a slow, controlled 5-7 second per rep pace focused on strengthening supporting musculature for improved body mechanics & functional mobility.  Patient & therapist focused on proper form & speed during treatment to ensure optimal strengthening of each targeted muscle group & neuromuscular re-education. Patients are instructed to keep sets/reps with proper load to stay at 3-5 out of 10 on the provided modified Rosendo exertion scale.    BATCA Machine Exercises Weight (lbs) Sets Repetitions Rosendo Exertion Scale Rating   Leg Extension  5 1 15 5   Hamstring Curls 15 1 20 5   Chest Press       Pec Fly       Lat Pull Down 15 1 15 4   Mid Row 15 1 15 4   Bicep Bar Curls 5 1 15 5   Standing Tricep Extension 7.5 1 20 4   Single Leg Press 22.5 1 15 3           Patient Education and Home Exercises     Home Exercises Provided and Patient Education Provided     Education provided:   - FRP Educational Topics: Modified Rosendo Exertion Scale, Central Sensitization , and Posture & Body Mechanics    Written Home Exercises Provided: Patient instructed to cont prior HEP. Exercises were reviewed and Thu was able to demonstrate them prior to the end of the session.  Thu demonstrated good  understanding of the education provided. See EMR under Patient Instructions for information provided during therapy sessions    ASSESSMENT     Thu with good participation in PT session today. Even though she seems skeptical of joint relief with stretching, she did  respond well with noted decreased pain complaints in back after stretch routine. Resisted training with BATCA cont with good pacing and tolerance to progress. Cont TA on TM for better community ambulation due to pt's short steps. Pt very open to education of FRP principles but will see how it is carried over into daily activities.    Thu Is progressing well towards her goals.   Pt prognosis is Good.     Pt will continue to benefit from skilled outpatient physical therapy to address the deficits listed in the problem list box on initial evaluation, provide pt/family education and to maximize pt's level of independence in the home and community environment.     Pt's spiritual, cultural and educational needs considered and pt agreeable to plan of care and goals.     Anticipated barriers to physical therapy: chronic nature of issues, psychosocial factors    GOALS: Pt is in agreement with the following goals:  Goal Progress Towards Goal   SHORT Term: In 3 weeks, pt will:     Verbalize & demonstrate good understanding of resisted training with 3-1-3 second rep for owtezlibdl-[id number]-atdzlkvml contraction to encourage full muscle recruitment for strength & endurance. Goal met 5/11/2023   Verbalize & demonstrate good understanding of home stretch routine to improve AROM, help decrease pain & improve mobility. Initiated 3/21/2023   Demonstrate proper supine or seated diaphragmatic breathing with decreased chest excursion & emphasis on full abdominal excursion & increased expiration time to promote muscle relaxation & increased parasympathetic activity to improve patient tolerance to activities. Initiated 3/21/2023   Verbalize good understanding of importance of proper posture in resisted exercise, functional activities & ADL's in order to help reduce postural strain, promote proper breathing. Goal met 5/11/2023   Demonstrate good understanding of full body resisted exercises w/ use of pictures &/or verbal cues to promote  "independence w/ exercise at the end of the program. Initiated 3/21/2023   Verbalize plan for continuing resisted exercises w/ specific location (i.e. commercial gym, home, community fitness center)  to incorporate all major muscle groups to maintain & progress therapeutic functional improvements. Initiated 3/21/2023   Verbalize difference between  "hurt vs harm"  to demonstrate understanding of fear avoidance in pain cycle.  Initiated 3/21/2023         Midterm: In 8 weeks, pt will:     Verbalize good understanding of activities planning/scheduling (stretching, mindfulness, resisted training, & cardio) prescribed by PT/OT following the end of the program to sustain & progress functional improvements. Initiated 3/21/2023   Perform selected resisted training w/ minimal to no cuing in therapy session to be independent w/ resisted strengthening. Initiated 3/21/2023   Demonstrate improved symptom self-management w/ posture/positioning and mechanical movements and/or implementation of appropriate modalities throughout a typical day.  Initiated 3/21/2023   Demonstrate independence w/ home stretch routine to improve AROM, help decrease pain & improve mobility. Initiated 3/21/2023         Long Term: In 12 weeks, pt will:     Perform selected cardio & resisted training independently to continue safe regular performance of daily exercise. Initiated 3/21/2023   Improve 2 Minute Walk Test (MWT) to 425 feet and 6 MWT to 1275 feet or greater to improve gait speed and mobility. Initiated 3/21/2023   Perform single leg stance 10 seconds or greater bilaterally to improve safety and balance in ADLs. Initiated 3/21/2023   Demonstrate Timed Up & Go (with appropriate assistive device, if necessary) of 10 seconds or less to decrease fall risk and improve functional mobility. Initiated 3/21/2023   Score 41 % or less on Neck FOTO to indicate significant functional improvements in impaired functions secondary to pain. Initiated 3/21/2023    "     PLAN     Continue PT per POC     Roseline Hamilton, PTA

## 2023-05-22 ENCOUNTER — CLINICAL SUPPORT (OUTPATIENT)
Dept: REHABILITATION | Facility: OTHER | Age: 38
End: 2023-05-22
Payer: COMMERCIAL

## 2023-05-22 DIAGNOSIS — R26.9 GAIT DISTURBANCE: ICD-10-CM

## 2023-05-22 DIAGNOSIS — R29.898 DECREASED GRIP STRENGTH: ICD-10-CM

## 2023-05-22 DIAGNOSIS — R26.89 POOR BALANCE: ICD-10-CM

## 2023-05-22 DIAGNOSIS — F40.298 FEAR OF PAIN: ICD-10-CM

## 2023-05-22 DIAGNOSIS — Z78.9 DECREASED ACTIVITIES OF DAILY LIVING (ADL): ICD-10-CM

## 2023-05-22 DIAGNOSIS — R52 PAIN AGGRAVATED BY ACTIVITIES OF DAILY LIVING: Primary | ICD-10-CM

## 2023-05-22 DIAGNOSIS — G89.29 CENTRAL SENSITIZATION TO PAIN: Primary | ICD-10-CM

## 2023-05-22 DIAGNOSIS — R68.89 DECREASED FUNCTIONAL ACTIVITY TOLERANCE: ICD-10-CM

## 2023-05-22 DIAGNOSIS — M79.7 FIBROMYALGIA: ICD-10-CM

## 2023-05-22 PROCEDURE — 97112 NEUROMUSCULAR REEDUCATION: CPT

## 2023-05-22 PROCEDURE — 97530 THERAPEUTIC ACTIVITIES: CPT

## 2023-05-22 NOTE — PROGRESS NOTES
[facility]    Functional Restoration Program  Physical Therapy Treatment Note  FRP Day 7    Name: Dina Hutton  MRN:[id number]      Therapy Diagnosis:   Encounter Diagnoses   Name Primary?    Central sensitization to pain Yes    Fibromyalgia     Gait disturbance     Poor balance        Physician: Sharona Neumann NP    Date: 5/22/2023    Physician Orders: PT Eval and Treat   Medical Diagnosis from Referral:   M79.7 (ICD-10-CM) - Fibromyalgia   R26.9 (ICD-10-CM) - Gait disturbance   E89.0 (ICD-10-CM) - Postsurgical hypothyroidism      Evaluation Date: 3/21/2023  Authorization Period Expiration: 12/31/2023  Plan of Care Expiration: 6/30/2023  Visit # / Visits authorized: 7/ 30  FOTO: 2/ 3       Time In: 1:30 pm  Time Out: 2:45 pm  Total Billable Time: 75 minutes    SUBJECTIVE     Thu reports she had a better weekend of pacing herself & did not have as much soreness after her last visit as she had in the past. She did have a little bit of extra knee pain after her last session & thinks it was due to OT training squats a little too long.      Patient's FRP goals: play w/ kids more, do home tasks more easily,        Current 4/10  Location(s): B hands, B shld, neck, B feet    OBJECTIVE     Objective Measures updated at progress report unless specified.      Limitation/Restriction for FOTO Neck Survey     Therapist reviewed FOTO scores for Dina Hutton on 5/8/2023.   FOTO documents entered into [facility] - see Media section.     Limitation Score 3/21/2023: 54%  Limitation Score 5/8/2023: 56%    Predicted Score: 41%           Treatment       Thu received the Individualized Treatments listed below:     Thu participated in dynamic functional therapeutic activities to improve functional performance for 20 minutes, including:      Full body functional mobility w/ 3-10 sec hold technique &/or 3-5 repetition technique to improve functional performance. In order to:  Improve driving safety, visual & spatial  awareness:  Cervical flx/ext  Cervical side bending  Cervical rotation  Cervical protraction/retraction  Improve lifting mechanics, showering/bathing, dressing, cooking, reaching, pushing & fine motor skills  Shld rolls  Shld depression/elevation  Scapular retraction/protraction/scaption  Posterior shld stretch (cross arm)  Shld ER stretch (chicken wing)  Elbow flx/ext  Forearm supination/pronation  Wrist flx/ext  Open/close hands/fingers  Improve bed mobility & rolling, bending over, cooking & cleaning:  Seated trunk rotations  Seated trunk/thoracic ext/flx  Improve functional gait, squatting, sitting tolerance, functional balance:   Seated marches & knee to chest  Seated knee flx/ext  Seated hip ER/piriformis stretch  Seated hamstring stretch  Seated toe raise to heel raise   Seated ankle circles each way  Improve overhead reaching/activities, standing tolerance:  Standing lumbar extensions  Standing lateral leaning stretch  Standing quad stretch (UE support for balance)          Thu participated in neuromuscular re-education activities to improve: Balance, Coordination, Kinesthetic, Sense, Proprioception, and Posture for 55 minutes. The following activities were included:      Peripheral muscle strengthening which included 1-2 sets of 10-20 repetitions at a slow, controlled 5-7 second per rep pace focused on strengthening supporting musculature for improved body mechanics & functional mobility.  Patient & therapist focused on proper form & speed during treatment to ensure optimal strengthening of each targeted muscle group & neuromuscular re-education. Patients are instructed to keep sets/reps with proper load to stay at 3-5 out of 10 on the provided modified Rosendo exertion scale.    BATCA Machine Exercises Weight (lbs) Sets Repetitions Rosendo Exertion Scale Rating   Leg Extension  7.5 1 15 5   Hamstring Curls 17.5 1 20 5   Chest Press 7.5 1 20 3   Pec Fly 17.5 1 20 5   Lat Pull Down 17.5 1 20 5   Mid Row 17.5 1 20  2   Bicep Bar Curls 5 1 20 4   Standing Tricep Extension 7.5 1 20 4   Single Leg Press 25 1 20 4     Fitter board medium setting tilts each way w/ UE support  Supine on towel roll  Diaphragmatic breaths w/ cuff weight on abdomen for tactile cue 2x1'  B shld flx 3 lb bar 2x10  B snow angels 2x10  B serratus punches 3 lb bar 2x10        Patient Education and Home Exercises     Home Exercises Provided and Patient Education Provided     Education provided:   - FRP Educational Topics: Modified Rosendo Exertion Scale, Central Sensitization , and Posture & Body Mechanics    Written Home Exercises Provided: Patient instructed to cont prior HEP. Exercises were reviewed and Thu was able to demonstrate them prior to the end of the session.  Thu demonstrated good  understanding of the education provided. See EMR under Patient Instructions for information provided during therapy sessions    ASSESSMENT     Thu with good participation in PT session today w/ good progression in her tolerance to movement/exercise. She reported significant reduction in her neck & shld pain w/ scapular repositioning exercises on the towel roll indicating postural component. She continues to have some trouble differentiating hurt vs harm, but is more open to the education at this time.    Thu Is progressing well towards her goals.   Pt prognosis is Good.     Pt will continue to benefit from skilled outpatient physical therapy to address the deficits listed in the problem list box on initial evaluation, provide pt/family education and to maximize pt's level of independence in the home and community environment.     Pt's spiritual, cultural and educational needs considered and pt agreeable to plan of care and goals.     Anticipated barriers to physical therapy: chronic nature of issues, psychosocial factors    GOALS: Pt is in agreement with the following goals:  Goal Progress Towards Goal   SHORT Term: In 3 weeks, pt will:     Verbalize & demonstrate good  "understanding of resisted training with 3-1-3 second rep for [id number] contraction to encourage full muscle recruitment for strength & endurance. Goal met 5/11/2023   Verbalize & demonstrate good understanding of home stretch routine to improve AROM, help decrease pain & improve mobility. Initiated 3/21/2023   Demonstrate proper supine or seated diaphragmatic breathing with decreased chest excursion & emphasis on full abdominal excursion & increased expiration time to promote muscle relaxation & increased parasympathetic activity to improve patient tolerance to activities. Initiated 3/21/2023   Verbalize good understanding of importance of proper posture in resisted exercise, functional activities & ADL's in order to help reduce postural strain, promote proper breathing. Goal met 5/11/2023   Demonstrate good understanding of full body resisted exercises w/ use of pictures &/or verbal cues to promote independence w/ exercise at the end of the program. Initiated 3/21/2023   Verbalize plan for continuing resisted exercises w/ specific location (i.e. commercial gym, home, community fitness center)  to incorporate all major muscle groups to maintain & progress therapeutic functional improvements. Initiated 3/21/2023   Verbalize difference between  "hurt vs harm"  to demonstrate understanding of fear avoidance in pain cycle.  Initiated 3/21/2023         Midterm: In 8 weeks, pt will:     Verbalize good understanding of activities planning/scheduling (stretching, mindfulness, resisted training, & cardio) prescribed by PT/OT following the end of the program to sustain & progress functional improvements. Initiated 3/21/2023   Perform selected resisted training w/ minimal to no cuing in therapy session to be independent w/ resisted strengthening. Initiated 3/21/2023   Demonstrate improved symptom self-management w/ posture/positioning and mechanical movements and/or implementation of appropriate modalities " throughout a typical day.  Initiated 3/21/2023   Demonstrate independence w/ home stretch routine to improve AROM, help decrease pain & improve mobility. Initiated 3/21/2023         Long Term: In 12 weeks, pt will:     Perform selected cardio & resisted training independently to continue safe regular performance of daily exercise. Initiated 3/21/2023   Improve 2 Minute Walk Test (MWT) to 425 feet and 6 MWT to 1275 feet or greater to improve gait speed and mobility. Initiated 3/21/2023   Perform single leg stance 10 seconds or greater bilaterally to improve safety and balance in ADLs. Initiated 3/21/2023   Demonstrate Timed Up & Go (with appropriate assistive device, if necessary) of 10 seconds or less to decrease fall risk and improve functional mobility. Initiated 3/21/2023   Score 41 % or less on Neck FOTO to indicate significant functional improvements in impaired functions secondary to pain. Initiated 3/21/2023        PLAN     Continue PT per POC     Mason Branch, PT

## 2023-05-22 NOTE — PROGRESS NOTES
"[facility]  Functional Restoration Program  Occupational Therapy Treatment Note  FRP day 7  MRN:[id number]    Name: Dina Hutton  MRN:[id number]  Encounter Date: 5/22/2023    Diagnosis:   Encounter Diagnoses   Name Primary?    Pain aggravated by activities of daily living Yes    Decreased activities of daily living (ADL)     Decreased  strength     Decreased functional activity tolerance     Fear of pain        Referring provider: Sharona Neumann NP    Physician Orders: eval and treat; [facility]  Medical Diagnosis:  M79.7 (ICD-10-CM) - Fibromyalgia  R26.9 (ICD-10-CM) - Gait disturbance  E89.0 (ICD-10-CM) - Postsurgical hypothyroidism   Evaluation Date: 3/21/2023  Insurance Authorization Period Expiration: 12/31/2023  Plan of Care Certification Period: 8/4/2023  Visit # / Visits authorized: 6/30 (plus eval)   FOTO completed: 2/3     Precautions:  Standard and Fall, light sensitivity    Time In: 1445  Time Out: 1600  Total Billable Time: 70 minutes    SUBJECTIVE     She reports: "this was a really frustrating lift for me. This feels different today" re: floor to waist. "I struggle with self-esteem, even as a kid. In my childhood, they don't use praise, they used judgement."     Thu was compliant with parts of home exercise program given previously, but states states she is continuing to have difficulty with pacing but states she's been stretching daily.  States she's noticing benefits of mindfulness.     Response to previous treatment: Ms. Hutton noted: improved energy and tolerance for ADLs    Functional change: improved energy and tolerance for ADLs; implementing mantra "its ok to not be ok"         Pain:  4/10 current  Location: arms, elbow, feet, hands (worse in index fingers), head, jaw, shoulders, wrists, and neck, and scalp.  Description: pulsating pain, which becomes tingling, which becomes numb. Sharp pain left side of lower back. She reports intermittent swelling into the joints. Her pain is worse " in the morning and day. She does endorse stiffness. She also reports headaches.    Patient Specific Activities based on Personal goals:  Activity  Performance  5/8/2023  Satisfaction      Meal prep and cooking.  5  3-4   2.  Getting on the ground to play with son.  1  0   3.  Participating in outdoor activities w family (Standing)  3  0   4.  Carrying/Lifting groceries, and putting them in cabinets/pantry.  7  6   5.  laundry (bending over, lifting wet laundry)  4  4           Total Score  4 -      Patient Specific Activity Scoring Scheme for Performance     0        1        2        3        4        5        6        7        8        9        10     Unable                                                                                     Able to perform  To perform                                                                              activity at same  Activity                                                                                    level as before                                                                          Objective     Objective Measures updated at progress report unless specified.      Treatment     Refer to [facility] protocol for details and explanation of each activity.     Thu received the treatments listed below:     Therapeutic activities and functional lifting activities in order to increase participation in, endurance for, and performance with vocational, selfcare ADLs, and leisure activities in order to improve quality of life, for 75 minutes, including:    Pt completed yoga activity on mat, supine, quadriped, seated and standing, with focused education on getting on/off floor properly and safely, back mobility, hip flexibility, stress reduction, dynamic standing balance, posture, alignment and coordinating movement with breath, rated moderate (3/10).    Pt educated on mod LSVT technique for sit<>stand using high velocity, big movements, with focus on using BUE for weight  "shift/momentum as well as "noes over toes" and coordinating movement with breath. Pt completed 10 reps with min-no cues needed.     Thu completed functional lifting, floor to waist, 3x5 reps x 5 lbs with exertion rated Hard (5/10), with cues and education improved to Sort of hard (4/10) focused education on widened base of support and recognizing physical signs of tension. Example of sitting in chair to recognize guarding against shifting into heels, Adjustments made to shoes to improve heel support, pt endorses gripping in toes. Pt plans to use this lift related to laundry.     Thu participated in functional lift waist to shoulder, 3x5 reps x 5 lbs with a rating of Sort of hard (4/10) exertion. Thu educated on standing posture, core awareness, keeping shoulders depressed and retracted, stepping to place > reaching to place, keeping weight close to center of gravity and pacing as needed.     Thu participated in endurance activity using Nustep for 10 minutes, at level 1.0 to  improve functional endurance and progress towards increased MET level for improved community mobility and participation, rated as Sort of hard (4/10) exertion, Thu re-educated on how to add mindfulness component to activity and how to pace appropriately by completed self check ins and grading activity as needed, with goal to reach moderate to sort of hard by end of activity. Pt appropriately tearing up when on nu-step, sharing that feelings of confidence have been challenging for her, and she was appropriately challenged by CBT this date.     No environmental, cultural, spiritual, developmental or education needs expressed or noted.    Patient Education and Home Exercises     Education provided:   - Progress towards goals   - proper posture and body mechanics.  - Functional pain scale  - DONNA rate of perceived exertion scale.   - importance of completion of exercises and activities outside of session/FRP to attain goals.      Written Home " Exercises Provided: yes.  Exercises were reviewed and Thu was able to demonstrate them prior to the end of the session.  Thu demonstrated good  understanding of the HEP provided.     See EMR under Patient Instructions for exercises provided during therapy sessions. FRP binder provided day one of cohort.    Assessment     Ms. Hutton with initial frustration at difficulty regarding technique for floor to waist lifting, though improved w cues and assistance in correcting posture, but primarily in de-escalating tension noted with desire to perform lift correctly. Pt appropriately tearing up when on nu-step, sharing that feelings of confidence have been challenging for her, and she was appropriately challenged by CBT this date.     Thu is progressing well towards her goals and status of goals can be seen below. Patient's prognosis is Good.     Thu would continue to benefit from skilled outpatient occupational therapy to address the deficits listed in the problem list on initial evaluation, provide patient education, and to maximize patient's level of independence in the home and community environment.     Anticipated barriers to occupational therapy: psychosocial demands, co morbidities    Goals:     SHORT Term goals: In 3 weeks, patient will:  Status of goal:   Implements correct use of breathing techniques during functional lifting tasks, with minimal cues needed.  Initiated 5/22/2023    Utilizes DONNA rate of exertion scale to pace performance with functional lifting tasks, and implements self-care strategies during activity participation to manage pain. Initiated 5/8/2023    Verbalize understanding of energy conservation and pacing strategies during daily habits, roles, and routines in order to improve tolerance for work and home demands.  Initiated 5/8/2023    Completes 5x sit to stand test in 20 seconds or less to improve ability to participate in community mobility.   Initiated 5/8/2023    Demonstrate increased  positional tolerance to the level needed for work, home, and community activities (standing in cue at social event, managing supplies for outing, streneous IADL), as evidenced by having a METS level of 4. Initiated 5/8/2023    Demonstrate proper body mechanics with functional lifting tasks (floor to waist, waist to shoulder, maximum lift, and box carry), via teach back method with minimal cues needed. Initiated 5/8/2023    Demonstrate increased functional strength by lifting 13 lbs waist to shoulder for management of (I)ADL, including dressing and grooming, placing items in kitchen cabinets, folding towels, and/or managing suitcase when traveling.   Initiated 5/8/2023    Demonstrate increased functional strength by lifting 20 lbs floor to waist to meet demand of work/home routine, including lifting groceries, managing laundry, and/or lifting supplies for outdoor activities.   Initiated 5/8/2023    Tolerate Home Activity Plan (HAP)/ Home Exercise Program (HEP) to promote safety and independence with ADL, with report of completion of at least 2 out of 4 days outside of program participation.  Initiated 5/8/2023    Show improved perception of own abilities as evidenced by increase of FOTO scores to established MDC value and perception of performance ratings regarding personal long term goals.  Initiated 5/8/2023    Pt will verbalize and demonstrate increased water intake to goal level Initiated 5/8/2023          MIDTERM goals: In 9 weeks, patient will: Status of goal:   Report implementation of application of mindfulness, stretching, and other coping strategies for improved participation in daily routine. Initiated 5/8/2023    Verbalize functional application of lifting techniques in daily life (golfers lift, floor to waist, waist to shoulder). Initiated 5/8/2023    Completes 5x sit to stand test in 17 seconds or less to improve ability to participate in community mobility.  Initiated 5/8/2023    Applies functional  application of lifting techniques (golfer's lift, floor to waist, waist to shoulder) in activities of daily life, per patient report.  Initiated 5/8/2023    Demonstrate physical capacities consistent with a Light-Medium (#35 max, frequent lifting/carrying #20, 3 METS)  demand level, as needed to perform activities to be successful in roles of life, regarding Household Management and Community Participation. Initiated 5/8/2023    Have an improvement of 3 points in 2/5 personal goals in rating of  performance.  Initiated 5/8/2023    Show improved perception of own abilities as evidenced by increase of FOTO scores to established MC II value and perception of performance ratings regarding personal long term goals.  Initiated 5/8/2023          LONG term goals = Patient Specific Goals:  Vocational: Chores, Daily tasks, playing with family   Recreational : sports, outdoor activities, walks   Daily Living Activities: cooking, cleaning, sports, one day go back to work      Measured by patient's self-reported performance and satisfaction of personal FRP goals, listed above in subjective section.   Total Score = sum of the activity performance scores / number of activities  Minimum detectable change (90%CI) for average score = 2 points  Minimum detectable change (90%CI) for single activity score  = 3 points    Plan     Continue per plan of care.     Updates/Grading for next session:  functional lifts, pacing, water intake, schedule to involve time for breakfast.     JACKELYN Henley   5/22/2023

## 2023-05-24 ENCOUNTER — CLINICAL SUPPORT (OUTPATIENT)
Dept: REHABILITATION | Facility: OTHER | Age: 38
End: 2023-05-24
Payer: COMMERCIAL

## 2023-05-24 ENCOUNTER — OFFICE VISIT (OUTPATIENT)
Dept: PSYCHIATRY | Facility: OTHER | Age: 38
End: 2023-05-24
Payer: COMMERCIAL

## 2023-05-24 DIAGNOSIS — R26.9 GAIT DISTURBANCE: ICD-10-CM

## 2023-05-24 DIAGNOSIS — R52 PAIN AGGRAVATED BY ACTIVITIES OF DAILY LIVING: Primary | ICD-10-CM

## 2023-05-24 DIAGNOSIS — G89.29 CENTRAL SENSITIZATION TO PAIN: Primary | ICD-10-CM

## 2023-05-24 DIAGNOSIS — R68.89 DECREASED FUNCTIONAL ACTIVITY TOLERANCE: ICD-10-CM

## 2023-05-24 DIAGNOSIS — R29.898 DECREASED GRIP STRENGTH: ICD-10-CM

## 2023-05-24 DIAGNOSIS — F40.298 FEAR OF PAIN: ICD-10-CM

## 2023-05-24 DIAGNOSIS — R26.89 POOR BALANCE: ICD-10-CM

## 2023-05-24 DIAGNOSIS — M79.7 FIBROMYALGIA: ICD-10-CM

## 2023-05-24 DIAGNOSIS — F43.29 ADJUSTMENT DISORDER WITH PHYSICAL COMPLAINTS: Primary | ICD-10-CM

## 2023-05-24 DIAGNOSIS — Z78.9 DECREASED ACTIVITIES OF DAILY LIVING (ADL): ICD-10-CM

## 2023-05-24 PROCEDURE — 97530 THERAPEUTIC ACTIVITIES: CPT

## 2023-05-24 PROCEDURE — 97110 THERAPEUTIC EXERCISES: CPT

## 2023-05-24 PROCEDURE — 90853 PR GROUP PSYCHOTHERAPY: ICD-10-PCS | Mod: ,,, | Performed by: SOCIAL WORKER

## 2023-05-24 PROCEDURE — 90853 GROUP PSYCHOTHERAPY: CPT | Mod: ,,, | Performed by: SOCIAL WORKER

## 2023-05-24 PROCEDURE — 97112 NEUROMUSCULAR REEDUCATION: CPT

## 2023-05-24 NOTE — PROGRESS NOTES
"[facility]    Functional Restoration Program  Physical Therapy Treatment Note  FRP Day 8    Name: Dina Hutton  MRN:[id number]      Therapy Diagnosis:   Encounter Diagnoses   Name Primary?    Central sensitization to pain Yes    Fibromyalgia     Gait disturbance     Poor balance      Physician: Sharona Neumann NP    Date: 5/24/2023    Physician Orders: PT Eval and Treat   Medical Diagnosis from Referral:   M79.7 (ICD-10-CM) - Fibromyalgia   R26.9 (ICD-10-CM) - Gait disturbance   E89.0 (ICD-10-CM) - Postsurgical hypothyroidism      Evaluation Date: 3/21/2023  Authorization Period Expiration: 12/31/2023  Plan of Care Expiration: 6/30/2023  Visit # / Visits authorized: 8/ 30  FOTO: 3/ 3       Time In: 2:55 pm  Time Out: 4:05 pm  Total Billable Time: 70 minutes    SUBJECTIVE     Thu presents today noting sig inc HA and lightheadedness.  Patient note poor sleep over last few days.  Patient clarify her pattern of sleep include days of poor sleep followed by "crashing".   Patient report performing stretches in AM and feels she is moving more in the day.  Patient clarify that she is getting out of bed earlier in the day to be more participatory in family life.  For example, patient note improved functional use of hands /c ability to change her child's diaper.   When discussing resistance training patient note she does not like the leg exercises.  Patient follow that she finds them challenging /c muscle pain/soreness but states "I know this is not real pain and is just the muscle"   Patient report tolerating last tx session well /c no c/o of excess discomfort.  Patient agree to tx today.      Patient's FRP goals: play w/ kids more, do home tasks more easily,        Current 4/10  Location(s): B hands, B shld, neck, B feet    OBJECTIVE     Postural examination:  Seated: head forward, B rounder shld, slouched  Standing: head forward, B rounder shld, slouched, rigid spine in mobility     Range of " Motion - Cervical    AROM AROM AROM     3/21/2023 Reassessment 05/24/23 Reassessment   Flexion 40 ° 40° °   Extension 34 ° 25° °   Side bending Right 18 ° 20° °   Side bending Left 24 ° 20° °   Rotation Right 55 ° 85° °   Rotation Left 60 ° 85° °      Range of Motion - Lumbar    3/21/2023  05/24/23       ROM Loss ROM Loss ROM Loss   Flexion major loss  mod loss  HS stretch     Extension moderate loss  mod loss     Side bending Right moderate loss  WFL     Side bending Left moderate loss  WFL     Rotation Right major loss  WFL     Rotation Left major loss  WFL        Strength Testing             3/21/2023 Reassessment 05/24/23 Reassessment   Motion Tested               Lower Extremity R L R L R L   Hip Flexion 4-/5 4-/5  NT NT        Hip IR 3+/5 3+/5  NT NT        Hip ER 4-/5 4-/5  NT NT        Knee Extension 4+/5 4+/5 5/5 5/5        Knee Flexion 4/5 4/5  5/5  5/5            Functional Testing       3/21/2023 Reassessment 05/24/23  Reassessment   Timed Up and Go 12.9 sec 16.91 sec sec   Single Limb Stance R LE 19.9 sec 25.97 sec sec   Single Limb Stance L LE 12.9 sec 17.34 sec sec   2 Minute Walk Test 272 feet 309 ft = 94.27 m feet   6 Minute Walk Test  (Stopped 4:00 due to fatigue & pain) 519 feet 899 ft = 274 m feet   Self Selected Walking Speed 0.66 m/sec 0.76 m/sec m/sec      GAIT:  Assistive Device used: none  Level of Assistance: independent  Patient displays the following gait deviations:  unsteady gait, decreased step length, WBOS, decreased weight shift      Minimum standards/norms:    TUG:  < 13.5 seconds/ Males 7.3 sec, Females 8.1 sec  SLS:  26-29 sec  Ages 20-69  Self Selected Walking Speed:  .4-.8m/sec  Limited community ambulator                                                   .8- 1.2  Community ambulator                                                    1.2 m/sec and above  Able to safely cross streets             Limitation/Restriction for FOTO Neck Survey     Therapist reviewed FOTO scores for  Dina Hutton on 5/8/2023.   FOTO documents entered into [employer] - see Media section.     Limitation Score 3/21/2023: 54%  Limitation Score 5/8/2023: 56%  Limitation Score 05/24/23: 52%     Predicted Score: 41%           Treatment       Thu received the Individualized Treatments listed below:       Thu participated in dynamic functional therapeutic activities to improve quality of functional rest for 10 minutes, including:    Pt instructed on positioning and behaviors to improve sleeping quality including:  Reviewing handout on sleep quality techniques    Phone use, leaving bedroom, going to bed sleepy  Pt verbalize understanding and locate handout in binder.    Thu received therapeutic exercises to develop strength, endurance, ROM, flexibility, posture, and core stabilization for 30 minutes including:    PT reassessment (see above)    Seated   Cervical retractions x 5, x 5 /c PT OP, 2 x 10 patient OP       Thu participated in neuromuscular re-education activities to improve: Balance, Coordination, Kinesthetic, Sense, Proprioception, and Posture for 30 minutes. The following activities were included:    Supine    Diaphragmatic breathing 5 min    Peripheral muscle strengthening which included 1-2 sets of 10-20 repetitions at a slow, controlled 5-7 second per rep pace focused on strengthening supporting musculature for improved body mechanics & functional mobility.  Patient & therapist focused on proper form & speed during treatment to ensure optimal strengthening of each targeted muscle group & neuromuscular re-education. Patients are instructed to keep sets/reps with proper load to stay at 3-5 out of 10 on the provided modified Rosendo exertion scale.    BATCA Machine Exercises Weight (lbs) Sets Repetitions Rosendo Exertion Scale Rating   Leg Extension        Hamstring Curls       Chest Press 15 1 15 4   Pec Fly 25 1 15 4   Lat Pull Down 25 1 20 5   Mid Row 25 1 15 4   Bicep Bar Curls       Standing Tricep Extension      "  Single Leg Press 35 1 20 5           Patient Education and Home Exercises     Home Exercises Provided and Patient Education Provided     Education provided:   - FRP Educational Topics: Modified Rosendo Exertion Scale, Central Sensitization , and Posture & Body Mechanics    Written Home Exercises Provided: Patient instructed to cont prior HEP. Exercises were reviewed and Thu was able to demonstrate them prior to the end of the session.  Thu demonstrated good  understanding of the education provided. See EMR under Patient Instructions for information provided during therapy sessions    ASSESSMENT     Patient /c good willingness to tx today despite note lightheadedness throughout tx including taking extended rest breaks.  Patient education include review of sleep hygiene as patient subjective report indicate min application of sleep training requests. Patient will need f/u to prevent sleep deprivation physical consequences which may have influenced today's tx.  Patient clarify that limitations today are not pain related. Therefore, cervical mobility exercises performed to assess potential cervicogenic/postural component to sx presentation.  Patient note sig improved sx, therefore, recommend continue cervicothoracic mobility and postural recognition exercises in POC for sx self management.  Despite patient presenting /c inc light sensitivity and HA, functional testing indicate overall improvements especially /c SLS and 6MWT.  Patient also demonstrate sig improved lumbar mobility.  During stretching patient /c inc Ind thereby meeting associated goal.  Tx include resistance training where patient commentary on muscle discomfort indicate inc understanding of "hurt vs harm" meeting associated goal.  Overall patient is making physical improvement, however, FOTO score /c only slight improvement and subjective report of continue concern for pain /c functional activities indicate patient will benefit addressing self efficacy and " continue to promote inc activity outside of tx sessions.      Thu Is progressing well towards her goals.   Pt prognosis is Good.     Pt will continue to benefit from skilled outpatient physical therapy to address the deficits listed in the problem list box on initial evaluation, provide pt/family education and to maximize pt's level of independence in the home and community environment.     Pt's spiritual, cultural and educational needs considered and pt agreeable to plan of care and goals.     Anticipated barriers to physical therapy: chronic nature of issues, psychosocial factors    GOALS: Pt is in agreement with the following goals:  Goal Progress Towards Goal   SHORT Term: In 3 weeks, pt will:     Verbalize & demonstrate good understanding of resisted training with 3-1-3 second rep for [id number] contraction to encourage full muscle recruitment for strength & endurance. Goal met 5/11/2023   Verbalize & demonstrate good understanding of home stretch routine to improve AROM, help decrease pain & improve mobility. MET 05/24/23   Demonstrate proper supine or seated diaphragmatic breathing with decreased chest excursion & emphasis on full abdominal excursion & increased expiration time to promote muscle relaxation & increased parasympathetic activity to improve patient tolerance to activities. Progressing 05/24/23   Verbalize good understanding of importance of proper posture in resisted exercise, functional activities & ADL's in order to help reduce postural strain, promote proper breathing. Progressing 05/24/23   Demonstrate good understanding of full body resisted exercises w/ use of pictures &/or verbal cues to promote independence w/ exercise at the end of the program. Progressing 05/24/23   Verbalize plan for continuing resisted exercises w/ specific location (i.e. commercial gym, home, community fitness center)  to incorporate all major muscle groups to maintain & progress therapeutic  "functional improvements. Progressing 05/24/23   Verbalize difference between  "hurt vs harm"  to demonstrate understanding of fear avoidance in pain cycle.  MET 05/24/23         Midterm: In 8 weeks, pt will:     Verbalize good understanding of activities planning/scheduling (stretching, mindfulness, resisted training, & cardio) prescribed by PT/OT following the end of the program to sustain & progress functional improvements. Progressing 05/24/23   Perform selected resisted training w/ minimal to no cuing in therapy session to be independent w/ resisted strengthening. Progressing 05/24/23   Demonstrate improved symptom self-management w/ posture/positioning and mechanical movements and/or implementation of appropriate modalities throughout a typical day.  Progressing 05/24/23   Demonstrate independence w/ home stretch routine to improve AROM, help decrease pain & improve mobility. Progressing 05/24/23         Long Term: In 12 weeks, pt will:     Perform selected cardio & resisted training independently to continue safe regular performance of daily exercise. Progressing 05/24/23   Improve 2 Minute Walk Test (MWT) to 425 feet and 6 MWT to 1275 feet or greater to improve gait speed and mobility. Progressing 05/24/23   Perform single leg stance 10 seconds or greater bilaterally to improve safety and balance in ADLs. Progressing 05/24/23   Demonstrate Timed Up & Go (with appropriate assistive device, if necessary) of 10 seconds or less to decrease fall risk and improve functional mobility. Progressing 05/24/23   Score 41 % or less on Neck FOTO to indicate significant functional improvements in impaired functions secondary to pain. Progressing 05/24/23        PLAN     Continue PT per JOY Rizvi, PT    y.    "

## 2023-05-24 NOTE — PROGRESS NOTES
"[facility]  Functional Restoration Program  Occupational Therapy Treatment and Reassessment Note  FRP day 8  MRN:[id number]    Name: Dina Hutton  MRN:[id number]  Encounter Date: 5/24/2023    Diagnosis:   Encounter Diagnoses   Name Primary?    Pain aggravated by activities of daily living Yes    Decreased activities of daily living (ADL)     Decreased  strength     Decreased functional activity tolerance     Fear of pain        Referring provider: Sharona Neumann NP    Physician Orders: eval and treat; [facility]  Medical Diagnosis:  M79.7 (ICD-10-CM) - Fibromyalgia  R26.9 (ICD-10-CM) - Gait disturbance  E89.0 (ICD-10-CM) - Postsurgical hypothyroidism   Evaluation Date: 3/21/2023  Insurance Authorization Period Expiration: 12/31/2023  Plan of Care Certification Period: 8/4/2023  Visit # / Visits authorized: 7/30 (plus eval)   FOTO completed: 3/3     Precautions:  Standard and Fall, light sensitivity    Time In: 1340  Time Out: 1450  Total Billable Time: 70 minutes    SUBJECTIVE     She reports: "i've been feeling a little light-headed. Its been starting since end of Monday"    Thu was compliant with parts of home exercise program given previously, but states states she is continuing to have difficulty with  pacing but states she's recognizing need for pacing more quickly/consistently, been stretching inconsistently (having difficulty completing at home).  States she's noticing benefits of mindfulness.     Response to previous treatment: Ms. Hutton noted: improved energy and tolerance for ADLs, improved activity tolerance     Functional change: improved energy and tolerance for ADLs; implementing mantra "its ok to not be ok" -  noticing "I see you out and and up more often, not just sitting there"         Pain:  Pain Rating Eval Reassessment   Currently 6/10 3/10   At Worst 8/10 8/10   At Best 5/10 2/10   Location: arms, elbow, feet, hands (worse in index fingers), head, jaw, shoulders, wrists, and " neck, and scalp.  Description: pulsating pain, which becomes tingling, which becomes numb. Sharp pain left side of lower back. She reports intermittent swelling into the joints. Her pain is worse in the morning and day. She does endorse stiffness. She also reports headaches.    Patient Specific Activities based on Personal goals:  Activity  Performance  2023  Satisfaction    Performance  2023  Satisfaction   Meal prep and cooking.  5  3-4 6 5   2.  Getting on the ground to play with son.  1  0 4 4   3.  Participating in outdoor activities w family (Standing)  3  0 2 2   4.  Carrying/Lifting groceries, and putting them in cabinets/pantry.  7  6 3 5   5.  laundry (bending over, lifting wet laundry)  4  4 5 5             Total Score  4 - 4       Patient Specific Activity Scoring Scheme for Performance     0        1        2        3        4        5        6        7        8        9        10     Unable                                                                                     Able to perform  To perform                                                                              activity at same  Activity                                                                                    level as before                                                                        Activities of Daily Living Checklist  Personal Care: 3/21/2023; 2023  Homemaking:  3/21/2023;  2023    Dressing Upper Body:  1; 2 Meal preparation: 1; 2   Dressing Lower Body:  2; 2 Kitchen Cleanup: 1; 1   Bathin; 2 Childcare:  2; 2   Hair Care:  2; 1 Grocery shoppin; 2   Sleep:  1; 1 Vacuuming/moppin; 1       Emptying trash/ garbage ba; 1   Home Maintenance:   Bed making changin; 0   Mowing, raking N/a Laundry  1; 1   Gardenin; 1       Home Repairs: N/a General Mobility:     Paintin; 1 Sitting: 3; 2       Bendin; 2   Getting around:    Getting in/out of bed: 2; 2   Getting in and  out of car:  2; 2 Standin; 2   Buckling up you seat belt:  1; 1 Walkin; 2   Opening heavy doors:  1; 1 Twistin; 2   Climbing/descending stairs:  1; 1 Liftin; 2   Key to score:   0: Unable to do the activity  1: Can do the activity with great difficulty  2: Can do the activity with little difficulty     3: No problem with activity  N/A: This is not an activity I do  H/T: Have not tried yet        Objective     COGNITIVE Exam  Behaviors: pleasant, engaged.  Memory: not tested during eval; able to follow multi-step commands. Pt endorses intermittent brain fog  Safety awareness/insight to disability: normal insight and judgment; aware of boom/bust cyclin, rushing,   Coping skills/emotional control: Appropriate to situation, tearful during session.      Dominant hand: right     Active ROM  UE RUE LUE Comments MIKE LUE Comments 2023    Hands Limited Limited Slow movement, pain with movement WFL, guarded  WFL, guarded  Painful in R 1 &2 PCP joints    Wrist limited limited slow movement, Pain with movement WFL, guarded  WFL, guarded  slow movement, Pain with movement    Elbows WFL WFL Pain with movement WFL, guarded  WFL, guarded  Pain w flexion,     Shoulders Limited  Limited Flexion/abduction limited (B)UE WFL, guarded  WFL, guarded  Sharp pain in R shoulder        Upper Extremity Strength - Manual Muscle Testing  Motion Tested Right 3/21/2023 Left 3/21/2023 Right  2023  Left  2023    Shoulder Flexion 3+/5 3+/5 Deferred  Deferred    Shoulder Extension 4-/5 4-/5 Deferred  Deferred    Shoulder Abduction 3+/5 3+/5 Deferred  Deferred    Shoulder IR 4/5 4/5 Deferred  Deferred    Shoulder ER 4-/5 4-/5 Deferred  Deferred    Elbow Flexion 4/5 4/5 Deferred  Deferred    Elbow Extension 4/5 4/5 Deferred  Deferred        Strength (in pounds, rung II, average of three trials)    3/21/2023 2023    Right hand  15.33 lbs 1.67 lbs   Left hand  15.67 lbs 12 lbs   [norms for women aged 35-39: R=74.1  +/-10.8; L=66.3 +/-11.7 (Jennifer et al, 1985)]      Functional Strength Test:  Pile Testing: Lifting  (Pounds/Heart rate)   GAP 3/21/2023  (#/HR/ROSENDO) RE 5/24/2023     Repetitive Floor to Waist      8/94/4 13/84/5    Repetitive Waist to Shoulder    8/88/5 8/90/5    1- Time Maximum     12/86/4 15/80/6    Carry-2 Handed (40ft)     12/93/6 (on forearms) deferred   Work demand Level     Light Light    Reason for Stop point: Increased time required for completing repetitions, ROSENDO scale rating beyond recommended levels, Increased discomfort, Fear of increased pain. During physical testing, participation level was consistent. The work demand level listed above is based on the physical performance screening test performed. More comprehensive physical testing integrated with clinical findings would be required to derived an accurate work capacity.      Balance  5 times sit-stand  (adults 18-65 y/o) 3/21/2023 5/24/2023    >12 sec= fall risk for general elderly  >16 sec= fall risk for Parkinson's disease  >10 sec= balance/vestibular dysfunction (<61 y/o)  >14.2 sec= balance/vestibular dysfunction (>61 y/o)  >12 sec= fall risk for CVA 29.20 seconds     (without UE used to push up from chair) 34.58 seconds    (without UE used to push up from chair)      Endurance Testing: Modified Ramp Treadmill Test    GAP 3/21/2023 RE 5/24/2023    Minutes Completed  2 minutes and 20 seconds deferred   % Grades  5 % deferred   Speed (mph)  1.3 mph deferred   METS 3 deferred    bpm deferred   Rosendo Rating Perceived Exertion Scale   5 deferred   Reason for stop point: onset of      Limitation/Restriction for FOTO NOC Survey     Therapist reviewed FOTO scores for Dina Hutton on 5/24/2023 .   FOTO documents entered into EPIC - see Media section.     Limitation Score: 61 %              No environmental, cultural, spiritual, developmental or education needs expressed or noted.      Treatment     Refer to [facility] protocol for details and  explanation of each activity.      Thu received the treatments listed below:      Therapeutic activities and functional lifting activities in order to increase participation in, endurance for, and performance with vocational, selfcare ADLs, and leisure activities in order to improve quality of life, for 75 minutes, including:    Reassessment performed this date, see objective data above.     No environmental, cultural, spiritual, developmental or education needs expressed or noted.    Patient Education and Home Exercises     Education provided:   - Progress towards goals   - proper posture and body mechanics.  - Functional pain scale  - DONNA rate of perceived exertion scale.   - importance of completion of exercises and activities outside of session/FRP to attain goals.      Written Home Exercises Provided: yes.  Exercises were reviewed and Thu was able to demonstrate them prior to the end of the session.  Thu demonstrated good  understanding of the HEP provided.     See EMR under Patient Instructions for exercises provided during therapy sessions. FRP binder provided day one of cohort.    Assessment     Ms. Hutton with initial frustration at difficulty regarding technique for floor to waist lifting, though improved w cues and assistance in correcting posture, but primarily in de-escalating tension noted with desire to perform lift correctly. Pt appropriately tearing up when on nu-step, sharing that feelings of confidence have been challenging for her, and she was appropriately challenged by CBT this date. Of note, pt significantly limited in today's reassessment by onset of migraine-like pain to neck and head, limiting tolerance and participation. Of measures complete this date, Thu demonstrated decline in 5TSTS and  strength, likely related to pain. Improvements in functional Range of motion appreciated, though MMT deferred this date 2* pain. Pt also tolerated increased weights with improved form and pacing  "noted during functional lifts. Of note, Thu also shared that she is noticing improvements in ADL performance and activity tolerance, as demonstrated by subjective measures on ADL checklist, though no significant change in rating of personal goals performance/satisfaction noted. Patient's subjective measure of pain also noted to improve, in comparison of "current/worst/best" pain rating from eval to re-eval. Decline in FOTO measures appreciated this date, likely attributable to today's significant pain presentation.  Thu has met 0 goals, but Thu is progressing slowly towards her goals and status of goals can be seen below. Patient's prognosis is Good.     Thu would continue to benefit from skilled outpatient occupational therapy to address the deficits listed in the problem list on initial evaluation, provide patient education, and to maximize patient's level of independence in the home and community environment.     Anticipated barriers to occupational therapy: psychosocial demands, co morbidities    Goals:     SHORT Term goals: In 3 weeks, patient will:  Status of goal: Reviewed 5/24/2023   Implements correct use of breathing techniques during functional lifting tasks, with minimal cues needed.  In Progress     Utilizes DONNA rate of exertion scale to pace performance with functional lifting tasks, and implements self-care strategies during activity participation to manage pain. In Progress     Verbalize understanding of energy conservation and pacing strategies during daily habits, roles, and routines in order to improve tolerance for work and home demands.  In Progress     Completes 5x sit to stand test in 20 seconds or less to improve ability to participate in community mobility.   In Progress     Demonstrate increased positional tolerance to the level needed for work, home, and community activities (standing in cue at social event, managing supplies for outing, streneous IADL), as evidenced by having a METS " level of 4. In Progress     Demonstrate proper body mechanics with functional lifting tasks (floor to waist, waist to shoulder, maximum lift, and box carry), via teach back method with minimal cues needed. In Progress      Demonstrate increased functional strength by lifting 13 lbs waist to shoulder for management of (I)ADL, including dressing and grooming, placing items in kitchen cabinets, folding towels, and/or managing suitcase when traveling.   In Progress     Demonstrate increased functional strength by lifting 20 lbs floor to waist to meet demand of work/home routine, including lifting groceries, managing laundry, and/or lifting supplies for outdoor activities.   In Progress     Tolerate Home Activity Plan (HAP)/ Home Exercise Program (HEP) to promote safety and independence with ADL, with report of completion of at least 2 out of 4 days outside of program participation.  In Progress 5/24/2023     Show improved perception of own abilities as evidenced by increase of FOTO scores to established MDC value and perception of performance ratings regarding personal long term goals.  In Progress     Pt will verbalize and demonstrate increased water intake to goal level In Progress            MIDTERM goals: In 9 weeks, patient will: Status of goal:   Report implementation of application of mindfulness, stretching, and other coping strategies for improved participation in daily routine. In Progress     Verbalize functional application of lifting techniques in daily life (golfers lift, floor to waist, waist to shoulder). In Progress      Completes 5x sit to stand test in 17 seconds or less to improve ability to participate in community mobility.  In Progress      Applies functional application of lifting techniques (golfer's lift, floor to waist, waist to shoulder) in activities of daily life, per patient report.  In Progress      Demonstrate physical capacities consistent with a Light-Medium (#35 max, frequent  lifting/carrying #20, 3 METS)  demand level, as needed to perform activities to be successful in roles of life, regarding Household Management and Community Participation. In Progress      Have an improvement of 3 points in 2/5 personal goals in rating of  performance.  In Progress      Show improved perception of own abilities as evidenced by increase of FOTO scores to established MC II value and perception of performance ratings regarding personal long term goals.  In Progress           LONG term goals = Patient Specific Goals:  Vocational: Chores, Daily tasks, playing with family   Recreational : sports, outdoor activities, walks   Daily Living Activities: cooking, cleaning, sports, one day go back to work      Measured by patient's self-reported performance and satisfaction of personal FRP goals, listed above in subjective section.   Total Score = sum of the activity performance scores / number of activities  Minimum detectable change (90%CI) for average score = 2 points  Minimum detectable change (90%CI) for single activity score  = 3 points    Plan     Continue per plan of care.     Updates/Grading for next session:  functional lifts, pacing, water intake, schedule to involve time for breakfast.     JACKELYN Henley   5/24/2023

## 2023-05-26 ENCOUNTER — CLINICAL SUPPORT (OUTPATIENT)
Dept: REHABILITATION | Facility: OTHER | Age: 38
End: 2023-05-26
Payer: COMMERCIAL

## 2023-05-26 ENCOUNTER — OFFICE VISIT (OUTPATIENT)
Dept: PSYCHIATRY | Facility: OTHER | Age: 38
End: 2023-05-26
Payer: COMMERCIAL

## 2023-05-26 DIAGNOSIS — R29.898 DECREASED GRIP STRENGTH: ICD-10-CM

## 2023-05-26 DIAGNOSIS — G89.29 CENTRAL SENSITIZATION TO PAIN: Primary | ICD-10-CM

## 2023-05-26 DIAGNOSIS — M79.7 FIBROMYALGIA: ICD-10-CM

## 2023-05-26 DIAGNOSIS — F43.29 ADJUSTMENT DISORDER WITH PHYSICAL COMPLAINTS: Primary | ICD-10-CM

## 2023-05-26 DIAGNOSIS — R26.89 POOR BALANCE: ICD-10-CM

## 2023-05-26 DIAGNOSIS — R52 PAIN AGGRAVATED BY ACTIVITIES OF DAILY LIVING: Primary | ICD-10-CM

## 2023-05-26 DIAGNOSIS — R68.89 DECREASED FUNCTIONAL ACTIVITY TOLERANCE: ICD-10-CM

## 2023-05-26 DIAGNOSIS — R26.9 GAIT DISTURBANCE: ICD-10-CM

## 2023-05-26 DIAGNOSIS — F40.298 FEAR OF PAIN: ICD-10-CM

## 2023-05-26 DIAGNOSIS — Z78.9 DECREASED ACTIVITIES OF DAILY LIVING (ADL): ICD-10-CM

## 2023-05-26 PROCEDURE — 97110 THERAPEUTIC EXERCISES: CPT | Mod: CQ

## 2023-05-26 PROCEDURE — 97530 THERAPEUTIC ACTIVITIES: CPT | Mod: CQ

## 2023-05-26 PROCEDURE — 97530 THERAPEUTIC ACTIVITIES: CPT

## 2023-05-26 PROCEDURE — 97112 NEUROMUSCULAR REEDUCATION: CPT | Mod: CQ

## 2023-05-26 PROCEDURE — 99499 UNLISTED E&M SERVICE: CPT | Mod: ,,, | Performed by: SOCIAL WORKER

## 2023-05-26 PROCEDURE — 99499 NO LOS: ICD-10-PCS | Mod: ,,, | Performed by: SOCIAL WORKER

## 2023-05-26 NOTE — PROGRESS NOTES
"[facility]  Functional Restoration Program  Occupational Therapy Note  FRP day 9  MRN:[id number]    Name: Dina Hutton  MRN:[id number]  Encounter Date: 5/26/2023    Diagnosis:   Encounter Diagnoses   Name Primary?    Pain aggravated by activities of daily living Yes    Decreased activities of daily living (ADL)     Decreased  strength     Decreased functional activity tolerance     Fear of pain      Referring provider: Sharona Neumann NP    Physician Orders: eval and treat; [facility]  Medical Diagnosis:  M79.7 (ICD-10-CM) - Fibromyalgia  R26.9 (ICD-10-CM) - Gait disturbance  E89.0 (ICD-10-CM) - Postsurgical hypothyroidism   Evaluation Date: 3/21/2023  Insurance Authorization Period Expiration: 12/31/2023  Plan of Care Certification Period: 8/4/2023  Visit # / Visits authorized: 8/30 (plus eval)   FOTO completed: 3/3     Precautions:  Standard and Fall, light sensitivity    Time In: 10:17  Time Out: 11:32  Total Billable Time: 71 minutes    SUBJECTIVE     She reports: "I haven't been meeting with my friends; I just have 2 friends left from high school". "When I get together with friends, I get so emotional".    Thu was compliant with parts of home exercise program given previously.    Response to previous treatment: Ms. Hutton noted: improved energy and tolerance for ADLs, improved activity tolerance     Functional change: improved energy and tolerance for ADLs; implementing mantra "its ok to not be ok" -  noticing "I see you out and and up more often, not just sitting there".        Pain:  Current: 4/10  Location: arms, elbow, feet, hands (worse in index fingers), head, jaw, shoulders, wrists, and neck, and scalp.    Patient Specific Activities based on Personal goals:  Activity  Performance  5/8/2023  Satisfaction    Performance  5/24/2023  Satisfaction   Meal prep and cooking.  5  3-4 6 5   2.  Getting on the ground to play with son.  1  0 4 4   3.  Participating in outdoor activities w family " (Standing)  3  0 2 2   4.  Carrying/Lifting groceries, and putting them in cabinets/pantry.  7  6 3 5   5.  laundry (bending over, lifting wet laundry)  4  4 5 5             Total Score  4 - 4       Patient Specific Activity Scoring Scheme for Performance     0        1        2        3        4        5        6        7        8        9        10     Unable                                                                                     Able to perform  To perform                                                                              activity at same  Activity                                                                                    level as before                                                                        Objective   Measures updated at progress note, unless otherwise noted.     Refer to progress note 5/24/2023 for details.    Endurance Testing: Modified Ramp Treadmill Test    GAP 3/21/2023  5/24/2023  5/26/2023   Minutes Completed  2 minutes and 20 seconds deferred 3 minutes and 00 seconds   % Grades  5 % deferred 10%   Speed (mph)  1.3 mph deferred 1.7 mph   METS 3 deferred 4    bpm deferred 101 bpm   Rosendo Rating Perceived Exertion Scale   5 deferred 4   Reason for stop point: 5/26/2023: onset of lightheadedness.    Treatment     Refer to [facility] protocol for details and explanation of each activity.      Thu received the treatments listed below:      Therapeutic activities and functional lifting activities in order to increase participation in, endurance for, and performance with vocational, selfcare ADLs, and leisure activities in order to improve quality of life, for 71 minutes, including:    Thu completed dynamic reaching in various functional ranges, with continued focus on hip rotation/weight shifting, 12 reps x no added lbs, rated as Sort of hard (4/10) exertion.     Modified treadmill test for endurance testing completed. Seated rest break after completion due to  "feeling light headed. Open to education about ankle pumps, and eccentric muscle contraction using palm to palm pushes. Verbalized understanding that body might create sense of lightheadedness, as Thu with difficulty to pacing and not aware of other cues. Reviewed drive to succeed; verbalized feeling guilty when taking break. Open to education of viewing it as pacing to calm nervous system, and avoiding boom/bust. Podcast shared regarding emotions based on cultural back grounds, to help understand mother's reasoning of shaming.    Thu participated in functional lift waist to shoulder, 15 reps total x 5 lbs with a rating of Moderate (3/10) exertion. Thu educated on standing posture, core awareness, keeping shoulders depressed and retracted, stepping to place > reaching to place, keeping weight close to center of gravity and pacing as needed. Encouraged to take break, as rated as sort of hard after 7 reps, but wanting to continue as task "not completed".    Thu completed functional lifting, floor to waist, 6 reps x 6 lbs, then 3 reps with 9 lbs with exertion rated Sort of hard (4/10); focused education on keeping weight into heels.     Completed energy banking tracking sheet; open to feedback and education regarding importance to continue to pace.    No environmental, cultural, spiritual, developmental or education needs expressed or noted.    Patient Education and Home Exercises     Education provided:   - Progress towards goals   - proper posture and body mechanics.  - Functional pain scale  - DONNA rate of perceived exertion scale.   - importance of completion of exercises and activities outside of session/FRP to attain goals.      Written Home Exercises Provided: Patient instructed to cont prior HEP.  Exercises were reviewed and Thu was able to demonstrate them prior to the end of the session.  Thu demonstrated good  understanding of the education provided.     See EMR under Patient Instructions for exercises " provided during therapy sessions. FRP binder provided day one of cohort.    Assessment     Ms. Hutton with good tolerance for functional lifting tasks, and able to participate in all other activities presented to her; open to education and feedback regarding importance to pacing, and understanding that other's might have motivation to create certain behaviors that are beneficial to the other primarily, but that these behaviors might not be beneficial to Thu at this time.     Overall, Thu is progressing slowly towards her goals and status of goals can be seen below. Patient's prognosis is Good.     Thu would continue to benefit from skilled outpatient occupational therapy to address the deficits listed in the problem list on initial evaluation, provide patient education, and to maximize patient's level of independence in the home and community environment.     Anticipated barriers to occupational therapy: psychosocial demands, co morbidities    Goals:     SHORT Term goals: In 3 weeks, patient will:  Status of goal: Reviewed 5/24/2023   Implements correct use of breathing techniques during functional lifting tasks, with minimal cues needed.  In Progress     Utilizes DONNA rate of exertion scale to pace performance with functional lifting tasks, and implements self-care strategies during activity participation to manage pain. In Progress     Verbalize understanding of energy conservation and pacing strategies during daily habits, roles, and routines in order to improve tolerance for work and home demands.  In Progress     Completes 5x sit to stand test in 20 seconds or less to improve ability to participate in community mobility.   In Progress     Demonstrate increased positional tolerance to the level needed for work, home, and community activities (standing in cue at social event, managing supplies for outing, streneous IADL), as evidenced by having a METS level of 4. In Progress     Demonstrate proper body mechanics  with functional lifting tasks (floor to waist, waist to shoulder, maximum lift, and box carry), via teach back method with minimal cues needed. In Progress      Demonstrate increased functional strength by lifting 13 lbs waist to shoulder for management of (I)ADL, including dressing and grooming, placing items in kitchen cabinets, folding towels, and/or managing suitcase when traveling.   In Progress     Demonstrate increased functional strength by lifting 20 lbs floor to waist to meet demand of work/home routine, including lifting groceries, managing laundry, and/or lifting supplies for outdoor activities.   In Progress     Tolerate Home Activity Plan (HAP)/ Home Exercise Program (HEP) to promote safety and independence with ADL, with report of completion of at least 2 out of 4 days outside of program participation.  In Progress   Show improved perception of own abilities as evidenced by increase of FOTO scores to established MDC value and perception of performance ratings regarding personal long term goals.  In Progress     Pt will verbalize and demonstrate increased water intake to goal level In Progress            MIDTERM goals: In 9 weeks, patient will: Status of goal:   Report implementation of application of mindfulness, stretching, and other coping strategies for improved participation in daily routine. In Progress     Verbalize functional application of lifting techniques in daily life (golfers lift, floor to waist, waist to shoulder). In Progress      Completes 5x sit to stand test in 17 seconds or less to improve ability to participate in community mobility.  In Progress      Applies functional application of lifting techniques (golfer's lift, floor to waist, waist to shoulder) in activities of daily life, per patient report.  In Progress      Demonstrate physical capacities consistent with a Light-Medium (#35 max, frequent lifting/carrying #20, 3 METS)  demand level, as needed to perform activities to be  "successful in roles of life, regarding Household Management and Community Participation. In Progress      Have an improvement of 3 points in 2/5 personal goals in rating of  performance.  In Progress      Show improved perception of own abilities as evidenced by increase of FOTO scores to established MC II value and perception of performance ratings regarding personal long term goals.  In Progress           LONG term goals = Patient Specific Goals:  Vocational: Chores, Daily tasks, playing with family   Recreational : sports, outdoor activities, walks   Daily Living Activities: cooking, cleaning, sports, one day go back to work      Measured by patient's self-reported performance and satisfaction of personal FRP goals, listed above in subjective section.   Total Score = sum of the activity performance scores / number of activities  Minimum detectable change (90%CI) for average score = 2 points  Minimum detectable change (90%CI) for single activity score  = 3 points    Plan     Continue per plan of care.     Updates/Grading for next session:  functional lifts, pacing, water intake, schedule to involve time for breakfast, follow up on podcast "in between us".    ARIC Ware, RAGINI/L, CEAS-I  5/26/2023           "

## 2023-05-26 NOTE — PROGRESS NOTES
"[facility]    Functional Restoration Program  Physical Therapy Treatment Note  FRP Day 9    Name: Dina Hutton  MRN:[id number]      Therapy Diagnosis:   Encounter Diagnoses   Name Primary?    Central sensitization to pain Yes    Fibromyalgia     Gait disturbance     Poor balance        Physician: Sharona Neumann NP    Date: 5/26/2023    Physician Orders: PT Eval and Treat   Medical Diagnosis from Referral:   M79.7 (ICD-10-CM) - Fibromyalgia   R26.9 (ICD-10-CM) - Gait disturbance   E89.0 (ICD-10-CM) - Postsurgical hypothyroidism      Evaluation Date: 3/21/2023  Authorization Period Expiration: 12/31/2023  Plan of Care Expiration: 6/30/2023  Visit # / Visits authorized: 8/ 30  FOTO: 3/ 3       Time In: 9:05 pm  Time Out: 10:15 pm  Total Billable Time: 70 minutes    SUBJECTIVE     Thu reports "crashing" yesterday because of "pushing" herself too much in PT reassess. Pt admits wanting to perform for the reassess and do well. Pt states she stayed in bed all day with headache and then unable to sleep that night. She states unable to perform the stretches due to dizziness but doing some breathing exercises. Pt will be traveling to [place] over weekend and is concerned about how she will handle traveling.    Patient report tolerating last tx session fair /c excess discomfort the next day.  Patient agree to tx today.      Patient's FRP goals: play w/ kids more, do home tasks more easily,        Current 4/10  Location(s): B hands, B shld, neck, B feet    OBJECTIVE      Objective Measures updated at progress report unless specified.     Limitation/Restriction for FOTO Neck Survey     Therapist reviewed FOTO scores for Dina Hutton on 5/8/2023.   FOTO documents entered into [employer] - see Media section.     Limitation Score 3/21/2023: 54%  Limitation Score 5/8/2023: 56%  Limitation Score 05/24/23: 52%     Predicted Score: 41%           Treatment       Thu received the Individualized Treatments listed " below:     Thu participated in dynamic functional therapeutic activities to improve functional performance for 40 minutes, including:      Full body functional mobility w/ 3-10 sec hold technique &/or 3-5 repetition technique to improve functional performance. In order to:  Improve driving safety, visual & spatial awareness:  Cervical flx/ext  Cervical side bending  Cervical rotation  Cervical protraction/retraction  Improve lifting mechanics, showering/bathing, dressing, cooking, reaching, pushing & fine motor skills  Shld rolls  Shld depression/elevation  Scapular retraction/protraction/scaption  Posterior shld stretch (cross arm)  Shld ER stretch (chicken wing)  Elbow flx/ext  Forearm supination/pronation  Wrist flx/ext  Open/close hands/fingers  Improve bed mobility & rolling, bending over, cooking & cleaning:  Seated trunk rotations  Seated trunk/thoracic ext/flx  Improve functional gait, squatting, sitting tolerance, functional balance:   Seated marches & knee to chest  Seated knee flx/ext  Seated hip ER/piriformis stretch  Seated hamstring stretch  Seated toe raise to heel raise   Seated ankle circles each way  Improve overhead reaching/activities, standing tolerance:  Standing lumbar extensions  Standing lateral leaning stretch  Standing quad stretch (UE support for balance)     Diaphragmatic breathing:   Verbal cues for unlabored, long & relaxed breathing w/ increased time for exhalation  Awareness of pulling breath down away from mouth to hips  Cues for for proper abdominal excursion & decreased use of accessory muscles of breathing (hand on stomach & on chest; increased stomach motion, decreased chest motion)  Education on inhalation activating sympathetic nervous system   Education on exhalation activating parasympathetic nervous system  Performed supine & seated     TM 1.5 - 1.7mph 12 min, cues for increase and even step lengths and heel strike -   PRE 3/10      Thu received therapeutic exercises to  develop strength, endurance, ROM, flexibility, posture, and core stabilization for 10 minutes including:    Seated   Cervical retractions x 5,  x 10 patient OP   Supine  LTR x 10   DKTC x 10  Cervical retractions into pillow x10      Thu participated in neuromuscular re-education activities to improve: Balance, Coordination, Kinesthetic, Sense, Proprioception, and Posture for 20 minutes. The following activities were included:      Peripheral muscle strengthening which included 1-2 sets of 10-20 repetitions at a slow, controlled 5-7 second per rep pace focused on strengthening supporting musculature for improved body mechanics & functional mobility.  Patient & therapist focused on proper form & speed during treatment to ensure optimal strengthening of each targeted muscle group & neuromuscular re-education. Patients are instructed to keep sets/reps with proper load to stay at 3-5 out of 10 on the provided modified Rosendo exertion scale.    Pollfish Machine Exercises Weight (lbs) Sets Repetitions Rosendo Exertion Scale Rating   Leg Extension  7.5 1 12 5   Hamstring Curls 20 1 15 5   Chest Press       Pec Fly       Lat Pull Down       Mid Row       Bicep Bar Curls 7.5 1 15 4   Standing Tricep Extension 10 1 20 5   Single Leg Press R/L 35 1 20/15 4       Patient Education and Home Exercises     Home Exercises Provided and Patient Education Provided     Education provided:   - FRP Educational Topics: Modified Rosendo Exertion Scale, Central Sensitization , and Posture & Body Mechanics    Written Home Exercises Provided: Patient instructed to cont prior HEP. Exercises were reviewed and Thu was able to demonstrate them prior to the end of the session.  Thu demonstrated good  understanding of the education provided. See EMR under Patient Instructions for information provided during therapy sessions    ASSESSMENT       Thu with good participation in PT session despite c/o cont mild headache from yesterday and restless night sleep. Pt  is not following pacing advice and cont to perform out of willingness to please. Diaphragmatic breaths w/ poor activation & very high use of accessory muscles w/ dominant chest excursion. Using tactile cue of PTA, pt with difficulty to activate in supine or reclined position. Pt cont to need instructed diaphragmatic breathing practice. During resistance training pt recognizes the difference between hurt vs harm when performing difficult exercise on the BATCA. Discussion about upcoming travels, for pt to use this time to practice mindfulness, breathing and stretching, taking breaks from riding in the car and pacing. Pt requires addition education of self efficacy and having confidence, how she can take the lead on self care.      Thu Is progressing well towards her goals.   Pt prognosis is Good.     Pt will continue to benefit from skilled outpatient physical therapy to address the deficits listed in the problem list box on initial evaluation, provide pt/family education and to maximize pt's level of independence in the home and community environment.     Pt's spiritual, cultural and educational needs considered and pt agreeable to plan of care and goals.     Anticipated barriers to physical therapy: chronic nature of issues, psychosocial factors    GOALS: Pt is in agreement with the following goals:  Goal Progress Towards Goal   SHORT Term: In 3 weeks, pt will:     Verbalize & demonstrate good understanding of resisted training with 3-1-3 second rep for [id number] contraction to encourage full muscle recruitment for strength & endurance. Goal met 5/11/2023   Verbalize & demonstrate good understanding of home stretch routine to improve AROM, help decrease pain & improve mobility. MET 05/24/23   Demonstrate proper supine or seated diaphragmatic breathing with decreased chest excursion & emphasis on full abdominal excursion & increased expiration time to promote muscle relaxation & increased  "parasympathetic activity to improve patient tolerance to activities. Progressing 05/24/23   Verbalize good understanding of importance of proper posture in resisted exercise, functional activities & ADL's in order to help reduce postural strain, promote proper breathing. Progressing 05/24/23   Demonstrate good understanding of full body resisted exercises w/ use of pictures &/or verbal cues to promote independence w/ exercise at the end of the program. Progressing 05/24/23   Verbalize plan for continuing resisted exercises w/ specific location (i.e. commercial gym, home, community fitness center)  to incorporate all major muscle groups to maintain & progress therapeutic functional improvements. Progressing 05/24/23   Verbalize difference between  "hurt vs harm"  to demonstrate understanding of fear avoidance in pain cycle.  MET 05/24/23         Midterm: In 8 weeks, pt will:     Verbalize good understanding of activities planning/scheduling (stretching, mindfulness, resisted training, & cardio) prescribed by PT/OT following the end of the program to sustain & progress functional improvements. Progressing 05/24/23   Perform selected resisted training w/ minimal to no cuing in therapy session to be independent w/ resisted strengthening. Progressing 05/24/23   Demonstrate improved symptom self-management w/ posture/positioning and mechanical movements and/or implementation of appropriate modalities throughout a typical day.  Progressing 05/24/23   Demonstrate independence w/ home stretch routine to improve AROM, help decrease pain & improve mobility. Progressing 05/24/23         Long Term: In 12 weeks, pt will:     Perform selected cardio & resisted training independently to continue safe regular performance of daily exercise. Progressing 05/24/23   Improve 2 Minute Walk Test (MWT) to 425 feet and 6 MWT to 1275 feet or greater to improve gait speed and mobility. Progressing 05/24/23   Perform single leg stance 10 " seconds or greater bilaterally to improve safety and balance in ADLs. Progressing 05/24/23   Demonstrate Timed Up & Go (with appropriate assistive device, if necessary) of 10 seconds or less to decrease fall risk and improve functional mobility. Progressing 05/24/23   Score 41 % or less on Neck FOTO to indicate significant functional improvements in impaired functions secondary to pain. Progressing 05/24/23        PLAN     Continue PT per POC     FIDEL [place].

## 2023-05-29 ENCOUNTER — OFFICE VISIT (OUTPATIENT)
Dept: PSYCHIATRY | Facility: OTHER | Age: 38
End: 2023-05-29
Payer: COMMERCIAL

## 2023-05-29 ENCOUNTER — CLINICAL SUPPORT (OUTPATIENT)
Dept: REHABILITATION | Facility: OTHER | Age: 38
End: 2023-05-29
Payer: COMMERCIAL

## 2023-05-29 DIAGNOSIS — R52 PAIN AGGRAVATED BY ACTIVITIES OF DAILY LIVING: Primary | ICD-10-CM

## 2023-05-29 DIAGNOSIS — R68.89 DECREASED FUNCTIONAL ACTIVITY TOLERANCE: ICD-10-CM

## 2023-05-29 DIAGNOSIS — R26.9 GAIT DISTURBANCE: ICD-10-CM

## 2023-05-29 DIAGNOSIS — R26.89 POOR BALANCE: ICD-10-CM

## 2023-05-29 DIAGNOSIS — Z78.9 DECREASED ACTIVITIES OF DAILY LIVING (ADL): ICD-10-CM

## 2023-05-29 DIAGNOSIS — M79.7 FIBROMYALGIA: ICD-10-CM

## 2023-05-29 DIAGNOSIS — F40.298 FEAR OF PAIN: ICD-10-CM

## 2023-05-29 DIAGNOSIS — G89.29 CENTRAL SENSITIZATION TO PAIN: Primary | ICD-10-CM

## 2023-05-29 DIAGNOSIS — F43.29 ADJUSTMENT DISORDER WITH PHYSICAL COMPLAINTS: Primary | ICD-10-CM

## 2023-05-29 DIAGNOSIS — R29.898 DECREASED GRIP STRENGTH: ICD-10-CM

## 2023-05-29 PROCEDURE — 90853 PR GROUP PSYCHOTHERAPY: ICD-10-PCS | Mod: ,,, | Performed by: SOCIAL WORKER

## 2023-05-29 PROCEDURE — 97110 THERAPEUTIC EXERCISES: CPT

## 2023-05-29 PROCEDURE — 97112 NEUROMUSCULAR REEDUCATION: CPT

## 2023-05-29 PROCEDURE — 90853 GROUP PSYCHOTHERAPY: CPT | Mod: ,,, | Performed by: SOCIAL WORKER

## 2023-05-29 PROCEDURE — 97530 THERAPEUTIC ACTIVITIES: CPT

## 2023-05-29 NOTE — PROGRESS NOTES
[facility]    Functional Restoration Program  Physical Therapy Treatment Note  FRP Day 10    Name: Dina Hutton  MRN:[id number]      Therapy Diagnosis:   Encounter Diagnoses   Name Primary?    Central sensitization to pain Yes    Fibromyalgia     Gait disturbance     Poor balance        Physician: Sharona Neumann NP    Date: 5/29/2023    Physician Orders: PT Eval and Treat   Medical Diagnosis from Referral:   M79.7 (ICD-10-CM) - Fibromyalgia   R26.9 (ICD-10-CM) - Gait disturbance   E89.0 (ICD-10-CM) - Postsurgical hypothyroidism      Evaluation Date: 3/21/2023  Authorization Period Expiration: 12/31/2023  Plan of Care Expiration: 6/30/2023  Visit # / Visits authorized: 9/ 30  FOTO: 3/ 3       Time In: 2:45 pm  Time Out: 4:00 pm  Total Billable Time: 75 minutes    SUBJECTIVE     Thu report visiting family in [place] over the weekend including extended driving.  Patient report she performed stretching prior to the drive and took numerous rest breaks during resulting in inc awakeness, being able to be more present in drive with family(talking /c ) and slight inc in tolerance at end of ride.      Patient report continued difficulty /c sleep quality, but attempting some techniques from her sleep hygiene handout.    Patient report unsure at what gym to join but intends to join with her  so that they can go together.    Patient report tolerating last tx session well /c no c/o of excess discomfort.  Patient agree to tx today.      Patient's FRP goals: play w/ kids more, do home tasks more easily,        Current 4/10  Location(s): B hands, B shld, neck, B feet    OBJECTIVE      Objective Measures updated at progress report unless specified.     Limitation/Restriction for FOTO Neck Survey     Therapist reviewed FOTO scores for Dina Hutton on 5/8/2023.   FOTO documents entered into [employer] - see Media section.     Limitation Score 3/21/2023: 54%  Limitation Score 5/8/2023: 56%  Limitation  Score 05/24/23: 52%     Predicted Score: 41%           Treatment       Thu received the Individualized Treatments listed below:     Thu participated in dynamic functional therapeutic activities to improve functional performance for 10 minutes, including:    Supine diaphragmatic breathing:     Cued for awareness of pulling breath down away from mouth to hips  Patient able to demonstrate abdominal excursion /c min chest rise  Patient verbalize times that she feels she could apply breathing to address stress/anxiety   Patient unable to verbalize exhale/inhale parasympathetic/sympathetic relationship   Performed supine     NP:   TM 1.5 - 1.7mph 12 min, cues for increase and even step lengths and heel strike -   PRE 3/10      Thu received therapeutic exercises to develop strength, endurance, ROM, flexibility, posture, and core stabilization for 10 minutes including:    NuStep for endurance 847 steps @ 10 min 4/10 RPE     Thu participated in neuromuscular re-education activities to improve: Balance, Coordination, Kinesthetic, Sense, Proprioception, and Posture for 55 minutes. The following activities were included:      Peripheral muscle strengthening which included 1-2 sets of 10-20 repetitions at a slow, controlled 5-7 second per rep pace focused on strengthening supporting musculature for improved body mechanics & functional mobility.  Patient & therapist focused on proper form & speed during treatment to ensure optimal strengthening of each targeted muscle group & neuromuscular re-education. Patients are instructed to keep sets/reps with proper load to stay at 3-5 out of 10 on the provided modified Rosendo exertion scale.    BATCA Machine Exercises Weight (lbs) Sets Repetitions Rosendo Exertion Scale Rating   Leg Extension  7.5 1 15 5   Hamstring Curls 22.5 1 18 5   Chest Press 15 1 15 4   Pec Fly 25 1 16 5   Lat Pull Down 25 1 15 5   Mid Row 25 1 20 5   Bicep Bar Curls 7.5 15 15 5   Standing Tricep Extension 7.5 20 20 5  "  Single Leg Press R/L 35 1 17 5       Patient Education and Home Exercises     Home Exercises Provided and Patient Education Provided     Education provided:   - FRP Educational Topics: Modified Rosendo Exertion Scale, Central Sensitization , and Posture & Body Mechanics    Written Home Exercises Provided: Patient instructed to cont prior HEP. Exercises were reviewed and Thu was able to demonstrate them prior to the end of the session.  Thu demonstrated good  understanding of the education provided. See EMR under Patient Instructions for information provided during therapy sessions    ASSESSMENT     Patient subjective report indicate good application of stretching in prep for extended travel.  Today patient complete full complement of BATCA indicating improved exercise tolerance,however, recommend f/u next visit to ensure no "boom/bust" components as had been patient trend in past.  Patient also express interest in pictures of exercise for inc confidence at gym, therefore, patient issued handout.  Plan to return to picture handout to for patient to demonstrate good understanding of full body resisted exercises to address associated goal and to promote independence /c exercise at the end of the program.  Patient demonstrate improved physical performance of diaphragmatic breathing, however, unable to verbalize understanding of mechanics and need cueing as to when controlled breathing would be appropriate for sx self management.        Thu Is progressing fair towards her goals.   Pt prognosis is Good.     Pt will continue to benefit from skilled outpatient physical therapy to address the deficits listed in the problem list box on initial evaluation, provide pt/family education and to maximize pt's level of independence in the home and community environment.     Pt's spiritual, cultural and educational needs considered and pt agreeable to plan of care and goals.     Anticipated barriers to physical therapy: chronic nature " "of issues, psychosocial factors    GOALS: Pt is in agreement with the following goals:  Goal Progress Towards Goal   SHORT Term: In 3 weeks, pt will:     Verbalize & demonstrate good understanding of resisted training with 3-1-3 second rep for hwkkqjllax-voksuwhga-nsgafkkmg contraction to encourage full muscle recruitment for strength & endurance. Goal met 5/11/2023   Verbalize & demonstrate good understanding of home stretch routine to improve AROM, help decrease pain & improve mobility. MET 05/24/23   Demonstrate proper supine or seated diaphragmatic breathing with decreased chest excursion & emphasis on full abdominal excursion & increased expiration time to promote muscle relaxation & increased parasympathetic activity to improve patient tolerance to activities. Progressing 05/24/23   Verbalize good understanding of importance of proper posture in resisted exercise, functional activities & ADL's in order to help reduce postural strain, promote proper breathing. Progressing 05/24/23   Demonstrate good understanding of full body resisted exercises w/ use of pictures &/or verbal cues to promote independence w/ exercise at the end of the program. Progressing 05/24/23   Verbalize plan for continuing resisted exercises w/ specific location (i.e. commercial gym, home, community fitness center)  to incorporate all major muscle groups to maintain & progress therapeutic functional improvements. Progressing 05/24/23   Verbalize difference between  "hurt vs harm"  to demonstrate understanding of fear avoidance in pain cycle.  MET 05/24/23         Midterm: In 8 weeks, pt will:     Verbalize good understanding of activities planning/scheduling (stretching, mindfulness, resisted training, & cardio) prescribed by PT/OT following the end of the program to sustain & progress functional improvements. Progressing 05/24/23   Perform selected resisted training w/ minimal to no cuing in therapy session to be independent w/ resisted " strengthening. Progressing 05/24/23   Demonstrate improved symptom self-management w/ posture/positioning and mechanical movements and/or implementation of appropriate modalities throughout a typical day.  Progressing 05/24/23   Demonstrate independence w/ home stretch routine to improve AROM, help decrease pain & improve mobility. Progressing 05/24/23         Long Term: In 12 weeks, pt will:     Perform selected cardio & resisted training independently to continue safe regular performance of daily exercise. Progressing 05/24/23   Improve 2 Minute Walk Test (MWT) to 425 feet and 6 MWT to 1275 feet or greater to improve gait speed and mobility. Progressing 05/24/23   Perform single leg stance 10 seconds or greater bilaterally to improve safety and balance in ADLs. Progressing 05/24/23   Demonstrate Timed Up & Go (with appropriate assistive device, if necessary) of 10 seconds or less to decrease fall risk and improve functional mobility. Progressing 05/24/23   Score 41 % or less on Neck FOTO to indicate significant functional improvements in impaired functions secondary to pain. Progressing 05/24/23        PLAN     Continue PT per JOY Rizvi, PT

## 2023-05-29 NOTE — PROGRESS NOTES
"[facility]  Functional Restoration Program  Occupational Therapy Note  FRP day 10  MRN:[id number]    Name: Dina Hutton  MRN:[id number]  Encounter Date: 5/29/2023    Diagnosis:   Encounter Diagnoses   Name Primary?    Pain aggravated by activities of daily living Yes    Decreased activities of daily living (ADL)     Decreased  strength     Decreased functional activity tolerance     Fear of pain        Referring provider: Sharona Neumann NP    Physician Orders: eval and treat; [facility]  Medical Diagnosis:  M79.7 (ICD-10-CM) - Fibromyalgia  R26.9 (ICD-10-CM) - Gait disturbance  E89.0 (ICD-10-CM) - Postsurgical hypothyroidism   Evaluation Date: 3/21/2023  Insurance Authorization Period Expiration: 12/31/2023  Plan of Care Certification Period: 8/4/2023  Visit # / Visits authorized: 10/30 (plus eval)   FOTO completed: 3/3     Precautions:  Standard and Fall, light sensitivity    Time In: 1300  Time Out: 1445  Total Billable Time: 75 minutes    SUBJECTIVE     She reports: "I went to [place] this weekend, and I was surprised by how much better I felt than I was expecting"     Thu was compliant with parts of home exercise program given previously. Still practicing pacing and deep breathing.     Response to previous treatment: Ms. Hutton noted: improved energy and tolerance for ADLs, improved activity tolerance     Functional change: improved energy and tolerance for ADLs; implementing mantra "its ok to not be ok" -  noticing "you seem happier" and her having more energy for activities, walking further distances         Pain:  Current: 4/10  Location: arms, elbow, feet, hands (worse in index fingers), head, jaw, shoulders, wrists, and neck, and scalp.    Patient Specific Activities based on Personal goals:  Activity  Performance  5/8/2023  Satisfaction    Performance  5/24/2023  Satisfaction   Meal prep and cooking.  5  3-4 6 5   2.  Getting on the ground to play with son.  1  0 4 4   3.  Participating in " outdoor activities w family (Standing)  3  0 2 2   4.  Carrying/Lifting groceries, and putting them in cabinets/pantry.  7  6 3 5   5.  laundry (bending over, lifting wet laundry)  4  4 5 5             Total Score  4 - 4       Patient Specific Activity Scoring Scheme for Performance     0        1        2        3        4        5        6        7        8        9        10     Unable                                                                                     Able to perform  To perform                                                                              activity at same  Activity                                                                                    level as before                                                                        Objective   Measures updated at progress note, unless otherwise noted.     Refer to progress note 5/24/2023 for details.      Treatment     Refer to [facility] protocol for details and explanation of each activity.      Thu received the treatments listed below:      Therapeutic activities and functional lifting activities in order to increase participation in, endurance for, and performance with vocational, selfcare ADLs, and leisure activities in order to improve quality of life, for 75 minutes, including:    Full body functional mobility w/ 3-10 sec hold technique &/or 3-5 repetition technique to improve functional performance. In order to:  Improve driving safety, visual & spatial awareness:  Cervical flx/ext  Cervical side bending  Cervical rotation  Cervical protraction/retraction  Improve lifting mechanics, showering/bathing, dressing, cooking, reaching, pushing & fine motor skills  Shld rolls  Shld depression/elevation  Scapular retraction/protraction/scaption  Posterior shld stretch (cross arm)  Shld ER stretch (chicken wing)  Elbow flx/ext  Forearm supination/pronation  Wrist flx/ext  Open/close hands/fingers  Improve bed mobility & rolling, bending  over, cooking & cleaning:  Seated trunk rotations  Seated trunk/thoracic ext/flx  Improve functional gait, squatting, sitting tolerance, functional balance:   Seated marches & knee to chest  Seated knee flx/ext  Seated hip ER/piriformis stretch  Seated hamstring stretch  Seated toe raise to heel raise   Seated ankle circles each way  Improve overhead reaching/activities, standing tolerance:  Standing lumbar extensions  Standing lateral leaning stretch  Standing quad stretch (UE support for balance)      Thu completed dynamic reaching in various functional ranges, with continued focus on hip rotation/weight shifting, 12 reps x no added lbs, rated as Sort of hard (4/10) exertion with standing and seated rest breaks PRN with cue to self-administer rest breaks.    Thu participated in functional lift waist to shoulder, 15 reps total x 5 lbs with a rating of Moderate (3/10) exertion. Thu educated on standing posture, core awareness, keeping shoulders depressed and retracted, stepping to place > reaching to place, keeping weight close to center of gravity and pacing as needed.    Thu completed functional lifting, floor to waist, 6 reps x 6 lbs, then 3 reps with 9 lbs with exertion rated Sort of hard (4/10); focused education on keeping weight into heels.     No environmental, cultural, spiritual, developmental or education needs expressed or noted.    Patient Education and Home Exercises     Education provided:   - Progress towards goals   - proper posture and body mechanics.  - Functional pain scale  - DONNA rate of perceived exertion scale.   - importance of completion of exercises and activities outside of session/FRP to attain goals.      Written Home Exercises Provided: Patient instructed to cont prior HEP.  Exercises were reviewed and Thu was able to demonstrate them prior to the end of the session.  Thu demonstrated good  understanding of the education provided.     See EMR under Patient Instructions for exercises  "provided during therapy sessions. FRP binder provided day one of cohort.    Assessment     Ms. Hutton engaged and endorsed excitement at successful trip to [place] this  weekend past, with application of FRP tools, pt noted improved pain management and activity tolerance. Of note, Thu continues to have difficulty with sleep, stating she hasn't slept "for a few days" - but stated that her overall quality of life has improved. Shellyu also asked for printed handouts and videos for educational activities this date, stating that it helps her "short term memory" to have visual aids.     Overall, Thu is progressing slowly towards her goals and status of goals can be seen below. Patient's prognosis is Good.     Thu would continue to benefit from skilled outpatient occupational therapy to address the deficits listed in the problem list on initial evaluation, provide patient education, and to maximize patient's level of independence in the home and community environment.     Anticipated barriers to occupational therapy: psychosocial demands, co morbidities    Goals:     SHORT Term goals: In 3 weeks, patient will:  Status of goal: Reviewed 5/24/2023   Implements correct use of breathing techniques during functional lifting tasks, with minimal cues needed.  In Progress     Utilizes DONNA rate of exertion scale to pace performance with functional lifting tasks, and implements self-care strategies during activity participation to manage pain. In Progress     Verbalize understanding of energy conservation and pacing strategies during daily habits, roles, and routines in order to improve tolerance for work and home demands.  In Progress     Completes 5x sit to stand test in 20 seconds or less to improve ability to participate in community mobility.   In Progress     Demonstrate increased positional tolerance to the level needed for work, home, and community activities (standing in cue at social event, managing supplies for outing, " streneous IADL), as evidenced by having a METS level of 4. In Progress     Demonstrate proper body mechanics with functional lifting tasks (floor to waist, waist to shoulder, maximum lift, and box carry), via teach back method with minimal cues needed. In Progress      Demonstrate increased functional strength by lifting 13 lbs waist to shoulder for management of (I)ADL, including dressing and grooming, placing items in kitchen cabinets, folding towels, and/or managing suitcase when traveling.   In Progress     Demonstrate increased functional strength by lifting 20 lbs floor to waist to meet demand of work/home routine, including lifting groceries, managing laundry, and/or lifting supplies for outdoor activities.   In Progress     Tolerate Home Activity Plan (HAP)/ Home Exercise Program (HEP) to promote safety and independence with ADL, with report of completion of at least 2 out of 4 days outside of program participation.  In Progress   Show improved perception of own abilities as evidenced by increase of FOTO scores to established MDC value and perception of performance ratings regarding personal long term goals.  In Progress     Pt will verbalize and demonstrate increased water intake to goal level In Progress            MIDTERM goals: In 9 weeks, patient will: Status of goal:   Report implementation of application of mindfulness, stretching, and other coping strategies for improved participation in daily routine. In Progress     Verbalize functional application of lifting techniques in daily life (golfers lift, floor to waist, waist to shoulder). In Progress      Completes 5x sit to stand test in 17 seconds or less to improve ability to participate in community mobility.  In Progress      Applies functional application of lifting techniques (golfer's lift, floor to waist, waist to shoulder) in activities of daily life, per patient report.  In Progress      Demonstrate physical capacities consistent with a  "Light-Medium (#35 max, frequent lifting/carrying #20, 3 METS)  demand level, as needed to perform activities to be successful in roles of life, regarding Household Management and Community Participation. In Progress      Have an improvement of 3 points in 2/5 personal goals in rating of  performance.  In Progress      Show improved perception of own abilities as evidenced by increase of FOTO scores to established MC II value and perception of performance ratings regarding personal long term goals.  In Progress           LONG term goals = Patient Specific Goals:  Vocational: Chores, Daily tasks, playing with family   Recreational : sports, outdoor activities, walks   Daily Living Activities: cooking, cleaning, sports, one day go back to work      Measured by patient's self-reported performance and satisfaction of personal FRP goals, listed above in subjective section.   Total Score = sum of the activity performance scores / number of activities  Minimum detectable change (90%CI) for average score = 2 points  Minimum detectable change (90%CI) for single activity score  = 3 points    Plan     Continue per plan of care.     Updates/Grading for next session:  functional lifts, pacing, water intake, schedule to involve time for breakfast, follow up on podcast "in between us"     Noy Storey LOTR   5/30/2023             "

## 2023-05-30 NOTE — PROGRESS NOTES
"[facility]  Functional Restoration Program  Occupational Therapy Note  FRP day 11  MRN:[id number]    Name: Dina Hutton  MRN:[id number]  Encounter Date: 5/31/2023    Diagnosis:   Encounter Diagnoses   Name Primary?    Pain aggravated by activities of daily living Yes    Decreased activities of daily living (ADL)     Decreased  strength     Decreased functional activity tolerance     Fear of pain      Referring provider: Sharona Neumann NP    Physician Orders: eval and treat; [facility] [place]  Medical Diagnosis:  M79.7 (ICD-10-CM) - Fibromyalgia  R26.9 (ICD-10-CM) - Gait disturbance  E89.0 (ICD-10-CM) - Postsurgical hypothyroidism   Evaluation Date: 3/21/2023  Insurance Authorization Period Expiration: 12/31/2023  Plan of Care Certification Period: 8/4/2023  Visit # / Visits authorized: 11/30 (plus eval)   FOTO completed: 3/3     Precautions:  Standard and Fall, light sensitivity    Time In: 1445  Time Out: 1600  Total Billable Time: 75 minutes    SUBJECTIVE     She reports: "its hard for me to be comfortable with my dark stuff, that's why I stopped going to therapy. I don't like crying. But maybe I should go back now." Extensive conversation on impact of not giving space to psychosocial stressors on physical presentation of pain and tension while walking on treadmill.     Thu was compliant with parts of home exercise program given previously. Still practicing pacing and deep breathing. Attempted to complete entire stretch routine on Tuesday morning, but was limited by onset of tension and pain in neck/shoulders. Encouragement from therapist to trial using mindfulness (ex. Lorenzo chi) to enter stretch routine, to assist in de-escalating physical tension.     Response to previous treatment: Ms. Hutton noted: improved energy and tolerance for ADLs, improved activity tolerance     Functional change: improved energy and tolerance for ADLs; implementing mantra "its ok to not be ok" -  noticing "you seem happier" and " her having more energy for activities, walking further distances         Pain:  Current: 5/10  Location: arms, elbow, feet, hands (worse in index fingers), head, jaw, shoulders, wrists, and neck, and scalp.    Patient Specific Activities based on Personal goals:  Activity  Performance  5/8/2023  Satisfaction    Performance  5/24/2023  Satisfaction   Meal prep and cooking.  5  3-4 6 5   2.  Getting on the ground to play with son.  1  0 4 4   3.  Participating in outdoor activities w family (Standing)  3  0 2 2   4.  Carrying/Lifting groceries, and putting them in cabinets/pantry.  7  6 3 5   5.  laundry (bending over, lifting wet laundry)  4  4 5 5             Total Score  4 - 4       Patient Specific Activity Scoring Scheme for Performance     0        1        2        3        4        5        6        7        8        9        10     Unable                                                                                     Able to perform  To perform                                                                              activity at same  Activity                                                                                    level as before                                                                        Objective   Measures updated at progress note, unless otherwise noted.     Refer to progress note 5/24/2023 for details.      Treatment     Refer to [facility] protocol for details and explanation of each activity.      Thu received the treatments listed below:      Therapeutic activities and functional lifting activities in order to increase participation in, endurance for, and performance with vocational, selfcare ADLs, and leisure activities in order to improve quality of life, for 75 minutes, including:    Thu completed dynamic reaching in various functional ranges, with continued focus on hip rotation/weight shifting, 12 reps x no added lbs, rated as Sort of hard (4/10) exertion with standing and  seated rest breaks PRN with cue to self-administer rest breaks.    Thu participated in functional lift waist to shoulder, 15 reps total x 5 lbs with a rating of Moderate (3/10) exertion. Thu educated on standing posture, core awareness, keeping shoulders depressed and retracted, stepping to place > reaching to place, keeping weight close to center of gravity and pacing as needed.    Thu completed functional lifting, floor to waist, 6 reps x 6 lbs, then 3 reps with 9 lbs with exertion rated Sort of hard (4/10); focused education on keeping weight into heels.     No environmental, cultural, spiritual, developmental or education needs expressed or noted.    Patient Education and Home Exercises     Education provided:   - Progress towards goals   - proper posture and body mechanics.  - Functional pain scale  - DONNA rate of perceived exertion scale.   - importance of completion of exercises and activities outside of session/FRP to attain goals.      Written Home Exercises Provided: Patient instructed to cont prior HEP.  Exercises were reviewed and Thu was able to demonstrate them prior to the end of the session.  Thu demonstrated good  understanding of the education provided.     See EMR under Patient Instructions for exercises provided during therapy sessions. FRP binder provided day one of cohort.    Assessment     Ms. Hutton tolerated today's session well, with improved engagement and compliance. Significant improvement in postural awareness and noted mind-body connection with reduced heightened emotional state while on treadmill, endorsing improved ease of breath and physical endurance. Of note, pt shared that she avoids emotional release 2* cultural and familial traditions, though was able to recognize improved ease of movement and decreased physical pain/tension after emotional release, tolerating over 30 min on treadmill (including rest break at minute 10).  Thu continues to progress towards goals, improved  intrinsic motivation noted throughout session and with carryover of education and application of FRP tools.     Overall, Thu is progressing slowly towards her goals and status of goals can be seen below. Patient's prognosis is Good.     Thu would continue to benefit from skilled outpatient occupational therapy to address the deficits listed in the problem list on initial evaluation, provide patient education, and to maximize patient's level of independence in the home and community environment.     Anticipated barriers to occupational therapy: psychosocial demands, co morbidities    Goals:     SHORT Term goals: In 3 weeks, patient will:  Status of goal: Reviewed 5/24/2023   Implements correct use of breathing techniques during functional lifting tasks, with minimal cues needed.  In Progress     Utilizes DONNA rate of exertion scale to pace performance with functional lifting tasks, and implements self-care strategies during activity participation to manage pain. In Progress     Verbalize understanding of energy conservation and pacing strategies during daily habits, roles, and routines in order to improve tolerance for work and home demands.  In Progress     Completes 5x sit to stand test in 20 seconds or less to improve ability to participate in community mobility.   In Progress     Demonstrate increased positional tolerance to the level needed for work, home, and community activities (standing in cue at social event, managing supplies for outing, streneous IADL), as evidenced by having a METS level of 4. In Progress     Demonstrate proper body mechanics with functional lifting tasks (floor to waist, waist to shoulder, maximum lift, and box carry), via teach back method with minimal cues needed. In Progress      Demonstrate increased functional strength by lifting 13 lbs waist to shoulder for management of (I)ADL, including dressing and grooming, placing items in kitchen cabinets, folding towels, and/or managing  suitcase when traveling.   In Progress     Demonstrate increased functional strength by lifting 20 lbs floor to waist to meet demand of work/home routine, including lifting groceries, managing laundry, and/or lifting supplies for outdoor activities.   In Progress     Tolerate Home Activity Plan (HAP)/ Home Exercise Program (HEP) to promote safety and independence with ADL, with report of completion of at least 2 out of 4 days outside of program participation.  In Progress   Show improved perception of own abilities as evidenced by increase of FOTO scores to established MDC value and perception of performance ratings regarding personal long term goals.  In Progress     Pt will verbalize and demonstrate increased water intake to goal level In Progress            MIDTERM goals: In 9 weeks, patient will: Status of goal:   Report implementation of application of mindfulness, stretching, and other coping strategies for improved participation in daily routine. In Progress     Verbalize functional application of lifting techniques in daily life (golfers lift, floor to waist, waist to shoulder). In Progress      Completes 5x sit to stand test in 17 seconds or less to improve ability to participate in community mobility.  In Progress      Applies functional application of lifting techniques (golfer's lift, floor to waist, waist to shoulder) in activities of daily life, per patient report.  In Progress      Demonstrate physical capacities consistent with a Light-Medium (#35 max, frequent lifting/carrying #20, 3 METS)  demand level, as needed to perform activities to be successful in roles of life, regarding Household Management and Community Participation. In Progress      Have an improvement of 3 points in 2/5 personal goals in rating of  performance.  In Progress      Show improved perception of own abilities as evidenced by increase of FOTO scores to established MC II value and perception of performance ratings regarding  "personal long term goals.  In Progress           LONG term goals = Patient Specific Goals:  Vocational: Chores, Daily tasks, playing with family   Recreational : sports, outdoor activities, walks   Daily Living Activities: cooking, cleaning, sports, one day go back to work      Measured by patient's self-reported performance and satisfaction of personal FRP goals, listed above in subjective section.   Total Score = sum of the activity performance scores / number of activities  Minimum detectable change (90%CI) for average score = 2 points  Minimum detectable change (90%CI) for single activity score  = 3 points    Plan     Continue per plan of care.     Updates/Grading for next session:  functional lifts, pacing, water intake, schedule to involve time for breakfast, follow up on podcast "in between us"     JACKELYN Henley   5/31/2023               "

## 2023-05-31 ENCOUNTER — CLINICAL SUPPORT (OUTPATIENT)
Dept: REHABILITATION | Facility: OTHER | Age: 38
End: 2023-05-31
Payer: COMMERCIAL

## 2023-05-31 ENCOUNTER — OFFICE VISIT (OUTPATIENT)
Dept: PSYCHIATRY | Facility: OTHER | Age: 38
End: 2023-05-31
Payer: COMMERCIAL

## 2023-05-31 DIAGNOSIS — R68.89 DECREASED FUNCTIONAL ACTIVITY TOLERANCE: ICD-10-CM

## 2023-05-31 DIAGNOSIS — R29.898 DECREASED GRIP STRENGTH: ICD-10-CM

## 2023-05-31 DIAGNOSIS — M79.7 FIBROMYALGIA: ICD-10-CM

## 2023-05-31 DIAGNOSIS — R52 PAIN AGGRAVATED BY ACTIVITIES OF DAILY LIVING: Primary | ICD-10-CM

## 2023-05-31 DIAGNOSIS — R26.89 POOR BALANCE: ICD-10-CM

## 2023-05-31 DIAGNOSIS — F40.298 FEAR OF PAIN: ICD-10-CM

## 2023-05-31 DIAGNOSIS — G89.29 CENTRAL SENSITIZATION TO PAIN: Primary | ICD-10-CM

## 2023-05-31 DIAGNOSIS — F43.29 ADJUSTMENT DISORDER WITH PHYSICAL COMPLAINTS: Primary | ICD-10-CM

## 2023-05-31 DIAGNOSIS — Z78.9 DECREASED ACTIVITIES OF DAILY LIVING (ADL): ICD-10-CM

## 2023-05-31 DIAGNOSIS — R26.9 GAIT DISTURBANCE: ICD-10-CM

## 2023-05-31 PROCEDURE — 97110 THERAPEUTIC EXERCISES: CPT

## 2023-05-31 PROCEDURE — 97112 NEUROMUSCULAR REEDUCATION: CPT

## 2023-05-31 PROCEDURE — 97530 THERAPEUTIC ACTIVITIES: CPT

## 2023-05-31 PROCEDURE — 90853 PR GROUP PSYCHOTHERAPY: ICD-10-PCS | Mod: ,,, | Performed by: SOCIAL WORKER

## 2023-05-31 PROCEDURE — 90853 GROUP PSYCHOTHERAPY: CPT | Mod: ,,, | Performed by: SOCIAL WORKER

## 2023-05-31 NOTE — PROGRESS NOTES
"[facility]    Functional Restoration Program  Physical Therapy Treatment Note  FRP Day 11    Name: Dina Htuton  MRN:[id number]      Therapy Diagnosis:   Encounter Diagnoses   Name Primary?    Central sensitization to pain Yes    Fibromyalgia     Gait disturbance     Poor balance          Physician: Sharona Neumann NP    Date: 5/31/2023    Physician Orders: PT Eval and Treat   Medical Diagnosis from Referral:   M79.7 (ICD-10-CM) - Fibromyalgia   R26.9 (ICD-10-CM) - Gait disturbance   E89.0 (ICD-10-CM) - Postsurgical hypothyroidism      Evaluation Date: 3/21/2023  Authorization Period Expiration: 12/31/2023  Plan of Care Expiration: 6/30/2023  Visit # / Visits authorized: 10/ 30  FOTO: 3/ 3       Time In: 1:30 pm  Time Out: 2:45 pm  Total Billable Time: 75 minutes    SUBJECTIVE     Thu report ability to perform all household activities yesterday and employing pacing during cooking and lifting due to inc BUE, shoulder discomfort /p last session.  Patient feels she may have "overdone it with my arms", however, patient note no issues /c BLE.  Despite BUE discomfort, patient reports good personal feeling having completed full complement of resistance exercises last session.     Patient report attempting using diaphragmatic breathing to assist sleep but notes "my brain is too busy".     Patient report obtaining gym membership with her , but no plan yet on when to attend.    Patient report tolerating last tx session fair /c no c/o of excess discomfort.  Patient agree to tx today.         Patient's FRP goals: play w/ kids more, do home tasks more easily,        Current 5/10  Location(s): B hands, B shld, neck, B feet    OBJECTIVE      Objective Measures updated at progress report unless specified.     Limitation/Restriction for FOTO Neck Survey     Therapist reviewed FOTO scores for Dina Hutton on 5/8/2023.   FOTO documents entered into [employer] - see Media section.     Limitation Score " 3/21/2023: 54%  Limitation Score 5/8/2023: 56%  Limitation Score 05/24/23: 52%     Predicted Score: 41%           Treatment       Thu received the Individualized Treatments listed below:     Thu participated in dynamic functional therapeutic activities to improve functional performance for 45 minutes, including:      Full body functional mobility w/ 3-10 sec hold technique &/or 3-5 repetition technique to improve functional performance. In order to:  Improve driving safety, visual & spatial awareness:  Cervical flx/ext  Cervical side bending  Cervical rotation  Cervical protraction/retraction  Improve lifting mechanics, showering/bathing, dressing, cooking, reaching, pushing & fine motor skills  Shld rolls  Shld depression/elevation  Scapular retraction/protraction/scaption  Posterior shld stretch (cross arm)  Shld ER stretch (chicken wing)  Elbow flx/ext  Forearm supination/pronation  Wrist flx/ext  Open/close hands/fingers  Improve bed mobility & rolling, bending over, cooking & cleaning:  Seated trunk rotations  Seated trunk/thoracic ext/flx  Improve functional gait, squatting, sitting tolerance, functional balance:   Seated marches & knee to chest  Seated knee flx/ext  Seated hip ER/piriformis stretch  Seated hamstring stretch  Seated toe raise to heel raise   Seated ankle circles each way  Improve overhead reaching/activities, standing tolerance:  Standing lumbar extensions  Standing lateral leaning stretch  Standing quad stretch (UE support for balance)    Supine diaphragmatic breathing:  10 min   Cued for awareness of pulling breath down away from mouth to hips  Patient difficulty demonstrate abdominal excursion /c min chest rise  Patient verbalize time when she applied breathing to address stress/anxiety   Patient unable to verbalize exhale/inhale parasympathetic/sympathetic relationship   Performed supine and seated    Patient view handout for exercise identification and body part use.   -patient took  pictures of BATCA  -patient reference BATCA poster for identification     NP:   TM 1.5 - 1.7mph 12 min, cues for increase and even step lengths and heel strike -   PRE 3/10      Thu received therapeutic exercises to develop strength, endurance, ROM, flexibility, posture, and core stabilization for 15 minutes including:    NuStep for endurance level 1 817 steps @ 10 min 4/10 RPE     Standing shoulder pendulums for B shoulder ROM    2 min each arm       Thu participated in neuromuscular re-education activities to improve: Balance, Coordination, Kinesthetic, Sense, Proprioception, and Posture for 15 minutes. The following activities were included:      Supine over rolled yoga mat - postural positioning   Positional release /c diaphragmatic breaths /c self tactile cue on abdomen 5 min   B shld flx 3 lb bar x 10, x 10 unweighted for inc ROM   B serratus punches x 10 unweighted   B snow angels 2x10      Peripheral muscle strengthening which included 1-2 sets of 10-20 repetitions at a slow, controlled 5-7 second per rep pace focused on strengthening supporting musculature for improved body mechanics & functional mobility.  Patient & therapist focused on proper form & speed during treatment to ensure optimal strengthening of each targeted muscle group & neuromuscular re-education. Patients are instructed to keep sets/reps with proper load to stay at 3-5 out of 10 on the provided modified Rosendo exertion scale.    [employer] Machine Exercises Weight (lbs) Sets Repetitions Rosendo Exertion Scale Rating   Leg Extension  7.5  15 5   Hamstring Curls 22.5  18 5   Chest Press 15  15 4   Pec Fly 25  16 5   Lat Pull Down 25  15 5   Mid Row 25  20 5   Bicep Bar Curls 7.5  15 5   Standing Tricep Extension 7.5  20 5   Single Leg Press R/L 35  17 5       Patient Education and Home Exercises     Home Exercises Provided and Patient Education Provided     Education provided:   - FRP Educational Topics: Modified Rosendo Exertion Scale, Central  "Sensitization , and Posture & Body Mechanics    Written Home Exercises Provided: Patient instructed to cont prior HEP. Exercises were reviewed and Thu was able to demonstrate them prior to the end of the session.  Thu demonstrated good  understanding of the education provided. See EMR under Patient Instructions for information provided during therapy sessions    ASSESSMENT     Patient subjective report indicating improved "boom/bust" awareness /c improved activity tolerance.  Also, application of pacing to continue functional activities despite moment of inc UE discomfort indicate assimilation of FRP principles.  Despite note of improved activity tolerance at home, throughout tx patient continue to reference on BUE discomfort leading to limitations in tx tolerance including deferring resistance training.  However, patient able to reference diaphragmatic breathing as a tool to dec her anxiousness regarding sx presentation indicating good understanding of controlled breathing and progressing in associated goal.  But needs continue practice for improved motor control for dec chest excursion.    Considering patient new gym membership, tx time spent discussing exercise identification and reviewing exercise basics.  Patient able to describe via pictures primary resistance machines and body regions being worked out thereby meeting associated goal. Patient encouraged to visit gym over the weekend to establish understanding of her gym's offerings.          Thu Is progressing fair towards her goals.   Pt prognosis is Good.     Pt will continue to benefit from skilled outpatient physical therapy to address the deficits listed in the problem list box on initial evaluation, provide pt/family education and to maximize pt's level of independence in the home and community environment.     Pt's spiritual, cultural and educational needs considered and pt agreeable to plan of care and goals.     Anticipated barriers to physical " "therapy: chronic nature of issues, psychosocial factors    GOALS: Pt is in agreement with the following goals:  Goal Progress Towards Goal   SHORT Term: In 3 weeks, pt will:     Verbalize & demonstrate good understanding of resisted training with 3-1-3 second rep for znxwioyrzz-qoyajsoeg-arwoqekxr contraction to encourage full muscle recruitment for strength & endurance. Goal met 5/11/2023   Verbalize & demonstrate good understanding of home stretch routine to improve AROM, help decrease pain & improve mobility. MET 05/24/23   Demonstrate proper supine or seated diaphragmatic breathing with decreased chest excursion & emphasis on full abdominal excursion & increased expiration time to promote muscle relaxation & increased parasympathetic activity to improve patient tolerance to activities. Progressing 05/24/23, 05/31/23   Verbalize good understanding of importance of proper posture in resisted exercise, functional activities & ADL's in order to help reduce postural strain, promote proper breathing. Progressing 05/24/23   Demonstrate good understanding of full body resisted exercises w/ use of pictures &/or verbal cues to promote independence w/ exercise at the end of the program. MET 05/31/23   Verbalize plan for continuing resisted exercises w/ specific location (i.e. commercial gym, home, community fitness center)  to incorporate all major muscle groups to maintain & progress therapeutic functional improvements. Progressing 05/24/23   Verbalize difference between  "hurt vs harm"  to demonstrate understanding of fear avoidance in pain cycle.  MET 05/24/23         Midterm: In 8 weeks, pt will:     Verbalize good understanding of activities planning/scheduling (stretching, mindfulness, resisted training, & cardio) prescribed by PT/OT following the end of the program to sustain & progress functional improvements. Progressing 05/24/23   Perform selected resisted training w/ minimal to no cuing in therapy session to be " independent w/ resisted strengthening. Progressing 05/24/23   Demonstrate improved symptom self-management w/ posture/positioning and mechanical movements and/or implementation of appropriate modalities throughout a typical day.  Progressing 05/24/23   Demonstrate independence w/ home stretch routine to improve AROM, help decrease pain & improve mobility. Progressing 05/24/23         Long Term: In 12 weeks, pt will:     Perform selected cardio & resisted training independently to continue safe regular performance of daily exercise. Progressing 05/24/23   Improve 2 Minute Walk Test (MWT) to 425 feet and 6 MWT to 1275 feet or greater to improve gait speed and mobility. Progressing 05/24/23   Perform single leg stance 10 seconds or greater bilaterally to improve safety and balance in ADLs. Progressing 05/24/23   Demonstrate Timed Up & Go (with appropriate assistive device, if necessary) of 10 seconds or less to decrease fall risk and improve functional mobility. Progressing 05/24/23   Score 41 % or less on Neck FOTO to indicate significant functional improvements in impaired functions secondary to pain. Progressing 05/24/23        PLAN     Continue PT per JOY Rizvi, PT

## 2023-06-02 ENCOUNTER — DOCUMENTATION ONLY (OUTPATIENT)
Dept: REHABILITATION | Facility: OTHER | Age: 38
End: 2023-06-02
Payer: COMMERCIAL

## 2023-06-02 NOTE — PROGRESS NOTES
Group Psychotherapy     Site: [facility]     Clinical status of patient: Outpatient     05/31/2023     Length of service:[id number] min     Referred by: Functional Restoration Program      Number of patients in attendance: 6     Target symptoms: CBT-Nutrition     Patient's response to intervention:  The patient's response to intervention is active listening.     Progress toward goals and other mental status changes:  The patient's progress toward goals is fair      Plan: group psychotherapy     Topics Covered: Pts learned about the psychology of food. Why we choose the foods that we do and how those choices impact our physical and emotional well-being. Pts made a connection between the types of food and how it impacts chronic pain both in a healthy and unhealthy way.  It was stressed that there are no bad foods but being intentional about what and why we are choosing to eat is important. Pts learned the difference between physical hunger and emotional hunger. Pts discussed strategies for making healthier food more accessible.

## 2023-06-02 NOTE — PROGRESS NOTES
Group Psychotherapy    Site: [facility]    Clinical status of patient: Outpatient    5/29/2023    Length of service:[id number]    Referred by: [facility]     Number of patients in attendance: 6    Target symptoms: Chronic Pain Management     Patient's response to intervention:  The patient's response to intervention is active listening.    Progress toward goals and other mental status changes:  The patient's progress toward goals is fair .    Plan: group psychotherapy    Topics Covered: Pt attended CBT for Chronic Pain as part of their participation in [facility]. Topics discussed today included a discussion on what they have learned thus far in the program. Pts are more than long term through, so we discussed things that they wanted to make sure they learned/things to make sure we talked about before the program ends. Pts discussed a need to understand more about chronic pain, hurt vs. Harm, boundary setting, what expectations are after the program, how to manage pain on bad days or days when anxiety or depression is high. Discussed coping skills to implement, discussed how mood disorders impact pain. Will f/u in 2 days.

## 2023-06-02 NOTE — PROGRESS NOTES
Group Psychotherapy    Site: [facility], [place], [facility] by [facility]    Clinical status of patient: Outpatient    5/24/2023    Length of service:[id number]    Referred by: [facility]     Number of patients in attendance: 7    Target symptoms: Chronic Pain Management     Patient's response to intervention:  The patient's response to intervention is active listening.    Progress toward goals and other mental status changes:  The patient's progress toward goals is good.    Plan: group psychotherapy    Topics Covered: Pt attended CBT for Chronic Pain as part of their participation in [facility]. Topics discussed today included a discussion on boundary setting in the setting of mental health and chronic pain. Discussed how boundary setting can impact and is impacted by anxiety, depression, other mood disorders and chronic pain. Pts discussed what changes they'd like to make in their boundary setting, will f/u in 2 days.

## 2023-06-02 NOTE — PROGRESS NOTES
Group Psychotherapy     Site: [facility]     Clinical status of patient: Outpatient     5/26/2023     Length of service:[id number] min     Referred by: [facility]      Number of patients in attendance: 6     Target symptoms: Nutrition Lecture #2 and Inflammation      Patient's response to intervention:  The patient's response to intervention is active listening.     Progress toward goals and other mental status changes:  The patient's progress toward goals is fair      Plan: group psychotherapy     Topics Covered: Pts discussed the importance of nutrition and how what we eat impacts chronic pain. Also discussed when and why we are eating. Are we truly physically hungry or is it an emotional response/trigger?  Emphasis on the different types anti-inflammatory foods. Pts were shown alternative food choices to ones that are high in sugar and unhealthy oils.

## 2023-06-05 ENCOUNTER — OFFICE VISIT (OUTPATIENT)
Dept: PSYCHIATRY | Facility: OTHER | Age: 38
End: 2023-06-05
Payer: COMMERCIAL

## 2023-06-05 ENCOUNTER — CLINICAL SUPPORT (OUTPATIENT)
Dept: REHABILITATION | Facility: OTHER | Age: 38
End: 2023-06-05
Payer: COMMERCIAL

## 2023-06-05 ENCOUNTER — PATIENT MESSAGE (OUTPATIENT)
Dept: OBSTETRICS AND GYNECOLOGY | Facility: CLINIC | Age: 38
End: 2023-06-05
Payer: COMMERCIAL

## 2023-06-05 DIAGNOSIS — Z78.9 DECREASED ACTIVITIES OF DAILY LIVING (ADL): ICD-10-CM

## 2023-06-05 DIAGNOSIS — G89.29 CENTRAL SENSITIZATION TO PAIN: Primary | ICD-10-CM

## 2023-06-05 DIAGNOSIS — F40.298 FEAR OF PAIN: ICD-10-CM

## 2023-06-05 DIAGNOSIS — R26.89 POOR BALANCE: ICD-10-CM

## 2023-06-05 DIAGNOSIS — R29.898 DECREASED GRIP STRENGTH: ICD-10-CM

## 2023-06-05 DIAGNOSIS — F43.29 ADJUSTMENT DISORDER WITH PHYSICAL COMPLAINTS: Primary | ICD-10-CM

## 2023-06-05 DIAGNOSIS — R26.9 GAIT DISTURBANCE: ICD-10-CM

## 2023-06-05 DIAGNOSIS — R68.89 DECREASED FUNCTIONAL ACTIVITY TOLERANCE: ICD-10-CM

## 2023-06-05 DIAGNOSIS — R52 PAIN AGGRAVATED BY ACTIVITIES OF DAILY LIVING: Primary | ICD-10-CM

## 2023-06-05 DIAGNOSIS — M79.7 FIBROMYALGIA: ICD-10-CM

## 2023-06-05 PROCEDURE — 90853 GROUP PSYCHOTHERAPY: CPT | Mod: ,,, | Performed by: SOCIAL WORKER

## 2023-06-05 PROCEDURE — 90853 PR GROUP PSYCHOTHERAPY: ICD-10-PCS | Mod: ,,, | Performed by: SOCIAL WORKER

## 2023-06-05 PROCEDURE — 97112 NEUROMUSCULAR REEDUCATION: CPT | Mod: CQ

## 2023-06-05 PROCEDURE — 97530 THERAPEUTIC ACTIVITIES: CPT | Mod: CQ

## 2023-06-05 PROCEDURE — 97530 THERAPEUTIC ACTIVITIES: CPT

## 2023-06-05 NOTE — PROGRESS NOTES
[facility]    Functional [facility]oration Program  Physical Therapy Treatment Note  FRP Day 13    Name: Dina Hutton  MRN:[id number]      Therapy Diagnosis:   Encounter Diagnoses   Name Primary?    Central sensitization to pain Yes    Fibromyalgia     Gait disturbance     Poor balance          Physician: Sharona Neumann NP    Date: 6/5/2023    Physician Orders: PT Eval and Treat   Medical Diagnosis from Referral:   M79.7 (ICD-10-CM) - Fibromyalgia   R26.9 (ICD-10-CM) - Gait disturbance   E89.0 (ICD-10-CM) - Postsurgical hypothyroidism      Evaluation Date: 3/21/2023  Authorization Period Expiration: 12/31/2023  Plan of Care Expiration: 6/30/2023  Visit # / Visits authorized: 11/ 30  FOTO: 3/ 3       Time In: 1:30 pm  Time Out: 2:45 pm  Total Billable Time: 75 minutes    SUBJECTIVE     Thu report feeling very good today and having a good weekend.  She reports hearing us talk to her in her head about her posture and walking, lifting and pacing and setting boundaries. She says its a good thing and she is doing more for herself in a good way. Pt states she did overdo it a bit with laundry but took breaks and rests in between loads. Thu still has difficulty sleeping, she says because her mind is busy.       Patient's FRP goals: play w/ kids more, do home tasks more easily,        Current 2-3/10  Location(s): B hands, B shld, neck, B feet    OBJECTIVE      Objective Measures updated at progress report unless specified.     Limitation/Restriction for FOTO Neck Survey     Therapist reviewed FOTO scores for Dina Hutton on 5/8/2023.   FOTO documents entered into [employer] - see Media section.     Limitation Score 3/21/2023: 54%  Limitation Score 5/8/2023: 56%  Limitation Score 05/24/23: 52%     Predicted Score: 41%           Treatment       Thu received the Individualized Treatments listed below:     Shellyu participated in dynamic functional therapeutic activities to improve functional performance for 30  minutes, including:      Full body functional mobility w/ 3-10 sec hold technique &/or 3-5 repetition technique to improve functional performance. In order to:  Improve driving safety, visual & spatial awareness:  Cervical flx/ext  Cervical side bending  Cervical rotation  Cervical protraction/retraction  Improve lifting mechanics, showering/bathing, dressing, cooking, reaching, pushing & fine motor skills  Shld rolls  Shld depression/elevation  Scapular retraction/protraction/scaption  Posterior shld stretch (cross arm)  Shld ER stretch (chicken wing)  Elbow flx/ext  Forearm supination/pronation  Wrist flx/ext  Open/close hands/fingers  Improve bed mobility & rolling, bending over, cooking & cleaning:  Seated trunk rotations  Seated trunk/thoracic ext/flx  Improve functional gait, squatting, sitting tolerance, functional balance:   Seated marches & knee to chest  Seated knee flx/ext  Seated hip ER/piriformis stretch  Seated hamstring stretch  Seated toe raise to heel raise   Seated ankle circles each way  Improve overhead reaching/activities, standing tolerance:  Standing lumbar extensions  Standing lateral leaning stretch  Standing quad stretch (UE support for balance)      TM  1.7mph 12 min, cues for increase and even step lengths and heel strike -   PRE 3/10      Thu received therapeutic exercises to develop strength, endurance, ROM, flexibility, posture, and core stabilization for  minutes including:    Thu participated in neuromuscular re-education activities to improve: Balance, Coordination, Kinesthetic, Sense, Proprioception, and Posture for 40 minutes. The following activities were included:      Peripheral muscle strengthening which included 1-2 sets of 10-20 repetitions at a slow, controlled 5-7 second per rep pace focused on strengthening supporting musculature for improved body mechanics & functional mobility.  Patient & therapist focused on proper form & speed during treatment to ensure optimal  "strengthening of each targeted muscle group & neuromuscular re-education. Patients are instructed to keep sets/reps with proper load to stay at 3-5 out of 10 on the provided modified Rosendo exertion scale.    BATCA Machine Exercises Weight (lbs) Sets Repetitions Rosendo Exertion Scale Rating   Leg Extension  7.5  15 5   Hamstring Curls 22.5  18 5   Chest Press 15  15 4   Pec Fly 25  16 5   Lat Pull Down 25  15 5   Mid Row 25  20 5   Bicep Bar Curls 7.5  15 5   Standing Tricep Extension 7.5  20 5   Single Leg Press R/L 35  17 5       Patient Education and Home Exercises     Home Exercises Provided and Patient Education Provided     Education provided:   - FRP Educational Topics: Modified Rosendo Exertion Scale, Central Sensitization , and Posture & Body Mechanics    Written Home Exercises Provided: Patient instructed to cont prior HEP. Exercises were reviewed and Thu was able to demonstrate them prior to the end of the session.  Thu demonstrated good  understanding of the education provided. See EMR under Patient Instructions for information provided during therapy sessions    ASSESSMENT     Thu participated great in PT today and super excited about how she feels today and what she accomplished over the weekend. Pt needed to be reminded of "boom and bust" and to cont to listen to her body during exercises today.  Pt showing improvements in self efficacy and showing good results from learning to pace and set boundaries. Cont  to educate on diaphragmatic breathing and mindfulness.      Thu Is progressing fair towards her goals.   Pt prognosis is Good.     Pt will continue to benefit from skilled outpatient physical therapy to address the deficits listed in the problem list box on initial evaluation, provide pt/family education and to maximize pt's level of independence in the home and community environment.     Pt's spiritual, cultural and educational needs considered and pt agreeable to plan of care and goals.     Anticipated " "barriers to physical therapy: chronic nature of issues, psychosocial factors    GOALS: Pt is in agreement with the following goals:  Goal Progress Towards Goal   SHORT Term: In 3 weeks, pt will:     Verbalize & demonstrate good understanding of resisted training with 3-1-3 second rep for [id number]-[id number] contraction to encourage full muscle recruitment for strength & endurance. Goal met 5/11/2023   Verbalize & demonstrate good understanding of home stretch routine to improve AROM, help decrease pain & improve mobility. MET 05/24/23   Demonstrate proper supine or seated diaphragmatic breathing with decreased chest excursion & emphasis on full abdominal excursion & increased expiration time to promote muscle relaxation & increased parasympathetic activity to improve patient tolerance to activities. Progressing 05/24/23, 05/31/23   Verbalize good understanding of importance of proper posture in resisted exercise, functional activities & ADL's in order to help reduce postural strain, promote proper breathing. Progressing 05/24/23   Demonstrate good understanding of full body resisted exercises w/ use of pictures &/or verbal cues to promote independence w/ exercise at the end of the program. MET 05/31/23   Verbalize plan for continuing resisted exercises w/ specific location (i.e. commercial gym, home, community fitness center)  to incorporate all major muscle groups to maintain & progress therapeutic functional improvements. Progressing 05/24/23   Verbalize difference between  "hurt vs harm"  to demonstrate understanding of fear avoidance in pain cycle.  MET 05/24/23         Midterm: In 8 weeks, pt will:     Verbalize good understanding of activities planning/scheduling (stretching, mindfulness, resisted training, & cardio) prescribed by PT/OT following the end of the program to sustain & progress functional improvements. Progressing 05/24/23   Perform selected resisted training w/ minimal to no cuing in " therapy session to be independent w/ resisted strengthening. Progressing 05/24/23   Demonstrate improved symptom self-management w/ posture/positioning and mechanical movements and/or implementation of appropriate modalities throughout a typical day.  Progressing 05/24/23   Demonstrate independence w/ home stretch routine to improve AROM, help decrease pain & improve mobility. Progressing 05/24/23         Long Term: In 12 weeks, pt will:     Perform selected cardio & resisted training independently to continue safe regular performance of daily exercise. Progressing 05/24/23   Improve 2 Minute Walk Test (MWT) to 425 feet and 6 MWT to 1275 feet or greater to improve gait speed and mobility. Progressing 05/24/23   Perform single leg stance 10 seconds or greater bilaterally to improve safety and balance in ADLs. Progressing 05/24/23   Demonstrate Timed Up & Go (with appropriate assistive device, if necessary) of 10 seconds or less to decrease fall risk and improve functional mobility. Progressing 05/24/23   Score 41 % or less on Neck FOTO to indicate significant functional improvements in impaired functions secondary to pain. Progressing 05/24/23        PLAN     Continue PT per POC     Roseline Hamilton, PTA

## 2023-06-05 NOTE — PROGRESS NOTES
"[facility]  Functional Restoration Program  Occupational Therapy Note  FRP day 13  MRN:[id number]    Name: Dina Hutton  MRN:[id number]  Encounter Date: 6/5/2023    Diagnosis:   Encounter Diagnoses   Name Primary?    Pain aggravated by activities of daily living Yes    Decreased activities of daily living (ADL)     Decreased  strength     Decreased functional activity tolerance     Fear of pain        Referring provider: Sharona Neumann NP    Physician Orders: eval and treat; [facility]  Medical Diagnosis:  M79.7 (ICD-10-CM) - Fibromyalgia  R26.9 (ICD-10-CM) - Gait disturbance  E89.0 (ICD-10-CM) - Postsurgical hypothyroidism   Evaluation Date: 3/21/2023  Insurance Authorization Period Expiration: 12/31/2023  Plan of Care Certification Period: 8/4/2023  Visit # / Visits authorized: 12/30 (plus eval)   FOTO completed: 3/3     Precautions:  Standard and Fall, light sensitivity    Time In: 1445  Time Out: 1600  Total Billable Time: 75 minutes    SUBJECTIVE     She reports: "I didn't realize it, but I wasn't pacing. I actually paced, and I did work, and I also rested, but not the way I used to, and I felt so much better"     Thu was compliant with parts of home exercise program given previously. Still practicing pacing and deep breathing. Stretching daily.    Response to previous treatment: Ms. Hutton noted: improved energy and tolerance for ADLs, improved activity tolerance     Functional change: improved energy and tolerance for ADLs; implementing mantra "its ok to not be ok" -  noticing "you seem happier" and her having more energy for activities, walking further distances, completing more chores around home, less fatigued        Pain:  Current: 3/10  Location: arms, elbow, feet, hands (worse in index fingers), head, jaw, shoulders, wrists, and neck, and scalp.    Patient Specific Activities based on Personal goals:  Activity  Performance  5/8/2023  Satisfaction    Performance  5/24/2023  Satisfaction " "  Meal prep and cooking.  5  3-4 6 5   2.  Getting on the ground to play with son.  1  0 4 4   3.  Participating in outdoor activities w family (Standing)  3  0 2 2   4.  Carrying/Lifting groceries, and putting them in cabinets/pantry.  7  6 3 5   5.  laundry (bending over, lifting wet laundry)  4  4 5 5             Total Score  4 - 4       Patient Specific Activity Scoring Scheme for Performance     0        1        2        3        4        5        6        7        8        9        10     Unable                                                                                     Able to perform  To perform                                                                              activity at same  Activity                                                                                    level as before                                                                        Objective   Measures updated at progress note, unless otherwise noted.     Refer to progress note 5/24/2023 for details.      Treatment     Refer to [facility] protocol for details and explanation of each activity.      Thu received the treatments listed below:      Therapeutic activities and functional lifting activities in order to increase participation in, endurance for, and performance with vocational, selfcare ADLs, and leisure activities in order to improve quality of life, for 75 minutes, including:    Seated activity to review pacing education in application re: daily schedule pre program, and set goals related to intentional movement, water intake, nutrition, stretching and mindfulness in order to create new daily schedule to include those goals.  Pt rated activity sort of hard (4/10) exertion.  During activity pt discussed anticipated barriers, anxieties and stressors re: new schedule and self accountability. Pt with increased tension in conversation on creating boundaries and "disappointing" others- pt appropriately tearful at times, " "therapist encouraged patient to practice verbalizing simple boundary, "I need to take a break" x4 trials before successful in articulating. Pt defaulting to giggling and tangential, appropriate conversation, but able to recognize own default to avoidance of conversation/verbalization that feels difficult.     Due to heightened emotional state, Thu instructed to walk mindfully to foster continued release of stressors/energy built up from more vulnerable conversation. Pt also noted to favor kinesthetic processing as learning method. Pt returned from lap sharing that she self-implemented diaphragmatic breathing as result of noting personal favoring of "hyperventilating" or breathing in upper shoulders. Pt also stated that she took x1 standing rest break during walk. Rated as sort of hard 4/10.     Followed by seated rest break with feet propped up, pt noted improved ease of breath and decreased tension. Additional boundary practice, stating "I don't want to talk about that right now." Pt endorsing feeling proud of herself and good in laying out boundary.     Thu completed functional lifting, floor to waist, 5x2 reps x 8 lbs with exertion rated Easy (2/10) then completed 4x2 reps with x12 lbs, rated as "sort of hard" 4/10, though tolerated lift well, self-guiding p[acing and breath work. Improved carryover of form noted. Pt stated that she "noticed her shoulders going up" and was able to use the physical cue of increased exertion to appropriately pace herself.  Pt plans to use this lift related to laundry.     Thu participated in functional lift waist to shoulder, 5x3 reps x 8 lbs with a rating of Moderate (3/10) exertion. Thu educated on standing posture, core awareness, keeping shoulders depressed and retracted, stepping to place > reaching to place, keeping weight close to center of gravity and pacing as needed. Fewer cues required to maintain interoceptive awareness of form and pacing, though Thu continues to enjoy " "socializing with other patients and offering encouragement, noted to be more intentional about resuming focus on task at hand.     No environmental, cultural, spiritual, developmental or education needs expressed or noted.    Patient Education and Home Exercises     Education provided:   - Progress towards goals   - proper posture and body mechanics.  - Functional pain scale  - DONNA rate of perceived exertion scale.   - importance of completion of exercises and activities outside of session/FRP to attain goals.      Written Home Exercises Provided: Patient instructed to cont prior HEP.  Exercises were reviewed and Thu was able to demonstrate them prior to the end of the session.  Thu demonstrated good  understanding of the education provided.     See EMR under Patient Instructions for exercises provided during therapy sessions. FRP binder provided day one of cohort.    Assessment     Ms. Hutton tolerated today's session well, describing successful experiences of pacing and improved mind-body awareness over the weekend. Fewer cues required to maintain interoceptive awareness of form and pacing, though Thu continues to enjoy socializing with other patients and offering encouragement, noted to be more intentional about resuming focus on task at hand.  Despite being "uncomfortable" with boundary setting exercise today, Thu tolerated well, naming recognition of benefits and also of the tasks requiring less effort than anticipated. Thu continues to progress towards goals, improved intrinsic motivation noted throughout session and with carryover of education and application of FRP tools.     Overall, Thu is progressing slowly towards her goals and status of goals can be seen below. Patient's prognosis is Good.     Thu would continue to benefit from skilled outpatient occupational therapy to address the deficits listed in the problem list on initial evaluation, provide patient education, and to maximize patient's level of " independence in the home and community environment.     Anticipated barriers to occupational therapy: psychosocial demands, co morbidities    Goals:     SHORT Term goals: In 3 weeks, patient will:  Status of goal: Reviewed 5/24/2023   Implements correct use of breathing techniques during functional lifting tasks, with minimal cues needed.  In Progress     Utilizes DONNA rate of exertion scale to pace performance with functional lifting tasks, and implements self-care strategies during activity participation to manage pain. In Progress     Verbalize understanding of energy conservation and pacing strategies during daily habits, roles, and routines in order to improve tolerance for work and home demands.  In Progress     Completes 5x sit to stand test in 20 seconds or less to improve ability to participate in community mobility.   In Progress     Demonstrate increased positional tolerance to the level needed for work, home, and community activities (standing in cue at social event, managing supplies for outing, streneous IADL), as evidenced by having a METS level of 4. In Progress     Demonstrate proper body mechanics with functional lifting tasks (floor to waist, waist to shoulder, maximum lift, and box carry), via teach back method with minimal cues needed. In Progress      Demonstrate increased functional strength by lifting 13 lbs waist to shoulder for management of (I)ADL, including dressing and grooming, placing items in kitchen cabinets, folding towels, and/or managing suitcase when traveling.   In Progress     Demonstrate increased functional strength by lifting 20 lbs floor to waist to meet demand of work/home routine, including lifting groceries, managing laundry, and/or lifting supplies for outdoor activities.   In Progress     Tolerate Home Activity Plan (HAP)/ Home Exercise Program (HEP) to promote safety and independence with ADL, with report of completion of at least 2 out of 4 days outside of program  participation.  In Progress   Show improved perception of own abilities as evidenced by increase of FOTO scores to established MDC value and perception of performance ratings regarding personal long term goals.  In Progress     Pt will verbalize and demonstrate increased water intake to goal level In Progress            MIDTERM goals: In 9 weeks, patient will: Status of goal:   Report implementation of application of mindfulness, stretching, and other coping strategies for improved participation in daily routine. In Progress     Verbalize functional application of lifting techniques in daily life (golfers lift, floor to waist, waist to shoulder). In Progress      Completes 5x sit to stand test in 17 seconds or less to improve ability to participate in community mobility.  In Progress      Applies functional application of lifting techniques (golfer's lift, floor to waist, waist to shoulder) in activities of daily life, per patient report.  In Progress      Demonstrate physical capacities consistent with a Light-Medium (#35 max, frequent lifting/carrying #20, 3 METS)  demand level, as needed to perform activities to be successful in roles of life, regarding Household Management and Community Participation. In Progress      Have an improvement of 3 points in 2/5 personal goals in rating of  performance.  In Progress      Show improved perception of own abilities as evidenced by increase of FOTO scores to established MC II value and perception of performance ratings regarding personal long term goals.  In Progress           LONG term goals = Patient Specific Goals:  Vocational: Chores, Daily tasks, playing with family   Recreational : sports, outdoor activities, walks   Daily Living Activities: cooking, cleaning, sports, one day go back to work      Measured by patient's self-reported performance and satisfaction of personal FRP goals, listed above in subjective section.   Total Score = sum of the activity performance  "scores / number of activities  Minimum detectable change (90%CI) for average score = 2 points  Minimum detectable change (90%CI) for single activity score  = 3 points    Plan     Continue per plan of care.     Updates/Grading for next session:  functional lifts, pacing, water intake, schedule to involve time for breakfast, follow up on podcast "in between us"     JACKELYN Henley   6/5/2023               "

## 2023-06-07 ENCOUNTER — OFFICE VISIT (OUTPATIENT)
Dept: PSYCHIATRY | Facility: OTHER | Age: 38
End: 2023-06-07
Payer: COMMERCIAL

## 2023-06-07 ENCOUNTER — CLINICAL SUPPORT (OUTPATIENT)
Dept: REHABILITATION | Facility: OTHER | Age: 38
End: 2023-06-07
Payer: COMMERCIAL

## 2023-06-07 DIAGNOSIS — R26.9 GAIT DISTURBANCE: ICD-10-CM

## 2023-06-07 DIAGNOSIS — M79.7 FIBROMYALGIA: ICD-10-CM

## 2023-06-07 DIAGNOSIS — R68.89 DECREASED FUNCTIONAL ACTIVITY TOLERANCE: ICD-10-CM

## 2023-06-07 DIAGNOSIS — F43.29 ADJUSTMENT DISORDER WITH PHYSICAL COMPLAINTS: Primary | ICD-10-CM

## 2023-06-07 DIAGNOSIS — R29.898 DECREASED GRIP STRENGTH: ICD-10-CM

## 2023-06-07 DIAGNOSIS — R26.89 POOR BALANCE: ICD-10-CM

## 2023-06-07 DIAGNOSIS — F40.298 FEAR OF PAIN: ICD-10-CM

## 2023-06-07 DIAGNOSIS — Z78.9 DECREASED ACTIVITIES OF DAILY LIVING (ADL): ICD-10-CM

## 2023-06-07 DIAGNOSIS — G89.29 CENTRAL SENSITIZATION TO PAIN: Primary | ICD-10-CM

## 2023-06-07 DIAGNOSIS — R52 PAIN AGGRAVATED BY ACTIVITIES OF DAILY LIVING: Primary | ICD-10-CM

## 2023-06-07 PROCEDURE — 97530 THERAPEUTIC ACTIVITIES: CPT | Mod: CQ

## 2023-06-07 PROCEDURE — 97530 THERAPEUTIC ACTIVITIES: CPT

## 2023-06-07 PROCEDURE — 90791 PR PSYCHIATRIC DIAGNOSTIC EVALUATION: ICD-10-PCS | Mod: ,,, | Performed by: SOCIAL WORKER

## 2023-06-07 PROCEDURE — 97112 NEUROMUSCULAR REEDUCATION: CPT | Mod: CQ

## 2023-06-07 PROCEDURE — 90791 PSYCH DIAGNOSTIC EVALUATION: CPT | Mod: ,,, | Performed by: SOCIAL WORKER

## 2023-06-07 NOTE — PROGRESS NOTES
"[facility]  Functional Restoration Program  Occupational Therapy Note  FRP day 14  MRN:[id number]    Name: Dina Hutton  MRN:[id number]  Encounter Date: 6/7/2023    Diagnosis:   Encounter Diagnoses   Name Primary?    Pain aggravated by activities of daily living Yes    Decreased activities of daily living (ADL)     Decreased  strength     Decreased functional activity tolerance     Fear of pain      Referring provider: Sharona Neumann NP    Physician Orders: eval and treat; [facility]  Medical Diagnosis:  M79.7 (ICD-10-CM) - Fibromyalgia  R26.9 (ICD-10-CM) - Gait disturbance  E89.0 (ICD-10-CM) - Postsurgical hypothyroidism   Evaluation Date: 3/21/2023  Insurance Authorization Period Expiration: 12/31/2023  Plan of Care Certification Period: 8/4/2023  Visit # / Visits authorized: 13/30 (plus eval)   FOTO completed: 3/3     Precautions:  Standard and Fall, light sensitivity    Time In: 1330  Time Out: 1445  Total Billable Time: 75 minutes    SUBJECTIVE     She reports: "Its a lot of mental change too, I've learned a lot about myself. "I woke up this morning and was able to make breakfast for my family. I've been walking with my son"     Thu was compliant with parts of home exercise program given previously. Still practicing pacing and deep breathing. Stretching daily.    Response to previous treatment: Ms. Hutton noted: improved energy and tolerance for ADLs, improved activity tolerance     Functional change:  improved energy and tolerance for ADLs; implementing mantra "its ok to not be ok" -  noticing "you seem happier" and her having more energy for activities, walking further distances, completing more chores around home, less fatigued. Noticed increased energy and activity tolerance.         Pain:  Current: 3/10  Location: arms, elbow, feet, hands (worse in index fingers), head, jaw, shoulders, wrists, and neck, and scalp.    Patient Specific Activities based on Personal goals:  Activity  " Performance  5/8/2023  Satisfaction    Performance  5/24/2023  Satisfaction   Meal prep and cooking.  5  3-4 6 5   2.  Getting on the ground to play with son.  1  0 4 4   3.  Participating in outdoor activities w family (Standing)  3  0 2 2   4.  Carrying/Lifting groceries, and putting them in cabinets/pantry.  7  6 3 5   5.  laundry (bending over, lifting wet laundry)  4  4 5 5             Total Score  4 - 4       Patient Specific Activity Scoring Scheme for Performance     0        1        2        3        4        5        6        7        8        9        10     Unable                                                                                     Able to perform  To perform                                                                              activity at same  Activity                                                                                    level as before                                                                        Objective   Measures updated at progress note, unless otherwise noted.     Refer to progress note 5/24/2023 for details.      Treatment     Refer to [facility] protocol for details and explanation of each activity.      Thu received the treatments listed below:      Therapeutic activities and functional lifting activities in order to increase participation in, endurance for, and performance with vocational, selfcare ADLs, and leisure activities in order to improve quality of life, for 75 minutes, including:      Pt participated in endurance activity on treadmill for 20 minutes on 1-2% incline, with speed 1.7-2.1, to improve functional endurance and progress towards increased MET level for improved community mobility and participation, rated as sort of hard (4/10);  continued education re: controlled breathing and pacing provided.      Pt completed sustained standing activity to discuss current habits/routines re: daily schedule pre program, and set goals related to  intentional movement, water intake, nutrition, stretching and mindfulness in order to create new daily schedule to include those goals. Pt used fine motor and dexterity skills to handwrite old and new schedules. Continued education on proper ergonomics (posture, neutral spine, 90 degrees for elbows on table, etc), weight shifting as needed and standing stretches as needed. Pt rated activity easy (2/10) exertion.  During activity pt discussed anticipated barriers, anxieties and stressors re: new schedule and self accountability. Pt with good mindset and plan moving forward and with decreased stress and increased self-efficacy post activity.    Standing rest break  with conversation about application of FRP education to daily life.     No environmental, cultural, spiritual, developmental or education needs expressed or noted.    Patient Education and Home Exercises     Education provided:   - Progress towards goals   - proper posture and body mechanics.  - Functional pain scale  - DONNA rate of perceived exertion scale.   - importance of completion of exercises and activities outside of session/FRP to attain goals.      Written Home Exercises Provided: Patient instructed to cont prior HEP.  Exercises were reviewed and Thu was able to demonstrate them prior to the end of the session.  Thu demonstrated good  understanding of the education provided.     See EMR under Patient Instructions for exercises provided during therapy sessions. FRP binder provided day one of cohort.    Assessment     Ms. Hutton tolerated today's session well, describing FRP tools and activities she wants to continue in daily life, with high responsiveness to scheduling exercise performed. Thu sharing throughout, noting improvements in pain and life with application of FRP principles and tools, specifically in moving through difficult things/energy rather than letting them stop her.  Good engagement with activities; use of interoception to note  effect on body with posture changes while attending to functional tasks- .Thu  able to note internal cues for needed standing/stretching rest breaks without cues from therapist.    Overall, Thu is progressing slowly towards her goals and status of goals can be seen below. Patient's prognosis is Good.     Thu would continue to benefit from skilled outpatient occupational therapy to address the deficits listed in the problem list on initial evaluation, provide patient education, and to maximize patient's level of independence in the home and community environment.     Anticipated barriers to occupational therapy: psychosocial demands, co morbidities    Goals:     SHORT Term goals: In 3 weeks, patient will:  Status of goal: Reviewed 5/24/2023   Implements correct use of breathing techniques during functional lifting tasks, with minimal cues needed.  In Progress     Utilizes DONNA rate of exertion scale to pace performance with functional lifting tasks, and implements self-care strategies during activity participation to manage pain. In Progress     Verbalize understanding of energy conservation and pacing strategies during daily habits, roles, and routines in order to improve tolerance for work and home demands.  In Progress     Completes 5x sit to stand test in 20 seconds or less to improve ability to participate in community mobility.   In Progress     Demonstrate increased positional tolerance to the level needed for work, home, and community activities (standing in cue at social event, managing supplies for outing, streneous IADL), as evidenced by having a METS level of 4. In Progress     Demonstrate proper body mechanics with functional lifting tasks (floor to waist, waist to shoulder, maximum lift, and box carry), via teach back method with minimal cues needed. In Progress      Demonstrate increased functional strength by lifting 13 lbs waist to shoulder for management of (I)ADL, including dressing and grooming,  placing items in kitchen cabinets, folding towels, and/or managing suitcase when traveling.   In Progress     Demonstrate increased functional strength by lifting 20 lbs floor to waist to meet demand of work/home routine, including lifting groceries, managing laundry, and/or lifting supplies for outdoor activities.   In Progress     Tolerate Home Activity Plan (HAP)/ Home Exercise Program (HEP) to promote safety and independence with ADL, with report of completion of at least 2 out of 4 days outside of program participation.  In Progress   Show improved perception of own abilities as evidenced by increase of FOTO scores to established MDC value and perception of performance ratings regarding personal long term goals.  In Progress     Pt will verbalize and demonstrate increased water intake to goal level In Progress            MIDTERM goals: In 9 weeks, patient will: Status of goal:   Report implementation of application of mindfulness, stretching, and other coping strategies for improved participation in daily routine. In Progress     Verbalize functional application of lifting techniques in daily life (golfers lift, floor to waist, waist to shoulder). In Progress      Completes 5x sit to stand test in 17 seconds or less to improve ability to participate in community mobility.  In Progress      Applies functional application of lifting techniques (golfer's lift, floor to waist, waist to shoulder) in activities of daily life, per patient report.  In Progress      Demonstrate physical capacities consistent with a Light-Medium (#35 max, frequent lifting/carrying #20, 3 METS)  demand level, as needed to perform activities to be successful in roles of life, regarding Household Management and Community Participation. In Progress      Have an improvement of 3 points in 2/5 personal goals in rating of  performance.  In Progress      Show improved perception of own abilities as evidenced by increase of FOTO scores to  "established MC II value and perception of performance ratings regarding personal long term goals.  In Progress           LONG term goals = Patient Specific Goals:  Vocational: Chores, Daily tasks, playing with family   Recreational : sports, outdoor activities, walks   Daily Living Activities: cooking, cleaning, sports, one day go back to work      Measured by patient's self-reported performance and satisfaction of personal FRP goals, listed above in subjective section.   Total Score = sum of the activity performance scores / number of activities  Minimum detectable change (90%CI) for average score = 2 points  Minimum detectable change (90%CI) for single activity score  = 3 points    Plan     Continue per plan of care.     Updates/Grading for next session:  functional lifts, pacing, water intake, schedule to involve time for breakfast, follow up on podcast "in between us"     JACKELYN Henley   6/7/2023                 "

## 2023-06-07 NOTE — PROGRESS NOTES
[facility]    Functional Restoration Program  Physical Therapy Treatment Note  FRP Day 14    Name: Dina Hutton  MRN:[id number]      Therapy Diagnosis:   Encounter Diagnoses   Name Primary?    Central sensitization to pain Yes    Fibromyalgia     Gait disturbance     Poor balance            Physician: Sharona Neumann NP    Date: 6/7/2023    Physician Orders: PT Eval and Treat   Medical Diagnosis from Referral:   M79.7 (ICD-10-CM) - Fibromyalgia   R26.9 (ICD-10-CM) - Gait disturbance   E89.0 (ICD-10-CM) - Postsurgical hypothyroidism      Evaluation Date: 3/21/2023  Authorization Period Expiration: 12/31/2023  Plan of Care Expiration: 6/30/2023  Visit # / Visits authorized: 12/ 30  FOTO: 3/ 3       Time In: 2:45 pm  Time Out: 4:00 pm  Total Billable Time: 70 minutes    SUBJECTIVE     Shellyu report feeling good again today. The pain is mostly just in her B feet and B shoulders,no pain in neck or back.  Pt states she was feeling so good after Mon session, she went to the gym with her  and performed some of the resisted exercises. She states her  was amazed.   Thu still has difficulty sleeping but this morning she woke up early to fix breakfast for family.       Patient's FRP goals: play w/ kids more, do home tasks more easily,        Current 2/10  Location(s):  B shld,  B feet    OBJECTIVE      Objective Measures updated at progress report unless specified.     Limitation/Restriction for FOTO Neck Survey     Therapist reviewed FOTO scores for Dina Hutton on 5/8/2023.   FOTO documents entered into [employer] - see Media section.     Limitation Score 3/21/2023: 54%  Limitation Score 5/8/2023: 56%  Limitation Score 05/24/23: 52%     Predicted Score: 41%           Treatment       Thu received the Individualized Treatments listed below:     Thu participated in dynamic functional therapeutic activities to improve functional performance for 15 minutes, including:     Supine diaphragmatic  breathing:  10 min              Cued for awareness of pulling breath down away from mouth to hips  Patient difficulty demonstrate abdominal excursion /c min chest rise    SOC 2x10 postural awareness    Thu received therapeutic exercises to develop strength, endurance, ROM, flexibility, posture, and core stabilization for  minutes including:    Thu participated in neuromuscular re-education activities to improve: Balance, Coordination, Kinesthetic, Sense, Proprioception, and Posture for 55 minutes. The following activities were included:    B shld flx 3 lb bar x 10  B serratus punches 3 lb bar x 10   B snow angels x10  Repeat each x10      Fitter board hard setting 2x20 each way     Peripheral muscle strengthening which included 1-2 sets of 10-20 repetitions at a slow, controlled 5-7 second per rep pace focused on strengthening supporting musculature for improved body mechanics & functional mobility.  Patient & therapist focused on proper form & speed during treatment to ensure optimal strengthening of each targeted muscle group & neuromuscular re-education. Patients are instructed to keep sets/reps with proper load to stay at 3-5 out of 10 on the provided modified Rosendo exertion scale.    BATCA Machine Exercises Weight (lbs) Sets Repetitions Rosendo Exertion Scale Rating   Leg Extension        Hamstring Curls       Chest Press 15  20 5   Pec Fly 25  20 5   Lat Pull Down 25  15 5   Mid Row 25  18 5   Bicep Bar Curls 7.5  15 5   Standing Tricep Extension 7.5  20 5   Single Leg Press R/L 35  17 5       Patient Education and Home Exercises     Home Exercises Provided and Patient Education Provided     Education provided:   - FRP Educational Topics: Modified Rosendo Exertion Scale, Central Sensitization , and Posture & Body Mechanics    Written Home Exercises Provided: Patient instructed to cont prior HEP. Exercises were reviewed and Thu was able to demonstrate them prior to the end of the session.  Thu demonstrated good   "understanding of the education provided. See EMR under Patient Instructions for information provided during therapy sessions    ASSESSMENT     Thu participated great in PT today and super excited about how she feels today. Pt needed to be reminded of "boom and bust" and to cont to listen to her body during exercises today.  Pt showing improvements in self efficacy and showing good results from learning to pace and set boundaries. During diaphragmatic breathing, pt needing less cues and per subjective said it made her feel relaxed and maybe she should try it before she goes to sleep.       Thu Is progressing fair towards her goals.   Pt prognosis is Good.     Pt will continue to benefit from skilled outpatient physical therapy to address the deficits listed in the problem list box on initial evaluation, provide pt/family education and to maximize pt's level of independence in the home and community environment.     Pt's spiritual, cultural and educational needs considered and pt agreeable to plan of care and goals.     Anticipated barriers to physical therapy: chronic nature of issues, psychosocial factors    GOALS: Pt is in agreement with the following goals:  Goal Progress Towards Goal   SHORT Term: In 3 weeks, pt will:     Verbalize & demonstrate good understanding of resisted training with 3-1-3 second rep for dmttqzyhzh-jcamnwkno-ndeknkyfa contraction to encourage full muscle recruitment for strength & endurance. Goal met 5/11/2023   Verbalize & demonstrate good understanding of home stretch routine to improve AROM, help decrease pain & improve mobility. MET 05/24/23   Demonstrate proper supine or seated diaphragmatic breathing with decreased chest excursion & emphasis on full abdominal excursion & increased expiration time to promote muscle relaxation & increased parasympathetic activity to improve patient tolerance to activities. Progressing 05/24/23, 05/31/23   Verbalize good understanding of importance of " "proper posture in resisted exercise, functional activities & ADL's in order to help reduce postural strain, promote proper breathing. Progressing 05/24/23   Demonstrate good understanding of full body resisted exercises w/ use of pictures &/or verbal cues to promote independence w/ exercise at the end of the program. MET 05/31/23   Verbalize plan for continuing resisted exercises w/ specific location (i.e. commercial gym, home, community fitness center)  to incorporate all major muscle groups to maintain & progress therapeutic functional improvements. Progressing 05/24/23   Verbalize difference between  "hurt vs harm"  to demonstrate understanding of fear avoidance in pain cycle.  MET 05/24/23         Midterm: In 8 weeks, pt will:     Verbalize good understanding of activities planning/scheduling (stretching, mindfulness, resisted training, & cardio) prescribed by PT/OT following the end of the program to sustain & progress functional improvements. Progressing 05/24/23   Perform selected resisted training w/ minimal to no cuing in therapy session to be independent w/ resisted strengthening. Progressing 05/24/23   Demonstrate improved symptom self-management w/ posture/positioning and mechanical movements and/or implementation of appropriate modalities throughout a typical day.  Progressing 05/24/23   Demonstrate independence w/ home stretch routine to improve AROM, help decrease pain & improve mobility. Progressing 05/24/23         Long Term: In 12 weeks, pt will:     Perform selected cardio & resisted training independently to continue safe regular performance of daily exercise. Progressing 05/24/23   Improve 2 Minute Walk Test (MWT) to 425 feet and 6 MWT to 1275 feet or greater to improve gait speed and mobility. Progressing 05/24/23   Perform single leg stance 10 seconds or greater bilaterally to improve safety and balance in ADLs. Progressing 05/24/23   Demonstrate Timed Up & Go (with appropriate assistive " device, if necessary) of 10 seconds or less to decrease fall risk and improve functional mobility. Progressing 05/24/23   Score 41 % or less on Neck FOTO to indicate significant functional improvements in impaired functions secondary to pain. Progressing 05/24/23        PLAN     Continue PT per POC     Roseline Hamilton, PTA

## 2023-06-08 NOTE — PROGRESS NOTES
Pt presented for testing for final day of CBT. Scores noted below:    Dina Hutton Cohort 67 Day 1  Day 16  % change    BETH  Depression- 20  Anxiety- 14  Stress- 17   Depression- 16  Anxiety- 13  Stress- 19 20 % improvement    Little Change   Little Change   VAS   Pain today-   Pain in the past week- 7 Pain today- 6  Pain in the past week- 8  Little change     Pain Catastrophizing Scale   48  37  23% improvement   Sleep Quality Instrument   19 16 16% improvement     Pain Disability Index  50 47 Little change   Fear Avoidance Beliefs  Fear of Physical pain - 17  Fear of Pain caused by work/chores- 34  Total fear of pain-70 Fear of Physical pain-6  Fear of Pain caused by work/chores-  18  Total fear of pain-  38 65% decrease     47% decrease    46% decrease      [place] Pain questionnaire  48  21  56% decrease   Low Back Disability    52% 40% 23% improvement   ACE score  1 N/A  N/A          We discussed plans for program completion, discussed active vs passive treatments, what pts have worked on. Will f/u on Friday for discharge meeting.

## 2023-06-08 NOTE — PROGRESS NOTES
Group Psychotherapy     Site: [facility]     Clinical status of patient: Outpatient     6-5-2023     Length of service:28877-13 min     Referred by: Functional Restoration Program      Number of patients in attendance: 6     Target symptoms: chronic pain      Patient's response to intervention:  The patient's response to intervention is active listening.     Progress toward goals and other mental status changes:  The patient's progress toward goals is fair .     Plan: group psychotherapy     Topics Covered: Pts learned the role our brain plays with chronic pain. Pts were told it is ok to acknowledge their pain but understanding why and how they interpret the pain that is occurring is crucial. The neuromatrix of pain consists of cognitive, sensory and emotion-related brain areas. The outputs to brain areas include our pain perception, action programs (involuntary and involuntary, and stress-regulation programs (release of endorphins, immune system activity). Discussed how anxiety, depression and other mood disorders can impact pain output. Will f/u in 2 days for final testing.

## 2023-06-09 ENCOUNTER — CLINICAL SUPPORT (OUTPATIENT)
Dept: REHABILITATION | Facility: OTHER | Age: 38
End: 2023-06-09
Payer: COMMERCIAL

## 2023-06-09 ENCOUNTER — OFFICE VISIT (OUTPATIENT)
Dept: PSYCHIATRY | Facility: OTHER | Age: 38
End: 2023-06-09
Payer: COMMERCIAL

## 2023-06-09 DIAGNOSIS — F40.298 FEAR OF PAIN: ICD-10-CM

## 2023-06-09 DIAGNOSIS — R26.9 GAIT DISTURBANCE: ICD-10-CM

## 2023-06-09 DIAGNOSIS — R29.898 DECREASED GRIP STRENGTH: ICD-10-CM

## 2023-06-09 DIAGNOSIS — G89.29 CENTRAL SENSITIZATION TO PAIN: Primary | ICD-10-CM

## 2023-06-09 DIAGNOSIS — F43.29 ADJUSTMENT DISORDER WITH PHYSICAL COMPLAINTS: Primary | ICD-10-CM

## 2023-06-09 DIAGNOSIS — R52 PAIN AGGRAVATED BY ACTIVITIES OF DAILY LIVING: Primary | ICD-10-CM

## 2023-06-09 DIAGNOSIS — R68.89 DECREASED FUNCTIONAL ACTIVITY TOLERANCE: ICD-10-CM

## 2023-06-09 DIAGNOSIS — M79.7 FIBROMYALGIA: ICD-10-CM

## 2023-06-09 DIAGNOSIS — Z78.9 DECREASED ACTIVITIES OF DAILY LIVING (ADL): ICD-10-CM

## 2023-06-09 DIAGNOSIS — R26.89 POOR BALANCE: ICD-10-CM

## 2023-06-09 PROCEDURE — 90832 PSYTX W PT 30 MINUTES: CPT | Mod: ,,, | Performed by: SOCIAL WORKER

## 2023-06-09 PROCEDURE — 97110 THERAPEUTIC EXERCISES: CPT | Mod: CQ

## 2023-06-09 PROCEDURE — 97530 THERAPEUTIC ACTIVITIES: CPT | Mod: CQ

## 2023-06-09 PROCEDURE — 97530 THERAPEUTIC ACTIVITIES: CPT

## 2023-06-09 PROCEDURE — 90832 PR PSYCHOTHERAPY W/PATIENT, 30 MIN: ICD-10-PCS | Mod: ,,, | Performed by: SOCIAL WORKER

## 2023-06-09 PROCEDURE — 97112 NEUROMUSCULAR REEDUCATION: CPT | Mod: CQ

## 2023-06-09 RX ORDER — NORELGESTROMIN AND ETHINYL ESTRADIOL 35; 150 UG/D; UG/D
1 PATCH TRANSDERMAL
Qty: 4 PATCH | Refills: 1 | Status: SHIPPED | OUTPATIENT
Start: 2023-06-09 | End: 2023-07-26

## 2023-06-09 NOTE — PROGRESS NOTES
[facility]    Functional Restoration Program  Physical Therapy Treatment Note  FRP Day 15    Name: Dina Hutton  MRN:[id number]      Therapy Diagnosis:   Encounter Diagnoses   Name Primary?    Central sensitization to pain Yes    Fibromyalgia     Gait disturbance     Poor balance            Physician: Sharona Neumann NP    Date: 6/9/2023    Physician Orders: PT Eval and Treat   Medical Diagnosis from Referral:   M79.7 (ICD-10-CM) - Fibromyalgia   R26.9 (ICD-10-CM) - Gait disturbance   E89.0 (ICD-10-CM) - Postsurgical hypothyroidism      Evaluation Date: 3/21/2023  Authorization Period Expiration: 12/31/2023  Plan of Care Expiration: 6/30/2023  Visit # / Visits authorized: 13/ 30  FOTO: 3/ 3       Time In: 12:30 pm  Time Out: 1:45 pm  Total Billable Time: 70 minutes    SUBJECTIVE     Thu report feeling good again today.  Pt states went to the gym again with her  and performed the resisted exercises. She reports the machines are similar to the ones used here but not exactly. She felt confident in using them and even showed her  how to work some of them. She states her  was amazed. She has been going to the gym late at night and feels tired after making it easier for her to sleep.  She states getting more hrs of sleep and feeling rested in the AM, enjoying another morning with her family.         Patient's FRP goals: play w/ kids more, do home tasks more easily,        Current 2/10  Location(s):  B shld,  B feet    OBJECTIVE      Objective Measures updated at progress report unless specified.     Limitation/Restriction for FOTO Neck Survey     Therapist reviewed FOTO scores for Dina Hutton on 5/8/2023.   FOTO documents entered into [employer] - see Media section.     Limitation Score 3/21/2023: 54%  Limitation Score 5/8/2023: 56%  Limitation Score 05/24/23: 52%     Predicted Score: 41%           Treatment       Thu received the Individualized Treatments listed below:     Thu  participated in dynamic functional therapeutic activities to improve functional performance for 18 minutes, including:    Full body functional mobility w/ 3-10 sec hold technique &/or 3-5 repetition technique to improve functional performance. In order to:  Improve driving safety, visual & spatial awareness:  Cervical flx/ext  Cervical side bending  Cervical rotation  Cervical protraction/retraction  Improve lifting mechanics, showering/bathing, dressing, cooking, reaching, pushing & fine motor skills  Shld rolls  Shld depression/elevation  Scapular retraction/protraction/scaption  Posterior shld stretch (cross arm)  Shld ER stretch (chicken wing)  Elbow flx/ext  Forearm supination/pronation  Wrist flx/ext  Open/close hands/fingers  Improve bed mobility & rolling, bending over, cooking & cleaning:  Seated trunk rotations  Seated trunk/thoracic ext/flx  Improve functional gait, squatting, sitting tolerance, functional balance:   Seated marches & knee to chest  Seated knee flx/ext  Seated hip ER/piriformis stretch  Seated hamstring stretch  Seated toe raise to heel raise   Seated ankle circles each way  Improve overhead reaching/activities, standing tolerance:  Standing lumbar extensions  Standing lateral leaning stretch  Standing quad stretch (UE support for balance)        Thu received therapeutic exercises to develop strength, endurance, ROM, flexibility, posture, and core stabilization for 12 minutes including:    TM 12 min 2.0 PRE 4/10    Thu participated in neuromuscular re-education activities to improve: Balance, Coordination, Kinesthetic, Sense, Proprioception, and Posture for 40 minutes. The following activities were included:    B shld flx 3 lb bar x 10  B serratus punches 3 lb bar x 10   B snow angels x10    Fitter board hard setting 2x20 each way     Peripheral muscle strengthening which included 1-2 sets of 10-20 repetitions at a slow, controlled 5-7 second per rep pace focused on strengthening  "supporting musculature for improved body mechanics & functional mobility.  Patient & therapist focused on proper form & speed during treatment to ensure optimal strengthening of each targeted muscle group & neuromuscular re-education. Patients are instructed to keep sets/reps with proper load to stay at 3-5 out of 10 on the provided modified Rosendo exertion scale.    BATCA Machine Exercises Weight (lbs) Sets Repetitions Rosendo Exertion Scale Rating   Leg Extension        Hamstring Curls       Chest Press       Pec Fly       Lat Pull Down       Mid Row       Bicep Bar Curls       Standing Tricep Extension 7.5  15 4   Single Leg Press R/L 35  120 4       Patient Education and Home Exercises     Home Exercises Provided and Patient Education Provided     Education provided:   - FRP Educational Topics: Modified Rosendo Exertion Scale, Central Sensitization , and Posture & Body Mechanics    Written Home Exercises Provided: Patient instructed to cont prior HEP. Exercises were reviewed and Thu was able to demonstrate them prior to the end of the session.  Thu demonstrated good  understanding of the education provided. See EMR under Patient Instructions for information provided during therapy sessions    ASSESSMENT     Thu participated great in PT today and super excited about how she feels today. Pt needed to be reminded of "boom and bust" and to cont to listen to her body during exercises today.  Pt showing improvements in self efficacy and showing good results from learning to pace and set boundaries. During diaphragmatic breathing, pt needing less cues and per subjective said it made her feel relaxed and maybe she should try it before she goes to sleep.       Thu Is progressing fair towards her goals.   Pt prognosis is Good.     Pt will continue to benefit from skilled outpatient physical therapy to address the deficits listed in the problem list box on initial evaluation, provide pt/family education and to maximize pt's level " "of independence in the home and community environment.     Pt's spiritual, cultural and educational needs considered and pt agreeable to plan of care and goals.     Anticipated barriers to physical therapy: chronic nature of issues, psychosocial factors    GOALS: Pt is in agreement with the following goals:  Goal Progress Towards Goal   SHORT Term: In 3 weeks, pt will:     Verbalize & demonstrate good understanding of resisted training with 3-1-3 second rep for kvahagdcoq-tizyvthmr-rzarlkvob contraction to encourage full muscle recruitment for strength & endurance. Goal met 5/11/2023   Verbalize & demonstrate good understanding of home stretch routine to improve AROM, help decrease pain & improve mobility. MET 05/24/23   Demonstrate proper supine or seated diaphragmatic breathing with decreased chest excursion & emphasis on full abdominal excursion & increased expiration time to promote muscle relaxation & increased parasympathetic activity to improve patient tolerance to activities. Progressing 05/24/23, 05/31/23   Verbalize good understanding of importance of proper posture in resisted exercise, functional activities & ADL's in order to help reduce postural strain, promote proper breathing. Progressing 05/24/23   Demonstrate good understanding of full body resisted exercises w/ use of pictures &/or verbal cues to promote independence w/ exercise at the end of the program. MET 05/31/23   Verbalize plan for continuing resisted exercises w/ specific location (i.e. commercial gym, home, community fitness center)  to incorporate all major muscle groups to maintain & progress therapeutic functional improvements. Progressing 05/24/23   Verbalize difference between  "hurt vs harm"  to demonstrate understanding of fear avoidance in pain cycle.  MET 05/24/23         Midterm: In 8 weeks, pt will:     Verbalize good understanding of activities planning/scheduling (stretching, mindfulness, resisted training, & cardio) " prescribed by PT/OT following the end of the program to sustain & progress functional improvements. Progressing 05/24/23   Perform selected resisted training w/ minimal to no cuing in therapy session to be independent w/ resisted strengthening. Progressing 05/24/23   Demonstrate improved symptom self-management w/ posture/positioning and mechanical movements and/or implementation of appropriate modalities throughout a typical day.  Progressing 05/24/23   Demonstrate independence w/ home stretch routine to improve AROM, help decrease pain & improve mobility. Progressing 05/24/23         Long Term: In 12 weeks, pt will:     Perform selected cardio & resisted training independently to continue safe regular performance of daily exercise. Progressing 05/24/23   Improve 2 Minute Walk Test (MWT) to 425 feet and 6 MWT to 1275 feet or greater to improve gait speed and mobility. Progressing 05/24/23   Perform single leg stance 10 seconds or greater bilaterally to improve safety and balance in ADLs. Progressing 05/24/23   Demonstrate Timed Up & Go (with appropriate assistive device, if necessary) of 10 seconds or less to decrease fall risk and improve functional mobility. Progressing 05/24/23   Score 41 % or less on Neck FOTO to indicate significant functional improvements in impaired functions secondary to pain. Progressing 05/24/23        PLAN     Pt on hold for 3+ weeks to attempt independence w/ pain coping strategies & exercise; she will return for reassessment afterwards.    Roseline Hamilton, PTA

## 2023-06-09 NOTE — PROGRESS NOTES
"[facility]  Functional Restoration Program  Occupational Therapy Note  FRP day 15  MRN:[id number]    Name: Dina Hutton  MRN:[id number]  Encounter Date: 6/9/2023    Diagnosis:   Encounter Diagnoses   Name Primary?    Pain aggravated by activities of daily living Yes    Decreased activities of daily living (ADL)     Decreased  strength     Decreased functional activity tolerance     Fear of pain        Referring provider: Sharona Neumann NP    Physician Orders: eval and treat; [facility]  Medical Diagnosis:  M79.7 (ICD-10-CM) - Fibromyalgia  R26.9 (ICD-10-CM) - Gait disturbance  E89.0 (ICD-10-CM) - Postsurgical hypothyroidism   Evaluation Date: 3/21/2023  Insurance Authorization Period Expiration: 12/31/2023  Plan of Care Certification Period: 8/4/2023  Visit # / Visits authorized: 14/30 (plus eval)   FOTO completed: 3/3     Precautions:  Standard and Fall, light sensitivity    Time In: 1345  Time Out: 1500  Total Billable Time: 75 minutes    SUBJECTIVE     She reports: "I went to the gym again yesterday with my  and it felt really good to do it twice in a week. I was surprised how much better I felt"     Thu was compliant with home exercise program given previously. Still practicing pacing and deep breathing. Stretching daily, practicing mindfulness daily and going to the gym     Response to previous treatment: Ms. Hutton noted: improved energy and tolerance for ADLs, improved activity tolerance     Functional change:  improved energy and tolerance for ADLs; implementing mantra "its ok to not be ok" -  noticing "you seem happier" and her having more energy for activities, walking further distances, completing more chores around home, less fatigued. Noticed increased energy and activity tolerance.         Pain:  Current: 3-2/10 "its calmed down since i've been here" re: pain  Location: arms, elbow, feet, hands (worse in index fingers), head, jaw, shoulders, wrists, and neck, and " scalp.    Patient Specific Activities based on Personal goals:  Activity  Performance  5/8/2023  Satisfaction    Performance  5/24/2023  Satisfaction   Meal prep and cooking.  5  3-4 6 5   2.  Getting on the ground to play with son.  1  0 4 4   3.  Participating in outdoor activities w family (Standing)  3  0 2 2   4.  Carrying/Lifting groceries, and putting them in cabinets/pantry.  7  6 3 5   5.  laundry (bending over, lifting wet laundry)  4  4 5 5             Total Score  4 - 4       Patient Specific Activity Scoring Scheme for Performance     0        1        2        3        4        5        6        7        8        9        10     Unable                                                                                     Able to perform  To perform                                                                              activity at same  Activity                                                                                    level as before                                                                        Objective   Measures updated at progress note, unless otherwise noted.     Refer to progress note 5/24/2023 for details.      Treatment     Refer to [facility] protocol for details and explanation of each activity.      Thu received the treatments listed below:      Therapeutic activities and functional lifting activities in order to increase participation in, endurance for, and performance with vocational, selfcare ADLs, and leisure activities in order to improve quality of life, for 75 minutes, including:    Pt educated on proper mechanics to get on and off floor using chair for support. Pt educated on steps to take to ensure safety if she were to have a fall: roll to back, complete body scan to see if all body parts are okay, roll to side, hands/knees, crawl to chair and then use steps to get on/off floor. Pt demonstrated understanding x4reps. Pt with increased self-efficacy after completion of  activity. Pt given handouts on proper mechanics. Pt rated as sort of hard- hard (4-5/10) exertion.      Pt completed yoga activity on mat, supine, quadriped with focused education on getting on/off floor properly and safely, back mobility, hip flexibility, stress reduction, dynamic standing balance, posture, alignment and coordinating movement with breath, rated sort of hard (4/10).    Practice of functional lifts reviewed via teach-back method. Pt required min-no cues for lifts.   - Thu completed functional lifting, floor to waist, 6 reps x 10 lbs with exertion rated Sort of hard (4/10).   - Thu participated in functional lift waist to shoulder, 6 reps x 10 lbs with a rating of Sort of hard (4/10) exertion. Thu educated on standing posture, core awareness, keeping shoulders depressed and retracted, stepping to place > reaching to place, keeping weight close to center of gravity and pacing as needed.   Thu completed maximal lift 5 reps with 20 lbs, rated as Sort of hard (4/10) exertion, continued education focused on neutral spine positioning and pushing through heels for safety and activation of correct muscles.  -Thu completed 10lb carry x 5 minutes rated Sort of hard (4/10). Pt encouraged to slow walking rate to focus on heel to tow technique.   -Pt educated on golfer's lifting technique with education focused on safety and stability with standing. Pt educated on the importance of wide base of support and maintenance of neutral spine, RUE/LLE for stability and vis versa for movement within lift.   Pt able to demonstrate concept of lift x 6-10 trials with alternating standing leg. Rated as Easy (2/10) exertion.      Thu participated in endurance activity using Nustep for 10 minutes, starting at level 1.0, rated as Moderate (3/10) exertion, Thu re-educated on how to add mindfulness component to activity and how to pace appropriately by completed self check ins and grading activity as needed, with goal to reach  moderate to sort of hard by end of activity.     No environmental, cultural, spiritual, developmental or education needs expressed or noted.    Patient Education and Home Exercises     Education provided:   - Progress towards goals   - proper posture and body mechanics.  - Functional pain scale  - DONNA rate of perceived exertion scale.   - importance of completion of exercises and activities outside of session/FRP to attain goals.      Written Home Exercises Provided: Patient instructed to cont prior HEP.  Exercises were reviewed and Thu was able to demonstrate them prior to the end of the session.  Thu demonstrated good  understanding of the education provided.     See EMR under Patient Instructions for exercises provided during therapy sessions. FRP binder provided day one of cohort.    Assessment     Ms. Hutton tolerated today's session well, describing FRP tools and activities she wants to continue in daily life, with high responsiveness to scheduling exercise performed. Thu sharing throughout, noting improvements in pain and life with application of FRP principles and tools, specifically in moving through difficult things/energy rather than letting them stop her.  Good engagement with activities; use of interoception to note effect on body with posture changes while attending to functional tasks.     Overall, Thu is progressing slowly towards her goals and status of goals can be seen below. Patient's prognosis is Good.     Thu would continue to benefit from skilled outpatient occupational therapy to address the deficits listed in the problem list on initial evaluation, provide patient education, and to maximize patient's level of independence in the home and community environment.     Anticipated barriers to occupational therapy: psychosocial demands, co morbidities    Goals:     SHORT Term goals: In 3 weeks, patient will:  Status of goal: Reviewed 5/24/2023   Implements correct use of breathing techniques  during functional lifting tasks, with minimal cues needed.  In Progress     Utilizes DONNA rate of exertion scale to pace performance with functional lifting tasks, and implements self-care strategies during activity participation to manage pain. In Progress     Verbalize understanding of energy conservation and pacing strategies during daily habits, roles, and routines in order to improve tolerance for work and home demands.  In Progress     Completes 5x sit to stand test in 20 seconds or less to improve ability to participate in community mobility.   In Progress     Demonstrate increased positional tolerance to the level needed for work, home, and community activities (standing in cue at social event, managing supplies for outing, streneous IADL), as evidenced by having a METS level of 4. In Progress     Demonstrate proper body mechanics with functional lifting tasks (floor to waist, waist to shoulder, maximum lift, and box carry), via teach back method with minimal cues needed. In Progress      Demonstrate increased functional strength by lifting 13 lbs waist to shoulder for management of (I)ADL, including dressing and grooming, placing items in kitchen cabinets, folding towels, and/or managing suitcase when traveling.   In Progress     Demonstrate increased functional strength by lifting 20 lbs floor to waist to meet demand of work/home routine, including lifting groceries, managing laundry, and/or lifting supplies for outdoor activities.   In Progress     Tolerate Home Activity Plan (HAP)/ Home Exercise Program (HEP) to promote safety and independence with ADL, with report of completion of at least 2 out of 4 days outside of program participation.  In Progress   Show improved perception of own abilities as evidenced by increase of FOTO scores to established MDC value and perception of performance ratings regarding personal long term goals.  In Progress     Pt will verbalize and demonstrate increased water intake  to goal level In Progress            MIDTERM goals: In 9 weeks, patient will: Status of goal:   Report implementation of application of mindfulness, stretching, and other coping strategies for improved participation in daily routine. In Progress     Verbalize functional application of lifting techniques in daily life (golfers lift, floor to waist, waist to shoulder). In Progress      Completes 5x sit to stand test in 17 seconds or less to improve ability to participate in community mobility.  In Progress      Applies functional application of lifting techniques (golfer's lift, floor to waist, waist to shoulder) in activities of daily life, per patient report.  In Progress      Demonstrate physical capacities consistent with a Light-Medium (#35 max, frequent lifting/carrying #20, 3 METS)  demand level, as needed to perform activities to be successful in roles of life, regarding Household Management and Community Participation. In Progress      Have an improvement of 3 points in 2/5 personal goals in rating of  performance.  In Progress      Show improved perception of own abilities as evidenced by increase of FOTO scores to established MC II value and perception of performance ratings regarding personal long term goals.  In Progress           LONG term goals = Patient Specific Goals:  Vocational: Chores, Daily tasks, playing with family   Recreational : sports, outdoor activities, walks   Daily Living Activities: cooking, cleaning, sports, one day go back to work      Measured by patient's self-reported performance and satisfaction of personal FR goals, listed above in subjective section.   Total Score = sum of the activity performance scores / number of activities  Minimum detectable change (90%CI) for average score = 2 points  Minimum detectable change (90%CI) for single activity score  = 3 points    Plan     Continue per plan of care.     Updates/Grading for next session:  functional lifts, pacing, water intake,  "schedule to involve time for breakfast, follow up on podcast "in between us"    Noy Storey, LOTR   6/9/2023                   "

## 2023-06-16 NOTE — PROGRESS NOTES
The team (SW, NP, OT, PT) met for 30 minutes with Izzy Mcdaniel regarding their progress in the program and the plans moving forward.  Discussion involved a comparison of the testing scores against day 1 intake scores and the plan to continue to make progress in those areas, how to manage mood disorders and chronic pain. Please refer to individual notes for plans of care.  All questions answered. Will f/u in 1 month.

## 2023-06-16 NOTE — PROGRESS NOTES
The team (SW, NP, OT, PT) met for 30 minutes with Dina Hutton regarding their progress in the program and the plans moving forward.  Discussion involved a comparison of the testing scores against day 1 intake scores and the plan to continue to make progress in those areas, how to manage mood disorders and chronic pain. Please refer to individual notes for plans of care.  All questions answered. Will f/u in 1 month.

## 2023-06-28 ENCOUNTER — LAB VISIT (OUTPATIENT)
Dept: LAB | Facility: HOSPITAL | Age: 38
End: 2023-06-28
Attending: STUDENT IN AN ORGANIZED HEALTH CARE EDUCATION/TRAINING PROGRAM
Payer: COMMERCIAL

## 2023-06-28 ENCOUNTER — OFFICE VISIT (OUTPATIENT)
Dept: PRIMARY CARE CLINIC | Facility: CLINIC | Age: 38
End: 2023-06-28
Payer: COMMERCIAL

## 2023-06-28 VITALS
HEART RATE: 91 BPM | WEIGHT: 128.75 LBS | HEIGHT: 57 IN | SYSTOLIC BLOOD PRESSURE: 112 MMHG | OXYGEN SATURATION: 98 % | BODY MASS INDEX: 27.78 KG/M2 | DIASTOLIC BLOOD PRESSURE: 68 MMHG

## 2023-06-28 DIAGNOSIS — R35.0 URINARY FREQUENCY: ICD-10-CM

## 2023-06-28 DIAGNOSIS — Z79.899 DRUG-INDUCED IMMUNODEFICIENCY: ICD-10-CM

## 2023-06-28 DIAGNOSIS — K12.0 APHTHOUS ULCER: ICD-10-CM

## 2023-06-28 DIAGNOSIS — D84.821 DRUG-INDUCED IMMUNODEFICIENCY: ICD-10-CM

## 2023-06-28 DIAGNOSIS — C73 THYROID CANCER: ICD-10-CM

## 2023-06-28 DIAGNOSIS — L03.011 PARONYCHIA OF FINGER OF RIGHT HAND: Primary | ICD-10-CM

## 2023-06-28 LAB
BACTERIA #/AREA URNS AUTO: ABNORMAL /HPF
BILIRUB UR QL STRIP: NEGATIVE
CLARITY UR REFRACT.AUTO: ABNORMAL
COLOR UR AUTO: YELLOW
GLUCOSE UR QL STRIP: NEGATIVE
HGB UR QL STRIP: ABNORMAL
KETONES UR QL STRIP: NEGATIVE
LEUKOCYTE ESTERASE UR QL STRIP: ABNORMAL
MICROSCOPIC COMMENT: ABNORMAL
NITRITE UR QL STRIP: NEGATIVE
PH UR STRIP: 5 [PH] (ref 5–8)
PROT UR QL STRIP: NEGATIVE
RBC #/AREA URNS AUTO: 13 /HPF (ref 0–4)
SP GR UR STRIP: 1.02 (ref 1–1.03)
SQUAMOUS #/AREA URNS AUTO: 23 /HPF
URN SPEC COLLECT METH UR: ABNORMAL
WBC #/AREA URNS AUTO: 52 /HPF (ref 0–5)

## 2023-06-28 PROCEDURE — 87077 CULTURE AEROBIC IDENTIFY: CPT | Mod: 59 | Performed by: STUDENT IN AN ORGANIZED HEALTH CARE EDUCATION/TRAINING PROGRAM

## 2023-06-28 PROCEDURE — 81001 URINALYSIS AUTO W/SCOPE: CPT | Performed by: STUDENT IN AN ORGANIZED HEALTH CARE EDUCATION/TRAINING PROGRAM

## 2023-06-28 PROCEDURE — 3074F SYST BP LT 130 MM HG: CPT | Mod: CPTII,S$GLB,, | Performed by: STUDENT IN AN ORGANIZED HEALTH CARE EDUCATION/TRAINING PROGRAM

## 2023-06-28 PROCEDURE — 1160F RVW MEDS BY RX/DR IN RCRD: CPT | Mod: CPTII,S$GLB,, | Performed by: STUDENT IN AN ORGANIZED HEALTH CARE EDUCATION/TRAINING PROGRAM

## 2023-06-28 PROCEDURE — 99214 OFFICE O/P EST MOD 30 MIN: CPT | Mod: S$GLB,,, | Performed by: STUDENT IN AN ORGANIZED HEALTH CARE EDUCATION/TRAINING PROGRAM

## 2023-06-28 PROCEDURE — 87088 URINE BACTERIA CULTURE: CPT | Performed by: STUDENT IN AN ORGANIZED HEALTH CARE EDUCATION/TRAINING PROGRAM

## 2023-06-28 PROCEDURE — 1159F MED LIST DOCD IN RCRD: CPT | Mod: CPTII,S$GLB,, | Performed by: STUDENT IN AN ORGANIZED HEALTH CARE EDUCATION/TRAINING PROGRAM

## 2023-06-28 PROCEDURE — 3078F PR MOST RECENT DIASTOLIC BLOOD PRESSURE < 80 MM HG: ICD-10-PCS | Mod: CPTII,S$GLB,, | Performed by: STUDENT IN AN ORGANIZED HEALTH CARE EDUCATION/TRAINING PROGRAM

## 2023-06-28 PROCEDURE — 99999 PR PBB SHADOW E&M-EST. PATIENT-LVL IV: ICD-10-PCS | Mod: PBBFAC,,, | Performed by: STUDENT IN AN ORGANIZED HEALTH CARE EDUCATION/TRAINING PROGRAM

## 2023-06-28 PROCEDURE — 3078F DIAST BP <80 MM HG: CPT | Mod: CPTII,S$GLB,, | Performed by: STUDENT IN AN ORGANIZED HEALTH CARE EDUCATION/TRAINING PROGRAM

## 2023-06-28 PROCEDURE — 87186 SC STD MICRODIL/AGAR DIL: CPT | Mod: 59 | Performed by: STUDENT IN AN ORGANIZED HEALTH CARE EDUCATION/TRAINING PROGRAM

## 2023-06-28 PROCEDURE — 3008F PR BODY MASS INDEX (BMI) DOCUMENTED: ICD-10-PCS | Mod: CPTII,S$GLB,, | Performed by: STUDENT IN AN ORGANIZED HEALTH CARE EDUCATION/TRAINING PROGRAM

## 2023-06-28 PROCEDURE — 3074F PR MOST RECENT SYSTOLIC BLOOD PRESSURE < 130 MM HG: ICD-10-PCS | Mod: CPTII,S$GLB,, | Performed by: STUDENT IN AN ORGANIZED HEALTH CARE EDUCATION/TRAINING PROGRAM

## 2023-06-28 PROCEDURE — 3008F BODY MASS INDEX DOCD: CPT | Mod: CPTII,S$GLB,, | Performed by: STUDENT IN AN ORGANIZED HEALTH CARE EDUCATION/TRAINING PROGRAM

## 2023-06-28 PROCEDURE — 1160F PR REVIEW ALL MEDS BY PRESCRIBER/CLIN PHARMACIST DOCUMENTED: ICD-10-PCS | Mod: CPTII,S$GLB,, | Performed by: STUDENT IN AN ORGANIZED HEALTH CARE EDUCATION/TRAINING PROGRAM

## 2023-06-28 PROCEDURE — 1159F PR MEDICATION LIST DOCUMENTED IN MEDICAL RECORD: ICD-10-PCS | Mod: CPTII,S$GLB,, | Performed by: STUDENT IN AN ORGANIZED HEALTH CARE EDUCATION/TRAINING PROGRAM

## 2023-06-28 PROCEDURE — 87086 URINE CULTURE/COLONY COUNT: CPT | Performed by: STUDENT IN AN ORGANIZED HEALTH CARE EDUCATION/TRAINING PROGRAM

## 2023-06-28 PROCEDURE — 99214 PR OFFICE/OUTPT VISIT, EST, LEVL IV, 30-39 MIN: ICD-10-PCS | Mod: S$GLB,,, | Performed by: STUDENT IN AN ORGANIZED HEALTH CARE EDUCATION/TRAINING PROGRAM

## 2023-06-28 PROCEDURE — 99999 PR PBB SHADOW E&M-EST. PATIENT-LVL IV: CPT | Mod: PBBFAC,,, | Performed by: STUDENT IN AN ORGANIZED HEALTH CARE EDUCATION/TRAINING PROGRAM

## 2023-06-28 RX ORDER — CLINDAMYCIN HYDROCHLORIDE 150 MG/1
150 CAPSULE ORAL EVERY 6 HOURS
Qty: 28 CAPSULE | Refills: 0 | Status: SHIPPED | OUTPATIENT
Start: 2023-06-28 | End: 2023-06-28 | Stop reason: CLARIF

## 2023-06-28 RX ORDER — PREDNISONE 20 MG/1
TABLET ORAL
COMMUNITY
Start: 2023-01-26 | End: 2023-07-17

## 2023-06-28 RX ORDER — MUPIROCIN 20 MG/G
OINTMENT TOPICAL 3 TIMES DAILY
Qty: 30 G | Refills: 0 | Status: SHIPPED | OUTPATIENT
Start: 2023-06-28 | End: 2023-07-26

## 2023-06-28 RX ORDER — CEPHALEXIN 500 MG/1
500 CAPSULE ORAL EVERY 6 HOURS
Qty: 28 CAPSULE | Refills: 0 | Status: SHIPPED | OUTPATIENT
Start: 2023-06-28 | End: 2023-07-05

## 2023-06-28 RX ORDER — TRIAMCINOLONE ACETONIDE 1 MG/G
PASTE DENTAL 2 TIMES DAILY
Qty: 5 G | Refills: 0 | Status: SHIPPED | OUTPATIENT
Start: 2023-06-28 | End: 2023-07-26

## 2023-07-01 LAB
BACTERIA UR CULT: ABNORMAL
BACTERIA UR CULT: ABNORMAL

## 2023-07-03 PROBLEM — Z79.899 DRUG-INDUCED IMMUNODEFICIENCY: Status: ACTIVE | Noted: 2023-07-03

## 2023-07-03 PROBLEM — D84.821 DRUG-INDUCED IMMUNODEFICIENCY: Status: ACTIVE | Noted: 2023-07-03

## 2023-07-03 NOTE — PROGRESS NOTES
Subjective:       Patient ID: Dina Hutton is a 37 y.o. female.   Chief Complaint: Finger Pain and Dental Pain      Blood in urine this morning. Period ended about 2-3 days ago. Mild pelvic abdominal pain. No fever. No back pain. No n/v/d.    Paronychia: R finger. Sxs started about 4 days ago after removing gel polish. No redness and swelling. No warmth    Hx Thyroid cancer/Post surgical hypothyroidism:  Stage I at diagnosis in 2020  -Hx of Thyroid cancer with removal.   -In remission  -On levothyroxine 100 mcg daily  Tolerating medication well.  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 Followed by rheumatology    Review of Systems   Constitutional:  Negative for fatigue and fever.   Respiratory:  Negative for cough and shortness of breath.    Cardiovascular:  Negative for chest pain.   Gastrointestinal:  Negative for abdominal pain, diarrhea, nausea and vomiting.   Genitourinary:  Positive for dysuria and hematuria.   Musculoskeletal:  Negative for back pain.   Neurological:  Negative for weakness.            Objective:        Physical Exam  HENT:      Head: Normocephalic and atraumatic.      Nose: Nose normal.      Mouth/Throat:      Mouth: Mucous membranes are moist.      Pharynx: Oropharynx is clear.   Eyes:      Extraocular Movements: Extraocular movements intact.      Conjunctiva/sclera: Conjunctivae normal.      Pupils: Pupils are equal, round, and reactive to light.   Cardiovascular:      Rate and Rhythm: Normal rate.   Pulmonary:      Effort: Pulmonary effort is normal.   Musculoskeletal:         General: No swelling. Normal range of motion.      Cervical back: Normal range of motion.      Right lower leg: No edema.      Left lower leg: No edema.   Skin:     General: Skin is warm.      Findings: Lesion present. No rash.   Neurological:      General: No focal deficit  present.      Mental Status: She is alert and oriented to person, place, and time.      Motor: No weakness.   Psychiatric:         Mood and Affect: Mood normal.         Thought Content: Thought content normal.       Assessment:         Problem List Items Addressed This Visit          Immunology/Multi System    Drug-induced immunodeficiency       Oncology    Thyroid cancer (Chronic)     Other Visit Diagnoses       Paronychia of finger of right hand    -  Primary    Relevant Medications    mupirocin (BACTROBAN) 2 % ointment    cephALEXin (KEFLEX) 500 MG capsule    Urinary frequency        Relevant Medications    cephALEXin (KEFLEX) 500 MG capsule    Other Relevant Orders    Urine culture (Completed)    Urinalysis (Completed)    Aphthous ulcer        Relevant Medications    triamcinolone acetonide 0.1% (KENALOG) 0.1 % paste              Plan:         1. Paronychia of finger of right hand  -     Discontinue: clindamycin (CLEOCIN) 150 MG capsule; Take 1 capsule (150 mg total) by mouth every 6 (six) hours. for 7 days  Dispense: 28 capsule; Refill: 0  -     mupirocin (BACTROBAN) 2 % ointment; Apply topically 3 (three) times daily.  Dispense: 30 g; Refill: 0  -     cephALEXin (KEFLEX) 500 MG capsule; Take 1 capsule (500 mg total) by mouth every 6 (six) hours. for 7 days  Dispense: 28 capsule; Refill: 0    2. Urinary frequency  -     Urine culture; Future; Expected date: 06/28/2023  -     Urinalysis; Future; Expected date: 06/28/2023  -     cephALEXin (KEFLEX) 500 MG capsule; Take 1 capsule (500 mg total) by mouth every 6 (six) hours. for 7 days  Dispense: 28 capsule; Refill: 0    3. Aphthous ulcer  -     triamcinolone acetonide 0.1% (KENALOG) 0.1 % paste; Place onto teeth 2 (two) times daily.  Dispense: 5 g; Refill: 0    4. Drug-induced immunodeficiency  Stable on medications, continue regimen    5. Thyroid cancer  In remission, Stable on medications, continue regimen          Lona Pham MD

## 2023-07-17 ENCOUNTER — HOSPITAL ENCOUNTER (EMERGENCY)
Facility: HOSPITAL | Age: 38
Discharge: HOME | End: 2023-07-17
Attending: STUDENT IN AN ORGANIZED HEALTH CARE EDUCATION/TRAINING PROGRAM
Payer: COMMERCIAL

## 2023-07-17 VITALS
HEART RATE: 75 BPM | WEIGHT: 125 LBS | BODY MASS INDEX: 26.97 KG/M2 | HEIGHT: 57 IN | RESPIRATION RATE: 16 BRPM | SYSTOLIC BLOOD PRESSURE: 118 MMHG | DIASTOLIC BLOOD PRESSURE: 55 MMHG | OXYGEN SATURATION: 98 % | TEMPERATURE: 98 F

## 2023-07-17 DIAGNOSIS — S16.1XXA STRAIN OF NECK MUSCLE, INITIAL ENCOUNTER: Primary | ICD-10-CM

## 2023-07-17 LAB
B-HCG UR QL: NEGATIVE
CTP QC/QA: YES

## 2023-07-17 PROCEDURE — 81025 URINE PREGNANCY TEST: CPT | Performed by: PHYSICIAN ASSISTANT

## 2023-07-17 PROCEDURE — 63600175 PHARM REV CODE 636 W HCPCS: Performed by: PHYSICIAN ASSISTANT

## 2023-07-17 PROCEDURE — 96372 THER/PROPH/DIAG INJ SC/IM: CPT | Performed by: PHYSICIAN ASSISTANT

## 2023-07-17 PROCEDURE — 25000003 PHARM REV CODE 250: Performed by: PHYSICIAN ASSISTANT

## 2023-07-17 PROCEDURE — 99285 EMERGENCY DEPT VISIT HI MDM: CPT | Mod: 25

## 2023-07-17 RX ORDER — ACETAMINOPHEN 500 MG
1000 TABLET ORAL
Status: COMPLETED | OUTPATIENT
Start: 2023-07-17 | End: 2023-07-17

## 2023-07-17 RX ORDER — METHOCARBAMOL 500 MG/1
1000 TABLET, FILM COATED ORAL 3 TIMES DAILY PRN
Qty: 20 TABLET | Refills: 0 | Status: SHIPPED | OUTPATIENT
Start: 2023-07-17 | End: 2023-07-22

## 2023-07-17 RX ORDER — LIDOCAINE 50 MG/G
1 PATCH TOPICAL
Status: DISCONTINUED | OUTPATIENT
Start: 2023-07-17 | End: 2023-07-17 | Stop reason: HOSPADM

## 2023-07-17 RX ORDER — DEXAMETHASONE SODIUM PHOSPHATE 4 MG/ML
12 INJECTION, SOLUTION INTRA-ARTICULAR; INTRALESIONAL; INTRAMUSCULAR; INTRAVENOUS; SOFT TISSUE
Status: COMPLETED | OUTPATIENT
Start: 2023-07-17 | End: 2023-07-17

## 2023-07-17 RX ORDER — PREDNISONE 20 MG/1
TABLET ORAL
Qty: 7 TABLET | Refills: 0 | Status: SHIPPED | OUTPATIENT
Start: 2023-07-17 | End: 2023-07-23

## 2023-07-17 RX ORDER — DIAZEPAM 2 MG/1
2 TABLET ORAL
Status: COMPLETED | OUTPATIENT
Start: 2023-07-17 | End: 2023-07-17

## 2023-07-17 RX ORDER — KETOROLAC TROMETHAMINE 30 MG/ML
30 INJECTION, SOLUTION INTRAMUSCULAR; INTRAVENOUS
Status: COMPLETED | OUTPATIENT
Start: 2023-07-17 | End: 2023-07-17

## 2023-07-17 RX ADMIN — DIAZEPAM 2 MG: 2 TABLET ORAL at 01:07

## 2023-07-17 RX ADMIN — KETOROLAC TROMETHAMINE 30 MG: 30 INJECTION, SOLUTION INTRAMUSCULAR; INTRAVENOUS at 01:07

## 2023-07-17 RX ADMIN — ACETAMINOPHEN 1000 MG: 500 TABLET ORAL at 02:07

## 2023-07-17 RX ADMIN — DEXAMETHASONE SODIUM PHOSPHATE 12 MG: 4 INJECTION INTRA-ARTICULAR; INTRALESIONAL; INTRAMUSCULAR; INTRAVENOUS; SOFT TISSUE at 03:07

## 2023-07-17 RX ADMIN — DIAZEPAM 2 MG: 2 TABLET ORAL at 03:07

## 2023-07-17 RX ADMIN — LIDOCAINE PATCH 5% 1 PATCH: 700 PATCH TOPICAL at 02:07

## 2023-07-17 NOTE — ED TRIAGE NOTES
Patient presents to ED via private transportation with complaints of neck pain. States that her neck (right side) stated hurting Saturday morning and the pain now radiates down her right shoulder.

## 2023-07-17 NOTE — ED PROVIDER NOTES
Encounter Date: 7/17/2023       History     Chief Complaint   Patient presents with    Neck Pain     Pain to rt neck X a couple of days, pain has traveled down into shoulder and rt arm, unsure of injury, but did go work out Thursday night     36yo F presents to ED with chief complaint atraumatic R sided neck pain x 2d.    Atraumatic pain to the right-sided neck since Thursday morning. Patient states she did recently start working out again, did a whole-body work out on Thursday.  She denies any trauma to her neck.  She denies any neck swelling.  No odynophagia.  No recent illness.  She states pain is sharp, severe, constant, worse with range of motion of her neck, worse with range of motion of her right shoulder.  Limited active range of motion of her shoulder secondary to neck discomfort.  Denies arm weakness.  No radicular complaints.  Denies history of cervical radiculopathy or cervical spine issues in the past.  No history of cervical spine surgery.  No chest pain.  No shortness of breath.  No cough.  No headache.  No fever.  No reported photophobia.  Took Benadryl this morning without relief.    R hand dominant    PMH:  Anemia  GERD  Depression  PONV  Thyroid CA s/p resection  Postsurgical hypothyroidism    Review of patient's allergies indicates:   Allergen Reactions    Vancomycin analogues Hives    Bactrim [sulfamethoxazole-trimethoprim] Nausea And Vomiting    Sulfa (sulfonamide antibiotics) Rash    Vicodin [hydrocodone-acetaminophen] Nausea Only     Patient states she is fine with other pain medication     Past Medical History:   Diagnosis Date    Anemia     Breast abscess 4/25/2020    GERD (gastroesophageal reflux disease)     History of fourth degree perineal laceration 8/21/2019    Hypoglycemia     Hypoglycemia     Mastitis 4/20/2020    Mental disorder     depression    PONV (postoperative nausea and vomiting)     Thyroid cancer     Thyroid disease      Past Surgical History:   Procedure Laterality Date     BREAST CYST EXCISION      COLONOSCOPY      ESOPHAGOGASTRODUODENOSCOPY      ESOPHAGOGASTRODUODENOSCOPY N/A 06/14/2021    Procedure: EGD (ESOPHAGOGASTRODUODENOSCOPY);  Surgeon: Farooq Cullen MD;  Location: [facility] ENDO;  Service: Endoscopy;  Laterality: N/A;  ongoing epigastric pain, burning, nausea, vomiting  Lives on [facility], want first available but if there are openings on , would prefer that location  cOVID test on 6/11/21 at [facility]    INCISION AND DRAINAGE OF BREAST Left 04/23/2020    Procedure: INCISION AND DRAINAGE, BREAST;  Surgeon: Mason Rubalcava III, MD;  Location: [facility] OR;  Service: General;  Laterality: Left;    THYROIDECTOMY Bilateral 09/29/2020    Procedure: THYROIDECTOMY with central neck dissection;  Surgeon: Kayla Lovelace MD;  Location: [facility] OR;  Service: General;  Laterality: Bilateral;     Family History   Problem Relation Age of Onset    Hypertension Mother     Hyperlipidemia Mother     Stroke Mother     Arthritis Maternal Grandmother     Mental illness Maternal Grandmother         Dementia & Alzheimer    Cancer Neg Hx      Social History     Tobacco Use    Smoking status: Never    Smokeless tobacco: Never    Tobacco comments:     Allergies to it   Substance Use Topics    Alcohol use: Yes     Alcohol/week: 1.0 standard drink     Types: 1 Glasses of wine per week     Comment: On special occasions or events    Drug use: Never     Review of Systems   Constitutional:  Negative for fever.   HENT:  Negative for facial swelling.    Eyes:  Negative for photophobia.   Respiratory:  Negative for cough.    Cardiovascular:  Negative for chest pain.   Musculoskeletal:  Positive for neck pain and neck stiffness. Negative for back pain.   Skin:  Negative for rash and wound.   Neurological:  Negative for weakness and headaches.     Physical Exam     Initial Vitals [07/17/23 0038]   BP Pulse Resp Temp SpO2   136/66 86 14 98.6 °F (37 °C) 98 %      MAP       --         Physical Exam    Nursing  note and vitals reviewed.  Constitutional: She appears well-developed and well-nourished. She is not diaphoretic. No distress.   HENT:   Head: Normocephalic and atraumatic.   Neck: Neck supple.   Tenderness to palpation to the right-sided lateral neck overlying the SCM and right-sided posterolateral cervical paraspinal musculature.  No midline spinal tenderness.  Tenderness to palpation to the right-sided proximal, posterior trapezius musculature.  No carotid bruit.  No stridor.  Retains full active range of motion of cervical spine with some discomfort.   Normal range of motion.  Cardiovascular:  Intact distal pulses.           2+ radial bilaterally   Musculoskeletal:      Cervical back: Normal range of motion and neck supple.      Comments: Limited active range of motion of right shoulder secondary to neck discomfort.     Neurological: She is alert and oriented to person, place, and time. GCS score is 15. GCS eye subscore is 4. GCS verbal subscore is 5. GCS motor subscore is 6.   Symmetric  strength   Skin: Skin is warm.   Psychiatric: Thought content normal.   Mildly anxious       ED Course   Procedures  Labs Reviewed   POCT URINE PREGNANCY          Imaging Results              CT Cervical Spine Without Contrast (Final result)  Result time 07/17/23 03:32:50      Final result by Ben Madrigal MD (07/17/23 03:32:50)                   Impression:      Mild chronic changes are noted, there is no evidence for acute cervical spine fracture deformity.      Electronically signed by: Ben Madrigal  Date:    07/17/2023  Time:    03:32               Narrative:    EXAMINATION:  CT CERVICAL SPINE WITHOUT CONTRAST    CLINICAL HISTORY:  Neck pain, acute, no red flags;    TECHNIQUE:  Low dose axial images, sagittal and coronal reformations were performed though the cervical spine.  Contrast was not administered.    COMPARISON:  CT examination of the cervical spine October 10, 2021    FINDINGS:  There is straightening  of the cervical spine.  Mild chronic endplate change noted.  There is no evidence for high-grade spondylolisthesis.  There is no evidence for high-grade or acute compression fracture deformity.  There is no evidence for facet dislocation or facet fracture deformity.  The occipital condyles articulate appropriately with the superior articular facets of C1 at the craniocervical junction.    There are mild chronic changes of the cervical spine.  There is no evidence for high-grade spinal canal or foraminal stenosis and no evidence for large focal disc protrusion.    On close evaluation of available imaging there is no evidence for acute cervical spine fracture deformity.  Postoperative change of the thyroid bed is noted.                                       Medications   LIDOcaine 5 % patch 1 patch (1 patch Transdermal Patch Applied 7/17/23 0227)   diazePAM tablet 2 mg (2 mg Oral Given 7/17/23 0112)   ketorolac injection 30 mg (30 mg Intramuscular Given 7/17/23 0111)   acetaminophen tablet 1,000 mg (1,000 mg Oral Given 7/17/23 0226)   diazePAM tablet 2 mg (2 mg Oral Given 7/17/23 0334)   dexAMETHasone injection 12 mg (12 mg Intramuscular Given 7/17/23 0356)     Medical Decision Making:   Differential Diagnosis:   Cervical strain, arthritis, epidural abscess, meningitis  ED Management:  No HA.  No photophobia.  Retains full active range of motion of neck although with some discomfort.  No convincing evidence of meningitis or infectious process.           ED Course as of 07/17/23 0429   Mon Jul 17, 2023 0207 No relief on re-evaluation; will add Tylenol and Lidoderm to see if any improvement. [SM]   0693 Patient states still with severe pain, CT cervical spine pending.     CT without any acute bony abnormality, mild chronic arthritic changes, straightening of cervical lordosis consistent with cervical strain/spasm.  Advised patient to continue with muscle relaxers, given short course of systemic corticosteroids given  radicular component.  Advised follow-up with pain medicine for re-evaluation, outpatient MRI for any persistent symptoms.  Discussed red flags reasons for return.  Patient and  are comfortable with current plan.    [SM]      ED Course User Index  [SM] Babak Rivera PA-C                 Clinical Impression:   Final diagnoses:  [S16.1XXA] Strain of neck muscle, initial encounter (Primary)        ED Disposition Condition    Discharge Stable          ED Prescriptions       Medication Sig Dispense Start Date End Date Auth. Provider    predniSONE (DELTASONE) 20 MG tablet Take 2 tablets (40 mg total) by mouth once daily for 2 days, THEN 1 tablet (20 mg total) once daily for 2 days, THEN 0.5 tablets (10 mg total) once daily for 2 days. 7 tablet 7/17/2023 7/23/2023 Babak Rivera PA-C    methocarbamoL (ROBAXIN) 500 MG Tab Take 2 tablets (1,000 mg total) by mouth 3 (three) times daily as needed (Stiffness/soreness). 20 tablet 7/17/2023 7/22/2023 Babak Rivera PA-C          Follow-up Information       Follow up With Specialties Details Why Contact Info    Lona Pham MD Internal Medicine Schedule an appointment as soon as possible for a visit  For reevaluation [address]  [place] [address]  [phone]               Babak Rivera PA-C  07/17/23 7639

## 2023-07-17 NOTE — DISCHARGE INSTRUCTIONS
Tylenol or ibuprofen as needed for pain.  Robaxin for continued stiffness/soreness. Be aware, this medication is sedating.  Do not mix with alcohol or any other sedating medications.  Do not drive or operate machinery when taking this medication.  Take the prednisone as prescribed.  You can also try over-the-counter lidocaine patches to the area to see if there is any relief.  Ice or heating pad to the area may also help with discomfort.    Please contact your primary care provider for follow-up and re-evaluation.  Use your insurance card to establish care with a local pain management physician for re-evaluation of neck pain.    Return to this ED if you continue with worsening pain despite treatment, if you develop fever or if you begin with severe headache, if you develop arm weakness, if any other problems occur.

## 2023-07-26 ENCOUNTER — OFFICE VISIT (OUTPATIENT)
Dept: OBSTETRICS AND GYNECOLOGY | Facility: CLINIC | Age: 38
End: 2023-07-26
Payer: COMMERCIAL

## 2023-07-26 ENCOUNTER — PATIENT MESSAGE (OUTPATIENT)
Dept: OBSTETRICS AND GYNECOLOGY | Facility: CLINIC | Age: 38
End: 2023-07-26

## 2023-07-26 VITALS — SYSTOLIC BLOOD PRESSURE: 106 MMHG | BODY MASS INDEX: 27.05 KG/M2 | HEIGHT: 57 IN | DIASTOLIC BLOOD PRESSURE: 70 MMHG

## 2023-07-26 DIAGNOSIS — Z11.51 SCREENING FOR HPV (HUMAN PAPILLOMAVIRUS): ICD-10-CM

## 2023-07-26 DIAGNOSIS — F32.A DEPRESSION, UNSPECIFIED DEPRESSION TYPE: ICD-10-CM

## 2023-07-26 DIAGNOSIS — Z30.45 ENCOUNTER FOR SURVEILLANCE OF TRANSDERMAL PATCH HORMONAL CONTRACEPTIVE DEVICE: ICD-10-CM

## 2023-07-26 DIAGNOSIS — R39.9 UTI SYMPTOMS: ICD-10-CM

## 2023-07-26 DIAGNOSIS — Z12.4 SCREENING FOR CERVICAL CANCER: Primary | ICD-10-CM

## 2023-07-26 PROCEDURE — 3074F PR MOST RECENT SYSTOLIC BLOOD PRESSURE < 130 MM HG: ICD-10-PCS | Mod: CPTII,S$GLB,, | Performed by: OBSTETRICS & GYNECOLOGY

## 2023-07-26 PROCEDURE — 1160F RVW MEDS BY RX/DR IN RCRD: CPT | Mod: CPTII,S$GLB,, | Performed by: OBSTETRICS & GYNECOLOGY

## 2023-07-26 PROCEDURE — 99395 PREV VISIT EST AGE 18-39: CPT | Mod: S$GLB,,, | Performed by: OBSTETRICS & GYNECOLOGY

## 2023-07-26 PROCEDURE — 87186 SC STD MICRODIL/AGAR DIL: CPT | Performed by: OBSTETRICS & GYNECOLOGY

## 2023-07-26 PROCEDURE — 87088 URINE BACTERIA CULTURE: CPT | Performed by: OBSTETRICS & GYNECOLOGY

## 2023-07-26 PROCEDURE — 1159F PR MEDICATION LIST DOCUMENTED IN MEDICAL RECORD: ICD-10-PCS | Mod: CPTII,S$GLB,, | Performed by: OBSTETRICS & GYNECOLOGY

## 2023-07-26 PROCEDURE — 3074F SYST BP LT 130 MM HG: CPT | Mod: CPTII,S$GLB,, | Performed by: OBSTETRICS & GYNECOLOGY

## 2023-07-26 PROCEDURE — 1160F PR REVIEW ALL MEDS BY PRESCRIBER/CLIN PHARMACIST DOCUMENTED: ICD-10-PCS | Mod: CPTII,S$GLB,, | Performed by: OBSTETRICS & GYNECOLOGY

## 2023-07-26 PROCEDURE — 3078F DIAST BP <80 MM HG: CPT | Mod: CPTII,S$GLB,, | Performed by: OBSTETRICS & GYNECOLOGY

## 2023-07-26 PROCEDURE — 88175 CYTOPATH C/V AUTO FLUID REDO: CPT | Performed by: OBSTETRICS & GYNECOLOGY

## 2023-07-26 PROCEDURE — 87077 CULTURE AEROBIC IDENTIFY: CPT | Mod: 59 | Performed by: OBSTETRICS & GYNECOLOGY

## 2023-07-26 PROCEDURE — 99999 PR PBB SHADOW E&M-EST. PATIENT-LVL III: CPT | Mod: PBBFAC,,, | Performed by: OBSTETRICS & GYNECOLOGY

## 2023-07-26 PROCEDURE — 99395 PR PREVENTIVE VISIT,EST,18-39: ICD-10-PCS | Mod: S$GLB,,, | Performed by: OBSTETRICS & GYNECOLOGY

## 2023-07-26 PROCEDURE — 87086 URINE CULTURE/COLONY COUNT: CPT | Performed by: OBSTETRICS & GYNECOLOGY

## 2023-07-26 PROCEDURE — 3008F BODY MASS INDEX DOCD: CPT | Mod: CPTII,S$GLB,, | Performed by: OBSTETRICS & GYNECOLOGY

## 2023-07-26 PROCEDURE — 3078F PR MOST RECENT DIASTOLIC BLOOD PRESSURE < 80 MM HG: ICD-10-PCS | Mod: CPTII,S$GLB,, | Performed by: OBSTETRICS & GYNECOLOGY

## 2023-07-26 PROCEDURE — 99999 PR PBB SHADOW E&M-EST. PATIENT-LVL III: ICD-10-PCS | Mod: PBBFAC,,, | Performed by: OBSTETRICS & GYNECOLOGY

## 2023-07-26 PROCEDURE — 1159F MED LIST DOCD IN RCRD: CPT | Mod: CPTII,S$GLB,, | Performed by: OBSTETRICS & GYNECOLOGY

## 2023-07-26 PROCEDURE — 3008F PR BODY MASS INDEX (BMI) DOCUMENTED: ICD-10-PCS | Mod: CPTII,S$GLB,, | Performed by: OBSTETRICS & GYNECOLOGY

## 2023-07-26 RX ORDER — NORELGESTROMIN AND ETHINYL ESTRADIOL 150; 35 UG/D; UG/D
1 PATCH TRANSDERMAL WEEKLY
Qty: 12 PATCH | Refills: 3 | Status: SHIPPED | OUTPATIENT
Start: 2023-07-26 | End: 2024-07-25

## 2023-07-26 NOTE — PROGRESS NOTES
"  Chief Complaint: Well Woman Exam     HPI:      Dina Hutton is a 37 y.o.  who presents today for well woman exam.  LMP: Patient's last menstrual period was 2023 (exact date).  No issues, problems, or complaints. Specifically, patient denies abnormal vaginal bleeding, discharge, pelvic pain, urinary problems, or changes in appetite. Ms. Hutton is currently sexually active with a single male partner. She is currently using contraceptive patches for contraception. She declines STD screening today.    Previous Pap: NILM (2019) - HPV not collected  Previous Mammogram: BiRads: 1 T-C Score: 7.45% (3/29/21)     OB History          2    Para   2    Term   2            AB        Living   2         SAB        IAB        Ectopic        Multiple   0    Live Births   2               ROS:     GENERAL: Denies unintentional weight gain or weight loss. Feeling well overall.   SKIN: Denies lesions. +Rashes from generic contraceptive patches.   HEENT: Denies headaches, or vision changes.   CARDIOVASCULAR: Denies palpitations or chest pain.   RESPIRATORY: Denies shortness of breath or dyspnea on exertion.  BREASTS: Denies lumps or nipple discharge.   ABDOMEN: Denies constipation, vomiting, change in appetite. +nausea and reflux for the past year. + loose stools.   URINARY: Recent UTI and still feeling some urgency and frequency.  NEUROLOGIC: Denies syncope or weakness.   PSYCHIATRIC: Reports significant anhedonia. Has not yet been able to get established with a therapist.    Physical Exam:      PHYSICAL EXAM:  /70   Ht 4' 9" (1.448 m)   LMP 2023 (Exact Date)   BMI 27.05 kg/m²   Body mass index is 27.05 kg/m².     APPEARANCE: Well nourished, well developed, in no acute distress.  PSYCH: Appropriate mood and affect.  SKIN: No acne or hirsutism  NECK: Neck symmetric without masses  NODES: No inguinal, axillary, or supraclavicular lymph node enlargement  ABDOMEN: Soft.  No tenderness or masses.  "   CARDIOVASCULAR: No edema of peripheral extremities  BREASTS: Symmetrical, no visible skin lesions. No palpable masses. No nipple discharge bilaterally.  PELVIC: Normal external genitalia without lesions.  Normal hair distribution.  Adequate perineal body, normal urethral meatus.  Vagina moist and well rugated. Without lesions. Vagina without abnormal discharge.  Small amount of menstrual blood present.Cervix pink, without lesions, discharge or tenderness.  No significant cystocele or rectocele.  Bimanual exam shows no midline or adnexal masses.      Assessment/Plan:     Screening for cervical cancer  -     Liquid-Based Pap Smear, Screening    Screening for HPV (human papillomavirus)  -     HPV High Risk Genotypes, PCR    UTI symptoms  -     Urine culture    Depression, unspecified depression type  -     Ambulatory referral/consult to Psychiatry; Future; Expected date: 08/02/2023  -     Pt given contact info for local psychologists    Encounter for surveillance of transdermal patch hormonal contraceptive device  -     norelgestromin-ethinyl estradiol (XULANE) 150-35 mcg/24 hr; Place 1 patch onto the skin once a week.  Dispense: 12 patch; Refill: 3      Follow up in about 1 year (around 7/26/2024) for Annual Exam.    Counseling:     Patient was counseled today on current ASCCP pap guidelines, the recommendation for yearly physical exams, safe driving habits, breast self awareness. She is to see her PCP for other health maintenance.     Use of the [facility] Patient Portal discussed and encouraged during today's visit.

## 2023-07-28 LAB
BACTERIA UR CULT: ABNORMAL
BACTERIA UR CULT: ABNORMAL

## 2023-07-31 ENCOUNTER — PATIENT MESSAGE (OUTPATIENT)
Dept: OBSTETRICS AND GYNECOLOGY | Facility: CLINIC | Age: 38
End: 2023-07-31
Payer: COMMERCIAL

## 2023-07-31 ENCOUNTER — PATIENT MESSAGE (OUTPATIENT)
Dept: PRIMARY CARE CLINIC | Facility: CLINIC | Age: 38
End: 2023-07-31
Payer: COMMERCIAL

## 2023-07-31 DIAGNOSIS — N30.00 ACUTE CYSTITIS WITHOUT HEMATURIA: Primary | ICD-10-CM

## 2023-07-31 LAB
FINAL PATHOLOGIC DIAGNOSIS: NORMAL
HPV HR 12 DNA SPEC QL NAA+PROBE: NEGATIVE
HPV16 AG SPEC QL: NEGATIVE
HPV18 DNA SPEC QL NAA+PROBE: NEGATIVE
Lab: NORMAL

## 2023-07-31 RX ORDER — CIPROFLOXACIN 500 MG/1
500 TABLET ORAL 2 TIMES DAILY
Qty: 6 TABLET | Refills: 0 | Status: SHIPPED | OUTPATIENT
Start: 2023-07-31 | End: 2023-08-03

## 2023-08-08 ENCOUNTER — PATIENT MESSAGE (OUTPATIENT)
Dept: OBSTETRICS AND GYNECOLOGY | Facility: CLINIC | Age: 38
End: 2023-08-08
Payer: COMMERCIAL

## 2023-08-08 ENCOUNTER — LAB VISIT (OUTPATIENT)
Dept: LAB | Facility: HOSPITAL | Age: 38
End: 2023-08-08
Attending: OBSTETRICS & GYNECOLOGY
Payer: COMMERCIAL

## 2023-08-08 DIAGNOSIS — S46.819D STRAIN OF TRAPEZIUS MUSCLE, UNSPECIFIED LATERALITY, SUBSEQUENT ENCOUNTER: Primary | ICD-10-CM

## 2023-08-08 DIAGNOSIS — R30.0 DYSURIA: Primary | ICD-10-CM

## 2023-08-08 DIAGNOSIS — R30.0 DYSURIA: ICD-10-CM

## 2023-08-08 PROCEDURE — 87086 URINE CULTURE/COLONY COUNT: CPT | Performed by: OBSTETRICS & GYNECOLOGY

## 2023-08-08 PROCEDURE — 87147 CULTURE TYPE IMMUNOLOGIC: CPT | Performed by: OBSTETRICS & GYNECOLOGY

## 2023-08-08 PROCEDURE — 87088 URINE BACTERIA CULTURE: CPT | Performed by: OBSTETRICS & GYNECOLOGY

## 2023-08-08 RX ORDER — LEVOFLOXACIN 750 MG/1
750 TABLET ORAL DAILY
Qty: 7 TABLET | Refills: 0 | Status: SHIPPED | OUTPATIENT
Start: 2023-08-08 | End: 2023-09-06

## 2023-08-08 NOTE — TELEPHONE ENCOUNTER
Positive urine culture on 6/28 then again on 7/26.  She was prescribed Keflex then Cipro.  Still having symptoms after finishing Cipro.  Dropping off urine sample today.     I didn't pend any medication, wasn't sure which one you would recommend based on recent cultures

## 2023-08-09 RX ORDER — AMOXICILLIN 500 MG/1
500 CAPSULE ORAL EVERY 12 HOURS
Qty: 20 CAPSULE | Refills: 0 | Status: SHIPPED | OUTPATIENT
Start: 2023-08-09 | End: 2023-08-19

## 2023-08-10 LAB — BACTERIA UR CULT: ABNORMAL

## 2023-08-11 ENCOUNTER — TELEPHONE (OUTPATIENT)
Dept: OBSTETRICS AND GYNECOLOGY | Facility: CLINIC | Age: 38
End: 2023-08-11
Payer: COMMERCIAL

## 2023-08-11 ENCOUNTER — TELEPHONE (OUTPATIENT)
Dept: ORTHOPEDICS | Facility: CLINIC | Age: 38
End: 2023-08-11
Payer: COMMERCIAL

## 2023-08-11 ENCOUNTER — PATIENT MESSAGE (OUTPATIENT)
Dept: OBSTETRICS AND GYNECOLOGY | Facility: CLINIC | Age: 38
End: 2023-08-11
Payer: COMMERCIAL

## 2023-08-11 DIAGNOSIS — M50.30 DDD (DEGENERATIVE DISC DISEASE), CERVICAL: Primary | ICD-10-CM

## 2023-08-11 NOTE — TELEPHONE ENCOUNTER
----- Message from Serena Post MD sent at 8/9/2023  7:00 PM CDT -----  Please call patient and let her know that I need her to take a different antibiotic.   She can stop the previous medication and start the new medication (amoxacillin) which she will take twice daily for 10 days.   Thank you!      CALLED PT, NO ANSWER. MAILBOX FULL, UNABLE TO LEAVE MESSAGE.

## 2023-08-14 ENCOUNTER — OFFICE VISIT (OUTPATIENT)
Dept: ORTHOPEDICS | Facility: CLINIC | Age: 38
End: 2023-08-14
Payer: COMMERCIAL

## 2023-08-14 ENCOUNTER — PATIENT MESSAGE (OUTPATIENT)
Dept: OBSTETRICS AND GYNECOLOGY | Facility: CLINIC | Age: 38
End: 2023-08-14
Payer: COMMERCIAL

## 2023-08-14 ENCOUNTER — HOSPITAL ENCOUNTER (OUTPATIENT)
Dept: RADIOLOGY | Facility: HOSPITAL | Age: 38
Discharge: HOME | End: 2023-08-14
Attending: ORTHOPAEDIC SURGERY
Payer: COMMERCIAL

## 2023-08-14 VITALS — WEIGHT: 131.63 LBS | HEIGHT: 59 IN | BODY MASS INDEX: 26.54 KG/M2

## 2023-08-14 DIAGNOSIS — M50.30 DDD (DEGENERATIVE DISC DISEASE), CERVICAL: ICD-10-CM

## 2023-08-14 DIAGNOSIS — M54.12 CERVICAL RADICULOPATHY: ICD-10-CM

## 2023-08-14 DIAGNOSIS — M48.02 SPINAL STENOSIS, CERVICAL REGION: ICD-10-CM

## 2023-08-14 DIAGNOSIS — M47.812 CERVICAL SPONDYLOSIS: Primary | ICD-10-CM

## 2023-08-14 DIAGNOSIS — S46.819D STRAIN OF TRAPEZIUS MUSCLE, UNSPECIFIED LATERALITY, SUBSEQUENT ENCOUNTER: ICD-10-CM

## 2023-08-14 PROCEDURE — 99999 PR PBB SHADOW E&M-EST. PATIENT-LVL IV: ICD-10-PCS | Mod: PBBFAC,,, | Performed by: ORTHOPAEDIC SURGERY

## 2023-08-14 PROCEDURE — 1160F PR REVIEW ALL MEDS BY PRESCRIBER/CLIN PHARMACIST DOCUMENTED: ICD-10-PCS | Mod: CPTII,S$GLB,, | Performed by: ORTHOPAEDIC SURGERY

## 2023-08-14 PROCEDURE — 3008F BODY MASS INDEX DOCD: CPT | Mod: CPTII,S$GLB,, | Performed by: ORTHOPAEDIC SURGERY

## 2023-08-14 PROCEDURE — 72050 X-RAY EXAM NECK SPINE 4/5VWS: CPT | Mod: 26,,, | Performed by: RADIOLOGY

## 2023-08-14 PROCEDURE — 1160F RVW MEDS BY RX/DR IN RCRD: CPT | Mod: CPTII,S$GLB,, | Performed by: ORTHOPAEDIC SURGERY

## 2023-08-14 PROCEDURE — 99999 PR PBB SHADOW E&M-EST. PATIENT-LVL IV: CPT | Mod: PBBFAC,,, | Performed by: ORTHOPAEDIC SURGERY

## 2023-08-14 PROCEDURE — 1159F MED LIST DOCD IN RCRD: CPT | Mod: CPTII,S$GLB,, | Performed by: ORTHOPAEDIC SURGERY

## 2023-08-14 PROCEDURE — 72050 XR CERVICAL SPINE AP LAT WITH FLEX EXTEN: ICD-10-PCS | Mod: 26,,, | Performed by: RADIOLOGY

## 2023-08-14 PROCEDURE — 72050 X-RAY EXAM NECK SPINE 4/5VWS: CPT | Mod: TC

## 2023-08-14 PROCEDURE — 1159F PR MEDICATION LIST DOCUMENTED IN MEDICAL RECORD: ICD-10-PCS | Mod: CPTII,S$GLB,, | Performed by: ORTHOPAEDIC SURGERY

## 2023-08-14 PROCEDURE — 3008F PR BODY MASS INDEX (BMI) DOCUMENTED: ICD-10-PCS | Mod: CPTII,S$GLB,, | Performed by: ORTHOPAEDIC SURGERY

## 2023-08-14 PROCEDURE — 99204 PR OFFICE/OUTPT VISIT, NEW, LEVL IV, 45-59 MIN: ICD-10-PCS | Mod: S$GLB,,, | Performed by: ORTHOPAEDIC SURGERY

## 2023-08-14 PROCEDURE — 99204 OFFICE O/P NEW MOD 45 MIN: CPT | Mod: S$GLB,,, | Performed by: ORTHOPAEDIC SURGERY

## 2023-08-14 RX ORDER — METHYLPREDNISOLONE 4 MG/1
TABLET ORAL
Qty: 21 TABLET | Refills: 0 | Status: SHIPPED | OUTPATIENT
Start: 2023-08-14 | End: 2023-11-27

## 2023-08-14 NOTE — PROGRESS NOTES
DATE: 8/14/2023  PATIENT: Dina Hutton    Attending Physician: Jass Bazan M.D.    CHIEF COMPLAINT: neck and b/l shoulder pain    HISTORY:  Dina Hutton is a 38 y.o. female w/ a hx of fibromyalgia, RA (offered Humira but pt declined), and thyroid cancer (s/p total thyroidectomy) presents for initial evaluation of neck and b/l shoulder pain (Neck - 8, Shoulder- 8). The pain has been present for a few years. The patient describes the pain as dull and it radiates down b/l shoulders. The pain is worse with activity and improved by rest. There is BUE associated numbness and tingling. There is no subjective weakness. Prior treatments have included OTC meds and PT (helped), but no IFEOMA or surgery.     The Patient denies myelopathic symptoms such as handwriting changes or difficulty with buttons/coins/keys. Denies perineal paresthesias, bowel/bladder dysfunction.    The patient does not smoke, have DM or endorse IVDU. The patient is not on any blood thinners and does not take chronic narcotics. She is a stay-at-home mom.    PAST MEDICAL/SURGICAL HISTORY:  Past Medical History:   Diagnosis Date    Anemia     Breast abscess 4/25/2020    GERD (gastroesophageal reflux disease)     History of fourth degree perineal laceration 8/21/2019    Hypoglycemia     Hypoglycemia     Mastitis 4/20/2020    Mental disorder     depression    PONV (postoperative nausea and vomiting)     Thyroid cancer     Thyroid disease      Past Surgical History:   Procedure Laterality Date    BREAST CYST EXCISION      COLONOSCOPY      ESOPHAGOGASTRODUODENOSCOPY      ESOPHAGOGASTRODUODENOSCOPY N/A 06/14/2021    Procedure: EGD (ESOPHAGOGASTRODUODENOSCOPY);  Surgeon: Farooq Cullen MD;  Location: [facility];  Service: Endoscopy;  Laterality: N/A;  ongoing epigastric pain, burning, nausea, vomiting  Lives on [facility], want first available but if there are openings on , would prefer that location  cOVID test on 6/11/21 at [facility]    INCISION AND DRAINAGE  OF BREAST Left 04/23/2020    Procedure: INCISION AND DRAINAGE, BREAST;  Surgeon: Mason Rubalcava III, MD;  Location: [facility] OR;  Service: General;  Laterality: Left;    THYROIDECTOMY Bilateral 09/29/2020    Procedure: THYROIDECTOMY with central neck dissection;  Surgeon: Kayla Lovelace MD;  Location: [facility] OR;  Service: General;  Laterality: Bilateral;       Current Medications:   Current Outpatient Medications:     albuterol (PROVENTIL HFA) 90 mcg/actuation inhaler, Inhale 2 puffs into the lungs every 6 (six) hours as needed for Wheezing or Shortness of Breath. Rescue, Disp: 18 g, Rfl: 0    amoxicillin (AMOXIL) 500 MG capsule, Take 1 capsule (500 mg total) by mouth every 12 (twelve) hours. for 10 days, Disp: 20 capsule, Rfl: 0    ascorbic acid/collagen hydr (COLLAGEN PLUS VITAMIN C ORAL), Take by mouth., Disp: , Rfl:     CALCIUM ORAL, Take by mouth., Disp: , Rfl:     CRANBERRY ORAL, Take by mouth., Disp: , Rfl:     ibuprofen (ADVIL,MOTRIN) 600 MG tablet, Take 1 tablet (600 mg total) by mouth every 8 (eight) hours as needed for Pain., Disp: 30 tablet, Rfl: 0    mv-min/iron/folic/calcium/vitK (WOMEN'S MULTIVITAMIN ORAL), Take by mouth., Disp: , Rfl:     norelgestromin-ethinyl estradiol (XULANE) 150-35 mcg/24 hr, Place 1 patch onto the skin once a week., Disp: 12 patch, Rfl: 3    TIROSINT 100 mcg Cap, Take 100 mcg by mouth once daily., Disp: 90 capsule, Rfl: 3    diphenhydrAMINE (BENADRYL) 25 mg capsule, Take 2 capsules (50 mg total) by mouth every 6 (six) hours as needed (Every time you use Compazine.)., Disp: 20 capsule, Rfl: 0    leflunomide (ARAVA) 20 MG Tab, Take 1 tablet (20 mg total) by mouth once daily., Disp: 30 tablet, Rfl: 3    levoFLOXacin (LEVAQUIN) 750 MG tablet, Take 1 tablet (750 mg total) by mouth once daily., Disp: 7 tablet, Rfl: 0    methylPREDNISolone (MEDROL DOSEPACK) 4 mg tablet, use as directed, Disp: 21 tablet, Rfl: 0    Social History:   Social History     Socioeconomic History     "Marital status:    Tobacco Use    Smoking status: Never    Smokeless tobacco: Never    Tobacco comments:     Allergies to it   Substance and Sexual Activity    Alcohol use: Yes     Alcohol/week: 1.0 standard drink of alcohol     Types: 1 Glasses of wine per week     Comment: On special occasions or events    Drug use: Never    Sexual activity: Yes     Partners: Male     Birth control/protection: Partner-Vasectomy, Patch       REVIEW OF SYSTEMS:  Constitution: Negative. Negative for chills, fever and night sweats.   Cardiovascular: Negative for chest pain and syncope.   Respiratory: Negative for cough and shortness of breath.   Gastrointestinal: See HPI. Negative for nausea/vomiting. Negative for abdominal pain.  Genitourinary: See HPI. Negative for discoloration or dysuria.  Skin: Negative for dry skin, itching and rash.   Hematologic/Lymphatic: negative for bleeding/clotting disorders.   Musculoskeletal: Negative for falls and muscle weakness.   Neurological: See HPI. no history of seizures. no history of cranial surgery or shunts.  Endocrine: Negative for polydipsia, polyphagia and polyuria.   Allergic/Immunologic: Negative for hives and persistent infections.  Psychiatric/Behavioral: Negative for depression and insomnia.         EXAM:  Ht 4' 10.5" (1.486 m)   Wt 59.7 kg (131 lb 9.8 oz)   LMP 07/24/2023 (Exact Date)   BMI 27.04 kg/m²     General: The patient is a 38 y.o. female in no apparent distress, the patient is orientatied to person, place and time.  Psych: Normal mood and affect  HEENT: Vision grossly intact, hearing intact to the spoken word.  Lungs: Respirations unlabored.  Gait: Normal station and gait, no difficulty with toe or heel walk.   Skin: Cervical skin negative for rashes, lesions, hairy patches and surgical scars.  Range of motion: Cervical range of motion is acceptable. There is posterior cervical tenderness to palpation.  Spinal Balance: Global saggital and coronal spinal balance " acceptable, no significant for scoliosis and kyphosis.  Musculoskeletal: No pain with the range of motion of the bilateral shoulders and elbows. Normal bulk and contour of the bilateral hands.  Vascular: Bilateral hands warm and well perfused, Radial pulses 2+ bilaterally.  Neurological: Normal strength and tone in all major motor groups in the bilateral upper and lower extremities. Normal sensation to light touch in the C5-T1 and L2-S1 dermatomes bilaterally.  Deep tendon reflexes symmetric 2+ in the bilateral upper and lower extremities.  Negative Inverted Radial Reflex and Palmer's bilaterally. Negative Babinski bilaterally.     IMAGING:   Today I independently reviewed the following images and my interpretations are as follows:    AP, Lat and Flex/Ex  upright C-spine films demonstrate mild spondylosis and DDD without listhesis.    CT cervical showed spondylosis.     Body mass index is 27.04 kg/m².  Hemoglobin A1c   Date Value Ref Range Status   07/28/2021 5.6 4.8 - 5.6 % Final     Comment:              Prediabetes: 5.7 - 6.4           Diabetes: >6.4           Glycemic control for adults with diabetes: <7.0         ASSESSMENT/PLAN:  Cervical spondylosis  -     methylPREDNISolone (MEDROL DOSEPACK) 4 mg tablet; use as directed  Dispense: 21 tablet; Refill: 0    Strain of trapezius muscle, unspecified laterality, subsequent encounter  -     Ambulatory referral/consult to Orthopedics    DDD (degenerative disc disease), cervical    Cervical radiculopathy    Spinal stenosis, cervical region  -     MRI Cervical Spine Without Contrast; Future; Expected date: 08/14/2023      Follow up in about 2 weeks (around 8/28/2023).    Patient has cervical spondylosis. I discussed the natural history of their diagnoses as well as surgical and nonsurgical treatment options. I educated the patient on the importance of core/back strengthening, correct posture, bending/lifting ergonomics, and low-impact aerobic exercises (walking,  elliptical, and aquatherapy). I prescribed medrol dose pack. I ordered cervical MRI to evaluate stenosis. Patient will follow up in 2 weeks for MRI review.    I have provided the patient with a home exercise program. It is the [employer] cervical exercise program. Exercises include walking erectly with neutral head position, supine neutral head position, supine retraction, sitting or standing neck retraction, posture training, forward/backward/sideward isometric strengthening, prone head lifts, supine head lifts, scapular retraction, and neck rotation. Pt will complete each exercise twice daily for 6-8 weeks.    Jass Bazan MD  Orthopaedic Spine Surgeon  Department of Orthopaedic Surgery  [phone]

## 2023-08-28 ENCOUNTER — HOSPITAL ENCOUNTER (OUTPATIENT)
Dept: RADIOLOGY | Facility: HOSPITAL | Age: 38
Discharge: HOME | End: 2023-08-28
Attending: ORTHOPAEDIC SURGERY
Payer: COMMERCIAL

## 2023-08-28 DIAGNOSIS — M48.02 SPINAL STENOSIS, CERVICAL REGION: ICD-10-CM

## 2023-08-28 PROCEDURE — 72141 MRI NECK SPINE W/O DYE: CPT | Mod: 26,,, | Performed by: INTERNAL MEDICINE

## 2023-08-28 PROCEDURE — 72141 MRI CERVICAL SPINE WITHOUT CONTRAST: ICD-10-PCS | Mod: 26,,, | Performed by: INTERNAL MEDICINE

## 2023-08-28 PROCEDURE — 72141 MRI NECK SPINE W/O DYE: CPT | Mod: TC

## 2023-08-31 ENCOUNTER — OFFICE VISIT (OUTPATIENT)
Dept: ORTHOPEDICS | Facility: CLINIC | Age: 38
End: 2023-08-31
Payer: COMMERCIAL

## 2023-08-31 VITALS — BODY MASS INDEX: 26.85 KG/M2 | HEIGHT: 58 IN | WEIGHT: 127.88 LBS

## 2023-08-31 DIAGNOSIS — M47.812 CERVICAL SPONDYLOSIS: Primary | ICD-10-CM

## 2023-08-31 DIAGNOSIS — M50.30 DDD (DEGENERATIVE DISC DISEASE), CERVICAL: ICD-10-CM

## 2023-08-31 DIAGNOSIS — M54.12 CERVICAL RADICULOPATHY: ICD-10-CM

## 2023-08-31 PROCEDURE — 3008F PR BODY MASS INDEX (BMI) DOCUMENTED: ICD-10-PCS | Mod: CPTII,S$GLB,, | Performed by: ORTHOPAEDIC SURGERY

## 2023-08-31 PROCEDURE — 3008F BODY MASS INDEX DOCD: CPT | Mod: CPTII,S$GLB,, | Performed by: ORTHOPAEDIC SURGERY

## 2023-08-31 PROCEDURE — 1160F PR REVIEW ALL MEDS BY PRESCRIBER/CLIN PHARMACIST DOCUMENTED: ICD-10-PCS | Mod: CPTII,S$GLB,, | Performed by: ORTHOPAEDIC SURGERY

## 2023-08-31 PROCEDURE — 99999 PR PBB SHADOW E&M-EST. PATIENT-LVL III: ICD-10-PCS | Mod: PBBFAC,,, | Performed by: ORTHOPAEDIC SURGERY

## 2023-08-31 PROCEDURE — 1159F PR MEDICATION LIST DOCUMENTED IN MEDICAL RECORD: ICD-10-PCS | Mod: CPTII,S$GLB,, | Performed by: ORTHOPAEDIC SURGERY

## 2023-08-31 PROCEDURE — 99999 PR PBB SHADOW E&M-EST. PATIENT-LVL III: CPT | Mod: PBBFAC,,, | Performed by: ORTHOPAEDIC SURGERY

## 2023-08-31 PROCEDURE — 1160F RVW MEDS BY RX/DR IN RCRD: CPT | Mod: CPTII,S$GLB,, | Performed by: ORTHOPAEDIC SURGERY

## 2023-08-31 PROCEDURE — 99214 OFFICE O/P EST MOD 30 MIN: CPT | Mod: S$GLB,,, | Performed by: ORTHOPAEDIC SURGERY

## 2023-08-31 PROCEDURE — 99214 PR OFFICE/OUTPT VISIT, EST, LEVL IV, 30-39 MIN: ICD-10-PCS | Mod: S$GLB,,, | Performed by: ORTHOPAEDIC SURGERY

## 2023-08-31 PROCEDURE — 1159F MED LIST DOCD IN RCRD: CPT | Mod: CPTII,S$GLB,, | Performed by: ORTHOPAEDIC SURGERY

## 2023-09-01 NOTE — PROGRESS NOTES
"DATE: 9/1/2023  PATIENT: Dina Hutton    Attending Physician: Jass Bazan M.D.    HISTORY:  Dina Hutton is a 38 y.o. female w/ a hx of fibromyalgia, RA (offered Humira but pt declined), and thyroid cancer (s/p total thyroidectomy) who returns to me today for MRI review. Patient continues to have neck pain that radiates down b/l shoulders. She has been taking meds with decent relief. She would like to continue conservative management.    The Patient denies myelopathic symptoms such as handwriting changes or difficulty with buttons/coins/keys. Denies perineal paresthesias, bowel/bladder dysfunction.    The patient does not smoke, have DM or endorse IVDU. The patient is not on any blood thinners and does not take chronic narcotics. She is a stay-at-home mom.    PMH/PSH/FamHx/SocHx:  Unchanged from prior visit    ROS:  Positive for neck pain  Denies myelopathic symptoms, perineal paresthesias, bowel or bladder incontinence    EXAM:  Ht 4' 10" (1.473 m)   Wt 58 kg (127 lb 14.4 oz)   BMI 26.73 kg/m²     My physical examination was notable for the following findings: motor intact BUE; SILT    IMAGING:  Today I independently reviewed the following images and my interpretations are as follows:    Previous AP and Lat upright C-spine films showed mild spondylosis and DDD without listhesis.    MRI cervical showed no significant stenosis.    ASSESSMENT/PLAN:  Patient has cervical spondylosis. I discussed the natural history of their diagnoses as well as surgical and nonsurgical treatment options. I educated the patient on the importance of core/back strengthening, correct posture, bending/lifting ergonomics, and low-impact aerobic exercises (walking, elliptical, and aquatherapy). Continue meds. I referred pt to PT for neck ROM. Patient will follow up PRN. Next step is a Pain Mgmt referral for possible injections.    Jass Bazan MD  Orthopaedic Spine Surgeon  Department of Orthopaedic Surgery  [phone]  "

## 2023-09-05 ENCOUNTER — DOCUMENTATION ONLY (OUTPATIENT)
Dept: REHABILITATION | Facility: OTHER | Age: 38
End: 2023-09-05
Payer: COMMERCIAL

## 2023-09-05 NOTE — PROGRESS NOTES
[facility]  Occupational Therapy Discharge Note    Name: Dina Hutton  [facility] Number: [id number]    Diagnosis:        Encounter Diagnoses   Name Primary?    Pain aggravated by activities of daily living Yes    Decreased activities of daily living (ADL)      Decreased  strength      Decreased functional activity tolerance      Fear of pain        Referring provider: Sharona Neumann NP     Physician Orders: eval and treat; FRP  Medical Diagnosis:  M79.7 (ICD-10-CM) - Fibromyalgia  R26.9 (ICD-10-CM) - Gait disturbance  E89.0 (ICD-10-CM) - Postsurgical hypothyroidism   Evaluation Date: 3/21/2023    Date of Last visit: 6/9/2023  Total Visits Received: 15, including eval    ASSESSMENT      Refer to progress note 5/24/2023 for details regarding last measures taken. Due to Thu unable to return per protocol, no updated measures available to review plan of goal, and status of program goals set at evaluation.    Patient Specific Activities based on Personal goals:  Activity  5/8/2023 5/24/2023    Performance Satisfaction Performance Satisfaction   Meal prep and cooking.  5  3-4 6 5   2.  Getting on the ground to play with son.  1  0 4 4   3.  Participating in outdoor activities w family (Standing)  3  0 2 2   4.  Carrying/Lifting groceries, and putting them in cabinets/pantry.  7  6 3 5   5.  laundry (bending over, lifting wet laundry)  4  4 5 5               Total Score  4 2.7 4  4.2      Patient Specific Activity Scoring Scheme for Performance     0        1        2        3        4        5        6        7        8        9        10     Unable                                                                   Able to perform  To perform                                                            activity at same  Activity                                                                   level as before                                                                        Discharge reason:  Unable to attend scheduled 1 and 2 month program follow up visits.     Discharge FOTO:      PLAN   This patient is discharged from Occupational Therapy. Continue with FRP follow appointments as planned with NP and LCSW.    RAGINI Caldera/L, CEAS-I  9/5/2023

## 2023-09-06 ENCOUNTER — LAB VISIT (OUTPATIENT)
Dept: LAB | Facility: HOSPITAL | Age: 38
End: 2023-09-06
Attending: STUDENT IN AN ORGANIZED HEALTH CARE EDUCATION/TRAINING PROGRAM
Payer: COMMERCIAL

## 2023-09-06 ENCOUNTER — OFFICE VISIT (OUTPATIENT)
Dept: PRIMARY CARE CLINIC | Facility: CLINIC | Age: 38
End: 2023-09-06
Payer: COMMERCIAL

## 2023-09-06 VITALS
OXYGEN SATURATION: 99 % | HEART RATE: 109 BPM | HEIGHT: 58 IN | DIASTOLIC BLOOD PRESSURE: 78 MMHG | BODY MASS INDEX: 27.35 KG/M2 | SYSTOLIC BLOOD PRESSURE: 122 MMHG | WEIGHT: 130.31 LBS

## 2023-09-06 DIAGNOSIS — G31.84 MCI (MILD COGNITIVE IMPAIRMENT): Primary | ICD-10-CM

## 2023-09-06 DIAGNOSIS — R30.0 DYSURIA: ICD-10-CM

## 2023-09-06 DIAGNOSIS — K21.9 GASTROESOPHAGEAL REFLUX DISEASE, UNSPECIFIED WHETHER ESOPHAGITIS PRESENT: ICD-10-CM

## 2023-09-06 LAB
BILIRUB UR QL STRIP: NEGATIVE
CLARITY UR REFRACT.AUTO: ABNORMAL
COLOR UR AUTO: YELLOW
GLUCOSE UR QL STRIP: NEGATIVE
HGB UR QL STRIP: ABNORMAL
KETONES UR QL STRIP: NEGATIVE
LEUKOCYTE ESTERASE UR QL STRIP: NEGATIVE
NITRITE UR QL STRIP: NEGATIVE
PH UR STRIP: 6 [PH] (ref 5–8)
PROT UR QL STRIP: ABNORMAL
SP GR UR STRIP: 1.02 (ref 1–1.03)
URN SPEC COLLECT METH UR: ABNORMAL

## 2023-09-06 PROCEDURE — 81003 URINALYSIS AUTO W/O SCOPE: CPT | Performed by: STUDENT IN AN ORGANIZED HEALTH CARE EDUCATION/TRAINING PROGRAM

## 2023-09-06 PROCEDURE — 99213 PR OFFICE/OUTPT VISIT, EST, LEVL III, 20-29 MIN: ICD-10-PCS | Mod: S$GLB,,, | Performed by: STUDENT IN AN ORGANIZED HEALTH CARE EDUCATION/TRAINING PROGRAM

## 2023-09-06 PROCEDURE — 1159F PR MEDICATION LIST DOCUMENTED IN MEDICAL RECORD: ICD-10-PCS | Mod: CPTII,S$GLB,, | Performed by: STUDENT IN AN ORGANIZED HEALTH CARE EDUCATION/TRAINING PROGRAM

## 2023-09-06 PROCEDURE — 1160F RVW MEDS BY RX/DR IN RCRD: CPT | Mod: CPTII,S$GLB,, | Performed by: STUDENT IN AN ORGANIZED HEALTH CARE EDUCATION/TRAINING PROGRAM

## 2023-09-06 PROCEDURE — 87086 URINE CULTURE/COLONY COUNT: CPT | Performed by: STUDENT IN AN ORGANIZED HEALTH CARE EDUCATION/TRAINING PROGRAM

## 2023-09-06 PROCEDURE — 3008F BODY MASS INDEX DOCD: CPT | Mod: CPTII,S$GLB,, | Performed by: STUDENT IN AN ORGANIZED HEALTH CARE EDUCATION/TRAINING PROGRAM

## 2023-09-06 PROCEDURE — 99999 PR PBB SHADOW E&M-EST. PATIENT-LVL V: CPT | Mod: PBBFAC,,, | Performed by: STUDENT IN AN ORGANIZED HEALTH CARE EDUCATION/TRAINING PROGRAM

## 2023-09-06 PROCEDURE — 3078F PR MOST RECENT DIASTOLIC BLOOD PRESSURE < 80 MM HG: ICD-10-PCS | Mod: CPTII,S$GLB,, | Performed by: STUDENT IN AN ORGANIZED HEALTH CARE EDUCATION/TRAINING PROGRAM

## 2023-09-06 PROCEDURE — 3078F DIAST BP <80 MM HG: CPT | Mod: CPTII,S$GLB,, | Performed by: STUDENT IN AN ORGANIZED HEALTH CARE EDUCATION/TRAINING PROGRAM

## 2023-09-06 PROCEDURE — 99999 PR PBB SHADOW E&M-EST. PATIENT-LVL V: ICD-10-PCS | Mod: PBBFAC,,, | Performed by: STUDENT IN AN ORGANIZED HEALTH CARE EDUCATION/TRAINING PROGRAM

## 2023-09-06 PROCEDURE — 3074F SYST BP LT 130 MM HG: CPT | Mod: CPTII,S$GLB,, | Performed by: STUDENT IN AN ORGANIZED HEALTH CARE EDUCATION/TRAINING PROGRAM

## 2023-09-06 PROCEDURE — 3074F PR MOST RECENT SYSTOLIC BLOOD PRESSURE < 130 MM HG: ICD-10-PCS | Mod: CPTII,S$GLB,, | Performed by: STUDENT IN AN ORGANIZED HEALTH CARE EDUCATION/TRAINING PROGRAM

## 2023-09-06 PROCEDURE — 1160F PR REVIEW ALL MEDS BY PRESCRIBER/CLIN PHARMACIST DOCUMENTED: ICD-10-PCS | Mod: CPTII,S$GLB,, | Performed by: STUDENT IN AN ORGANIZED HEALTH CARE EDUCATION/TRAINING PROGRAM

## 2023-09-06 PROCEDURE — 99213 OFFICE O/P EST LOW 20 MIN: CPT | Mod: S$GLB,,, | Performed by: STUDENT IN AN ORGANIZED HEALTH CARE EDUCATION/TRAINING PROGRAM

## 2023-09-06 PROCEDURE — 1159F MED LIST DOCD IN RCRD: CPT | Mod: CPTII,S$GLB,, | Performed by: STUDENT IN AN ORGANIZED HEALTH CARE EDUCATION/TRAINING PROGRAM

## 2023-09-06 PROCEDURE — 3008F PR BODY MASS INDEX (BMI) DOCUMENTED: ICD-10-PCS | Mod: CPTII,S$GLB,, | Performed by: STUDENT IN AN ORGANIZED HEALTH CARE EDUCATION/TRAINING PROGRAM

## 2023-09-06 RX ORDER — PANTOPRAZOLE SODIUM 40 MG/1
40 TABLET, DELAYED RELEASE ORAL DAILY
Qty: 30 TABLET | Refills: 11 | Status: SHIPPED | OUTPATIENT
Start: 2023-09-06 | End: 2023-10-27

## 2023-09-06 NOTE — PATIENT INSTRUCTIONS
Protonix every morning before breakfast (after thyroid medication)    *Pepcid 20mg if needed for reflex.  (Over the counter)

## 2023-09-07 LAB
BACTERIA UR CULT: NORMAL
BACTERIA UR CULT: NORMAL

## 2023-09-11 ENCOUNTER — PATIENT MESSAGE (OUTPATIENT)
Dept: PRIMARY CARE CLINIC | Facility: CLINIC | Age: 38
End: 2023-09-11
Payer: COMMERCIAL

## 2023-09-13 NOTE — TELEPHONE ENCOUNTER
Let's try increasing water to about 4-5 bottles daily for the next few weeks and see if that makes a difference.

## 2023-09-14 ENCOUNTER — PATIENT MESSAGE (OUTPATIENT)
Dept: ENDOCRINOLOGY | Facility: CLINIC | Age: 38
End: 2023-09-14
Payer: COMMERCIAL

## 2023-09-14 DIAGNOSIS — E89.0 POSTSURGICAL HYPOTHYROIDISM: Primary | Chronic | ICD-10-CM

## 2023-09-15 ENCOUNTER — LAB VISIT (OUTPATIENT)
Dept: LAB | Facility: HOSPITAL | Age: 38
End: 2023-09-15
Attending: INTERNAL MEDICINE
Payer: COMMERCIAL

## 2023-09-15 DIAGNOSIS — E89.0 POSTSURGICAL HYPOTHYROIDISM: Chronic | ICD-10-CM

## 2023-09-15 LAB
T4 FREE SERPL-MCNC: 1.26 NG/DL (ref 0.71–1.51)
TSH SERPL DL<=0.005 MIU/L-ACNC: 0.21 UIU/ML (ref 0.4–4)

## 2023-09-15 PROCEDURE — 84439 ASSAY OF FREE THYROXINE: CPT | Performed by: INTERNAL MEDICINE

## 2023-09-15 PROCEDURE — 36415 COLL VENOUS BLD VENIPUNCTURE: CPT | Performed by: INTERNAL MEDICINE

## 2023-09-15 PROCEDURE — 84443 ASSAY THYROID STIM HORMONE: CPT | Performed by: INTERNAL MEDICINE

## 2023-09-16 ENCOUNTER — PATIENT MESSAGE (OUTPATIENT)
Dept: ENDOCRINOLOGY | Facility: HOSPITAL | Age: 38
End: 2023-09-16
Payer: COMMERCIAL

## 2023-09-16 DIAGNOSIS — E89.0 POSTSURGICAL HYPOTHYROIDISM: Primary | Chronic | ICD-10-CM

## 2023-09-16 RX ORDER — LEVOTHYROXINE SODIUM 88 UG/1
88 CAPSULE ORAL DAILY
Qty: 90 CAPSULE | Refills: 3 | Status: SHIPPED | OUTPATIENT
Start: 2023-09-16 | End: 2023-11-27

## 2023-10-06 NOTE — PROGRESS NOTES
Subjective:       Patient ID: Dina Hutton is a 38 y.o. female.   Chief Complaint: Abdominal Pain and GI Problem      Memory:  Endorses several years of occasional memory issues.  When she has memory problems, they are usually related to short-term information.  No issues with long-term memory.  No head injuries.    Reflux:  Not currently taking medication for reflux.  Endorses epigastric discomfort.  Symptoms worse in the evening and in the morning.    Dysuria:  Recently treated for UTI.  Has mild burning with urination.  Water intake has been low.    Review of Systems   Constitutional:  Negative for fatigue and fever.   Respiratory:  Negative for cough and shortness of breath.    Cardiovascular:  Negative for chest pain.   Gastrointestinal:  Negative for abdominal pain, diarrhea, nausea and vomiting.   Genitourinary:  Positive for dysuria. Negative for difficulty urinating, frequency, hematuria and urgency.   Musculoskeletal:  Negative for back pain.   Neurological:  Positive for memory loss. Negative for weakness.              Objective:        Physical Exam  HENT:      Head: Normocephalic and atraumatic.      Nose: Nose normal.      Mouth/Throat:      Mouth: Mucous membranes are moist.      Pharynx: Oropharynx is clear.   Eyes:      Extraocular Movements: Extraocular movements intact.      Conjunctiva/sclera: Conjunctivae normal.      Pupils: Pupils are equal, round, and reactive to light.   Cardiovascular:      Rate and Rhythm: Normal rate and regular rhythm.   Pulmonary:      Effort: Pulmonary effort is normal.   Musculoskeletal:         General: No swelling. Normal range of motion.      Cervical back: Normal range of motion.      Right lower leg: No edema.      Left lower leg: No edema.   Skin:     General: Skin is warm.      Findings: No lesion or rash.   Neurological:      General: No focal deficit present.      Mental Status: She is alert and oriented to person, place, and time.      Motor: No weakness.    Psychiatric:         Mood and Affect: Mood normal.         Thought Content: Thought content normal.         Assessment:         Problem List Items Addressed This Visit          Neuro    MCI (mild cognitive impairment) - Primary    Relevant Orders    Ambulatory referral/consult to Neurology       GI    GERD (gastroesophageal reflux disease)    Relevant Medications    pantoprazole (PROTONIX) 40 MG tablet     Other Visit Diagnoses       Dysuria        Relevant Orders    Urinalysis (Completed)    CULTURE, URINE (Completed)              Plan:         1. MCI (mild cognitive impairment)  -     Ambulatory referral/consult to Neurology; Future; Expected date: 09/13/2023    2. Gastroesophageal reflux disease, unspecified whether esophagitis present  -     pantoprazole (PROTONIX) 40 MG tablet; Take 1 tablet (40 mg total) by mouth once daily.  Dispense: 30 tablet; Refill: 11  Patient was instructed to avoid foods that can trigger heartburn symptoms such as acidic foods and spicy foods.  Avoid lying down within 30 minutes after meals.    3. Dysuria  -     Urinalysis; Future; Expected date: 09/06/2023  -     CULTURE, URINE; Future; Expected date: 09/06/2023          Lona Pham MD

## 2023-10-07 ENCOUNTER — PATIENT MESSAGE (OUTPATIENT)
Dept: PRIMARY CARE CLINIC | Facility: CLINIC | Age: 38
End: 2023-10-07
Payer: COMMERCIAL

## 2023-10-07 DIAGNOSIS — R30.0 DYSURIA: Primary | ICD-10-CM

## 2023-10-09 ENCOUNTER — LAB VISIT (OUTPATIENT)
Dept: LAB | Facility: HOSPITAL | Age: 38
End: 2023-10-09
Payer: COMMERCIAL

## 2023-10-09 DIAGNOSIS — R30.0 DYSURIA: ICD-10-CM

## 2023-10-09 LAB
BACTERIA #/AREA URNS HPF: ABNORMAL /HPF
BILIRUB UR QL STRIP: NEGATIVE
CLARITY UR: ABNORMAL
COLOR UR: YELLOW
GLUCOSE UR QL STRIP: NEGATIVE
HGB UR QL STRIP: NEGATIVE
KETONES UR QL STRIP: NEGATIVE
LEUKOCYTE ESTERASE UR QL STRIP: ABNORMAL
MICROSCOPIC COMMENT: ABNORMAL
NITRITE UR QL STRIP: NEGATIVE
PH UR STRIP: 6 [PH] (ref 5–8)
PROT UR QL STRIP: NEGATIVE
SP GR UR STRIP: 1.01 (ref 1–1.03)
SQUAMOUS #/AREA URNS HPF: 9 /HPF
URN SPEC COLLECT METH UR: ABNORMAL
UROBILINOGEN UR STRIP-ACNC: NEGATIVE EU/DL
WBC #/AREA URNS HPF: 7 /HPF (ref 0–5)

## 2023-10-09 PROCEDURE — 81000 URINALYSIS NONAUTO W/SCOPE: CPT | Performed by: INTERNAL MEDICINE

## 2023-10-12 ENCOUNTER — OFFICE VISIT (OUTPATIENT)
Dept: NEUROLOGY | Facility: CLINIC | Age: 38
End: 2023-10-12
Payer: COMMERCIAL

## 2023-10-12 VITALS
DIASTOLIC BLOOD PRESSURE: 63 MMHG | BODY MASS INDEX: 26.93 KG/M2 | HEIGHT: 58 IN | WEIGHT: 128.31 LBS | SYSTOLIC BLOOD PRESSURE: 136 MMHG | HEART RATE: 81 BPM

## 2023-10-12 DIAGNOSIS — M79.7 FIBROMYALGIA: ICD-10-CM

## 2023-10-12 DIAGNOSIS — R51.9 INTRACTABLE HEADACHE, UNSPECIFIED CHRONICITY PATTERN, UNSPECIFIED HEADACHE TYPE: ICD-10-CM

## 2023-10-12 DIAGNOSIS — G31.84 MCI (MILD COGNITIVE IMPAIRMENT): Primary | ICD-10-CM

## 2023-10-12 DIAGNOSIS — R20.2 PARESTHESIA: ICD-10-CM

## 2023-10-12 PROCEDURE — 3075F PR MOST RECENT SYSTOLIC BLOOD PRESS GE 130-139MM HG: ICD-10-PCS | Mod: CPTII,S$GLB,, | Performed by: STUDENT IN AN ORGANIZED HEALTH CARE EDUCATION/TRAINING PROGRAM

## 2023-10-12 PROCEDURE — 3008F PR BODY MASS INDEX (BMI) DOCUMENTED: ICD-10-PCS | Mod: CPTII,S$GLB,, | Performed by: STUDENT IN AN ORGANIZED HEALTH CARE EDUCATION/TRAINING PROGRAM

## 2023-10-12 PROCEDURE — 3075F SYST BP GE 130 - 139MM HG: CPT | Mod: CPTII,S$GLB,, | Performed by: STUDENT IN AN ORGANIZED HEALTH CARE EDUCATION/TRAINING PROGRAM

## 2023-10-12 PROCEDURE — 3078F DIAST BP <80 MM HG: CPT | Mod: CPTII,S$GLB,, | Performed by: STUDENT IN AN ORGANIZED HEALTH CARE EDUCATION/TRAINING PROGRAM

## 2023-10-12 PROCEDURE — 99214 OFFICE O/P EST MOD 30 MIN: CPT | Mod: S$GLB,,, | Performed by: STUDENT IN AN ORGANIZED HEALTH CARE EDUCATION/TRAINING PROGRAM

## 2023-10-12 PROCEDURE — 99214 PR OFFICE/OUTPT VISIT, EST, LEVL IV, 30-39 MIN: ICD-10-PCS | Mod: S$GLB,,, | Performed by: STUDENT IN AN ORGANIZED HEALTH CARE EDUCATION/TRAINING PROGRAM

## 2023-10-12 PROCEDURE — 3078F PR MOST RECENT DIASTOLIC BLOOD PRESSURE < 80 MM HG: ICD-10-PCS | Mod: CPTII,S$GLB,, | Performed by: STUDENT IN AN ORGANIZED HEALTH CARE EDUCATION/TRAINING PROGRAM

## 2023-10-12 PROCEDURE — 99999 PR PBB SHADOW E&M-EST. PATIENT-LVL IV: ICD-10-PCS | Mod: PBBFAC,,, | Performed by: STUDENT IN AN ORGANIZED HEALTH CARE EDUCATION/TRAINING PROGRAM

## 2023-10-12 PROCEDURE — 99999 PR PBB SHADOW E&M-EST. PATIENT-LVL IV: CPT | Mod: PBBFAC,,, | Performed by: STUDENT IN AN ORGANIZED HEALTH CARE EDUCATION/TRAINING PROGRAM

## 2023-10-12 PROCEDURE — 1159F MED LIST DOCD IN RCRD: CPT | Mod: CPTII,S$GLB,, | Performed by: STUDENT IN AN ORGANIZED HEALTH CARE EDUCATION/TRAINING PROGRAM

## 2023-10-12 PROCEDURE — 3008F BODY MASS INDEX DOCD: CPT | Mod: CPTII,S$GLB,, | Performed by: STUDENT IN AN ORGANIZED HEALTH CARE EDUCATION/TRAINING PROGRAM

## 2023-10-12 PROCEDURE — 1159F PR MEDICATION LIST DOCUMENTED IN MEDICAL RECORD: ICD-10-PCS | Mod: CPTII,S$GLB,, | Performed by: STUDENT IN AN ORGANIZED HEALTH CARE EDUCATION/TRAINING PROGRAM

## 2023-10-12 NOTE — PROGRESS NOTES
Chief Complaint and Duration     Headaches, cognitive difficulty    History of Present Illness     Dina Hutton is a 38 y.o. female with a history of multiple medical diagnoses as listed below that presents for evaluation and treatment of headache. She does not have a headache at this time.    Hx of thyroid cancer s/p thyroid removal (2020), chronic neck and lower back and overall body pains, joint pains. On birth control. Referred by rheumatology for headaches and neurocognitive issues. Being seen by rheum for inflammatory arthritis and fibromyalgia. Workup ongoing with lupus panel and myositis panel.     Hx of headaches as a child - after thyroid cancer, pattern of headaches changed and is now more diffuse with light sensitivity as an aura. Sees spots in her eyes. Will sometimes be associated w headache but sometimes her eyes become drowsy and she wants to fall asleep. States 3 weeks out of the month, has a generalized headache associated w neck pain.     Gets lightheaded often, which also seems to trigger these headaches. Denies room spinning. Does have ear ringing.     Has overall body parasthesias including in her fingers and her toes, recent diagnosis of carpal tunnel.     Also endorses cognitive difficulty w remembering short term recall.     Poor sleep.       Description of Headaches:  Location of pain: generalized  Radiation of pain?:all throughout  Character of pain:throbbing and sharp  Severity of pain: 7  Accompanying symptoms: lightedheadedness, GERD  Prodromal sx?: light sensitivity, dizzy.   Rapidity of onset: gradually  Typical duration of individual headache: 1 hour - a few hours  Are most headaches similar in presentation? yes  Typical precipitants: light    Temporal Pattern of Headaches:  Started having HA's childhood, then changed in 2 years ago  Worst time of day: morning  Awaken from sleep?: yes  Seasonal pattern?: yes, allergies and cold weather  Clustering of HA's over time? no  Overall  pattern since problem began: gradually worsening    Degree of Functional Impairment: severe, missed social plans    Additional Relevant History:  History of head/neck trauma? Yes, did not hit head, no LOC  History of head/neck surgery? Thyroid surgery  Family h/o headache problems? Yes, mother has it  Use of meds that might worsen HA's (nitrates, exogenous estrogens,    Nifedipine)? Yes, was on birth control  Exposure to carbon monoxide? no  Substance use: occasional alcohol use    Current Medication Regiment:   Abortive meds? Tylenol, advil  Daily use? No (takes it 3x/week double dose)  Prophylactic meds? none      Interim:  10/12/23 - saw in January, ordered MRI, EEG, functional restoration clinic.  MRI of the brain was unremarkable, EEG was also unremarkable.  Patient was referred back over by her primary care for short-term memory.    Review of patient's allergies indicates:   Allergen Reactions    Vancomycin analogues Hives    Bactrim [sulfamethoxazole-trimethoprim] Nausea And Vomiting    Sulfa (sulfonamide antibiotics) Rash    Vicodin [hydrocodone-acetaminophen] Nausea Only     Patient states she is fine with other pain medication     Current Outpatient Medications   Medication Sig Dispense Refill    albuterol (PROVENTIL HFA) 90 mcg/actuation inhaler Inhale 2 puffs into the lungs every 6 (six) hours as needed for Wheezing or Shortness of Breath. Rescue 18 g 0    ascorbic acid/collagen hydr (COLLAGEN PLUS VITAMIN C ORAL) Take by mouth.      CALCIUM ORAL Take by mouth.      CRANBERRY ORAL Take by mouth.      ibuprofen (ADVIL,MOTRIN) 600 MG tablet Take 1 tablet (600 mg total) by mouth every 8 (eight) hours as needed for Pain. 30 tablet 0    mv-min/iron/folic/calcium/vitK (WOMEN'S MULTIVITAMIN ORAL) Take by mouth.      norelgestromin-ethinyl estradiol (XULANE) 150-35 mcg/24 hr Place 1 patch onto the skin once a week. 12 patch 3    TIROSINT 88 mcg Cap Take 88 mcg by mouth once daily. 90 capsule 3     methylPREDNISolone (MEDROL DOSEPACK) 4 mg tablet use as directed (Patient not taking: Reported on 9/6/2023) 21 tablet 0    pantoprazole (PROTONIX) 40 MG tablet Take 1 tablet (40 mg total) by mouth once daily. (Patient not taking: Reported on 10/12/2023) 30 tablet 11     No current facility-administered medications for this visit.     Medical History     Past Medical History:   Diagnosis Date    Anemia     Breast abscess 4/25/2020    GERD (gastroesophageal reflux disease)     History of fourth degree perineal laceration 8/21/2019    Hypoglycemia     Hypoglycemia     Mastitis 4/20/2020    Mental disorder     depression    PONV (postoperative nausea and vomiting)     Thyroid cancer     Thyroid disease      Past Surgical History:   Procedure Laterality Date    BREAST CYST EXCISION      COLONOSCOPY      ESOPHAGOGASTRODUODENOSCOPY      ESOPHAGOGASTRODUODENOSCOPY N/A 06/14/2021    Procedure: EGD (ESOPHAGOGASTRODUODENOSCOPY);  Surgeon: Farooq Cullen MD;  Location: [facility] ENDO;  Service: Endoscopy;  Laterality: N/A;  ongoing epigastric pain, burning, nausea, vomiting  Lives on [facility], want first available but if there are openings on , would prefer that location  cOVID test on 6/11/21 at [facility]    INCISION AND DRAINAGE OF BREAST Left 04/23/2020    Procedure: INCISION AND DRAINAGE, BREAST;  Surgeon: Mason Rubalcava III, MD;  Location: [facility] OR;  Service: General;  Laterality: Left;    THYROIDECTOMY Bilateral 09/29/2020    Procedure: THYROIDECTOMY with central neck dissection;  Surgeon: Kayla Lovelace MD;  Location: [facility] OR;  Service: General;  Laterality: Bilateral;     Family History   Problem Relation Age of Onset    Hypertension Mother     Hyperlipidemia Mother     Stroke Mother     Arthritis Maternal Grandmother     Mental illness Maternal Grandmother         Dementia & Alzheimer    Cancer Neg Hx      Social History     Socioeconomic History    Marital status:    Tobacco Use    Smoking status:  "Never    Smokeless tobacco: Never    Tobacco comments:     Allergies to it   Substance and Sexual Activity    Alcohol use: Yes     Alcohol/week: 1.0 standard drink of alcohol     Types: 1 Glasses of wine per week     Comment: On special occasions or events    Drug use: Never    Sexual activity: Yes     Partners: Male     Birth control/protection: Partner-Vasectomy, Patch       Exam     Vitals:    10/12/23 1007   BP: 136/63   Pulse: 81        Physical Exam:  General: She is not in acute distress. She is not ill-appearing.   HENT: Normocephalic and atraumatic. Moist mucous membranes.  Eyes: Conjunctivae normal.   Pulmonary: Pulmonary effort is normal.   Abdominal: Abdomen is soft and flat.   Skin: Skin is warm and dry. No rashes.   Psychiatric: Mood normal.        Neurologic Exam   Mental status: oriented to person, place, and time  Attention: Normal. Concentration: normal. Able to spell "world' backwards, able to do serial 7s.   Speech: speech is normal.  Cranial Nerves: PERRL, EOMI intact, V1-V3 Facial sensation intact. Symmetric facies. Hearing grossly intact. Palate and uvula midline, symmetric. No tongue deviation. Trapezius strength intact.     Motor exam: bulk and tone normal. Strength 5/5 in bilateral upper extremities: deltoids, biceps, triceps, wrist flexion/extension, finger abduction/adduction. Strength 5/5 in bilateral lower extremities: hip flexion/extension, thigh adduction/abduction, knee flexion/extension, dorsiflexion/plantarflexion, foot eversion/inversion.    Reflexes: 2+ in bilateral upper extremities: biceps and brachiaradialis, 2+ in bilateral lower extremities: patellar and achilles  Plantar reflex: normal    Sensory exam: light touch intact    Gait exam: normal  Romberg: negative  Coordination: normal     Tremor: none    Labs and Imaging     Labs: reviewed  12/3/22 - TSH and T4 wnl. MARIE positive.   11/2/22 - ESR 42, MARIE positive  01/2023, B12 was 561    Imaging: reviewed personally  [facility] " 8/31/22 - no acute intracranial abnormality    MRI of the brain January 2023 was unremarkable, EEG also unremarkable.    Assessment and Plan     Problem List Items Addressed This Visit          Neuro    Intractable headache    Paresthesia    MCI (mild cognitive impairment)    Relevant Orders    Ambulatory referral/consult to Adult Neuropsychology       Orthopedic    Fibromyalgia - Primary     This is a patient follow up for cognitive impairment, had multifactorial intractable headaches.  Overall body pain with neck pain and back pain.  Patient has a history of thyroid cancer.  Labs showed positive MARIE.  Discussed magnesium supplementation 400 mg daily on prior visit, EEG was also ordered given she was stating aura with and without a headache, that was unremarkable.  MRI of the brain was also unremarkable.  Patient was referred to functional restoration Clinic.  Also history of bilateral carpal tunnel.    Patient coming in today continuing to endorse short-term memory problems and also some long-term memory, patient still able to do her own ADLs, we will refer patient to neuropsych eval.  Discussed with the patient multifactorial nature of memory, may be contributing from stress as well as anxiety as well as poor sleep.  Patient is concerned given she has a family history of Alzheimer's.    Follow-up:  Joe

## 2023-10-27 ENCOUNTER — OFFICE VISIT (OUTPATIENT)
Dept: GASTROENTEROLOGY | Facility: CLINIC | Age: 38
End: 2023-10-27
Payer: COMMERCIAL

## 2023-10-27 ENCOUNTER — OFFICE VISIT (OUTPATIENT)
Dept: URGENT CARE | Facility: CLINIC | Age: 38
End: 2023-10-27
Payer: COMMERCIAL

## 2023-10-27 VITALS — HEIGHT: 58 IN | WEIGHT: 127.63 LBS | BODY MASS INDEX: 26.79 KG/M2

## 2023-10-27 VITALS
DIASTOLIC BLOOD PRESSURE: 80 MMHG | BODY MASS INDEX: 26.66 KG/M2 | HEIGHT: 58 IN | OXYGEN SATURATION: 99 % | RESPIRATION RATE: 20 BRPM | SYSTOLIC BLOOD PRESSURE: 117 MMHG | WEIGHT: 127 LBS | HEART RATE: 90 BPM | TEMPERATURE: 97 F

## 2023-10-27 DIAGNOSIS — J06.0 ACUTE LARYNGOPHARYNGITIS: ICD-10-CM

## 2023-10-27 DIAGNOSIS — K21.9 GASTROESOPHAGEAL REFLUX DISEASE, UNSPECIFIED WHETHER ESOPHAGITIS PRESENT: Primary | ICD-10-CM

## 2023-10-27 DIAGNOSIS — R05.9 COUGH, UNSPECIFIED TYPE: Primary | ICD-10-CM

## 2023-10-27 LAB
CTP QC/QA: YES
CTP QC/QA: YES
POC MOLECULAR INFLUENZA A AGN: NEGATIVE
POC MOLECULAR INFLUENZA B AGN: NEGATIVE
SARS-COV-2 AG RESP QL IA.RAPID: NEGATIVE

## 2023-10-27 PROCEDURE — 87502 INFLUENZA DNA AMP PROBE: CPT | Mod: QW,S$GLB,, | Performed by: FAMILY MEDICINE

## 2023-10-27 PROCEDURE — 87811 SARS-COV-2 COVID19 W/OPTIC: CPT | Mod: QW,S$GLB,, | Performed by: FAMILY MEDICINE

## 2023-10-27 PROCEDURE — 99213 OFFICE O/P EST LOW 20 MIN: CPT | Mod: S$GLB,,, | Performed by: FAMILY MEDICINE

## 2023-10-27 PROCEDURE — 3008F BODY MASS INDEX DOCD: CPT | Mod: CPTII,S$GLB,, | Performed by: INTERNAL MEDICINE

## 2023-10-27 PROCEDURE — 99213 PR OFFICE/OUTPT VISIT, EST, LEVL III, 20-29 MIN: ICD-10-PCS | Mod: S$GLB,,, | Performed by: FAMILY MEDICINE

## 2023-10-27 PROCEDURE — 3008F PR BODY MASS INDEX (BMI) DOCUMENTED: ICD-10-PCS | Mod: CPTII,S$GLB,, | Performed by: INTERNAL MEDICINE

## 2023-10-27 PROCEDURE — 87811 SARS CORONAVIRUS 2 ANTIGEN POCT, MANUAL READ: ICD-10-PCS | Mod: QW,S$GLB,, | Performed by: FAMILY MEDICINE

## 2023-10-27 PROCEDURE — 99999 PR PBB SHADOW E&M-EST. PATIENT-LVL III: CPT | Mod: PBBFAC,,, | Performed by: INTERNAL MEDICINE

## 2023-10-27 PROCEDURE — 99214 OFFICE O/P EST MOD 30 MIN: CPT | Mod: S$GLB,,, | Performed by: INTERNAL MEDICINE

## 2023-10-27 PROCEDURE — 87502 POCT INFLUENZA A/B MOLECULAR: ICD-10-PCS | Mod: QW,S$GLB,, | Performed by: FAMILY MEDICINE

## 2023-10-27 PROCEDURE — 99214 PR OFFICE/OUTPT VISIT, EST, LEVL IV, 30-39 MIN: ICD-10-PCS | Mod: S$GLB,,, | Performed by: INTERNAL MEDICINE

## 2023-10-27 PROCEDURE — 99999 PR PBB SHADOW E&M-EST. PATIENT-LVL III: ICD-10-PCS | Mod: PBBFAC,,, | Performed by: INTERNAL MEDICINE

## 2023-10-27 RX ORDER — IBUPROFEN 600 MG/1
600 TABLET ORAL 3 TIMES DAILY
Qty: 20 TABLET | Refills: 0 | Status: SHIPPED | OUTPATIENT
Start: 2023-10-27 | End: 2023-12-27

## 2023-10-27 RX ORDER — DIPHENHYDRAMINE HCL 25 MG/1
2 TABLET, CHEWABLE ORAL DAILY
Qty: 6.7 ML | Refills: 0 | Status: SHIPPED | OUTPATIENT
Start: 2023-10-27 | End: 2023-12-27

## 2023-10-27 RX ORDER — CETIRIZINE HYDROCHLORIDE 10 MG/1
10 TABLET ORAL DAILY
Qty: 30 TABLET | Refills: 0 | Status: SHIPPED | OUTPATIENT
Start: 2023-10-27 | End: 2023-12-27

## 2023-10-27 RX ORDER — AZELASTINE 1 MG/ML
1 SPRAY, METERED NASAL 2 TIMES DAILY
Qty: 30 ML | Refills: 0 | Status: SHIPPED | OUTPATIENT
Start: 2023-10-27 | End: 2023-12-27

## 2023-10-27 RX ORDER — LANSOPRAZOLE 30 MG/1
30 CAPSULE, DELAYED RELEASE ORAL 2 TIMES DAILY
Qty: 60 CAPSULE | Refills: 3 | Status: SHIPPED | OUTPATIENT
Start: 2023-10-27 | End: 2023-12-27

## 2023-10-27 RX ORDER — BENZONATATE 200 MG/1
200 CAPSULE ORAL 3 TIMES DAILY PRN
Qty: 21 CAPSULE | Refills: 0 | Status: SHIPPED | OUTPATIENT
Start: 2023-10-27 | End: 2023-12-27

## 2023-10-27 NOTE — PROGRESS NOTES
"Subjective:      Patient ID: Dina Hutton is a 38 y.o. female.    Vitals:  height is 4' 10" (1.473 m) and weight is 57.6 kg (127 lb). Her tympanic temperature is 97.4 °F (36.3 °C). Her blood pressure is 117/80 and her pulse is 90. Her respiration is 20 and oxygen saturation is 99%.     Chief Complaint: Cough (/)    Pt complains of sore throat,  nasal/ chest congestion, ike, SOB, headaches, phlegm, right ear pain. Pt symptoms started this past week while OTC cold and flu med's with no relief.    Cough  This is a new problem. The problem has been gradually worsening. The problem occurs constantly. Associated symptoms include headaches. Nothing aggravates the symptoms. She has tried nothing for the symptoms. The treatment provided no relief.       Respiratory:  Positive for cough.    Neurological:  Positive for headaches.      Objective:     Physical Exam   Constitutional: She is oriented to person, place, and time.   HENT:   Head: Normocephalic.   Ears:   Right Ear: External ear normal. Tympanic membrane is bulging. Tympanic membrane is not injected and not erythematous. A middle ear effusion is present.   Left Ear: External ear normal. Tympanic membrane is bulging. Tympanic membrane is not injected and not erythematous. A middle ear effusion is present.   Nose: Rhinorrhea present. No congestion.   Mouth/Throat: Mucous membranes are moist. Posterior oropharyngeal erythema present. No oropharyngeal exudate.   Eyes: Conjunctivae are normal.   Cardiovascular: Normal rate.   Pulmonary/Chest: Effort normal.   Musculoskeletal: Normal range of motion.         General: Normal range of motion.   Neurological: She is alert and oriented to person, place, and time.   Skin: Skin is dry.   Psychiatric: Her behavior is normal.       Assessment:     1. Cough, unspecified type    2. Acute laryngopharyngitis      Results for orders placed or performed in visit on 10/27/23   SARS Coronavirus 2 Antigen, POCT Manual Read   Result Value " Ref Range    SARS Coronavirus 2 Antigen Negative Negative     Acceptable Yes    POCT Influenza A/B MOLECULAR   Result Value Ref Range    POC Molecular Influenza A Ag Negative Negative, Not Reported    POC Molecular Influenza B Ag Negative Negative, Not Reported     Acceptable Yes        Plan:       Cough, unspecified type  -     SARS Coronavirus 2 Antigen, POCT Manual Read  -     POCT Influenza A/B MOLECULAR    Acute laryngopharyngitis  -     azelastine (ASTELIN) 137 mcg (0.1 %) nasal spray; 1 spray (137 mcg total) by Nasal route 2 (two) times daily.  Dispense: 30 mL; Refill: 0  -     benzonatate (TESSALON) 200 MG capsule; Take 1 capsule (200 mg total) by mouth 3 (three) times daily as needed for Cough.  Dispense: 21 capsule; Refill: 0  -     triamcinolone (NASAL ALLERGY) 55 mcg nasal inhaler; 2 sprays by Nasal route Daily.  Dispense: 6.7 mL; Refill: 0  -     cetirizine (ZYRTEC) 10 MG tablet; Take 1 tablet (10 mg total) by mouth once daily.  Dispense: 30 tablet; Refill: 0  -     ibuprofen (ADVIL,MOTRIN) 600 MG tablet; Take 1 tablet (600 mg total) by mouth 3 (three) times daily.  Dispense: 20 tablet; Refill: 0      RTC PRN

## 2023-10-27 NOTE — PROGRESS NOTES
Subjective:       Patient ID: Dina Hutton is a 38 y.o. female.    Chief Complaint: Abdominal Pain, Gastroesophageal Reflux, Diarrhea, and Constipation    Patient here today to reestablish care with aforementioned complaints.  She was previously seen by nurse practitioner  in 2021 with complaints of GERD and nausea.  She was sent for EGD which was performed later that year.  Exam was unremarkable.  Today patient reports ongoing issues with acid reflux.  It went away for some time however returned earlier this year.  Her symptoms occur daily, particularly with lying down.  She was on omeprazole but it seemed to stop working.  She was switched to pantoprazole, only to find out that the omeprazole was working better so she switched back.  She denies significant dysphagia or odynophagia.  Denies prior abdominal surgeries.      Review of Systems   Cardiovascular:  Positive for chest pain.   Gastrointestinal:  Positive for abdominal pain.       The following portions of the patient's history were reviewed and updated as appropriate: allergies, current medications, past family history, past medical history, past social history, past surgical history and problem list.    Objective:      Physical Exam  Constitutional:       Appearance: She is well-developed.   HENT:      Head: Normocephalic and atraumatic.   Eyes:      Conjunctiva/sclera: Conjunctivae normal.   Pulmonary:      Effort: Pulmonary effort is normal. No respiratory distress.   Musculoskeletal:      Cervical back: Normal range of motion.   Neurological:      Mental Status: She is alert and oriented to person, place, and time.   Psychiatric:         Behavior: Behavior normal.         Thought Content: Thought content normal.         Judgment: Judgment normal.           Pertinent labs and imaging studies reviewed    Assessment:       1. Gastroesophageal reflux disease, unspecified whether esophagitis present        Plan:       Class switch PPI      (Portions  of this note were dictated using voice recognition software and may contain dictation related errors in spelling/grammar/syntax not found on text review)

## 2023-10-30 ENCOUNTER — LAB VISIT (OUTPATIENT)
Dept: LAB | Facility: HOSPITAL | Age: 38
End: 2023-10-30
Attending: INTERNAL MEDICINE
Payer: COMMERCIAL

## 2023-10-30 DIAGNOSIS — E89.0 POSTSURGICAL HYPOTHYROIDISM: Chronic | ICD-10-CM

## 2023-10-30 LAB
T4 FREE SERPL-MCNC: 1.05 NG/DL (ref 0.71–1.51)
TSH SERPL DL<=0.005 MIU/L-ACNC: 0.38 UIU/ML (ref 0.4–4)

## 2023-10-30 PROCEDURE — 84443 ASSAY THYROID STIM HORMONE: CPT | Performed by: INTERNAL MEDICINE

## 2023-10-30 PROCEDURE — 36415 COLL VENOUS BLD VENIPUNCTURE: CPT | Performed by: INTERNAL MEDICINE

## 2023-10-30 PROCEDURE — 84439 ASSAY OF FREE THYROXINE: CPT | Performed by: INTERNAL MEDICINE

## 2023-11-08 ENCOUNTER — PATIENT MESSAGE (OUTPATIENT)
Dept: ENDOCRINOLOGY | Facility: CLINIC | Age: 38
End: 2023-11-08
Payer: COMMERCIAL

## 2023-11-08 DIAGNOSIS — C73 THYROID CANCER: Chronic | ICD-10-CM

## 2023-11-08 DIAGNOSIS — E89.0 POSTSURGICAL HYPOTHYROIDISM: Primary | Chronic | ICD-10-CM

## 2023-11-10 ENCOUNTER — HOSPITAL ENCOUNTER (OUTPATIENT)
Dept: RADIOLOGY | Facility: HOSPITAL | Age: 38
Discharge: HOME | End: 2023-11-10
Attending: INTERNAL MEDICINE
Payer: COMMERCIAL

## 2023-11-10 DIAGNOSIS — C73 THYROID CANCER: ICD-10-CM

## 2023-11-10 PROCEDURE — 76536 US EXAM OF HEAD AND NECK: CPT | Mod: TC

## 2023-11-10 PROCEDURE — 76536 US EXAM OF HEAD AND NECK: CPT | Mod: 26,,, | Performed by: RADIOLOGY

## 2023-11-10 PROCEDURE — 76536 US SOFT TISSUE HEAD NECK THYROID: ICD-10-PCS | Mod: 26,,, | Performed by: RADIOLOGY

## 2023-11-27 ENCOUNTER — OFFICE VISIT (OUTPATIENT)
Dept: ENDOCRINOLOGY | Facility: CLINIC | Age: 38
End: 2023-11-27
Payer: COMMERCIAL

## 2023-11-27 ENCOUNTER — LAB VISIT (OUTPATIENT)
Dept: LAB | Facility: HOSPITAL | Age: 38
End: 2023-11-27
Attending: INTERNAL MEDICINE
Payer: COMMERCIAL

## 2023-11-27 VITALS
WEIGHT: 132.19 LBS | SYSTOLIC BLOOD PRESSURE: 120 MMHG | RESPIRATION RATE: 18 BRPM | BODY MASS INDEX: 28.52 KG/M2 | DIASTOLIC BLOOD PRESSURE: 82 MMHG | HEIGHT: 57 IN | OXYGEN SATURATION: 98 % | HEART RATE: 81 BPM

## 2023-11-27 DIAGNOSIS — C73 THYROID CANCER: Chronic | ICD-10-CM

## 2023-11-27 DIAGNOSIS — K21.9 GASTROESOPHAGEAL REFLUX DISEASE WITHOUT ESOPHAGITIS: ICD-10-CM

## 2023-11-27 DIAGNOSIS — E89.0 POSTSURGICAL HYPOTHYROIDISM: Primary | Chronic | ICD-10-CM

## 2023-11-27 DIAGNOSIS — E89.0 POSTSURGICAL HYPOTHYROIDISM: Chronic | ICD-10-CM

## 2023-11-27 DIAGNOSIS — M79.7 FIBROMYALGIA: ICD-10-CM

## 2023-11-27 LAB — TSH SERPL DL<=0.005 MIU/L-ACNC: 0.58 UIU/ML (ref 0.4–4)

## 2023-11-27 PROCEDURE — 99999 PR PBB SHADOW E&M-EST. PATIENT-LVL IV: CPT | Mod: PBBFAC,,, | Performed by: INTERNAL MEDICINE

## 2023-11-27 PROCEDURE — 84443 ASSAY THYROID STIM HORMONE: CPT | Performed by: INTERNAL MEDICINE

## 2023-11-27 PROCEDURE — 1159F MED LIST DOCD IN RCRD: CPT | Mod: CPTII,S$GLB,, | Performed by: INTERNAL MEDICINE

## 2023-11-27 PROCEDURE — 84432 ASSAY OF THYROGLOBULIN: CPT | Performed by: INTERNAL MEDICINE

## 2023-11-27 PROCEDURE — 3079F PR MOST RECENT DIASTOLIC BLOOD PRESSURE 80-89 MM HG: ICD-10-PCS | Mod: CPTII,S$GLB,, | Performed by: INTERNAL MEDICINE

## 2023-11-27 PROCEDURE — 99999 PR PBB SHADOW E&M-EST. PATIENT-LVL IV: ICD-10-PCS | Mod: PBBFAC,,, | Performed by: INTERNAL MEDICINE

## 2023-11-27 PROCEDURE — 1160F PR REVIEW ALL MEDS BY PRESCRIBER/CLIN PHARMACIST DOCUMENTED: ICD-10-PCS | Mod: CPTII,S$GLB,, | Performed by: INTERNAL MEDICINE

## 2023-11-27 PROCEDURE — 3074F PR MOST RECENT SYSTOLIC BLOOD PRESSURE < 130 MM HG: ICD-10-PCS | Mod: CPTII,S$GLB,, | Performed by: INTERNAL MEDICINE

## 2023-11-27 PROCEDURE — 99214 PR OFFICE/OUTPT VISIT, EST, LEVL IV, 30-39 MIN: ICD-10-PCS | Mod: S$GLB,,, | Performed by: INTERNAL MEDICINE

## 2023-11-27 PROCEDURE — 36415 COLL VENOUS BLD VENIPUNCTURE: CPT | Performed by: INTERNAL MEDICINE

## 2023-11-27 PROCEDURE — 1160F RVW MEDS BY RX/DR IN RCRD: CPT | Mod: CPTII,S$GLB,, | Performed by: INTERNAL MEDICINE

## 2023-11-27 PROCEDURE — 3074F SYST BP LT 130 MM HG: CPT | Mod: CPTII,S$GLB,, | Performed by: INTERNAL MEDICINE

## 2023-11-27 PROCEDURE — 3008F BODY MASS INDEX DOCD: CPT | Mod: CPTII,S$GLB,, | Performed by: INTERNAL MEDICINE

## 2023-11-27 PROCEDURE — 3008F PR BODY MASS INDEX (BMI) DOCUMENTED: ICD-10-PCS | Mod: CPTII,S$GLB,, | Performed by: INTERNAL MEDICINE

## 2023-11-27 PROCEDURE — 1159F PR MEDICATION LIST DOCUMENTED IN MEDICAL RECORD: ICD-10-PCS | Mod: CPTII,S$GLB,, | Performed by: INTERNAL MEDICINE

## 2023-11-27 PROCEDURE — 3079F DIAST BP 80-89 MM HG: CPT | Mod: CPTII,S$GLB,, | Performed by: INTERNAL MEDICINE

## 2023-11-27 PROCEDURE — 99214 OFFICE O/P EST MOD 30 MIN: CPT | Mod: S$GLB,,, | Performed by: INTERNAL MEDICINE

## 2023-11-27 RX ORDER — LIOTHYRONINE SODIUM 5 UG/1
TABLET ORAL
Qty: 30 TABLET | Refills: 11 | Status: SHIPPED | OUTPATIENT
Start: 2023-11-27

## 2023-11-27 RX ORDER — LEVOTHYROXINE SODIUM 75 UG/1
75 CAPSULE ORAL DAILY
Qty: 30 CAPSULE | Refills: 11 | Status: SHIPPED | OUTPATIENT
Start: 2023-11-27 | End: 2023-12-18 | Stop reason: SDUPTHER

## 2023-11-27 NOTE — PROGRESS NOTES
"Subjective:      Patient ID: Dina Hutton is a 38 y.o. female.    Chief Complaint:  Hypothyroidism      History of Present Illness    Dina Hutton is a 38 y.o. female who is here for follow-up of papillary thyroid cancer and postsurgical hypothyroidism.      Last visit in 2/2022.     Since her last visit she was diagnosed with seronegative arthritis and started on leuflonamide. However, she reports not getting a benefit from this so she stopped it.     She still has significant fatigue.     With regards to the papillary thyroid cancer and postsurgical hypothyroidism:  5/3/2021:     Remains on the same contraceptive patch.      Current dose of thyroid hormone is Tirosint 88 mcg daily.      Postop ultrasound in 6/2021:  1.  Thyroid gland is surgically absent without evidence of residual/recurrent thyroid tissue in the surgical beds.     2.  No abnormal appearing lymph nodes are identified.      Neck ultrasound from 11/2023 with no new or suspicious findings.      Latest Reference Range & Units 12/21/20 09:24 03/05/21 11:21 10/04/21 09:19 01/27/22 10:06   Thyroglobulin, LC/MS/MS ng/mL 0.6 (H) 0.5 (H) <0.2 0.4 (H)       8AM cortisol was 25.4 ruling out adrenal insufficiency.        Background history:  Left-sided thyroid nodule was initially discovered on CTA of the chest done in May, 2019 when she was evaluated in the emergency room for chest pain.   "There is a 1.3 cm hypoattenuating left thyroid lobe nodule.  The remaining visualized soft tissue structures at the base of the neck are unremarkable."     She had a follow-up ultrasound done on June 5th, which showed a solid, hypoechoic left thyroid nodule with "punctate peripheral calcifications" and circumscribed borders (see report below).      She underwent FNA on 1/2/2020, which came back [place] [facility] (Malignant).  Because she was pregnant at the time in the 3rd trimester, the decision was made to hold off on surgery until after she delivered.  She underwent total " thyroidectomy on 9/29/2020:              Review of Systems   Constitutional:  Negative for chills and fever.   Gastrointestinal:  Negative for nausea.       Objective:   Physical Exam  Vitals and nursing note reviewed.       BP Readings from Last 3 Encounters:   11/27/23 120/82   10/27/23 117/80   10/12/23 136/63     Wt Readings from Last 1 Encounters:   11/27/23 0842 59.9 kg (132 lb 2.7 oz)       Body mass index is 28.6 kg/m².    Lab Review:   Lab Results   Component Value Date    HGBA1C 5.6 07/28/2021     Lab Results   Component Value Date    CHOL 295 (H) 07/28/2021    HDL 79 07/28/2021    LDLCALC 175 (H) 07/28/2021    TRIG 225 (H) 07/28/2021     Lab Results   Component Value Date     03/10/2023    K 4.2 03/10/2023     03/10/2023    CO2 25 03/10/2023    GLU 97 03/10/2023    BUN 9 03/10/2023    CREATININE 0.7 03/10/2023    CALCIUM 8.7 03/10/2023    PROT 7.1 03/10/2023    ALBUMIN 3.5 03/10/2023    BILITOT 0.2 03/10/2023    ALKPHOS 52 (L) 03/10/2023    AST 14 03/10/2023    ALT 10 03/10/2023    ANIONGAP 6 (L) 03/10/2023    ESTGFRAFRICA >60 02/18/2022    EGFRNONAA >60 02/18/2022    TSH 0.381 (L) 10/30/2023         Assessment and Plan     Postsurgical hypothyroidism  TSH is slightly below goal but nearly normal    Having some symptoms that could be related to hypothyroidism although non-specific.     Reviewed with her that symptoms of hypothyroidism would be less likely given current TSH level, but we could try adding T3 to her regimen to see if this helps and she is agreeable    Will decrease Tirosint to 75 mcg once daily and start liothyronine 2.5 mg twice daily    Goal TSH 0.5-2.0    Repeat TSH in 5-6 weeks    Thyroid cancer  AJCC stage I disease. LUIS [facility]RIQUE low risk pathology.    Serum thyroglobulin with mass spec 0.4  No significant findings on neck USS from 11/2023    Would not recommend CISSE ablation at this time    Repeat neck USS in 1 year    Tg pending    Fibromyalgia  --Encouraged her to follow up with  rheumatology      TSH in 5-6 weeks, follow up in 6 months      Kei Gold M.D. Staff Endocrinology

## 2023-11-27 NOTE — ASSESSMENT & PLAN NOTE
TSH is slightly below goal but nearly normal    Having some symptoms that could be related to hypothyroidism although non-specific.     Reviewed with her that symptoms of hypothyroidism would be less likely given current TSH level, but we could try adding T3 to her regimen to see if this helps and she is agreeable    Will decrease Tirosint to 75 mcg once daily and start liothyronine 2.5 mg twice daily    Goal TSH 0.5-2.0    Repeat TSH in 5-6 weeks

## 2023-11-27 NOTE — ASSESSMENT & PLAN NOTE
AJCC stage I disease. LUIS ENRIQUE low risk pathology.    Serum thyroglobulin with mass spec 0.4  No significant findings on neck USS from 11/2023    Would not recommend CISSE ablation at this time    Repeat neck USS in 1 year    Tg pending

## 2023-11-28 LAB
THRYOGLOBULIN INTERPRETATION: ABNORMAL
THYROGLOB AB SERPL-ACNC: <1.8 IU/ML
THYROGLOB SERPL-MCNC: 0.4 NG/ML

## 2023-12-04 ENCOUNTER — PATIENT MESSAGE (OUTPATIENT)
Dept: NEUROLOGY | Facility: CLINIC | Age: 38
End: 2023-12-04
Payer: COMMERCIAL

## 2023-12-07 NOTE — PROGRESS NOTES
Subjective:       Patient ID: Dina Hutton is a 35 y.o. female.    Vitals:  temperature is 97.8 °F (36.6 °C). Her blood pressure is 114/68 and her pulse is 83. Her respiration is 16 and oxygen saturation is 98%.     Chief Complaint: COVID-19 Concerns    Pt c/o nausea, fatigue, sore throat, coughing, and congestion that started yesterday. She has a  at home and is concerned about covid. She tried robitussin DM and zyrtec which provided relief with the congestion. No known covid exposure. Denies fever, chills, CP, SOB, abd pain, anosmia.     Sinus Problem  This is a new problem. The current episode started yesterday. The problem is unchanged. There has been no fever. She is experiencing no pain. Associated symptoms include coughing, a hoarse voice and a sore throat. Pertinent negatives include no chills, congestion, headaches or shortness of breath. Treatments tried: robitussin, zyrtec. The treatment provided mild relief.       Constitution: Positive for fatigue. Negative for chills and fever.   HENT: Positive for sore throat. Negative for congestion.    Neck: Negative for painful lymph nodes.   Cardiovascular: Negative for chest pain and leg swelling.   Eyes: Negative for double vision and blurred vision.   Respiratory: Positive for cough. Negative for shortness of breath.    Gastrointestinal: Positive for nausea. Negative for vomiting and diarrhea.   Genitourinary: Negative for dysuria, frequency, urgency and history of kidney stones.   Musculoskeletal: Negative for joint pain, joint swelling, muscle cramps and muscle ache.   Skin: Negative for color change, pale, rash and bruising.   Allergic/Immunologic: Negative for seasonal allergies.   Neurological: Negative for dizziness, history of vertigo, light-headedness, passing out and headaches.   Hematologic/Lymphatic: Negative for swollen lymph nodes.   Psychiatric/Behavioral: Negative for nervous/anxious, sleep disturbance and depression. The patient is not  nervous/anxious.        Objective:      Physical Exam   Constitutional: She is oriented to person, place, and time. She appears well-developed. She is cooperative.  Non-toxic appearance. She does not appear ill. No distress.   HENT:   Head: Normocephalic and atraumatic.   Ears:   Right Ear: Hearing, tympanic membrane, external ear and ear canal normal.   Left Ear: Hearing, tympanic membrane, external ear and ear canal normal.   Nose: Congestion present. No mucosal edema, rhinorrhea or nasal deformity. No epistaxis. Right sinus exhibits no maxillary sinus tenderness and no frontal sinus tenderness. Left sinus exhibits no maxillary sinus tenderness and no frontal sinus tenderness.   Mouth/Throat: Uvula is midline, oropharynx is clear and moist and mucous membranes are normal. Mucous membranes are moist. No trismus in the jaw. Normal dentition. No uvula swelling. Cobblestoning present. No oropharyngeal exudate, posterior oropharyngeal edema or posterior oropharyngeal erythema. Tonsils are 0 on the right. Tonsils are 0 on the left. No tonsillar exudate. Oropharynx is clear.   Eyes: Conjunctivae and lids are normal. No scleral icterus.   Neck: Trachea normal, normal range of motion, full passive range of motion without pain and phonation normal. Neck supple. No neck rigidity. No edema and no erythema present.   Cardiovascular: Normal rate, regular rhythm, normal heart sounds and normal pulses.   Pulmonary/Chest: Effort normal and breath sounds normal. No respiratory distress. She has no decreased breath sounds. She has no wheezes. She has no rhonchi. She has no rales.   Abdominal: Normal appearance.   Musculoskeletal: Normal range of motion.         General: No deformity.   Lymphadenopathy:     She has no cervical adenopathy.   Neurological: She is alert and oriented to person, place, and time. She exhibits normal muscle tone. Coordination normal.   Skin: Skin is warm, dry, intact, not diaphoretic and not pale.  Psychiatric: Her speech is normal and behavior is normal. Judgment and thought content normal.   Nursing note and vitals reviewed.    Results for orders placed or performed in visit on 11/26/20   POCT COVID-19 Rapid Screening   Result Value Ref Range    POC Rapid COVID Negative Negative     Acceptable Yes            Assessment:       1. Viral URI    2. Encounter for screening examination for infectious disease        Plan:         Viral URI  -     promethazine-dextromethorphan (PROMETHAZINE-DM) 6.25-15 mg/5 mL Syrp; Take 5 mLs by mouth every 4 to 6 hours as needed. 5 mL every 4 to 6 hours; maximum: 30 mL in 24 hours  Dispense: 118 mL; Refill: 0  -     (Magic mouthwash) 1:1:1 Benadryl 12.5mg/5ml liq, aluminum & magnesium hydroxide-simehticone (Maalox), lidocaine viscous 2%; Swish and spit 10 mLs every 4 (four) hours as needed. for sore throat  Dispense: 360 mL; Refill: 0    Encounter for screening examination for infectious disease  -     POCT COVID-19 Rapid Screening         Reviewed previous pertinent office visits, PMH, PSH, fam hx  We discussed that this is likely a viral illness and will not benefit from antibiotics. Symptomatic care recommended.   Recommended otc motrin or tylenol as needed for fever/aches unless contraindicated  Since symptoms have been present for less than 48 hours we discussed the possibility of false negative. Recommended retesting in 2-3 days and isolation for now.   Advised on return/follow-up precautions. Advised on ER precautions. Answered all patient questions. Patient verbalized understanding and voiced agreement with current treatment plan.    Patient Instructions      If not allergic,take tylenol (acetominophen) for fever control, chills, or body aches every 4 hours. Do not exceed 4000 mg/ day.If not allergic, take Motrin (Ibuprofen) every 4 hours for fever, chills, pain or inflammation. Do not exceed 2400 mg/day. You can alternate taking tylenol and motrin.    You  must understand that you've received an Urgent Care treatment only and that you may be released before all your medical problems are known or treated. You, the patient, will arrange for follow up care as instructed.     Follow up with your PCP or specialty clinic as instructed in the next 2-3 days if not improved or as needed. You can call [phone] to schedule an appointment with appropriate provider.     If you condition worsens, we recommend that you receive another evaluation at the emergency room immediately or contact your primary medical clinic's after hours call service to discuss your concerns.     Please return here or go to the Emergency Department for any concerns or worsening condition.     If you were prescribed a narcotic or controlled substance, do not drive or operate heavy equipment or machinery while taking these medications.       Viral Upper Respiratory Illness (Adult)  You have a viral upper respiratory illness (URI), which is another term for the common cold. This illness is contagious during the first few days. It is spread through the air by coughing and sneezing. It may also be spread by direct contact (touching the sick person and then touching your own eyes, nose, or mouth). Frequent handwashing will decrease risk of spread. Most viral illnesses go away within 7 to 10 days with rest and simple home remedies. Sometimes the illness may last for several weeks. Antibiotics will not kill a virus, and they are generally not prescribed for this condition.    Home care  · If symptoms are severe, rest at home for the first 2 to 3 days. When you resume activity, don't let yourself get too tired.  · Avoid being exposed to cigarette smoke (yours or others).  · You may use acetaminophen or ibuprofen to control pain and fever, unless another medicine was prescribed. (Note: If you have chronic liver or kidney disease, have ever had a stomach ulcer or gastrointestinal bleeding, or are taking  blood-thinning medicines, talk with your healthcare provider before using these medicines.) Aspirin should never be given to anyone under 18 years of age who is ill with a viral infection or fever. It may cause severe liver or brain damage.  · Your appetite may be poor, so a light diet is fine. Avoid dehydration by drinking 6 to 8 glasses of fluids per day (water, soft drinks, juices, tea, or soup). Extra fluids will help loosen secretions in the nose and lungs.  · Over-the-counter cold medicines will not shorten the length of time youre sick, but they may be helpful for the following symptoms: cough, sore throat, and nasal and sinus congestion. (Note: Do not use decongestants if you have high blood pressure.)  Follow-up care  Follow up with your healthcare provider, or as advised.  When to seek medical advice  Call your healthcare provider right away if any of these occur:  · Cough with lots of colored sputum (mucus)  · Severe headache; face, neck, or ear pain  · Difficulty swallowing due to throat pain  · Fever of 100.4°F (38°C)  Call 911, or get immediate medical care  Call emergency services right away if any of these occur:  · Chest pain, shortness of breath, wheezing, or difficulty breathing  · Coughing up blood  · Inability to swallow due to throat pain  Date Last Reviewed: 9/13/2015  © 2097-1550 [employer]. [address] [place] [address], [place] [address]. All rights reserved. This information is not intended as a substitute for professional medical care. Always follow your healthcare professional's instructions.             done

## 2023-12-11 ENCOUNTER — HOSPITAL ENCOUNTER (EMERGENCY)
Facility: HOSPITAL | Age: 38
Discharge: HOME | End: 2023-12-11
Attending: EMERGENCY MEDICINE
Payer: COMMERCIAL

## 2023-12-11 VITALS
TEMPERATURE: 98 F | RESPIRATION RATE: 18 BRPM | OXYGEN SATURATION: 99 % | SYSTOLIC BLOOD PRESSURE: 137 MMHG | BODY MASS INDEX: 28.05 KG/M2 | WEIGHT: 130 LBS | DIASTOLIC BLOOD PRESSURE: 67 MMHG | HEART RATE: 74 BPM | HEIGHT: 57 IN

## 2023-12-11 DIAGNOSIS — R07.9 CHEST PAIN: ICD-10-CM

## 2023-12-11 DIAGNOSIS — R10.13 EPIGASTRIC PAIN: Primary | ICD-10-CM

## 2023-12-11 DIAGNOSIS — R06.02 SHORTNESS OF BREATH: ICD-10-CM

## 2023-12-11 LAB
ALBUMIN SERPL BCP-MCNC: 3.4 G/DL (ref 3.5–5.2)
ALP SERPL-CCNC: 55 U/L (ref 55–135)
ALT SERPL W/O P-5'-P-CCNC: 10 U/L (ref 10–44)
ANION GAP SERPL CALC-SCNC: 9 MMOL/L (ref 8–16)
AST SERPL-CCNC: 12 U/L (ref 10–40)
B-HCG UR QL: NEGATIVE
BASOPHILS # BLD AUTO: 0.04 K/UL (ref 0–0.2)
BASOPHILS NFR BLD: 0.5 % (ref 0–1.9)
BILIRUB SERPL-MCNC: 0.1 MG/DL (ref 0.1–1)
BNP SERPL-MCNC: 20 PG/ML (ref 0–99)
BUN SERPL-MCNC: 7 MG/DL (ref 6–20)
CALCIUM SERPL-MCNC: 8.7 MG/DL (ref 8.7–10.5)
CHLORIDE SERPL-SCNC: 104 MMOL/L (ref 95–110)
CO2 SERPL-SCNC: 24 MMOL/L (ref 23–29)
CREAT SERPL-MCNC: 0.6 MG/DL (ref 0.5–1.4)
CTP QC/QA: YES
D DIMER PPP IA.FEU-MCNC: 0.58 MG/L FEU
DIFFERENTIAL METHOD: NORMAL
EOSINOPHIL # BLD AUTO: 0.2 K/UL (ref 0–0.5)
EOSINOPHIL NFR BLD: 2.1 % (ref 0–8)
ERYTHROCYTE [DISTWIDTH] IN BLOOD BY AUTOMATED COUNT: 13.1 % (ref 11.5–14.5)
EST. GFR  (NO RACE VARIABLE): >60 ML/MIN/1.73 M^2
GLUCOSE SERPL-MCNC: 150 MG/DL (ref 70–110)
HCT VFR BLD AUTO: 39.3 % (ref 37–48.5)
HCV AB SERPL QL IA: NORMAL
HGB BLD-MCNC: 13.1 G/DL (ref 12–16)
HIV 1+2 AB+HIV1 P24 AG SERPL QL IA: NORMAL
IMM GRANULOCYTES # BLD AUTO: 0.02 K/UL (ref 0–0.04)
IMM GRANULOCYTES NFR BLD AUTO: 0.2 % (ref 0–0.5)
LIPASE SERPL-CCNC: 27 U/L (ref 4–60)
LYMPHOCYTES # BLD AUTO: 1.7 K/UL (ref 1–4.8)
LYMPHOCYTES NFR BLD: 20.1 % (ref 18–48)
MCH RBC QN AUTO: 28.4 PG (ref 27–31)
MCHC RBC AUTO-ENTMCNC: 33.3 G/DL (ref 32–36)
MCV RBC AUTO: 85 FL (ref 82–98)
MONOCYTES # BLD AUTO: 0.4 K/UL (ref 0.3–1)
MONOCYTES NFR BLD: 4.9 % (ref 4–15)
NEUTROPHILS # BLD AUTO: 6.1 K/UL (ref 1.8–7.7)
NEUTROPHILS NFR BLD: 72.2 % (ref 38–73)
NRBC BLD-RTO: 0 /100 WBC
PLATELET # BLD AUTO: 297 K/UL (ref 150–450)
PMV BLD AUTO: 10.2 FL (ref 9.2–12.9)
POTASSIUM SERPL-SCNC: 3.9 MMOL/L (ref 3.5–5.1)
PROT SERPL-MCNC: 7.5 G/DL (ref 6–8.4)
RBC # BLD AUTO: 4.61 M/UL (ref 4–5.4)
SODIUM SERPL-SCNC: 137 MMOL/L (ref 136–145)
TROPONIN I SERPL DL<=0.01 NG/ML-MCNC: <0.006 NG/ML (ref 0–0.03)
TROPONIN I SERPL DL<=0.01 NG/ML-MCNC: <0.006 NG/ML (ref 0–0.03)
WBC # BLD AUTO: 8.49 K/UL (ref 3.9–12.7)

## 2023-12-11 PROCEDURE — 93010 ELECTROCARDIOGRAM REPORT: CPT | Mod: ,,, | Performed by: INTERNAL MEDICINE

## 2023-12-11 PROCEDURE — 81025 URINE PREGNANCY TEST: CPT | Performed by: EMERGENCY MEDICINE

## 2023-12-11 PROCEDURE — 85025 COMPLETE CBC W/AUTO DIFF WBC: CPT | Performed by: EMERGENCY MEDICINE

## 2023-12-11 PROCEDURE — 96375 TX/PRO/DX INJ NEW DRUG ADDON: CPT | Mod: 59

## 2023-12-11 PROCEDURE — 83690 ASSAY OF LIPASE: CPT | Performed by: EMERGENCY MEDICINE

## 2023-12-11 PROCEDURE — 93005 ELECTROCARDIOGRAM TRACING: CPT

## 2023-12-11 PROCEDURE — 99285 EMERGENCY DEPT VISIT HI MDM: CPT | Mod: 25

## 2023-12-11 PROCEDURE — 87389 HIV-1 AG W/HIV-1&-2 AB AG IA: CPT | Performed by: EMERGENCY MEDICINE

## 2023-12-11 PROCEDURE — 84484 ASSAY OF TROPONIN QUANT: CPT | Mod: 91 | Performed by: EMERGENCY MEDICINE

## 2023-12-11 PROCEDURE — 93010 EKG 12-LEAD: ICD-10-PCS | Mod: ,,, | Performed by: INTERNAL MEDICINE

## 2023-12-11 PROCEDURE — 85379 FIBRIN DEGRADATION QUANT: CPT | Performed by: PHYSICIAN ASSISTANT

## 2023-12-11 PROCEDURE — 96374 THER/PROPH/DIAG INJ IV PUSH: CPT

## 2023-12-11 PROCEDURE — 25500020 PHARM REV CODE 255: Performed by: EMERGENCY MEDICINE

## 2023-12-11 PROCEDURE — 86803 HEPATITIS C AB TEST: CPT | Performed by: EMERGENCY MEDICINE

## 2023-12-11 PROCEDURE — 80053 COMPREHEN METABOLIC PANEL: CPT | Performed by: EMERGENCY MEDICINE

## 2023-12-11 PROCEDURE — 63600175 PHARM REV CODE 636 W HCPCS: Performed by: PHYSICIAN ASSISTANT

## 2023-12-11 PROCEDURE — 83880 ASSAY OF NATRIURETIC PEPTIDE: CPT | Performed by: EMERGENCY MEDICINE

## 2023-12-11 RX ORDER — METHOCARBAMOL 500 MG/1
1000 TABLET, FILM COATED ORAL 3 TIMES DAILY
Qty: 30 TABLET | Refills: 0 | Status: SHIPPED | OUTPATIENT
Start: 2023-12-11 | End: 2023-12-16

## 2023-12-11 RX ORDER — NAPROXEN 500 MG/1
500 TABLET ORAL 2 TIMES DAILY WITH MEALS
Qty: 14 TABLET | Refills: 0 | Status: SHIPPED | OUTPATIENT
Start: 2023-12-11 | End: 2023-12-18

## 2023-12-11 RX ORDER — MORPHINE SULFATE 4 MG/ML
4 INJECTION, SOLUTION INTRAMUSCULAR; INTRAVENOUS
Status: COMPLETED | OUTPATIENT
Start: 2023-12-11 | End: 2023-12-11

## 2023-12-11 RX ORDER — ONDANSETRON 2 MG/ML
4 INJECTION INTRAMUSCULAR; INTRAVENOUS
Status: COMPLETED | OUTPATIENT
Start: 2023-12-11 | End: 2023-12-11

## 2023-12-11 RX ADMIN — IOHEXOL 40 ML: 350 INJECTION, SOLUTION INTRAVENOUS at 09:12

## 2023-12-11 RX ADMIN — ONDANSETRON 4 MG: 2 INJECTION INTRAMUSCULAR; INTRAVENOUS at 07:12

## 2023-12-11 RX ADMIN — MORPHINE SULFATE 4 MG: 4 INJECTION INTRAVENOUS at 07:12

## 2023-12-12 ENCOUNTER — OFFICE VISIT (OUTPATIENT)
Dept: PRIMARY CARE CLINIC | Facility: CLINIC | Age: 38
End: 2023-12-12
Payer: COMMERCIAL

## 2023-12-12 VITALS
HEART RATE: 79 BPM | DIASTOLIC BLOOD PRESSURE: 88 MMHG | WEIGHT: 129.44 LBS | HEIGHT: 57 IN | OXYGEN SATURATION: 97 % | BODY MASS INDEX: 27.92 KG/M2 | SYSTOLIC BLOOD PRESSURE: 124 MMHG

## 2023-12-12 DIAGNOSIS — E89.0 POSTSURGICAL HYPOTHYROIDISM: Primary | Chronic | ICD-10-CM

## 2023-12-12 DIAGNOSIS — F32.A DEPRESSION, UNSPECIFIED DEPRESSION TYPE: ICD-10-CM

## 2023-12-12 DIAGNOSIS — F41.1 GAD (GENERALIZED ANXIETY DISORDER): ICD-10-CM

## 2023-12-12 PROCEDURE — 3079F DIAST BP 80-89 MM HG: CPT | Mod: CPTII,S$GLB,, | Performed by: STUDENT IN AN ORGANIZED HEALTH CARE EDUCATION/TRAINING PROGRAM

## 2023-12-12 PROCEDURE — 99999 PR PBB SHADOW E&M-EST. PATIENT-LVL V: CPT | Mod: PBBFAC,,, | Performed by: STUDENT IN AN ORGANIZED HEALTH CARE EDUCATION/TRAINING PROGRAM

## 2023-12-12 PROCEDURE — 1159F MED LIST DOCD IN RCRD: CPT | Mod: CPTII,S$GLB,, | Performed by: STUDENT IN AN ORGANIZED HEALTH CARE EDUCATION/TRAINING PROGRAM

## 2023-12-12 PROCEDURE — 1160F RVW MEDS BY RX/DR IN RCRD: CPT | Mod: CPTII,S$GLB,, | Performed by: STUDENT IN AN ORGANIZED HEALTH CARE EDUCATION/TRAINING PROGRAM

## 2023-12-12 PROCEDURE — 1160F PR REVIEW ALL MEDS BY PRESCRIBER/CLIN PHARMACIST DOCUMENTED: ICD-10-PCS | Mod: CPTII,S$GLB,, | Performed by: STUDENT IN AN ORGANIZED HEALTH CARE EDUCATION/TRAINING PROGRAM

## 2023-12-12 PROCEDURE — 1159F PR MEDICATION LIST DOCUMENTED IN MEDICAL RECORD: ICD-10-PCS | Mod: CPTII,S$GLB,, | Performed by: STUDENT IN AN ORGANIZED HEALTH CARE EDUCATION/TRAINING PROGRAM

## 2023-12-12 PROCEDURE — 3079F PR MOST RECENT DIASTOLIC BLOOD PRESSURE 80-89 MM HG: ICD-10-PCS | Mod: CPTII,S$GLB,, | Performed by: STUDENT IN AN ORGANIZED HEALTH CARE EDUCATION/TRAINING PROGRAM

## 2023-12-12 PROCEDURE — 99214 PR OFFICE/OUTPT VISIT, EST, LEVL IV, 30-39 MIN: ICD-10-PCS | Mod: S$GLB,,, | Performed by: STUDENT IN AN ORGANIZED HEALTH CARE EDUCATION/TRAINING PROGRAM

## 2023-12-12 PROCEDURE — 99214 OFFICE O/P EST MOD 30 MIN: CPT | Mod: S$GLB,,, | Performed by: STUDENT IN AN ORGANIZED HEALTH CARE EDUCATION/TRAINING PROGRAM

## 2023-12-12 PROCEDURE — 99999 PR PBB SHADOW E&M-EST. PATIENT-LVL V: ICD-10-PCS | Mod: PBBFAC,,, | Performed by: STUDENT IN AN ORGANIZED HEALTH CARE EDUCATION/TRAINING PROGRAM

## 2023-12-12 PROCEDURE — 3074F SYST BP LT 130 MM HG: CPT | Mod: CPTII,S$GLB,, | Performed by: STUDENT IN AN ORGANIZED HEALTH CARE EDUCATION/TRAINING PROGRAM

## 2023-12-12 PROCEDURE — 3074F PR MOST RECENT SYSTOLIC BLOOD PRESSURE < 130 MM HG: ICD-10-PCS | Mod: CPTII,S$GLB,, | Performed by: STUDENT IN AN ORGANIZED HEALTH CARE EDUCATION/TRAINING PROGRAM

## 2023-12-12 PROCEDURE — 3008F PR BODY MASS INDEX (BMI) DOCUMENTED: ICD-10-PCS | Mod: CPTII,S$GLB,, | Performed by: STUDENT IN AN ORGANIZED HEALTH CARE EDUCATION/TRAINING PROGRAM

## 2023-12-12 PROCEDURE — 3008F BODY MASS INDEX DOCD: CPT | Mod: CPTII,S$GLB,, | Performed by: STUDENT IN AN ORGANIZED HEALTH CARE EDUCATION/TRAINING PROGRAM

## 2023-12-12 RX ORDER — ESCITALOPRAM OXALATE 10 MG/1
10 TABLET ORAL DAILY
Qty: 30 TABLET | Refills: 11 | Status: SHIPPED | OUTPATIENT
Start: 2023-12-12 | End: 2024-03-11

## 2023-12-12 NOTE — ED PROVIDER NOTES
Encounter Date: 12/11/2023       History     Chief Complaint   Patient presents with    Abdominal Pain     Mid epigastric pain, feeling sob, pain to r shoulder    Chest Pain      38-year-old female with past medical history of thyroid cancer, hypoglycemia, GERD presents to the emergency department for epigastric /right upper quadrant abdominal pain that radiates to her right shoulder and to her back.  States it has been ongoing for week progressively worsening.  States it is worse after eating.  She does endorse some shortness of breath as well.  Currently rates her pain a 9/10.    The history is provided by the patient.     Review of patient's allergies indicates:   Allergen Reactions    Vancomycin analogues Hives    Bactrim [sulfamethoxazole-trimethoprim] Nausea And Vomiting    Sulfa (sulfonamide antibiotics) Rash    Vicodin [hydrocodone-acetaminophen] Nausea Only     Patient states she is fine with other pain medication     Past Medical History:   Diagnosis Date    Anemia     Breast abscess 4/25/2020    GERD (gastroesophageal reflux disease)     History of fourth degree perineal laceration 8/21/2019    Hypoglycemia     Hypoglycemia     Mastitis 4/20/2020    Mental disorder     depression    PONV (postoperative nausea and vomiting)     Thyroid cancer     Thyroid disease      Past Surgical History:   Procedure Laterality Date    BREAST CYST EXCISION      COLONOSCOPY      ESOPHAGOGASTRODUODENOSCOPY      ESOPHAGOGASTRODUODENOSCOPY N/A 06/14/2021    Procedure: EGD (ESOPHAGOGASTRODUODENOSCOPY);  Surgeon: Farooq Cullen MD;  Location: [facility];  Service: Endoscopy;  Laterality: N/A;  ongoing epigastric pain, burning, nausea, vomiting  Lives on [facility], want first available but if there are openings on , would prefer that location  cOVID test on 6/11/21 at [facility]    INCISION AND DRAINAGE OF BREAST Left 04/23/2020    Procedure: INCISION AND DRAINAGE, BREAST;  Surgeon: Mason Rubalcava III, MD;  Location: [facility]  OR;  Service: General;  Laterality: Left;    THYROIDECTOMY Bilateral 09/29/2020    Procedure: THYROIDECTOMY with central neck dissection;  Surgeon: Kayla Lovelace MD;  Location: [facility] OR;  Service: General;  Laterality: Bilateral;     Family History   Problem Relation Age of Onset    Hypertension Mother     Hyperlipidemia Mother     Stroke Mother     Arthritis Maternal Grandmother     Mental illness Maternal Grandmother         Dementia & Alzheimer    Cancer Neg Hx      Social History     Tobacco Use    Smoking status: Never    Smokeless tobacco: Never    Tobacco comments:     Allergies to it   Substance Use Topics    Alcohol use: Yes     Alcohol/week: 1.0 standard drink of alcohol     Types: 1 Glasses of wine per week     Comment: On special occasions or events    Drug use: Never     Review of Systems   Constitutional:  Negative for chills and fever.   HENT:  Negative for sore throat.    Respiratory:  Positive for shortness of breath.    Cardiovascular:  Positive for chest pain.   Gastrointestinal:  Positive for abdominal pain, nausea and vomiting.   Genitourinary:  Negative for difficulty urinating and dysuria.   Musculoskeletal:  Positive for back pain.   Skin: Negative.    Neurological:  Negative for weakness.   Psychiatric/Behavioral:  Negative for confusion.        Physical Exam     Initial Vitals [12/11/23 1811]   BP Pulse Resp Temp SpO2   (!) 157/70 104 18 97.6 °F (36.4 °C) 99 %      MAP       --         Physical Exam    Nursing note and vitals reviewed.  Constitutional: She appears well-developed and well-nourished.   HENT:   Head: Normocephalic and atraumatic.   Eyes: Conjunctivae are normal.   Neck: Neck supple.   Normal range of motion.  Cardiovascular:  Regular rhythm, normal heart sounds and intact distal pulses.           Pulmonary/Chest: Breath sounds normal.   Abdominal: Abdomen is soft. She exhibits no distension and no mass. There is abdominal tenderness (Epigastric/RUQ). There is no rebound  "and no guarding.   Musculoskeletal:         General: Normal range of motion.      Cervical back: Normal range of motion and neck supple.     Neurological: She is alert and oriented to person, place, and time. GCS score is 15. GCS eye subscore is 4. GCS verbal subscore is 5. GCS motor subscore is 6.   Skin: Skin is warm and dry. Capillary refill takes less than 2 seconds.         ED Course   Procedures  Labs Reviewed   COMPREHENSIVE METABOLIC PANEL - Abnormal; Notable for the following components:       Result Value    Glucose 150 (*)     Albumin 3.4 (*)     All other components within normal limits   D DIMER, QUANTITATIVE - Abnormal; Notable for the following components:    D-Dimer 0.58 (*)     All other components within normal limits   CBC W/ AUTO DIFFERENTIAL   TROPONIN I   TROPONIN I   B-TYPE NATRIURETIC PEPTIDE   LIPASE   HIV 1 / 2 ANTIBODY    Narrative:     Release to patient->Immediate   HEPATITIS C ANTIBODY    Narrative:     Release to patient->Immediate   POCT URINE PREGNANCY   POCT TROPONIN   POCT TROPONIN          Imaging Results              X-Ray Chest AP Portable (Final result)  Result time 12/11/23 21:46:34      Final result by Alejandro Costa MD (12/11/23 21:46:34)                   Impression:      No acute cardiopulmonary finding identified on this single view.      Electronically signed by: Alejandro Costa MD  Date:    12/11/2023  Time:    21:46               Narrative:    EXAMINATION:  XR CHEST AP PORTABLE    CLINICAL HISTORY:  Provided history is "Chest Pain;  ".    TECHNIQUE:  One view of the chest.    COMPARISON:  12/05/2021.    FINDINGS:  Cardiac wires overlie the chest.  Cardiomediastinal silhouette is not enlarged.  No confluent area of consolidation.  No sizable pleural effusion.  No pneumothorax.                                       CTA Chest Non-Coronary (PE Studies) (Final result)  Result time 12/11/23 21:22:00      Final result by Christian Cameron MD (12/11/23 21:22:00)        "            Impression:      1. No evidence of acute pulmonary thromboembolism.  2. No acute findings evident elsewhere in the chest.      Electronically signed by: Christian Cameron  Date:    12/11/2023  Time:    21:22               Narrative:    EXAMINATION:  CTA CHEST NON CORONARY (PE STUDIES)    CLINICAL HISTORY:  Pulmonary embolism (PE) suspected, positive D-dimer;    TECHNIQUE:  Following the intravenous administration of 75 cc of Omnipaque 350 contrast material, 1.25 mm contiguous axial images were acquired through the chest utilizing the CT pulmonary angiogram protocol.  2-D coronal and sagittal reconstructed images of the chest and sagittal oblique MIP images of the pulmonary arterial system were generated from the source data.    COMPARISON:  None.    FINDINGS:  Pulmonary arterial system: This is a good quality examination for the evaluation of pulmonary thromboembolism with good opacification of the pulmonary arteries to the level of the segmental branches of the pulmonary arterial system. There is no evidence of acute pulmonary thromboembolism. No large saddle pulmonary embolism, enlargement of the main pulmonary artery, or secondary findings of right ventricular strain.    Aorta and heart: The heart is normal in size.  No pericardial fluid.  No thoracic aortic aneurysm.  No thoracic aortic dissection.    Airways: Unremarkable.    Lymph nodes: No pathologically enlarged lymph nodes in the chest or axilla.    Lungs: The lungs are clear with no evidence of airspace consolidation, mass, or bronchiectasis.  No emphysematous lung architecture.    Pleura: No significant volume of pleural fluid or pneumothorax.  No pleural based masses.    Visualized upper abdominal soft tissues: No significant abnormalities appreciated.  Thyroidectomy clips in the base of the neck.    Bones: There is degenerative change of the thoracic spine.                                       US Abdomen Limited (Final result)  Result time  12/11/23 20:42:51      Final result by Alejandro Costa MD (12/11/23 20:42:51)                   Impression:      No acute sonographic abnormality identified in the upper abdomen.    Small volume biliary sludge.      Electronically signed by: Alejandro Costa MD  Date:    12/11/2023  Time:    20:42               Narrative:    EXAMINATION:  US ABDOMEN LIMITED    CLINICAL HISTORY:  RUQ abdominal pain;.    TECHNIQUE:  Limited ultrasound of the right upper quadrant of the abdomen including pancreas, liver, gallbladder, common bile duct was performed.    COMPARISON:  08/16/2019.    FINDINGS:  Liver: Normal in size, measuring 14 cm. Homogeneous echotexture. No focal hepatic lesions.    Gallbladder: Gallbladder is not distended.  Small volume biliary sludge.  No shadowing calcified gallstones.  No gallbladder wall thickening or pericholecystic fluid.  Sonographic Casillas sign is reportedly negative.    Biliary system: The common duct is not dilated, measuring 4 mm.  No intrahepatic ductal dilatation.    Spleen: Normal in size with a homogeneous echotexture, measuring 7.1 x 2.3 cm.    Pancreas: The visualized portions of pancreas appear normal.    Miscellaneous: No ascites.  Limited evaluation of the right kidney is negative for hydronephrosis.                                       Medications   morphine injection 4 mg (4 mg Intravenous Given 12/11/23 1930)   ondansetron injection 4 mg (4 mg Intravenous Given 12/11/23 1931)   iohexoL (OMNIPAQUE 350) injection 75 mL (40 mLs Intravenous Given 12/11/23 2101)     Medical Decision Making    38-year-old female presents emergency department for epigastric abdominal pain, shortness of breath and nausea x1 week    Differential includes but not limited to PE, pneumonia, gastritis, cholecystitis, choledocholithiasis, aortic dissection, ACS, pancreatitis     On exam she is tender in the epigastric region as well as the right upper quadrant so ultrasound was obtained.  No evidence of  cholecystitis or CBD dilation does have a small volume of biliary sludge.  Her liver enzymes were normal today.  No leukocytosis.  EKG normal sinus rhythm at a rate 85, normal axis, normal intervals, no STEMI.  Troponin negative x2.  Low risk heart score.  Given her history of previous thyroid cancer D-dimer was obtained which was slightly elevated.  And CTA PE study was obtained which did not show any evidence of PE.  No  abnormalities to the aorta was seen on the CTA as well.  No signs of pneumonia on imaging.  No emergent pathology seen in her ED visit today.  We discussed PCP follow-up.  Return ED precautions given.        Amount and/or Complexity of Data Reviewed  Labs: ordered. Decision-making details documented in ED Course.  Radiology: ordered.    Risk  Prescription drug management.               ED Course as of 12/11/23 2154   Mon Dec 11, 2023   1903 Preg Test, Ur: Negative [HJ]   1906 Troponin I: <0.006 [HJ]   1910 Lipase: 27 [HJ]   1911 WBC: 8.49 [HJ]   1911 Troponin I: <0.006 [HJ]   2018 D-Dimer(!): 0.58  Will CTA [HJ]   2114   On my interpretation EKG shows normal sinus rhythm at a rate 85, normal axis, normal intervals, no STEMI. [HJ]      ED Course User Index  [HJ] Rene Acosta PA-C                           Clinical Impression:  Final diagnoses:  [R07.9] Chest pain  [R10.13] Epigastric pain (Primary)                 Rene Acosta PA-C  12/11/23 2157

## 2023-12-12 NOTE — ED NOTES
"Patient states mid upper ab dpain that radiates to left shoulder and to back x 1 week, Reports " tingling" to left face.No OTC meds  "
Patient identifiers verified and correct for  MS Hutton  C/C: Mid upper epigastric pain , SEE NN  APPEARANCE: awake and alert in NAD. PAIN  9/10  SKIN: warm, dry and intact. No breakdown or bruising.  MUSCULOSKELETAL: Patient moving all extremities spontaneously, no obvious swelling or deformities noted. Ambulates independently.  RESPIRATORY: Positive  shortness of breath.Respirations unlabored.   CARDIAC: Positive  CP, 2+ distal pulses; no peripheral edema  ABDOMEN: ABdomen round, reports pain to mid upper abdomen, positive nausea,   : voids spontaneously, denies difficulty  Neurologic: AAO x 4; follows commands equal strength in all extremities; denies numbness/tingling. Denies dizziness  Christina soto  
Pt aware of urine sample needed.   
Pt placed on ED telebox: 2183  
Waiting to be seen   
20

## 2023-12-12 NOTE — DISCHARGE INSTRUCTIONS
You were seen in the emergency department for shortness of breath and abdominal pain.  Your CT scan did not show any evidence of blood clots or pneumonia.  Your heart enzymes were normal.  Your blood work did not show any abnormalities.  No emergent causes of her symptoms found in the ED today and recommend close follow-up with your PCP.  You may return to the ED sooner if you develop any new or worsening symptoms.

## 2023-12-12 NOTE — PROGRESS NOTES
SUBJECTIVE     Chief Complaint   Patient presents with    Follow-up       HPI  Dina Hutton is a very pleasant 38 y.o. female with history of thyroid cancer (in remission) and depression that presents for anxiety follow up.    ED visit yesterday (12/11) for Chest pain, shortness of breath. EKG normal, CTA chest ordered ue to elevated D dimer unremarkable, chest xray unremarkable, US abdomen showed small volume biliary sludge but otherwise no issues.      Anxiety/Depression:   Previously rx Lexapro 10mg, but has not been taking  Denies HI/SI/AVH  + ongoing sxs of anxiey    Hx hypothyroidism: 2/2 thyroid resection  Liothyronine 5 mcg and tirosint 88mcg daily    PAST MEDICAL HISTORY:  Past Medical History:   Diagnosis Date    Anemia     Breast abscess 4/25/2020    GERD (gastroesophageal reflux disease)     History of fourth degree perineal laceration 8/21/2019    Hypoglycemia     Hypoglycemia     Mastitis 4/20/2020    Mental disorder     depression    PONV (postoperative nausea and vomiting)     Thyroid cancer     Thyroid disease        PAST SURGICAL HISTORY:  Past Surgical History:   Procedure Laterality Date    BREAST CYST EXCISION      COLONOSCOPY      ESOPHAGOGASTRODUODENOSCOPY      ESOPHAGOGASTRODUODENOSCOPY N/A 06/14/2021    Procedure: EGD (ESOPHAGOGASTRODUODENOSCOPY);  Surgeon: Farooq Cullen MD;  Location: [facility];  Service: Endoscopy;  Laterality: N/A;  ongoing epigastric pain, burning, nausea, vomiting  Lives on [facility], want first available but if there are openings on , would prefer that location  cOVID test on 6/11/21 at [facility]    INCISION AND DRAINAGE OF BREAST Left 04/23/2020    Procedure: INCISION AND DRAINAGE, BREAST;  Surgeon: Mason Rubalcava III, MD;  Location: [facility] OR;  Service: General;  Laterality: Left;    THYROIDECTOMY Bilateral 09/29/2020    Procedure: THYROIDECTOMY with central neck dissection;  Surgeon: Kayla Lovelace MD;  Location: [facility] OR;  Service: General;   Laterality: Bilateral;       SOCIAL HISTORY:  Social History     Socioeconomic History    Marital status:    Tobacco Use    Smoking status: Never    Smokeless tobacco: Never    Tobacco comments:     Allergies to it   Substance and Sexual Activity    Alcohol use: Yes     Alcohol/week: 1.0 standard drink of alcohol     Types: 1 Glasses of wine per week     Comment: On special occasions or events    Drug use: Never    Sexual activity: Yes     Partners: Male     Birth control/protection: Partner-Vasectomy, Patch     Social Determinants of Health     Financial Resource Strain: High Risk (11/26/2023)    Overall Financial Resource Strain (CARDIA)     Difficulty of Paying Living Expenses: Hard   Food Insecurity: Patient Declined (11/26/2023)    Hunger Vital Sign     Worried About Running Out of Food in the Last Year: Patient declined     Ran Out of Food in the Last Year: Patient declined   Transportation Needs: Patient Declined (11/26/2023)    PRAPARE - Transportation     Lack of Transportation (Medical): Patient declined     Lack of Transportation (Non-Medical): Patient declined   Physical Activity: Insufficiently Active (11/26/2023)    Exercise Vital Sign     Days of Exercise per Week: 3 days     Minutes of Exercise per Session: 20 min   Stress: Stress Concern Present (11/26/2023)    [employer] - Occupational Stress Questionnaire     Feeling of Stress : To some extent   Social Connections: Unknown (11/26/2023)    Social Connection and Isolation Panel [NHANES]     Frequency of Communication with Friends and Family: Three times a week     Frequency of Social Gatherings with Friends and Family: Once a week     Active Member of Clubs or Organizations: No     Attends Club or Organization Meetings: Patient declined     Marital Status:    Housing Stability: Unknown (11/26/2023)    Housing Stability Vital Sign     Unable to Pay for Housing in the Last Year: Patient refused     Number of  Places Lived in the Last Year: 1     Unstable Housing in the Last Year: Patient refused       FAMILY HISTORY:  Family History   Problem Relation Age of Onset    Hypertension Mother     Hyperlipidemia Mother     Stroke Mother     Arthritis Maternal Grandmother     Mental illness Maternal Grandmother         Dementia & Alzheimer    Cancer Neg Hx        ALLERGIES AND MEDICATIONS: updated and reviewed.  Review of patient's allergies indicates:   Allergen Reactions    Vancomycin analogues Hives    Bactrim [sulfamethoxazole-trimethoprim] Nausea And Vomiting    Sulfa (sulfonamide antibiotics) Rash    Vicodin [hydrocodone-acetaminophen] Nausea Only     Patient states she is fine with other pain medication     Current Outpatient Medications   Medication Sig Dispense Refill    albuterol (PROVENTIL HFA) 90 mcg/actuation inhaler Inhale 2 puffs into the lungs every 6 (six) hours as needed for Wheezing or Shortness of Breath. Rescue 18 g 0    ascorbic acid/collagen hydr (COLLAGEN PLUS VITAMIN C ORAL) Take by mouth.      azelastine (ASTELIN) 137 mcg (0.1 %) nasal spray 1 spray (137 mcg total) by Nasal route 2 (two) times daily. 30 mL 0    CALCIUM ORAL Take by mouth.      cetirizine (ZYRTEC) 10 MG tablet Take 1 tablet (10 mg total) by mouth once daily. 30 tablet 0    CRANBERRY ORAL Take by mouth.      ibuprofen (ADVIL,MOTRIN) 600 MG tablet Take 1 tablet (600 mg total) by mouth 3 (three) times daily. 20 tablet 0    mv-min/iron/folic/calcium/vitK (WOMEN'S MULTIVITAMIN ORAL) Take by mouth.      naproxen (NAPROSYN) 500 MG tablet Take 1 tablet (500 mg total) by mouth 2 (two) times daily with meals. for 7 days 14 tablet 0    norelgestromin-ethinyl estradiol (XULANE) 150-35 mcg/24 hr Place 1 patch onto the skin once a week. 12 patch 3    TIROSINT 75 mcg Cap Take 75 mcg by mouth once daily. Brand name medically necessary (Patient taking differently: Take 88 mcg by mouth once daily. Brand name medically necessary) 30 capsule 11     "triamcinolone (NASAL ALLERGY) 55 mcg nasal inhaler 2 sprays by Nasal route Daily. 6.7 mL 0    benzonatate (TESSALON) 200 MG capsule Take 1 capsule (200 mg total) by mouth 3 (three) times daily as needed for Cough. (Patient not taking: Reported on 11/27/2023) 21 capsule 0    EScitalopram oxalate (LEXAPRO) 10 MG tablet Take 1 tablet (10 mg total) by mouth once daily. 30 tablet 11    lansoprazole (PREVACID) 30 MG capsule Take 1 capsule (30 mg total) by mouth 2 (two) times a day. (Patient not taking: Reported on 12/12/2023) 60 capsule 3    liothyronine (CYTOMEL) 5 MCG Tab Take a half-tablet (2.5 mcg) by mouth twice daily (Patient not taking: Reported on 12/12/2023) 30 tablet 11     No current facility-administered medications for this visit.       ROS  Review of Systems   Constitutional:  Negative for fever and weight loss.   HENT:  Negative for ear pain and sore throat.    Respiratory:  Negative for cough, hemoptysis, sputum production, shortness of breath and wheezing.    Cardiovascular:  Negative for chest pain, palpitations, orthopnea, claudication, leg swelling and PND.   Gastrointestinal:  Negative for abdominal pain, constipation, diarrhea, nausea and vomiting.   Genitourinary:  Negative for dysuria.   Musculoskeletal:  Negative for back pain, joint pain and neck pain.   Skin:  Negative for rash.   Neurological:  Negative for dizziness, weakness and headaches.   Psychiatric/Behavioral:  Positive for depression. Negative for hallucinations and suicidal ideas. The patient is nervous/anxious.          OBJECTIVE     Physical Exam  Vitals:    12/12/23 1103   BP: 124/88   Pulse: 79    Body mass index is 28 kg/m².  Weight: 58.7 kg (129 lb 6.6 oz)   Height: 4' 9" (144.8 cm)     Physical Exam  HENT:      Head: Normocephalic and atraumatic.      Nose: Nose normal.      Mouth/Throat:      Mouth: Mucous membranes are moist.      Pharynx: Oropharynx is clear.   Eyes:      Extraocular Movements: Extraocular movements intact. "      Conjunctiva/sclera: Conjunctivae normal.      Pupils: Pupils are equal, round, and reactive to light.   Cardiovascular:      Rate and Rhythm: Normal rate and regular rhythm.   Pulmonary:      Effort: Pulmonary effort is normal.      Breath sounds: Normal breath sounds.   Abdominal:      General: There is no distension.      Palpations: Abdomen is soft.      Tenderness: There is no abdominal tenderness.   Musculoskeletal:         General: No swelling. Normal range of motion.      Cervical back: Normal range of motion.      Right lower leg: No edema.      Left lower leg: No edema.   Skin:     General: Skin is warm.      Findings: No lesion or rash.   Neurological:      General: No focal deficit present.      Mental Status: She is alert and oriented to person, place, and time.      Motor: No weakness.   Psychiatric:         Mood and Affect: Mood normal.         Thought Content: Thought content normal.               ASSESSMENT     38 y.o. female with     1. Postsurgical hypothyroidism    2. BENJAMIN (generalized anxiety disorder)    3. Depression, unspecified depression type          PLAN:     1. Postsurgical hypothyroidism  Overview:  Stable on medications, continue regimen  Follow up labs and make adjustments as needed        2. BENJAMIN (generalized anxiety disorder)  -     EScitalopram oxalate (LEXAPRO) 10 MG tablet; Take 1 tablet (10 mg total) by mouth once daily.  Dispense: 30 tablet; Refill: 11    3. Depression, unspecified depression type  Start lexapro 10 mg daily. Follow up in 6 weeks for dose titration  Explained risks of this class of medication including potential for SI/HI/AVH. Educated to stop medication and proceed to ED if pt experiences SI/HI/AVH. Patient verbalizes understanding of the plan.              Follow up in 6 months.    Lona Pham MD

## 2023-12-12 NOTE — PATIENT INSTRUCTIONS
Start Lexapro 5 mg (1/2 tablet) daily for 2 weeks    Increase to Lexapro 10 mg (full tablet) every day.

## 2023-12-12 NOTE — FIRST PROVIDER EVALUATION
Medical screening examination initiated.  I have conducted a focused provider triage encounter, findings are as follows:    Brief history of present illness:  CP, SOB x 1 week    There were no vitals filed for this visit.    Pertinent physical exam:  points to epigastrium and right shoulder     Brief workup plan:  CP workup plus lipase    Preliminary workup initiated; this workup will be continued and followed by the physician or advanced practice provider that is assigned to the patient when roomed.

## 2023-12-18 ENCOUNTER — PATIENT MESSAGE (OUTPATIENT)
Dept: ENDOCRINOLOGY | Facility: CLINIC | Age: 38
End: 2023-12-18
Payer: COMMERCIAL

## 2023-12-18 DIAGNOSIS — E89.0 POSTSURGICAL HYPOTHYROIDISM: Primary | Chronic | ICD-10-CM

## 2023-12-18 DIAGNOSIS — C73 THYROID CANCER: Chronic | ICD-10-CM

## 2023-12-18 RX ORDER — LEVOTHYROXINE SODIUM 75 UG/1
75 CAPSULE ORAL DAILY
Qty: 30 CAPSULE | Refills: 11 | Status: SHIPPED | OUTPATIENT
Start: 2023-12-18 | End: 2024-12-17

## 2023-12-24 ENCOUNTER — PATIENT MESSAGE (OUTPATIENT)
Dept: RHEUMATOLOGY | Facility: CLINIC | Age: 38
End: 2023-12-24
Payer: COMMERCIAL

## 2023-12-25 ENCOUNTER — HOSPITAL ENCOUNTER (EMERGENCY)
Facility: HOSPITAL | Age: 38
Discharge: HOME | End: 2023-12-25
Attending: EMERGENCY MEDICINE
Payer: COMMERCIAL

## 2023-12-25 VITALS
RESPIRATION RATE: 14 BRPM | DIASTOLIC BLOOD PRESSURE: 78 MMHG | SYSTOLIC BLOOD PRESSURE: 134 MMHG | TEMPERATURE: 98 F | HEART RATE: 89 BPM | OXYGEN SATURATION: 95 %

## 2023-12-25 DIAGNOSIS — M54.10 RADICULOPATHY, UNSPECIFIED SPINAL REGION: Primary | ICD-10-CM

## 2023-12-25 DIAGNOSIS — M19.90 JOINT INFLAMMATION: ICD-10-CM

## 2023-12-25 PROCEDURE — 99284 EMERGENCY DEPT VISIT MOD MDM: CPT

## 2023-12-25 PROCEDURE — 25000003 PHARM REV CODE 250: Performed by: PHYSICIAN ASSISTANT

## 2023-12-25 PROCEDURE — 63600175 PHARM REV CODE 636 W HCPCS: Performed by: PHYSICIAN ASSISTANT

## 2023-12-25 PROCEDURE — 96372 THER/PROPH/DIAG INJ SC/IM: CPT | Performed by: PHYSICIAN ASSISTANT

## 2023-12-25 RX ORDER — TRIAMCINOLONE ACETONIDE 40 MG/ML
80 INJECTION, SUSPENSION INTRA-ARTICULAR; INTRAMUSCULAR
Status: COMPLETED | OUTPATIENT
Start: 2023-12-25 | End: 2023-12-25

## 2023-12-25 RX ORDER — PREDNISONE 10 MG/1
10 TABLET ORAL DAILY
Qty: 21 TABLET | Refills: 0 | Status: SHIPPED | OUTPATIENT
Start: 2023-12-25

## 2023-12-25 RX ORDER — OXYCODONE AND ACETAMINOPHEN 5; 325 MG/1; MG/1
1 TABLET ORAL EVERY 6 HOURS PRN
Qty: 9 TABLET | Refills: 0 | Status: SHIPPED | OUTPATIENT
Start: 2023-12-25

## 2023-12-25 RX ORDER — OXYCODONE AND ACETAMINOPHEN 5; 325 MG/1; MG/1
1 TABLET ORAL
Status: COMPLETED | OUTPATIENT
Start: 2023-12-25 | End: 2023-12-25

## 2023-12-25 RX ADMIN — TRIAMCINOLONE ACETONIDE 80 MG: 200 INJECTION, SUSPENSION INTRA-ARTICULAR; INTRAMUSCULAR at 10:12

## 2023-12-25 RX ADMIN — OXYCODONE HYDROCHLORIDE AND ACETAMINOPHEN 1 TABLET: 5; 325 TABLET ORAL at 10:12

## 2023-12-26 NOTE — DISCHARGE INSTRUCTIONS
Take prednisolone until complete   Use Percocet as needed for severe pain   Follow-up with your rheumatologist and return to the ED as needed    Thank you for coming to our Emergency Department today. It is important to remember that some problems are difficult to diagnose and may not be found during your Emergency Department visit. Be sure to follow up with your primary care doctor and review all labs/imaging/tests that were performed during this visit with them. Some labs/tests may be outside of the normal range and require non-emergent follow-up and further investigation to help diagnose/exclude/prevent complications or other medical conditions.    If you do not have a primary care doctor, you may contact the one listed on your discharge paperwork or you may also call the [facility] Appointment Desk at [phone] to schedule an appointment and establish care with one. It is important to your health that you have a primary care doctor.    Please take all medications as directed. All medications may potentially have side-effects and it is impossible to predict which medications may give you side-effects or what side-effects (if any) they will give you.. If you feel that you are having a negative effect or side-effect of any medication you should immediately stop taking them and seek medical attention. If you feel that you are having a life-threatening reaction call 911.    Return to the ER with any questions/concerns, new/concerning symptoms, worsening or failure to improve.     Do not drive, swim, climb to height, take a bath or make any important decisions for 24 hours if you have received any pain medications, sedatives or mood altering drugs during your ER visit.

## 2023-12-26 NOTE — ED PROVIDER NOTES
Encounter Date: 12/25/2023       History     Chief Complaint   Patient presents with    Arm Pain     Pt reports right sided arm pain with numbness and tingling that began 3 days ago. No weakness noted. Pt reports pain with movement. Denies injury.No deformity noted     38-year-old female presents with numbness and tingling to the right upper extremity and numbness and tingling to the right lower extremity.  Patient also reports swelling of the right wrist.  Past medical history significant for thyroid cancer, GERD, inflammatory disorder without clear etiology.  Symptoms began after pregnancy and progressed with COVID infection.  Patient has been under the management of multiple specialties.  She is presumed to have an underlying autoimmune condition that has not been clearly diagnosed.  She meets criteria for fibromyalgia, and there is some concern for rheumatoid arthritis.  Her symptoms are chronic in nature but come in exacerbations.  She denies any new trauma or injury.  She denies any fevers or chills.  She has had similar complaints with radiculopathy and swelling in the past.  She has had multiple imaging modalities performed for similar complaints, specifically had an x-ray of her cervical spine a few months ago.  Since that time there was no new falls, trauma or injury.  She denies any fevers or chills.  No infectious etiology.  She has been trying to manage her pain with prescribed naproxen and Robaxin without much relief.      Review of patient's allergies indicates:   Allergen Reactions    Vancomycin analogues Hives    Bactrim [sulfamethoxazole-trimethoprim] Nausea And Vomiting    Sulfa (sulfonamide antibiotics) Rash    Vicodin [hydrocodone-acetaminophen] Nausea Only     Patient states she is fine with other pain medication     Past Medical History:   Diagnosis Date    Anemia     Breast abscess 4/25/2020    GERD (gastroesophageal reflux disease)     History of fourth degree perineal laceration 8/21/2019     Hypoglycemia     Hypoglycemia     Mastitis 4/20/2020    Mental disorder     depression    PONV (postoperative nausea and vomiting)     Thyroid cancer     Thyroid disease      Past Surgical History:   Procedure Laterality Date    BREAST CYST EXCISION      COLONOSCOPY      ESOPHAGOGASTRODUODENOSCOPY      ESOPHAGOGASTRODUODENOSCOPY N/A 06/14/2021    Procedure: EGD (ESOPHAGOGASTRODUODENOSCOPY);  Surgeon: Farooq Cullen MD;  Location: [facility] ENDO;  Service: Endoscopy;  Laterality: N/A;  ongoing epigastric pain, burning, nausea, vomiting  Lives on [facility], want first available but if there are openings on , would prefer that location  cOVID test on 6/11/21 at [facility]    INCISION AND DRAINAGE OF BREAST Left 04/23/2020    Procedure: INCISION AND DRAINAGE, BREAST;  Surgeon: Mason Rubalcava III, MD;  Location: [facility] OR;  Service: General;  Laterality: Left;    THYROIDECTOMY Bilateral 09/29/2020    Procedure: THYROIDECTOMY with central neck dissection;  Surgeon: Kayla Lovelace MD;  Location: [facility] OR;  Service: General;  Laterality: Bilateral;     Family History   Problem Relation Age of Onset    Hypertension Mother     Hyperlipidemia Mother     Stroke Mother     Arthritis Maternal Grandmother     Mental illness Maternal Grandmother         Dementia & Alzheimer    Cancer Neg Hx      Social History     Tobacco Use    Smoking status: Never    Smokeless tobacco: Never    Tobacco comments:     Allergies to it   Substance Use Topics    Alcohol use: Yes     Alcohol/week: 1.0 standard drink of alcohol     Types: 1 Glasses of wine per week     Comment: On special occasions or events    Drug use: Never     Review of Systems   Constitutional:  Negative for fever.   HENT:  Negative for sore throat.    Respiratory:  Negative for shortness of breath.    Cardiovascular:  Negative for chest pain.   Gastrointestinal:  Negative for nausea.   Genitourinary:  Negative for dysuria.   Musculoskeletal:  Positive for arthralgias and  joint swelling. Negative for back pain.   Skin:  Negative for rash.   Neurological:  Negative for weakness.        Numbness and tingling to the right upper extremity and right lower extremity   Hematological:  Does not bruise/bleed easily.       Physical Exam     Initial Vitals [12/25/23 2152]   BP Pulse Resp Temp SpO2   134/78 89 18 97.9 °F (36.6 °C) 95 %      MAP       --         Physical Exam    Constitutional: Vital signs are normal. She appears well-developed and well-nourished.   HENT:   Head: Normocephalic and atraumatic.   Right Ear: Hearing normal.   Left Ear: Hearing normal.   Eyes: Conjunctivae are normal.   Cardiovascular:  Normal rate and regular rhythm.           Abdominal: Abdomen is soft. Bowel sounds are normal.   Musculoskeletal:         General: Normal range of motion.      Comments: Mild tenderness to the paraspinal musculature in the cervical region.  No midline spinal tenderness   The right upper extremity has some swelling around the distal forearm and the wrist and the hand.  Normal range of motion of the extremity.  Normal pulses    No swelling to the right lower extremity.  No pain to palpation of the lumbar spine.  There is mild discomfort to palpation of the right SI joint  Straight leg raise is negative  Patient is ambulatory     Neurological: She is alert and oriented to person, place, and time.   Skin: Skin is warm and intact.   Psychiatric: She has a normal mood and affect. Her speech is normal and behavior is normal. Cognition and memory are normal.         ED Course   Procedures  Labs Reviewed - No data to display       Imaging Results    None          Medications   triamcinolone acetonide injection 80 mg (80 mg Intramuscular Given 12/25/23 2243)   oxyCODONE-acetaminophen 5-325 mg per tablet 1 tablet (1 tablet Oral Given 12/25/23 2242)     Medical Decision Making  38-year-old female presenting with acute on chronic migratory joint pain and swelling with radiculopathy.    I  considered but I doubt septic arthritis.  I doubt acute fracture or spinal cord injury.  I doubt osteomyelitis or spinal cord infection.  I doubt cauda equina.  I doubt spinal cord impingement.    This patient's symptoms are acute on chronic in nature and she has had them before.  I do believe that given her underlying autoimmune condition and presumed RA this likely is playing a part in her symptoms tonight.  I doubt acute emergent process but I do suspect a possible acute inflammatory flare.  She was treated with a Kenalog injection in the ED and given a steroid taper along with pain medications.  She was advised to closely follow-up with her rheumatologist and or primary care doctor.  Strict return precautions were given if no improvement or worsening over the next 24-72 hours.    Risk  Prescription drug management.                                      Clinical Impression:  Final diagnoses:  [M54.10] Radiculopathy, unspecified spinal region (Primary)  [M19.90] Joint inflammation          ED Disposition Condition    Discharge Stable          ED Prescriptions       Medication Sig Dispense Start Date End Date Auth. Provider    oxyCODONE-acetaminophen (PERCOCET) 5-325 mg per tablet Take 1 tablet by mouth every 6 (six) hours as needed for Pain. 9 tablet 12/25/2023 -- Jermaine Montesinos PA-C    predniSONE (DELTASONE) 10 MG tablet Take 1 tablet (10 mg total) by mouth once daily. Take 4 tabs x 3 days, then take 2 tabs x 3 days, then take 1 tab x 3 days. 21 tablet 12/25/2023 -- Jermaine Montesinos PA-C          Follow-up Information    None          Jermaine Montesinos PA-C  12/25/23 0019

## 2023-12-27 ENCOUNTER — LAB VISIT (OUTPATIENT)
Dept: LAB | Facility: HOSPITAL | Age: 38
End: 2023-12-27
Attending: INTERNAL MEDICINE
Payer: COMMERCIAL

## 2023-12-27 ENCOUNTER — PATIENT OUTREACH (OUTPATIENT)
Dept: EMERGENCY MEDICINE | Facility: HOSPITAL | Age: 38
End: 2023-12-27
Payer: COMMERCIAL

## 2023-12-27 ENCOUNTER — OFFICE VISIT (OUTPATIENT)
Dept: RHEUMATOLOGY | Facility: CLINIC | Age: 38
End: 2023-12-27
Payer: COMMERCIAL

## 2023-12-27 VITALS
SYSTOLIC BLOOD PRESSURE: 130 MMHG | BODY MASS INDEX: 28.06 KG/M2 | DIASTOLIC BLOOD PRESSURE: 90 MMHG | HEART RATE: 52 BPM | HEIGHT: 57 IN | WEIGHT: 130.06 LBS

## 2023-12-27 DIAGNOSIS — M47.22 OSTEOARTHRITIS OF SPINE WITH RADICULOPATHY, CERVICAL REGION: Primary | ICD-10-CM

## 2023-12-27 DIAGNOSIS — M05.79 RHEUMATOID ARTHRITIS INVOLVING MULTIPLE SITES WITH POSITIVE RHEUMATOID FACTOR: Primary | ICD-10-CM

## 2023-12-27 DIAGNOSIS — M05.79 RHEUMATOID ARTHRITIS INVOLVING MULTIPLE SITES WITH POSITIVE RHEUMATOID FACTOR: ICD-10-CM

## 2023-12-27 LAB
CRP SERPL-MCNC: 9.3 MG/L (ref 0–8.2)
HBV SURFACE AG SERPL QL IA: NORMAL

## 2023-12-27 PROCEDURE — 3075F PR MOST RECENT SYSTOLIC BLOOD PRESS GE 130-139MM HG: ICD-10-PCS | Mod: CPTII,S$GLB,, | Performed by: INTERNAL MEDICINE

## 2023-12-27 PROCEDURE — 99999 PR PBB SHADOW E&M-EST. PATIENT-LVL IV: CPT | Mod: PBBFAC,,, | Performed by: INTERNAL MEDICINE

## 2023-12-27 PROCEDURE — 1160F RVW MEDS BY RX/DR IN RCRD: CPT | Mod: CPTII,S$GLB,, | Performed by: INTERNAL MEDICINE

## 2023-12-27 PROCEDURE — 1160F PR REVIEW ALL MEDS BY PRESCRIBER/CLIN PHARMACIST DOCUMENTED: ICD-10-PCS | Mod: CPTII,S$GLB,, | Performed by: INTERNAL MEDICINE

## 2023-12-27 PROCEDURE — 3008F PR BODY MASS INDEX (BMI) DOCUMENTED: ICD-10-PCS | Mod: CPTII,S$GLB,, | Performed by: INTERNAL MEDICINE

## 2023-12-27 PROCEDURE — 3008F BODY MASS INDEX DOCD: CPT | Mod: CPTII,S$GLB,, | Performed by: INTERNAL MEDICINE

## 2023-12-27 PROCEDURE — 87340 HEPATITIS B SURFACE AG IA: CPT | Performed by: INTERNAL MEDICINE

## 2023-12-27 PROCEDURE — 3075F SYST BP GE 130 - 139MM HG: CPT | Mod: CPTII,S$GLB,, | Performed by: INTERNAL MEDICINE

## 2023-12-27 PROCEDURE — 86140 C-REACTIVE PROTEIN: CPT | Performed by: INTERNAL MEDICINE

## 2023-12-27 PROCEDURE — 99999 PR PBB SHADOW E&M-EST. PATIENT-LVL IV: ICD-10-PCS | Mod: PBBFAC,,, | Performed by: INTERNAL MEDICINE

## 2023-12-27 PROCEDURE — 36415 COLL VENOUS BLD VENIPUNCTURE: CPT | Performed by: INTERNAL MEDICINE

## 2023-12-27 PROCEDURE — 99214 PR OFFICE/OUTPT VISIT, EST, LEVL IV, 30-39 MIN: ICD-10-PCS | Mod: S$GLB,,, | Performed by: INTERNAL MEDICINE

## 2023-12-27 PROCEDURE — 3080F DIAST BP >= 90 MM HG: CPT | Mod: CPTII,S$GLB,, | Performed by: INTERNAL MEDICINE

## 2023-12-27 PROCEDURE — 3080F PR MOST RECENT DIASTOLIC BLOOD PRESSURE >= 90 MM HG: ICD-10-PCS | Mod: CPTII,S$GLB,, | Performed by: INTERNAL MEDICINE

## 2023-12-27 PROCEDURE — 1159F PR MEDICATION LIST DOCUMENTED IN MEDICAL RECORD: ICD-10-PCS | Mod: CPTII,S$GLB,, | Performed by: INTERNAL MEDICINE

## 2023-12-27 PROCEDURE — 87517 HEPATITIS B DNA QUANT: CPT | Performed by: INTERNAL MEDICINE

## 2023-12-27 PROCEDURE — 1159F MED LIST DOCD IN RCRD: CPT | Mod: CPTII,S$GLB,, | Performed by: INTERNAL MEDICINE

## 2023-12-27 PROCEDURE — 99214 OFFICE O/P EST MOD 30 MIN: CPT | Mod: S$GLB,,, | Performed by: INTERNAL MEDICINE

## 2023-12-27 RX ORDER — ADALIMUMAB 40MG/0.4ML
40 KIT SUBCUTANEOUS
Qty: 2 PEN | Refills: 11 | Status: ACTIVE | OUTPATIENT
Start: 2023-12-27 | End: 2024-01-31

## 2023-12-27 NOTE — PROGRESS NOTES
Michel Collier LPN  ED Navigator  Emergency Department    Project: [facility] ED Navigator  Role: Community Health Worker    Date: 12/27/2023  Patient Name: Dina Hutton  MRN: [id number]  PCP: Lona Pham MD    Assessment:     Dina Hutton is a 38 y.o. female who has presented to ED for right arm pain. Patient has visited the ED 2 times in the past 3 months. Patient did not contact PCP.     ED Navigator Initial Assessment    ED Navigator Enrollment Documentation  Consent to Services  Does patient consent to completing the assessment?: Yes  Contact  Method of Initial Contact: Phone  Transportation  Does the patient have issues with Transportation?: No  Does the patient have transportation to and from healthcare appointments?: Yes  Insurance Coverage  Do you have coverage/adequate coverage?: Yes  Type/kind of coverage: Primary Cvg:  [employer] Benefit/[facility] [employer] [place]  Is patient able to afford co-pays/deductibles?: Yes  Is patient able to afford HME or supplies?: Yes  Does patient have an established [facility] PCP?: Yes  Able to access?: Yes  Does the patient have a lack of adequate coverage?: No  Specialist Appointment  Did the patient come to the ED to see a specialist?: No  Does the patient have a pending specialist referral?: Yes  Does the patient have a specialist appointment made?: No  PCP Follow Up Appointment  Has the patient had an appointment with a primary care provider in the past year?: Yes  Approximate date: 12/13/23  Provider: Lona Pham MD  When was the last time you saw your PCP?: 12/13/23  Medications  Is patient able to afford medication?: Yes  Is patient unable to get medication due to lack of transportation?: No  Psychological  Does the patient have psycho-social concerns?: Yes  What concerns does the patient have?: Anxiety and/or Depression  Food  Does the patient have concerns about food?: No  Communication/Education  Does the patient have limited English  proficiency/English not primary language?: No  Does patient have low literacy and/or low health literacy?: No  Does patient have concerns with care?: No  Does patient have dissatisfaction with care?: No  Other Financial Concerns  Does the patient have immediate financial distress?: No  Does the patient have general financial concerns?: Yes  Other Social Barriers/Concerns  Does the patient have any additional barriers or concerns?: Unable to afford utilities  Primary Barrier  Barriers identified: Financial barrier (health insurance, employment, etc.), Structural barrier (service availability, waiting times, etc.)  Root Cause of ED Utilization: Chronic Conditions  Plan to address Chronic Conditions: Schedule appointment for patient with their PCP/specialist per ED discharge instructions, Encourage patient to contact PCP/specialist for follow up per ED discharge instructions, Remind patient of upcoming PCP/specialist appointment within 24 to 48 hours before scheduled appt  Next steps: Provided Education, Other Service  Was education/educational materials provided surrounding PCP services/creating a medical home?: Yes Was education verbal or written?: Written     Was education/educational materials provided surrounding low cost, healthy foods?: Yes Was education verbal or written?: Written     Was education/educational materials provided surrounding other items? If so, use comment to explain.: Yes Was education verbal or written?: Written   Plan: Provided information for [facility] 24/7 Nurse triage line, [phone] or [phone]-[facility] ([phone])  Expected Date of Follow Up 1: 1/25/24         Social History     Socioeconomic History    Marital status:    Tobacco Use    Smoking status: Never    Smokeless tobacco: Never    Tobacco comments:     Allergies to it   Substance and Sexual Activity    Alcohol use: Yes     Alcohol/week: 1.0 standard drink of alcohol     Types: 1 Glasses of wine per week      Comment: On special occasions or events    Drug use: Never    Sexual activity: Yes     Partners: Male     Birth control/protection: Partner-Vasectomy, Patch     Social Determinants of Health     Financial Resource Strain: Medium Risk (12/27/2023)    Overall Financial Resource Strain (CARDIA)     Difficulty of Paying Living Expenses: Somewhat hard   Food Insecurity: Unknown (12/27/2023)    Hunger Vital Sign     Worried About Running Out of Food in the Last Year: Never true     Ran Out of Food in the Last Year: Patient declined   Transportation Needs: Unknown (12/27/2023)    PRAPARE - Transportation     Lack of Transportation (Medical): No     Lack of Transportation (Non-Medical): Patient declined   Physical Activity: Inactive (12/27/2023)    Exercise Vital Sign     Days of Exercise per Week: 0 days     Minutes of Exercise per Session: 0 min   Stress: Stress Concern Present (12/27/2023)    [employer] - Occupational Stress Questionnaire     Feeling of Stress : To some extent   Social Connections: Moderately Isolated (12/27/2023)    Social Connection and Isolation Panel [NHANES]     Frequency of Communication with Friends and Family: More than three times a week     Frequency of Social Gatherings with Friends and Family: More than three times a week     Attends Hindu Services: Never     Active Member of Clubs or Organizations: No     Attends Club or Organization Meetings: Never     Marital Status:    Housing Stability: High Risk (12/27/2023)    Housing Stability Vital Sign     Unable to Pay for Housing in the Last Year: Yes     Number of Places Lived in the Last Year: 1     Unstable Housing in the Last Year: No       Plan:   Call placed to f/u from her recent ED visit for right arm pain. Pt states that she is doing ok and would like to be scheduled per referrals with PT and [facility]. Appt schedule on 1-10-24 with [facility]. Unable to schedule appt with [facility],  message left. Call returned to advise Pt with No answer. Voicemail left. Pt requested information for utility, mortgage and food assistance. Resources emailed to [email or web address] for later use.   Michel Collier      Appointment made with: Lona Pham MD

## 2023-12-27 NOTE — PROGRESS NOTES
Chief Complaint   Patient presents with    Disease Management    Pain     The chief complaint leading to consultation is: seronegative arthritis      Notes:       History of presenting illness      38 year old  female     Was in perfect state of health until pregnancy  - in 2020 while she was pregnant- she had headaches,dizziness,fatigue,abdominal pain, tingling and numbness  Work up at this point revealed thyroid cancer  They had to wait till she delivered  Finally in 2021- she had entire thyroid gland removed    Removing the gland- didn't help her symptoms at all  But it took a while for them to get her on the correct thyroid medications  Finally they achieved a normal TSH,T4  When she achieved that-she felt better but she had dizziness,headaches,muscle pain and joint pain    Now TSH is abnormal again    TSH was 0.127 last time,it was < 0.010 prior to that  Medications are being adjusted   Free T4 is normal    She is on and off a birth control patch-this might be interacting with the TSH numbers    Joint pains - started 2 months after thyroid surgery and has persisted until now-  Hands,wrists,knees,elbows and ankles,neck hurt  Pain is every morning,worse during cold weather  Swelling in the feet,wrists and fingers  Morning stiffness-for few hours    She experienced tingling and numbness in the hands  She loses   Left hand- bending is painful  EMG/NCS b/l UE negative      OTC pain medications-tylenol,advil,salon patches help a bit    GERD  Bloating and epigastric burning  Nausea+  Vomiting when GERD gets severe    She has severe headaches and that makes the scalp hurt  Memory issues,brain fog,inability to concentrate  Dry mouth due to medications  She has sensitivity to light- she needs a new filter for her eye-as per optometry      Sharp back pains and needles to her back  No triggers    Episodic shortness of breath-started after covid- this happens at rest and with exertion  Occasional chest pains    She  had covid jan 2022 and July 2022  She doesn't think this has anything to do with her symptoms    Has a lot of allergies recently   Hives+,itchy eyes and nose  All this started after thyroid surgery    She lost a lot of hair after thyroid surgery and she had a big bald patch as well  She took vitamins and that grew her hair back    Night sweats+      No skin rashes,malar rash,photosensitivity   No telangiectasias   No calcinosis   No psoriasis   No oral and nasal ulcers   No dry eyes   No dysphagia,diplopia and dysphonia and muscle weakness   No raynaud's+   No digital ulcers   No cytopenias   No renal issues   No blood clots   No fever,chills,weight loss and loss of appetite   No pregnancy losses/pre term deliveries /pregnancy complications   No recurrent conjunctivitis or uveitis or scleritis or episcleritis   No chronic or bloody diarrhea with no u colitis or crohn's /inflammatory bowel disease   No vaginal or urethral  d/c/STDs/no ulcers   No unexplained lymphadenopathy,parotitis   No seizures,strokes,psychosis  No sclerodactyly  No puffy hands  No perioral tightness     3/2023    Pain in more joints  Stiffness   Left hand index finger - range of motion- if she flexes it the finger straightens back  Swelling in the feet  Shoulder pain  Elbow pain+    MRI hands  1. Severe enhancing tenosynovitis of the flexor carpi radialis. Mild nonenhancing tenosynovitis of the 1st, 2nd, 3rd and 5th flexor tendons.  2. No osteitis.  No osseous erosions.  No synovitis.    12/2023    Leflunamide is giving her headaches  She stopped it many times     She has severe pain in the right arm  ER visit- neck pain going down and there is paresthesias in the right arm    CT C spine and MRI C spine -deg changes C6-7 mild canal stenosis and b/l foraminal stenosis     PT suggested for neck and this never happened    Knees hurt  Ankles hurt  Toes hurt  Elbows and shoulders and fingers and hips hurt    Entire right side swells  Fingers and  ankles swell  Wrists swell  Neck swells    EMS Till 3 pm afternoon    Steroid injection helps  Prednisone 40 mg started- tapering dose suggested yesterday  This prednisone helped with the swelling and the mobility    Recent ER visit  Swelling noted in the right wrist,hand and she had C spine tenderness    Past history : depression,postpartum depression  Thyroid cancer  GERD    Family history  HTN,HLD,Dementia,arthritis    Social history  Not a smoker,alcoholic    Review of Systems   Constitutional:  Negative for fever and unexpected weight change.   HENT:  Negative for mouth sores and trouble swallowing.    Eyes:  Negative for redness.   Respiratory:  Positive for shortness of breath. Negative for cough.    Cardiovascular:  Positive for chest pain.   Gastrointestinal:  Negative for constipation and diarrhea.   Genitourinary:  Positive for dysuria. Negative for genital sores.   Skin:  Negative for rash.   Neurological:  Positive for headaches.   Hematological:  Bruises/bleeds easily.     As detailed above        Physical Exam   Constitutional: She is oriented to person, place, and time. No distress.   HENT:   Head: Normocephalic.   Mouth/Throat: Oropharynx is clear and moist.   Eyes: Pupils are equal, round, and reactive to light. Conjunctivae are normal. Right eye exhibits no discharge. Left eye exhibits no discharge. No scleral icterus.   Neck: No thyromegaly present.   Cardiovascular: Normal rate, regular rhythm and normal heart sounds.   Pulmonary/Chest: Effort normal and breath sounds normal. No stridor.   Abdominal: Soft. Bowel sounds are normal.   Musculoskeletal:         General: Normal range of motion.      Cervical back: Normal range of motion.   Lymphadenopathy:     She has no cervical adenopathy.   Neurological: She is alert and oriented to person, place, and time.   Skin: Skin is warm. No rash noted. She is not diaphoretic.   Psychiatric: Affect and judgment normal.       Widespread pain index  Note the  areas which the patient has had pain over the last week:                   Shoulder-girdle, left 1               Shoulder-girdle, right 1                         Upper arm left                        Upper arm right                          Lower arm left 1                       Lower arm right     Hip (buttock, trochanter) left  1  Hip (buttock, trochanter) right 1                           Upper leg, left                         Upper leg, right                           Lower leg, left                         Lower leg, right                                      Jaw, left                                   Jaw, right                                         Chest 1                                  Abdomen 1                               Upper back                               Lower back 1                                        Neck 1  Score will be from 0-19: 9/19                                         Symptom severity score  Fatigue 2  Waking Unrefreshed 3  Cognitive Symptoms 2   0 = no problem, 1=slight or mild problem 2= moderate; considerable problems often present and/or at a moderate level, 3 = severe, pervasive, continuous, life disturbing problem  For each of the 3 symptoms, indicate the level of severity over the past week using the Scale.  The symptom severity score is the sum of the severity of the 3 symptoms (fatigue, waking unrefreshed, and cognitive symptoms) plus the number of the following symptoms occurring during the previous 6 months:   Headaches 1  Pain or cramps in the lower abdomen 1  Depression 1  The final score is between 0 and 12 : 10/12                                          Criteria  Patient has fibromyalgia if the following 3 conditions are met:  1.  Widespread pain index greater than or equal to 7 and symptom severity score greater than or equal to 5 or widespread pain index between 3- 6, and symptom severity score greater than or equal to 9.    2.  Symptoms have been present in a  similar level for at least 3 months  3.  The patient does not have a disorder that would otherwise sufficiently explain the pain      Laboratory abnormalities    ESR 42  CRP nml  RF neg  CCP 9.4- mildly elevated     MARIE positive  1: 320 centromere pattern  SSA neg  UA,UPCR nml  DELFIN neg    Profile neg  CBC nml  Mild anemia during pregnancy  CMP nml  Complements nml  APLAS panel neg  Scleroderma myomarker panel neg    Ck nml  Aldolase slightly elevated 7.8    Hep b and hiv neg    SPEP,GIDEON nml    Vit b1,b12,6 nml    Assessment       38 year old  female who was in the perfect state of health until she got pregnant in 2020 when she developed symptoms of headaches,dizziness,fatigue,abdominal pain, tingling and numbness,further work up led to the diagnosis of thyroid cancer,requiring removal of the thyroid gland.  Unfortunately removal of the gland did not eliminate all her symptoms    She now comes with an abnormal TSH but normal T4 and lot of symptoms  Joint pain,swelling,stiffness- she does have swollen wrists b/l  She has pain in the hands,wrists,elbows,ankles and neck with significant morning stiffness  Multiple joints are tender on exam but only wrists are swollen  She has elevated ESR,normal CRP,She has normal hand,neck,T and LS spine and hip xrays  She suffers from hand paresthesias -but has normal EMG/NCS B/L UE    Her wide spread pain index is high,so is the symptom severity score  She does qualify to have fibromyalgia-which might be interefering with the CDAI since she has CDAI of 24.5,mostly due to tender joints    She has associated GERD,bloating,nausea,vomiting,headaches,neurocognitive difficulties,sharp pains,episodic shortness of breath,severe allergies and hair loss and night sweats  She suffers from depression    She has a positive MARIE but that's a centromere pattern,she doesn't suffer from raynaud's,doesn't have symptoms of systemic sclerosis     I do think some of her symptoms are due to her  thyroid disease and some due to fibromyalgia but inflammatory arthritis is also very much likely-  CCP is positive but very low titre        3/2023    Pain in more joints  Stiffness   Left hand index finger - range of motion- if she flexes it the finger straightens back  Swelling in the feet  Shoulder pain  Elbow pain+    MRI hands  1. Severe enhancing tenosynovitis of the flexor carpi radialis. Mild nonenhancing tenosynovitis of the 1st, 2nd, 3rd and 5th flexor tendons.  2. No osteitis.  No osseous erosions.  No synovitis.    Hep b core antibody positive    Her  is vasectomised and she will not get pregnant     12/2023    Leflunamide is giving her headaches  She stopped it many times     She has severe pain in the right arm  ER visit- neck pain going down and there is paresthesias in the right arm    CT C spine and MRI C spine -deg changes C6-7 mild canal stenosis and b/l foraminal stenosis     PT suggested for neck and this never happened    Knees hurt  Ankles hurt  Toes hurt  Elbows and shoulders and fingers and hips hurt    Entire right side swells  Fingers and ankles swell  Wrists swell  Neck swells    EMS Till 3 pm afternoon    Steroid injection helps  Prednisone 40 mg started- tapering dose suggested yesterday  This prednisone helped with the swelling and the mobility    Recent ER visit  Swelling noted in the right wrist,hand and she had C spine tenderness    She had stopped ssz due to intolerance in the past    Hepatology knows about the hep b core ab    CDAI 42     1. Osteoarthritis of spine with radiculopathy, cervical region    2. Rheumatoid arthritis involving multiple sites with positive rheumatoid factor            Reviewed labs/xrays  Reviewed medications      F/u  problem     Plan    Offer   Humira 40 mg every other week for the CCP positive RA    Neck- healthy back    RTC 3 months    Thu was seen today for disease management and pain.    Diagnoses and all orders for this  visit:    Osteoarthritis of spine with radiculopathy, cervical region  -     Ambulatory referral/consult to [facility]; Future  -     adalimumab (HUMIRA,CF, PEN) 40 mg/0.4 mL PnKt; Inject 0.4 mLs (40 mg total) into the skin every 14 (fourteen) days.    Rheumatoid arthritis involving multiple sites with positive rheumatoid factor  -     adalimumab (HUMIRA,CF, PEN) 40 mg/0.4 mL PnKt; Inject 0.4 mLs (40 mg total) into the skin every 14 (fourteen) days.  -     Sedimentation rate; Future  -     C-Reactive Protein; Future  -     Hepatitis B Surface Antigen; Future  -     HEPATITIS B VIRAL DNA, QUANTITATIVE; Future  -     Quantiferon Gold TB; Future

## 2023-12-29 LAB
HEPATITIS B VIRUS DNA: NORMAL
HEPATITIS B VIRUS PCR, QUANT: NOT DETECTED IU/ML

## 2024-01-03 PROBLEM — M05.79 RHEUMATOID ARTHRITIS INVOLVING MULTIPLE SITES WITH POSITIVE RHEUMATOID FACTOR: Status: ACTIVE | Noted: 2024-01-03

## 2024-01-06 NOTE — PROGRESS NOTES
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 Score Type of Standardized Score Standardized Score Percentile/CP Descriptor   SMDT Written 38 zscore -1.45 7 Below Average   SMDT Oral 50 zscore -0.97 17 Low Average   TMT A  42 Tscore 32 4 Below Average   TMT A errors 0 - - - -   EXECUTIVE FUNCTIONING Raw Score Type of Standardized Score Standardized Score Percentile/CP Descriptor   TMT B 82 Tscore 38 12 Low Average   TMT B errors 0 - - - -   WCST-64         Total Correct 55 SS - - -   Total Errors 9  73 Average   Perseverative Resp. 5  58 Average   Perseverative Err. 5  58 Average   Nonperseverative Err. 4  70 Average   Concept. Level Response 52  50 Average   Categories Completed 5 - - >16 WNL   FMS 0 - - - -   Learning to Learn -3.85 - - >16 WNL   MOOD & PERSONALITY Raw Score Type of Standardized Score Standardized Score Percentile/CP Descriptor   BDI-2 26 - - - Moderate   BENJAMIN-7 10 - - - Moderate   TSI-2         Response Level 0 Tscore 45 - -   Atypical Response 5 Tscore 66 - -   Self-Disturbance 40 Tscore 54 - -   Posttraumatic Stress 37 Tscore 51 - -   Externalization 29 Tscore 55 - -   Somatization 15 Tscore 60 - -   Anxious Arousal  16 Tscore 57 - -      Anxiety  9 Tscore 59 - -      Hyperarousal 7 Tscore 55  - -   Depression 16 Tscore 55 - -   Anger 15 Tscore 58 - -   Intrusive Experience 6 Tscore 48 - -   Defensive Avoidance 11 Tscore 51 - -   Dissociation 4 Tscore 47 - -   Somatic Preoccupations 15 Tscore 60 - -      Pain 7 Tscore 57 - -      General 8 Tscore 61 - -   Sexual Disturbance 2 Tscore 46 - -      Sexual Concerns 2 Tscore 47 - -      Dysfunctional Sexual Behavior 0 Tscore 45 - -   Suicidality 6 Tscore 58 - -      Ideation 6 Tscore 62 - -      Behavior 0 Tscore 47 - -   Insecure Attachment 15 Tscore 54 - -      Relational Avoidance 7 Tscore 53 - -      Rejection Sensitivity 8 Tscore 55 - -   Impaired Self-Reference 9 Tscore 51 - -      Reduced Self-Awareness 3 Tscore 46 - -      Other-Directedness 6 Tscore 57 - -   Tension Reduction Behavior 6 Tscore 53 - -   ss = scaled score (mean = 10, SD = 3); SS = standard score (mean = 100, SD = 15); Tscore mean = 50, SD = 10; zscore (mean = 0.00, SD = 1)  It is important to note that scores/percentiles should only be interpreted by a neuropsychologist. It is common for healthy individuals to have 1-3 isolated low/unusual scores that are not indicative of any significant cognitive dysfunction.       BILLING  Code Description Minutes Units   43025 Psychiatric Interview 50 1   03194 Nubhvl xm phys/qhp 1st hr 0    58793 Nubhvl xm phy/qhp ea addl hr 0    48082 Psycl tst eval phys/qhp 1st 0    59234 Psycl tst eval phys/qhp ea 0    17225 Nrpsyc tst eval phys/qhp 1st 60 1   27061 Nrpsyc tst eval phys/qhp ea 97 2     Referral review/test selection 30      Tech consult/test review/modifications 10      Patient limitation management 0      Patient behavior management 0      Patient symptom monitoring 0      Record Review/Integration/Report Generation 86      Face-to-Face interpretive Feedback 31    18701 Psycl/nrpsyc tst phy/qhp 1st 0    53332 Psycl/nrpsyc tst phy/qhp ea 0    15720 Psycl/nrpsyc tech 1st 30 1   41078 Psycl/nrpsyc tst tech ea 202 7

## 2024-01-08 ENCOUNTER — PATIENT OUTREACH (OUTPATIENT)
Dept: EMERGENCY MEDICINE | Facility: HOSPITAL | Age: 39
End: 2024-01-08
Payer: COMMERCIAL

## 2024-01-08 NOTE — PROGRESS NOTES
Appt reminder call placed in regards to upcoming appt at, [facility], Infectious Diseases on 1-10-24 at 9am. No answer. Detailed v/m left.

## 2024-01-08 NOTE — PROGRESS NOTES
Call placed per ED Navigator to f/u from last encounter. No answer. V/m left. ED navigator will follow-up with patient on/around 2-12-24.

## 2024-01-10 ENCOUNTER — CLINICAL SUPPORT (OUTPATIENT)
Dept: INFECTIOUS DISEASES | Facility: CLINIC | Age: 39
End: 2024-01-10
Payer: COMMERCIAL

## 2024-01-10 ENCOUNTER — LAB VISIT (OUTPATIENT)
Dept: LAB | Facility: HOSPITAL | Age: 39
End: 2024-01-10
Attending: INTERNAL MEDICINE
Payer: COMMERCIAL

## 2024-01-10 ENCOUNTER — OFFICE VISIT (OUTPATIENT)
Dept: INFECTIOUS DISEASES | Facility: CLINIC | Age: 39
End: 2024-01-10
Payer: COMMERCIAL

## 2024-01-10 VITALS
HEART RATE: 93 BPM | BODY MASS INDEX: 28.21 KG/M2 | SYSTOLIC BLOOD PRESSURE: 122 MMHG | HEIGHT: 57 IN | DIASTOLIC BLOOD PRESSURE: 78 MMHG | TEMPERATURE: 98 F | WEIGHT: 130.75 LBS

## 2024-01-10 DIAGNOSIS — M05.79 RHEUMATOID ARTHRITIS INVOLVING MULTIPLE SITES WITH POSITIVE RHEUMATOID FACTOR: Primary | ICD-10-CM

## 2024-01-10 DIAGNOSIS — M05.79 RHEUMATOID ARTHRITIS INVOLVING MULTIPLE SITES WITH POSITIVE RHEUMATOID FACTOR: ICD-10-CM

## 2024-01-10 LAB — HAV IGG SER QL IA: NORMAL

## 2024-01-10 PROCEDURE — 90472 IMMUNIZATION ADMIN EACH ADD: CPT | Mod: S$GLB,,, | Performed by: INTERNAL MEDICINE

## 2024-01-10 PROCEDURE — 90750 HZV VACC RECOMBINANT IM: CPT | Mod: S$GLB,,, | Performed by: INTERNAL MEDICINE

## 2024-01-10 PROCEDURE — 3078F DIAST BP <80 MM HG: CPT | Mod: CPTII,S$GLB,, | Performed by: INTERNAL MEDICINE

## 2024-01-10 PROCEDURE — 1159F MED LIST DOCD IN RCRD: CPT | Mod: CPTII,S$GLB,, | Performed by: INTERNAL MEDICINE

## 2024-01-10 PROCEDURE — 36415 COLL VENOUS BLD VENIPUNCTURE: CPT | Performed by: INTERNAL MEDICINE

## 2024-01-10 PROCEDURE — 86790 VIRUS ANTIBODY NOS: CPT | Performed by: INTERNAL MEDICINE

## 2024-01-10 PROCEDURE — 86480 TB TEST CELL IMMUN MEASURE: CPT | Performed by: INTERNAL MEDICINE

## 2024-01-10 PROCEDURE — 3008F BODY MASS INDEX DOCD: CPT | Mod: CPTII,S$GLB,, | Performed by: INTERNAL MEDICINE

## 2024-01-10 PROCEDURE — 90471 IMMUNIZATION ADMIN: CPT | Mod: S$GLB,,, | Performed by: INTERNAL MEDICINE

## 2024-01-10 PROCEDURE — 90677 PCV20 VACCINE IM: CPT | Mod: S$GLB,,, | Performed by: INTERNAL MEDICINE

## 2024-01-10 PROCEDURE — 99999 PR PBB SHADOW E&M-EST. PATIENT-LVL I: CPT | Mod: PBBFAC,,,

## 2024-01-10 PROCEDURE — 86592 SYPHILIS TEST NON-TREP QUAL: CPT | Performed by: INTERNAL MEDICINE

## 2024-01-10 PROCEDURE — 3074F SYST BP LT 130 MM HG: CPT | Mod: CPTII,S$GLB,, | Performed by: INTERNAL MEDICINE

## 2024-01-10 PROCEDURE — 86682 HELMINTH ANTIBODY: CPT | Performed by: INTERNAL MEDICINE

## 2024-01-10 PROCEDURE — 99999 PR PBB SHADOW E&M-EST. PATIENT-LVL IV: CPT | Mod: PBBFAC,,, | Performed by: INTERNAL MEDICINE

## 2024-01-10 PROCEDURE — 99203 OFFICE O/P NEW LOW 30 MIN: CPT | Mod: 25,S$GLB,, | Performed by: INTERNAL MEDICINE

## 2024-01-10 NOTE — PROGRESS NOTES
Patient received Prevnar 20 IM to the left deltoid.  And also Shingrix IM to the right deltoid.  Tolerated well and left in NAD

## 2024-01-10 NOTE — Clinical Note
Please let pt know labs showed she does not have immunity for hepatitis A virus, so I recommend she receive vaccine. Please schedule 2 dose series if she agrees to receive it. thanks

## 2024-01-10 NOTE — PROGRESS NOTES
Pre Biologic Response Modifier Therapy Consult  BMR Recipient Evaluation    Requesting Physician:     Reason for Visit:    Chief Complaint   Patient presents with    Follow-up     History of Present Illness  Dina Hutton is a 38 y.o. year old  female with advanced Rheumatoid Arthritis currently being evaluated for prior to starting biologic response modifer (BRM) therapy.  Rheumatology planning on starting humira. Was recently on prednisone. Patient denies any recent fever, chills, or infective infective illnesses. Reports joint pain and swelling on hands and toes and ankles. No fevers, chills. Does report intermittent flutter and chest pain.      Also with hx of thyroid cancer s/p removal, currently in remission. On liothyrinone and levothyroxine    1) Do you have a history of:    YES NO   Diabetes                 []       [x]   Diabetic Foot Infection/Bone Infection  []  [x]   Surgical Removal of Spleen    []  [x]       2) Have you had recurrent infections involving:             YES NO  Sinus infections  [x]  []  Sore Throat   []  [x]                             Prostate Infections  []  [x]  .                         Bladder Infections  [x]  []                                 Kidney Infections  []  [x]        Intestinal Infections  []  [x]   Skin Infections   []  [x]                   Reproductive Infections             x  Periodontal Disease              x      3)Have you ever had: YES     NO UNKNOWN      Chicken Pox   []  [x]  []                 Shingles   []  [x]  []                Orolabial Herpes             []  [x]  []       Genital Herpes  []  [x]  []           Cytomegalovirus  []  [x]  []               Ally-Barr Virus  []  [x]  []              Genital Warts   []  [x]  []                Hepatitis A   []  [x]  []             Hepatitis B   []  []  []  Hep b core ab positive              Hepatitis C   []  [x]  []                 Syphilis   []  [x]  []                Gonorrhea   []  [x]  []                  Pelvic Inflammatory  []  [x]  []   Disease   []  [x]  []                    Chlamydia Infection  []  [x]  []                Intestinal parasites        or worms   []  [x]  []                 Fungal Infections  []  [x]  []                Blood Infections  []  [x]  []     Comment:       4) Have you ever been exposed   YES NO  to someone with tuberculosis?  []  [x]   If yes, what treatment did you receive:     5) What states have you lived in?   Born in [facility] until [age]y/o. Then lived in [place], [place]. Moved to [facility] 1987.    6) What countries have you visited for more than 2 weeks?      No foreign travel                    YES NO  7) Did you have any associated infections?     []  [x]     8) Are you planning to travel outside the           [place] after starting BRMs?    [x]   []           Household                  YES NO  9 Do you have pets living in your house?   []   [x]             If yes, describe:     Do you spend time or live on a farm or                 have livestock or other farm animals?   []  [x]   If yes, which ones:    Do you have a fish tank?     []  [x]                       Do you have a litter box?     []  [x]                        Do you fish or hunt?      []  [x]                       Do you clean or skin fish or animals?   []  [x]                      Do you consume raw or undercooked                meat, fish, or shellfish?     [x]  []         10) What occupations have you had?             11) Patient reports hobby to be fishing                           12)Do you garden or otherwise  YES NO   work in the soil?    []  [x]   13)Do you hike, camp, or spend   time in wooded areas?   [x]  []                           14) The patient's immunization history was reviewed.   Have you ever received:  YES NO UNKNOWN DATES  Routine Childhood vaccines  [x]  []  []                Influenza vaccine   []  [x]  []         Pneumonia    []  [x]  []      Tetanus-diptheria   [x]  []  []  2020  Hepatitis A vaccine series       []  [x]  []       Hepatitis B vaccine series       []  [x]  []        Meningitis vaccine   []  [x]  []     Varicella vaccine   []  []  []                  Based on the patients immunization history and serologies, immunizations were ordered:         Ordered  Not Ordered  Influenza Vaccine     []    []   Hepatitis A series at 0, 6 months   []    []   Hepatitis B sries at 0, 1, and 6 months  []    []   Hepatitis B High Dose series 0,1, and 6 months []    []   Prevnar 20      [x]    []   Pneumovax      []    []   TDap       []    []   Zoster       [x]    []   Menactra      []    []   HiB       []    []               The patient was encouraged to contact us about any problems that may develop after immunization and possible side effects were reviewed.       Allergies: Vancomycin analogues, Bactrim [sulfamethoxazole-trimethoprim], Sulfa (sulfonamide antibiotics), and Vicodin [hydrocodone-acetaminophen]  Immunization History   Administered Date(s) Administered    MMR 09/17/1986, 09/28/1991    Tdap 03/20/2020     Past Medical History:   Diagnosis Date    Anemia     Breast abscess 4/25/2020    GERD (gastroesophageal reflux disease)     History of fourth degree perineal laceration 8/21/2019    Hypoglycemia     Hypoglycemia     Mastitis 4/20/2020    Mental disorder     depression    PONV (postoperative nausea and vomiting)     Thyroid cancer     Thyroid disease      Past Surgical History:   Procedure Laterality Date    BREAST CYST EXCISION      COLONOSCOPY      ESOPHAGOGASTRODUODENOSCOPY      ESOPHAGOGASTRODUODENOSCOPY N/A 06/14/2021    Procedure: EGD (ESOPHAGOGASTRODUODENOSCOPY);  Surgeon: Farooq Cullen MD;  Location: [facility];  Service: Endoscopy;  Laterality: N/A;  ongoing epigastric pain, burning, nausea, vomiting  Lives on [facility], want first available but if there are openings on , would prefer that location  cOVID test on 6/11/21 at [facility]    INCISION AND DRAINAGE OF  BREAST Left 04/23/2020    Procedure: INCISION AND DRAINAGE, BREAST;  Surgeon: Mason Rubalcava III, MD;  Location: [facility] OR;  Service: General;  Laterality: Left;    THYROIDECTOMY Bilateral 09/29/2020    Procedure: THYROIDECTOMY with central neck dissection;  Surgeon: Kayla Lovelace MD;  Location: [facility] OR;  Service: General;  Laterality: Bilateral;      Social History     Socioeconomic History    Marital status:    Tobacco Use    Smoking status: Never    Smokeless tobacco: Never    Tobacco comments:     Allergies to it   Substance and Sexual Activity    Alcohol use: Yes     Alcohol/week: 1.0 standard drink of alcohol     Types: 1 Glasses of wine per week     Comment: On special occasions or events    Drug use: Never    Sexual activity: Yes     Partners: Male     Birth control/protection: Partner-Vasectomy, Patch     Social Determinants of Health     Financial Resource Strain: Medium Risk (12/27/2023)    Overall Financial Resource Strain (CARDIA)     Difficulty of Paying Living Expenses: Somewhat hard   Food Insecurity: Unknown (12/27/2023)    Hunger Vital Sign     Worried About Running Out of Food in the Last Year: Never true     Ran Out of Food in the Last Year: Patient declined   Transportation Needs: Unknown (12/27/2023)    PRAPARE - Transportation     Lack of Transportation (Medical): No     Lack of Transportation (Non-Medical): Patient declined   Physical Activity: Inactive (12/27/2023)    Exercise Vital Sign     Days of Exercise per Week: 0 days     Minutes of Exercise per Session: 0 min   Stress: Stress Concern Present (12/27/2023)    [employer] [place] [employer] - Occupational Stress Questionnaire     Feeling of Stress : To some extent   Social Connections: Moderately Isolated (12/27/2023)    Social Connection and Isolation Panel [NHANES]     Frequency of Communication with Friends and Family: More than three times a week     Frequency of Social Gatherings with Friends and Family:  "More than three times a week     Attends Christianity Services: Never     Active Member of Clubs or Organizations: No     Attends Club or Organization Meetings: Never     Marital Status:    Housing Stability: High Risk (12/27/2023)    Housing Stability Vital Sign     Unable to Pay for Housing in the Last Year: Yes     Number of Places Lived in the Last Year: 1     Unstable Housing in the Last Year: No       ROS  Physical Exam  Diagnostics:   RPR   Date Value Ref Range Status   04/01/2020 Non-reactive Non-reactive Final     No results found for: "CMVANTIBODIE"  No results found for: "HIV1X2"  No results found for: "HTLVIIIANTIB"  Hepatitis B Surface Ag   Date Value Ref Range Status   12/27/2023 Non-reactive Non-reactive Final     Hep B Core Total Ab   Date Value Ref Range Status   12/03/2022 Reactive (A) Non-reactive Final     Hepatitis C Ab   Date Value Ref Range Status   12/11/2023 Non-reactive Non-reactive Final     No results found for: "TOXOIGG"  No components found for: "TOXOIGGINTER"  No results found for: "LSU1XIQ"  No results found for: "FOT6OXY"  No results found for: "VARICELLAZOS"  No results found for: "VARICELLAINT"  No results found for: "STRONGANTIGG"  No results found for: "EPSTEINBARRV"  Hep B S Ab   Date Value Ref Range Status   12/03/2022 >1000.00 mIU/mL Final   12/03/2022 Reactive  Final     Comment:     Individual is considered immune to HBV infection.     No results found for: "QUANTIFERON"  No results found for: "HEPAIGM"  No results found for: "PPD"  No results found for this or any previous visit.      BRM - Candidacy  #Rheumatoid Arthritis  --immunizations and serologies reviewed  --will order the following labs:  -     QUANTIFERON GOLD TB; Future; Expected date: 01/10/2024  -     RPR; Future; Expected date: 01/10/2024  -     STRONGYLOIDES IGG ANTIBODIES; Future; Expected date: 01/10/2024  -     Hepatitis A antibody, IgG; Future; Expected date: 01/10/2024    --recommend the following " immunizations:  -     Cancel: Influenza - Quadrivalent *Preferred* (6 months+) (PF)- pt refused  -     (In Office Administered) Pneumococcal Conjugate Vaccine (20 Valent) (IM) (Preferred)  -     Zoster Recombinant Vaccine; Standing  - covid- pt refused    #Hx of chronic Hep B  --continue to follow-up with hepatology  --Hep B quant negative      Biologic Response Modifier Candidacy: Based on available information, there are no identified significant barriers to BRMs from an infectious disease standpoint pending acceptable serologies.  Final determination of BRM candidacy will be made once evaluation is complete and reviewed.    Naya Mcdonald MD        Counseling:I discussed with Thu the risk for increased susceptibility to infections following BRM therapy including increased risk for infection.  The patients has been counseled on the importance of vaccinations including but not limited to a yearly flu vaccine.Specific guidance has been provided to the patient regarding the patients occupation, hobbies and activities to avoid future infectious complications including but not limited to avoiding undercooked meats and seafood, proper hygiene, and contact with animals.

## 2024-01-11 ENCOUNTER — PATIENT MESSAGE (OUTPATIENT)
Dept: PRIMARY CARE CLINIC | Facility: CLINIC | Age: 39
End: 2024-01-11
Payer: COMMERCIAL

## 2024-01-11 LAB — RPR SER QL: NORMAL

## 2024-01-12 ENCOUNTER — OFFICE VISIT (OUTPATIENT)
Dept: NEUROLOGY | Facility: CLINIC | Age: 39
End: 2024-01-12
Payer: COMMERCIAL

## 2024-01-12 DIAGNOSIS — F41.1 GENERALIZED ANXIETY DISORDER WITH PANIC ATTACKS: ICD-10-CM

## 2024-01-12 DIAGNOSIS — G31.84 MCI (MILD COGNITIVE IMPAIRMENT): ICD-10-CM

## 2024-01-12 DIAGNOSIS — F41.0 GENERALIZED ANXIETY DISORDER WITH PANIC ATTACKS: ICD-10-CM

## 2024-01-12 DIAGNOSIS — R41.89 COGNITIVE CHANGES: Primary | ICD-10-CM

## 2024-01-12 DIAGNOSIS — F33.1 MODERATE EPISODE OF RECURRENT MAJOR DEPRESSIVE DISORDER: ICD-10-CM

## 2024-01-12 LAB
GAMMA INTERFERON BACKGROUND BLD IA-ACNC: 0.05 IU/ML
M TB IFN-G CD4+ BCKGRND COR BLD-ACNC: 0.01 IU/ML
M TB IFN-G CD4+ BCKGRND COR BLD-ACNC: 0.06 IU/ML
MITOGEN IGNF BCKGRD COR BLD-ACNC: 9.95 IU/ML
STRONGYLOIDES ANTIBODY IGG: NEGATIVE
TB GOLD PLUS: NEGATIVE

## 2024-01-12 PROCEDURE — 96133 NRPSYC TST EVAL PHYS/QHP EA: CPT | Mod: S$GLB,,, | Performed by: CLINICAL NEUROPSYCHOLOGIST

## 2024-01-12 PROCEDURE — 90791 PSYCH DIAGNOSTIC EVALUATION: CPT | Mod: GT,S$GLB,, | Performed by: CLINICAL NEUROPSYCHOLOGIST

## 2024-01-12 PROCEDURE — 96138 PSYCL/NRPSYC TECH 1ST: CPT | Mod: S$GLB,,, | Performed by: CLINICAL NEUROPSYCHOLOGIST

## 2024-01-12 PROCEDURE — 99999 PR PBB SHADOW E&M-EST. PATIENT-LVL II: CPT | Mod: PBBFAC,,, | Performed by: CLINICAL NEUROPSYCHOLOGIST

## 2024-01-12 PROCEDURE — 96139 PSYCL/NRPSYC TST TECH EA: CPT | Mod: S$GLB,,, | Performed by: CLINICAL NEUROPSYCHOLOGIST

## 2024-01-12 PROCEDURE — 96132 NRPSYC TST EVAL PHYS/QHP 1ST: CPT | Mod: S$GLB,,, | Performed by: CLINICAL NEUROPSYCHOLOGIST

## 2024-01-12 PROCEDURE — 99499 UNLISTED E&M SERVICE: CPT | Mod: S$GLB,,, | Performed by: CLINICAL NEUROPSYCHOLOGIST

## 2024-01-17 ENCOUNTER — PATIENT MESSAGE (OUTPATIENT)
Dept: INFECTIOUS DISEASES | Facility: CLINIC | Age: 39
End: 2024-01-17
Payer: COMMERCIAL

## 2024-01-17 ENCOUNTER — TELEPHONE (OUTPATIENT)
Dept: INFECTIOUS DISEASES | Facility: CLINIC | Age: 39
End: 2024-01-17
Payer: COMMERCIAL

## 2024-01-17 NOTE — TELEPHONE ENCOUNTER
- Left a voice mail letting patient know that she is do for a Hep A vaccine per .   - Also left a [facility] message.

## 2024-01-17 NOTE — TELEPHONE ENCOUNTER
----- Message from Naya Mcdonald MD sent at 1/17/2024 10:23 AM CST -----  Please let pt know labs showed she does not have immunity for hepatitis A virus, so I recommend she receive vaccine. Please schedule 2 dose series if she agrees to receive it. thanks

## 2024-01-19 ENCOUNTER — PATIENT MESSAGE (OUTPATIENT)
Dept: RHEUMATOLOGY | Facility: CLINIC | Age: 39
End: 2024-01-19
Payer: COMMERCIAL

## 2024-01-19 ENCOUNTER — OFFICE VISIT (OUTPATIENT)
Dept: NEUROLOGY | Facility: CLINIC | Age: 39
End: 2024-01-19
Payer: COMMERCIAL

## 2024-01-19 DIAGNOSIS — F41.9 ANXIETY: ICD-10-CM

## 2024-01-19 DIAGNOSIS — F33.1 MODERATE EPISODE OF RECURRENT MAJOR DEPRESSIVE DISORDER: ICD-10-CM

## 2024-01-19 DIAGNOSIS — R41.89 COGNITIVE CHANGES: Primary | ICD-10-CM

## 2024-01-19 PROBLEM — F41.1 GENERALIZED ANXIETY DISORDER WITH PANIC ATTACKS: Status: ACTIVE | Noted: 2024-01-19

## 2024-01-19 PROBLEM — F41.0 GENERALIZED ANXIETY DISORDER WITH PANIC ATTACKS: Status: ACTIVE | Noted: 2024-01-19

## 2024-01-19 PROBLEM — G31.84 MCI (MILD COGNITIVE IMPAIRMENT): Status: RESOLVED | Noted: 2023-01-03 | Resolved: 2024-01-19

## 2024-01-19 PROCEDURE — 99499 UNLISTED E&M SERVICE: CPT | Mod: S$GLB,,, | Performed by: CLINICAL NEUROPSYCHOLOGIST

## 2024-01-19 NOTE — PROGRESS NOTES
NEUROPSYCHOLOGICAL EVALUATION FEEDBACK    Dina Hutton attended a feedback session today.  We discussed the results of the neuropsychological evaluation and I gave time to discuss questions and concerns. For full evaluation details, please see the note from this provider dated 01/12/2024. A copy of the report was printed and given to her today. 35 minutes.     Problem List Items Addressed This Visit          Psychiatric    Depression    Overview     Sxs improving  Increase lexapro to 20mg daily  Explained risks of this class of medication including potential for SI/HI/AVH. Educated to stop medication and proceed to ED if pt experiences SI/HI/AVH. Patient verbalizes understanding of the plan.            Other Visit Diagnoses       Cognitive changes    -  Primary    Anxiety                  Felicitas Ahn, PhD, ABPP  Board Certified in Clinical Neuropsychology   [facility] - Department of Neurology

## 2024-01-19 NOTE — PATIENT INSTRUCTIONS
PRACTICE GOOD COGNITIVE HYGIENE  Engage in regular exercise, which increases alertness and arousal and can improve attention and focus.  Consider lower impact exercises, such as yoga or light walking. Try to exercise for at least 150 minutes per week  Get a good night's sleep, as this can enhance alertness and cognition.  Eat healthy foods and balanced meals. It is notable that research indicates certain nutrients may aid in brain function, such as B vitamins (especially B6, B12, and folic acid), antioxidants (such as vitamins C and E, and beta carotene), and Omega-3 fatty acids. Here are some common tips for diet (Adopted from Chayito et al, [facility], 2018):  Eat primarily plant-based foods, such as fruits and vegetables, whole grains, legumes   (beans) and nuts.  Limit refined carbohydrates (white pasta, bread, rice).  Replace butter with healthy fats such as olive oil.  Use herbs and spices instead of salt to flavor foods.  Limit red meat and processed meats to no more than a few times a month.  Avoid sugary sodas, bakery goods, and sweets.  Eat fish and poultry at least twice a week.  Keep your brain active. Find activities to stay mentally active, such as reading, games (cards, checkers), puzzles (crosswords, Sudoku, jig saw), crafts (models, woodworking), gardening, or participating in activities in the community.  Stay socially engaged. Continue staying active with your family and friends.    RESOURCE BOOKS & POD CASTS  Consider purchasing the following books on brain health:   High-Octane Brain: 5 Science-Based Steps to Sharpen Your Memory and Reduce Your Risk of Alzheimer's by Kitty Maki, PhD, ABPP-CN.   Keep Your Wits About You: The Science of Brain Maintenance As You Age by Rebecca Grant, PhD.   The Brain Health Book: Using the Power of Neuroscience to Improve Your Life by Hermes Aquino, PhD, ABPP-CN.    Consider purchasing the book, Bouncing Back: Skills for Adaptation to Injury, Aging, Illness, and  Pain by Ashok Tyler, PhD    Consider listening to Brain Beat, a pod cast series by the National Academy of Neuro[employer] Foundation featuring discussions with experts on brain health and functioning.     BRAIN TRAINING APPS  · BrainHQ (https://www.brainhq.[employer]/) - BrainHQ has more than two dozen brain-training exercises organized into six categories: Attention, Brain Speed, Memory, People Skills, Intelligence, and Navigation. It allows you to fit brain exercises into your busy life, and access brain training on most internet-connected devices. Plus, each exercise continuously adapts to your unique performance. So you train at the right level for you.     · Mind Games (https://www.mindgames.com/) - [employer] is a great collection of games based in part on principles derived from cognitive tasks to help you practice different mental skills. This includes a handful of free games. Additionally, there are a number of trial games included that can be played 3 times. All games include your score history and a graph of your progress. Using some principles of standardized testing, your scores are also converted to a standard scale so that you can see where you need work and excel. The training center does the work for you by picking the perfect mix of exercises to keep you engaged.     · Elevate (https://FoodyDirect.com/) - Elevate was selected by [employer] as Daniela of the Year! Elevate is a brain training program designed to improve focus, speaking abilities, processing speed, memory, math skills, and more. Each person is provided with a personalized training program that adjusts over time to maximize results. Theoretically, the more you train with Elevate, the more you'll improve critical cognitive skills that are proven to boost productivity, earning power, and self-confidence. Users who train at least 3 times per week have reported dramatic gains and increased confidence. In-daniela purchases.     · Peak  (https://www.peak.net/) - Reach Peak performance with over 40 unique games, each one developed by neuroscientists and game experts to challenge your cognitive skills and push you further. Use , the  for your brain, to find the right workout for you at the right time. Choose from 's best recommendations to push your skills to the max. Or take contextual workouts like Coffee Break if you're short on time.  will help you track your progress using in-depth insights and keep you going when you need it most. Play for free or upgrade to Pro and get the best brain training experience available. In-halie purchases.     OTHER SUGGESTIONS  Games and Apps like Sudoku; Crossword Puzzles; Word Find; Memory Games; Logic Games; Solitaire; and Hidden Object Mysteries. Find some you like and play them. It will exercise many of the skills necessary to improve function.    COGNITIVE TIPS AND STRATEGIES  The following tips and strategies are provided to help assist in daily activities:      Attention: Remember that inattention and lack of focus are major culprits to forgetting information so be sure and practice paying attention for adequate learning of information. If you rely on passive attention to remembering something (e.g., yeah, uh-huh approach), you'll find you cannot recall it later. I recommend the following to improve attention, which may aid in later recall:  1. Reduce distractions in the area as much as possible  2. Look at the person as they are speaking to you.   3. Paraphrase as they are speaking  4. Write down important pieces of information   5. Ask them to repeat if you zone out.  6. Have them simplify and reduce information that you need to attend to during conversation.  7. Have visual cues to remind you if you need to do something later.     Processing Speed:  1. Using multiple modalities (e.g., listening, writing notes, asking questions, recording) to learn new information is  likely to allow additional time for processing, thus improving memory for the material.   2. Allowing sufficient time to complete tasks will reduce frustration and help to ensure completion.     Executive Functionin. Don't attempt to multi-task.  Separate tasks so that each can be completed one at a time  2. Consider using a calendar/day planner, as that may be effective to help you plan and stay on track.  Color-coding specific tasks by importance may add additional benefit to your planner  3. Break down large projects into smaller tasks and write down the steps to completing the task.  Taking notes while reading can help with recall.     Storing Information: Use the below strategies to help you further enhance how information is stored  1. Rehearse - Immediately after seeing/hearing something, try to recall it.  Wait a few minutes, then check again.  Gradually lengthen the intervals between rehearsals.  2. Repetition of learned material is critical to ensure storage of information to be learned. Self-test at home to ensure learning.  3. Write down important information to improve your attention and focus and to have something to look back on when you need to recall it.  4. Make sure the person doesn't rattle off, but presents in a clear, logical, and unhurried manner.      Recalling Information:  1. Jog your memory - Lose something?  Think back to when you last had it.  What did you do next?  And after that?  Mentally walk yourself through each activity that followed.  Prodding your memory this way may enable you to recall the location of the missing item.  2. Use a cue - Symbolic reminders (the proverbial string around the finger) are helpful.  So too are memos, timers, calendar notes, etc.--keep them in visible, appropriate place  3. Get organized - Have fixed locations for all important papers, key phone numbers, medications, keys, wallet, glasses, tools, etc.  4. Develop routines - Routines can anchor  memories so they do not drift away.       Word Finding:   Not being able to find a word when you need it is a common and very annoying problem. It is not strictly a memory issue, but more a filing and retrieval issue. You know the word or name you want, but it cannot be found in your brain file. It is like having all the files in your file cabinet emptied on the floor. Every piece of information is there but finding it can be a challenge.   One strategy that may help is working with a speech pathologist/therapist to learn techniques to decrease word finding problems. Some of those strategies/techniques include the following:  Use circumlocutions. Describe the object you're trying to name instead of naming it.  Recite you're A, B, Cs. Go through the alphabet to see if a letter triggers finding the word.  Picture it. Try to visualize the spelling or writing of the word.  Relax. The harder you try to force yourself to come up with the word, the more frustrated you get and the worse you function.   Write it down. In situations where using correct words is critical, such as communicating on the radio while flying, write down the key words so that they are in front of you if needed.  Use word association. For words or names you continually misfile and can't find, try to come up with a word association, something that reminds you of the word or name.  Write a script. Try scripting something important that you want to say. Either write it out or practice it beforehand, so that you get it right.  Play word games. Doing crossword puzzles and playing word finding games may help your brain become more efficient at filing and retrieving words.    SLEEP HYGIENE  Poor sleep has a negative effect on cognition. Several strategies have been shown to improve sleep:   Caffeine intake in the afternoon and evening, as well as stuffing oneself at supper, can decrease the quality of restful sleep throughout the night.   Bedtime and  wake-up times should be consistent every night and morning so the body becomes used to a single routine, even on the weekends.  Engage in daily physical activity, but not 2-3 hours before bedtime.   No technology use (television, computer, iPad) 1-2 hours before bed.   Have a wind down routine (e.g., soft lights in the house, bath before bed, reduced fluid intake, songs, reading, less noise) to promote sleep readiness.   Visit the www.sleepfoundation.org for more strategies.     MINDFULNESS  Mindfulness is the basic human ability to be fully present, aware of where we are and what were doing, and not overly reactive or overwhelmed by whats going on around us. While mindfulness is something we all naturally possess, its more readily available to us when we practice on a daily basis. Whenever you bring awareness to what youre directly experiencing via your senses, or to your state of mind via your thoughts and emotions, youre being mindful. And theres growing research showing that when you train your brain to be mindful, youre actually remodeling the physical structure of your brain.    What is meditation?  Meditation is exploring. Its not a fixed destination. Your head doesnt become vacuumed free of thought, utterly undistracted. When we meditate we venture into the workings of our minds: our sensations (air blowing on our skin or a harsh smell wafting into the room), our emotions (love this, hate that, crave this, loathe that) and thoughts (wouldnt it be weird to see an elephant playing a trumpet).    Mindfulness meditation asks us to suspend judgment and unleash our natural curiosity about the workings of the mind, approaching our experience with warmth and kindness, to ourselves and others.    How do I practice mindfulness and meditation?  Mindfulness is available to us in every moment, whether through meditations and body scans, or mindful moment practices like taking time to pause and breathe when the  "phone rings instead of rushing to answer it. Reminding yourself to take notice of your thoughts, feelings, body sensations and the world around you is the first step to mindfulness.    Notice the everyday  "Even as we go about our daily lives, we can notice the sensations of things, the food we eat, the air moving past the body as we walk," says Professor Boyd. "All this may sound very small, but it has huge power to interrupt the 'autopilot' mode we often engage day to day, and to give us new perspectives on life."    Keep it regular  It can be helpful to pick a regular time - the morning journey to work or a walk at lunchtime - during which you decide to be aware of the sensations created by the world around you.    Try something new  Trying new things, such as sitting in a different seat in meetings or going somewhere new for lunch, can also help you notice the world in a new way.    Watch your thoughts  "Some people find it very difficult to practice mindfulness. As soon as they stop what they're doing, lots of thoughts and worries crowd in," says Professor Boyd.    "It might be useful to remember that mindfulness isn't about making these thoughts go away, but rather about seeing them as mental events.    "Imagine standing at a bus station and seeing 'thought buses' coming and going without having to get on them and be taken away. This can be very hard at first, but with gentle persistence it is possible.    "Some people find that it is easier to cope with an over-busy mind if they are doing gentle yoga or walking."    Name thoughts and feelings  To develop an awareness of thoughts and feelings, some people find it helpful to silently name them: "Here's the thought that I might fail that exam". Or, "This is anxiety".    Free yourself from the past and future  You can practise mindfulness anywhere, but it can be especially helpful to take a mindful approach if you realise that, for several minutes, you " "have been "trapped" in reliving past problems or "pre-living" future worries.    Different mindfulness practices  As well as practising mindfulness in daily life, it can be helpful to set aside time for a more formal mindfulness practice.    Mindfulness meditation involves sitting silently and paying attention to thoughts, sounds, the sensations of breathing or parts of the body, bringing your attention back whenever the mind starts to wander.    Yoga and odnina-chi can also help with developing awareness of your breathing.    Mindfulness Resources:  www.mindful.org  Mist.io Daniela (free access to HeadSpace Plus in 2020 for healthcare providers with an NPI number)  Calm Daniela    Mindfulness Books:  Mindfulness for Beginners, by Zafar Cotto  The Mindful Way Through Anxiety, by Katy Zuleta and Arline Sinclair  The Little Book of Mindfulness, by Silvia Hays    Material adapted from: https://www.nhs.uk/conditions/stress-anxiety-depression/mindfulness/ and mindful.org    "

## 2024-01-24 ENCOUNTER — PATIENT MESSAGE (OUTPATIENT)
Dept: ENDOCRINOLOGY | Facility: CLINIC | Age: 39
End: 2024-01-24
Payer: COMMERCIAL

## 2024-01-31 ENCOUNTER — LAB VISIT (OUTPATIENT)
Dept: LAB | Facility: HOSPITAL | Age: 39
End: 2024-01-31
Attending: INTERNAL MEDICINE
Payer: COMMERCIAL

## 2024-01-31 ENCOUNTER — DOCUMENTATION ONLY (OUTPATIENT)
Dept: RHEUMATOLOGY | Facility: CLINIC | Age: 39
End: 2024-01-31
Payer: COMMERCIAL

## 2024-01-31 DIAGNOSIS — E89.0 POSTSURGICAL HYPOTHYROIDISM: Chronic | ICD-10-CM

## 2024-01-31 LAB — TSH SERPL DL<=0.005 MIU/L-ACNC: 0.49 UIU/ML (ref 0.4–4)

## 2024-01-31 PROCEDURE — 36415 COLL VENOUS BLD VENIPUNCTURE: CPT | Performed by: INTERNAL MEDICINE

## 2024-01-31 PROCEDURE — 84443 ASSAY THYROID STIM HORMONE: CPT | Performed by: INTERNAL MEDICINE

## 2024-01-31 NOTE — PROGRESS NOTES
"  Message from [facility]    I just wanted to update you about Ms. Hutton's Humira. I spoke with Dr. Gold and he said "I don't think her history of thyroid cancer should be a contraindication to starting Humira if that is what rheum feels is best for her. She is more or less in remission with a very low tumor marker and negative ultrasound, and she is being closely monitored."     I discussed the lower risk of thyroid cancer with Humira per package isnsert however the patient is still quite apprehensive about starting. She prefers to have her pain that risk thyroid cancer coming back but will keep this option open. We will discontinue the medication on our end for now. Please feel free to send it over to us again in the event Ms. Hutton changes her mind.     Thank you!   "

## 2024-02-12 ENCOUNTER — PATIENT OUTREACH (OUTPATIENT)
Dept: EMERGENCY MEDICINE | Facility: HOSPITAL | Age: 39
End: 2024-02-12
Payer: COMMERCIAL

## 2024-02-12 NOTE — PROGRESS NOTES
Call placed per ED Navigator to f/u from last encounter. No answer. ED navigator will follow-up with patient on/around 3-19-24.

## 2024-03-04 ENCOUNTER — HOSPITAL ENCOUNTER (EMERGENCY)
Facility: HOSPITAL | Age: 39
Discharge: HOME | End: 2024-03-04
Attending: EMERGENCY MEDICINE
Payer: COMMERCIAL

## 2024-03-04 VITALS
TEMPERATURE: 98 F | HEART RATE: 98 BPM | DIASTOLIC BLOOD PRESSURE: 76 MMHG | SYSTOLIC BLOOD PRESSURE: 128 MMHG | RESPIRATION RATE: 18 BRPM | OXYGEN SATURATION: 98 %

## 2024-03-04 DIAGNOSIS — N39.0 FREQUENT UTI: ICD-10-CM

## 2024-03-04 DIAGNOSIS — N39.0 URINARY TRACT INFECTION WITHOUT HEMATURIA, SITE UNSPECIFIED: Primary | ICD-10-CM

## 2024-03-04 LAB
ALBUMIN SERPL BCP-MCNC: 3.3 G/DL (ref 3.5–5.2)
ALP SERPL-CCNC: 59 U/L (ref 55–135)
ALT SERPL W/O P-5'-P-CCNC: 14 U/L (ref 10–44)
ANION GAP SERPL CALC-SCNC: 7 MMOL/L (ref 8–16)
AST SERPL-CCNC: 13 U/L (ref 10–40)
B-HCG UR QL: NEGATIVE
BACTERIA #/AREA URNS HPF: ABNORMAL /HPF
BASOPHILS # BLD AUTO: 0.04 K/UL (ref 0–0.2)
BASOPHILS NFR BLD: 0.3 % (ref 0–1.9)
BILIRUB SERPL-MCNC: 0.2 MG/DL (ref 0.1–1)
BILIRUB UR QL STRIP: NEGATIVE
BUN SERPL-MCNC: 12 MG/DL (ref 6–20)
CALCIUM SERPL-MCNC: 9 MG/DL (ref 8.7–10.5)
CHLORIDE SERPL-SCNC: 109 MMOL/L (ref 95–110)
CLARITY UR: ABNORMAL
CO2 SERPL-SCNC: 21 MMOL/L (ref 23–29)
COLOR UR: YELLOW
CREAT SERPL-MCNC: 0.7 MG/DL (ref 0.5–1.4)
CTP QC/QA: YES
DIFFERENTIAL METHOD BLD: ABNORMAL
EOSINOPHIL # BLD AUTO: 0.1 K/UL (ref 0–0.5)
EOSINOPHIL NFR BLD: 0.6 % (ref 0–8)
ERYTHROCYTE [DISTWIDTH] IN BLOOD BY AUTOMATED COUNT: 13 % (ref 11.5–14.5)
EST. GFR  (NO RACE VARIABLE): >60 ML/MIN/1.73 M^2
GLUCOSE SERPL-MCNC: 180 MG/DL (ref 70–110)
GLUCOSE UR QL STRIP: ABNORMAL
HCT VFR BLD AUTO: 38.2 % (ref 37–48.5)
HGB BLD-MCNC: 12.6 G/DL (ref 12–16)
HGB UR QL STRIP: ABNORMAL
HYALINE CASTS #/AREA URNS LPF: ABNORMAL /LPF
IMM GRANULOCYTES # BLD AUTO: 0.05 K/UL (ref 0–0.04)
IMM GRANULOCYTES NFR BLD AUTO: 0.4 % (ref 0–0.5)
KETONES UR QL STRIP: NEGATIVE
LEUKOCYTE ESTERASE UR QL STRIP: ABNORMAL
LIPASE SERPL-CCNC: 19 U/L (ref 4–60)
LYMPHOCYTES # BLD AUTO: 1.4 K/UL (ref 1–4.8)
LYMPHOCYTES NFR BLD: 9.8 % (ref 18–48)
MCH RBC QN AUTO: 27.6 PG (ref 27–31)
MCHC RBC AUTO-ENTMCNC: 33 G/DL (ref 32–36)
MCV RBC AUTO: 84 FL (ref 82–98)
MICROSCOPIC COMMENT: ABNORMAL
MONOCYTES # BLD AUTO: 0.6 K/UL (ref 0.3–1)
MONOCYTES NFR BLD: 4.3 % (ref 4–15)
NEUTROPHILS # BLD AUTO: 12 K/UL (ref 1.8–7.7)
NEUTROPHILS NFR BLD: 84.6 % (ref 38–73)
NITRITE UR QL STRIP: POSITIVE
NRBC BLD-RTO: 0 /100 WBC
PH UR STRIP: 6 [PH] (ref 5–8)
PLATELET # BLD AUTO: 239 K/UL (ref 150–450)
PMV BLD AUTO: 9.8 FL (ref 9.2–12.9)
POTASSIUM SERPL-SCNC: 3.4 MMOL/L (ref 3.5–5.1)
PROT SERPL-MCNC: 7.1 G/DL (ref 6–8.4)
PROT UR QL STRIP: ABNORMAL
RBC # BLD AUTO: 4.57 M/UL (ref 4–5.4)
RBC #/AREA URNS HPF: 53 /HPF (ref 0–4)
SODIUM SERPL-SCNC: 137 MMOL/L (ref 136–145)
SP GR UR STRIP: 1.02 (ref 1–1.03)
SQUAMOUS #/AREA URNS HPF: 2 /HPF
UNIDENT CRYS URNS QL MICRO: ABNORMAL
URN SPEC COLLECT METH UR: ABNORMAL
UROBILINOGEN UR STRIP-ACNC: NEGATIVE EU/DL
WBC # BLD AUTO: 14.14 K/UL (ref 3.9–12.7)
WBC #/AREA URNS HPF: >100 /HPF (ref 0–5)
WBC CLUMPS URNS QL MICRO: ABNORMAL

## 2024-03-04 PROCEDURE — 80053 COMPREHEN METABOLIC PANEL: CPT

## 2024-03-04 PROCEDURE — 99285 EMERGENCY DEPT VISIT HI MDM: CPT | Mod: 25

## 2024-03-04 PROCEDURE — 85025 COMPLETE CBC W/AUTO DIFF WBC: CPT

## 2024-03-04 PROCEDURE — 25000003 PHARM REV CODE 250: Performed by: PHYSICIAN ASSISTANT

## 2024-03-04 PROCEDURE — 87088 URINE BACTERIA CULTURE: CPT

## 2024-03-04 PROCEDURE — 87186 SC STD MICRODIL/AGAR DIL: CPT

## 2024-03-04 PROCEDURE — 63600175 PHARM REV CODE 636 W HCPCS: Performed by: PHYSICIAN ASSISTANT

## 2024-03-04 PROCEDURE — 81025 URINE PREGNANCY TEST: CPT

## 2024-03-04 PROCEDURE — 25500020 PHARM REV CODE 255: Performed by: EMERGENCY MEDICINE

## 2024-03-04 PROCEDURE — 87086 URINE CULTURE/COLONY COUNT: CPT

## 2024-03-04 PROCEDURE — 96365 THER/PROPH/DIAG IV INF INIT: CPT

## 2024-03-04 PROCEDURE — 96375 TX/PRO/DX INJ NEW DRUG ADDON: CPT

## 2024-03-04 PROCEDURE — 83690 ASSAY OF LIPASE: CPT | Performed by: PHYSICIAN ASSISTANT

## 2024-03-04 PROCEDURE — 96361 HYDRATE IV INFUSION ADD-ON: CPT

## 2024-03-04 PROCEDURE — 81000 URINALYSIS NONAUTO W/SCOPE: CPT

## 2024-03-04 PROCEDURE — 87077 CULTURE AEROBIC IDENTIFY: CPT

## 2024-03-04 RX ORDER — ONDANSETRON 4 MG/1
4 TABLET, ORALLY DISINTEGRATING ORAL EVERY 6 HOURS PRN
Qty: 15 TABLET | Refills: 0 | Status: SHIPPED | OUTPATIENT
Start: 2024-03-04

## 2024-03-04 RX ORDER — ONDANSETRON HYDROCHLORIDE 2 MG/ML
4 INJECTION, SOLUTION INTRAVENOUS
Status: COMPLETED | OUTPATIENT
Start: 2024-03-04 | End: 2024-03-04

## 2024-03-04 RX ORDER — CIPROFLOXACIN 500 MG/1
500 TABLET ORAL 2 TIMES DAILY
Qty: 14 TABLET | Refills: 0 | Status: SHIPPED | OUTPATIENT
Start: 2024-03-04 | End: 2024-03-11

## 2024-03-04 RX ORDER — KETOROLAC TROMETHAMINE 30 MG/ML
15 INJECTION, SOLUTION INTRAMUSCULAR; INTRAVENOUS
Status: COMPLETED | OUTPATIENT
Start: 2024-03-04 | End: 2024-03-04

## 2024-03-04 RX ORDER — OXYCODONE HYDROCHLORIDE 5 MG/1
5 TABLET ORAL EVERY 4 HOURS PRN
Qty: 12 TABLET | Refills: 0 | Status: SHIPPED | OUTPATIENT
Start: 2024-03-04 | End: 2024-05-29

## 2024-03-04 RX ORDER — OXYCODONE AND ACETAMINOPHEN 5; 325 MG/1; MG/1
1 TABLET ORAL
Status: COMPLETED | OUTPATIENT
Start: 2024-03-04 | End: 2024-03-04

## 2024-03-04 RX ADMIN — IOHEXOL 75 ML: 350 INJECTION, SOLUTION INTRAVENOUS at 04:03

## 2024-03-04 RX ADMIN — SODIUM CHLORIDE 1000 ML: 9 INJECTION, SOLUTION INTRAVENOUS at 04:03

## 2024-03-04 RX ADMIN — ONDANSETRON 4 MG: 2 INJECTION INTRAMUSCULAR; INTRAVENOUS at 04:03

## 2024-03-04 RX ADMIN — CEFTRIAXONE 1 G: 1 INJECTION, POWDER, FOR SOLUTION INTRAMUSCULAR; INTRAVENOUS at 05:03

## 2024-03-04 RX ADMIN — OXYCODONE HYDROCHLORIDE AND ACETAMINOPHEN 1 TABLET: 5; 325 TABLET ORAL at 05:03

## 2024-03-04 RX ADMIN — KETOROLAC TROMETHAMINE 15 MG: 30 INJECTION, SOLUTION INTRAMUSCULAR at 04:03

## 2024-03-04 NOTE — DISCHARGE INSTRUCTIONS
Please take the prescribed medications according to the directions on the bottle.  Call the urology clinic listed below to schedule an appointment to follow up regarding your urinary tract infection.  If your symptoms worsen you may return to the ED for reevaluation.

## 2024-03-04 NOTE — ED NOTES
Pt presents w/ c/o dysuria and cloudy urine x 1 wk, Also c/o RLQ abd pain w/ nausea. Denies fever.  Pt is Aaox3, resp even and unlabored, skin warm and dry. Nad noted. Friend at bedside.

## 2024-03-04 NOTE — ED PROVIDER NOTES
Encounter Date: 3/4/2024    SCRIBE #1 NOTE: I, Candace Rosales, am scribing for, and in the presence of,  Taylor Russo PA-C. I have scribed the following portions of the note - Other sections scribed: HPI, ROS, PE.       History     Chief Complaint   Patient presents with    Abdominal Pain     Burning, cloudy frequent urination for one week. Now having RLQ severe pain with nausea.      Dina Hutton is a 38 y.o. female, with PMHx of GERD and UTI who presents to the ED complaining of severe generalized abdominal pain x 2 days. She also reports decreased appetite, diarrhea, nausea, and emesis. Patient notes 6 emesis episodes yesterday and 3 today. Today, she states RLQ pain began radiating to her back. Patient states she has also been having UTI symptoms of cloudy urine, dysuria, slight hematuria, pelvic pain, abdominal distention, urinary frequency x 1 week.  Patient reports taking Advil and Cystex with no relief. Also took imodium which helped harden her stool. Patient reports eating lunch 1 hour PTA. Pain was ongoing at that time. Denies any fever. Patient reports history of UTIs but has not been previously hospitalized due to symptoms. Denies history of abdominal surgeries, gallstones or kidney stones. Reports Sulfa and Bactrim drug allergy.         The history is provided by the patient. No  was used.     Review of patient's allergies indicates:   Allergen Reactions    Lansoprazole Shortness Of Breath    Vancomycin analogues Hives    Bactrim [sulfamethoxazole-trimethoprim] Nausea And Vomiting    Sulfa (sulfonamide antibiotics) Rash    Vicodin [hydrocodone-acetaminophen] Nausea Only     Patient states she is fine with other pain medication     Past Medical History:   Diagnosis Date    Anemia     Breast abscess 4/25/2020    GERD (gastroesophageal reflux disease)     History of fourth degree perineal laceration 8/21/2019    Hypoglycemia     Hypoglycemia     Mastitis 4/20/2020    Mental disorder      depression    PONV (postoperative nausea and vomiting)     Thyroid cancer     Thyroid disease      Past Surgical History:   Procedure Laterality Date    BREAST CYST EXCISION      COLONOSCOPY      ESOPHAGOGASTRODUODENOSCOPY      ESOPHAGOGASTRODUODENOSCOPY N/A 06/14/2021    Procedure: EGD (ESOPHAGOGASTRODUODENOSCOPY);  Surgeon: Farooq Cullen MD;  Location: [facility] ENDO;  Service: Endoscopy;  Laterality: N/A;  ongoing epigastric pain, burning, nausea, vomiting  Lives on [facility], want first available but if there are openings on , would prefer that location  cOVID test on 6/11/21 at [facility]    INCISION AND DRAINAGE OF BREAST Left 04/23/2020    Procedure: INCISION AND DRAINAGE, BREAST;  Surgeon: Mason Rubalcava III, MD;  Location: [facility] OR;  Service: General;  Laterality: Left;    THYROIDECTOMY Bilateral 09/29/2020    Procedure: THYROIDECTOMY with central neck dissection;  Surgeon: Kayla Lovelace MD;  Location: [facility] OR;  Service: General;  Laterality: Bilateral;     Family History   Problem Relation Age of Onset    Hypertension Mother     Hyperlipidemia Mother     Stroke Mother     Arthritis Maternal Grandmother     Mental illness Maternal Grandmother         Dementia & Alzheimer    Cancer Neg Hx      Social History     Tobacco Use    Smoking status: Never    Smokeless tobacco: Never    Tobacco comments:     Allergies to it   Substance Use Topics    Alcohol use: Yes     Alcohol/week: 1.0 standard drink of alcohol     Types: 1 Glasses of wine per week     Comment: On special occasions or events    Drug use: Never     Review of Systems   Constitutional:  Positive for appetite change (decreased). Negative for chills, diaphoresis, fatigue and fever.   HENT:  Negative for congestion, sinus pressure, sinus pain, sneezing and sore throat.    Eyes:  Negative for visual disturbance.   Respiratory:  Negative for cough and shortness of breath.    Cardiovascular:  Negative for chest pain.   Gastrointestinal:   Positive for abdominal pain, diarrhea, nausea and vomiting. Negative for constipation.   Genitourinary:  Positive for dysuria, frequency and hematuria. Negative for difficulty urinating and pelvic pain.        (+) cloudy urine   Musculoskeletal:  Positive for back pain.   Skin:  Negative for rash.   Neurological:  Negative for syncope and headaches.       Physical Exam     Initial Vitals [03/04/24 1523]   BP Pulse Resp Temp SpO2   128/76 98 (!) 22 97.9 °F (36.6 °C) 98 %      MAP       --         Physical Exam    Nursing note and vitals reviewed.  Constitutional: She appears well-developed and well-nourished. She is not diaphoretic.   HENT:   Head: Normocephalic and atraumatic.   Right Ear: External ear normal.   Left Ear: External ear normal.   Eyes: Conjunctivae are normal.   Neck:   Normal range of motion.  Cardiovascular:  Normal rate, regular rhythm, normal heart sounds, intact distal pulses and normal pulses.           Pulmonary/Chest: Effort normal and breath sounds normal. No respiratory distress.   Abdominal: Abdomen is soft. She exhibits no distension. There is generalized abdominal tenderness.   Patient is holding abdomen and appears uncomfortable throughout the exam.    There is right CVA tenderness.  There is left CVA tenderness.   Musculoskeletal:         General: No edema.      Cervical back: Normal range of motion.     Neurological: She is alert and oriented to person, place, and time. GCS score is 15. GCS eye subscore is 4. GCS verbal subscore is 5. GCS motor subscore is 6.   Skin: Skin is warm and dry.   Psychiatric: She has a normal mood and affect. Her behavior is normal. Judgment normal.         ED Course   Procedures  Labs Reviewed   CULTURE, URINE - Abnormal; Notable for the following components:       Result Value    Urine Culture, Routine   (*)     Value: GRAM NEGATIVE SAWYER, NON-LACTOSE   >100,000 cfu/ml  Identification and susceptibility pending      All other components within  normal limits    Narrative:     Specimen Source->Urine   URINALYSIS, REFLEX TO URINE CULTURE - Abnormal; Notable for the following components:    Appearance, UA Hazy (*)     Protein, UA 2+ (*)     Glucose, UA 2+ (*)     Occult Blood UA 2+ (*)     Nitrite, UA Positive (*)     Leukocytes, UA 3+ (*)     All other components within normal limits    Narrative:     Specimen Source->Urine   CBC W/ AUTO DIFFERENTIAL - Abnormal; Notable for the following components:    WBC 14.14 (*)     Gran # (ANC) 12.0 (*)     Immature Grans (Abs) 0.05 (*)     Gran % 84.6 (*)     Lymph % 9.8 (*)     All other components within normal limits   COMPREHENSIVE METABOLIC PANEL - Abnormal; Notable for the following components:    Potassium 3.4 (*)     CO2 21 (*)     Glucose 180 (*)     Albumin 3.3 (*)     Anion Gap 7 (*)     All other components within normal limits   URINALYSIS MICROSCOPIC - Abnormal; Notable for the following components:    RBC, UA 53 (*)     WBC, UA >100 (*)     WBC Clumps, UA Moderate (*)     Bacteria Few (*)     All other components within normal limits    Narrative:     Specimen Source->Urine   LIPASE   POCT URINE PREGNANCY          Imaging Results              CT Abdomen Pelvis With IV Contrast NO Oral Contrast (Final result)  Result time 03/04/24 17:37:32      Final result by Jermaine Hilton MD (03/04/24 17:37:32)                   Impression:      1. There is indistinctness about the right ureter noting mild urothelial thickening/enhancement.  Correlation with urinalysis is recommended, finding could reflect sequela of urothelial inflammation or infection.  Please note, the urinary bladder is decompressed and may account for wall thickening however cystitis in the setting of ascending infection is a consideration.  2. Please see above for several additional findings.      Electronically signed by: Jermaine Hilton MD  Date:    03/04/2024  Time:    17:37               Narrative:    EXAMINATION:  CT ABDOMEN PELVIS WITH  IV CONTRAST    CLINICAL HISTORY:  Nausea/vomiting;RLQ abdominal pain (Age >= 14y);    TECHNIQUE:  Low dose axial images, sagittal and coronal reformations were obtained from the lung bases to the pubic symphysis following the IV administration of 75 mL of Omnipaque 350 .  Oral contrast was not given.    COMPARISON:  CTA chest 12/11/2023, CT renal stone study 03/26/2019    FINDINGS:  Images of the lower thorax are unremarkable.    The spleen, pancreas, gallbladder and adrenal glands are grossly unremarkable.  There is a subcentimeter low attenuating lesion arising from the right hepatic dome, too small for characterization.  The stomach is distended with ingested content without wall thickening.  The portal vein, splenic vein, SMV, celiac axis and SMA all are patent.  No significant abdominal lymphadenopathy.    The kidneys enhance symmetrically without hydronephrosis or nephrolithiasis.  There is slight indistinctness about the right ureter noting mild urothelial thickening.  The ureters are unable to be followed in their entirety to the urinary bladder, no definite calculi seen bilaterally.  The urinary bladder is unremarkable.  The uterus and adnexa are unremarkable.    The distal large bowel is decompressed noting a few scattered colonic diverticula.  The terminal ileum and appendix are unremarkable.  The small bowel is grossly unremarkable.  There are a few scattered shotty periaortic, pericaval, and mesenteric lymph nodes.  No focal organized pelvic fluid collection.    There are degenerative changes of the spine.  No significant inguinal lymphadenopathy.                                       Medications   sodium chloride 0.9% bolus 1,000 mL 1,000 mL (0 mLs Intravenous Stopped 3/4/24 1846)   ondansetron injection 4 mg (4 mg Intravenous Given 3/4/24 1604)   ketorolac injection 15 mg (15 mg Intravenous Given 3/4/24 1606)   iohexoL (OMNIPAQUE 350) injection 75 mL (75 mLs Intravenous Given 3/4/24 1643)    cefTRIAXone (Rocephin) 1 g in dextrose 5 % in water (D5W) 100 mL IVPB (MB+) (0 g Intravenous Stopped 3/4/24 1846)   oxyCODONE-acetaminophen 5-325 mg per tablet 1 tablet (1 tablet Oral Given 3/4/24 1748)     Medical Decision Making  Dina Hutton is a 38 y.o. female, with PMHx of GERD and UTI who presents to the ED complaining of severe generalized abdominal pain x 2 days. She also reports decreased appetite, diarrhea, nausea, and emesis. Patient notes 6 emesis episodes yesterday and 3 today. Today, she states RLQ pain began radiating to her back. Patient states she has also been having UTI symptoms of cloudy urine, dysuria, slight hematuria, pelvic pain, abdominal distention, urinary frequency x 1 week.  Patient reports taking Advil and Cystex with no relief. Also took imodium which helped harden her stool. Patient reports eating lunch 1 hour PTA. Pain was ongoing at that time. Denies any fever. Patient reports history of UTIs but has not been previously hospitalized due to symptoms. Denies history of abdominal surgeries, gallstones or kidney stones. Reports Sulfa and Bactrim drug allergy. On physical exam patient has generalized abdominal tenderness to mild palpation throughout the majority of her abdomen.  Abdomen is soft.  Mild guarding present.  Patient is holding her right lower quadrant throughout the entire examination.  She does appear uncomfortable. Please see physical exam section above for remainder of physical exam findings.  Patient is not febrile or tachycardic > 100 bpm.  Urinalysis is concerning for urinary tract infection CT scan does not state pyelonephritis, however there are signs of possible ascending infection.  Urine culture pending at this time.  Patient was treated with IV normal saline, Zofran, and Toradol initially.  Upon re-evaluation still with some pain, therefore Percocet ordered.  1 g IV Rocephin ordered in ED. We discussed a plan to send her home with prescription for Cipro twice a  day for 7 days.  Ambulatory referral to Urology placed.  Strict return precautions discussed.  Patient advised to return to ED for intractable pain, intractable vomiting, or worsening symptoms including fever or chills.     Of note: Differential diagnosis to include but not limited to:  UTI, pyelonephritis, kidney stone, appendicitis, gastroenteritis    Taylor Russo PA-C    DISCLAIMER: This note was prepared with [employer] voice recognition transcription software. Garbled syntax, mangled pronouns, and other bizarre constructions may be attributed to that software system. If you have any questions regarding information in this note please contact me.         Amount and/or Complexity of Data Reviewed  Labs: ordered. Decision-making details documented in ED Course.  Radiology: ordered. Decision-making details documented in ED Course.    Risk  Prescription drug management.            Scribe Attestation:   Scribe #1: I performed the above scribed service and the documentation accurately describes the services I performed. I attest to the accuracy of the note.        ED Course as of 03/05/24 1425   Mon Mar 04, 2024   1615 CBC auto differential(!)  White blood cell count is elevated at 14.14.  No anemia.  Normal platelet count [MB]   1615 POCT urine pregnancy  Negative [MB]   1632 Comprehensive metabolic panel(!)  Mild hypokalemia.  CO2 of 21.  Glucose 180.  Albumin 3.3.  Anion gap 7.  Rest with normal limits   [MB]   1633 Lipase  Within normal limits [MB]   1650 Urinalysis, Reflex to Urine Culture Urine, Clean Catch(!)  Positive for urinary tract infection [MB]      ED Course User Index  [MB] Taylor Russo PA-C                         I, Taylor Russo, personally performed the services described in this documentation. All medical record entries made by the scribe were at my direction and in my presence. I have reviewed the chart and agree that the record reflects my personal performance and is accurate and  complete.    Clinical Impression:  Final diagnoses:  [N39.0] Urinary tract infection without hematuria, site unspecified (Primary)  [N39.0] Frequent UTI          ED Disposition Condition    Discharge Stable          ED Prescriptions       Medication Sig Dispense Start Date End Date Auth. Provider    ciprofloxacin HCl (CIPRO) 500 MG tablet Take 1 tablet (500 mg total) by mouth 2 (two) times daily. for 7 days 14 tablet 3/4/2024 3/11/2024 Taylor Russo PA-C    ondansetron (ZOFRAN-ODT) 4 MG TbDL Take 1 tablet (4 mg total) by mouth every 6 (six) hours as needed (nausea or vomiting). 15 tablet 3/4/2024 -- Taylor Russo PA-C    oxyCODONE (ROXICODONE) 5 MG immediate release tablet Take 1 tablet (5 mg total) by mouth every 4 (four) hours as needed for Pain. 12 tablet 3/4/2024 -- Taylor Russo PA-C          Follow-up Information       Follow up With Specialties Details Why Contact Info    Yumiko Espino MD Urology Schedule an appointment as soon as possible for a visit in 3 days For follow up [address]   [facility] [address]  [phone]      [facility] - Emergency Dept Emergency Medicine Go to  As needed, If symptoms worsen [address]  [facility] [place] [id number] [phone]             Taylor Russo PA-C  03/05/24 6700

## 2024-03-06 ENCOUNTER — CLINICAL SUPPORT (OUTPATIENT)
Dept: INFECTIOUS DISEASES | Facility: CLINIC | Age: 39
End: 2024-03-06
Payer: COMMERCIAL

## 2024-03-06 DIAGNOSIS — M05.79 RHEUMATOID ARTHRITIS INVOLVING MULTIPLE SITES WITH POSITIVE RHEUMATOID FACTOR: Primary | ICD-10-CM

## 2024-03-06 LAB — BACTERIA UR CULT: ABNORMAL

## 2024-03-07 NOTE — PROGRESS NOTES
Subjective:       Dina Hutton is a 38 y.o. female who is an established patient who was seen  for evaluation of UTI.      Previously seen by uro NP, last in 2022. Returns now with UTIs. Previously seen for UTI/hematuria. CT urogram 2/22 - normal. Cystoscopy in office 5/22 ([facility]) - normal.     Returns with rUTIs. Recently treated for UTI. One day left of abx - Cipro. Still with dysuria and hematuria. Reports sometimes needs several rounds of abx. +diarrhea since starting abx, starting to become solid. UTIs sometimes related to intercourse but not usually.     PVR (bladder scan) today - 90cc    UCx:  6/23 - 10-49k Proteus/E coli ESBL  7/23 - 10-49k Proteus/E coli ESBL  8/23 - 10-49 GBS  9/23 - mixed  3/24 - >100k E coli    The following portions of the patient's history were reviewed and updated as appropriate: allergies, current medications, past family history, past medical history, past social history, past surgical history and problem list.    Review of Systems  12 point review of systems completed. Pertinent positive and negatives listed in HPI        Objective:    Vitals: Wt 58.9 kg (129 lb 13.6 oz)   BMI 28.10 kg/m²     Physical Exam   General: well developed, well nourished in no acute distress  Head: normocephalic, atraumatic  Neck: no obvious enlargement of thyroid  HEENT: EOMI, mucus membranes moist, sclera anicteric, no hearing impairment  Lungs: symmetric expansion, non-labored breathing  Neuro: alert and oriented x 3, no gross deficits  Psych: normal judgment and insight, normal mood/affect and non-anxious  Genitourinary:   deferred      Lab Review   Urine analysis today in clinic shows trace protein     Lab Results   Component Value Date    WBC 14.14 (H) 03/04/2024    HGB 12.6 03/04/2024    HCT 38.2 03/04/2024    HCT 33 (L) 05/21/2019    MCV 84 03/04/2024    MCV 75.1 (L) 08/21/2019     03/04/2024     Lab Results   Component Value Date    CREATININE 0.7 03/04/2024    BUN 12 03/04/2024          Imaging  Images and reports were personally reviewed by me and discussed with patient  CT uro 2022       Assessment/Plan:      1. Recurrent UTI    - Prior workup. Neg CT uro and cysto in 2022.   - Repeat DELMAR now   - Will try to avoid prophy abx for now   - Discussed UTI prevention strategies.   - Adequate hydration.   - Double voiding. Consider timed voiding.    - Avoid constipation.   - DELMAR with PVR to monitor upper tracts - will do now   - Cystoscopy 2022 - negative   - Cranberry/probiotics/D-mannose   - Call with UTI symptoms so UA/UCx can be sent.      2. Dysuria    - Trial UroMP. Call if not able to get.   - Avoid bladder irritants     3. Gross hematuria    - Prior negative workup in 2022   - Will closely monitor. Seems related to UTIs.          Follow up in 2-3 months

## 2024-03-11 ENCOUNTER — OFFICE VISIT (OUTPATIENT)
Dept: UROLOGY | Facility: CLINIC | Age: 39
End: 2024-03-11
Payer: COMMERCIAL

## 2024-03-11 VITALS — WEIGHT: 129.88 LBS | BODY MASS INDEX: 28.1 KG/M2

## 2024-03-11 DIAGNOSIS — N39.0 RECURRENT UTI: Primary | ICD-10-CM

## 2024-03-11 DIAGNOSIS — R30.0 DYSURIA: ICD-10-CM

## 2024-03-11 DIAGNOSIS — R31.0 GROSS HEMATURIA: ICD-10-CM

## 2024-03-11 PROCEDURE — 3008F BODY MASS INDEX DOCD: CPT | Mod: CPTII,S$GLB,, | Performed by: UROLOGY

## 2024-03-11 PROCEDURE — 1160F RVW MEDS BY RX/DR IN RCRD: CPT | Mod: CPTII,S$GLB,, | Performed by: UROLOGY

## 2024-03-11 PROCEDURE — 99214 OFFICE O/P EST MOD 30 MIN: CPT | Mod: S$GLB,,, | Performed by: UROLOGY

## 2024-03-11 PROCEDURE — 99999 PR PBB SHADOW E&M-EST. PATIENT-LVL III: CPT | Mod: PBBFAC,,, | Performed by: UROLOGY

## 2024-03-11 PROCEDURE — 1159F MED LIST DOCD IN RCRD: CPT | Mod: CPTII,S$GLB,, | Performed by: UROLOGY

## 2024-03-11 RX ORDER — METHENAMINE, SODIUM PHOSPHATE, MONOBASIC, ANHYDROUS, PHENYL SALICYLATE, METHYLENE BLUE AND HYOSCYAMINE SULFATE 118; 40.8; 36; 10; .12 MG/1; MG/1; MG/1; MG/1; MG/1
1 CAPSULE ORAL 3 TIMES DAILY PRN
Qty: 40 CAPSULE | Refills: 11 | Status: SHIPPED | OUTPATIENT
Start: 2024-03-11 | End: 2024-06-14

## 2024-03-13 ENCOUNTER — CLINICAL SUPPORT (OUTPATIENT)
Dept: INFECTIOUS DISEASES | Facility: CLINIC | Age: 39
End: 2024-03-13
Payer: COMMERCIAL

## 2024-03-13 ENCOUNTER — HOSPITAL ENCOUNTER (OUTPATIENT)
Dept: RADIOLOGY | Facility: HOSPITAL | Age: 39
Discharge: HOME | End: 2024-03-13
Attending: UROLOGY
Payer: COMMERCIAL

## 2024-03-13 DIAGNOSIS — Z23 NEED FOR SHINGLES VACCINE: Primary | ICD-10-CM

## 2024-03-13 DIAGNOSIS — N39.0 RECURRENT UTI: ICD-10-CM

## 2024-03-13 PROCEDURE — 76770 US EXAM ABDO BACK WALL COMP: CPT | Mod: TC

## 2024-03-13 PROCEDURE — 90750 HZV VACC RECOMBINANT IM: CPT | Mod: S$GLB,,, | Performed by: INTERNAL MEDICINE

## 2024-03-13 PROCEDURE — 76770 US EXAM ABDO BACK WALL COMP: CPT | Mod: 26,,, | Performed by: STUDENT IN AN ORGANIZED HEALTH CARE EDUCATION/TRAINING PROGRAM

## 2024-03-13 PROCEDURE — 99999 PR PBB SHADOW E&M-EST. PATIENT-LVL I: CPT | Mod: PBBFAC,,,

## 2024-03-13 PROCEDURE — 90471 IMMUNIZATION ADMIN: CPT | Mod: S$GLB,,, | Performed by: INTERNAL MEDICINE

## 2024-03-13 NOTE — PROGRESS NOTES
Patient received the second zoster vaccine in the left deltoid. Pt tolerated well. Pt asked to wait in the clinic 15 minutes after injection in the event of an allergic reaction. Pt verbalized understanding. Pt left in NAD.

## 2024-03-27 ENCOUNTER — PATIENT MESSAGE (OUTPATIENT)
Dept: ORTHOPEDICS | Facility: CLINIC | Age: 39
End: 2024-03-27
Payer: COMMERCIAL

## 2024-03-27 ENCOUNTER — PATIENT MESSAGE (OUTPATIENT)
Dept: PRIMARY CARE CLINIC | Facility: CLINIC | Age: 39
End: 2024-03-27
Payer: COMMERCIAL

## 2024-03-27 ENCOUNTER — HOSPITAL ENCOUNTER (OUTPATIENT)
Dept: RADIOLOGY | Facility: HOSPITAL | Age: 39
Discharge: HOME | End: 2024-03-27
Attending: STUDENT IN AN ORGANIZED HEALTH CARE EDUCATION/TRAINING PROGRAM
Payer: COMMERCIAL

## 2024-03-27 DIAGNOSIS — M25.529 ELBOW PAIN, UNSPECIFIED LATERALITY: ICD-10-CM

## 2024-03-27 PROCEDURE — 73080 X-RAY EXAM OF ELBOW: CPT | Mod: TC,RT

## 2024-03-27 PROCEDURE — 73080 X-RAY EXAM OF ELBOW: CPT | Mod: 26,RT,, | Performed by: STUDENT IN AN ORGANIZED HEALTH CARE EDUCATION/TRAINING PROGRAM

## 2024-04-11 ENCOUNTER — PATIENT MESSAGE (OUTPATIENT)
Dept: PRIMARY CARE CLINIC | Facility: CLINIC | Age: 39
End: 2024-04-11
Payer: COMMERCIAL

## 2024-04-11 DIAGNOSIS — M25.529 ELBOW PAIN, UNSPECIFIED LATERALITY: Primary | ICD-10-CM

## 2024-04-13 ENCOUNTER — PATIENT MESSAGE (OUTPATIENT)
Dept: RHEUMATOLOGY | Facility: CLINIC | Age: 39
End: 2024-04-13
Payer: COMMERCIAL

## 2024-04-14 RX ORDER — HYDROXYCHLOROQUINE SULFATE 200 MG/1
200 TABLET, FILM COATED ORAL 2 TIMES DAILY
Qty: 60 TABLET | Refills: 5 | Status: SHIPPED | OUTPATIENT
Start: 2024-04-14 | End: 2024-10-11

## 2024-04-17 ENCOUNTER — OFFICE VISIT (OUTPATIENT)
Dept: SPORTS MEDICINE | Facility: CLINIC | Age: 39
End: 2024-04-17
Payer: COMMERCIAL

## 2024-04-17 VITALS
BODY MASS INDEX: 27.59 KG/M2 | HEART RATE: 76 BPM | SYSTOLIC BLOOD PRESSURE: 117 MMHG | WEIGHT: 127.88 LBS | HEIGHT: 57 IN | DIASTOLIC BLOOD PRESSURE: 78 MMHG

## 2024-04-17 DIAGNOSIS — R20.0 NUMBNESS AND TINGLING OF RIGHT ARM: Primary | ICD-10-CM

## 2024-04-17 DIAGNOSIS — M77.11 LATERAL EPICONDYLITIS, RIGHT ELBOW: ICD-10-CM

## 2024-04-17 DIAGNOSIS — R20.2 NUMBNESS AND TINGLING OF RIGHT ARM: Primary | ICD-10-CM

## 2024-04-17 DIAGNOSIS — M25.529 ELBOW PAIN, UNSPECIFIED LATERALITY: ICD-10-CM

## 2024-04-17 PROCEDURE — 3008F BODY MASS INDEX DOCD: CPT | Mod: CPTII,S$GLB,, | Performed by: ORTHOPAEDIC SURGERY

## 2024-04-17 PROCEDURE — 99999 PR PBB SHADOW E&M-EST. PATIENT-LVL IV: CPT | Mod: PBBFAC,,, | Performed by: ORTHOPAEDIC SURGERY

## 2024-04-17 PROCEDURE — 3074F SYST BP LT 130 MM HG: CPT | Mod: CPTII,S$GLB,, | Performed by: ORTHOPAEDIC SURGERY

## 2024-04-17 PROCEDURE — 1159F MED LIST DOCD IN RCRD: CPT | Mod: CPTII,S$GLB,, | Performed by: ORTHOPAEDIC SURGERY

## 2024-04-17 PROCEDURE — 99214 OFFICE O/P EST MOD 30 MIN: CPT | Mod: S$GLB,,, | Performed by: ORTHOPAEDIC SURGERY

## 2024-04-17 PROCEDURE — 3078F DIAST BP <80 MM HG: CPT | Mod: CPTII,S$GLB,, | Performed by: ORTHOPAEDIC SURGERY

## 2024-04-17 NOTE — PROGRESS NOTES
CC Right elbow pain    HPI:   Dina Hutton is a pleasant 38 y.o. patient who presents for evaluation of right elbow pain. Currently unemployed, RHD. She was in the hospital for a UTI a few months ago and an IV was placed into her right antecubital fossa which resulted in acute onset of elbow pain. She also has numbness radiating into her thumb and index finger. She has been treating her pain with rest and OTC NSAIDs.     Review of Systems   Constitution: Negative. Negative for chills, fever and night sweats.   HENT: Negative for congestion and headaches.    Eyes: Negative for blurred vision, left vision loss and right vision loss.   Cardiovascular: Negative for chest pain and syncope.   Respiratory: Negative for cough and shortness of breath.    Endocrine: Negative for polydipsia, polyphagia and polyuria.   Hematologic/Lymphatic: Negative for bleeding problem. Does not bruise/bleed easily.   Skin: Negative for dry skin, itching and rash.   Musculoskeletal: Negative for falls and muscle weakness.   Gastrointestinal: Negative for abdominal pain and bowel incontinence.   Genitourinary: Negative for bladder incontinence and nocturia.   Neurological: Negative for disturbances in coordination, loss of balance and seizures.   Psychiatric/Behavioral: Negative for depression. The patient does not have insomnia.    Allergic/Immunologic: Negative for hives and persistent infections.     PAST MEDICAL HISTORY:   Past Medical History:   Diagnosis Date    Anemia     Breast abscess 4/25/2020    GERD (gastroesophageal reflux disease)     History of fourth degree perineal laceration 8/21/2019    Hypoglycemia     Hypoglycemia     Mastitis 4/20/2020    Mental disorder     depression    PONV (postoperative nausea and vomiting)     Thyroid cancer     Thyroid disease      PAST SURGICAL HISTORY:   Past Surgical History:   Procedure Laterality Date    BREAST CYST EXCISION      COLONOSCOPY      ESOPHAGOGASTRODUODENOSCOPY       ESOPHAGOGASTRODUODENOSCOPY N/A 06/14/2021    Procedure: EGD (ESOPHAGOGASTRODUODENOSCOPY);  Surgeon: Farooq Cullen MD;  Location: [facility] ENDO;  Service: Endoscopy;  Laterality: N/A;  ongoing epigastric pain, burning, nausea, vomiting  Lives on [facility], want first available but if there are openings on [place], would prefer that location  cOVID test on 6/11/21 at [facility]    INCISION AND DRAINAGE OF BREAST Left 04/23/2020    Procedure: INCISION AND DRAINAGE, BREAST;  Surgeon: Mason Rubalcava III, MD;  Location: [facility] OR;  Service: General;  Laterality: Left;    THYROIDECTOMY Bilateral 09/29/2020    Procedure: THYROIDECTOMY with central neck dissection;  Surgeon: Kayla Lovelace MD;  Location: [facility] OR;  Service: General;  Laterality: Bilateral;     FAMILY HISTORY:   Family History   Problem Relation Name Age of Onset    Hypertension Mother Harriet Le     Hyperlipidemia Mother Harriet Le     Stroke Mother Harriet Le     Arthritis Maternal Grandmother Grandmother     Mental illness Maternal Grandmother Grandmother         Dementia & Alzheimer    Cancer Neg Hx       SOCIAL HISTORY:   Social History     Socioeconomic History    Marital status:    Tobacco Use    Smoking status: Never    Smokeless tobacco: Never    Tobacco comments:     Allergies to it   Substance and Sexual Activity    Alcohol use: Yes     Alcohol/week: 1.0 standard drink of alcohol     Types: 1 Glasses of wine per week     Comment: On special occasions or events    Drug use: Never    Sexual activity: Yes     Partners: Male     Birth control/protection: Partner-Vasectomy, Patch     Social Determinants of Health     Financial Resource Strain: Medium Risk (12/27/2023)    Overall Financial Resource Strain (CARDIA)     Difficulty of Paying Living Expenses: Somewhat hard   Food Insecurity: Unknown (12/27/2023)    Hunger Vital Sign     Worried About Running Out of Food in the Last Year: Never true     Ran Out of Food in the Last Year: Patient declined    Transportation Needs: Unknown (12/27/2023)    PRAPARE - Transportation     Lack of Transportation (Medical): No     Lack of Transportation (Non-Medical): Patient declined   Physical Activity: Inactive (12/27/2023)    Exercise Vital Sign     Days of Exercise per Week: 0 days     Minutes of Exercise per Session: 0 min   Stress: Stress Concern Present (12/27/2023)    [employer] - Occupational Stress Questionnaire     Feeling of Stress : To some extent   Social Connections: Moderately Isolated (12/27/2023)    Social Connection and Isolation Panel [NHANES]     Frequency of Communication with Friends and Family: More than three times a week     Frequency of Social Gatherings with Friends and Family: More than three times a week     Attends Restoration Services: Never     Active Member of Clubs or Organizations: No     Attends Club or Organization Meetings: Never     Marital Status:    Housing Stability: High Risk (12/27/2023)    Housing Stability Vital Sign     Unable to Pay for Housing in the Last Year: Yes     Number of Places Lived in the Last Year: 1     Unstable Housing in the Last Year: No       MEDICATIONS:   Current Outpatient Medications:     albuterol (PROVENTIL HFA) 90 mcg/actuation inhaler, Inhale 2 puffs into the lungs every 6 (six) hours as needed for Wheezing or Shortness of Breath. Rescue, Disp: 18 g, Rfl: 0    ascorbic acid/collagen hydr (COLLAGEN PLUS VITAMIN C ORAL), Take by mouth., Disp: , Rfl:     CALCIUM ORAL, Take by mouth., Disp: , Rfl:     CRANBERRY ORAL, Take by mouth., Disp: , Rfl:     hydroxychloroquine (PLAQUENIL) 200 mg tablet, Take 1 tablet (200 mg total) by mouth 2 (two) times daily., Disp: 60 tablet, Rfl: 5    levothyroxine (TIROSINT) 75 mcg Cap, Take 75 mcg by mouth once daily. Dispense as generic levothyroxine capsules, Disp: 30 capsule, Rfl: 11    liothyronine (CYTOMEL) 5 MCG Tab, Take a half-tablet (2.5 mcg) by mouth twice daily, Disp: 30 tablet,  "Rfl: 11    mv-min/iron/folic/calcium/vitK (WOMEN'S MULTIVITAMIN ORAL), Take by mouth., Disp: , Rfl:     norelgestromin-ethinyl estradiol (XULANE) 150-35 mcg/24 hr, Place 1 patch onto the skin once a week., Disp: 12 patch, Rfl: 3    ondansetron (ZOFRAN-ODT) 4 MG TbDL, Take 1 tablet (4 mg total) by mouth every 6 (six) hours as needed (nausea or vomiting)., Disp: 15 tablet, Rfl: 0    katie-bryan.blue-s.phos-phsal-hyo (URO-MP) 118-10-40.8-36 mg Cap, Take 1 capsule by mouth 3 (three) times daily as needed (painful urination)., Disp: 40 capsule, Rfl: 11    oxyCODONE (ROXICODONE) 5 MG immediate release tablet, Take 1 tablet (5 mg total) by mouth every 4 (four) hours as needed for Pain., Disp: 12 tablet, Rfl: 0  ALLERGIES:   Review of patient's allergies indicates:   Allergen Reactions    Lansoprazole Shortness Of Breath    Vancomycin analogues Hives    Bactrim [sulfamethoxazole-trimethoprim] Nausea And Vomiting    Sulfa (sulfonamide antibiotics) Rash    Vicodin [hydrocodone-acetaminophen] Nausea Only     Patient states she is fine with other pain medication       VITAL SIGNS: /78   Pulse 76   Ht 4' 9" (1.448 m)   Wt 58 kg (127 lb 13.9 oz)   BMI 27.67 kg/m²        PHYSICAL EXAM:  General: Patient appears alert and oriented x 3.  Mood is pleasant.  Observation of ears, eyes and nose reveal no gross abnormalities. Observation of ears, eyes and nose reveal no gross abnormalities.  HEENT: NCAT, sclera nonicteric.  Well nourished, in no acute distress and ambulates with a non-antalgic gait with no assistive devices.    Skin: Skin intact bilaterally. Sensation intact bilaterally. Compartments soft. No evidence of edema, infection, or induration.     Right ELBOW / WRIST EXTREMITY EXAM:    OBSERVATION / INSPECTION    Swelling  none    Deformity  none  Discoloration  none     Scars   none    Atrophy  none    TENDERNESS / CREPITUS (T / C):           T / C        Medial epicondyle   - / -    Med. (Ulnar) collateral ligament  - " / -    Flexor pronator Musculature   - / -   Biceps tendon    - / -   Head of radius    - / -    Lateral epicondyle   + / -    Extensor Musculature   + / -   Brachioradialis   - / -   Triceps tendon   - / -   Triceps muscle   - / -   Olecranon    - / -   Olecranon bursa   - / -   Cubital fossa    - / -   Anterior jointline   - / -   Radial tunnel    - / -             ROM: ('*' = with pain)    Right Elbow  AROM (PROM)     Extension   10 deg  (5 deg)   Flexion   145 deg (145 deg)         Pronation  90 deg  (90 deg)      Supination   80 deg  (80 deg)                 Left Elbow  AROM (PROM)     Extension   0 deg  (5 deg)   Flexion   145 deg (145 deg)         Pronation  90 deg  (90 deg)      Supination   80 deg  (80 deg)            Right Wrist  AROM (PROM)   Extension   80 deg (85 deg)   Flexion   80 deg (85 deg)         Ulnar Deviation   35 deg (40 deg)  Radial Deviation 35 deg (40 deg)             Left Wrist   AROM (PROM)     Extension   80 deg (85 deg)   Flexion   80 deg (85 deg)         Ulnar Deviation   35 deg (40 deg)  Radial Deviation 35 deg (40 deg)        STRENGTH: ('*' = with pain)    Elbow Flexion:   5/5  Elbow Extension:  5/5  Wrist Flexion:   5/5  Wrist Extension:  5/5  :    5/5  Intrinsics:   5/5  EPL (Ext. pollicis longus): 5/5  Pinch Mechanism:  5/5    ELBOW EXAMINATION:  See above noted areas of tenderness.   Test for Ligamentous Instability - UCL normal  Test for Ligamentous Instability - LUCL normal  PLRI       neg  Tinel's (Percussion) Test - Cubital  neg  Tennis Elbow Test    +  Golfer's Elbow Test    neg  Radial Capitellar Grind Test   neg  Valgus/Extension Overload Test  neg  Resisted Long Finger Extension Pain +  Moving Valgus     neg  Forearm pain with resisted supination neg    WRIST EXAMINATION:  See above noted areas of tenderness.   Finkelstein's Test   neg  Tinel's Test - Carpal Tunnel  neg  Phalen's Test    neg  Median Nerve Compression Test neg  Ulnar-sided Compression Test neg  LT  Ballottment Test   neg  Snuff box tenderness   neg  Noguera's Test   neg  LT Instability    neg  Hook of Hamate Tenderness  neg     EXTREMITY NEURO-VASCULAR EXAMINATION: Sensation grossly intact to light touch all dermatomal regions. DTR 2+ Biceps, Triceps, BR and Negative Arianna's sign. Grossly intact motor function at Elbow, Wrist and Hand. Distal pulses radial and ulnar 2+, brisk cap refill, symmetric.      Xrays: (AP, lateral, oblique) Right elbow ordered and reviewed by me personally today: no evidence of fracture or dislocation.  Osseous structures appear intact.        ASSESSMENT:  Right elbow pain, lateral epicondylitis. RUE numbness       PLAN:  Corwin Twist bar  Stretches instructed by me  NSAIDs  EMG NCV RUE     I made the decision to obtain old records of the patient including previous notes and imaging. New imaging was ordered today of the extremity or extremities evaluated. I independently reviewed and interpreted the radiographs and/or MRIs today as well as prior imaging.

## 2024-04-18 ENCOUNTER — PATIENT MESSAGE (OUTPATIENT)
Dept: NEUROLOGY | Facility: CLINIC | Age: 39
End: 2024-04-18
Payer: COMMERCIAL

## 2024-05-14 ENCOUNTER — PATIENT MESSAGE (OUTPATIENT)
Dept: RHEUMATOLOGY | Facility: CLINIC | Age: 39
End: 2024-05-14
Payer: COMMERCIAL

## 2024-05-14 DIAGNOSIS — M05.79 RHEUMATOID ARTHRITIS INVOLVING MULTIPLE SITES WITH POSITIVE RHEUMATOID FACTOR: Primary | ICD-10-CM

## 2024-05-14 RX ORDER — ADALIMUMAB 40MG/0.8ML
40 KIT SUBCUTANEOUS
Qty: 2 PEN | Refills: 11 | Status: ACTIVE | OUTPATIENT
Start: 2024-05-14 | End: 2024-06-03

## 2024-06-03 DIAGNOSIS — M05.79 RHEUMATOID ARTHRITIS INVOLVING MULTIPLE SITES WITH POSITIVE RHEUMATOID FACTOR: ICD-10-CM

## 2024-06-03 RX ORDER — ETANERCEPT 50 MG/ML
50 SOLUTION SUBCUTANEOUS WEEKLY
Qty: 4 ML | Refills: 11 | Status: ACTIVE | OUTPATIENT
Start: 2024-06-03 | End: 2025-05-05

## 2024-06-11 ENCOUNTER — PATIENT MESSAGE (OUTPATIENT)
Dept: ENDOCRINOLOGY | Facility: CLINIC | Age: 39
End: 2024-06-11
Payer: COMMERCIAL

## 2024-06-11 DIAGNOSIS — E89.0 POSTSURGICAL HYPOTHYROIDISM: Primary | Chronic | ICD-10-CM

## 2024-06-11 DIAGNOSIS — C73 THYROID CANCER: Chronic | ICD-10-CM

## 2024-06-13 ENCOUNTER — LAB VISIT (OUTPATIENT)
Dept: LAB | Facility: HOSPITAL | Age: 39
End: 2024-06-13
Attending: INTERNAL MEDICINE
Payer: COMMERCIAL

## 2024-06-13 DIAGNOSIS — E89.0 POSTSURGICAL HYPOTHYROIDISM: Chronic | ICD-10-CM

## 2024-06-13 DIAGNOSIS — C73 THYROID CANCER: Chronic | ICD-10-CM

## 2024-06-13 LAB — TSH SERPL DL<=0.005 MIU/L-ACNC: 0.4 UIU/ML (ref 0.4–4)

## 2024-06-13 PROCEDURE — 36415 COLL VENOUS BLD VENIPUNCTURE: CPT | Performed by: INTERNAL MEDICINE

## 2024-06-13 PROCEDURE — 84443 ASSAY THYROID STIM HORMONE: CPT | Performed by: INTERNAL MEDICINE

## 2024-06-13 PROCEDURE — 84432 ASSAY OF THYROGLOBULIN: CPT | Performed by: INTERNAL MEDICINE

## 2024-06-14 ENCOUNTER — OFFICE VISIT (OUTPATIENT)
Dept: ENDOCRINOLOGY | Facility: CLINIC | Age: 39
End: 2024-06-14
Payer: COMMERCIAL

## 2024-06-14 VITALS
HEART RATE: 87 BPM | HEIGHT: 57 IN | OXYGEN SATURATION: 99 % | BODY MASS INDEX: 27.56 KG/M2 | SYSTOLIC BLOOD PRESSURE: 120 MMHG | WEIGHT: 127.75 LBS | DIASTOLIC BLOOD PRESSURE: 82 MMHG

## 2024-06-14 DIAGNOSIS — E89.0 POSTSURGICAL HYPOTHYROIDISM: Primary | Chronic | ICD-10-CM

## 2024-06-14 DIAGNOSIS — C73 THYROID CANCER: Chronic | ICD-10-CM

## 2024-06-14 DIAGNOSIS — M05.79 RHEUMATOID ARTHRITIS INVOLVING MULTIPLE SITES WITH POSITIVE RHEUMATOID FACTOR: ICD-10-CM

## 2024-06-14 PROCEDURE — 3079F DIAST BP 80-89 MM HG: CPT | Mod: CPTII,S$GLB,, | Performed by: INTERNAL MEDICINE

## 2024-06-14 PROCEDURE — 3074F SYST BP LT 130 MM HG: CPT | Mod: CPTII,S$GLB,, | Performed by: INTERNAL MEDICINE

## 2024-06-14 PROCEDURE — 99999 PR PBB SHADOW E&M-EST. PATIENT-LVL IV: CPT | Mod: PBBFAC,,, | Performed by: INTERNAL MEDICINE

## 2024-06-14 PROCEDURE — 1159F MED LIST DOCD IN RCRD: CPT | Mod: CPTII,S$GLB,, | Performed by: INTERNAL MEDICINE

## 2024-06-14 PROCEDURE — 1160F RVW MEDS BY RX/DR IN RCRD: CPT | Mod: CPTII,S$GLB,, | Performed by: INTERNAL MEDICINE

## 2024-06-14 PROCEDURE — 99214 OFFICE O/P EST MOD 30 MIN: CPT | Mod: S$GLB,,, | Performed by: INTERNAL MEDICINE

## 2024-06-14 PROCEDURE — 3008F BODY MASS INDEX DOCD: CPT | Mod: CPTII,S$GLB,, | Performed by: INTERNAL MEDICINE

## 2024-06-14 RX ORDER — LEVOTHYROXINE SODIUM 75 UG/1
75 CAPSULE ORAL DAILY
Qty: 90 CAPSULE | Refills: 3 | Status: SHIPPED | OUTPATIENT
Start: 2024-06-14 | End: 2025-06-14

## 2024-06-14 RX ORDER — LIOTHYRONINE SODIUM 5 UG/1
TABLET ORAL
Qty: 90 TABLET | Refills: 3 | Status: SHIPPED | OUTPATIENT
Start: 2024-06-14

## 2024-06-14 NOTE — ASSESSMENT & PLAN NOTE
AJCC stage I disease. LUIS ENRIQUE low risk pathology.    Serum thyroglobulin with mass spec 0.4  No significant findings on neck USS from 11/2023    Would not recommend CISSE ablation at this time    Repeat neck USS in 6 months    Tg pending

## 2024-06-14 NOTE — ASSESSMENT & PLAN NOTE
TSH now at goal    Thyroid symptoms have improved    She feels better on her current regimen then she did on single-agent levothyroxine    Will continue Tirosint at 75 mcg once daily and start liothyronine 2.5 mg twice daily    Goal TSH 0.5-2.0, but aim to keep closer to lower end of reference range    Repeat TSH in 5-6 weeks

## 2024-06-14 NOTE — PROGRESS NOTES
"Subjective:      Patient ID: Dina Hutton is a 38 y.o. female.    Chief Complaint:  Hypothyroidism      History of Present Illness  Dina Hutton is a 38 y.o. female who is here for follow-up of papillary thyroid cancer and postsurgical hypothyroidism.      Last visit in 11/2023.      No significant medical changes since last visit. Getting ready to start Enbrel for seronegative arthritis.      With regards to the papillary thyroid cancer and postsurgical hypothyroidism:  5/3/2021:     Remains on the same contraceptive patch.      Current dose of thyroid hormone is Tirosint 75 mcg once daily and liothyronine 2.5 mcg twice daily.     Feels like energy is better on the current regimen.   Occasional palpitations.       Postop ultrasound in 6/2021:  1.  Thyroid gland is surgically absent without evidence of residual/recurrent thyroid tissue in the surgical beds.     2.  No abnormal appearing lymph nodes are identified.        Neck ultrasound from 11/2023 with no new or suspicious findings.       Latest Reference Range & Units 12/21/20 09:24 03/05/21 11:21 10/04/21 09:19 01/27/22 10:06 11/27/23 08:31   Thyroglobulin Antibody Screen <1.8 IU/mL     <1.8   Thyroglobulin, Tumor Marker ng/mL     0.4 (H)   Thyroglobulin, LC/MS/MS ng/mL 0.6 (H) 0.5 (H) <0.2 0.4 (H)         8AM cortisol was 25.4 ruling out adrenal insufficiency.        Background history:  Left-sided thyroid nodule was initially discovered on CTA of the chest done in May, 2019 when she was evaluated in the emergency room for chest pain.   "There is a 1.3 cm hypoattenuating left thyroid lobe nodule.  The remaining visualized soft tissue structures at the base of the neck are unremarkable."     She had a follow-up ultrasound done on June 5th, which showed a solid, hypoechoic left thyroid nodule with "punctate peripheral calcifications" and circumscribed borders (see report below).      She underwent FNA on 1/2/2020, which came back [place] VI (Malignant).  Because " she was pregnant at the time in the 3rd trimester, the decision was made to hold off on surgery until after she delivered.  She underwent total thyroidectomy on 9/29/2020:           Review of Systems   Constitutional:  Negative for chills and fever.   Gastrointestinal:  Negative for nausea.       Objective:   Physical Exam  Vitals and nursing note reviewed.     No thyroid tissue palpated  No cervical adenopathy    BP Readings from Last 3 Encounters:   06/14/24 120/82   04/17/24 117/78   03/27/24 100/60     Wt Readings from Last 1 Encounters:   06/14/24 0911 58 kg (127 lb 12.1 oz)       Body mass index is 27.65 kg/m².    Lab Review:   Lab Results   Component Value Date    HGBA1C 5.6 07/28/2021     Lab Results   Component Value Date    CHOL 295 (H) 07/28/2021    HDL 79 07/28/2021    LDLCALC 175 (H) 07/28/2021    TRIG 225 (H) 07/28/2021     Lab Results   Component Value Date     03/04/2024    K 3.4 (L) 03/04/2024     03/04/2024    CO2 21 (L) 03/04/2024     (H) 03/04/2024    BUN 12 03/04/2024    CREATININE 0.7 03/04/2024    CALCIUM 9.0 03/04/2024    PROT 7.1 03/04/2024    ALBUMIN 3.3 (L) 03/04/2024    BILITOT 0.2 03/04/2024    ALKPHOS 59 03/04/2024    AST 13 03/04/2024    ALT 14 03/04/2024    ANIONGAP 7 (L) 03/04/2024    ESTGFRAFRICA >60 02/18/2022    EGFRNONAA >60 02/18/2022    TSH 0.403 06/13/2024         Assessment and Plan     Postsurgical hypothyroidism  TSH now at goal    Thyroid symptoms have improved    She feels better on her current regimen then she did on single-agent levothyroxine    Will continue Tirosint at 75 mcg once daily and start liothyronine 2.5 mg twice daily    Goal TSH 0.5-2.0, but aim to keep closer to lower end of reference range    Repeat TSH in 5-6 weeks    Thyroid cancer  AJCC stage I disease. LUIS ENRIQUE low risk pathology.    Serum thyroglobulin with mass spec 0.4  No significant findings on neck USS from 11/2023    Would not recommend CISSE ablation at this time    Repeat neck USS  in 6 months    Tg pending      Rheumatoid arthritis involving multiple sites with positive rheumatoid factor  --Will be starting Enbrel soon      Needs follow up in 6 months with TSH, thyroglobulin and ultrasound on [facility] prior to appointment      Kei Gold M.D. Staff Endocrinology

## 2024-06-17 LAB
THRYOGLOBULIN INTERPRETATION: ABNORMAL
THYROGLOB AB SERPL-ACNC: <1.8 IU/ML
THYROGLOB SERPL-MCNC: 0.5 NG/ML

## 2024-06-19 ENCOUNTER — TELEPHONE (OUTPATIENT)
Dept: NEUROLOGY | Facility: CLINIC | Age: 39
End: 2024-06-19
Payer: COMMERCIAL

## 2024-07-11 ENCOUNTER — PATIENT MESSAGE (OUTPATIENT)
Dept: RHEUMATOLOGY | Facility: CLINIC | Age: 39
End: 2024-07-11
Payer: COMMERCIAL

## 2024-08-06 ENCOUNTER — LAB VISIT (OUTPATIENT)
Dept: LAB | Facility: HOSPITAL | Age: 39
End: 2024-08-06
Attending: INTERNAL MEDICINE
Payer: COMMERCIAL

## 2024-08-06 ENCOUNTER — OFFICE VISIT (OUTPATIENT)
Dept: RHEUMATOLOGY | Facility: CLINIC | Age: 39
End: 2024-08-06
Payer: COMMERCIAL

## 2024-08-06 VITALS
SYSTOLIC BLOOD PRESSURE: 105 MMHG | BODY MASS INDEX: 28.15 KG/M2 | HEART RATE: 80 BPM | HEIGHT: 57 IN | WEIGHT: 130.5 LBS | DIASTOLIC BLOOD PRESSURE: 73 MMHG

## 2024-08-06 DIAGNOSIS — K21.9 GASTROESOPHAGEAL REFLUX DISEASE, UNSPECIFIED WHETHER ESOPHAGITIS PRESENT: ICD-10-CM

## 2024-08-06 DIAGNOSIS — Z79.899 DRUG-INDUCED IMMUNODEFICIENCY: ICD-10-CM

## 2024-08-06 DIAGNOSIS — D84.821 DRUG-INDUCED IMMUNODEFICIENCY: ICD-10-CM

## 2024-08-06 DIAGNOSIS — M13.80 SERONEGATIVE ARTHRITIS: ICD-10-CM

## 2024-08-06 DIAGNOSIS — M05.79 RHEUMATOID ARTHRITIS INVOLVING MULTIPLE SITES WITH POSITIVE RHEUMATOID FACTOR: Primary | ICD-10-CM

## 2024-08-06 LAB
25(OH)D3+25(OH)D2 SERPL-MCNC: 23 NG/ML (ref 30–96)
ALBUMIN SERPL BCP-MCNC: 3.3 G/DL (ref 3.5–5.2)
ALBUMIN SERPL BCP-MCNC: 3.3 G/DL (ref 3.5–5.2)
ALP SERPL-CCNC: 46 U/L (ref 55–135)
ALP SERPL-CCNC: 46 U/L (ref 55–135)
ALT SERPL W/O P-5'-P-CCNC: 10 U/L (ref 10–44)
ALT SERPL W/O P-5'-P-CCNC: 10 U/L (ref 10–44)
ANION GAP SERPL CALC-SCNC: 9 MMOL/L (ref 8–16)
ANION GAP SERPL CALC-SCNC: 9 MMOL/L (ref 8–16)
AST SERPL-CCNC: 12 U/L (ref 10–40)
AST SERPL-CCNC: 12 U/L (ref 10–40)
BASOPHILS # BLD AUTO: 0.07 K/UL (ref 0–0.2)
BASOPHILS # BLD AUTO: 0.07 K/UL (ref 0–0.2)
BASOPHILS NFR BLD: 0.7 % (ref 0–1.9)
BASOPHILS NFR BLD: 0.7 % (ref 0–1.9)
BILIRUB SERPL-MCNC: 0.2 MG/DL (ref 0.1–1)
BILIRUB SERPL-MCNC: 0.2 MG/DL (ref 0.1–1)
BUN SERPL-MCNC: 10 MG/DL (ref 6–20)
BUN SERPL-MCNC: 10 MG/DL (ref 6–20)
CALCIUM SERPL-MCNC: 8.9 MG/DL (ref 8.7–10.5)
CALCIUM SERPL-MCNC: 8.9 MG/DL (ref 8.7–10.5)
CHLORIDE SERPL-SCNC: 106 MMOL/L (ref 95–110)
CHLORIDE SERPL-SCNC: 106 MMOL/L (ref 95–110)
CO2 SERPL-SCNC: 22 MMOL/L (ref 23–29)
CO2 SERPL-SCNC: 22 MMOL/L (ref 23–29)
CREAT SERPL-MCNC: 0.7 MG/DL (ref 0.5–1.4)
CREAT SERPL-MCNC: 0.7 MG/DL (ref 0.5–1.4)
CRP SERPL-MCNC: 15.4 MG/L (ref 0–8.2)
DIFFERENTIAL METHOD BLD: ABNORMAL
DIFFERENTIAL METHOD BLD: ABNORMAL
EOSINOPHIL # BLD AUTO: 0.2 K/UL (ref 0–0.5)
EOSINOPHIL # BLD AUTO: 0.2 K/UL (ref 0–0.5)
EOSINOPHIL NFR BLD: 2.4 % (ref 0–8)
EOSINOPHIL NFR BLD: 2.4 % (ref 0–8)
ERYTHROCYTE [DISTWIDTH] IN BLOOD BY AUTOMATED COUNT: 13.1 % (ref 11.5–14.5)
ERYTHROCYTE [DISTWIDTH] IN BLOOD BY AUTOMATED COUNT: 13.1 % (ref 11.5–14.5)
ERYTHROCYTE [SEDIMENTATION RATE] IN BLOOD BY PHOTOMETRIC METHOD: 67 MM/HR (ref 0–36)
EST. GFR  (NO RACE VARIABLE): >60 ML/MIN/1.73 M^2
EST. GFR  (NO RACE VARIABLE): >60 ML/MIN/1.73 M^2
GLUCOSE SERPL-MCNC: 99 MG/DL (ref 70–110)
GLUCOSE SERPL-MCNC: 99 MG/DL (ref 70–110)
HBV SURFACE AG SERPL QL IA: NORMAL
HCT VFR BLD AUTO: 42.9 % (ref 37–48.5)
HCT VFR BLD AUTO: 42.9 % (ref 37–48.5)
HEPATITIS B VIRUS DNA: NORMAL
HEPATITIS B VIRUS PCR, QUANT: NOT DETECTED IU/ML
HGB BLD-MCNC: 13.1 G/DL (ref 12–16)
HGB BLD-MCNC: 13.1 G/DL (ref 12–16)
IMM GRANULOCYTES # BLD AUTO: 0.04 K/UL (ref 0–0.04)
IMM GRANULOCYTES # BLD AUTO: 0.04 K/UL (ref 0–0.04)
IMM GRANULOCYTES NFR BLD AUTO: 0.4 % (ref 0–0.5)
IMM GRANULOCYTES NFR BLD AUTO: 0.4 % (ref 0–0.5)
LYMPHOCYTES # BLD AUTO: 1.5 K/UL (ref 1–4.8)
LYMPHOCYTES # BLD AUTO: 1.5 K/UL (ref 1–4.8)
LYMPHOCYTES NFR BLD: 16.3 % (ref 18–48)
LYMPHOCYTES NFR BLD: 16.3 % (ref 18–48)
MCH RBC QN AUTO: 28.2 PG (ref 27–31)
MCH RBC QN AUTO: 28.2 PG (ref 27–31)
MCHC RBC AUTO-ENTMCNC: 30.5 G/DL (ref 32–36)
MCHC RBC AUTO-ENTMCNC: 30.5 G/DL (ref 32–36)
MCV RBC AUTO: 92 FL (ref 82–98)
MCV RBC AUTO: 92 FL (ref 82–98)
MONOCYTES # BLD AUTO: 0.5 K/UL (ref 0.3–1)
MONOCYTES # BLD AUTO: 0.5 K/UL (ref 0.3–1)
MONOCYTES NFR BLD: 5.3 % (ref 4–15)
MONOCYTES NFR BLD: 5.3 % (ref 4–15)
NEUTROPHILS # BLD AUTO: 7 K/UL (ref 1.8–7.7)
NEUTROPHILS # BLD AUTO: 7 K/UL (ref 1.8–7.7)
NEUTROPHILS NFR BLD: 74.9 % (ref 38–73)
NEUTROPHILS NFR BLD: 74.9 % (ref 38–73)
NRBC BLD-RTO: 0 /100 WBC
NRBC BLD-RTO: 0 /100 WBC
PLATELET # BLD AUTO: 239 K/UL (ref 150–450)
PLATELET # BLD AUTO: 239 K/UL (ref 150–450)
PMV BLD AUTO: 10.4 FL (ref 9.2–12.9)
PMV BLD AUTO: 10.4 FL (ref 9.2–12.9)
POTASSIUM SERPL-SCNC: 4 MMOL/L (ref 3.5–5.1)
POTASSIUM SERPL-SCNC: 4 MMOL/L (ref 3.5–5.1)
PROT SERPL-MCNC: 7.2 G/DL (ref 6–8.4)
PROT SERPL-MCNC: 7.2 G/DL (ref 6–8.4)
RBC # BLD AUTO: 4.65 M/UL (ref 4–5.4)
RBC # BLD AUTO: 4.65 M/UL (ref 4–5.4)
SODIUM SERPL-SCNC: 137 MMOL/L (ref 136–145)
SODIUM SERPL-SCNC: 137 MMOL/L (ref 136–145)
WBC # BLD AUTO: 9.4 K/UL (ref 3.9–12.7)
WBC # BLD AUTO: 9.4 K/UL (ref 3.9–12.7)

## 2024-08-06 PROCEDURE — 99214 OFFICE O/P EST MOD 30 MIN: CPT | Mod: S$GLB,,, | Performed by: INTERNAL MEDICINE

## 2024-08-06 PROCEDURE — 87340 HEPATITIS B SURFACE AG IA: CPT | Performed by: INTERNAL MEDICINE

## 2024-08-06 PROCEDURE — 3074F SYST BP LT 130 MM HG: CPT | Mod: CPTII,S$GLB,, | Performed by: INTERNAL MEDICINE

## 2024-08-06 PROCEDURE — 36415 COLL VENOUS BLD VENIPUNCTURE: CPT | Performed by: INTERNAL MEDICINE

## 2024-08-06 PROCEDURE — 86140 C-REACTIVE PROTEIN: CPT | Performed by: INTERNAL MEDICINE

## 2024-08-06 PROCEDURE — 80053 COMPREHEN METABOLIC PANEL: CPT | Performed by: INTERNAL MEDICINE

## 2024-08-06 PROCEDURE — 82306 VITAMIN D 25 HYDROXY: CPT | Performed by: INTERNAL MEDICINE

## 2024-08-06 PROCEDURE — 3078F DIAST BP <80 MM HG: CPT | Mod: CPTII,S$GLB,, | Performed by: INTERNAL MEDICINE

## 2024-08-06 PROCEDURE — 99999 PR PBB SHADOW E&M-EST. PATIENT-LVL III: CPT | Mod: PBBFAC,,, | Performed by: INTERNAL MEDICINE

## 2024-08-06 PROCEDURE — 85025 COMPLETE CBC W/AUTO DIFF WBC: CPT | Performed by: INTERNAL MEDICINE

## 2024-08-06 PROCEDURE — G2211 COMPLEX E/M VISIT ADD ON: HCPCS | Mod: S$GLB,,, | Performed by: INTERNAL MEDICINE

## 2024-08-06 PROCEDURE — 85652 RBC SED RATE AUTOMATED: CPT | Performed by: INTERNAL MEDICINE

## 2024-08-06 PROCEDURE — 3008F BODY MASS INDEX DOCD: CPT | Mod: CPTII,S$GLB,, | Performed by: INTERNAL MEDICINE

## 2024-08-06 PROCEDURE — 87517 HEPATITIS B DNA QUANT: CPT | Performed by: INTERNAL MEDICINE

## 2024-08-06 PROCEDURE — 1159F MED LIST DOCD IN RCRD: CPT | Mod: CPTII,S$GLB,, | Performed by: INTERNAL MEDICINE

## 2024-08-06 RX ORDER — FOLIC ACID 1 MG/1
1 TABLET ORAL DAILY
Qty: 30 TABLET | Refills: 4 | Status: SHIPPED | OUTPATIENT
Start: 2024-08-06 | End: 2025-01-03

## 2024-08-06 RX ORDER — OMEPRAZOLE 20 MG/1
40 CAPSULE, DELAYED RELEASE ORAL DAILY
Qty: 30 CAPSULE | Refills: 5 | Status: SHIPPED | OUTPATIENT
Start: 2024-08-06 | End: 2024-11-04

## 2024-08-06 RX ORDER — METHOTREXATE 2.5 MG/1
TABLET ORAL
Qty: 35 TABLET | Refills: 3 | Status: SHIPPED | OUTPATIENT
Start: 2024-08-06

## 2024-08-11 ENCOUNTER — PATIENT MESSAGE (OUTPATIENT)
Dept: RHEUMATOLOGY | Facility: CLINIC | Age: 39
End: 2024-08-11
Payer: COMMERCIAL

## 2024-09-17 ENCOUNTER — LAB VISIT (OUTPATIENT)
Dept: LAB | Facility: HOSPITAL | Age: 39
End: 2024-09-17
Attending: INTERNAL MEDICINE
Payer: COMMERCIAL

## 2024-09-17 DIAGNOSIS — M13.80 SERONEGATIVE ARTHRITIS: ICD-10-CM

## 2024-09-17 LAB
ALBUMIN SERPL BCP-MCNC: 3.4 G/DL (ref 3.5–5.2)
ALP SERPL-CCNC: 59 U/L (ref 55–135)
ALT SERPL W/O P-5'-P-CCNC: 11 U/L (ref 10–44)
ANION GAP SERPL CALC-SCNC: 10 MMOL/L (ref 8–16)
AST SERPL-CCNC: 15 U/L (ref 10–40)
BASOPHILS # BLD AUTO: 0.04 K/UL (ref 0–0.2)
BASOPHILS NFR BLD: 0.3 % (ref 0–1.9)
BILIRUB SERPL-MCNC: 0.3 MG/DL (ref 0.1–1)
BUN SERPL-MCNC: 12 MG/DL (ref 6–20)
CALCIUM SERPL-MCNC: 8.9 MG/DL (ref 8.7–10.5)
CHLORIDE SERPL-SCNC: 105 MMOL/L (ref 95–110)
CO2 SERPL-SCNC: 22 MMOL/L (ref 23–29)
CREAT SERPL-MCNC: 0.6 MG/DL (ref 0.5–1.4)
DIFFERENTIAL METHOD BLD: ABNORMAL
EOSINOPHIL # BLD AUTO: 0.2 K/UL (ref 0–0.5)
EOSINOPHIL NFR BLD: 1.7 % (ref 0–8)
ERYTHROCYTE [DISTWIDTH] IN BLOOD BY AUTOMATED COUNT: 12.8 % (ref 11.5–14.5)
EST. GFR  (NO RACE VARIABLE): >60 ML/MIN/1.73 M^2
GLUCOSE SERPL-MCNC: 93 MG/DL (ref 70–110)
HCT VFR BLD AUTO: 39.7 % (ref 37–48.5)
HGB BLD-MCNC: 13.4 G/DL (ref 12–16)
IMM GRANULOCYTES # BLD AUTO: 0.03 K/UL (ref 0–0.04)
IMM GRANULOCYTES NFR BLD AUTO: 0.2 % (ref 0–0.5)
LYMPHOCYTES # BLD AUTO: 1.9 K/UL (ref 1–4.8)
LYMPHOCYTES NFR BLD: 14.3 % (ref 18–48)
MCH RBC QN AUTO: 28.5 PG (ref 27–31)
MCHC RBC AUTO-ENTMCNC: 33.8 G/DL (ref 32–36)
MCV RBC AUTO: 85 FL (ref 82–98)
MONOCYTES # BLD AUTO: 0.7 K/UL (ref 0.3–1)
MONOCYTES NFR BLD: 4.9 % (ref 4–15)
NEUTROPHILS # BLD AUTO: 10.3 K/UL (ref 1.8–7.7)
NEUTROPHILS NFR BLD: 78.6 % (ref 38–73)
NRBC BLD-RTO: 0 /100 WBC
PLATELET # BLD AUTO: 281 K/UL (ref 150–450)
PMV BLD AUTO: 9.4 FL (ref 9.2–12.9)
POTASSIUM SERPL-SCNC: 4 MMOL/L (ref 3.5–5.1)
PROT SERPL-MCNC: 7.2 G/DL (ref 6–8.4)
RBC # BLD AUTO: 4.7 M/UL (ref 4–5.4)
SODIUM SERPL-SCNC: 137 MMOL/L (ref 136–145)
WBC # BLD AUTO: 13.16 K/UL (ref 3.9–12.7)

## 2024-09-17 PROCEDURE — 80053 COMPREHEN METABOLIC PANEL: CPT | Performed by: INTERNAL MEDICINE

## 2024-09-17 PROCEDURE — 85025 COMPLETE CBC W/AUTO DIFF WBC: CPT | Performed by: INTERNAL MEDICINE

## 2024-09-17 PROCEDURE — 36415 COLL VENOUS BLD VENIPUNCTURE: CPT | Performed by: INTERNAL MEDICINE

## 2024-09-27 ENCOUNTER — OFFICE VISIT (OUTPATIENT)
Dept: PRIMARY CARE CLINIC | Facility: CLINIC | Age: 39
End: 2024-09-27
Payer: COMMERCIAL

## 2024-09-27 ENCOUNTER — LAB VISIT (OUTPATIENT)
Dept: LAB | Facility: HOSPITAL | Age: 39
End: 2024-09-27
Attending: STUDENT IN AN ORGANIZED HEALTH CARE EDUCATION/TRAINING PROGRAM
Payer: COMMERCIAL

## 2024-09-27 VITALS
WEIGHT: 129.88 LBS | HEART RATE: 100 BPM | DIASTOLIC BLOOD PRESSURE: 80 MMHG | OXYGEN SATURATION: 98 % | BODY MASS INDEX: 28.1 KG/M2 | SYSTOLIC BLOOD PRESSURE: 122 MMHG

## 2024-09-27 DIAGNOSIS — E89.0 POSTSURGICAL HYPOTHYROIDISM: Chronic | ICD-10-CM

## 2024-09-27 DIAGNOSIS — M05.79 RHEUMATOID ARTHRITIS INVOLVING MULTIPLE SITES WITH POSITIVE RHEUMATOID FACTOR: Primary | ICD-10-CM

## 2024-09-27 DIAGNOSIS — B18.1 CHRONIC VIRAL HEPATITIS B WITHOUT DELTA AGENT AND WITHOUT COMA: ICD-10-CM

## 2024-09-27 DIAGNOSIS — Z79.899 OTHER LONG TERM (CURRENT) DRUG THERAPY: ICD-10-CM

## 2024-09-27 DIAGNOSIS — R14.0 ABDOMINAL BLOATING: ICD-10-CM

## 2024-09-27 LAB
ALBUMIN SERPL BCP-MCNC: 3.3 G/DL (ref 3.5–5.2)
ALP SERPL-CCNC: 58 U/L (ref 55–135)
ALT SERPL W/O P-5'-P-CCNC: 8 U/L (ref 10–44)
ANION GAP SERPL CALC-SCNC: 8 MMOL/L (ref 8–16)
AST SERPL-CCNC: 16 U/L (ref 10–40)
BASOPHILS # BLD AUTO: 0.05 K/UL (ref 0–0.2)
BASOPHILS NFR BLD: 0.4 % (ref 0–1.9)
BILIRUB SERPL-MCNC: 0.3 MG/DL (ref 0.1–1)
BUN SERPL-MCNC: 11 MG/DL (ref 6–20)
CALCIUM SERPL-MCNC: 9.1 MG/DL (ref 8.7–10.5)
CHLORIDE SERPL-SCNC: 108 MMOL/L (ref 95–110)
CHOLEST SERPL-MCNC: 223 MG/DL (ref 120–199)
CHOLEST/HDLC SERPL: 3.3 {RATIO} (ref 2–5)
CO2 SERPL-SCNC: 22 MMOL/L (ref 23–29)
CREAT SERPL-MCNC: 0.7 MG/DL (ref 0.5–1.4)
DIFFERENTIAL METHOD BLD: ABNORMAL
EOSINOPHIL # BLD AUTO: 0.2 K/UL (ref 0–0.5)
EOSINOPHIL NFR BLD: 1.2 % (ref 0–8)
ERYTHROCYTE [DISTWIDTH] IN BLOOD BY AUTOMATED COUNT: 13.2 % (ref 11.5–14.5)
EST. GFR  (NO RACE VARIABLE): >60 ML/MIN/1.73 M^2
ESTIMATED AVG GLUCOSE: 111 MG/DL (ref 68–131)
GLUCOSE SERPL-MCNC: 96 MG/DL (ref 70–110)
HBA1C MFR BLD: 5.5 % (ref 4–5.6)
HCT VFR BLD AUTO: 39.2 % (ref 37–48.5)
HDLC SERPL-MCNC: 67 MG/DL (ref 40–75)
HDLC SERPL: 30 % (ref 20–50)
HGB BLD-MCNC: 12.7 G/DL (ref 12–16)
IMM GRANULOCYTES # BLD AUTO: 0.04 K/UL (ref 0–0.04)
IMM GRANULOCYTES NFR BLD AUTO: 0.3 % (ref 0–0.5)
LDLC SERPL CALC-MCNC: 126.8 MG/DL (ref 63–159)
LYMPHOCYTES # BLD AUTO: 1.7 K/UL (ref 1–4.8)
LYMPHOCYTES NFR BLD: 12.8 % (ref 18–48)
MCH RBC QN AUTO: 27.9 PG (ref 27–31)
MCHC RBC AUTO-ENTMCNC: 32.4 G/DL (ref 32–36)
MCV RBC AUTO: 86 FL (ref 82–98)
MONOCYTES # BLD AUTO: 0.7 K/UL (ref 0.3–1)
MONOCYTES NFR BLD: 5.1 % (ref 4–15)
NEUTROPHILS # BLD AUTO: 10.4 K/UL (ref 1.8–7.7)
NEUTROPHILS NFR BLD: 80.2 % (ref 38–73)
NONHDLC SERPL-MCNC: 156 MG/DL
NRBC BLD-RTO: 0 /100 WBC
PLATELET # BLD AUTO: 348 K/UL (ref 150–450)
PMV BLD AUTO: 10.1 FL (ref 9.2–12.9)
POTASSIUM SERPL-SCNC: 4.4 MMOL/L (ref 3.5–5.1)
PROT SERPL-MCNC: 7.4 G/DL (ref 6–8.4)
RBC # BLD AUTO: 4.56 M/UL (ref 4–5.4)
SODIUM SERPL-SCNC: 138 MMOL/L (ref 136–145)
T4 FREE SERPL-MCNC: 0.92 NG/DL (ref 0.71–1.51)
TRIGL SERPL-MCNC: 146 MG/DL (ref 30–150)
TSH SERPL DL<=0.005 MIU/L-ACNC: 0.38 UIU/ML (ref 0.4–4)
WBC # BLD AUTO: 13.03 K/UL (ref 3.9–12.7)

## 2024-09-27 PROCEDURE — 99999 PR PBB SHADOW E&M-EST. PATIENT-LVL IV: CPT | Mod: PBBFAC,,, | Performed by: STUDENT IN AN ORGANIZED HEALTH CARE EDUCATION/TRAINING PROGRAM

## 2024-09-27 PROCEDURE — 1160F RVW MEDS BY RX/DR IN RCRD: CPT | Mod: CPTII,S$GLB,, | Performed by: STUDENT IN AN ORGANIZED HEALTH CARE EDUCATION/TRAINING PROGRAM

## 2024-09-27 PROCEDURE — 84439 ASSAY OF FREE THYROXINE: CPT | Performed by: STUDENT IN AN ORGANIZED HEALTH CARE EDUCATION/TRAINING PROGRAM

## 2024-09-27 PROCEDURE — 84443 ASSAY THYROID STIM HORMONE: CPT | Performed by: STUDENT IN AN ORGANIZED HEALTH CARE EDUCATION/TRAINING PROGRAM

## 2024-09-27 PROCEDURE — 36415 COLL VENOUS BLD VENIPUNCTURE: CPT | Performed by: STUDENT IN AN ORGANIZED HEALTH CARE EDUCATION/TRAINING PROGRAM

## 2024-09-27 PROCEDURE — 3074F SYST BP LT 130 MM HG: CPT | Mod: CPTII,S$GLB,, | Performed by: STUDENT IN AN ORGANIZED HEALTH CARE EDUCATION/TRAINING PROGRAM

## 2024-09-27 PROCEDURE — 83036 HEMOGLOBIN GLYCOSYLATED A1C: CPT | Performed by: STUDENT IN AN ORGANIZED HEALTH CARE EDUCATION/TRAINING PROGRAM

## 2024-09-27 PROCEDURE — 80053 COMPREHEN METABOLIC PANEL: CPT | Performed by: STUDENT IN AN ORGANIZED HEALTH CARE EDUCATION/TRAINING PROGRAM

## 2024-09-27 PROCEDURE — 80061 LIPID PANEL: CPT | Performed by: STUDENT IN AN ORGANIZED HEALTH CARE EDUCATION/TRAINING PROGRAM

## 2024-09-27 PROCEDURE — 3044F HG A1C LEVEL LT 7.0%: CPT | Mod: CPTII,S$GLB,, | Performed by: STUDENT IN AN ORGANIZED HEALTH CARE EDUCATION/TRAINING PROGRAM

## 2024-09-27 PROCEDURE — 3008F BODY MASS INDEX DOCD: CPT | Mod: CPTII,S$GLB,, | Performed by: STUDENT IN AN ORGANIZED HEALTH CARE EDUCATION/TRAINING PROGRAM

## 2024-09-27 PROCEDURE — 1159F MED LIST DOCD IN RCRD: CPT | Mod: CPTII,S$GLB,, | Performed by: STUDENT IN AN ORGANIZED HEALTH CARE EDUCATION/TRAINING PROGRAM

## 2024-09-27 PROCEDURE — 99214 OFFICE O/P EST MOD 30 MIN: CPT | Mod: S$GLB,,, | Performed by: STUDENT IN AN ORGANIZED HEALTH CARE EDUCATION/TRAINING PROGRAM

## 2024-09-27 PROCEDURE — 3079F DIAST BP 80-89 MM HG: CPT | Mod: CPTII,S$GLB,, | Performed by: STUDENT IN AN ORGANIZED HEALTH CARE EDUCATION/TRAINING PROGRAM

## 2024-09-27 PROCEDURE — 85025 COMPLETE CBC W/AUTO DIFF WBC: CPT | Performed by: STUDENT IN AN ORGANIZED HEALTH CARE EDUCATION/TRAINING PROGRAM

## 2024-09-27 NOTE — PROGRESS NOTES
SUBJECTIVE     Chief Complaint   Patient presents with    Follow-up       HPI  Dina Hutton is a very pleasant 39 y.o. female with history of thyroid cancer (in remission), RA, and depression that presents for follow up.      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  -In remission  -On levothyroxine 88mcg daily  Tolerating medication well.  Denies symptoms of Fatigue, Cold intolerance, Weight gain, Constipation, Dry skin, Myalgia  Due for TSH recheck.    Chronic viral hepatitis B without delta agent and without coma    Abdominal bloating- ongoing x several months  Does not notice any specific foods that make it better /worse  No n/v/d  Regular bms  Occasional epigastric pain  P requests referral to GI    PAST MEDICAL HISTORY:  Past Medical History:   Diagnosis Date    Anemia     Breast abscess 04/25/2020    Depression     GERD (gastroesophageal reflux disease)     GERD (gastroesophageal reflux disease)     History of fourth degree perineal laceration 08/21/2019    Hypoglycemia     Hypoglycemia     Mastitis 04/20/2020    Mental disorder     depression    PONV (postoperative nausea and vomiting)     Thyroid cancer     Thyroid disease        PAST SURGICAL HISTORY:  Past Surgical History:   Procedure Laterality Date    BREAST CYST EXCISION      COLONOSCOPY      ESOPHAGOGASTRODUODENOSCOPY      ESOPHAGOGASTRODUODENOSCOPY N/A 06/14/2021    Procedure: EGD (ESOPHAGOGASTRODUODENOSCOPY);  Surgeon: Farooq Cullen MD;  Location: [facility]  ENDO;  Service: Endoscopy;  Laterality: N/A;  ongoing epigastric pain, burning, nausea, vomiting  Lives on [facility], want first available but if there are openings on , would prefer that location  cOVID test on 6/11/21 at [facility]    INCISION AND DRAINAGE OF BREAST Left 04/23/2020    Procedure: INCISION AND DRAINAGE, BREAST;  Surgeon: Mason Rubalcava III, MD;  Location: [facility] OR;  Service: General;  Laterality: Left;    THYROIDECTOMY Bilateral 09/29/2020    Procedure: THYROIDECTOMY with central neck dissection;  Surgeon: Kayla Lovelace MD;  Location: [facility] OR;  Service: General;  Laterality: Bilateral;       SOCIAL HISTORY:  Social History     Socioeconomic History    Marital status:    Tobacco Use    Smoking status: Never    Smokeless tobacco: Never    Tobacco comments:     Allergies to it   Substance and Sexual Activity    Alcohol use: Yes     Alcohol/week: 1.0 standard drink of alcohol     Types: 1 Glasses of wine per week     Comment: On special occasions or events    Drug use: Never    Sexual activity: Yes     Partners: Male     Birth control/protection: Partner-Vasectomy, Patch     Social Drivers of Health     Financial Resource Strain: Medium Risk (12/27/2023)    Overall Financial Resource Strain (CARDIA)     Difficulty of Paying Living Expenses: Somewhat hard   Food Insecurity: Unknown (12/27/2023)    Hunger Vital Sign     Worried About Running Out of Food in the Last Year: Never true     Ran Out of Food in the Last Year: Patient declined   Transportation Needs: Unknown (12/27/2023)    PRAPARE - Transportation     Lack of Transportation (Medical): No     Lack of Transportation (Non-Medical): Patient declined   Physical Activity: Inactive (12/27/2023)    Exercise Vital Sign     Days of Exercise per Week: 0 days     Minutes of Exercise per Session: 0 min   Stress: Stress Concern Present (12/27/2023)    [employer] [place] [employer] - Occupational Stress Questionnaire     Feeling  of Stress : To some extent   Housing Stability: High Risk (12/27/2023)    Housing Stability Vital Sign     Unable to Pay for Housing in the Last Year: Yes     Number of Places Lived in the Last Year: 1     Unstable Housing in the Last Year: No       FAMILY HISTORY:  Family History   Problem Relation Name Age of Onset    Hypertension Mother Harriet Le     Hyperlipidemia Mother Harriet Le     Stroke Mother Harriet Le     Arthritis Maternal Grandmother Grandmother     Mental illness Maternal Grandmother Grandmother         Dementia & Alzheimer    Cancer Neg Hx         ALLERGIES AND MEDICATIONS: updated and reviewed.  Review of patient's allergies indicates:   Allergen Reactions    Enbrel [etanercept] Swelling and Rash    Humira [adalimumab] Itching and Other (See Comments)     Body-wide burning, itching, numbness, tingling, and arthralgia ~30 min after injection    Lansoprazole Shortness Of Breath    Vancomycin analogues Hives    Bactrim [sulfamethoxazole-trimethoprim] Nausea And Vomiting    Sulfa (sulfonamide antibiotics) Rash    Vicodin [hydrocodone-acetaminophen] Nausea Only     Patient states she is fine with other pain medication     Current Outpatient Medications   Medication Sig Dispense Refill    albuterol (PROVENTIL HFA) 90 mcg/actuation inhaler Inhale 2 puffs into the lungs every 6 (six) hours as needed for Wheezing or Shortness of Breath. Rescue 18 g 0    ascorbic acid/collagen hydr (COLLAGEN PLUS VITAMIN C ORAL) Take by mouth.      CALCIUM ORAL Take by mouth.      CRANBERRY ORAL Take by mouth.      folic acid (FOLVITE) 1 MG tablet Take 1 tablet (1 mg total) by mouth once daily. 30 tablet 4    levothyroxine (TIROSINT) 75 mcg Cap Take 75 mcg by mouth once daily. Dispense as generic levothyroxine capsules 90 capsule 3    liothyronine (CYTOMEL) 5 MCG Tab Take a half-tablet (2.5 mcg) by mouth twice daily 90 tablet 3    methotrexate 2.5 MG Tab 5 tabs weekly for 4 weeks and then 7 tabs weekly 35 tablet 3     mv-min/iron/folic/calcium/vitK (WOMEN'S MULTIVITAMIN ORAL) Take by mouth.      norelgestromin-ethinyl estradiol (XULANE) 150-35 mcg/24 hr Place 1 patch onto the skin once a week. 12 patch 3    omeprazole (PRILOSEC) 20 MG capsule Take 2 capsules (40 mg total) by mouth once daily. 30 capsule 5    ondansetron (ZOFRAN-ODT) 4 MG TbDL Take 1 tablet (4 mg total) by mouth every 6 (six) hours as needed (nausea or vomiting). 15 tablet 0     No current facility-administered medications for this visit.       ROS  Review of Systems   Constitutional:  Negative for fever and weight loss.   HENT:  Negative for ear pain, hearing loss and sore throat.    Eyes:  Negative for discharge.   Respiratory:  Negative for cough, hemoptysis, sputum production, shortness of breath and wheezing.    Cardiovascular:  Negative for chest pain, palpitations, orthopnea, claudication, leg swelling and PND.   Gastrointestinal:  Negative for abdominal pain, blood in stool, constipation, diarrhea, nausea and vomiting.   Genitourinary:  Negative for dysuria and hematuria.   Musculoskeletal:  Negative for back pain, joint pain and neck pain.   Skin:  Negative for rash.   Neurological:  Negative for dizziness, weakness and headaches.   Endo/Heme/Allergies:  Negative for polydipsia.   Psychiatric/Behavioral:  Positive for depression. Negative for hallucinations and suicidal ideas. The patient is nervous/anxious.          OBJECTIVE     Physical Exam  Vitals:    09/27/24 0929   BP: 122/80   Pulse: 100    Body mass index is 28.1 kg/m².  Weight: 58.9 kg (129 lb 13.6 oz)         Physical Exam  HENT:      Head: Normocephalic and atraumatic.      Nose: Nose normal.      Mouth/Throat:      Mouth: Mucous membranes are moist.      Pharynx: Oropharynx is clear.   Eyes:      Extraocular Movements: Extraocular movements intact.      Conjunctiva/sclera: Conjunctivae normal.      Pupils: Pupils are equal, round, and reactive to light.   Cardiovascular:      Rate and Rhythm:  Normal rate and regular rhythm.   Pulmonary:      Effort: Pulmonary effort is normal.      Breath sounds: Normal breath sounds.   Abdominal:      General: There is no distension.      Palpations: Abdomen is soft.      Tenderness: There is no abdominal tenderness.   Musculoskeletal:         General: No swelling. Normal range of motion.      Cervical back: Normal range of motion.      Right lower leg: No edema.      Left lower leg: No edema.   Skin:     General: Skin is warm.      Findings: No lesion or rash.   Neurological:      General: No focal deficit present.      Mental Status: She is alert and oriented to person, place, and time.      Motor: No weakness.   Psychiatric:         Mood and Affect: Mood normal.         Thought Content: Thought content normal.               ASSESSMENT     39 y.o. female with     1. Chronic viral hepatitis B without delta agent and without coma    2. Rheumatoid arthritis involving multiple sites with positive rheumatoid factor    3. Postsurgical hypothyroidism    4. Abdominal bloating    5. Other long term (current) drug therapy          PLAN:     1. Rheumatoid arthritis involving multiple sites with positive rheumatoid factor  -     Ambulatory referral/consult to Rheumatology; Future; Expected date: 10/04/2024    2. Chronic viral hepatitis B without delta agent and without coma  Stable    3. Postsurgical hypothyroidism  Overview:  Levothyroxine 75 mcg  Stable on medications, continue regimen  Follow up labs and make adjustments as needed        4. Abdominal bloating  -     Ambulatory referral/consult to Gastroenterology; Future; Expected date: 10/04/2024    5. Other long term (current) drug therapy  -     Hemoglobin A1C; Future; Expected date: 09/27/2024  -     TSH; Future; Expected date: 09/27/2024  -     Lipid Panel; Future; Expected date: 09/27/2024  -     Comprehensive Metabolic Panel; Future; Expected date: 09/27/2024  -     CBC Auto Differential; Future; Expected date:  09/27/2024            Follow up in 6 months.    Lona Pham MD

## 2024-09-28 ENCOUNTER — PATIENT MESSAGE (OUTPATIENT)
Dept: ENDOCRINOLOGY | Facility: CLINIC | Age: 39
End: 2024-09-28
Payer: COMMERCIAL

## 2024-10-07 ENCOUNTER — PATIENT MESSAGE (OUTPATIENT)
Dept: PRIMARY CARE CLINIC | Facility: CLINIC | Age: 39
End: 2024-10-07
Payer: COMMERCIAL

## 2024-10-07 NOTE — TELEPHONE ENCOUNTER
"I placed the referral as "urgent" to help speed up the time. I recommend she add to the waitlist so that if spots open up she can get a sooner appointment  "

## 2024-10-07 NOTE — TELEPHONE ENCOUNTER
Pt states that you told her to let you know if she has trouble getting a Rheumatology appt scheduled and you may be able to help her get in sooner. States she is not able to get an appt with any provider until next year

## 2024-10-10 ENCOUNTER — PATIENT MESSAGE (OUTPATIENT)
Dept: RHEUMATOLOGY | Facility: CLINIC | Age: 39
End: 2024-10-10
Payer: COMMERCIAL

## 2024-10-10 ENCOUNTER — TELEPHONE (OUTPATIENT)
Dept: RHEUMATOLOGY | Facility: CLINIC | Age: 39
End: 2024-10-10
Payer: COMMERCIAL

## 2024-10-10 DIAGNOSIS — M13.80 SERONEGATIVE ARTHRITIS: ICD-10-CM

## 2024-10-10 DIAGNOSIS — Z79.899 IMMUNODEFICIENCY DUE TO DRUG THERAPY: Primary | ICD-10-CM

## 2024-10-10 DIAGNOSIS — D84.821 IMMUNODEFICIENCY DUE TO DRUG THERAPY: Primary | ICD-10-CM

## 2024-10-10 RX ORDER — FOLIC ACID 1 MG/1
1 TABLET ORAL DAILY
Qty: 30 TABLET | Refills: 4 | Status: SHIPPED | OUTPATIENT
Start: 2024-10-10 | End: 2025-03-09

## 2024-10-10 RX ORDER — PREDNISONE 20 MG/1
40 TABLET ORAL DAILY
Qty: 14 TABLET | Refills: 0 | Status: SHIPPED | OUTPATIENT
Start: 2024-10-10 | End: 2024-10-22

## 2024-10-10 RX ORDER — METHOTREXATE 2.5 MG/1
12.5 TABLET ORAL
Qty: 20 TABLET | Refills: 3 | Status: SHIPPED | OUTPATIENT
Start: 2024-10-10 | End: 2025-10-10

## 2024-10-10 RX ORDER — PREDNISONE 20 MG/1
40 TABLET ORAL DAILY
Qty: 14 TABLET | Refills: 0 | Status: SHIPPED | OUTPATIENT
Start: 2024-10-10 | End: 2024-10-10 | Stop reason: SDUPTHER

## 2024-10-10 NOTE — PROGRESS NOTES
Patient of .  She is supposed to be taking MTX but stopped it and is now flaring.  Start prednisone 40mg a day for 7 days.  Restart MTX 5 pills once a week for 4 weeks then labs as per Dr. Nicole note.  Take folic acid 1 mg po qday

## 2024-10-11 ENCOUNTER — OFFICE VISIT (OUTPATIENT)
Dept: GASTROENTEROLOGY | Facility: CLINIC | Age: 39
End: 2024-10-11
Payer: COMMERCIAL

## 2024-10-11 ENCOUNTER — LAB VISIT (OUTPATIENT)
Dept: LAB | Facility: HOSPITAL | Age: 39
End: 2024-10-11
Payer: COMMERCIAL

## 2024-10-11 VITALS
SYSTOLIC BLOOD PRESSURE: 124 MMHG | BODY MASS INDEX: 27.92 KG/M2 | HEART RATE: 102 BPM | WEIGHT: 129.44 LBS | DIASTOLIC BLOOD PRESSURE: 78 MMHG | HEIGHT: 57 IN

## 2024-10-11 DIAGNOSIS — R10.10 PAIN OF UPPER ABDOMEN: Primary | ICD-10-CM

## 2024-10-11 DIAGNOSIS — R14.0 ABDOMINAL BLOATING: ICD-10-CM

## 2024-10-11 LAB
ALBUMIN SERPL BCP-MCNC: 3.4 G/DL (ref 3.5–5.2)
ALP SERPL-CCNC: 58 U/L (ref 55–135)
ALT SERPL W/O P-5'-P-CCNC: 12 U/L (ref 10–44)
ANION GAP SERPL CALC-SCNC: 6 MMOL/L (ref 8–16)
AST SERPL-CCNC: 13 U/L (ref 10–40)
BASOPHILS # BLD AUTO: 0.04 K/UL (ref 0–0.2)
BASOPHILS NFR BLD: 0.4 % (ref 0–1.9)
BILIRUB SERPL-MCNC: 0.2 MG/DL (ref 0.1–1)
BUN SERPL-MCNC: 13 MG/DL (ref 6–20)
CALCIUM SERPL-MCNC: 8.7 MG/DL (ref 8.7–10.5)
CHLORIDE SERPL-SCNC: 108 MMOL/L (ref 95–110)
CO2 SERPL-SCNC: 27 MMOL/L (ref 23–29)
CREAT SERPL-MCNC: 0.7 MG/DL (ref 0.5–1.4)
DIFFERENTIAL METHOD BLD: ABNORMAL
EOSINOPHIL # BLD AUTO: 0.3 K/UL (ref 0–0.5)
EOSINOPHIL NFR BLD: 2.9 % (ref 0–8)
ERYTHROCYTE [DISTWIDTH] IN BLOOD BY AUTOMATED COUNT: 13.3 % (ref 11.5–14.5)
EST. GFR  (NO RACE VARIABLE): >60 ML/MIN/1.73 M^2
GLUCOSE SERPL-MCNC: 117 MG/DL (ref 70–110)
HCT VFR BLD AUTO: 39.8 % (ref 37–48.5)
HGB BLD-MCNC: 13.1 G/DL (ref 12–16)
IMM GRANULOCYTES # BLD AUTO: 0.03 K/UL (ref 0–0.04)
IMM GRANULOCYTES NFR BLD AUTO: 0.3 % (ref 0–0.5)
LIPASE SERPL-CCNC: 19 U/L (ref 4–60)
LYMPHOCYTES # BLD AUTO: 1.5 K/UL (ref 1–4.8)
LYMPHOCYTES NFR BLD: 14.1 % (ref 18–48)
MCH RBC QN AUTO: 28.7 PG (ref 27–31)
MCHC RBC AUTO-ENTMCNC: 32.9 G/DL (ref 32–36)
MCV RBC AUTO: 87 FL (ref 82–98)
MONOCYTES # BLD AUTO: 0.6 K/UL (ref 0.3–1)
MONOCYTES NFR BLD: 6.1 % (ref 4–15)
NEUTROPHILS # BLD AUTO: 8 K/UL (ref 1.8–7.7)
NEUTROPHILS NFR BLD: 76.2 % (ref 38–73)
NRBC BLD-RTO: 0 /100 WBC
PLATELET # BLD AUTO: 310 K/UL (ref 150–450)
PMV BLD AUTO: 9.4 FL (ref 9.2–12.9)
POTASSIUM SERPL-SCNC: 4 MMOL/L (ref 3.5–5.1)
PROT SERPL-MCNC: 7.2 G/DL (ref 6–8.4)
RBC # BLD AUTO: 4.57 M/UL (ref 4–5.4)
SODIUM SERPL-SCNC: 141 MMOL/L (ref 136–145)
WBC # BLD AUTO: 10.52 K/UL (ref 3.9–12.7)

## 2024-10-11 PROCEDURE — 85025 COMPLETE CBC W/AUTO DIFF WBC: CPT

## 2024-10-11 PROCEDURE — 83690 ASSAY OF LIPASE: CPT

## 2024-10-11 PROCEDURE — 80053 COMPREHEN METABOLIC PANEL: CPT

## 2024-10-11 PROCEDURE — 99999 PR PBB SHADOW E&M-EST. PATIENT-LVL IV: CPT | Mod: PBBFAC,,,

## 2024-10-11 PROCEDURE — 36415 COLL VENOUS BLD VENIPUNCTURE: CPT

## 2024-10-11 RX ORDER — FAMOTIDINE 40 MG/1
40 TABLET, FILM COATED ORAL DAILY
Qty: 30 TABLET | Refills: 11 | Status: SHIPPED | OUTPATIENT
Start: 2024-10-11 | End: 2025-10-11

## 2024-10-11 RX ORDER — DICYCLOMINE HYDROCHLORIDE 10 MG/1
10 CAPSULE ORAL 3 TIMES DAILY PRN
Qty: 120 CAPSULE | Refills: 0 | Status: SHIPPED | OUTPATIENT
Start: 2024-10-11

## 2024-10-11 RX ORDER — ESOMEPRAZOLE MAGNESIUM 40 MG/1
40 CAPSULE, DELAYED RELEASE ORAL
Qty: 30 CAPSULE | Refills: 11 | Status: SHIPPED | OUTPATIENT
Start: 2024-10-11 | End: 2025-10-11

## 2024-10-11 NOTE — PROGRESS NOTES
Gastroenterology Clinic Consultation Note    Reason for Visit:  The primary encounter diagnosis was Pain of upper abdomen. A diagnosis of Abdominal bloating was also pertinent to this visit.    PCP:   Lona Pham   [address] / [place] [address]      Initial HPI   This is a 39 y.o. female presenting for ongoing GI symptoms. Patient reports abdominal pain and bloating. She has had this extensively evaluated by multiple GI providers in the past. She reports ongoing intermittent reflux symptoms. She has been on Prilosec that controls this sometimes. She has tried Prevacid in the past, but had a reaction so she was switched back to Prilosec. She reports abdominal pain. Describes it as tender to touch. Cannot even put her underwear up on her abdomen as this causes pain for her. Any touch of her abdomen causes pain. This has been ongoing for many years and extensively evaluated. EGD/Colonoscopy done by Dr. Lopez at [facility] that was reportedly normal. CT A/P that was grossly normal from a GI perspective. She has had an abdominal US that noted some biliary sludge but no gallstones. She continues to experience bloating. Bowel movements are typically normal. Have been looser in consistency, but not diarrhea. Denies blood in stool, unintentional weight loss, nausea, vomiting.     Denies FH of colon, gastric, esophageal or pancreatic cancers. Denies FH of IBD.      ROS:  Review of Systems   Constitutional:  Negative for chills, fever, malaise/fatigue and weight loss.   HENT:  Negative for sore throat.    Eyes:  Negative for redness.   Gastrointestinal:  Positive for abdominal pain and heartburn. Negative for blood in stool, constipation, diarrhea, melena, nausea and vomiting.   Skin:  Negative for rash.   Neurological:  Negative for dizziness, loss of consciousness and weakness.        Medical History:  has a past medical history of  Anemia, Breast abscess (04/25/2020), Depression, GERD (gastroesophageal reflux disease), GERD (gastroesophageal reflux disease), History of fourth degree perineal laceration (08/21/2019), Hypoglycemia, Hypoglycemia, Mastitis (04/20/2020), Mental disorder, PONV (postoperative nausea and vomiting), Thyroid cancer, and Thyroid disease.    Surgical History:  has a past surgical history that includes Incision and drainage of breast (Left, 04/23/2020); Colonoscopy; Esophagogastroduodenoscopy; Thyroidectomy (Bilateral, 09/29/2020); Breast cyst excision; and Esophagogastroduodenoscopy (N/A, 06/14/2021).    Family History: family history includes Arthritis in her maternal grandmother; Hyperlipidemia in her mother; Hypertension in her mother; Mental illness in her maternal grandmother; Stroke in her mother..       Review of patient's allergies indicates:   Allergen Reactions    Enbrel [etanercept] Swelling and Rash    Humira [adalimumab] Itching and Other (See Comments)     Body-wide burning, itching, numbness, tingling, and arthralgia ~30 min after injection    Lansoprazole Shortness Of Breath    Vancomycin analogues Hives    Bactrim [sulfamethoxazole-trimethoprim] Nausea And Vomiting    Sulfa (sulfonamide antibiotics) Rash    Vicodin [hydrocodone-acetaminophen] Nausea Only     Patient states she is fine with other pain medication       Current Outpatient Medications on File Prior to Visit   Medication Sig Dispense Refill    ascorbic acid/collagen hydr (COLLAGEN PLUS VITAMIN C ORAL) Take by mouth.      CALCIUM ORAL Take by mouth.      CRANBERRY ORAL Take by mouth.      folic acid (FOLVITE) 1 MG tablet Take 1 tablet (1 mg total) by mouth once daily. 30 tablet 4    levothyroxine (TIROSINT) 75 mcg Cap Take 75 mcg by mouth once daily. Dispense as generic levothyroxine capsules 90 capsule 3    liothyronine (CYTOMEL) 5 MCG Tab Take a half-tablet (2.5 mcg) by mouth twice daily 90 tablet 3    methotrexate 2.5 MG Tab 5 tabs weekly  "for 4 weeks and then 7 tabs weekly 35 tablet 3    methotrexate 2.5 MG Tab Take 5 tablets (12.5 mg total) by mouth every 7 days. 20 tablet 3    mv-min/iron/folic/calcium/vitK (WOMEN'S MULTIVITAMIN ORAL) Take by mouth.      ondansetron (ZOFRAN-ODT) 4 MG TbDL Take 1 tablet (4 mg total) by mouth every 6 (six) hours as needed (nausea or vomiting). 15 tablet 0    predniSONE (DELTASONE) 20 MG tablet Take 2 tablets (40 mg total) by mouth once daily for 7 days 14 tablet 0    [DISCONTINUED] omeprazole (PRILOSEC) 20 MG capsule Take 2 capsules (40 mg total) by mouth once daily. 30 capsule 5    albuterol (PROVENTIL HFA) 90 mcg/actuation inhaler Inhale 2 puffs into the lungs every 6 (six) hours as needed for Wheezing or Shortness of Breath. Rescue 18 g 0    norelgestromin-ethinyl estradiol (XULANE) 150-35 mcg/24 hr Place 1 patch onto the skin once a week. 12 patch 3     No current facility-administered medications on file prior to visit.         Objective Findings:    Vital Signs:  /78   Pulse 102   Ht 4' 9" (1.448 m)   Wt 58.7 kg (129 lb 6.6 oz)   BMI 28.00 kg/m²   Body mass index is 28 kg/m².    Physical Exam:  Physical Exam  Vitals and nursing note reviewed.   Constitutional:       Appearance: She is normal weight. She is not ill-appearing.   HENT:      Mouth/Throat:      Mouth: Mucous membranes are moist.      Pharynx: Oropharynx is clear.   Eyes:      General: No scleral icterus.  Abdominal:      General: Abdomen is flat. Bowel sounds are normal. There is no distension.      Palpations: Abdomen is soft. There is no mass.      Tenderness: There is abdominal tenderness in the right upper quadrant, epigastric area and left upper quadrant.      Hernia: No hernia is present.   Skin:     General: Skin is warm and dry.      Capillary Refill: Capillary refill takes less than 2 seconds.      Coloration: Skin is not jaundiced or pale.   Neurological:      Mental Status: She is alert and oriented to person, place, and time. " Mental status is at baseline.             Labs:  Lab Results   Component Value Date    WBC 10.52 10/11/2024    HGB 13.1 10/11/2024    HCT 39.8 10/11/2024     10/11/2024    CRP 15.4 (H) 08/06/2024    CHOL 223 (H) 09/27/2024    TRIG 146 09/27/2024    HDL 67 09/27/2024    ALKPHOS 58 09/27/2024    LIPASE 19 03/04/2024    ALT 8 (L) 09/27/2024    AST 16 09/27/2024     09/27/2024    K 4.4 09/27/2024     09/27/2024    CREATININE 0.7 09/27/2024    BUN 11 09/27/2024    CO2 22 (L) 09/27/2024    TSH 0.376 (L) 09/27/2024    HGBA1C 5.5 09/27/2024       Imaging reviewed: CT A/P 3/4/2024  Impression:     1. There is indistinctness about the right ureter noting mild urothelial thickening/enhancement.  Correlation with urinalysis is recommended, finding could reflect sequela of urothelial inflammation or infection.  Please note, the urinary bladder is decompressed and may account for wall thickening however cystitis in the setting of ascending infection is a consideration.  2. Please see above for several additional findings.        Electronically signed by:Jermaine Hilton MD  Date:                                            03/04/2024  Time:                                           17:37    US Abdomen 12/11/2023  Impression:     No acute sonographic abnormality identified in the upper abdomen.     Small volume biliary sludge.     Electronically signed by:Alejandro Costa MD  Date:                                            12/11/2023  Time:                                           20:42    Endoscopy reviewed: EGD 6/14/2021  Impression:            - Normal esophagus. Biopsied.                          - Normal stomach. Biopsied.                          - Normal examined duodenum.   Recommendation:        - Patient has a contact number available for                          emergencies. The signs and symptoms of potential                          delayed complications were discussed with the                           patient. Return to normal activities tomorrow.                          Written discharge instructions were provided to                          the patient.                          - Discharge patient to home (ambulatory).                          - Resume previous diet.                          - Continue present medications.                          - Await pathology results.                          - Return to nurse practitioner after studies are                          complete.                          the recent use of carafate did not help. symptoms                          have not responded to PPI. consider OTC agents                          like FDgard, unless already tried.   Farooq Cullen MD   6/14/2021 3:50:33 PM       Assessment:  1. Pain of upper abdomen    2. Abdominal bloating      Orders Placed This Encounter    NM Hepatobiliary (HIDA) W Pharm and EF When Performed    CT Enterography Abd_Pelvis With Contrast    LIPASE    COMPREHENSIVE METABOLIC PANEL    CBC Auto Differential    esomeprazole (NEXIUM) 40 MG capsule    famotidine (PEPCID) 40 MG tablet    dicyclomine (BENTYL) 10 MG capsule         Plan:  HIDA scan and CTE ordered for further evaluation of causes of her abdominal pain. Given her level of sensitivity to touch, would favor a visceral hypersensitivity causing her symptoms. Changed PPI to Nexium. Bentyl as needed for abdominal pain. Pepcid for breakthrough pain. Labs ordered today as well.  Pending above results, will consider EGD/Colonoscopy for further assessment.   RTC pending above       Thank you for allowing me to participate in this patient's care.    Sincerely,     Soha Rubin NP  Gastroenterology Department  [facility]

## 2024-10-17 ENCOUNTER — HOSPITAL ENCOUNTER (OUTPATIENT)
Dept: RADIOLOGY | Facility: HOSPITAL | Age: 39
Discharge: HOME | End: 2024-10-17
Payer: COMMERCIAL

## 2024-10-17 DIAGNOSIS — R14.0 ABDOMINAL BLOATING: ICD-10-CM

## 2024-10-17 DIAGNOSIS — R10.10 PAIN OF UPPER ABDOMEN: ICD-10-CM

## 2024-10-17 PROCEDURE — 25500020 PHARM REV CODE 255

## 2024-10-17 PROCEDURE — 74177 CT ABD & PELVIS W/CONTRAST: CPT | Mod: TC

## 2024-10-17 PROCEDURE — 74177 CT ABD & PELVIS W/CONTRAST: CPT | Mod: 26,,, | Performed by: RADIOLOGY

## 2024-10-17 RX ADMIN — IOHEXOL 100 ML: 350 INJECTION, SOLUTION INTRAVENOUS at 07:10

## 2024-10-18 ENCOUNTER — PATIENT MESSAGE (OUTPATIENT)
Dept: GASTROENTEROLOGY | Facility: CLINIC | Age: 39
End: 2024-10-18
Payer: COMMERCIAL

## 2024-10-21 ENCOUNTER — PATIENT MESSAGE (OUTPATIENT)
Dept: GASTROENTEROLOGY | Facility: CLINIC | Age: 39
End: 2024-10-21
Payer: COMMERCIAL

## 2024-10-22 ENCOUNTER — PATIENT MESSAGE (OUTPATIENT)
Dept: RESEARCH | Facility: HOSPITAL | Age: 39
End: 2024-10-22
Payer: COMMERCIAL

## 2024-10-31 ENCOUNTER — HOSPITAL ENCOUNTER (OUTPATIENT)
Dept: RADIOLOGY | Facility: HOSPITAL | Age: 39
Discharge: HOME | End: 2024-10-31
Payer: COMMERCIAL

## 2024-10-31 DIAGNOSIS — R10.10 PAIN OF UPPER ABDOMEN: ICD-10-CM

## 2024-10-31 PROCEDURE — 78227 HEPATOBIL SYST IMAGE W/DRUG: CPT | Mod: 26,,, | Performed by: NUCLEAR MEDICINE

## 2024-10-31 PROCEDURE — A9537 TC99M MEBROFENIN: HCPCS

## 2024-10-31 PROCEDURE — 78227 HEPATOBIL SYST IMAGE W/DRUG: CPT | Mod: TC

## 2024-10-31 RX ORDER — KIT FOR THE PREPARATION OF TECHNETIUM TC 99M MEBROFENIN 45 MG/10ML
5 INJECTION, POWDER, LYOPHILIZED, FOR SOLUTION INTRAVENOUS
Status: COMPLETED | OUTPATIENT
Start: 2024-10-31 | End: 2024-10-31

## 2024-10-31 RX ADMIN — MEBROFENIN 5.5 MILLICURIE: 45 INJECTION, POWDER, LYOPHILIZED, FOR SOLUTION INTRAVENOUS at 11:10

## 2024-11-12 ENCOUNTER — LAB VISIT (OUTPATIENT)
Dept: LAB | Facility: HOSPITAL | Age: 39
End: 2024-11-12
Attending: INTERNAL MEDICINE
Payer: COMMERCIAL

## 2024-11-12 DIAGNOSIS — Z79.899 IMMUNODEFICIENCY DUE TO DRUG THERAPY: ICD-10-CM

## 2024-11-12 DIAGNOSIS — M13.80 SERONEGATIVE ARTHRITIS: ICD-10-CM

## 2024-11-12 DIAGNOSIS — D84.821 IMMUNODEFICIENCY DUE TO DRUG THERAPY: ICD-10-CM

## 2024-11-12 LAB
ALBUMIN SERPL BCP-MCNC: 3.5 G/DL (ref 3.5–5.2)
ALP SERPL-CCNC: 57 U/L (ref 40–150)
ALT SERPL W/O P-5'-P-CCNC: 11 U/L (ref 10–44)
ANION GAP SERPL CALC-SCNC: 10 MMOL/L (ref 8–16)
AST SERPL-CCNC: 12 U/L (ref 10–40)
BASOPHILS # BLD AUTO: 0.05 K/UL (ref 0–0.2)
BASOPHILS NFR BLD: 0.5 % (ref 0–1.9)
BILIRUB SERPL-MCNC: 0.3 MG/DL (ref 0.1–1)
BUN SERPL-MCNC: 16 MG/DL (ref 6–20)
CALCIUM SERPL-MCNC: 9.5 MG/DL (ref 8.7–10.5)
CHLORIDE SERPL-SCNC: 106 MMOL/L (ref 95–110)
CO2 SERPL-SCNC: 26 MMOL/L (ref 23–29)
CREAT SERPL-MCNC: 0.7 MG/DL (ref 0.5–1.4)
CRP SERPL-MCNC: 22.1 MG/L (ref 0–8.2)
DIFFERENTIAL METHOD BLD: NORMAL
EOSINOPHIL # BLD AUTO: 0.2 K/UL (ref 0–0.5)
EOSINOPHIL NFR BLD: 2.1 % (ref 0–8)
ERYTHROCYTE [DISTWIDTH] IN BLOOD BY AUTOMATED COUNT: 14.1 % (ref 11.5–14.5)
ERYTHROCYTE [SEDIMENTATION RATE] IN BLOOD BY PHOTOMETRIC METHOD: 49 MM/HR (ref 0–36)
EST. GFR  (NO RACE VARIABLE): >60 ML/MIN/1.73 M^2
GLUCOSE SERPL-MCNC: 112 MG/DL (ref 70–110)
HBV SURFACE AG SERPL QL IA: NORMAL
HCT VFR BLD AUTO: 39.5 % (ref 37–48.5)
HGB BLD-MCNC: 12.7 G/DL (ref 12–16)
IMM GRANULOCYTES # BLD AUTO: 0.03 K/UL (ref 0–0.04)
IMM GRANULOCYTES NFR BLD AUTO: 0.3 % (ref 0–0.5)
LYMPHOCYTES # BLD AUTO: 1.9 K/UL (ref 1–4.8)
LYMPHOCYTES NFR BLD: 19.4 % (ref 18–48)
MCH RBC QN AUTO: 28.1 PG (ref 27–31)
MCHC RBC AUTO-ENTMCNC: 32.2 G/DL (ref 32–36)
MCV RBC AUTO: 87 FL (ref 82–98)
MONOCYTES # BLD AUTO: 0.5 K/UL (ref 0.3–1)
MONOCYTES NFR BLD: 5.5 % (ref 4–15)
NEUTROPHILS # BLD AUTO: 7 K/UL (ref 1.8–7.7)
NEUTROPHILS NFR BLD: 72.2 % (ref 38–73)
NRBC BLD-RTO: 0 /100 WBC
PLATELET # BLD AUTO: 366 K/UL (ref 150–450)
PMV BLD AUTO: 9.2 FL (ref 9.2–12.9)
POTASSIUM SERPL-SCNC: 4.6 MMOL/L (ref 3.5–5.1)
PROT SERPL-MCNC: 7.5 G/DL (ref 6–8.4)
RBC # BLD AUTO: 4.52 M/UL (ref 4–5.4)
SODIUM SERPL-SCNC: 142 MMOL/L (ref 136–145)
WBC # BLD AUTO: 9.68 K/UL (ref 3.9–12.7)

## 2024-11-12 PROCEDURE — 86140 C-REACTIVE PROTEIN: CPT | Performed by: INTERNAL MEDICINE

## 2024-11-12 PROCEDURE — 87340 HEPATITIS B SURFACE AG IA: CPT | Performed by: INTERNAL MEDICINE

## 2024-11-12 PROCEDURE — 85652 RBC SED RATE AUTOMATED: CPT | Performed by: INTERNAL MEDICINE

## 2024-11-12 PROCEDURE — 36415 COLL VENOUS BLD VENIPUNCTURE: CPT | Performed by: INTERNAL MEDICINE

## 2024-11-12 PROCEDURE — 85025 COMPLETE CBC W/AUTO DIFF WBC: CPT | Performed by: INTERNAL MEDICINE

## 2024-11-12 PROCEDURE — 80053 COMPREHEN METABOLIC PANEL: CPT | Performed by: INTERNAL MEDICINE

## 2024-11-12 PROCEDURE — 87517 HEPATITIS B DNA QUANT: CPT | Performed by: INTERNAL MEDICINE

## 2024-11-13 LAB
HBV DNA SERPL NAA+PROBE-ACNC: NOT DETECTED IU/ML
HEPATITIS B VIRUS DNA: NORMAL

## 2024-11-18 ENCOUNTER — TELEPHONE (OUTPATIENT)
Dept: ENDOSCOPY | Facility: HOSPITAL | Age: 39
End: 2024-11-18
Payer: COMMERCIAL

## 2024-11-18 VITALS — WEIGHT: 130 LBS | HEIGHT: 57 IN | BODY MASS INDEX: 28.05 KG/M2

## 2024-11-18 DIAGNOSIS — R10.9 ABDOMINAL PAIN, UNSPECIFIED ABDOMINAL LOCATION: Primary | ICD-10-CM

## 2024-11-18 DIAGNOSIS — Z12.11 SCREEN FOR COLON CANCER: Primary | ICD-10-CM

## 2024-11-18 DIAGNOSIS — K21.9 GASTROESOPHAGEAL REFLUX DISEASE, UNSPECIFIED WHETHER ESOPHAGITIS PRESENT: ICD-10-CM

## 2024-11-18 RX ORDER — SODIUM, POTASSIUM,MAG SULFATES 17.5-3.13G
1 SOLUTION, RECONSTITUTED, ORAL ORAL DAILY
Qty: 354 ML | Refills: 0 | Status: SHIPPED | OUTPATIENT
Start: 2024-11-18 | End: 2024-11-24

## 2024-11-18 NOTE — TELEPHONE ENCOUNTER
"----- Message from Soha Rubin NP sent at 2024  8:22 AM CST -----  Regarding: EGD/Colonoscopy  Procedure: EGD    Diagnosis: Abdominal pain and GERD    Procedure Timin-12 weeks    #If within 4 weeks selected, please anthony as high priority#    #If greater than 12 weeks, please select "5-12 weeks" and delay sending until 3 months prior to requested date#     Location: Any Site    Additional Scheduling Information: No scheduling concerns    Prep Specifications:Standard prep    Is the patient taking a GLP-1 Agonist:no    Have you attached a patient to this message: yes  "

## 2024-11-18 NOTE — TELEPHONE ENCOUNTER
Referral for procedure from [facility]      Spoke to Thu to schedule procedure(s) Upper Endoscopy (EGD)       Physician to perform procedure(s) Dr. RAYMON Talley  Date of Procedure (s) 11/20/24  Arrival Time 9:30 AM  Time of Procedure(s) 10:30 AM   Location of Procedure(s) [address]   Type of Rx Prep sent to patient: Other  Instructions provided to patient via [facility]    Patient was informed on the following information and verbalized understanding. Screening questionnaire reviewed with patient and complete. If procedure requires anesthesia, a responsible adult needs to be present to accompany the patient home, patient cannot drive after receiving anesthesia. Appointment details are tentative, especially check-in time. Patient will receive a prep-op call 7 days prior to confirm check-in time for procedure. If applicable the patient should contact their pharmacy to verify Rx for procedure prep is ready for pick-up. Patient was advised to call the scheduling department at [phone] if pharmacy states no Rx is available. Patient was advised to call the endoscopy scheduling department if any questions or concerns arise.       Endoscopy Scheduling Department

## 2024-11-18 NOTE — TELEPHONE ENCOUNTER
Are you ready for your Colonoscopy?                            __ If you take blood thinners,weight loss or diabetic injectable medications, have you stopped taking them according to your doctor's instructions before your procedures?  __ Have you stopped eating solid foods and followed a clear liquid diet a full day before your procedure or followed the diet       guidelines indicated in your instructions?       REMINDER: NO BROTH AFTER MIDNIGHT THE DAY BEFORE PROCEDURE.   __ Have you completed all your prep solution?      PLEASE DO NOT FOLLOW the insert/or box instructions from pharmacy)  __ Have you taken your blood pressure, heart, seizure, or other essential medications the morning of your procedure?  __ Have you planned for a ride to and from procedure?  (Medical Transportation, Uber, Lyft, Taxi, etc. may ONLY be used if a responsible adult is present to accompany you home). The responsible adult CANNOT be the  of the service.  person must be available to return and pick you up within 15 minutes of being notified of discharge.               Questions or Concerns?  Please call us!  [phone] (M-F) [phone] (Nights and weekends)     Listed below are some helpful tips:     Please bring protective cases for eyewear and hearing aids-wear comfortable clothing/shoes.  Follow prep instructions closely so you don't have to do it twice.  Bowel prep is prescription used to clean out the colon before a colonoscopy. The prep increases movement of your colon by causing you to have diarrhea (loose stools). Cleaning out stool from the colon helps your doctor to see in your colon clearly during this procedure.   It is important to stay hydrated before, during and after bowel prep to prevent loss of fluid (dehydration). You can have water and your choice of clear liquids. Reminder: these liquids you will drink in addition to the bowel prep.      Preparing the mixture:     First, mix the prep with water.  Make  the taste better by adding a sugar free drink mix (Crystal Light) can improve the taste of your prep.   Use a large bore (opening) straw. Place towards the back of mouth (throat) as tolerated.  Prepare a prep mixture that is lightly chilled, but not ice-cold. Drinking a large amount of ice-cold liquid can make you feel very ill.  Avoid drinking any RED beverages or eating popsicles with this color for the 24 hours leading up to your procedure. This color can look like blood in the colon.     Consuming the prep:     Take your time. If you feel ill, take a 15-minute break from drinking the prep mixture  Combat hunger and dehydration with clear liquids. Options like JELL-O, or popsicles will help.  Settle in with good reading material. The goal is to clean out 6 feet of colon, so you can plan on spending a good deal of time in the bathroom. Have some good reading material on-hand or an iPad ready to keep yourself entertained!  Keep yourself comfortable. We recommend applying personal hygiene wipes, Tucks pads and a soothing ointment, like A&D ointment, Desitin, or Vaseline to your bottom before starting and as needed to protect your skin.                IMPORTANT INFORMATION TO KNOW BEFORE YOUR PROCEDURE     [facility] 2nd Floor         When you arrive:  If your procedure is Monday - Friday 5am - 7pm - Please enter through the front door near [facility]. Please proceed up the first set of elevators to the 2nd floor where you will check in at the endo registration desk.      If your procedure is on a Saturday (weekend), enter through the back Outpatient entrance. Please note this entrance is diagonal from the Emergency Department entrance.                  If your procedure requires the administration of anesthesia, it is necessary for a responsible adult to drive you home. (Medical Transportation, Uber, Lyft, Taxi, etc. may ONLY be used if a responsible adult is present to accompany you  home.  The responsible adult CAN'T be the  of the service).       person must be available to return to pick you up within 15 minutes of being notified of discharge.         Please bring a picture ID, insurance card, & copayment        Take Medications as directed below:     If you begin taking any blood thinning medications, injectable weight loss/diabetes medications (other than insulin) , or Adipex (Phentermine) please contact the endoscopy scheduling department listed below as soon as possible.     If you are diabetic see the attached instruction sheet regarding your medication.      If you take HEART, BLOOD PRESSURE, SEIZURE, PAIN, LUNG (including inhalers/nebulizers), ANTI-REJECTION (transplant patients), or PSYCHIATRIC medications, please take at your regular times with a sip of water or as directed by the scheduling nurse.      Important contact information:     Endoscopy [phone] Hours of operation Monday-Friday 8:00-4:30pm.     Questions about insurance or financial obligations call [phone] or [phone].     If you have questions regarding the prep or need to reschedule, please call [phone]. After hours questions requiring immediate assistance, contact [facility] nurse line at [phone] or [phone].   NOTE:      On occasion, unforeseen circumstances may cause a delay in your procedure start time. We respect your time and appreciate your patience during these circumstances.            Comments:                   Colonoscopy Procedure Prep Instructions        Date of procedure: 11/20/24 Arrive at: 9:30AM     Location of Department:   [facility] [address], [place] [address]  Take the Elevators to 2nd Floor Endoscopy Procedural Area     As soon as possible:   your prep from pharmacy and over the counter DULCOLAX LAXATIVE TABLETS      On the day before your procedure   What You CAN do:   You may have clear liquids  ONLY -see below for list.      Liquids That Are OK to Drink:   Water  Sports drinks (Gatorade, Power-Aid)  Coffee or tea (no cream or nondairy creamer)  Clear juices without pulp (apple, white grape)  Gelatin desserts (no fruit or toppings)  Clear soda (sprite, coke, ginger ale)  Chicken broth (until 12 midnight the night before procedure)        What You CANNOT do:   Do not EAT solid food, drink milk or anything   colored red.  Do not drink alcohol.  Do not take oral medications within 1 hour of starting   each dose of SUPREP.  No gum chewing or candy morning of procedure.        Note:   (Please disregard the insert instructions from pharmacy).  SUPREP Bowel Prep Kit is indicated for cleansing of the colon as a preparation for colonoscopy in adults.   Be sure to tell your doctor about all the medicines you take, including prescription and non-prescription medicines, vitamins, and herbal supplements. SUPREP Bowel Prep Kit may affect how other medicines work.  Medication taken by mouth may not be absorbed properly when taken within 1 hour before the start of each dose of SUPREP Bowel Prep Kit.     It is not uncommon to experience some abdominal cramping, nausea and/or vomiting when taking the prep. If you have nausea and/or vomiting while taking the prep, stop drinking for 20 to 30 minutes then continue.     How to take prep:     SUPREP Bowel Prep Kit is a (2-day) prep.   Both 6-ounce bottles are required for a complete preparation for colonoscopy. Dilute the solution concentrate as directed prior to use. You must drink water with each dose of SUPREP, and additional water after each dose.     DOSE 1--Day Before Colonoscopy 11/19/24     Drink at least 6 to 8 glasses of clear liquids from time you wake up until you begin your prep and then continue until bedtime to avoid dehydration.      12:00 pm (NOON) Take four (4) Dulcolax (Bisacodyl) tablets with at least 8 ounces or more of clear liquids.       6:00 pm:     You  must complete Steps 1 through 4 using one (1) 6-ounce bottle before going to bed as shown below:     Step 1-Pour ONE (1) 6-ounce bottle of SUPREP liquid into the mixing container.  Step 2-Add cool drinking water to the 16-ounce line on the container and mix.  Step 3-Drink ALL the liquid in the container.  Step 4-You must drink two (2) more 16-ounce containers of water over the next 1 hour.  IMPORTANT: If you experience preparation-related symptoms (for example, nausea, bloating, or cramping), stop, or slow the rate of drinking the additional water until your symptoms decrease.     DOSE 2--Day of the Colonoscopy 11/20/24 at 2-3 AM.     For this dose, repeat Steps 1 through 4 shown above using the other 6-ounce bottle.   You may continue drinking water/clear liquids until   2 hours before your colonoscopy or as directed by the scheduling nurse  8:30AM.     For information about your procedure, two (2) things to view prior to colonoscopy:  Please watch this informational video. It is important to watch this animated consent video prior to your arrival. If you haven't watched the video prior to arriving, you are required to watch it during admission which can causes delays.     Options for viewing:   Using a keyboard:  press and hold the control tab (Ctrl) and left mouse click to follow links.            Colonoscopy Instructional Video                                                                                   OR     Type link address into your web browser's address bar:  [email or web address]        Educational Booklet with pictures:        Colonoscopy Prep - Liquid        Comments:

## 2024-11-19 ENCOUNTER — ANESTHESIA EVENT (OUTPATIENT)
Dept: ENDOSCOPY | Facility: HOSPITAL | Age: 39
End: 2024-11-19
Payer: COMMERCIAL

## 2024-11-19 RX ORDER — LIDOCAINE HYDROCHLORIDE 10 MG/ML
1 INJECTION, SOLUTION EPIDURAL; INFILTRATION; INTRACAUDAL; PERINEURAL ONCE
OUTPATIENT
Start: 2024-11-19 | End: 2024-11-19

## 2024-11-19 RX ORDER — SODIUM CHLORIDE 9 MG/ML
INJECTION, SOLUTION INTRAVENOUS CONTINUOUS
OUTPATIENT
Start: 2024-11-19

## 2024-11-20 ENCOUNTER — TELEPHONE (OUTPATIENT)
Dept: ENDOSCOPY | Facility: HOSPITAL | Age: 39
End: 2024-11-20
Payer: COMMERCIAL

## 2024-11-20 ENCOUNTER — ANESTHESIA (OUTPATIENT)
Dept: ENDOSCOPY | Facility: HOSPITAL | Age: 39
End: 2024-11-20
Payer: COMMERCIAL

## 2024-11-20 NOTE — ANESTHESIA PREPROCEDURE EVALUATION
11/20/2024  Dina Hutton is a 39 y.o., female.  To undergo Procedure(s) (LRB):  EGD (ESOPHAGOGASTRODUODENOSCOPY) (N/A)  COLONOSCOPY (N/A)       Past Medical History:  Past Medical History:   Diagnosis Date    Anemia     Breast abscess 04/25/2020    Depression     GERD (gastroesophageal reflux disease)     GERD (gastroesophageal reflux disease)     History of fourth degree perineal laceration 08/21/2019    Hypoglycemia     Hypoglycemia     Mastitis 04/20/2020    Mental disorder     depression    PONV (postoperative nausea and vomiting)     Thyroid cancer     Thyroid disease        Past Surgical History:  Past Surgical History:   Procedure Laterality Date    BREAST CYST EXCISION      COLONOSCOPY      ESOPHAGOGASTRODUODENOSCOPY      ESOPHAGOGASTRODUODENOSCOPY N/A 06/14/2021    Procedure: EGD (ESOPHAGOGASTRODUODENOSCOPY);  Surgeon: Farooq Cullen MD;  Location: [facility] ENDO;  Service: Endoscopy;  Laterality: N/A;  ongoing epigastric pain, burning, nausea, vomiting  Lives on [facility], want first available but if there are openings on , would prefer that location  cOVID test on 6/11/21 at [facility]    INCISION AND DRAINAGE OF BREAST Left 04/23/2020    Procedure: INCISION AND DRAINAGE, BREAST;  Surgeon: Mason Rubalcava III, MD;  Location: [facility] OR;  Service: General;  Laterality: Left;    THYROIDECTOMY Bilateral 09/29/2020    Procedure: THYROIDECTOMY with central neck dissection;  Surgeon: Kayla Lovelace MD;  Location: [facility] OR;  Service: General;  Laterality: Bilateral;       Social History:  Social History     Socioeconomic History    Marital status:    Tobacco Use    Smoking status: Never    Smokeless tobacco: Never    Tobacco comments:     Allergies to it   Substance and Sexual Activity    Alcohol use: Yes     Alcohol/week: 1.0 standard drink of alcohol     Types: 1 Glasses of wine per week      Comment: On special occasions or events    Drug use: Never    Sexual activity: Yes     Partners: Male     Birth control/protection: Partner-Vasectomy, Patch     Social Drivers of Health     Financial Resource Strain: Medium Risk (12/27/2023)    Overall Financial Resource Strain (CARDIA)     Difficulty of Paying Living Expenses: Somewhat hard   Food Insecurity: Unknown (12/27/2023)    Hunger Vital Sign     Worried About Running Out of Food in the Last Year: Never true     Ran Out of Food in the Last Year: Patient declined   Transportation Needs: Unknown (12/27/2023)    PRAPARE - Transportation     Lack of Transportation (Medical): No     Lack of Transportation (Non-Medical): Patient declined   Physical Activity: Inactive (12/27/2023)    Exercise Vital Sign     Days of Exercise per Week: 0 days     Minutes of Exercise per Session: 0 min   Stress: Stress Concern Present (12/27/2023)    [employer] [place] [employer] - Occupational Stress Questionnaire     Feeling of Stress : To some extent   Housing Stability: High Risk (12/27/2023)    Housing Stability Vital Sign     Unable to Pay for Housing in the Last Year: Yes     Number of Places Lived in the Last Year: 1     Unstable Housing in the Last Year: No       Medications:  No current facility-administered medications on file prior to encounter.     Current Outpatient Medications on File Prior to Encounter   Medication Sig Dispense Refill    albuterol (PROVENTIL HFA) 90 mcg/actuation inhaler Inhale 2 puffs into the lungs every 6 (six) hours as needed for Wheezing or Shortness of Breath. Rescue 18 g 0    ascorbic acid/collagen hydr (COLLAGEN PLUS VITAMIN C ORAL) Take by mouth.      CALCIUM ORAL Take by mouth.      CRANBERRY ORAL Take by mouth.      dicyclomine (BENTYL) 10 MG capsule Take 1 capsule (10 mg total) by mouth 3 (three) times daily as needed (abdominal pain). 120 capsule 0    esomeprazole (NEXIUM) 40 MG capsule Take 1 capsule (40 mg total) by mouth  before breakfast. 30 capsule 11    famotidine (PEPCID) 40 MG tablet Take 1 tablet (40 mg total) by mouth once daily. 30 tablet 11    folic acid (FOLVITE) 1 MG tablet Take 1 tablet (1 mg total) by mouth once daily. 30 tablet 4    levothyroxine (TIROSINT) 75 mcg Cap Take 75 mcg by mouth once daily. Dispense as generic levothyroxine capsules 90 capsule 3    liothyronine (CYTOMEL) 5 MCG Tab Take a half-tablet (2.5 mcg) by mouth twice daily 90 tablet 3    methotrexate 2.5 MG Tab 5 tabs weekly for 4 weeks and then 7 tabs weekly 35 tablet 3    methotrexate 2.5 MG Tab Take 5 tablets (12.5 mg total) by mouth every 7 days. 20 tablet 3    mv-min/iron/folic/calcium/vitK (WOMEN'S MULTIVITAMIN ORAL) Take by mouth.      norelgestromin-ethinyl estradiol (XULANE) 150-35 mcg/24 hr Place 1 patch onto the skin once a week. 12 patch 3    ondansetron (ZOFRAN-ODT) 4 MG TbDL Take 1 tablet (4 mg total) by mouth every 6 (six) hours as needed (nausea or vomiting). 15 tablet 0       Allergies:  Review of patient's allergies indicates:   Allergen Reactions    Enbrel [etanercept] Swelling and Rash    Humira [adalimumab] Itching and Other (See Comments)     Body-wide burning, itching, numbness, tingling, and arthralgia ~30 min after injection    Lansoprazole Shortness Of Breath    Vancomycin analogues Hives    Bactrim [sulfamethoxazole-trimethoprim] Nausea And Vomiting    Sulfa (sulfonamide antibiotics) Rash    Vicodin [hydrocodone-acetaminophen] Nausea Only     Patient states she is fine with other pain medication       Active Problems:  Patient Active Problem List   Diagnosis    Depression    Gastroesophageal reflux disease without esophagitis    Hearing loss    Postpartum depression    Thyroid cancer    Postsurgical hypothyroidism    Muscle tension dysphonia    GERD (gastroesophageal reflux disease)    Syncope    Positive anti-CCP test    Intractable headache    Aura    Fibromyalgia    Paresthesia    Gait disturbance    Central sensitization  to pain    Poor balance    Decreased activities of daily living (ADL)    Decreased  strength    Decreased functional activity tolerance    Fear of pain    Hepatitis B core antibody positive    Pain aggravated by activities of daily living    Drug-induced immunodeficiency    Rheumatoid arthritis involving multiple sites with positive rheumatoid factor    Generalized anxiety disorder with panic attacks    Chronic viral hepatitis B without delta agent and without coma       Diagnostic Studies:   Latest Reference Range & Units 11/12/24 08:45   WBC 3.90 - 12.70 K/uL 9.68   RBC 4.00 - 5.40 M/uL 4.52   Hemoglobin 12.0 - 16.0 g/dL 12.7   Hematocrit 37.0 - 48.5 % 39.5   MCV 82 - 98 fL 87   MCH 27.0 - 31.0 pg 28.1   MCHC 32.0 - 36.0 g/dL 32.2   RDW 11.5 - 14.5 % 14.1   Platelet Count 150 - 450 K/uL 366      Latest Reference Range & Units 11/12/24 08:45   Sodium 136 - 145 mmol/L 142   Potassium 3.5 - 5.1 mmol/L 4.6   Chloride 95 - 110 mmol/L 106   CO2 23 - 29 mmol/L 26   Anion Gap 8 - 16 mmol/L 10   BUN 6 - 20 mg/dL 16   Creatinine 0.5 - 1.4 mg/dL 0.7   eGFR >60 mL/min/1.73 m^2 >60   Glucose 70 - 110 mg/dL 112 (H)     24 Hour Vitals:      See Nursing Charting For Additional Vitals      Pre-op Assessment    I have reviewed the Patient Summary Reports.     I have reviewed the Nursing Notes. I have reviewed the NPO Status.   I have reviewed the Medications.     Review of Systems  Anesthesia Hx:  No problems with previous Anesthesia                Social:  Non-Smoker, Social Alcohol Use       Cardiovascular:  Cardiovascular Normal Exercise tolerance: good                                             Pulmonary:  Pulmonary Normal                       Hepatic/GI:     GERD Liver Disease,               Musculoskeletal:  Arthritis   Fibromyalgia    RA            Neurological:      Headaches                                 Endocrine:   Hypothyroidism          Psych:    depression                Physical Exam  General: Well nourished,  Cooperative, Alert and Oriented    Airway:  Mallampati: I   Mouth Opening: Normal  TM Distance: Normal  Tongue: Normal  Neck ROM: Normal ROM    Dental:  Intact    Chest/Lungs:  Normal Respiratory Rate    Heart:  Rate: Normal  Rhythm: Regular Rhythm        Anesthesia Plan  Type of Anesthesia, risks & benefits discussed:    Anesthesia Type: MAC, Gen Natural Airway  Intra-op Monitoring Plan: Standard ASA Monitors  Post Op Pain Control Plan: multimodal analgesia  Induction:  IV  Informed Consent: Informed consent signed with the Patient and all parties understand the risks and agree with anesthesia plan.  All questions answered. Patient consented to blood products? No  ASA Score: 2    Ready For Surgery From Anesthesia Perspective.     .

## 2024-11-20 NOTE — TELEPHONE ENCOUNTER
Pts  left voicemail on Endoscopy scheduling line, stated wife did not  prep from pharmacy in time to prep. She did not prep. Pt needed to r/s procedures. Procedure removed from schedule.

## 2024-11-21 NOTE — TELEPHONE ENCOUNTER
Referral for procedure from [facility]      Spoke to pt to schedule procedure(s) Colonoscopy/EGD       Physician to perform procedure(s) Dr. REJI Muniz  Date of Procedure (s) 11/25/24  Arrival Time 8:30 AM  Time of Procedure(s) 9:30 AM   [facility]   Type of Rx Prep sent to patient: Suprep  Instructions provided to patient via [facility]    Patient was informed on the following information and verbalized understanding. Screening questionnaire reviewed with patient and complete. If procedure requires anesthesia, a responsible adult needs to be present to accompany the patient home, patient cannot drive after receiving anesthesia. Appointment details are tentative, especially check-in time. Patient will receive a prep-op call 7 days prior to confirm check-in time for procedure. If applicable the patient should contact their pharmacy to verify Rx for procedure prep is ready for pick-up. Patient was advised to call the scheduling department at [phone] if pharmacy states no Rx is available. Patient was advised to call the endoscopy scheduling department if any questions or concerns arise.      [facility] Endoscopy Scheduling Department

## 2024-11-24 RX ORDER — LIDOCAINE HYDROCHLORIDE 10 MG/ML
1 INJECTION, SOLUTION EPIDURAL; INFILTRATION; INTRACAUDAL; PERINEURAL ONCE
OUTPATIENT
Start: 2024-11-24 | End: 2024-11-24

## 2024-11-25 ENCOUNTER — HOSPITAL ENCOUNTER (OUTPATIENT)
Facility: HOSPITAL | Age: 39
Discharge: HOME | End: 2024-11-25
Attending: STUDENT IN AN ORGANIZED HEALTH CARE EDUCATION/TRAINING PROGRAM | Admitting: STUDENT IN AN ORGANIZED HEALTH CARE EDUCATION/TRAINING PROGRAM
Payer: COMMERCIAL

## 2024-11-25 VITALS
DIASTOLIC BLOOD PRESSURE: 57 MMHG | HEART RATE: 82 BPM | SYSTOLIC BLOOD PRESSURE: 117 MMHG | OXYGEN SATURATION: 100 % | RESPIRATION RATE: 20 BRPM | TEMPERATURE: 98 F

## 2024-11-25 DIAGNOSIS — R10.9 ABDOMINAL PAIN: ICD-10-CM

## 2024-11-25 LAB
B-HCG UR QL: NEGATIVE
CTP QC/QA: YES

## 2024-11-25 PROCEDURE — 81025 URINE PREGNANCY TEST: CPT | Performed by: STUDENT IN AN ORGANIZED HEALTH CARE EDUCATION/TRAINING PROGRAM

## 2024-11-25 PROCEDURE — 43239 EGD BIOPSY SINGLE/MULTIPLE: CPT | Mod: ,,, | Performed by: STUDENT IN AN ORGANIZED HEALTH CARE EDUCATION/TRAINING PROGRAM

## 2024-11-25 PROCEDURE — 63600175 PHARM REV CODE 636 W HCPCS: Performed by: STUDENT IN AN ORGANIZED HEALTH CARE EDUCATION/TRAINING PROGRAM

## 2024-11-25 PROCEDURE — 37000008 HC ANESTHESIA 1ST 15 MINUTES: Performed by: STUDENT IN AN ORGANIZED HEALTH CARE EDUCATION/TRAINING PROGRAM

## 2024-11-25 PROCEDURE — 43239 EGD BIOPSY SINGLE/MULTIPLE: CPT | Performed by: STUDENT IN AN ORGANIZED HEALTH CARE EDUCATION/TRAINING PROGRAM

## 2024-11-25 PROCEDURE — 45378 DIAGNOSTIC COLONOSCOPY: CPT | Performed by: STUDENT IN AN ORGANIZED HEALTH CARE EDUCATION/TRAINING PROGRAM

## 2024-11-25 PROCEDURE — 37000009 HC ANESTHESIA EA ADD 15 MINS: Performed by: STUDENT IN AN ORGANIZED HEALTH CARE EDUCATION/TRAINING PROGRAM

## 2024-11-25 PROCEDURE — 88305 TISSUE EXAM BY PATHOLOGIST: CPT | Performed by: PATHOLOGY

## 2024-11-25 PROCEDURE — 25000003 PHARM REV CODE 250: Performed by: STUDENT IN AN ORGANIZED HEALTH CARE EDUCATION/TRAINING PROGRAM

## 2024-11-25 PROCEDURE — 45378 DIAGNOSTIC COLONOSCOPY: CPT | Mod: ,,, | Performed by: STUDENT IN AN ORGANIZED HEALTH CARE EDUCATION/TRAINING PROGRAM

## 2024-11-25 RX ORDER — PROPOFOL 10 MG/ML
VIAL (ML) INTRAVENOUS
Status: DISCONTINUED | OUTPATIENT
Start: 2024-11-25 | End: 2024-11-25

## 2024-11-25 RX ORDER — PROPOFOL 10 MG/ML
VIAL (ML) INTRAVENOUS
Status: DISCONTINUED
Start: 2024-11-25 | End: 2024-11-25 | Stop reason: HOSPADM

## 2024-11-25 RX ORDER — LIDOCAINE HYDROCHLORIDE 20 MG/ML
INJECTION, SOLUTION EPIDURAL; INFILTRATION; INTRACAUDAL; PERINEURAL
Status: DISCONTINUED
Start: 2024-11-25 | End: 2024-11-25 | Stop reason: HOSPADM

## 2024-11-25 RX ORDER — SODIUM CHLORIDE 9 MG/ML
INJECTION, SOLUTION INTRAVENOUS CONTINUOUS
Status: DISCONTINUED | OUTPATIENT
Start: 2024-11-25 | End: 2024-11-25 | Stop reason: HOSPADM

## 2024-11-25 RX ORDER — LIDOCAINE HYDROCHLORIDE 20 MG/ML
INJECTION INTRAVENOUS
Status: DISCONTINUED | OUTPATIENT
Start: 2024-11-25 | End: 2024-11-25

## 2024-11-25 RX ADMIN — PROPOFOL 30 MG: 10 INJECTION, EMULSION INTRAVENOUS at 10:11

## 2024-11-25 RX ADMIN — PROPOFOL 50 MG: 10 INJECTION, EMULSION INTRAVENOUS at 10:11

## 2024-11-25 RX ADMIN — PROPOFOL 100 MG: 10 INJECTION, EMULSION INTRAVENOUS at 09:11

## 2024-11-25 RX ADMIN — PROPOFOL 30 MG: 10 INJECTION, EMULSION INTRAVENOUS at 09:11

## 2024-11-25 RX ADMIN — LIDOCAINE HYDROCHLORIDE 100 MG: 20 INJECTION, SOLUTION INTRAVENOUS at 09:11

## 2024-11-25 RX ADMIN — PROPOFOL 20 MG: 10 INJECTION, EMULSION INTRAVENOUS at 09:11

## 2024-11-25 RX ADMIN — SODIUM CHLORIDE: 0.9 INJECTION, SOLUTION INTRAVENOUS at 09:11

## 2024-11-25 NOTE — TRANSFER OF CARE
Anesthesia Transfer of Care Note    Patient: Dina Hutton    Procedure(s) Performed: Procedure(s) (LRB):  EGD (ESOPHAGOGASTRODUODENOSCOPY) (N/A)  COLONOSCOPY (N/A)    Patient location: GI    Anesthesia Type: general    Transport from OR: Transported from OR on room air with adequate spontaneous ventilation    Post pain: adequate analgesia    Post assessment: no apparent anesthetic complications and tolerated procedure well    Post vital signs: stable    Level of consciousness: sedated    Nausea/Vomiting: no nausea/vomiting    Complications: none    Transfer of care protocol was followed      Last vitals: Visit Vitals  BP (!) 103/55 (BP Location: Left arm, Patient Position: Lying)   Pulse 83   Temp 36.7 °C (98 °F) (Temporal)   Resp 20   SpO2 96%   Breastfeeding No

## 2024-11-25 NOTE — PROVATION PATIENT INSTRUCTIONS
Discharge Summary/Instructions after an Endoscopic Procedure  Patient Name: Dina Hutton  Patient MRN: [id number]  Patient YOB: 1985 Monday, November 25, 2024  Valarie Damico MD  Dear patient,  As a result of recent federal legislation (The Federal Cures Act), you may   receive lab or pathology results from your procedure in your [facility]   account before your physician is able to contact you. Your physician or   their representative will relay the results to you with their   recommendations at their soonest availability.  Thank you,  RESTRICTIONS:  During your procedure today, you received medications for sedation.  These   medications may affect your judgment, balance and coordination.  Therefore,   for 24 hours, you have the following restrictions:   - DO NOT drive a car, operate machinery, make legal/financial decisions,   sign important papers or drink alcohol.    ACTIVITY:  Today: no heavy lifting, straining or running due to procedural   sedation/anesthesia.  The following day: return to full activity including work.  DIET:  Eat and drink normally unless instructed otherwise.     TREATMENT FOR COMMON SIDE EFFECTS:  - Mild abdominal pain, nausea, belching, bloating or excessive gas:  rest,   eat lightly and use a heating pad.  - Sore Throat: treat with throat lozenges and/or gargle with warm salt   water.  - Because air was used during the procedure, expelling large amounts of air   from your rectum or belching is normal.  - If a bowel prep was taken, you may not have a bowel movement for 1-3 days.    This is normal.  SYMPTOMS TO WATCH FOR AND REPORT TO YOUR PHYSICIAN:  1. Abdominal pain or bloating, other than gas cramps.  2. Chest pain.  3. Back pain.  4. Signs of infection such as: chills or fever occurring within 24 hours   after the procedure.  5. Rectal bleeding, which would show as bright red, maroon, or black stools.   (A tablespoon of blood from the rectum is not serious, especially if    hemorrhoids are present.)  6. Vomiting.  7. Weakness or dizziness.  GO DIRECTLY TO THE NEAREST EMERGENCY ROOM IF YOU HAVE ANY OF THE FOLLOWING:      Difficulty breathing              Chills and/or fever over 101 F   Persistent vomiting and/or vomiting blood   Severe abdominal pain   Severe chest pain   Black, tarry stools   Bleeding- more than one tablespoon   Any other symptom or condition that you feel may need urgent attention  Your doctor recommends these additional instructions:  If any biopsies were taken, your doctors clinic will contact you in 1 to 2   weeks with any results.  - Patient has a contact number available for emergencies.  The signs and   symptoms of potential delayed complications were discussed with the   patient.  Return to normal activities tomorrow.  Written discharge   instructions were provided to the patient.   - Discharge patient to home.   - Resume previous diet.   - Continue present medications.   - Await pathology results.   - Perform a colonoscopy today.  For questions, problems or results please call your physician - Valarie Damico MD at Work:  [phone].  [facility] can be reached at [phone]     IF A COMPLICATION OR EMERGENCY SITUATION ARISES AND YOU ARE UNABLE TO REACH   YOUR PHYSICIAN - GO DIRECTLY TO THE EMERGENCY ROOM.  MD Valarie Goldstein MD  11/25/2024 10:15:14 AM  This report has been verified and signed electronically.  Dear patient,  As a result of recent federal legislation (The Federal Cures Act), you may   receive lab or pathology results from your procedure in your [facility]   account before your physician is able to contact you. Your physician or   their representative will relay the results to you with their   recommendations at their soonest availability.  Thank you,  PROVATION

## 2024-11-25 NOTE — H&P
Endoscopy History and Physical    PCP - Lona Pham MD    Procedure - EGD/colonoscopy  ASA - per anesthesia  Mallampati - per anesthesia  Plan of anesthesia - MAC    HPI:  This is a 39 y.o. female here for evaluation of : abdominal pain    ROS:  Constitutional: No fevers, chills  CV: No chest pain  Pulm: No cough  Ophtho: No vision changes  GI: see HPI  Derm: No rash    Medical History:  has a past medical history of Anemia, Breast abscess (04/25/2020), Depression, GERD (gastroesophageal reflux disease), GERD (gastroesophageal reflux disease), History of fourth degree perineal laceration (08/21/2019), Hypoglycemia, Hypoglycemia, Mastitis (04/20/2020), Mental disorder, PONV (postoperative nausea and vomiting), Thyroid cancer, and Thyroid disease.    Surgical History:  has a past surgical history that includes Incision and drainage of breast (Left, 04/23/2020); Colonoscopy; Esophagogastroduodenoscopy; Thyroidectomy (Bilateral, 09/29/2020); Breast cyst excision; and Esophagogastroduodenoscopy (N/A, 06/14/2021).    Family History: family history includes Arthritis in her maternal grandmother; Hyperlipidemia in her mother; Hypertension in her mother; Mental illness in her maternal grandmother; Stroke in her mother.. Otherwise no colon cancer, inflammatory bowel disease, or GI malignancies.    Social History:  reports that she has never smoked. She has never used smokeless tobacco. She reports current alcohol use of about 1.0 standard drink of alcohol per week. She reports that she does not use drugs.    Review of patient's allergies indicates:   Allergen Reactions    Enbrel [etanercept] Swelling and Rash    Humira [adalimumab] Itching and Other (See Comments)     Body-wide burning, itching, numbness, tingling, and arthralgia ~30 min after injection    Lansoprazole Shortness Of Breath    Vancomycin analogues Hives    Bactrim [sulfamethoxazole-trimethoprim] Nausea And Vomiting    Sulfa (sulfonamide antibiotics)  Rash    Vicodin [hydrocodone-acetaminophen] Nausea Only     Patient states she is fine with other pain medication       Medications:   No medications prior to admission.         Vital Signs: There were no vitals filed for this visit.    General Appearance: Well appearing in no acute distress  Eyes:    No scleral icterus  ENT: atraumatic  Abdomen: Soft, nondistended  Extremities: no tenderness  Skin: normal color    Labs:  Lab Results   Component Value Date    WBC 9.68 11/12/2024    HGB 12.7 11/12/2024    HCT 39.5 11/12/2024     11/12/2024    CHOL 223 (H) 09/27/2024    TRIG 146 09/27/2024    HDL 67 09/27/2024    ALT 11 11/12/2024    AST 12 11/12/2024     11/12/2024    K 4.6 11/12/2024     11/12/2024    CREATININE 0.7 11/12/2024    BUN 16 11/12/2024    CO2 26 11/12/2024    TSH 0.376 (L) 09/27/2024    HGBA1C 5.5 09/27/2024       I have explained the risks and benefits of endoscopy procedures to the patient/their POA including but not limited to bleeding, perforation, infection, and death.  The patient/POA was asked if they understand and allowed to ask any further questions to their satisfaction.    Valarie Damico MD

## 2024-11-25 NOTE — PROVATION PATIENT INSTRUCTIONS
Discharge Summary/Instructions after an Endoscopic Procedure  Patient Name: Dina Hutton  Patient MRN: [id number]  Patient YOB: 1985 Monday, November 25, 2024  Valarie Damico MD  Dear patient,  As a result of recent federal legislation (The Federal Cures Act), you may   receive lab or pathology results from your procedure in your [facility]   account before your physician is able to contact you. Your physician or   their representative will relay the results to you with their   recommendations at their soonest availability.  Thank you,  RESTRICTIONS:  During your procedure today, you received medications for sedation.  These   medications may affect your judgment, balance and coordination.  Therefore,   for 24 hours, you have the following restrictions:   - DO NOT drive a car, operate machinery, make legal/financial decisions,   sign important papers or drink alcohol.    ACTIVITY:  Today: no heavy lifting, straining or running due to procedural   sedation/anesthesia.  The following day: return to full activity including work.  DIET:  Eat and drink normally unless instructed otherwise.     TREATMENT FOR COMMON SIDE EFFECTS:  - Mild abdominal pain, nausea, belching, bloating or excessive gas:  rest,   eat lightly and use a heating pad.  - Sore Throat: treat with throat lozenges and/or gargle with warm salt   water.  - Because air was used during the procedure, expelling large amounts of air   from your rectum or belching is normal.  - If a bowel prep was taken, you may not have a bowel movement for 1-3 days.    This is normal.  SYMPTOMS TO WATCH FOR AND REPORT TO YOUR PHYSICIAN:  1. Abdominal pain or bloating, other than gas cramps.  2. Chest pain.  3. Back pain.  4. Signs of infection such as: chills or fever occurring within 24 hours   after the procedure.  5. Rectal bleeding, which would show as bright red, maroon, or black stools.   (A tablespoon of blood from the rectum is not serious, especially if    hemorrhoids are present.)  6. Vomiting.  7. Weakness or dizziness.  GO DIRECTLY TO THE NEAREST EMERGENCY ROOM IF YOU HAVE ANY OF THE FOLLOWING:      Difficulty breathing              Chills and/or fever over 101 F   Persistent vomiting and/or vomiting blood   Severe abdominal pain   Severe chest pain   Black, tarry stools   Bleeding- more than one tablespoon   Any other symptom or condition that you feel may need urgent attention  Your doctor recommends these additional instructions:  If any biopsies were taken, your doctors clinic will contact you in 1 to 2   weeks with any results.  - Patient has a contact number available for emergencies.  The signs and   symptoms of potential delayed complications were discussed with the   patient.  Return to normal activities tomorrow.  Written discharge   instructions were provided to the patient.   - Discharge patient to home.   - Resume previous diet.   - Continue present medications.   - Repeat colonoscopy at age 45 for screening purposes.   - Workup for abdominal pain unremarkable so far. Suspect visceral   hypersensitivity. Consider TCA for neuromodulation.  For questions, problems or results please call your physician - Valarie Damico MD at Work:  [phone].  [facility] can be reached at [phone]     IF A COMPLICATION OR EMERGENCY SITUATION ARISES AND YOU ARE UNABLE TO REACH   YOUR PHYSICIAN - GO DIRECTLY TO THE EMERGENCY ROOM.  MD Valarie Goldstein MD  11/25/2024 10:18:04 AM  This report has been verified and signed electronically.  Dear patient,  As a result of recent federal legislation (The Federal Cures Act), you may   receive lab or pathology results from your procedure in your [facility]   account before your physician is able to contact you. Your physician or   their representative will relay the results to you with their   recommendations at their soonest availability.  Thank you,  PROVATION

## 2024-11-25 NOTE — PLAN OF CARE
Procedure and recovery complete. Awake and alert. No c/o pain or discomfort. Resp. Even and unlabored.  at bedside. Discharge instructions given. Verbalized understanding. No acute distress noted.

## 2024-11-27 ENCOUNTER — PATIENT MESSAGE (OUTPATIENT)
Dept: GASTROENTEROLOGY | Facility: HOSPITAL | Age: 39
End: 2024-11-27
Payer: COMMERCIAL

## 2024-11-27 LAB
FINAL PATHOLOGIC DIAGNOSIS: NORMAL
GROSS: NORMAL
Lab: NORMAL

## 2024-11-28 NOTE — ANESTHESIA POSTPROCEDURE EVALUATION
Anesthesia Post Evaluation    Patient: Thu JESUS Hutton    Procedure(s) Performed: Procedure(s) (LRB):  EGD (ESOPHAGOGASTRODUODENOSCOPY) (N/A)  COLONOSCOPY (N/A)    Final Anesthesia Type: general      Patient location during evaluation: GI PACU  Patient participation: Yes- Able to Participate  Level of consciousness: awake and alert and oriented  Post-procedure vital signs: reviewed and stable  Pain management: adequate  Airway patency: patent    PONV status at discharge: No PONV  Anesthetic complications: no      Cardiovascular status: blood pressure returned to baseline and hemodynamically stable  Respiratory status: unassisted, spontaneous ventilation and room air  Hydration status: euvolemic  Follow-up not needed.              Vitals Value Taken Time   /57 11/25/24 1047   Temp 36.7 °C (98 °F) 11/25/24 1017   Pulse 82 11/25/24 1047   Resp 20 11/25/24 1047   SpO2 100 % 11/25/24 1047         Event Time   Out of Recovery 11:08:47         Pain/Flor Score: No data recorded

## 2024-12-02 ENCOUNTER — PATIENT MESSAGE (OUTPATIENT)
Dept: GASTROENTEROLOGY | Facility: CLINIC | Age: 39
End: 2024-12-02
Payer: COMMERCIAL

## 2024-12-04 ENCOUNTER — HOSPITAL ENCOUNTER (OUTPATIENT)
Dept: RADIOLOGY | Facility: HOSPITAL | Age: 39
Discharge: HOME | End: 2024-12-04
Attending: INTERNAL MEDICINE
Payer: COMMERCIAL

## 2024-12-04 DIAGNOSIS — C73 THYROID CANCER: Chronic | ICD-10-CM

## 2024-12-04 PROCEDURE — 76536 US EXAM OF HEAD AND NECK: CPT | Mod: 26,,, | Performed by: INTERNAL MEDICINE

## 2024-12-04 PROCEDURE — 76536 US EXAM OF HEAD AND NECK: CPT | Mod: TC

## 2024-12-05 ENCOUNTER — PATIENT MESSAGE (OUTPATIENT)
Dept: ENDOCRINOLOGY | Facility: CLINIC | Age: 39
End: 2024-12-05
Payer: COMMERCIAL

## 2024-12-05 DIAGNOSIS — E89.0 POSTSURGICAL HYPOTHYROIDISM: Primary | Chronic | ICD-10-CM

## 2024-12-06 DIAGNOSIS — Z30.45 ENCOUNTER FOR SURVEILLANCE OF TRANSDERMAL PATCH HORMONAL CONTRACEPTIVE DEVICE: ICD-10-CM

## 2024-12-06 RX ORDER — NORELGESTROMIN AND ETHINYL ESTRADIOL 35; 150 UG/MG; UG/MG
1 PATCH TRANSDERMAL WEEKLY
Qty: 12 PATCH | Refills: 0 | Status: SHIPPED | OUTPATIENT
Start: 2024-12-06 | End: 2025-12-06

## 2024-12-09 ENCOUNTER — LAB VISIT (OUTPATIENT)
Dept: LAB | Facility: HOSPITAL | Age: 39
End: 2024-12-09
Attending: INTERNAL MEDICINE
Payer: COMMERCIAL

## 2024-12-09 DIAGNOSIS — E89.0 POSTSURGICAL HYPOTHYROIDISM: Chronic | ICD-10-CM

## 2024-12-09 LAB — TSH SERPL DL<=0.005 MIU/L-ACNC: 0.42 UIU/ML (ref 0.4–4)

## 2024-12-09 PROCEDURE — 36415 COLL VENOUS BLD VENIPUNCTURE: CPT | Performed by: INTERNAL MEDICINE

## 2024-12-09 PROCEDURE — 84443 ASSAY THYROID STIM HORMONE: CPT | Performed by: INTERNAL MEDICINE

## 2024-12-10 NOTE — PROGRESS NOTES
Subjective:      Patient ID: Dina Hutton is a 39 y.o. female.    Chief Complaint: Widespread joint pain and swelling    HPI: 39 y.o. female with PMHx of thyroid cancer s/p thyroidectomy (2020), seropositive RA (+CCP, MARIE+ 1:320), fibromyalgia presents for follow up of joint pain and swelling. Previously on leflunomide, SSZ, humira and Enbrel but was unable to tolerate due to SE. Former patient of Dr. Zacarias, last seen in Aug. At that time, she was she was started on MTX 5 tabs weekly x 4 weeks and then advised to increase to 7 tabs weekly until follow up.     She reports hand pain and swelling had improved significantly on MTX. However, 2 weeks ago the hand swelling started to come back. And now having pain and swelling in the bilateral knees and feet causing her to be unable to ambulate. She is requesting a scooter to help with getting around in the house and outside. She uses salonpas patches, Advil and Tylenol for the pain, which helps to a certain extent. Her morning stiffness is still significant but has improved since MTX. Now + AM stiffness, lasting 1-3 hrs, prior was lasting several hrs into the afternoon or evening.     Reports when she initially started MTX was having nausea and headaches - has improved. With folic acid notes nausea, headaches, disoriented (improved), sleepiness. She has been taking 1/2 tab for the past month and has notice some improvement in symptoms. She took prenatal vitamins in the past with no SE.      Birth control/protection: Partner-Vasectomy    Initial Presentation:   She was in usual state of health until 2020 during 2nd pregnancy. In 2020 while she was pregnant- she had headaches, dizziness, fatigue, abdominal pain, tingling and numbness. Work up at this point revealed thyroid cancer. Then she underwent thyroidectomy. 2 months after sx she developed widespread joint pain -  neck, hands, wrists, knees, elbows and ankles. Swelling in the feet, wrists and fingers. Morning  stiffness-for few hours  Also, she experienced tingling and numbness in the hand w/ lose  strength. EMG/NCS was b/l UE negative     Prior treatments:  - Leflunomide: had headaches  - SSZ: nausea, headaches and fatigue  - Humira: injection site reactions and nausea  - Enbrel: severe injection site reactions which was itchy    Review of Systems   Constitutional:  Negative for fever and unexpected weight change.   HENT:  Positive for mouth sores. Negative for trouble swallowing.    Eyes:  Negative for redness.   Respiratory:  Positive for shortness of breath. Negative for cough.    Cardiovascular:  Negative for chest pain.   Gastrointestinal:  Negative for constipation and diarrhea.   Genitourinary:  Negative for dysuria and genital sores.   Skin:  Positive for rash.   Neurological:  Positive for headaches.   Hematological:  Bruises/bleeds easily.       Objective:   /73 (Patient Position: Sitting)   Pulse 80   Wt 60.9 kg (134 lb 4.2 oz)   BMI 29.05 kg/m²   Physical Exam     11/29/2022 12/27/2023 8/6/2024 12/11/2024   Tender (BRITT-28) 8 / 28 22 / 28 16 / 28 16 / 28    Swollen (BRITT-28) 1 / 28  2 / 28  3 / 28  3 / 28    Provider Global 70 / 100 90 / 100 60 / 100 80 / 100   Patient Global 85 / 100 90 / 100 75 / 100 80 / 100   ESR 42 mm/hr -- 67 mm/hr 49 mm/hr   CRP 4.7 mg/L 9.3 mg/L 15.4 mg/L 22.1 mg/L   BRITT-28 (ESR) 5.67 (High disease activity) -- 6.72 (High disease activity) 6.57 (High disease activity)   BRITT-28 (CRP) 4.64 (Moderate disease activity) 6.08 (High disease activity) 5.74 (High disease activity) 5.94 (High disease activity)   CDAI Score 24.5  42  32.5  35      Prior serologies:   ESR 42  CRP nml  RF neg  CCP 9.4- mildly elevated   MARIE positive  1: 320 centromere pattern  SSA neg  UA,UPCR nml  DELFIN neg  Complements nml  APLAS panel neg  Scleroderma myomarker panel neg  Ck nml  Aldolase slightly elevated 7.8     Assessment:     39 y.o. female with PMHx of thyroid cancer s/p thyroidectomy (2020),  seropositive RA (+CCP, MARIE+ 1:320), fibromyalgia presents for follow up of joint pain and swelling. Previously on leflunomide, SSZ, humira and Enbrel but was unable to tolerate due to SE. Former patient of Dr. Zacarias, last seen in Aug. At that time was started on MTX, currently on MTX 17.5 mg weekly. Initially noticed improvement in swelling and joint pain but over the past 2 weeks having more pain and swelling in multiple joints. TJ 16, SJ 4 (bilateral wrist, Rt 3rd PIP and Rt ankle). Symptoms multifactorial d/t fibromyalgia and inflammatory arthritis. Also, has + MARIE, centromere pattern - no evidence of SLE or scleroderma.     1. 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 Seronegative arthritis    4. Fibromyalgia      Plan:     - Increase MTX to 8 tabs every Friday - advised to take 4 tabs in the AM and 4 tabs in the PM  - Continue folic acid 1 mg daily; advised to try a different brand  - Start Cymbalta 30 mg once daily for 1 week, then increase to 60 mg once daily as tolerated  - Check CBC, CMP, ESR, CRP today  - Referral to PMR for scooter wheelchair evaluation    This patient was examined with my attending. Plan discussed with the patient. Return to clinic in 3 months     Problem List Items Addressed This Visit          Immunology/Multi System    Rheumatoid arthritis involving multiple sites with positive rheumatoid factor - Primary    Relevant Medications    methotrexate 2.5 MG Tab       Orthopedic    Fibromyalgia    Relevant Medications    DULoxetine (CYMBALTA) 30 MG capsule     Other Visit Diagnoses       Immunodeficiency due to drug therapy        Seronegative arthritis              Talita Bernstein MD  PGY-4, Rheumatology Fellow

## 2024-12-11 ENCOUNTER — OFFICE VISIT (OUTPATIENT)
Dept: RHEUMATOLOGY | Facility: CLINIC | Age: 39
End: 2024-12-11
Payer: COMMERCIAL

## 2024-12-11 ENCOUNTER — LAB VISIT (OUTPATIENT)
Dept: LAB | Facility: HOSPITAL | Age: 39
End: 2024-12-11
Attending: STUDENT IN AN ORGANIZED HEALTH CARE EDUCATION/TRAINING PROGRAM
Payer: COMMERCIAL

## 2024-12-11 VITALS
SYSTOLIC BLOOD PRESSURE: 124 MMHG | HEART RATE: 80 BPM | DIASTOLIC BLOOD PRESSURE: 73 MMHG | BODY MASS INDEX: 29.05 KG/M2 | WEIGHT: 134.25 LBS

## 2024-12-11 DIAGNOSIS — R76.8 CYCLIC CITRULLINATED PEPTIDE (CCP) ANTIBODY POSITIVE: ICD-10-CM

## 2024-12-11 DIAGNOSIS — Z79.899 IMMUNODEFICIENCY DUE TO DRUG THERAPY: ICD-10-CM

## 2024-12-11 DIAGNOSIS — D84.821 IMMUNODEFICIENCY DUE TO DRUG THERAPY: ICD-10-CM

## 2024-12-11 DIAGNOSIS — M06.9 RHEUMATOID ARTHRITIS INVOLVING MULTIPLE JOINTS: Primary | ICD-10-CM

## 2024-12-11 DIAGNOSIS — M79.7 FIBROMYALGIA: ICD-10-CM

## 2024-12-11 DIAGNOSIS — M06.9 RHEUMATOID ARTHRITIS INVOLVING MULTIPLE JOINTS: ICD-10-CM

## 2024-12-11 LAB
ALBUMIN SERPL BCP-MCNC: 3.6 G/DL (ref 3.5–5.2)
ALP SERPL-CCNC: 69 U/L (ref 40–150)
ALT SERPL W/O P-5'-P-CCNC: 28 U/L (ref 10–44)
ANION GAP SERPL CALC-SCNC: 7 MMOL/L (ref 8–16)
AST SERPL-CCNC: 18 U/L (ref 10–40)
BASOPHILS # BLD AUTO: 0.07 K/UL (ref 0–0.2)
BASOPHILS NFR BLD: 0.7 % (ref 0–1.9)
BILIRUB SERPL-MCNC: 0.4 MG/DL (ref 0.1–1)
BUN SERPL-MCNC: 11 MG/DL (ref 6–20)
CALCIUM SERPL-MCNC: 9 MG/DL (ref 8.7–10.5)
CHLORIDE SERPL-SCNC: 109 MMOL/L (ref 95–110)
CO2 SERPL-SCNC: 23 MMOL/L (ref 23–29)
CREAT SERPL-MCNC: 0.6 MG/DL (ref 0.5–1.4)
CRP SERPL-MCNC: 15.1 MG/L (ref 0–8.2)
DIFFERENTIAL METHOD BLD: ABNORMAL
EOSINOPHIL # BLD AUTO: 0.3 K/UL (ref 0–0.5)
EOSINOPHIL NFR BLD: 2.4 % (ref 0–8)
ERYTHROCYTE [DISTWIDTH] IN BLOOD BY AUTOMATED COUNT: 13.9 % (ref 11.5–14.5)
ERYTHROCYTE [SEDIMENTATION RATE] IN BLOOD BY PHOTOMETRIC METHOD: 35 MM/HR (ref 0–36)
EST. GFR  (NO RACE VARIABLE): >60 ML/MIN/1.73 M^2
GLUCOSE SERPL-MCNC: 93 MG/DL (ref 70–110)
HCT VFR BLD AUTO: 40.9 % (ref 37–48.5)
HGB BLD-MCNC: 12.6 G/DL (ref 12–16)
IMM GRANULOCYTES # BLD AUTO: 0.03 K/UL (ref 0–0.04)
IMM GRANULOCYTES NFR BLD AUTO: 0.3 % (ref 0–0.5)
LYMPHOCYTES # BLD AUTO: 1.9 K/UL (ref 1–4.8)
LYMPHOCYTES NFR BLD: 17.6 % (ref 18–48)
MCH RBC QN AUTO: 27 PG (ref 27–31)
MCHC RBC AUTO-ENTMCNC: 30.8 G/DL (ref 32–36)
MCV RBC AUTO: 88 FL (ref 82–98)
MONOCYTES # BLD AUTO: 0.5 K/UL (ref 0.3–1)
MONOCYTES NFR BLD: 5.1 % (ref 4–15)
NEUTROPHILS # BLD AUTO: 7.9 K/UL (ref 1.8–7.7)
NEUTROPHILS NFR BLD: 73.9 % (ref 38–73)
NRBC BLD-RTO: 0 /100 WBC
PLATELET # BLD AUTO: ABNORMAL K/UL (ref 150–450)
PMV BLD AUTO: ABNORMAL FL (ref 9.2–12.9)
POTASSIUM SERPL-SCNC: 4 MMOL/L (ref 3.5–5.1)
PROT SERPL-MCNC: 7.2 G/DL (ref 6–8.4)
RBC # BLD AUTO: 4.66 M/UL (ref 4–5.4)
SODIUM SERPL-SCNC: 139 MMOL/L (ref 136–145)
WBC # BLD AUTO: 10.65 K/UL (ref 3.9–12.7)

## 2024-12-11 PROCEDURE — 99999 PR PBB SHADOW E&M-EST. PATIENT-LVL V: CPT | Mod: PBBFAC,,, | Performed by: STUDENT IN AN ORGANIZED HEALTH CARE EDUCATION/TRAINING PROGRAM

## 2024-12-11 PROCEDURE — 3074F SYST BP LT 130 MM HG: CPT | Mod: CPTII,S$GLB,, | Performed by: STUDENT IN AN ORGANIZED HEALTH CARE EDUCATION/TRAINING PROGRAM

## 2024-12-11 PROCEDURE — 85025 COMPLETE CBC W/AUTO DIFF WBC: CPT | Performed by: STUDENT IN AN ORGANIZED HEALTH CARE EDUCATION/TRAINING PROGRAM

## 2024-12-11 PROCEDURE — 85652 RBC SED RATE AUTOMATED: CPT | Performed by: STUDENT IN AN ORGANIZED HEALTH CARE EDUCATION/TRAINING PROGRAM

## 2024-12-11 PROCEDURE — 3008F BODY MASS INDEX DOCD: CPT | Mod: CPTII,S$GLB,, | Performed by: STUDENT IN AN ORGANIZED HEALTH CARE EDUCATION/TRAINING PROGRAM

## 2024-12-11 PROCEDURE — 86140 C-REACTIVE PROTEIN: CPT | Performed by: STUDENT IN AN ORGANIZED HEALTH CARE EDUCATION/TRAINING PROGRAM

## 2024-12-11 PROCEDURE — 99214 OFFICE O/P EST MOD 30 MIN: CPT | Mod: S$GLB,,, | Performed by: STUDENT IN AN ORGANIZED HEALTH CARE EDUCATION/TRAINING PROGRAM

## 2024-12-11 PROCEDURE — 1160F RVW MEDS BY RX/DR IN RCRD: CPT | Mod: CPTII,S$GLB,, | Performed by: STUDENT IN AN ORGANIZED HEALTH CARE EDUCATION/TRAINING PROGRAM

## 2024-12-11 PROCEDURE — 3044F HG A1C LEVEL LT 7.0%: CPT | Mod: CPTII,S$GLB,, | Performed by: STUDENT IN AN ORGANIZED HEALTH CARE EDUCATION/TRAINING PROGRAM

## 2024-12-11 PROCEDURE — 3078F DIAST BP <80 MM HG: CPT | Mod: CPTII,S$GLB,, | Performed by: STUDENT IN AN ORGANIZED HEALTH CARE EDUCATION/TRAINING PROGRAM

## 2024-12-11 PROCEDURE — 36415 COLL VENOUS BLD VENIPUNCTURE: CPT | Performed by: STUDENT IN AN ORGANIZED HEALTH CARE EDUCATION/TRAINING PROGRAM

## 2024-12-11 PROCEDURE — 1159F MED LIST DOCD IN RCRD: CPT | Mod: CPTII,S$GLB,, | Performed by: STUDENT IN AN ORGANIZED HEALTH CARE EDUCATION/TRAINING PROGRAM

## 2024-12-11 PROCEDURE — 80053 COMPREHEN METABOLIC PANEL: CPT | Performed by: STUDENT IN AN ORGANIZED HEALTH CARE EDUCATION/TRAINING PROGRAM

## 2024-12-11 RX ORDER — DULOXETIN HYDROCHLORIDE 30 MG/1
30 CAPSULE, DELAYED RELEASE ORAL DAILY
Qty: 60 CAPSULE | Refills: 2 | Status: SHIPPED | OUTPATIENT
Start: 2024-12-11

## 2024-12-11 RX ORDER — METHOTREXATE 2.5 MG/1
20 TABLET ORAL
Qty: 96 TABLET | Refills: 0 | Status: SHIPPED | OUTPATIENT
Start: 2024-12-11

## 2024-12-14 NOTE — PROGRESS NOTES
I have reviewed the notes, assessments, and/or procedures performed this visit, and I concur with the documentation.  She still has some evidence of active rheumatoid arthritis but a lot of her pain is due to fibromyalgia.  We increased her methotrexate to 20 mg weekly.  We also placed her on Cymbalta for her fibromyalgia.  She will return to see us in 3 months.

## 2025-01-03 ENCOUNTER — OFFICE VISIT (OUTPATIENT)
Dept: GASTROENTEROLOGY | Facility: CLINIC | Age: 40
End: 2025-01-03
Payer: COMMERCIAL

## 2025-01-03 VITALS
HEIGHT: 57 IN | SYSTOLIC BLOOD PRESSURE: 125 MMHG | DIASTOLIC BLOOD PRESSURE: 76 MMHG | HEART RATE: 88 BPM | WEIGHT: 134.69 LBS | BODY MASS INDEX: 29.06 KG/M2

## 2025-01-03 DIAGNOSIS — R19.4 BOWEL HABIT CHANGES: ICD-10-CM

## 2025-01-03 DIAGNOSIS — R14.0 BLOATING: Primary | ICD-10-CM

## 2025-01-03 PROCEDURE — 99999 PR PBB SHADOW E&M-EST. PATIENT-LVL III: CPT | Mod: PBBFAC,,,

## 2025-01-03 RX ORDER — OMEPRAZOLE 40 MG/1
40 CAPSULE, DELAYED RELEASE ORAL DAILY
COMMUNITY

## 2025-01-03 NOTE — PROGRESS NOTES
"GENERAL GI PATIENT INTAKE:    COVID symptoms in the last 7 days (runny nose, sore throat, congestion, cough, fever): No  PCP: Lona Pham  If not PCP-  number given to establish [phone]: N/A    ALLERGIES REVIEWED:  Yes    CHIEF COMPLAINT:    Chief Complaint   Patient presents with    Gastroesophageal Reflux    Abdominal Pain       VITAL SIGNS:  /76   Pulse 88   Ht 4' 9" (1.448 m)   Wt 61.1 kg (134 lb 11.2 oz)   BMI 29.15 kg/m²      Change in medical, surgical, family or social history: No      REVIEWED MEDICATION LIST RECONCILED INCLUDING ABOVE MEDS:  Yes     "

## 2025-01-03 NOTE — PROGRESS NOTES
Gastroenterology Clinic Consultation Note    Reason for Visit:  The primary encounter diagnosis was Bloating. A diagnosis of Bowel habit changes was also pertinent to this visit.    PCP:   Lona Pham.         Initial HPI   This is a 39 y.o. female presenting for GI followup for her ongoing GI symptoms. Her symptoms this visit include post prandial bloating and fluctuating bowel habits. Reports that her appetite has been good, but does feel like her good is slow to digest. Does report post prandial abdominal distention. Denies nausea and vomiting and unintentional weight loss, abdominal pain. Her reflux symptoms have been stable on Omeprazole. Did not feel like she had symptom improvement with Nexium so she switched back.     Bowel movements do fluctuate from soft to constipation. Rarely feels like she has normal BM. Not currently taking fiber supplementation. Denies blood in her stool.     ROS:  Review of Systems   Constitutional:  Negative for chills, fever, malaise/fatigue and weight loss.   HENT:  Negative for sore throat.    Eyes:  Negative for redness.   Gastrointestinal:  Positive for abdominal pain, constipation and diarrhea. Negative for blood in stool, heartburn, melena, nausea and vomiting.   Skin:  Negative for rash.   Neurological:  Negative for dizziness, loss of consciousness and weakness.        Medical History:  has a past medical history of Anemia, Breast abscess (04/25/2020), Depression, GERD (gastroesophageal reflux disease), GERD (gastroesophageal reflux disease), History of fourth degree perineal laceration (08/21/2019), Hypoglycemia, Hypoglycemia, Mastitis (04/20/2020), Mental disorder, PONV (postoperative nausea and vomiting), Thyroid cancer, and Thyroid disease.    Surgical History:  has a past surgical history that includes Incision and drainage of breast (Left, 04/23/2020); Colonoscopy;  Esophagogastroduodenoscopy; Thyroidectomy (Bilateral, 09/29/2020); Breast cyst excision; Esophagogastroduodenoscopy (N/A, 06/14/2021); Esophagogastroduodenoscopy (N/A, 11/25/2024); and Colonoscopy (N/A, 11/25/2024).    Family History: family history includes Arthritis in her maternal grandmother; Hyperlipidemia in her mother; Hypertension in her mother; Mental illness in her maternal grandmother; Stroke in her mother..       Review of patient's allergies indicates:   Allergen Reactions    Enbrel [etanercept] Swelling and Rash    Humira [adalimumab] Itching and Other (See Comments)     Body-wide burning, itching, numbness, tingling, and arthralgia ~30 min after injection    Lansoprazole Shortness Of Breath    Vancomycin analogues Hives    Bactrim [sulfamethoxazole-trimethoprim] Nausea And Vomiting    Sulfa (sulfonamide antibiotics) Rash    Vicodin [hydrocodone-acetaminophen] Nausea Only     Patient states she is fine with other pain medication       Current Outpatient Medications on File Prior to Visit   Medication Sig Dispense Refill    ascorbic acid/collagen hydr (COLLAGEN PLUS VITAMIN C ORAL) Take by mouth.      CALCIUM ORAL Take by mouth.      CRANBERRY ORAL Take by mouth.      dicyclomine (BENTYL) 10 MG capsule Take 1 capsule (10 mg total) by mouth 3 (three) times daily as needed (abdominal pain). 120 capsule 0    DULoxetine (CYMBALTA) 30 MG capsule Take 1 capsule (30 mg total) by mouth once daily. Then increase to 60 mg once daily as tolerated 60 capsule 2    famotidine (PEPCID) 40 MG tablet Take 1 tablet (40 mg total) by mouth once daily. 30 tablet 11    folic acid (FOLVITE) 1 MG tablet Take 1 tablet (1 mg total) by mouth once daily. 30 tablet 4    levothyroxine (TIROSINT) 75 mcg Cap Take 75 mcg by mouth once daily. Dispense as generic levothyroxine capsules 90 capsule 3    liothyronine (CYTOMEL) 5 MCG Tab Take a half-tablet (2.5 mcg) by mouth twice daily 90 tablet 3    methotrexate 2.5 MG Tab Take 8 tablets  "(20 mg total) by mouth every 7 days. 96 tablet 0    mv-min/iron/folic/calcium/vitK (WOMEN'S MULTIVITAMIN ORAL) Take by mouth.      norelgestromin-ethinyl estradiol (XULANE) 150-35 mcg/24 hr Place 1 patch onto the skin once a week. 12 patch 0    ondansetron (ZOFRAN-ODT) 4 MG TbDL Take 1 tablet (4 mg total) by mouth every 6 (six) hours as needed (nausea or vomiting). 15 tablet 0    [DISCONTINUED] esomeprazole (NEXIUM) 40 MG capsule Take 1 capsule (40 mg total) by mouth before breakfast. 30 capsule 11    albuterol (PROVENTIL HFA) 90 mcg/actuation inhaler Inhale 2 puffs into the lungs every 6 (six) hours as needed for Wheezing or Shortness of Breath. Rescue 18 g 0    omeprazole (PRILOSEC) 40 MG capsule Take 40 mg by mouth once daily.      [DISCONTINUED] methotrexate 2.5 MG Tab Take 5 tablets (12.5 mg total) by mouth every 7 days. 20 tablet 3     No current facility-administered medications on file prior to visit.         Objective Findings:    Vital Signs:  /76   Pulse 88   Ht 4' 9" (1.448 m)   Wt 61.1 kg (134 lb 11.2 oz)   BMI 29.15 kg/m²   Body mass index is 29.15 kg/m².    Physical Exam:  Physical Exam  Vitals and nursing note reviewed.   Constitutional:       Appearance: She is normal weight. She is not ill-appearing.   HENT:      Mouth/Throat:      Mouth: Mucous membranes are moist.      Pharynx: Oropharynx is clear.   Eyes:      General: No scleral icterus.  Abdominal:      General: Abdomen is flat. Bowel sounds are normal. There is no distension.      Palpations: Abdomen is soft. There is no mass.      Tenderness: There is no abdominal tenderness.      Hernia: No hernia is present.   Skin:     General: Skin is warm and dry.      Capillary Refill: Capillary refill takes less than 2 seconds.      Coloration: Skin is not jaundiced or pale.   Neurological:      Mental Status: She is alert and oriented to person, place, and time. Mental status is at baseline.             Labs:  Lab Results   Component Value " Date    WBC 10.65 12/11/2024    HGB 12.6 12/11/2024    HCT 40.9 12/11/2024    PLT SEE COMMENT 12/11/2024    CRP 15.1 (H) 12/11/2024    CHOL 223 (H) 09/27/2024    TRIG 146 09/27/2024    HDL 67 09/27/2024    ALKPHOS 69 12/11/2024    LIPASE 19 10/11/2024    ALT 28 12/11/2024    AST 18 12/11/2024     12/11/2024    K 4.0 12/11/2024     12/11/2024    CREATININE 0.6 12/11/2024    BUN 11 12/11/2024    CO2 23 12/11/2024    TSH 0.419 12/09/2024    HGBA1C 5.5 09/27/2024       Imaging reviewed: NM HIDA 10/31/2024  Impression:     The cystic and common osman ducts are patent.     Normal contraction of the gallbladder with  GBEF of 70  %  after novasource oral stimulation        Electronically signed by:Lore Joyce  Date:                                            10/31/2024  Time:                                           13:38    CT Enterography 10/17/2024  Impression:     Mild wall thickening of the pyloric segment of the stomach.  The findings may be seen with gastritis.     Stable 5 mm pulmonary nodule.     Stable subcentimeter hypodensity in the right hepatic dome, too small to characterize.     Electronically signed by resident: Mason Mohamud  Date:                                            10/18/2024  Time:                                           10:03     Electronically signed by:Mando Preston MD  Date:                                            10/18/2024  Time:                                           11:44    US Abdomen 12/11/2023  Impression:     No acute sonographic abnormality identified in the upper abdomen.     Small volume biliary sludge.        Electronically signed by:Alejandro Costa MD  Date:                                            12/11/2023  Time:                                           20:42    Endoscopy reviewed: Colonoscopy 11/25/2024  Impression:            - The examined portion of the ileum was normal.                          - The entire examined colon is normal on direct                           and retroflexion views.                          - No specimens collected.   Recommendation:        - Patient has a contact number available for                          emergencies. The signs and symptoms of potential                          delayed complications were discussed with the                          patient. Return to normal activities tomorrow.                          Written discharge instructions were provided to                          the patient.                          - Discharge patient to home.                          - Resume previous diet.                          - Continue present medications.                          - Repeat colonoscopy at age 45 for screening                          purposes.                          - Workup for abdominal pain unremarkable so far.                          Suspect visceral hypersensitivity. Consider TCA                          for neuromodulation.   MD Valarie Goldstein MD   11/25/2024 10:18:04 AM     EGD 11/25/2024  Impression:            - Normal esophagus.                          - Normal stomach. Biopsied.                          - Normal examined duodenum. Biopsied.   Recommendation:        - Patient has a contact number available for                          emergencies. The signs and symptoms of potential                          delayed complications were discussed with the                          patient. Return to normal activities tomorrow.                          Written discharge instructions were provided to                          the patient.                          - Discharge patient to home.                          - Resume previous diet.                          - Continue present medications.                          - Await pathology results.                          - Perform a colonoscopy today.   MD Valarie Goldstein MD   11/25/2024 10:15:14 AM      Assessment:  1. Bloating    2. Bowel habit changes             Plan:  Recommended Antrantil for bloating. Can consider Rifaximin if symptoms continue, but would recommend patient see GI dietician to help discuss diet modifications to improve her symptoms.  Recommended fiber supplementation.       Thank you for allowing me to participate in this patient's care.    Sincerely,     Soha Rubin NP  Gastroenterology Department  [facility]

## 2025-01-03 NOTE — PATIENT INSTRUCTIONS
--Antrantil as needed for bloating  --Enzymedical Gold is a good all around digestive enzyme.     [facility]  High Fiber Diet    20-30 grams of fiber per day is recommended    Fiber cereal = 5 grams (Raisin Bran, Shredded Wheat, Grape Nuts)  Konsyl 1 teaspoon = 6 grams  Metamucil 1 tablespoon = 3 grams  Citrucel 1 tablespoon = 2 grams  Fiber Choice = 3-4 per day    Drink at least 4-5 glasses of liquids per day or the fiber can be constipating rather then stimulating to your gut.  Boil and bake potatoes in their skin. Eat the skin, too.  Include fresh fruits and raw vegetables in your daily diet. Raw fruits and vegetables have more useful fiber than those that have been peeled, cooked, pureed, or otherwise processed.  Eat a wide variety of fibrous foods in reasonable amounts. Increase fiber intake slowly especially if you have been on a low-fiber diet.  Eat more legumes-peas, beans, soybeans.  For snacks, try dried fruit, whole wheat and rye crackers.  Avoid instant-cook hot cereals. Use the longer cooking cereals.  Use bran whenever possible. Sprinkle it on top of cereal, mix it into mashed potatoes or hamburger meat, or use it in combination dishes such as meat loaf.   Substitute whole grain, whole wheat and bran products for white flour products.  Eat slowly and chew your food thoroughly.    Psyllium has been shown to improve both constipation and diarrhea. Fiber may increase bulk of stool and may also include alterations in the production of gaseous fermentation products and changes to the gut microbiome. As some patients may experience increased bloating and gas, we suggest a low starting dose of psyllium that provides approximately 3 to 4 grams of soluble fiber per day. The soluble fiber content of psyllium products (ie, per packet, teaspoon, or pill) varies widely; refer to product-specific label to determine dose. The dose should then be slowly titrated up based on response to treatment.      Foods High in Fiber    This diet furnishes adequate amounts of all the essential nutrients needed by the body and a very liberal fiber or roughage content. Roughage is indigestible fiber found in fruits, vegetables and whole grain cereals. It provides bulk to the large intestine and, accompanied by an adequate fluid intake, is a stimulant to elimination. Regular eating and elimination habits are vital to good health.     Fruits:  Use all fruits and juices liberally; fresh, cooked, dried or canned. Eat fruit raw and with skins when possible. Have at least four servings of fruit daily including a citrus fruit and a stewed dried fruit. Hard seeds of fruits (berries, figs, Grapes, mangoes, tomatoes) etc. may be removed.    Vegetables: Use all vegetables liberally. Green leafy vegetables, such as cabbage, spinach, lettuce, broccoli, and other greens are particularly good.    Potato: As desired. Serve baked frequently and eat the skin. Other starchy foods such as rice, macaroni, etc., may be occasionally substituted. Chew popcorn well and do not eat hard kernels.    Meat, Fish, Poultry: One or two servings daily.    Eggs: One daily if you are not on a low cholesterol diet.    Milk: Include at least one-half pint daily. Whole milk or skimmed may be used.     Cereals: Use whole grain breads and cereals such as bran, bran flakes, grape nut flakes, puffed wheat, oatmeal, Wheaties, etc., as much as possible. Refined breaks and cereals are not restricted; however, they do not contain the bulk necessary for this diet.     Sugars, Sweets: Use very moderately. Depend on fruit as dessert.    Fats: Use butter or margarine as desired. Oil or dressing on salads as desired. Fried foods may be used in moderation. Nuts may be eaten if you chew them well and may be ground or finely chopped for cooking.   Sample Menu                                                                                 Breakfast                           Lunch  Orange juice, 4 ounces                                                Vegetable soup                    Stewed fruit                                                                 Fresh fruit plate with cottage cheese  Shredded wheat                                                           Whole wheat toast  Scrambled eggs                                                           Butter  Whole wheat toast                                                       Coffee or tea  Dinner                                                                         Bedtime  Large glass tomato juice                                             1 glass milk  Roast beef                                                                   stewed fruit  Baked potato with skin  Buttered spinach  Raw vegetable salad  Baked apple with skin   Coffee or tea

## 2025-01-14 ENCOUNTER — PATIENT MESSAGE (OUTPATIENT)
Dept: ENDOCRINOLOGY | Facility: CLINIC | Age: 40
End: 2025-01-14
Payer: COMMERCIAL

## 2025-01-14 DIAGNOSIS — C73 THYROID CANCER: Chronic | ICD-10-CM

## 2025-01-14 DIAGNOSIS — E89.0 POSTSURGICAL HYPOTHYROIDISM: Chronic | ICD-10-CM

## 2025-01-14 RX ORDER — LEVOTHYROXINE SODIUM 75 UG/1
75 CAPSULE ORAL DAILY
Qty: 90 CAPSULE | Refills: 3 | Status: SHIPPED | OUTPATIENT
Start: 2025-01-14 | End: 2026-01-14

## 2025-01-16 ENCOUNTER — OFFICE VISIT (OUTPATIENT)
Dept: PRIMARY CARE CLINIC | Facility: CLINIC | Age: 40
End: 2025-01-16
Payer: COMMERCIAL

## 2025-01-16 VITALS
HEART RATE: 91 BPM | OXYGEN SATURATION: 100 % | SYSTOLIC BLOOD PRESSURE: 110 MMHG | HEIGHT: 57 IN | DIASTOLIC BLOOD PRESSURE: 70 MMHG | BODY MASS INDEX: 28.78 KG/M2 | WEIGHT: 133.38 LBS

## 2025-01-16 DIAGNOSIS — T23.271A PARTIAL THICKNESS BURN OF RIGHT WRIST, INITIAL ENCOUNTER: ICD-10-CM

## 2025-01-16 DIAGNOSIS — Z79.899 DRUG-INDUCED IMMUNODEFICIENCY: Primary | ICD-10-CM

## 2025-01-16 DIAGNOSIS — H57.11 PAIN OF RIGHT EYE: ICD-10-CM

## 2025-01-16 DIAGNOSIS — D84.821 DRUG-INDUCED IMMUNODEFICIENCY: Primary | ICD-10-CM

## 2025-01-16 PROBLEM — R20.2 PARESTHESIA: Status: RESOLVED | Noted: 2023-01-03 | Resolved: 2025-01-16

## 2025-01-16 PROBLEM — R26.89 POOR BALANCE: Status: RESOLVED | Noted: 2023-03-21 | Resolved: 2025-01-16

## 2025-01-16 PROBLEM — R76.8 POSITIVE ANTI-CCP TEST: Status: RESOLVED | Noted: 2022-12-18 | Resolved: 2025-01-16

## 2025-01-16 PROBLEM — Z78.9 DECREASED ACTIVITIES OF DAILY LIVING (ADL): Status: RESOLVED | Noted: 2023-03-22 | Resolved: 2025-01-16

## 2025-01-16 PROBLEM — R68.89 DECREASED FUNCTIONAL ACTIVITY TOLERANCE: Status: RESOLVED | Noted: 2023-03-22 | Resolved: 2025-01-16

## 2025-01-16 PROBLEM — F40.298 FEAR OF PAIN: Status: RESOLVED | Noted: 2023-03-22 | Resolved: 2025-01-16

## 2025-01-16 PROBLEM — R29.818 AURA: Status: RESOLVED | Noted: 2023-01-03 | Resolved: 2025-01-16

## 2025-01-16 PROBLEM — K21.9 GERD (GASTROESOPHAGEAL REFLUX DISEASE): Status: RESOLVED | Noted: 2021-06-14 | Resolved: 2025-01-16

## 2025-01-16 PROBLEM — R26.9 GAIT DISTURBANCE: Status: RESOLVED | Noted: 2023-03-21 | Resolved: 2025-01-16

## 2025-01-16 PROBLEM — R52 PAIN AGGRAVATED BY ACTIVITIES OF DAILY LIVING: Status: RESOLVED | Noted: 2023-05-08 | Resolved: 2025-01-16

## 2025-01-16 PROBLEM — R55 SYNCOPE: Status: RESOLVED | Noted: 2021-10-10 | Resolved: 2025-01-16

## 2025-01-16 PROBLEM — R29.898 DECREASED GRIP STRENGTH: Status: RESOLVED | Noted: 2023-03-22 | Resolved: 2025-01-16

## 2025-01-16 PROCEDURE — 99999 PR PBB SHADOW E&M-EST. PATIENT-LVL III: CPT | Mod: PBBFAC,,, | Performed by: INTERNAL MEDICINE

## 2025-01-16 PROCEDURE — 3074F SYST BP LT 130 MM HG: CPT | Mod: CPTII,S$GLB,, | Performed by: INTERNAL MEDICINE

## 2025-01-16 PROCEDURE — 3008F BODY MASS INDEX DOCD: CPT | Mod: CPTII,S$GLB,, | Performed by: INTERNAL MEDICINE

## 2025-01-16 PROCEDURE — 3078F DIAST BP <80 MM HG: CPT | Mod: CPTII,S$GLB,, | Performed by: INTERNAL MEDICINE

## 2025-01-16 PROCEDURE — 99214 OFFICE O/P EST MOD 30 MIN: CPT | Mod: S$GLB,,, | Performed by: INTERNAL MEDICINE

## 2025-01-16 RX ORDER — ERYTHROMYCIN 5 MG/G
OINTMENT OPHTHALMIC 3 TIMES DAILY
Qty: 3.5 G | Refills: 0 | Status: SHIPPED | OUTPATIENT
Start: 2025-01-16

## 2025-01-16 NOTE — PROGRESS NOTES
[facility] Note    Chief Complaint      Chief Complaint   Patient presents with    Follow-up     Burn on arm/wrist, eye?scratch     History of Present Illness      Dina Hutton is a 39 y.o. female who presents today for wrist burn.  Patient comes to appointment alone.  Rheum: Amandeep    Burned wrist while carmelizing sugar.  .    Scratched eye and has some pain when she closes eye lid.  Eyes feel itchy.    RA: Seeing Dr. Bernstein, only taking 7 tablets of MTX, has been having more joint pain.  Had adverse effects with Enbrel.  Depression/anxiety: on cymbalta, anxiety still there.  No panic attacks.  Thyroid cancer: seeing Dr. Gold, on levothyroxine and cytomel. TSH normal 12/2024.      Problem List Items Addressed This Visit       Drug-induced immunodeficiency - Primary     Other Visit Diagnoses       Partial thickness burn of right wrist, initial encounter        Pain of right eye                Health Maintenance   Topic Date Due    COVID-19 Vaccine (1) Never done    Cervical Cancer Screening  07/26/2024    Hemoglobin A1c (Diabetic Prevention Screening)  09/27/2027    TETANUS VACCINE  03/20/2030    RSV Vaccine (Age 60+ and Pregnant patients) (1 - 1-dose 75+ series) 08/07/2060    Hepatitis C Screening  Completed    HIV Screening  Completed    Pneumococcal Vaccines (Age 0-49)  Completed    Lipid Panel  Completed    Influenza Vaccine  Addressed       Past Medical History:   Diagnosis Date    Anemia     Breast abscess 04/25/2020    Depression     GERD (gastroesophageal reflux disease)     GERD (gastroesophageal reflux disease)     History of fourth degree perineal laceration 08/21/2019    Hypoglycemia     Hypoglycemia     Mastitis 04/20/2020    Mental disorder     depression    PONV (postoperative nausea and vomiting)     Thyroid cancer     Thyroid disease        Past Surgical History:   Procedure Laterality Date    BREAST CYST EXCISION      COLONOSCOPY      COLONOSCOPY N/A 11/25/2024     Procedure: COLONOSCOPY;  Surgeon: Valarie Damico MD;  Location: [facility] ENDO;  Service: Endoscopy;  Laterality: N/A;  referral a labat / suprep/prep inst sent to pt via portal    ESOPHAGOGASTRODUODENOSCOPY      ESOPHAGOGASTRODUODENOSCOPY N/A 06/14/2021    Procedure: EGD (ESOPHAGOGASTRODUODENOSCOPY);  Surgeon: Farooq Cullen MD;  Location: [facility] ENDO;  Service: Endoscopy;  Laterality: N/A;  ongoing epigastric pain, burning, nausea, vomiting  Lives on [facility], want first available but if there are openings on [place], would prefer that location  cOVID test on 6/11/21 at [facility]    ESOPHAGOGASTRODUODENOSCOPY N/A 11/25/2024    Procedure: EGD (ESOPHAGOGASTRODUODENOSCOPY);  Surgeon: Valarie Damico MD;  Location: [facility] ENDO;  Service: Endoscopy;  Laterality: N/A;  referral a labat/ prep inst sent to pt via portal    INCISION AND DRAINAGE OF BREAST Left 04/23/2020    Procedure: INCISION AND DRAINAGE, BREAST;  Surgeon: Mason Rubalcava III, MD;  Location: [facility] OR;  Service: General;  Laterality: Left;    THYROIDECTOMY Bilateral 09/29/2020    Procedure: THYROIDECTOMY with central neck dissection;  Surgeon: Kayla Lovelace MD;  Location: [facility] OR;  Service: General;  Laterality: Bilateral;       family history includes Arthritis in her maternal grandmother; Hyperlipidemia in her mother; Hypertension in her mother; Mental illness in her maternal grandmother; Stroke in her mother.    Social History     Tobacco Use    Smoking status: Never    Smokeless tobacco: Never    Tobacco comments:     Allergies to it   Substance Use Topics    Alcohol use: Yes     Alcohol/week: 1.0 standard drink of alcohol     Types: 1 Glasses of wine per week     Comment: On special occasions or events    Drug use: Never       Review of Systems   HENT:  Positive for hearing loss.    Eyes:  Negative for discharge.   Respiratory:  Negative for wheezing.    Cardiovascular:  Negative for chest pain and palpitations.   Gastrointestinal:  Positive  for constipation and diarrhea. Negative for blood in stool and vomiting.   Genitourinary:  Negative for dysuria and hematuria.   Musculoskeletal:  Positive for neck pain.   Neurological:  Positive for weakness and headaches.   Endo/Heme/Allergies:  Negative for polydipsia.        Outpatient Encounter Medications as of 1/16/2025   Medication Sig Dispense Refill    ascorbic acid/collagen hydr (COLLAGEN PLUS VITAMIN C ORAL) Take by mouth.      CALCIUM ORAL Take by mouth.      CRANBERRY ORAL Take by mouth.      dicyclomine (BENTYL) 10 MG capsule Take 1 capsule (10 mg total) by mouth 3 (three) times daily as needed (abdominal pain). 120 capsule 0    DULoxetine (CYMBALTA) 30 MG capsule Take 1 capsule (30 mg total) by mouth once daily. Then increase to 60 mg once daily as tolerated 60 capsule 2    famotidine (PEPCID) 40 MG tablet Take 1 tablet (40 mg total) by mouth once daily. 30 tablet 11    folic acid (FOLVITE) 1 MG tablet Take 1 tablet (1 mg total) by mouth once daily. 30 tablet 4    levothyroxine (TIROSINT) 75 mcg Cap Take 75 mcg by mouth once daily. 90 capsule 3    liothyronine (CYTOMEL) 5 MCG Tab Take a half-tablet (2.5 mcg) by mouth twice daily 90 tablet 3    methotrexate 2.5 MG Tab Take 8 tablets (20 mg total) by mouth every 7 days. 96 tablet 0    mv-min/iron/folic/calcium/vitK (WOMEN'S MULTIVITAMIN ORAL) Take by mouth.      norelgestromin-ethinyl estradiol (XULANE) 150-35 mcg/24 hr Place 1 patch onto the skin once a week. 12 patch 0    omeprazole (PRILOSEC) 40 MG capsule Take 40 mg by mouth once daily.      ondansetron (ZOFRAN-ODT) 4 MG TbDL Take 1 tablet (4 mg total) by mouth every 6 (six) hours as needed (nausea or vomiting). 15 tablet 0    albuterol (PROVENTIL HFA) 90 mcg/actuation inhaler Inhale 2 puffs into the lungs every 6 (six) hours as needed for Wheezing or Shortness of Breath. Rescue 18 g 0    erythromycin (ROMYCIN) ophthalmic ointment Place into the right eye 3 (three) times daily. 3.5 g 0     No  "facility-administered encounter medications on file as of 1/16/2025.        Review of patient's allergies indicates:   Allergen Reactions    Enbrel [etanercept] Swelling and Rash    Humira [adalimumab] Itching and Other (See Comments)     Body-wide burning, itching, numbness, tingling, and arthralgia ~30 min after injection    Lansoprazole Shortness Of Breath    Vancomycin analogues Hives    Bactrim [sulfamethoxazole-trimethoprim] Nausea And Vomiting    Sulfa (sulfonamide antibiotics) Rash    Vicodin [hydrocodone-acetaminophen] Nausea Only     Patient states she is fine with other pain medication       Physical Exam      Vital Signs  Pulse: 91  SpO2: 100 %  BP: 110/70  BP Location: Right arm  Patient Position: Sitting  Height and Weight  Height: 4' 9" (144.8 cm)  Weight: 60.5 kg (133 lb 6.1 oz)  BSA (Calculated - sq m): 1.56 sq meters  BMI (Calculated): 28.9  Weight in (lb) to have BMI = 25: 115.3]    Physical Exam  Constitutional:       Appearance: She is well-developed.   HENT:      Head: Normocephalic and atraumatic.   Cardiovascular:      Rate and Rhythm: Normal rate and regular rhythm.      Heart sounds: Normal heart sounds. No murmur heard.  Pulmonary:      Effort: Pulmonary effort is normal. No respiratory distress.      Breath sounds: Normal breath sounds.   Abdominal:      General: There is no distension.      Palpations: Abdomen is soft.      Tenderness: There is no abdominal tenderness. There is no guarding.   Skin:     General: Skin is warm and dry.   Neurological:      Mental Status: She is alert. Mental status is at baseline.   Psychiatric:         Behavior: Behavior normal.          Laboratory:  CBC:  Recent Labs   Lab Result Units 11/12/24  0845 12/11/24  1110   WBC K/uL 9.68 10.65   RBC M/uL 4.52 4.66   Hemoglobin g/dL 12.7 12.6   Hematocrit % 39.5 40.9   Platelets K/uL 366 SEE COMMENT   MCV fL 87 88   MCH pg 28.1 27.0   MCHC g/dL 32.2 30.8*     CMP:  Recent Labs   Lab Result Units 11/12/24  0845 " "12/11/24  1110   Glucose mg/dL 112* 93   Calcium mg/dL 9.5 9.0   Albumin g/dL 3.5 3.6   Total Protein g/dL 7.5 7.2   Sodium mmol/L 142 139   Potassium mmol/L 4.6 4.0   CO2 mmol/L 26 23   Chloride mmol/L 106 109   BUN mg/dL 16 11   Alkaline Phosphatase U/L 57 69   ALT U/L 11 28   AST U/L 12 18   Total Bilirubin mg/dL 0.3 0.4     URINALYSIS:  No results for input(s): "COLORU", "CLARITYU", "SPECGRAV", "PHUR", "PROTEINUA", "GLUCOSEU", "BILIRUBINCON", "BLOODU", "WBCU", "RBCU", "BACTERIA", "MUCUS", "NITRITE", "LEUKOCYTESUR", "UROBILINOGEN", "HYALINECASTS" in the last 2160 hours.   LIPIDS:  Recent Labs   Lab Result Units 12/09/24  1258   TSH uIU/mL 0.419     TSH:  Recent Labs   Lab Result Units 12/09/24  1258   TSH uIU/mL 0.419     A1C:  No results for input(s): "HGBA1C" in the last 2160 hours.    Radiology:  No results found in the last 30 days.     Assessment/Plan     Dina Hutton is a 39 y.o.female with:    1. Drug-induced immunodeficiency    2. Partial thickness burn of right wrist, initial encounter    3. Pain of right eye      -likely corneal abrasion, rx erythromycin. Avoid contact use  -wrist wound healing with good granulation tissue, will try Mederna to help with scarring. Clean with soap and water.  -Continue current medications and maintain follow up with specialists.    -Follow up in about 1 year (around 1/16/2026) for Annual Visit.       Vida Cunningham MD  [facility]              Answers submitted by the patient for this visit:  Review of Systems Questionnaire (Submitted on 1/15/2025)  activity change: No  unexpected weight change: Yes  rhinorrhea: Yes  trouble swallowing: No  visual disturbance: No  chest tightness: No  polyuria: Yes  difficulty urinating: No  menstrual problem: Yes  joint swelling: Yes  arthralgias: Yes  confusion: No  dysphoric mood: No    "

## 2025-03-12 ENCOUNTER — OFFICE VISIT (OUTPATIENT)
Dept: OBSTETRICS AND GYNECOLOGY | Facility: CLINIC | Age: 40
End: 2025-03-12
Payer: COMMERCIAL

## 2025-03-12 VITALS
WEIGHT: 134.38 LBS | BODY MASS INDEX: 28.99 KG/M2 | DIASTOLIC BLOOD PRESSURE: 64 MMHG | SYSTOLIC BLOOD PRESSURE: 110 MMHG | HEIGHT: 57 IN

## 2025-03-12 DIAGNOSIS — Z30.45 ENCOUNTER FOR SURVEILLANCE OF TRANSDERMAL PATCH HORMONAL CONTRACEPTIVE DEVICE: ICD-10-CM

## 2025-03-12 DIAGNOSIS — Z12.39 ENCOUNTER FOR SCREENING FOR MALIGNANT NEOPLASM OF BREAST, UNSPECIFIED SCREENING MODALITY: Primary | ICD-10-CM

## 2025-03-12 DIAGNOSIS — N84.1 CERVICAL POLYP: ICD-10-CM

## 2025-03-12 PROCEDURE — 1160F RVW MEDS BY RX/DR IN RCRD: CPT | Mod: CPTII,S$GLB,, | Performed by: OBSTETRICS & GYNECOLOGY

## 2025-03-12 PROCEDURE — 3078F DIAST BP <80 MM HG: CPT | Mod: CPTII,S$GLB,, | Performed by: OBSTETRICS & GYNECOLOGY

## 2025-03-12 PROCEDURE — 99395 PREV VISIT EST AGE 18-39: CPT | Mod: S$GLB,,, | Performed by: OBSTETRICS & GYNECOLOGY

## 2025-03-12 PROCEDURE — 99999 PR PBB SHADOW E&M-EST. PATIENT-LVL III: CPT | Mod: PBBFAC,,, | Performed by: OBSTETRICS & GYNECOLOGY

## 2025-03-12 PROCEDURE — 3008F BODY MASS INDEX DOCD: CPT | Mod: CPTII,S$GLB,, | Performed by: OBSTETRICS & GYNECOLOGY

## 2025-03-12 PROCEDURE — 3074F SYST BP LT 130 MM HG: CPT | Mod: CPTII,S$GLB,, | Performed by: OBSTETRICS & GYNECOLOGY

## 2025-03-12 PROCEDURE — 88305 TISSUE EXAM BY PATHOLOGIST: CPT | Performed by: PATHOLOGY

## 2025-03-12 PROCEDURE — 88305 TISSUE EXAM BY PATHOLOGIST: CPT | Mod: 26,,, | Performed by: PATHOLOGY

## 2025-03-12 PROCEDURE — 1159F MED LIST DOCD IN RCRD: CPT | Mod: CPTII,S$GLB,, | Performed by: OBSTETRICS & GYNECOLOGY

## 2025-03-14 ENCOUNTER — RESULTS FOLLOW-UP (OUTPATIENT)
Dept: OBSTETRICS AND GYNECOLOGY | Facility: CLINIC | Age: 40
End: 2025-03-14

## 2025-03-14 LAB
FINAL PATHOLOGIC DIAGNOSIS: NORMAL
GROSS: NORMAL
Lab: NORMAL

## 2025-03-20 ENCOUNTER — OFFICE VISIT (OUTPATIENT)
Dept: PRIMARY CARE CLINIC | Facility: CLINIC | Age: 40
End: 2025-03-20
Payer: COMMERCIAL

## 2025-03-20 DIAGNOSIS — F33.1 MODERATE EPISODE OF RECURRENT MAJOR DEPRESSIVE DISORDER: Primary | ICD-10-CM

## 2025-03-20 DIAGNOSIS — M06.9 RHEUMATOID ARTHRITIS INVOLVING MULTIPLE JOINTS: ICD-10-CM

## 2025-03-20 DIAGNOSIS — R23.2 HOT FLASHES: ICD-10-CM

## 2025-03-20 DIAGNOSIS — B18.1 CHRONIC VIRAL HEPATITIS B WITHOUT DELTA AGENT AND WITHOUT COMA: ICD-10-CM

## 2025-03-20 DIAGNOSIS — C73 THYROID CANCER: ICD-10-CM

## 2025-03-20 DIAGNOSIS — R06.02 SHORTNESS OF BREATH: ICD-10-CM

## 2025-03-20 PROCEDURE — 98005 SYNCH AUDIO-VIDEO EST LOW 20: CPT | Mod: 95,,, | Performed by: STUDENT IN AN ORGANIZED HEALTH CARE EDUCATION/TRAINING PROGRAM

## 2025-03-20 PROCEDURE — 1159F MED LIST DOCD IN RCRD: CPT | Mod: CPTII,95,, | Performed by: STUDENT IN AN ORGANIZED HEALTH CARE EDUCATION/TRAINING PROGRAM

## 2025-03-20 PROCEDURE — 1160F RVW MEDS BY RX/DR IN RCRD: CPT | Mod: CPTII,95,, | Performed by: STUDENT IN AN ORGANIZED HEALTH CARE EDUCATION/TRAINING PROGRAM

## 2025-03-20 RX ORDER — NORELGESTROMIN AND ETHINYL ESTRADIOL 35; 150 UG/MG; UG/MG
1 PATCH TRANSDERMAL WEEKLY
Qty: 12 PATCH | Refills: 3 | Status: SHIPPED | OUTPATIENT
Start: 2025-03-20 | End: 2026-03-20

## 2025-03-20 RX ORDER — CALCIUM CARBONATE 160(400)MG
1 TABLET,CHEWABLE ORAL DAILY
Qty: 1 EACH | Refills: 0 | Status: SHIPPED | OUTPATIENT
Start: 2025-03-20

## 2025-03-20 NOTE — PROGRESS NOTES
Subjective:       Patient ID: Dina Hutton is a 39 y.o. female.    Chief Complaint: RA follow up    The patient location is: [place], [place]  The chief complaint leading to consultation is: RA follow up    Visit type: audiovisual    Face to Face time with patient: 15 minutes  20 minutes of total time spent on the encounter, which includes face to face time and non-face to face time preparing to see the patient (eg, review of tests), Obtaining and/or reviewing separately obtained history, Documenting clinical information in the electronic or other health record, Independently interpreting results (not separately reported) and communicating results to the patient/family/caregiver, or Care coordination (not separately reported).         Each patient to whom he or she provides medical services by telemedicine is:  (1) informed of the relationship between the physician and patient and the respective role of any other health care provider with respect to management of the patient; and (2) notified that he or she may decline to receive medical services by telemedicine and may withdraw from such care at any time.    Notes:     HPI      RA:  Dx in 2023  Seeing Dr. Bernstein, only taking 7 tablets of MTX, has been having more joint pain.  Had adverse effects with Enbrel.  Drug induced immunodeficiency 2/2 therapy with methotrexate  Previously tx with leflunomide 20mg   On Folvite    + intermittent recent sob  + intermittent hot flashes     Anxiety/Depression:   On cymbalta  Previously rx Lexapro 10mg, but has not been taking  Denies HI/SI/AVH  + ongoing sxs of anxiey     Hx hypothyroidism: 2/2 thyroid resection  Liothyronine 5 mcg and tirosint 75mcg daily     Hx Thyroid cancer/Post surgical hypothyroidism:  Stage I at diagnosis in 2020  -Hx of Thyroid cancer with removal.  -In remission  -On levothyroxine 88mcg daily  Tolerating medication well.  Denies symptoms of Fatigue, Cold intolerance, Weight gain, Constipation, Dry skin,  Myalgia  Due for TSH recheck.     Chronic viral hepatitis B without delta agent and without coma    Review of Systems   Constitutional:  Positive for unexpected weight change. Negative for activity change.   HENT:  Negative for hearing loss, rhinorrhea and trouble swallowing.    Eyes:  Negative for discharge and visual disturbance.   Respiratory:  Positive for wheezing. Negative for chest tightness.    Cardiovascular:  Negative for chest pain and palpitations.   Gastrointestinal:  Negative for blood in stool, constipation, diarrhea and vomiting.   Endocrine: Positive for polyuria. Negative for polydipsia.   Genitourinary:  Negative for difficulty urinating, dysuria, hematuria and menstrual problem.   Musculoskeletal:  Positive for arthralgias, joint swelling and neck pain.   Neurological:  Positive for weakness and headaches.   Psychiatric/Behavioral:  Negative for confusion and dysphoric mood.          Objective:      Physical Exam  HENT:      Head: Normocephalic and atraumatic.      Nose: Nose normal.      Mouth/Throat:      Mouth: Mucous membranes are moist.      Pharynx: Oropharynx is clear.   Eyes:      Extraocular Movements: Extraocular movements intact.      Conjunctiva/sclera: Conjunctivae normal.      Pupils: Pupils are equal, round, and reactive to light.   Pulmonary:      Effort: Pulmonary effort is normal.   Musculoskeletal:      Cervical back: Normal range of motion.   Skin:     Findings: No lesion or rash.   Neurological:      General: No focal deficit present.      Mental Status: She is alert and oriented to person, place, and time.      Motor: No weakness.   Psychiatric:         Mood and Affect: Mood normal.         Thought Content: Thought content normal.         Assessment:       Problem List Items Addressed This Visit          Psychiatric    Moderate episode of recurrent major depressive disorder - Primary       Oncology    Thyroid cancer (Chronic)       GI    Chronic viral hepatitis B without  delta agent and without coma     Other Visit Diagnoses         Rheumatoid arthritis involving multiple joints        Relevant Medications    walker (ULTRA-LIGHT ROLLATOR) Misc      Hot flashes        Relevant Orders    FOLLICLE STIMULATING HORMONE    Iron and TIBC    Vitamin D      Shortness of breath        Relevant Orders    X-Ray Chest PA And Lateral            Plan:       Diagnoses and all orders for this visit:    Moderate episode of recurrent major depressive disorder  Stable on medications, continue regimen    Thyroid cancer  Stable    Chronic viral hepatitis B without delta agent and without coma  Stable    Rheumatoid arthritis involving multiple joints  -     walker (ULTRA-LIGHT ROLLATOR) Misc; 1 each by Misc.(Non-Drug; Combo Route) route once daily.  Stable on medications, continue regimen    Hot flashes  -     FOLLICLE STIMULATING HORMONE; Future  -     Iron and TIBC; Future  -     Vitamin D; Future    Shortness of breath  -     X-Ray Chest PA And Lateral; Future

## 2025-03-20 NOTE — PROGRESS NOTES
"  Chief Complaint: Well Woman Exam, intermenstrual spotting     HPI:      Dina Hutton is a 39 y.o.  who presents for annual exam. She is currently complaining of intermenstrual spotting that happens a few days prior to menses for the past few months.     Ms. Hutton is currently sexually active with a single male partner. She declines STD screening today. Patient has regular monthly menses. Patient's last menstrual period was 2025. She is currently using contraceptive patches for contraception.    Previous Pap: NILM, HPV negative (2023)  Previous Mammogram: BiRads: 1 T-C Score: 7.45% (3/29/21) - Start MMG at age 40.       Ms. Hutton confirms that she wears her seatbelt when riding in the car and does not text while driving.     OB History          2    Para   2    Term   2            AB        Living   2         SAB        IAB        Ectopic        Multiple   0    Live Births   2               ROS:     GENERAL: Denies unintentional weight gain or weight loss. Feeling well overall.   SKIN: Denies rash or lesions.   HEENT: Denies headaches, or vision changes.   CARDIOVASCULAR: Denies palpitations or chest pain.   RESPIRATORY: Denies shortness of breath or dyspnea on exertion.  BREASTS: Denies lumps or nipple discharge.   ABDOMEN: Denies constipation, diarrhea, nausea, vomiting, change in appetite.  URINARY: Denies frequency, dysuria.  NEUROLOGIC: Denies syncope or weakness.   PSYCHIATRIC: Denies uncontrolled depression or anxiety.    Physical Exam:      PHYSICAL EXAM:  /64 (Patient Position: Sitting)   Ht 4' 9" (1.448 m)   Wt 61 kg (134 lb 5.9 oz)   LMP 2025   BMI 29.08 kg/m²   Body mass index is 29.08 kg/m².     APPEARANCE: Well nourished, well developed, in no acute distress.  PSYCH: Appropriate mood and affect.  SKIN: No acne or hirsutism  NECK: Neck symmetric without masses or thyromegaly  NODES: No inguinal, axillary, or supraclavicular lymph node " enlargement  CHEST: Normal respiratory effort.  ABDOMEN: Soft.  No tenderness or masses.   BREASTS: Symmetrical, no visible skin lesions. No palpable masses. No nipple discharge bilaterally.  PELVIC: Normal external genitalia without lesions.  Normal hair distribution.  Adequate perineal body, normal urethral meatus.  Vagina moist and well rugated. Without lesions. Vagina without abnormal discharge.  Cervix without abnormal discharge or tenderness. 6 mm endocervical polyp noted. Easily twisted off with ring forceps. No significant cystocele or rectocele.  Bimanual exam shows uterus to be normal size, regular, mobile and nontender.  Adnexa without masses or tenderness.      Physical Exam     A female chaperone was present for the breast and pelvic exam.     Assessment/Plan:     Encounter for screening for malignant neoplasm of breast, unspecified screening modality  -     Mammo Digital Screening Bilat w/ Jossue (XPD); Future; Expected date: 08/08/2025    Encounter for surveillance of transdermal patch hormonal contraceptive device  -     norelgestromin-ethinyl estradiol (XULANE) 150-35 mcg/24 hr; Place 1 patch onto the skin once a week.  Dispense: 12 patch; Refill: 3    Cervical polyp  -     Specimen to Pathology, Ob/Gyn      Follow up in about 1 year (around 3/12/2026) for Annual Exam.    Counseling:     Patient was counseled today on current ASCCP pap guidelines, the recommendation for yearly physical exams, safe driving habits, breast self awareness and annual mammograms. She is to see her PCP for other health maintenance.       Use of the [facility] Patient Portal discussed and encouraged during today's visit.

## 2025-03-21 ENCOUNTER — HOSPITAL ENCOUNTER (OUTPATIENT)
Dept: RADIOLOGY | Facility: HOSPITAL | Age: 40
Discharge: HOME | End: 2025-03-21
Attending: OBSTETRICS & GYNECOLOGY
Payer: COMMERCIAL

## 2025-03-21 ENCOUNTER — HOSPITAL ENCOUNTER (OUTPATIENT)
Dept: RADIOLOGY | Facility: HOSPITAL | Age: 40
Discharge: HOME | End: 2025-03-21
Attending: STUDENT IN AN ORGANIZED HEALTH CARE EDUCATION/TRAINING PROGRAM
Payer: COMMERCIAL

## 2025-03-21 DIAGNOSIS — R06.02 SHORTNESS OF BREATH: ICD-10-CM

## 2025-03-21 DIAGNOSIS — Z12.39 ENCOUNTER FOR SCREENING FOR MALIGNANT NEOPLASM OF BREAST, UNSPECIFIED SCREENING MODALITY: ICD-10-CM

## 2025-03-21 PROCEDURE — 77063 BREAST TOMOSYNTHESIS BI: CPT | Mod: TC

## 2025-03-21 PROCEDURE — 77067 SCR MAMMO BI INCL CAD: CPT | Mod: 26,,, | Performed by: RADIOLOGY

## 2025-03-21 PROCEDURE — 71046 X-RAY EXAM CHEST 2 VIEWS: CPT | Mod: 26,,, | Performed by: RADIOLOGY

## 2025-03-21 PROCEDURE — 71046 X-RAY EXAM CHEST 2 VIEWS: CPT | Mod: TC,FY

## 2025-03-21 PROCEDURE — 77063 BREAST TOMOSYNTHESIS BI: CPT | Mod: 26,,, | Performed by: RADIOLOGY

## 2025-03-24 ENCOUNTER — RESULTS FOLLOW-UP (OUTPATIENT)
Dept: PRIMARY CARE CLINIC | Facility: CLINIC | Age: 40
End: 2025-03-24

## 2025-04-03 ENCOUNTER — PATIENT MESSAGE (OUTPATIENT)
Dept: PRIMARY CARE CLINIC | Facility: CLINIC | Age: 40
End: 2025-04-03
Payer: COMMERCIAL

## 2025-04-03 DIAGNOSIS — M06.9 RHEUMATOID ARTHRITIS INVOLVING MULTIPLE JOINTS: Primary | ICD-10-CM

## 2025-04-03 NOTE — TELEPHONE ENCOUNTER
3/20 prescribed placed a medication order for a walker (ultra-light rollator)    Pended order for rollator in encounter for review    LOV with Lona Pham MD , 3/20/2025

## 2025-04-07 ENCOUNTER — OFFICE VISIT (OUTPATIENT)
Dept: RHEUMATOLOGY | Facility: CLINIC | Age: 40
End: 2025-04-07
Payer: COMMERCIAL

## 2025-04-07 VITALS
DIASTOLIC BLOOD PRESSURE: 80 MMHG | BODY MASS INDEX: 29.68 KG/M2 | HEIGHT: 57 IN | HEART RATE: 73 BPM | WEIGHT: 137.56 LBS | SYSTOLIC BLOOD PRESSURE: 121 MMHG

## 2025-04-07 DIAGNOSIS — Z79.899 IMMUNODEFICIENCY DUE TO DRUG THERAPY: ICD-10-CM

## 2025-04-07 DIAGNOSIS — M05.9 SEROPOSITIVE RHEUMATOID ARTHRITIS: Primary | ICD-10-CM

## 2025-04-07 DIAGNOSIS — M79.7 FIBROMYALGIA: ICD-10-CM

## 2025-04-07 DIAGNOSIS — D84.821 IMMUNODEFICIENCY DUE TO DRUG THERAPY: ICD-10-CM

## 2025-04-07 DIAGNOSIS — R76.8 HEPATITIS B CORE ANTIBODY POSITIVE: ICD-10-CM

## 2025-04-07 PROCEDURE — 3008F BODY MASS INDEX DOCD: CPT | Mod: CPTII,S$GLB,, | Performed by: STUDENT IN AN ORGANIZED HEALTH CARE EDUCATION/TRAINING PROGRAM

## 2025-04-07 PROCEDURE — 3079F DIAST BP 80-89 MM HG: CPT | Mod: CPTII,S$GLB,, | Performed by: STUDENT IN AN ORGANIZED HEALTH CARE EDUCATION/TRAINING PROGRAM

## 2025-04-07 PROCEDURE — 99999 PR PBB SHADOW E&M-EST. PATIENT-LVL IV: CPT | Mod: PBBFAC,,, | Performed by: STUDENT IN AN ORGANIZED HEALTH CARE EDUCATION/TRAINING PROGRAM

## 2025-04-07 PROCEDURE — G2211 COMPLEX E/M VISIT ADD ON: HCPCS | Mod: S$GLB,,, | Performed by: STUDENT IN AN ORGANIZED HEALTH CARE EDUCATION/TRAINING PROGRAM

## 2025-04-07 PROCEDURE — 99215 OFFICE O/P EST HI 40 MIN: CPT | Mod: S$GLB,,, | Performed by: STUDENT IN AN ORGANIZED HEALTH CARE EDUCATION/TRAINING PROGRAM

## 2025-04-07 PROCEDURE — 3074F SYST BP LT 130 MM HG: CPT | Mod: CPTII,S$GLB,, | Performed by: STUDENT IN AN ORGANIZED HEALTH CARE EDUCATION/TRAINING PROGRAM

## 2025-04-07 PROCEDURE — 1159F MED LIST DOCD IN RCRD: CPT | Mod: CPTII,S$GLB,, | Performed by: STUDENT IN AN ORGANIZED HEALTH CARE EDUCATION/TRAINING PROGRAM

## 2025-04-07 NOTE — PATIENT INSTRUCTIONS
Do labs tomorrow  Increase methotrexate to 8 tablets every week (divide the doses to 4 in the morning and 4 in the evening - take with meals)   Get prenatal vitamins and take 1 per day and  make sure you get at least 800 mcg of folate

## 2025-04-07 NOTE — PROGRESS NOTES
Subjective:      Patient ID: Dina Hutton is a 39 y.o. female.    Chief Complaint: Widespread joint pain and swelling    HPI:     Today 4/2025  -she is new to me. Used to follow up with Dr. Bernstein and Dr. Wolf at [facility]. Last visit in 12/2024. Previously used to follow with Dr. WELLS.   -during last visit, MTX was increased to 20 mg daily and she was also started on cymbalta for fibromyalgia  -labs from 12/2024 showed elevated CRP but trending down and ESR normalized. CBC and CMP normal.   -she reports today that she did not increase MTX to 20 mg as she was afraid of worsening joint pain. She is currently taking 7 tablets of methotrexate.   She takes prenatal vitamins that has folic acid 800 mg  -she did not start cymbalta.   -she has noted swelling in ankles, knees, ankles. Reports MS that could last for 1 pm. She denies too much myalgias.     Disease history     39 y.o. female with PMHx of thyroid cancer s/p thyroidectomy (2020), seropositive RA (+CCP, MARIE+ 1:320), fibromyalgia presents for follow up of joint pain and swelling. Previously on leflunomide, SSZ, humira and Enbrel but was unable to tolerate due to SE. Former patient of Dr. Zacarias, last seen in Aug. At that time, she was she was started on MTX 5 tabs weekly x 4 weeks and then advised to increase to 7 tabs weekly until follow up.     She reports hand pain and swelling had improved significantly on MTX. However, 2 weeks ago the hand swelling started to come back. And now having pain and swelling in the bilateral knees and feet causing her to be unable to ambulate. She is requesting a scooter to help with getting around in the house and outside. She uses salonpas patches, Advil and Tylenol for the pain, which helps to a certain extent. Her morning stiffness is still significant but has improved since MTX. Now + AM stiffness, lasting 1-3 hrs, prior was lasting several hrs into the afternoon or evening.     Reports when she initially started MTX was  "having nausea and headaches - has improved. With folic acid notes nausea, headaches, disoriented (improved), sleepiness. She has been taking 1/2 tab for the past month and has notice some improvement in symptoms. She took prenatal vitamins in the past with no SE.      Birth control/protection: Partner-Vasectomy    Initial Presentation:   She was in usual state of health until 2020 during 2nd pregnancy. In 2020 while she was pregnant- she had headaches, dizziness, fatigue, abdominal pain, tingling and numbness. Work up at this point revealed thyroid cancer. Then she underwent thyroidectomy. 2 months after sx she developed widespread joint pain -  neck, hands, wrists, knees, elbows and ankles. Swelling in the feet, wrists and fingers. Morning stiffness-for few hours  Also, she experienced tingling and numbness in the hand w/ lose  strength. EMG/NCS was b/l UE negative     Prior treatments:  - Leflunomide: had headaches  - SSZ: nausea, headaches and fatigue  - Humira: injection site reactions and nausea  - Enbrel: severe injection site reactions which was itchy    Review of Systems   Constitutional:  Positive for unexpected weight change. Negative for fever.   HENT:  Positive for mouth sores. Negative for trouble swallowing.    Eyes:  Negative for redness.   Respiratory:  Positive for cough and shortness of breath.    Cardiovascular:  Negative for chest pain.   Gastrointestinal:  Positive for constipation and diarrhea.   Genitourinary:  Negative for dysuria and genital sores.   Skin:  Negative for rash.   Neurological:  Positive for headaches.   Hematological:  Bruises/bleeds easily.       Objective:   /80 (BP Location: Right arm, Patient Position: Sitting)   Pulse 73   Ht 4' 9" (1.448 m)   Wt 62.4 kg (137 lb 9.1 oz)   LMP 03/05/2025   BMI 29.77 kg/m²   Physical Exam     12/27/2023 8/6/2024 12/11/2024 4/7/2025   Tender (BRITT-28) 22 / 28 16 / 28 16 / 28 17 / 28    Swollen (BRITT-28) 2 / 28  3 / 28  3 / 28 "  6 / 28    Provider Global 90 / 100 60 / 100 80 / 100 80 / 100   Patient Global 90 / 100 75 / 100 80 / 100 75 / 100   ESR -- 67 mm/hr 49 mm/hr --   CRP 9.3 mg/L 15.4 mg/L 22.1 mg/L --   BRITT-28 (ESR) -- 6.72 (High disease activity) 6.57 (High disease activity) --   BRITT-28 (CRP) 6.08 (High disease activity) 5.74 (High disease activity) 5.94 (High disease activity) --   CDAI Score 42  32.5  35  38.5      Prior serologies:   ESR 42  CRP nml  RF neg  CCP 9.4- mildly elevated   MARIE positive  1: 320 centromere pattern  SSA neg  UA,UPCR nml  DELFIN neg  Complements nml  APLAS panel neg  Scleroderma myomarker panel neg  Ck nml  Aldolase slightly elevated 7.8     Assessment:     39 y.o. female with PMHx of thyroid cancer s/p thyroidectomy (2020), seropositive RA (+CCP, MARIE+ 1:320), fibromyalgia presents for follow up of joint pain and swelling. Previously on leflunomide, SSZ, humira and Enbrel but was unable to tolerate due to SE. Former patient of Dr. Zacarias, last seen in Aug. At that time was started on MTX, currently on MTX 17.5 mg weekly. Initially noticed improvement in swelling and joint pain but over the past 2 weeks having more pain and swelling in multiple joints. TJ 16, SJ 4 (bilateral wrist, Rt 3rd PIP and Rt ankle). Symptoms multifactorial d/t fibromyalgia and inflammatory arthritis. Also, has + MARIE, centromere pattern - no evidence of SLE or scleroderma.     1. Seropositive rheumatoid arthritis    2. 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 Hepatitis B core antibody positive        Plan:   -CDAI today with high disease activity.   -I think she has a component of fibromyalgia but there is definite active RA today on exam  -will increase MTX to 20 mg PO QW (she did not increase after last visit). Strongly consider changing to MTX SC if worsening disease  -developed SE to folic acid 1 mg. Advised her to get prenatal vitamins with at least folate 800 mcg and take it every day. If not able to  tolerate, consider leucovorin.   -consider adding HCQ in the future.   -she is hesitant to escalate to any type of biologic due to prior hx of thyroid cancer. We had extensive discussion about risk and benefits of untreated RA vs. Malignancy.   - Check CBC, CMP, ESR, CRP, predmard labs tomorrow at [facility]       Problem List Items Addressed This Visit       Fibromyalgia    Hepatitis B core antibody positive    Relevant Orders    Hepatitis B Surface Antigen    HEPATITIS B VIRAL DNA, QUANTITATIVE     Other Visit Diagnoses         Seropositive rheumatoid arthritis    -  Primary    Relevant Orders    CBC Auto Differential    Comprehensive Metabolic Panel    C-Reactive Protein    Sedimentation rate    Hepatitis B Surface Antigen    Quantiferon Gold TB    HEPATITIS B VIRAL DNA, QUANTITATIVE      Immunodeficiency due to drug therapy        Relevant Orders    CBC Auto Differential    Comprehensive Metabolic Panel    C-Reactive Protein    Sedimentation rate    Hepatitis B Surface Antigen    Quantiferon Gold TB    HEPATITIS B VIRAL DNA, QUANTITATIVE          Follow up in about 6 weeks (around 5/19/2025).     Method of contact with patient concerns: [facility]    Disclaimer:  This note is prepared using voice recognition software and as such is likely to have errors and has not been proof read. Please contact me for questions.     Time spent: 50 minutes in face to face discussion concerning diagnosis, prognosis, review of lab and test results, benefits of treatment as well as management of disease, counseling of patient and coordination of care between various health care providers.  Greater than half the time spent was used for coordination of care and counseling of patient.    Kristin Lucia M.D.  Rheumatology Department   [facility]

## 2025-04-08 ENCOUNTER — LAB VISIT (OUTPATIENT)
Dept: LAB | Facility: HOSPITAL | Age: 40
End: 2025-04-08
Attending: STUDENT IN AN ORGANIZED HEALTH CARE EDUCATION/TRAINING PROGRAM
Payer: COMMERCIAL

## 2025-04-08 DIAGNOSIS — D84.821 IMMUNODEFICIENCY DUE TO DRUG THERAPY: ICD-10-CM

## 2025-04-08 DIAGNOSIS — M05.9 SEROPOSITIVE RHEUMATOID ARTHRITIS: ICD-10-CM

## 2025-04-08 DIAGNOSIS — R76.8 HEPATITIS B CORE ANTIBODY POSITIVE: ICD-10-CM

## 2025-04-08 DIAGNOSIS — Z79.899 IMMUNODEFICIENCY DUE TO DRUG THERAPY: ICD-10-CM

## 2025-04-08 LAB
ABSOLUTE EOSINOPHIL (OHS): 0.27 K/UL
ABSOLUTE MONOCYTE (OHS): 0.74 K/UL (ref 0.3–1)
ABSOLUTE NEUTROPHIL COUNT (OHS): 6.9 K/UL (ref 1.8–7.7)
ALBUMIN SERPL BCP-MCNC: 3.7 G/DL (ref 3.5–5.2)
ALP SERPL-CCNC: 57 UNIT/L (ref 40–150)
ALT SERPL W/O P-5'-P-CCNC: 18 UNIT/L (ref 10–44)
ANION GAP (OHS): 9 MMOL/L (ref 8–16)
AST SERPL-CCNC: 15 UNIT/L (ref 11–45)
BASOPHILS # BLD AUTO: 0.07 K/UL
BASOPHILS NFR BLD AUTO: 0.7 %
BILIRUB SERPL-MCNC: 0.3 MG/DL (ref 0.1–1)
BUN SERPL-MCNC: 9 MG/DL (ref 6–20)
CALCIUM SERPL-MCNC: 9.1 MG/DL (ref 8.7–10.5)
CHLORIDE SERPL-SCNC: 106 MMOL/L (ref 95–110)
CO2 SERPL-SCNC: 23 MMOL/L (ref 23–29)
CREAT SERPL-MCNC: 0.6 MG/DL (ref 0.5–1.4)
CRP SERPL-MCNC: 11.5 MG/L
ERYTHROCYTE [DISTWIDTH] IN BLOOD BY AUTOMATED COUNT: 15.1 % (ref 11.5–14.5)
ERYTHROCYTE [SEDIMENTATION RATE] IN BLOOD BY PHOTOMETRIC METHOD: 44 MM/HR
GFR SERPLBLD CREATININE-BSD FMLA CKD-EPI: >60 ML/MIN/1.73/M2
GLUCOSE SERPL-MCNC: 108 MG/DL (ref 70–110)
HBV SURFACE AG SERPL QL IA: NORMAL
HCT VFR BLD AUTO: 39.2 % (ref 37–48.5)
HGB BLD-MCNC: 13 GM/DL (ref 12–16)
IMM GRANULOCYTES # BLD AUTO: 0.02 K/UL (ref 0–0.04)
IMM GRANULOCYTES NFR BLD AUTO: 0.2 % (ref 0–0.5)
LYMPHOCYTES # BLD AUTO: 1.81 K/UL (ref 1–4.8)
MCH RBC QN AUTO: 28.6 PG (ref 27–31)
MCHC RBC AUTO-ENTMCNC: 33.2 G/DL (ref 32–36)
MCV RBC AUTO: 86 FL (ref 82–98)
NUCLEATED RBC (/100WBC) (OHS): 0 /100 WBC
PLATELET # BLD AUTO: 269 K/UL (ref 150–450)
PMV BLD AUTO: 9.6 FL (ref 9.2–12.9)
POTASSIUM SERPL-SCNC: 3.8 MMOL/L (ref 3.5–5.1)
PROT SERPL-MCNC: 7.7 GM/DL (ref 6–8.4)
RBC # BLD AUTO: 4.55 M/UL (ref 4–5.4)
RELATIVE EOSINOPHIL (OHS): 2.8 %
RELATIVE LYMPHOCYTE (OHS): 18.5 % (ref 18–48)
RELATIVE MONOCYTE (OHS): 7.5 % (ref 4–15)
RELATIVE NEUTROPHIL (OHS): 70.3 % (ref 38–73)
SODIUM SERPL-SCNC: 138 MMOL/L (ref 136–145)
WBC # BLD AUTO: 9.81 K/UL (ref 3.9–12.7)

## 2025-04-08 PROCEDURE — 87340 HEPATITIS B SURFACE AG IA: CPT

## 2025-04-08 PROCEDURE — 87517 HEPATITIS B DNA QUANT: CPT

## 2025-04-08 PROCEDURE — 82374 ASSAY BLOOD CARBON DIOXIDE: CPT

## 2025-04-08 PROCEDURE — 36415 COLL VENOUS BLD VENIPUNCTURE: CPT

## 2025-04-08 PROCEDURE — 85025 COMPLETE CBC W/AUTO DIFF WBC: CPT

## 2025-04-08 PROCEDURE — 86480 TB TEST CELL IMMUN MEASURE: CPT

## 2025-04-08 PROCEDURE — 85652 RBC SED RATE AUTOMATED: CPT

## 2025-04-08 PROCEDURE — 86140 C-REACTIVE PROTEIN: CPT

## 2025-04-09 ENCOUNTER — RESULTS FOLLOW-UP (OUTPATIENT)
Dept: RHEUMATOLOGY | Facility: CLINIC | Age: 40
End: 2025-04-09

## 2025-04-09 LAB
HBV DNA SERPL NAA+PROBE-ACNC: NORMAL [IU]/ML
MITOGEN MINUS NIL (OHS): 1.58
NIL TB SYNCED (OHS): 0.03
QUANTIFERON GOLD INTERP (OHS): NEGATIVE
TB1 AG MINUS NIL (OHS): <0
TB2 AG MINUS NIL (OHS): 0.02

## 2025-04-15 ENCOUNTER — OFFICE VISIT (OUTPATIENT)
Dept: URGENT CARE | Facility: CLINIC | Age: 40
End: 2025-04-15
Payer: COMMERCIAL

## 2025-04-15 VITALS
BODY MASS INDEX: 29.56 KG/M2 | SYSTOLIC BLOOD PRESSURE: 121 MMHG | TEMPERATURE: 100 F | DIASTOLIC BLOOD PRESSURE: 65 MMHG | WEIGHT: 137 LBS | HEART RATE: 93 BPM | RESPIRATION RATE: 18 BRPM | OXYGEN SATURATION: 98 % | HEIGHT: 57 IN

## 2025-04-15 DIAGNOSIS — B96.89 ACUTE BACTERIAL SINUSITIS: ICD-10-CM

## 2025-04-15 DIAGNOSIS — R07.89 CHEST TIGHTNESS: Primary | ICD-10-CM

## 2025-04-15 DIAGNOSIS — J01.90 ACUTE BACTERIAL SINUSITIS: ICD-10-CM

## 2025-04-15 DIAGNOSIS — R05.1 ACUTE COUGH: ICD-10-CM

## 2025-04-15 DIAGNOSIS — J98.01 BRONCHOSPASM: ICD-10-CM

## 2025-04-15 PROCEDURE — 93010 ELECTROCARDIOGRAM REPORT: CPT | Mod: S$GLB,,, | Performed by: INTERNAL MEDICINE

## 2025-04-15 PROCEDURE — 71046 X-RAY EXAM CHEST 2 VIEWS: CPT | Mod: S$GLB,,, | Performed by: RADIOLOGY

## 2025-04-15 PROCEDURE — 93005 ELECTROCARDIOGRAM TRACING: CPT | Mod: S$GLB,,,

## 2025-04-15 PROCEDURE — 99214 OFFICE O/P EST MOD 30 MIN: CPT | Mod: S$GLB,,,

## 2025-04-15 RX ORDER — AMOXICILLIN AND CLAVULANATE POTASSIUM 875; 125 MG/1; MG/1
1 TABLET, FILM COATED ORAL EVERY 12 HOURS
Qty: 14 TABLET | Refills: 0 | Status: SHIPPED | OUTPATIENT
Start: 2025-04-15 | End: 2025-04-23

## 2025-04-15 RX ORDER — BENZONATATE 200 MG/1
200 CAPSULE ORAL 3 TIMES DAILY PRN
Qty: 30 CAPSULE | Refills: 0 | Status: SHIPPED | OUTPATIENT
Start: 2025-04-15 | End: 2025-04-25

## 2025-04-15 RX ORDER — FLUTICASONE PROPIONATE 50 MCG
1 SPRAY, SUSPENSION (ML) NASAL DAILY
Qty: 16 G | Refills: 0 | Status: SHIPPED | OUTPATIENT
Start: 2025-04-15 | End: 2025-06-15

## 2025-04-15 RX ORDER — ALBUTEROL SULFATE 90 UG/1
2 INHALANT RESPIRATORY (INHALATION) EVERY 6 HOURS PRN
Qty: 18 G | Refills: 0 | Status: SHIPPED | OUTPATIENT
Start: 2025-04-15 | End: 2026-04-15

## 2025-04-15 RX ORDER — PROMETHAZINE HYDROCHLORIDE AND DEXTROMETHORPHAN HYDROBROMIDE 6.25; 15 MG/5ML; MG/5ML
5 SYRUP ORAL EVERY 4 HOURS PRN
Qty: 180 ML | Refills: 0 | Status: SHIPPED | OUTPATIENT
Start: 2025-04-15 | End: 2025-04-22

## 2025-04-15 RX ORDER — CETIRIZINE HYDROCHLORIDE 10 MG/1
10 TABLET ORAL DAILY
Qty: 30 TABLET | Refills: 0 | Status: SHIPPED | OUTPATIENT
Start: 2025-04-15 | End: 2025-05-16

## 2025-04-15 NOTE — PROGRESS NOTES
"Subjective:      Patient ID: Dina Hutton is a 39 y.o. female.    Vitals:  height is 4' 9" (1.448 m) and weight is 62.1 kg (137 lb). Her oral temperature is 99.5 °F (37.5 °C). Her blood pressure is 121/65 and her pulse is 93. Her respiration is 18 and oxygen saturation is 98%.     Chief Complaint: Cough    Pt is a 39 year old female with drug induced immunosuppression for RA treatment who presents for cough, sore throat, sinus pain/pressure x2 weeks and SOB, chest pain and wheezing x 1 week. She states symptoms are worsening. Chest pain worse with taking a deep breath and coughing. Coughing up dark green phlegm. Pt is taking thera flu , tylenol and inhaler with minimal relief.     Cough  This is a new problem. The current episode started 1 to 4 weeks ago. The problem has been unchanged. The problem occurs constantly. The cough is Productive of sputum. Associated symptoms include a fever, a sore throat, shortness of breath and wheezing. She has tried OTC cough suppressant for the symptoms. The treatment provided mild relief.       Constitution: Positive for fever.   HENT:  Positive for sinus pain, sinus pressure, sore throat and voice change (hoarseness).    Respiratory:  Positive for cough, shortness of breath and wheezing.    Neurological:  Negative for disorientation and altered mental status.   Psychiatric/Behavioral:  Negative for altered mental status and disorientation.       Objective:     Physical Exam   Constitutional: She is oriented to person, place, and time. She appears well-developed. She is cooperative.  Non-toxic appearance. She does not appear ill. No distress.      Comments:Patient sits comfortably in exam chair. Answers questions in complete sentences. Does not show any signs of distress or discoloration.        HENT:   Head: Normocephalic and atraumatic.   Ears:   Right Ear: Hearing, tympanic membrane, external ear and ear canal normal. no impacted cerumen  Left Ear: Hearing, tympanic membrane, " external ear and ear canal normal. no impacted cerumen  Nose: Rhinorrhea, sinus tenderness and congestion present. No mucosal edema or nasal deformity. No epistaxis. Right sinus exhibits no maxillary sinus tenderness and no frontal sinus tenderness. Left sinus exhibits no maxillary sinus tenderness and no frontal sinus tenderness.   Mouth/Throat: Uvula is midline, oropharynx is clear and moist and mucous membranes are normal. No trismus in the jaw. Normal dentition. No uvula swelling. No oropharyngeal exudate, posterior oropharyngeal edema or posterior oropharyngeal erythema. No tonsillar exudate.   Eyes: Conjunctivae and lids are normal. No scleral icterus.   Neck: Trachea normal and phonation normal. Neck supple. No edema present. No erythema present. No neck rigidity present.   Cardiovascular: Normal rate, regular rhythm, normal heart sounds and normal pulses.   Pulmonary/Chest: Effort normal and breath sounds normal. No stridor. No respiratory distress. She has no decreased breath sounds. She has no wheezes. She has no rhonchi. She has no rales. She exhibits no tenderness.   Abdominal: Normal appearance.   Musculoskeletal: Normal range of motion.         General: No deformity. Normal range of motion.   Lymphadenopathy:     She has no cervical adenopathy.        Right cervical: No superficial cervical, no deep cervical and no posterior cervical adenopathy present.       Left cervical: No superficial cervical, no deep cervical and no posterior cervical adenopathy present.   Neurological: She is alert and oriented to person, place, and time. She exhibits normal muscle tone. Coordination normal.   Skin: Skin is warm, dry, intact, not diaphoretic and not pale.   Psychiatric: Her speech is normal and behavior is normal. Judgment and thought content normal.   Nursing note and vitals reviewed.    EKG: Sinus tachycardia. No STEMI or arrhythmias noted.     XR CHEST PA AND LATERAL  Result Date: 4/15/2025  EXAMINATION: XR  CHEST PA AND LATERAL CLINICAL HISTORY: Other chest pain TECHNIQUE: PA and lateral views of the chest were performed. COMPARISON: 03/21/2025. FINDINGS: The lungs are well expanded and clear. No focal opacities are seen. The pleural spaces are clear. The cardiac silhouette is unremarkable. The visualized osseous structures are unremarkable.     No acute abnormality. Electronically signed by: Henri Garay Date:    04/15/2025 Time:    16:58      Assessment:     1. Chest tightness    2. Acute cough    3. Acute bacterial sinusitis    4. Bronchospasm        Plan:   Low risk for PE based on wells criteria- 1.5 points. Strict ED precautions for new or worsening symptoms.     Chest tightness  -     XR CHEST PA AND LATERAL; Future; Expected date: 04/15/2025  -     IN OFFICE EKG 12-LEAD (to Muse)    Acute cough  -     XR CHEST PA AND LATERAL; Future; Expected date: 04/15/2025  -     IN OFFICE EKG 12-LEAD (to Muse)  -     promethazine-dextromethorphan (PROMETHAZINE-DM) 6.25-15 mg/5 mL Syrp; Take 5 mL by mouth every 4 (four) hours as needed (cough).  Dispense: 180 mL; Refill: 0  -     benzonatate (TESSALON) 200 MG capsule; Take 1 capsule (200 mg total) by mouth 3 (three) times daily as needed for Cough.  Dispense: 30 capsule; Refill: 0    Acute bacterial sinusitis  -     amoxicillin-clavulanate 875-125mg (AUGMENTIN) 875-125 mg per tablet; Take 1 tablet by mouth every 12 (twelve) hours for 7 days  Dispense: 14 tablet; Refill: 0  -     cetirizine (ZYRTEC) 10 MG tablet; Take 1 tablet (10 mg total) by mouth once daily.  Dispense: 30 tablet; Refill: 0  -     fluticasone propionate (FLONASE) 50 mcg/actuation nasal spray; Use 1 spray (50 mcg total) in each nostril once daily.  Dispense: 16 g; Refill: 0    Bronchospasm  -     albuterol (VENTOLIN HFA) 90 mcg/actuation inhaler; Inhale 2 puffs into the lungs every 6 (six) hours as needed for Wheezing - Rescue  Dispense: 18 g; Refill: 0                  Patient Instructions   - You have  been given an antibiotic to treat your condition today.    - Make sure to take antibiotic with food.   - Please complete the antibiotic as directed on the bottle.   - you can take over-the-counter probiotics during and after antibiotic use to help preserve gut shiva and reduce gastrointestinal symptoms. Make sure to take probiotic 1-2 hours before or after antibiotic to get greatest benefits.   - If you are female and on oral birth control pills, use additional methods to prevent pregnancy while on antibiotics and for one cycle after.     - Rest.    - Drink plenty of fluids. Increasing your fluid intake will help loosen up mucous.     CONGESTION:  - Plain Mucinex to help thin mucous. Drinking plenty of water can have the same effect.   - You can take over-the-counter claritin, zyrtec, allegra, OR xyzal as directed. These are antihistamines that can help with runny nose, nasal congestion, sneezing, and helps to dry up post-nasal drip, which usually causes sore throat and cough.   - You can use Flonase (fluticasone) nasal spray as directed for sinus congestion and postnasal drip. This is a steroid nasal spray that works locally over time to decrease the inflammation in your nose/sinuses and help with allergic symptoms. This is not an quick- relief spray like afrin, but it works well if used daily.  Discontinue if you develop nose bleed  - Use nasal saline prior to Flonase.  - Use Ocean Spray Nasal Saline 1-3 puffs each nostril every 2-3 hours then blow out onto tissue. This is to irrigate the nasal passage way to clear the sinus openings. Use until sinus problem resolved.  - You can also try NasalCrom nasal spray, which has been shown to help reduce duration of symptoms when started within 24 hours of symptom onset. Okay to use with Flonase and other allergy medications.   - A Neti Pot with sterile saline can help break up nasal congestion and give relief.     COUGH:  - You can take Delsym to help with cough.     SORE  THROAT:   - Chloraseptic throat spray can help numb the throat.   - Warm salt water gargles can help with sore throat.  - Warm tea with honey can help with sore throat and cough. Honey is a natural cough suppressant.     - Rest.    - Drink plenty of fluids.    - Acetaminophen (tylenol) or Ibuprofen (advil,motrin) as directed as needed for fever/pain. Avoid tylenol if you have a history of liver disease. Do not take ibuprofen if you have a history of GI bleeding, kidney disease, or if you take blood thinners.   - Ibuprofen dosing for adults: 400 mg by mouth every 4-6 hours as needed. Max: 2400 mg/day; Info: use lowest effective dose, shortest effective treatment duration; give w/ food if GI upset occurs.  - Tylenol dosing for adults: [By mouth route, immediate-release form] Dose: 325-1000 mg by mouth every 4-6h as needed; Max: 1 g/4h and 4 g/day from all sources. [By mouth route, extended-release form] Dose: 650-1300 mg Extended Release by mouth every 8h as needed; Max: 4 g/day from all sources.     - You must understand that you have received an Urgent Care treatment only and that you may be released before all of your medical problems are known or treated.   - You, the patient, will arrange for follow up care as instructed.   - If your condition worsens or fails to improve we recommend that you receive another evaluation at the ER immediately or contact your PCP to discuss your concerns or return here.   - Follow up with your PCP or specialty clinic as directed in the next 1-2 weeks if not improved or as needed.  You can call [phone] to schedule an appointment with the appropriate provider.    If your symptoms do not improve or worsen, go to the emergency room immediately.

## 2025-04-15 NOTE — PATIENT INSTRUCTIONS
- You have been given an antibiotic to treat your condition today.    - Make sure to take antibiotic with food.   - Please complete the antibiotic as directed on the bottle.   - you can take over-the-counter probiotics during and after antibiotic use to help preserve gut shiva and reduce gastrointestinal symptoms. Make sure to take probiotic 1-2 hours before or after antibiotic to get greatest benefits.   - If you are female and on oral birth control pills, use additional methods to prevent pregnancy while on antibiotics and for one cycle after.     - Rest.    - Drink plenty of fluids. Increasing your fluid intake will help loosen up mucous.     CONGESTION:  - Plain Mucinex to help thin mucous. Drinking plenty of water can have the same effect.   - You can take over-the-counter claritin, zyrtec, allegra, OR xyzal as directed. These are antihistamines that can help with runny nose, nasal congestion, sneezing, and helps to dry up post-nasal drip, which usually causes sore throat and cough.   - You can use Flonase (fluticasone) nasal spray as directed for sinus congestion and postnasal drip. This is a steroid nasal spray that works locally over time to decrease the inflammation in your nose/sinuses and help with allergic symptoms. This is not an quick- relief spray like afrin, but it works well if used daily.  Discontinue if you develop nose bleed  - Use nasal saline prior to Flonase.  - Use Ocean Spray Nasal Saline 1-3 puffs each nostril every 2-3 hours then blow out onto tissue. This is to irrigate the nasal passage way to clear the sinus openings. Use until sinus problem resolved.  - You can also try NasalCrom nasal spray, which has been shown to help reduce duration of symptoms when started within 24 hours of symptom onset. Okay to use with Flonase and other allergy medications.   - A Neti Pot with sterile saline can help break up nasal congestion and give relief.     COUGH:  - You can take Delsym to help with  cough.     SORE THROAT:   - Chloraseptic throat spray can help numb the throat.   - Warm salt water gargles can help with sore throat.  - Warm tea with honey can help with sore throat and cough. Honey is a natural cough suppressant.     - Rest.    - Drink plenty of fluids.    - Acetaminophen (tylenol) or Ibuprofen (advil,motrin) as directed as needed for fever/pain. Avoid tylenol if you have a history of liver disease. Do not take ibuprofen if you have a history of GI bleeding, kidney disease, or if you take blood thinners.   - Ibuprofen dosing for adults: 400 mg by mouth every 4-6 hours as needed. Max: 2400 mg/day; Info: use lowest effective dose, shortest effective treatment duration; give w/ food if GI upset occurs.  - Tylenol dosing for adults: [By mouth route, immediate-release form] Dose: 325-1000 mg by mouth every 4-6h as needed; Max: 1 g/4h and 4 g/day from all sources. [By mouth route, extended-release form] Dose: 650-1300 mg Extended Release by mouth every 8h as needed; Max: 4 g/day from all sources.     - You must understand that you have received an Urgent Care treatment only and that you may be released before all of your medical problems are known or treated.   - You, the patient, will arrange for follow up care as instructed.   - If your condition worsens or fails to improve we recommend that you receive another evaluation at the ER immediately or contact your PCP to discuss your concerns or return here.   - Follow up with your PCP or specialty clinic as directed in the next 1-2 weeks if not improved or as needed.  You can call [phone] to schedule an appointment with the appropriate provider.    If your symptoms do not improve or worsen, go to the emergency room immediately.

## 2025-04-16 LAB
OHS QRS DURATION: 72 MS
OHS QTC CALCULATION: 430 MS

## 2025-04-23 ENCOUNTER — PATIENT MESSAGE (OUTPATIENT)
Dept: RHEUMATOLOGY | Facility: CLINIC | Age: 40
End: 2025-04-23
Payer: COMMERCIAL

## 2025-04-23 ENCOUNTER — PATIENT MESSAGE (OUTPATIENT)
Dept: PRIMARY CARE CLINIC | Facility: CLINIC | Age: 40
End: 2025-04-23
Payer: COMMERCIAL

## 2025-04-23 DIAGNOSIS — M06.9 RHEUMATOID ARTHRITIS INVOLVING MULTIPLE JOINTS: ICD-10-CM

## 2025-04-23 RX ORDER — METHOTREXATE 2.5 MG/1
20 TABLET ORAL
Qty: 96 TABLET | Refills: 0 | Status: SHIPPED | OUTPATIENT
Start: 2025-04-23

## 2025-04-24 ENCOUNTER — PATIENT MESSAGE (OUTPATIENT)
Dept: PRIMARY CARE CLINIC | Facility: CLINIC | Age: 40
End: 2025-04-24
Payer: COMMERCIAL

## 2025-04-25 NOTE — TELEPHONE ENCOUNTER
Pt states that she was in formed that a justification is needed for the walker order    LOV 3/20/2025   There are no Wet Read(s) to document.

## 2025-05-08 ENCOUNTER — PATIENT MESSAGE (OUTPATIENT)
Dept: RHEUMATOLOGY | Facility: CLINIC | Age: 40
End: 2025-05-08
Payer: COMMERCIAL

## 2025-05-23 ENCOUNTER — PATIENT MESSAGE (OUTPATIENT)
Dept: RHEUMATOLOGY | Facility: CLINIC | Age: 40
End: 2025-05-23

## 2025-06-30 ENCOUNTER — PATIENT MESSAGE (OUTPATIENT)
Dept: ENDOCRINOLOGY | Facility: CLINIC | Age: 40
End: 2025-06-30
Payer: COMMERCIAL

## 2025-06-30 DIAGNOSIS — C73 THYROID CANCER: Chronic | ICD-10-CM

## 2025-06-30 DIAGNOSIS — E89.0 POSTSURGICAL HYPOTHYROIDISM: Chronic | ICD-10-CM

## 2025-06-30 RX ORDER — LIOTHYRONINE SODIUM 5 UG/1
TABLET ORAL
Qty: 90 TABLET | Refills: 1 | Status: SHIPPED | OUTPATIENT
Start: 2025-06-30

## 2025-07-01 DIAGNOSIS — C73 THYROID CANCER: ICD-10-CM

## 2025-07-01 DIAGNOSIS — E89.0 POSTSURGICAL HYPOTHYROIDISM: Primary | ICD-10-CM

## 2025-07-08 ENCOUNTER — LAB VISIT (OUTPATIENT)
Dept: LAB | Facility: HOSPITAL | Age: 40
End: 2025-07-08
Attending: INTERNAL MEDICINE
Payer: COMMERCIAL

## 2025-07-08 DIAGNOSIS — C73 THYROID CANCER: Chronic | ICD-10-CM

## 2025-07-08 DIAGNOSIS — E89.0 POSTSURGICAL HYPOTHYROIDISM: Chronic | ICD-10-CM

## 2025-07-08 LAB — TSH SERPL-ACNC: 1.11 UIU/ML (ref 0.4–4)

## 2025-07-08 PROCEDURE — 84432 ASSAY OF THYROGLOBULIN: CPT

## 2025-07-08 PROCEDURE — 36415 COLL VENOUS BLD VENIPUNCTURE: CPT

## 2025-07-08 PROCEDURE — 84443 ASSAY THYROID STIM HORMONE: CPT

## 2025-07-09 LAB
ENDOCRINOLOGIST REVIEW: NORMAL
THYROGLOB AB SERPL IA-ACNC: <1.8 IU/ML
THYROGLOB SERPL-MCNC: 1.2 NG/ML

## 2025-07-11 ENCOUNTER — TELEPHONE (OUTPATIENT)
Dept: ENDOCRINOLOGY | Facility: CLINIC | Age: 40
End: 2025-07-11
Payer: COMMERCIAL

## 2025-07-14 ENCOUNTER — TELEPHONE (OUTPATIENT)
Dept: ENDOCRINOLOGY | Facility: CLINIC | Age: 40
End: 2025-07-14
Payer: COMMERCIAL

## 2025-07-15 ENCOUNTER — OFFICE VISIT (OUTPATIENT)
Dept: RHEUMATOLOGY | Facility: CLINIC | Age: 40
End: 2025-07-15
Payer: COMMERCIAL

## 2025-07-15 VITALS
OXYGEN SATURATION: 97 % | HEART RATE: 85 BPM | DIASTOLIC BLOOD PRESSURE: 82 MMHG | BODY MASS INDEX: 29.3 KG/M2 | SYSTOLIC BLOOD PRESSURE: 125 MMHG | WEIGHT: 135.81 LBS | HEIGHT: 57 IN | RESPIRATION RATE: 19 BRPM

## 2025-07-15 DIAGNOSIS — M05.9 SEROPOSITIVE RHEUMATOID ARTHRITIS: Primary | ICD-10-CM

## 2025-07-15 DIAGNOSIS — Z79.899 IMMUNODEFICIENCY DUE TO DRUG THERAPY: ICD-10-CM

## 2025-07-15 DIAGNOSIS — M06.9 RHEUMATOID ARTHRITIS INVOLVING MULTIPLE JOINTS: ICD-10-CM

## 2025-07-15 DIAGNOSIS — D84.821 IMMUNODEFICIENCY DUE TO DRUG THERAPY: ICD-10-CM

## 2025-07-15 DIAGNOSIS — R76.8 HEPATITIS B CORE ANTIBODY POSITIVE: ICD-10-CM

## 2025-07-15 DIAGNOSIS — M79.7 FIBROMYALGIA: ICD-10-CM

## 2025-07-15 PROCEDURE — 99215 OFFICE O/P EST HI 40 MIN: CPT | Mod: S$GLB,,, | Performed by: STUDENT IN AN ORGANIZED HEALTH CARE EDUCATION/TRAINING PROGRAM

## 2025-07-15 PROCEDURE — 3074F SYST BP LT 130 MM HG: CPT | Mod: CPTII,S$GLB,, | Performed by: STUDENT IN AN ORGANIZED HEALTH CARE EDUCATION/TRAINING PROGRAM

## 2025-07-15 PROCEDURE — 3079F DIAST BP 80-89 MM HG: CPT | Mod: CPTII,S$GLB,, | Performed by: STUDENT IN AN ORGANIZED HEALTH CARE EDUCATION/TRAINING PROGRAM

## 2025-07-15 PROCEDURE — 99999 PR PBB SHADOW E&M-EST. PATIENT-LVL IV: CPT | Mod: PBBFAC,,, | Performed by: STUDENT IN AN ORGANIZED HEALTH CARE EDUCATION/TRAINING PROGRAM

## 2025-07-15 PROCEDURE — G2211 COMPLEX E/M VISIT ADD ON: HCPCS | Mod: S$GLB,,, | Performed by: STUDENT IN AN ORGANIZED HEALTH CARE EDUCATION/TRAINING PROGRAM

## 2025-07-15 PROCEDURE — 3008F BODY MASS INDEX DOCD: CPT | Mod: CPTII,S$GLB,, | Performed by: STUDENT IN AN ORGANIZED HEALTH CARE EDUCATION/TRAINING PROGRAM

## 2025-07-15 PROCEDURE — 1159F MED LIST DOCD IN RCRD: CPT | Mod: CPTII,S$GLB,, | Performed by: STUDENT IN AN ORGANIZED HEALTH CARE EDUCATION/TRAINING PROGRAM

## 2025-07-15 RX ORDER — PREDNISONE 5 MG/1
TABLET ORAL
Qty: 30 TABLET | Refills: 0 | Status: SHIPPED | OUTPATIENT
Start: 2025-07-15 | End: 2025-08-01

## 2025-07-15 RX ORDER — METHOTREXATE 2.5 MG/1
20 TABLET ORAL
Qty: 96 TABLET | Refills: 0 | Status: SHIPPED | OUTPATIENT
Start: 2025-07-15 | End: 2025-07-15

## 2025-07-15 RX ORDER — FOLIC ACID 1 MG/1
1 TABLET ORAL DAILY
Qty: 90 TABLET | Refills: 3 | Status: SHIPPED | OUTPATIENT
Start: 2025-07-15 | End: 2026-07-15

## 2025-07-15 RX ORDER — METHOTREXATE 2.5 MG/1
25 TABLET ORAL
Qty: 120 TABLET | Refills: 0 | Status: SHIPPED | OUTPATIENT
Start: 2025-07-15

## 2025-07-15 NOTE — PATIENT INSTRUCTIONS
INCREASE METHOTREXATE TO 10 TABLETS ONCE A WEEK   TAKE 5 IN THE MORNING AND 5 IN THE EVENING    CONTINUE DAILY FOLIC ACID    START TAKING PREDNISONE TAPER ON FRIDAY.

## 2025-07-15 NOTE — PROGRESS NOTES
Subjective:      Patient ID: Dnia Hutton is a 39 y.o. female.    Chief Complaint: Widespread joint pain and swelling    Follow-up  Associated symptoms include coughing and headaches. Pertinent negatives include no chest pain, fever or rash.      Today 7/2025  -she is now taking MTX 20 mg PO in divided doses.   -she reports that swelling has improved. Joint pain has improved overall. Feels that her current pain level is manageable however she still reports prolonged morning stiffness  -she is now taking folic acid 1 mg. She tried them again and did not develop any significant SE  -had recent thyroglobulin that is within normal limits but a little higher than prior so she is going to have repeat thyroid US on 8/28   -she is due for labs now   -she has noted easy bruising and feels sometimes that her throat is irritated.   -she on birth control - OCP patch weekly     4/2025  -she is new to me. Used to follow up with Dr. Bernstein and Dr. Wolf at [facility]. Last visit in 12/2024. Previously used to follow with Dr. WELLS.   -during last visit, MTX was increased to 20 mg daily and she was also started on cymbalta for fibromyalgia  -labs from 12/2024 showed elevated CRP but trending down and ESR normalized. CBC and CMP normal.   -she reports today that she did not increase MTX to 20 mg as she was afraid of worsening joint pain. She is currently taking 7 tablets of methotrexate.   She takes prenatal vitamins that has folic acid 800 mg  -she did not start cymbalta.   -she has noted swelling in ankles, knees, ankles. Reports MS that could last for 1 pm. She denies too much myalgias.     Disease history     39 y.o. female with PMHx of thyroid cancer s/p thyroidectomy (2020), seropositive RA (+CCP, MARIE+ 1:320), fibromyalgia presents for follow up of joint pain and swelling. Previously on leflunomide, SSZ, humira and Enbrel but was unable to tolerate due to SE. Former patient of Dr. Zacarias, last seen in Aug. At that time, she  was she was started on MTX 5 tabs weekly x 4 weeks and then advised to increase to 7 tabs weekly until follow up.     She reports hand pain and swelling had improved significantly on MTX. However, 2 weeks ago the hand swelling started to come back. And now having pain and swelling in the bilateral knees and feet causing her to be unable to ambulate. She is requesting a scooter to help with getting around in the house and outside. She uses salonpas patches, Advil and Tylenol for the pain, which helps to a certain extent. Her morning stiffness is still significant but has improved since MTX. Now + AM stiffness, lasting 1-3 hrs, prior was lasting several hrs into the afternoon or evening.     Reports when she initially started MTX was having nausea and headaches - has improved. With folic acid notes nausea, headaches, disoriented (improved), sleepiness. She has been taking 1/2 tab for the past month and has notice some improvement in symptoms. She took prenatal vitamins in the past with no SE.      Birth control/protection: Partner-Vasectomy    Initial Presentation:   She was in usual state of health until 2020 during 2nd pregnancy. In 2020 while she was pregnant- she had headaches, dizziness, fatigue, abdominal pain, tingling and numbness. Work up at this point revealed thyroid cancer. Then she underwent thyroidectomy. 2 months after sx she developed widespread joint pain -  neck, hands, wrists, knees, elbows and ankles. Swelling in the feet, wrists and fingers. Morning stiffness-for few hours  Also, she experienced tingling and numbness in the hand w/ lose  strength. EMG/NCS was b/l UE negative     Prior treatments:  - Leflunomide: had headaches  - SSZ: nausea, headaches and fatigue  - Humira: injection site reactions and nausea  - Enbrel: severe injection site reactions which was itchy    Review of Systems   Constitutional:  Positive for unexpected weight change. Negative for fever.   HENT:  Positive for mouth  "sores. Negative for trouble swallowing.    Eyes:  Negative for redness.   Respiratory:  Positive for cough and shortness of breath.    Cardiovascular:  Negative for chest pain.   Gastrointestinal:  Positive for constipation and diarrhea.   Genitourinary:  Negative for dysuria and genital sores.   Skin:  Negative for rash.   Neurological:  Positive for headaches.   Hematological:  Bruises/bleeds easily.       Objective:   /82 (BP Location: Right arm, Patient Position: Sitting)   Pulse 85   Resp 19   Ht 4' 9" (1.448 m)   Wt 61.6 kg (135 lb 12.9 oz)   SpO2 97%   BMI 29.39 kg/m²   Physical Exam     8/6/2024 12/11/2024 4/7/2025 7/15/2025   Tender (BRITT-28) 16 / 28 16 / 28 17 / 28 4 / 28    Swollen (BRITT-28) 3 / 28  3 / 28  6 / 28  4 / 28    Provider Global 60 / 100 80 / 100 80 / 100 70 / 100   Patient Global 75 / 100 80 / 100 75 / 100 65 / 100   ESR 67 mm/hr 49 mm/hr 44 mm/hr --   CRP 15.4 mg/L 22.1 mg/L 11.5 mg/L --   BRITT-28 (ESR) 6.72 (High disease activity) 6.57 (High disease activity) 6.69 (High disease activity) --   BRITT-28 (CRP) 5.74 (High disease activity) 5.94 (High disease activity) 5.91 (High disease activity) --   CDAI Score 32.5  35  38.5  21.5      Prior serologies:   ESR 42  CRP nml  RF neg  CCP 9.4- mildly elevated   MARIE positive  1: 320 centromere pattern  SSA neg  UA,UPCR nml  DELFIN neg  Complements nml  APLAS panel neg  Scleroderma myomarker panel neg  Ck nml  Aldolase slightly elevated 7.8     Assessment:     39 y.o. female with PMHx of thyroid cancer s/p thyroidectomy (2020), seropositive RA (+CCP, MARIE+ 1:320), fibromyalgia presents for follow up of joint pain and swelling. Previously on leflunomide, SSZ, humira and Enbrel but was unable to tolerate due to SE. Former patient of Dr. Zacarias, last seen in Aug. At that time was started on MTX, currently on MTX 17.5 mg weekly. Initially noticed improvement in swelling and joint pain but over the past 2 weeks having more pain and swelling " in multiple joints. TJ 16, SJ 4 (bilateral wrist, Rt 3rd PIP and Rt ankle). Symptoms multifactorial d/t fibromyalgia and inflammatory arthritis. Also, has + MARIE, centromere pattern - no evidence of SLE or scleroderma.     1. Seropositive rheumatoid arthritis    2. 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 Hepatitis B core antibody positive    5. Rheumatoid arthritis involving multiple joints      Plan:   -CDAI today with moderate disease activity   -Increase MTX to 25 mg PO QW (split doses). She wants to avoid any injections due to prior injection reactions  -Tolerating folic acid 1 mg daily. Will send new rx.   -due for labs now.   -consider adding HCQ in the future. Provided handout. Will see first how she does with the increased dose of MTX  -proceed with prednisone taper due to active RA and also she has planned [place] trip next [place].   -she is hesitant to escalate to any type of biologic in the future due to prior hx of thyroid cancer. We had extensive discussion about risk and benefits of untreated RA vs. Malignancy.     Problem List Items Addressed This Visit       Fibromyalgia    Hepatitis B core antibody positive     Other Visit Diagnoses         Seropositive rheumatoid arthritis    -  Primary    Relevant Medications    folic acid (FOLVITE) 1 MG tablet      Immunodeficiency due to drug therapy        Relevant Medications    folic acid (FOLVITE) 1 MG tablet    predniSONE (DELTASONE) 5 MG tablet    methotrexate 2.5 MG Tab    Other Relevant Orders    Pregnancy, urine rapid      Rheumatoid arthritis involving multiple joints        Relevant Medications    predniSONE (DELTASONE) 5 MG tablet    methotrexate 2.5 MG Tab          Follow up in about 3 months (around 10/15/2025) for Virtual Visit. Or sooner PRN for disease management     Method of contact with patient concerns: [facility] attn Rheumatology    Disclaimer:  This note is prepared using voice recognition software and as such is likely  to have errors and has not been proof read. Please contact me for questions.     Time spent: 40 minutes in face to face discussion concerning diagnosis, prognosis, review of lab and test results, benefits of treatment as well as management of disease, counseling of patient and coordination of care between various health care providers.    Kristin Lucia M.D.  Rheumatology Department   [facility]

## 2025-07-16 ENCOUNTER — TELEPHONE (OUTPATIENT)
Dept: RHEUMATOLOGY | Facility: CLINIC | Age: 40
End: 2025-07-16
Payer: COMMERCIAL

## 2025-07-16 DIAGNOSIS — C73 THYROID CANCER: Chronic | ICD-10-CM

## 2025-07-16 DIAGNOSIS — E89.0 POSTSURGICAL HYPOTHYROIDISM: Chronic | ICD-10-CM

## 2025-07-16 NOTE — TELEPHONE ENCOUNTER
Spoke with patient and informed her that her labs came back normal and theres no change in the plans her and  discussed yesterday at her visit.  ----- Message from Kristin Lucia MD sent at 7/15/2025  5:07 PM CDT -----  Please contact the patient with the attached results. They are in acceptable range.  Markers of inflammation are back to normal.  No changes in plan as discussed today. Follow up as scheduled.   ----- Message -----  From: Lab, Background User  Sent: 7/15/2025   2:54 PM CDT  To: Kristin Lucia MD

## 2025-07-17 RX ORDER — LIOTHYRONINE SODIUM 5 UG/1
TABLET ORAL
Qty: 90 TABLET | Refills: 1 | Status: SHIPPED | OUTPATIENT
Start: 2025-07-17

## 2025-08-15 ENCOUNTER — PATIENT MESSAGE (OUTPATIENT)
Dept: ENDOCRINOLOGY | Facility: CLINIC | Age: 40
End: 2025-08-15
Payer: COMMERCIAL

## 2025-08-15 DIAGNOSIS — E89.0 POSTSURGICAL HYPOTHYROIDISM: Primary | Chronic | ICD-10-CM

## 2025-08-15 DIAGNOSIS — C73 THYROID CANCER: Chronic | ICD-10-CM

## 2025-08-15 RX ORDER — LIOTHYRONINE SODIUM 5 UG/1
TABLET ORAL
Qty: 90 TABLET | Refills: 3 | Status: SHIPPED | OUTPATIENT
Start: 2025-08-15

## 2025-08-28 ENCOUNTER — HOSPITAL ENCOUNTER (OUTPATIENT)
Dept: ENDOCRINOLOGY | Facility: CLINIC | Age: 40
Discharge: HOME | End: 2025-08-28
Attending: INTERNAL MEDICINE
Payer: COMMERCIAL

## 2025-08-28 DIAGNOSIS — E89.0 POSTSURGICAL HYPOTHYROIDISM: ICD-10-CM

## 2025-08-28 DIAGNOSIS — C73 THYROID CANCER: ICD-10-CM

## 2025-08-28 PROCEDURE — 76536 US EXAM OF HEAD AND NECK: CPT | Mod: S$GLB,,, | Performed by: INTERNAL MEDICINE

## (undated) DEVICE — PAD ABDOMINAL STERILE 5X9IN

## (undated) DEVICE — SEE MEDLINE ITEM 152532

## (undated) DEVICE — APPLICATOR CHLORAPREP ORN 26ML

## (undated) DEVICE — SUT VICRYL PLUS 3-0 SH 18IN

## (undated) DEVICE — GLOVE BIOGEL ECLIPSE SZ 7

## (undated) DEVICE — SUT PROLENE 5/0 RB-1 36 IN

## (undated) DEVICE — SPONGE KITTNER 1/4X 5/8 L STRL

## (undated) DEVICE — Device

## (undated) DEVICE — CLIP LIGATING MEDIUM

## (undated) DEVICE — BLADE SURG STAINLESS STEEL #15

## (undated) DEVICE — SEE MEDLINE ITEM 157150

## (undated) DEVICE — POSITIONER IV ARMBOARD FOAM

## (undated) DEVICE — CLIP LIGATING HEMOCLP SMALL

## (undated) DEVICE — GLOVE SURG BIOGEL LATEX SZ 7.5

## (undated) DEVICE — PROBE PRASS SLIM

## (undated) DEVICE — DRAPE STERI INSTRUMENT 1018

## (undated) DEVICE — SEE MEDLINE ITEM 157194

## (undated) DEVICE — ELECTRODE BLD EXT INSUL 1

## (undated) DEVICE — SOL WATER STRL IRR 1000ML

## (undated) DEVICE — SEE MEDLINE ITEM 152622

## (undated) DEVICE — SPONGE LAP 18X18 PREWASHED

## (undated) DEVICE — PACK DRAPE UNIVERSAL CONVERTOR

## (undated) DEVICE — POSITIONER HEAD DONUT 9IN FOAM

## (undated) DEVICE — SEE MEDLINE ITEM 157166

## (undated) DEVICE — RETRACTOR WAVEGUIDE NAR/FLAT

## (undated) DEVICE — CORD FOR BIPOLAR FORCEPS 12

## (undated) DEVICE — SUT PROLENE 3-0 30SH

## (undated) DEVICE — ELECTRODE REM PLYHSV RETURN 9

## (undated) DEVICE — NDL HYPO REG 25G X 1 1/2

## (undated) DEVICE — SUT 4-0 12-18IN SILK BLACK

## (undated) DEVICE — TAPE MEDIPORE 3 X 10YD

## (undated) DEVICE — TUBE EMG NIM 6.0MM TRIVANTAGE

## (undated) DEVICE — SYR 10CC LUER LOCK

## (undated) DEVICE — COVER OVERHEAD SURG LT BLUE

## (undated) DEVICE — HEMOSTAT SURGICEL FIBRLR 1X2IN

## (undated) DEVICE — TRAY DRY SKIN SCRUB PREP

## (undated) DEVICE — SEE MEDLINE ITEM 157117

## (undated) DEVICE — CONTAINER SPECIMEN STRL 4OZ

## (undated) DEVICE — MARKER SKIN STND TIP BLUE BARR

## (undated) DEVICE — SUT 2-0 12-18IN SILK

## (undated) DEVICE — WARMER DRAPE STERILE LF

## (undated) DEVICE — SUT 3-0 12-18IN SILK

## (undated) DEVICE — SCRUB HIBICLENS 4% CHG 4OZ